# Patient Record
Sex: FEMALE | Race: WHITE | NOT HISPANIC OR LATINO | Employment: PART TIME | URBAN - METROPOLITAN AREA
[De-identification: names, ages, dates, MRNs, and addresses within clinical notes are randomized per-mention and may not be internally consistent; named-entity substitution may affect disease eponyms.]

---

## 2017-01-24 DIAGNOSIS — B37.31 VAGINAL CANDIDIASIS: ICD-10-CM

## 2017-01-24 RX ORDER — FLUCONAZOLE 150 MG/1
TABLET ORAL
Qty: 4 TABLET | Refills: 0 | Status: SHIPPED | OUTPATIENT
Start: 2017-01-24 | End: 2017-03-25 | Stop reason: SDUPTHER

## 2017-02-23 ENCOUNTER — OFFICE VISIT (OUTPATIENT)
Dept: FAMILY MEDICINE CLINIC | Facility: CLINIC | Age: 57
End: 2017-02-23

## 2017-02-23 VITALS
HEIGHT: 64 IN | HEART RATE: 94 BPM | DIASTOLIC BLOOD PRESSURE: 76 MMHG | BODY MASS INDEX: 47.87 KG/M2 | WEIGHT: 280.4 LBS | TEMPERATURE: 98.6 F | OXYGEN SATURATION: 95 % | SYSTOLIC BLOOD PRESSURE: 138 MMHG

## 2017-02-23 DIAGNOSIS — Z79.899 ENCOUNTER FOR LONG-TERM (CURRENT) USE OF MEDICATIONS: ICD-10-CM

## 2017-02-23 DIAGNOSIS — E55.9 VITAMIN D DEFICIENCY: ICD-10-CM

## 2017-02-23 DIAGNOSIS — E03.8 OTHER SPECIFIED HYPOTHYROIDISM: ICD-10-CM

## 2017-02-23 DIAGNOSIS — M17.11 PRIMARY OSTEOARTHRITIS OF RIGHT KNEE: ICD-10-CM

## 2017-02-23 DIAGNOSIS — R79.89 ELEVATED LIVER FUNCTION TESTS: ICD-10-CM

## 2017-02-23 DIAGNOSIS — R42 DIZZINESS: ICD-10-CM

## 2017-02-23 DIAGNOSIS — F41.9 ANXIETY: ICD-10-CM

## 2017-02-23 DIAGNOSIS — E11.9 TYPE 2 DIABETES MELLITUS WITHOUT COMPLICATION, WITHOUT LONG-TERM CURRENT USE OF INSULIN (HCC): ICD-10-CM

## 2017-02-23 DIAGNOSIS — E78.5 HYPERLIPIDEMIA, UNSPECIFIED HYPERLIPIDEMIA TYPE: ICD-10-CM

## 2017-02-23 DIAGNOSIS — I10 HYPERTENSION, ESSENTIAL: Primary | ICD-10-CM

## 2017-02-23 DIAGNOSIS — J30.1 NON-SEASONAL ALLERGIC RHINITIS DUE TO POLLEN: ICD-10-CM

## 2017-02-23 DIAGNOSIS — E66.01 MORBID OBESITY WITH BMI OF 45.0-49.9, ADULT (HCC): ICD-10-CM

## 2017-02-23 PROCEDURE — 99214 OFFICE O/P EST MOD 30 MIN: CPT | Performed by: INTERNAL MEDICINE

## 2017-02-23 RX ORDER — TIZANIDINE HYDROCHLORIDE 4 MG/1
4 CAPSULE, GELATIN COATED ORAL 3 TIMES DAILY
Qty: 90 CAPSULE | Refills: 1 | Status: SHIPPED | OUTPATIENT
Start: 2017-02-23 | End: 2017-05-21 | Stop reason: SDUPTHER

## 2017-02-23 NOTE — PROGRESS NOTES
Tiana Hudson is a 56 y.o. female   Chief Complaint   Patient presents with   • Hypertension     follow up   • Diabetes   • Hypothyroidism         Subjective   History of Present Illness     Tiana Hudson is a 56 y.o.female who presents with for routine check up and evaluation  Medical illnesses addressed today include HTN, Hypothyroidism, Type 2 DM (intolerant of Invokamet, willing to try Victoza), anxiety, vit D def, non-seasonal allergic rhinitis, dizziness, elevated LFT, morbid obesity.  Follow up as planned      The following portions of the patient's history were reviewed and updated as appropriate: past medical history, surgeries, family history, allergies, current medications, past social history and problem list.    A comprehensive review of 14 systems was peformed  Review of Systems   Constitutional: Negative.  Negative for chills, fatigue, fever and unexpected weight change.   HENT: Negative.  Negative for ear pain, hearing loss, sinus pressure, sore throat and tinnitus.    Eyes: Negative.  Negative for pain, discharge and redness.   Respiratory: Negative.  Negative for cough, shortness of breath and wheezing.    Cardiovascular: Negative.  Negative for chest pain, palpitations and leg swelling.   Gastrointestinal: Negative.  Negative for abdominal pain, constipation, diarrhea and nausea.   Endocrine: Negative.  Negative for cold intolerance and heat intolerance.   Genitourinary: Negative.  Negative for difficulty urinating, flank pain and urgency.   Musculoskeletal: Positive for back pain. Negative for joint swelling and myalgias.   Skin: Negative.  Negative for rash and wound.   Allergic/Immunologic: Negative.  Negative for environmental allergies and food allergies.   Neurological: Negative.  Negative for dizziness, seizures, numbness and headaches.   Hematological: Negative.  Negative for adenopathy. Does not bruise/bleed easily.   Psychiatric/Behavioral: Negative.  Negative for decreased  "concentration, dysphoric mood and sleep disturbance. The patient is not nervous/anxious.    All other systems reviewed and are negative.      I have reviewed the patient's medical history in detail and updated the computerized patient record.      Objective   Vitals:    02/23/17 1359   BP: 138/76   BP Location: Right arm   Patient Position: Sitting   Cuff Size: Large Adult   Pulse: 94   Temp: 98.6 °F (37 °C)   TempSrc: Oral   SpO2: 95%   Weight: 280 lb 6.4 oz (127 kg)   Height: 64\" (162.6 cm)   PainSc:   2   PainLoc: Knee           Physical Exam   Constitutional: She appears well-developed and well-nourished.   HENT:   Head: Normocephalic and atraumatic.   Right Ear: External ear normal.   Left Ear: External ear normal.   Nose: Nose normal.   Mouth/Throat: Oropharynx is clear and moist.   Eyes: Conjunctivae and EOM are normal. Pupils are equal, round, and reactive to light.   Neck: Normal range of motion. Neck supple.   Cardiovascular: Normal rate, regular rhythm, normal heart sounds and intact distal pulses.    Pulmonary/Chest: Effort normal and breath sounds normal.   Abdominal: Soft. Bowel sounds are normal.   Musculoskeletal: Normal range of motion.   Bilatetral knee pain  Blood sugars running high   Neurological: She is alert. She has normal reflexes.   Skin: Skin is warm and dry.   Psychiatric: She has a normal mood and affect. Her behavior is normal. Judgment and thought content normal.       Procedures    Reviewed old notes from [employer]                            Assessment/Plan     Diagnoses and all orders for this visit:    Hypertension, essential  Comments:  Salt level to  monitor  Try to lose weight  Follow up as planned  Orders:  -     CBC & Differential  -     Comprehensive Metabolic Panel  -     Lipid Panel With LDL / HDL Ratio  -     Thyroid Panel With TSH  -     Hemoglobin A1c  -     Microalbumin / Creatinine Urine Ratio  -     Vitamin D 25 hydroxy    Primary osteoarthritis of right " knee  Comments:  Follow up as planned  Dr. Cruz as planned  Considering knee replacement  Orders:  -     CBC & Differential  -     Comprehensive Metabolic Panel  -     Lipid Panel With LDL / HDL Ratio  -     Thyroid Panel With TSH  -     Hemoglobin A1c  -     Microalbumin / Creatinine Urine Ratio  -     Vitamin D 25 hydroxy    Other specified hypothyroidism  Comments:  Continue same meds  Check labs today  Follow up as planned                                                                                                        Orders:  -     CBC & Differential  -     Comprehensive Metabolic Panel  -     Lipid Panel With LDL / HDL Ratio  -     Thyroid Panel With TSH  -     Hemoglobin A1c  -     Microalbumin / Creatinine Urine Ratio  -     Vitamin D 25 hydroxy    Hyperlipidemia, unspecified hyperlipidemia type  Comments:  Labs  Watch diet  Orders:  -     CBC & Differential  -     Comprehensive Metabolic Panel  -     Lipid Panel With LDL / HDL Ratio  -     Thyroid Panel With TSH  -     Hemoglobin A1c  -     Microalbumin / Creatinine Urine Ratio  -     Vitamin D 25 hydroxy    Anxiety  Comments:  Lexapro as planned  Follow up as planned  Orders:  -     CBC & Differential  -     Comprehensive Metabolic Panel  -     Lipid Panel With LDL / HDL Ratio  -     Thyroid Panel With TSH  -     Hemoglobin A1c  -     Microalbumin / Creatinine Urine Ratio  -     Vitamin D 25 hydroxy    Vitamin D deficiency  Comments:  Check level  Follow up as planned  Orders:  -     CBC & Differential  -     Comprehensive Metabolic Panel  -     Lipid Panel With LDL / HDL Ratio  -     Thyroid Panel With TSH  -     Hemoglobin A1c  -     Microalbumin / Creatinine Urine Ratio  -     Vitamin D 25 hydroxy    Non-seasonal allergic rhinitis due to pollen  Comments:  Follow up as planned  Return if symptoms change or worsen    Dizziness  Comments:  treat as needed  Follow up as planned    Elevated liver function tests  Comments:  Continue close  monitoring    Morbid obesity with BMI of 45.0-49.9, adult  Comments:  Follow up as planned  No other acute changes at present time    Encounter for long-term (current) use of medications  -     CBC & Differential  -     Comprehensive Metabolic Panel  -     Lipid Panel With LDL / HDL Ratio  -     Thyroid Panel With TSH  -     Hemoglobin A1c  -     Microalbumin / Creatinine Urine Ratio  -     Vitamin D 25 hydroxy    Type 2 diabetes mellitus without complication, without long-term current use of insulin  Comments:  trial of Bydureon  Continue to monitor closely    Other orders  -     TiZANidine (ZANAFLEX) 4 MG capsule; Take 1 capsule by mouth 3 (Three) Times a Day.  -     Exenatide ER 2 MG pen-injector; Inject 1 ampule under the skin 1 (One) Time Per Week.           Dean Fairchild MD  2/23/2017  2:04 PM

## 2017-02-23 NOTE — PATIENT INSTRUCTIONS
Diagnoses and all orders for this visit:    Hypertension, essential  Comments:  Salt level to  monitor  Try to lose weight  Follow up as planned  Orders:  -     CBC & Differential  -     Comprehensive Metabolic Panel  -     Lipid Panel With LDL / HDL Ratio  -     Thyroid Panel With TSH  -     Hemoglobin A1c  -     Microalbumin / Creatinine Urine Ratio  -     Vitamin D 25 hydroxy    Primary osteoarthritis of right knee  Comments:  Follow up as planned  Dr. Cruz as planned  Considering knee replacement  Orders:  -     CBC & Differential  -     Comprehensive Metabolic Panel  -     Lipid Panel With LDL / HDL Ratio  -     Thyroid Panel With TSH  -     Hemoglobin A1c  -     Microalbumin / Creatinine Urine Ratio  -     Vitamin D 25 hydroxy    Other specified hypothyroidism  Comments:  Continue same meds  Check labs today  Follow up as planned                                                                                                        Orders:  -     CBC & Differential  -     Comprehensive Metabolic Panel  -     Lipid Panel With LDL / HDL Ratio  -     Thyroid Panel With TSH  -     Hemoglobin A1c  -     Microalbumin / Creatinine Urine Ratio  -     Vitamin D 25 hydroxy    Hyperlipidemia, unspecified hyperlipidemia type  Comments:  Labs  Watch diet  Orders:  -     CBC & Differential  -     Comprehensive Metabolic Panel  -     Lipid Panel With LDL / HDL Ratio  -     Thyroid Panel With TSH  -     Hemoglobin A1c  -     Microalbumin / Creatinine Urine Ratio  -     Vitamin D 25 hydroxy    Anxiety  Comments:  Lexapro as planned  Follow up as planned  Orders:  -     CBC & Differential  -     Comprehensive Metabolic Panel  -     Lipid Panel With LDL / HDL Ratio  -     Thyroid Panel With TSH  -     Hemoglobin A1c  -     Microalbumin / Creatinine Urine Ratio  -     Vitamin D 25 hydroxy    Vitamin D deficiency  Comments:  Check level  Follow up as planned  Orders:  -     CBC & Differential  -     Comprehensive Metabolic Panel  -      Lipid Panel With LDL / HDL Ratio  -     Thyroid Panel With TSH  -     Hemoglobin A1c  -     Microalbumin / Creatinine Urine Ratio  -     Vitamin D 25 hydroxy    Non-seasonal allergic rhinitis due to pollen  Comments:  Follow up as planned  Return if symptoms change or worsen    Dizziness  Comments:  treat as needed  Follow up as planned    Elevated liver function tests  Comments:  Continue close monitoring    Morbid obesity with BMI of 45.0-49.9, adult  Comments:  Follow up as planned  No other acute changes at present time    Encounter for long-term (current) use of medications  -     CBC & Differential  -     Comprehensive Metabolic Panel  -     Lipid Panel With LDL / HDL Ratio  -     Thyroid Panel With TSH  -     Hemoglobin A1c  -     Microalbumin / Creatinine Urine Ratio  -     Vitamin D 25 hydroxy    Type 2 diabetes mellitus without complication, without long-term current use of insulin  Comments:  trial of Bydureon  Continue to monitor closely    Other orders  -     TiZANidine (ZANAFLEX) 4 MG capsule; Take 1 capsule by mouth 3 (Three) Times a Day.  -     Exenatide ER 2 MG pen-injector; Inject 1 ampule under the skin 1 (One) Time Per Week.

## 2017-02-24 LAB
25(OH)D3+25(OH)D2 SERPL-MCNC: 42.1 NG/ML (ref 30–100)
ALBUMIN SERPL-MCNC: 4.3 G/DL (ref 3.5–5.2)
ALBUMIN/CREAT UR: 137.6 MG/G CREAT (ref 0–30)
ALBUMIN/GLOB SERPL: 1.7 G/DL
ALP SERPL-CCNC: 63 U/L (ref 39–117)
ALT SERPL-CCNC: 23 U/L (ref 1–33)
AST SERPL-CCNC: 25 U/L (ref 1–32)
BASOPHILS # BLD AUTO: 0.05 10*3/MM3 (ref 0–0.2)
BASOPHILS NFR BLD AUTO: 0.8 % (ref 0–1.5)
BILIRUB SERPL-MCNC: 0.7 MG/DL (ref 0.1–1.2)
BUN SERPL-MCNC: 14 MG/DL (ref 6–20)
BUN/CREAT SERPL: 16.3 (ref 7–25)
CALCIUM SERPL-MCNC: 9.9 MG/DL (ref 8.6–10.5)
CHLORIDE SERPL-SCNC: 98 MMOL/L (ref 98–107)
CHOLEST SERPL-MCNC: 220 MG/DL (ref 0–200)
CO2 SERPL-SCNC: 26.5 MMOL/L (ref 22–29)
CREAT SERPL-MCNC: 0.86 MG/DL (ref 0.57–1)
CREAT UR-MCNC: 88.1 MG/DL
DIFFERENTIAL COMMENT: NORMAL
EOSINOPHIL # BLD AUTO: 0.43 10*3/MM3 (ref 0–0.7)
EOSINOPHIL NFR BLD AUTO: 6.9 % (ref 0.3–6.2)
ERYTHROCYTE [DISTWIDTH] IN BLOOD BY AUTOMATED COUNT: 21.4 % (ref 11.7–13)
FT4I SERPL CALC-MCNC: 2.6 (ref 1.2–4.9)
GLOBULIN SER CALC-MCNC: 2.6 GM/DL
GLUCOSE SERPL-MCNC: 130 MG/DL (ref 65–99)
HBA1C MFR BLD: 6.55 % (ref 4.8–5.6)
HCT VFR BLD AUTO: 32.6 % (ref 35.6–45.5)
HDLC SERPL-MCNC: 58 MG/DL (ref 40–60)
HGB BLD-MCNC: 9.5 G/DL (ref 11.9–15.5)
IMM GRANULOCYTES # BLD: 0.02 10*3/MM3 (ref 0–0.03)
IMM GRANULOCYTES NFR BLD: 0.3 % (ref 0–0.5)
LDLC SERPL CALC-MCNC: 140 MG/DL (ref 0–100)
LDLC/HDLC SERPL: 2.41 {RATIO}
LYMPHOCYTES # BLD AUTO: 1.82 10*3/MM3 (ref 0.9–4.8)
LYMPHOCYTES NFR BLD AUTO: 29.2 % (ref 19.6–45.3)
MCH RBC QN AUTO: 20.1 PG (ref 26.9–32)
MCHC RBC AUTO-ENTMCNC: 29.1 G/DL (ref 32.4–36.3)
MCV RBC AUTO: 68.9 FL (ref 80.5–98.2)
MICROALBUMIN UR-MCNC: 121.2 UG/ML
MONOCYTES # BLD AUTO: 0.59 10*3/MM3 (ref 0.2–1.2)
MONOCYTES NFR BLD AUTO: 9.5 % (ref 5–12)
NEUTROPHILS # BLD AUTO: 3.32 10*3/MM3 (ref 1.9–8.1)
NEUTROPHILS NFR BLD AUTO: 53.3 % (ref 42.7–76)
PLATELET # BLD AUTO: 381 10*3/MM3 (ref 140–500)
PLATELET BLD QL SMEAR: NORMAL
POTASSIUM SERPL-SCNC: 4.7 MMOL/L (ref 3.5–5.2)
PROT SERPL-MCNC: 6.9 G/DL (ref 6–8.5)
RBC # BLD AUTO: 4.73 10*6/MM3 (ref 3.9–5.2)
RBC MORPH BLD: NORMAL
SODIUM SERPL-SCNC: 138 MMOL/L (ref 136–145)
T3RU NFR SERPL: 27 % (ref 24–39)
T4 SERPL-MCNC: 9.8 UG/DL (ref 4.5–12)
TRIGL SERPL-MCNC: 112 MG/DL (ref 0–150)
TSH SERPL DL<=0.005 MIU/L-ACNC: 2.06 UIU/ML (ref 0.45–4.5)
VLDLC SERPL CALC-MCNC: 22.4 MG/DL (ref 5–40)
WBC # BLD AUTO: 6.23 10*3/MM3 (ref 4.5–10.7)

## 2017-03-13 ENCOUNTER — CLINICAL SUPPORT (OUTPATIENT)
Dept: ORTHOPEDIC SURGERY | Facility: CLINIC | Age: 57
End: 2017-03-13

## 2017-03-13 VITALS — TEMPERATURE: 98.6 F | BODY MASS INDEX: 46.61 KG/M2 | HEIGHT: 64 IN | WEIGHT: 273 LBS

## 2017-03-13 DIAGNOSIS — M17.0 ARTHRITIS OF BOTH KNEES: Primary | ICD-10-CM

## 2017-03-13 DIAGNOSIS — M17.11 ARTHRITIS OF RIGHT KNEE: ICD-10-CM

## 2017-03-13 PROCEDURE — 99214 OFFICE O/P EST MOD 30 MIN: CPT | Performed by: NURSE PRACTITIONER

## 2017-03-13 PROCEDURE — 20610 DRAIN/INJ JOINT/BURSA W/O US: CPT | Performed by: NURSE PRACTITIONER

## 2017-03-13 RX ORDER — CYCLOBENZAPRINE HCL 10 MG
10 TABLET ORAL 3 TIMES DAILY PRN
Status: ON HOLD | COMMUNITY
End: 2017-05-22

## 2017-03-13 RX ORDER — METHYLPREDNISOLONE ACETATE 80 MG/ML
80 INJECTION, SUSPENSION INTRA-ARTICULAR; INTRALESIONAL; INTRAMUSCULAR; SOFT TISSUE
Status: COMPLETED | OUTPATIENT
Start: 2017-03-13 | End: 2017-03-13

## 2017-03-13 RX ORDER — BUPIVACAINE HYDROCHLORIDE 5 MG/ML
2 INJECTION, SOLUTION PERINEURAL
Status: COMPLETED | OUTPATIENT
Start: 2017-03-13 | End: 2017-03-13

## 2017-03-13 RX ORDER — LIDOCAINE HYDROCHLORIDE 10 MG/ML
2 INJECTION, SOLUTION INFILTRATION; PERINEURAL
Status: COMPLETED | OUTPATIENT
Start: 2017-03-13 | End: 2017-03-13

## 2017-03-13 RX ORDER — CEFAZOLIN SODIUM 2 G/100ML
2 INJECTION, SOLUTION INTRAVENOUS ONCE
Status: CANCELLED | OUTPATIENT
Start: 2017-03-13 | End: 2017-03-13

## 2017-03-13 RX ADMIN — BUPIVACAINE HYDROCHLORIDE 2 ML: 5 INJECTION, SOLUTION PERINEURAL at 09:28

## 2017-03-13 RX ADMIN — METHYLPREDNISOLONE ACETATE 80 MG: 80 INJECTION, SUSPENSION INTRA-ARTICULAR; INTRALESIONAL; INTRAMUSCULAR; SOFT TISSUE at 09:28

## 2017-03-13 RX ADMIN — LIDOCAINE HYDROCHLORIDE 2 ML: 10 INJECTION, SOLUTION INFILTRATION; PERINEURAL at 09:28

## 2017-03-13 NOTE — PATIENT INSTRUCTIONS
[facility]    KNEE HOME EXERCISES      It is important that you maintain and possibly improve your strength and range of motion of your knee.      •  Walk frequently for short intervals  •  Using a cane or walker to allow you to walk safely if needed  •  Avoid sitting for long periods of time to avoid cramping and swelling of your leg   •  Do exercises either on a bed or in a chair.  •  If any of the exercises cause you pain, either eliminate that exercise or decrease          the motion or repetitions      Start exercises with 10 repetitions and increase to 30 repetitions.  Do two sets of each exercise each day    ANKLE PUMPS    1. Move your feet up and down as if you are pumping on a gas pedal       STRAIGHT LEG RAISES    1. Lie flat with your injured leg straight.  Keep the other leg bent.  2. Tighten the injured leg's thigh muscle.  3. Lift leg, with knee locked in the straight position no higher than the other knee for  seconds  4. Lower leg slowly. Rest for 5 seconds      SHORT ARC QUAD    1. Lie with both knees bent over a pillow  2. Straighten both knees  3. Hold 5 seconds  4. Bend knees back to starting position and repeat sequence       QUADRICEPS     1. Lie flat with your injured let straight.  Keep the other leg bent.  2. Tighten the injured leg's thigh muscle by trying to move your kneecap toward the  thigh.  3. Make your leg as stiff as you can.  4. Hold for 5 seconds and relax for 5 seconds        HAMSTRING STRETCH    1. Lie flat with your injured leg straight. Keep the other leg bent  2. Press your heel of your injured leg into the floor for 5 seconds       OR  1. Sit with injured leg out straight.   2. Loop a sheet around the ball of foot and toes.  3. Gently pull on the sheet, keeping the leg straight  4. Hold for 10-30 seconds      KNEE FLEXION-EXTENSION SITTING     1. Sit with injured let bent as shown  2. Straighten leg at the knee  3. Hold 5 seconds  4. Bend knee back  to starting position   5. Rest for 2-5 seconds and repeat sequence       HEEL SLIDES     1. Lie on back with legs straight  2. Slide heels toward buttocks  3. Return to starting position       HEEL SLIDS WITH SHEET    1. Lie on your back with a sheet wrapped around your foot  2. Pull on the sheet to assist you in bending your knee and sliding your heel toward  your buttocks  3. Hold for 5 seconds        KNEE FLEXION STRETCH    1. Sit in a chair  2. Bend bad knee back as far as you can   3. Hold stretch for 5-10 seconds      CHAIR PUSH UPS    1. Sit in a chair with arm rests  2. Place hands on armrests  3. Straighten arms, raising buttocks up off of chair         WALL SLIDES    1. Stand with your back and head against a wall.    2. Keep your feet shoulder width apart and 6-12 inches from the wall  3. Slowly slide down the wall until your knees are slightly bent.    4. Hold for 5 seconds and then slowly slide back up  5. As you get stronger, then you can slowly increase the bend in your knees, but do  not drop your buttocks below the level of your knees       STEP UPS    1. Stand with your foot on your injured leg on a 3-5 inch support (such as a block of  wood)  2. Place other foot on the floor  3. Shift your weight onto the foot on the block and try to straighten the knee and  raising the other foot off of the floor  4. Slowly lower your foot until only the heel touches the floor

## 2017-03-13 NOTE — PROGRESS NOTES
Knee Joint Injection      Patient: Tiana Hudson  YOB: 1960    Chief Complaints: Knee pain    Subjective:    History of Present Illness: Pt gets intermittent  injections with good relief. Is here for repeat injection. Understands options. The pain is a generalized joint line tenderness.  It has been progressive in nature but remains intermittent.  Worsened by prolonged standing or walking and squatting activities. Has had improvement in the past with ice/heat, rest, and injections. KNEE: TIMING:  The pain is described as ACUTE on CHRONIC.  LOCATION: medial joint line tenderness. AGGRAVATING FACTORS:  Is worsened by prolonged standing, sitting, walking and squatting activities.  ASSOCIATED SYMPTOMS:  swelling, tenderness, and aching. OTHER SYMPTOMS denies popping, locking or catching. RELIEVING FACTORS:  Previous treatment has included cortisone injections  OTC Tylenol  OTC meds/NSAIDS  assistive devices.    This problem is not new to this examiner.     Allergies:   Allergies   Allergen Reactions   • Sulfa Antibiotics Hives       Medications:   Home Medications:  Current Outpatient Prescriptions on File Prior to Visit   Medication Sig   • acetaminophen (TYLENOL) 500 MG tablet Take 1,000 mg by mouth Every 6 (Six) Hours As Needed for mild pain (1-3).   • amLODIPine-benazepril (LOTREL 5-20) 5-20 MG per capsule Take 1 capsule by mouth daily.   • Canagliflozin-Metformin HCl (INVOKAMET) 150-1000 MG tablet Take 150-1,000 tablets by mouth daily.   • celecoxib (CeleBREX) 200 MG capsule Take 1 capsule by mouth 2 (Two) Times a Day.   • cholecalciferol (VITAMIN D3) 400 UNITS tablet Take 2,000 Units by mouth daily.   • escitalopram (LEXAPRO) 10 MG tablet Take 1 tablet by mouth daily.   • Exenatide ER 2 MG pen-injector Inject 1 ampule under the skin 1 (One) Time Per Week.   • fluconazole (DIFLUCAN) 150 MG tablet TAKE ONE TABLET BY MOUTH ONCE WEEKLY   • levothyroxine (SYNTHROID, LEVOTHROID) 125 MCG tablet Take 1  tablet by mouth daily.   • meclizine (ANTIVERT) 25 MG tablet Take 1 tablet by mouth 3 (Three) Times a Day As Needed for dizziness.   • metFORMIN (GLUCOPHAGE) 500 MG tablet TAKE ONE TABLET BY MOUTH TWO TIMES A DAY WITH MEALS   • oxybutynin XL (DITROPAN-XL) 10 MG 24 hr tablet Take 10 mg by mouth Daily.   • pravastatin (PRAVACHOL) 40 MG tablet Take 1 tablet by mouth daily.   • rOPINIRole (REQUIP) 0.5 MG tablet Take 1 tablet by mouth Every Night. Take 1 hour before bedtime.   • TiZANidine (ZANAFLEX) 4 MG capsule Take 1 capsule by mouth 3 (Three) Times a Day.     No current facility-administered medications on file prior to visit.      Current Medications:  Scheduled Meds:  Continuous Infusions:  No current facility-administered medications for this visit.   PRN Meds:.    I have reviewed the patient's medical history in detail and updated the computerized patient record.  Review and summarization of old records include:    Past Medical History   Diagnosis Date   • Arthritis    • Bladder mass    • Depression    • Diabetes mellitus    • Disease of thyroid gland      HYPOTHYROIDISM   • Gallstones    • Hyperlipidemia    • Hypertension    • Kidney stone    • Lumbar stenosis    • PONV (postoperative nausea and vomiting)    • Positive TB test      chest x-ray neg        Past Surgical History   Procedure Laterality Date   • Appendectomy     • Ovarian cyst removal Left    • Dilatation and curettage     • Knee arthroscopy w/ meniscal repair Left    • Cystoscopy bladder biopsy N/A 10/3/2016     Procedure: CYSTOSCOPY BLADDER BIOPSY;  Surgeon: Rei Calvert MD;  Location: [facility] OR;  Service:         Social History     Occupational History   • Not on file.     Social History Main Topics   • Smoking status: Never Smoker   • Smokeless tobacco: Never Used   • Alcohol use 0.6 oz/week     1 Shots of liquor per week   • Drug use: No   • Sexual activity: Defer    Social History     Social History Narrative        Family  History   Problem Relation Age of Onset   • Hepatitis Mother    • Heart failure Father    • Stroke Father    • Hypertension Father        ROS: 14 point review of systems was performed and was negative except for documented findings in HPI and today's encounter.     Allergies:   Allergies   Allergen Reactions   • Sulfa Antibiotics Hives     Constitutional:  Denies fever, shaking or chills   Eyes:  Denies change in visual acuity   HENT:  Denies nasal congestion or sore throat   Respiratory:  Denies cough or shortness of breath   Cardiovascular:  Denies chest pain or severe LE edema   GI:  Denies abdominal pain, nausea, vomiting, bloody stools or diarrhea   Musculoskeletal:  Numbness, tingling, or loss of motor function only as noted above in history of present illness.  : Denies painful urination or hematuria  Integument:  Denies rash, lesion or ulceration   Neurologic:  Denies headache or focal weakness  Endocrine:  Denies lymphadenopathy  Psych:  Denies confusion or change in mental status   Hem:  Denies active bleeding    Physical Exam:  Body mass index is 46.86 kg/(m^2).  Vitals:    03/13/17 0859   Temp: 98.6 °F (37 °C)     Vital Signs:  reviewed  Constitutional: Awake alert and oriented x3, well developed, no acute distress, non-toxic appearance.  EYES: symmetric, sclera clear  ENT:  Normocephalic, Atraumatic.   Respiratory:  No respiratory distress, No wheezing  CV: pulse regular, no palpitations or pallor.  GI:  Abdomen soft, non-tender.   Vascular:  Intact distal pulses, No cyanosis, no signs or symptoms of DVT.  Neurologic: Sensation grossly intact to the involved extremity, No focal deficits noted.   Neck: No tenderness, Supple.  Integument: warm, dry, no ulcerations.   Psychiatric:  Oriented, no pathological affect.  Musculoskeletal:    Affected knee(s):  Painful gait with a subtle limp, positive for synovitis, swelling, joint effusion with crepitation.  Lachman negative  Posterior drawer  negative  Luz Marina's negative  Patellofemoral grind +  Sensation grossly intact to light touch throughout the lower extremity  Skin is intact  Distal pulses are palpable  No signs or symptoms of DVT        Diagnostic Data:     Imaging was done previously in the office and discussed at length with the patient:    Indication: pain related symptoms,  Views: 3V AP, LAT & 40 degree PA bilateral knee(s)   Findings: severe end-stage arthritis (bone on bone, subchondral sclerosis/cysts, osteophytes)  Comparison views: viewed last xray done in the office.     Procedure:  Large Joint Arthrocentesis  Date/Time: 3/13/2017 9:28 AM  Consent given by: patient  Site marked: site marked  Timeout: Immediately prior to procedure a time out was called to verify the correct patient, procedure, equipment, support staff and site/side marked as required   Supporting Documentation  Indications: pain and joint swelling   Procedure Details  Location: knee - L knee  Preparation: Patient was prepped and draped in the usual sterile fashion  Needle size: 22 G  Approach: anterolateral  Medications administered: 2 mL bupivacaine; 2 mL lidocaine 1 %; 80 mg methylPREDNISolone acetate 80 MG/ML  Patient tolerance: patient tolerated the procedure well with no immediate complications          Assessment. Severe arthritis bilateral knee(s). Right knee is worse than the Left and would like to schedule Right TKA in the next few months.       Plan: Is to proceed with injection  Follow up as indicated.  Ice, elevate, and rest as needed.  Additional interventions include: Biomechanics of pertinent body area discussed.  Risks, benefits, alternatives, comparisons, and complications of accepted medicines, injections, recommendations, surgical procedures, and therapies explained and education provided in laymen's terms. The patient was given the opportunity to ask questions and they were answerved to their satisfaction.   Natural history and expected course  discussed. Questions answered.  Advice on benefits of, and types of regular/moderate exercise.  Lifestyle measures for weight loss with biomechanical explanations for weight loss and how this affects orthopedic condition.  Cortisone Injection. See procedure note.  OTC analgesics as needed with dosage warning and instructions given.  Medications per orders; safety, precautions, and warnings given.  Cryotherapy/brachy therapy as indicated with instructions.   TKA: Continuation of conservative management vs. TKA: I reviewed anatomy of a total knee arthroplasty in laymen's terms, as well as typical postoperative recovery and possibly 6-12 months for maximal recovery, and possible need for rehabilitation stay after hospitalization. We also discussed risks, benefits, alternatives, and limitations of procedure with risks including but not limited to neurovascular damage, bleeding, infection, malalignment, chronic pian, failure of implants, osteolysis, loosening of implants, loss of motion, weakness, stiffness, instability, DVT, pulmonary embolus, death, stroke, complex regional pain syndrome, myocardial infarction, and need for additional procedures. Concept of substitution vs. replacement discussed.  No guarantees were given regarding results of surgery.  Patient verbalized understanding, and was given the opportunity to ask and have all questions answered today.    Discussed risks involved with BMI at 46.8, weight distribution is apple shapped, encouraged thigh strengthening exercises and exercises bike and weight loss leading into surgery. Right knee is worse than the Left and would like to schedule Right TKA in the next few months.   Dr. Cruz in to discuss surgery. Is a nurse in L&D.   3/13/2017  NOEMI

## 2017-03-24 DIAGNOSIS — B37.31 VAGINAL CANDIDIASIS: ICD-10-CM

## 2017-03-25 DIAGNOSIS — B37.31 VAGINAL CANDIDIASIS: ICD-10-CM

## 2017-03-25 RX ORDER — FLUCONAZOLE 150 MG/1
150 TABLET ORAL ONCE
Qty: 4 TABLET | Refills: 0 | Status: SHIPPED | OUTPATIENT
Start: 2017-03-25 | End: 2017-03-25

## 2017-03-27 RX ORDER — FLUCONAZOLE 150 MG/1
TABLET ORAL
Qty: 4 TABLET | Refills: 0 | Status: SHIPPED | OUTPATIENT
Start: 2017-03-27 | End: 2017-05-11 | Stop reason: SDUPTHER

## 2017-03-30 RX ORDER — ESCITALOPRAM OXALATE 10 MG/1
TABLET ORAL
Qty: 90 TABLET | Refills: 2 | Status: SHIPPED | OUTPATIENT
Start: 2017-03-30 | End: 2017-05-11 | Stop reason: SDUPTHER

## 2017-04-19 ENCOUNTER — HOSPITAL ENCOUNTER (OUTPATIENT)
Dept: GENERAL RADIOLOGY | Facility: HOSPITAL | Age: 57
Discharge: HOME | End: 2017-04-19
Attending: INTERNAL MEDICINE | Admitting: INTERNAL MEDICINE

## 2017-04-19 ENCOUNTER — OFFICE VISIT (OUTPATIENT)
Dept: FAMILY MEDICINE CLINIC | Facility: CLINIC | Age: 57
End: 2017-04-19

## 2017-04-19 VITALS
BODY MASS INDEX: 48.32 KG/M2 | HEIGHT: 64 IN | DIASTOLIC BLOOD PRESSURE: 68 MMHG | WEIGHT: 283 LBS | TEMPERATURE: 99.4 F | SYSTOLIC BLOOD PRESSURE: 128 MMHG | HEART RATE: 95 BPM | OXYGEN SATURATION: 94 %

## 2017-04-19 DIAGNOSIS — E78.5 HYPERLIPIDEMIA, UNSPECIFIED HYPERLIPIDEMIA TYPE: ICD-10-CM

## 2017-04-19 DIAGNOSIS — M17.0 ARTHRITIS OF BOTH KNEES: ICD-10-CM

## 2017-04-19 DIAGNOSIS — M25.561 PAIN IN BOTH KNEES, UNSPECIFIED CHRONICITY: ICD-10-CM

## 2017-04-19 DIAGNOSIS — I10 HYPERTENSION, ESSENTIAL: Primary | ICD-10-CM

## 2017-04-19 DIAGNOSIS — E03.8 OTHER SPECIFIED HYPOTHYROIDISM: ICD-10-CM

## 2017-04-19 DIAGNOSIS — Z01.818 PREOPERATIVE CLEARANCE: ICD-10-CM

## 2017-04-19 DIAGNOSIS — E11.9 TYPE 2 DIABETES MELLITUS WITHOUT COMPLICATION, WITHOUT LONG-TERM CURRENT USE OF INSULIN (HCC): ICD-10-CM

## 2017-04-19 DIAGNOSIS — M25.562 PAIN IN BOTH KNEES, UNSPECIFIED CHRONICITY: ICD-10-CM

## 2017-04-19 DIAGNOSIS — M17.11 ARTHRITIS OF RIGHT KNEE: ICD-10-CM

## 2017-04-19 PROCEDURE — 99214 OFFICE O/P EST MOD 30 MIN: CPT | Performed by: INTERNAL MEDICINE

## 2017-04-19 PROCEDURE — 93000 ELECTROCARDIOGRAM COMPLETE: CPT | Performed by: INTERNAL MEDICINE

## 2017-04-19 PROCEDURE — 71020 HC CHEST PA AND LATERAL: CPT

## 2017-04-19 NOTE — PATIENT INSTRUCTIONS
Diagnoses and all orders for this visit:    Hypertension, essential  Comments:  Continue same meds  Follow  up as planned    Hyperlipidemia, unspecified hyperlipidemia type  Comments:  Watch diet and exercise regularly  Now stable at present time    Other specified hypothyroidism  Comments:  Continue hypothyroidism monitoring  Follow up as planned    Arthritis of both knees  Comments:  Continue treatment with Dr. Cruz  Follow up as planned    Arthritis of right knee  Comments:  planned knee replacement  Follow up as planned    Pain in both knees, unspecified chronicity  -     XR Chest 2 View    Type 2 diabetes mellitus without complication, without long-term current use of insulin  Comments:  Follow up as planned at present time  Continue same treatments    Preoperative clearance  Comments:  Patient is medically cleared for surgery from my point of view  Will review labs collected today  EKG is normal.  Orders:  -     XR Chest 2 View    Other orders  -     ECG 12 Lead

## 2017-04-19 NOTE — PROGRESS NOTES
Tiana Hudson is a 56 y.o. female   Chief Complaint   Patient presents with   • Hypertension     clearance for surgery          Subjective   History of Present Illness     Tiana Hudson is a 56 y.o.female who presents with:surgery planned by MAO May 22.  She is having total knee arthroplasty.  Has labs scheduled ast hospital.  No other acute changes at present time. Reviewed xray myself and looks good.  EKG is totally normal.  No pain meds at present time.  New job going okay but misses L and D      The following portions of the patient's history were reviewed and updated as appropriate: past medical history, surgeries, family history, allergies, current medications, past social history and problem list.    A comprehensive review of 14 systems was peformed  Review of Systems   Constitutional: Negative for chills, fatigue, fever and unexpected weight change.   HENT: Negative for ear pain, hearing loss, sinus pressure, sore throat and tinnitus.    Eyes: Negative for pain, discharge and redness.   Respiratory: Negative for cough, shortness of breath and wheezing.    Cardiovascular: Negative for chest pain, palpitations and leg swelling.   Gastrointestinal: Negative for abdominal pain, constipation, diarrhea and nausea.   Endocrine: Negative for cold intolerance and heat intolerance.   Genitourinary: Negative for difficulty urinating, flank pain and urgency.   Musculoskeletal: Negative for back pain, joint swelling and myalgias.   Skin: Negative for rash and wound.   Allergic/Immunologic: Negative for environmental allergies and food allergies.   Neurological: Negative for dizziness, seizures, numbness and headaches.   Hematological: Negative for adenopathy. Does not bruise/bleed easily.   Psychiatric/Behavioral: Negative for decreased concentration, dysphoric mood and sleep disturbance. The patient is not nervous/anxious.    All other systems reviewed and are negative.      I have reviewed the patient's  "medical history in detail and updated the computerized patient record.    Objective   Vitals:    04/19/17 1044   BP: 128/68   BP Location: Left arm   Patient Position: Sitting   Cuff Size: Adult   Pulse: 95   Temp: 99.4 °F (37.4 °C)   TempSrc: Oral   SpO2: 94%   Weight: 283 lb (128 kg)   Height: 64\" (162.6 cm)   PainSc:   4   PainLoc: Knee         Physical Exam   Constitutional: She appears well-developed and well-nourished.   HENT:   Head: Normocephalic and atraumatic.   Right Ear: External ear normal.   Left Ear: External ear normal.   Nose: Nose normal.   Mouth/Throat: Oropharynx is clear and moist.   Eyes: Conjunctivae and EOM are normal. Pupils are equal, round, and reactive to light.   Neck: Normal range of motion. Neck supple.   Cardiovascular: Normal rate, regular rhythm, normal heart sounds and intact distal pulses.    Pulmonary/Chest: Effort normal and breath sounds normal.   Abdominal: Soft. Bowel sounds are normal.   Musculoskeletal: Normal range of motion.   R>L DJD with planned replacement   Neurological: She is alert. She has normal reflexes.   Skin: Skin is warm and dry.   Psychiatric: She has a normal mood and affect. Her behavior is normal. Judgment and thought content normal.         ECG 12 Lead  Date/Time: 4/19/2017 1:05 PM  Performed by: KYLE UPTON  Authorized by: KYLE UPTON   Comparison: not compared with previous ECG   Previous ECG: no previous ECG available  Rhythm: sinus rhythm  BPM: 65  Clinical impression: normal ECG            Reviewed consult notes from Dr. Cruz    Reviewed actual xray films Chest xray as planned.  No other acute changes                        Assessment/Plan     Diagnoses and all orders for this visit:    Hypertension, essential  Comments:  Continue same meds  Follow  up as planned    Hyperlipidemia, unspecified hyperlipidemia type  Comments:  Watch diet and exercise regularly  Now stable at present time    Other specified hypothyroidism  Comments:  Continue " hypothyroidism monitoring  Follow up as planned    Arthritis of both knees  Comments:  Continue treatment with Dr. Cruz  Follow up as planned    Arthritis of right knee  Comments:  planned knee replacement  Follow up as planned    Pain in both knees, unspecified chronicity  -     XR Chest 2 View    Type 2 diabetes mellitus without complication, without long-term current use of insulin  Comments:  Follow up as planned at present time  Continue same treatments    Preoperative clearance  Comments:  Patient is medically cleared for surgery from my point of view  Will review labs collected today  EKG is normal.  Orders:  -     XR Chest 2 View    Other orders  -     ECG 12 Lead           Dean Fairchild MD  4/19/2017  10:47 AM

## 2017-05-11 ENCOUNTER — APPOINTMENT (OUTPATIENT)
Dept: PREADMISSION TESTING | Facility: HOSPITAL | Age: 57
End: 2017-05-11

## 2017-05-11 VITALS
TEMPERATURE: 97.9 F | HEIGHT: 64 IN | HEART RATE: 75 BPM | WEIGHT: 283.6 LBS | DIASTOLIC BLOOD PRESSURE: 82 MMHG | SYSTOLIC BLOOD PRESSURE: 133 MMHG | RESPIRATION RATE: 16 BRPM | OXYGEN SATURATION: 97 % | BODY MASS INDEX: 48.42 KG/M2

## 2017-05-11 DIAGNOSIS — M17.11 ARTHRITIS OF RIGHT KNEE: ICD-10-CM

## 2017-05-11 LAB
ANION GAP SERPL CALCULATED.3IONS-SCNC: 14.4 MMOL/L
BACTERIA UR QL AUTO: ABNORMAL /HPF
BILIRUB UR QL STRIP: NEGATIVE
BLADDER EPITHELIAL: ABNORMAL /LPF
BUN BLD-MCNC: 16 MG/DL (ref 6–20)
BUN/CREAT SERPL: 21.3 (ref 7–25)
CALCIUM SPEC-SCNC: 9.7 MG/DL (ref 8.6–10.5)
CHLORIDE SERPL-SCNC: 100 MMOL/L (ref 98–107)
CLARITY UR: ABNORMAL
CO2 SERPL-SCNC: 24.6 MMOL/L (ref 22–29)
COLOR UR: ABNORMAL
CREAT BLD-MCNC: 0.75 MG/DL (ref 0.57–1)
DEPRECATED RDW RBC AUTO: 50.6 FL (ref 37–54)
ERYTHROCYTE [DISTWIDTH] IN BLOOD BY AUTOMATED COUNT: 20.7 % (ref 11.7–13)
GFR SERPL CREATININE-BSD FRML MDRD: 81 ML/MIN/1.73
GLUCOSE BLD-MCNC: 115 MG/DL (ref 65–99)
GLUCOSE UR STRIP-MCNC: NEGATIVE MG/DL
HCT VFR BLD AUTO: 32.2 % (ref 35.6–45.5)
HGB BLD-MCNC: 9.4 G/DL (ref 11.9–15.5)
HGB UR QL STRIP.AUTO: ABNORMAL
HYALINE CASTS UR QL AUTO: ABNORMAL /LPF
KETONES UR QL STRIP: ABNORMAL
LEUKOCYTE ESTERASE UR QL STRIP.AUTO: ABNORMAL
MCH RBC QN AUTO: 19.5 PG (ref 26.9–32)
MCHC RBC AUTO-ENTMCNC: 29.2 G/DL (ref 32.4–36.3)
MCV RBC AUTO: 66.7 FL (ref 80.5–98.2)
NITRITE UR QL STRIP: POSITIVE
PH UR STRIP.AUTO: <=5 [PH] (ref 5–8)
PLATELET # BLD AUTO: 343 10*3/MM3 (ref 140–500)
PMV BLD AUTO: 10 FL (ref 6–12)
POTASSIUM BLD-SCNC: 4.1 MMOL/L (ref 3.5–5.2)
PROT UR QL STRIP: ABNORMAL
RBC # BLD AUTO: 4.83 10*6/MM3 (ref 3.9–5.2)
RBC # UR: ABNORMAL /HPF
REF LAB TEST METHOD: ABNORMAL
RENAL EPI CELLS #/AREA URNS HPF: ABNORMAL /HPF
SODIUM BLD-SCNC: 139 MMOL/L (ref 136–145)
SP GR UR STRIP: 1.02 (ref 1–1.03)
SQUAMOUS #/AREA URNS HPF: ABNORMAL /HPF
UROBILINOGEN UR QL STRIP: ABNORMAL
WBC NRBC COR # BLD: 6.98 10*3/MM3 (ref 4.5–10.7)
WBC UR QL AUTO: ABNORMAL /HPF
YEAST URNS QL MICRO: ABNORMAL /HPF

## 2017-05-11 PROCEDURE — 85027 COMPLETE CBC AUTOMATED: CPT | Performed by: ORTHOPAEDIC SURGERY

## 2017-05-11 PROCEDURE — 80048 BASIC METABOLIC PNL TOTAL CA: CPT | Performed by: ORTHOPAEDIC SURGERY

## 2017-05-11 PROCEDURE — 36415 COLL VENOUS BLD VENIPUNCTURE: CPT

## 2017-05-11 PROCEDURE — 87086 URINE CULTURE/COLONY COUNT: CPT | Performed by: ORTHOPAEDIC SURGERY

## 2017-05-11 PROCEDURE — 81001 URINALYSIS AUTO W/SCOPE: CPT | Performed by: ORTHOPAEDIC SURGERY

## 2017-05-11 RX ORDER — ESCITALOPRAM OXALATE 10 MG/1
10 TABLET ORAL DAILY
COMMUNITY

## 2017-05-11 RX ORDER — FLUCONAZOLE 150 MG/1
150 TABLET ORAL AS NEEDED
COMMUNITY
End: 2017-07-13

## 2017-05-12 ENCOUNTER — TELEPHONE (OUTPATIENT)
Dept: ORTHOPEDIC SURGERY | Facility: CLINIC | Age: 57
End: 2017-05-12

## 2017-05-12 DIAGNOSIS — N39.0 URINARY TRACT INFECTION, SITE UNSPECIFIED: Primary | ICD-10-CM

## 2017-05-12 LAB — BACTERIA SPEC AEROBE CULT: NORMAL

## 2017-05-13 ENCOUNTER — TELEPHONE (OUTPATIENT)
Dept: FAMILY MEDICINE CLINIC | Facility: CLINIC | Age: 57
End: 2017-05-13

## 2017-05-15 RX ORDER — PHENAZOPYRIDINE HYDROCHLORIDE 200 MG/1
200 TABLET, FILM COATED ORAL 3 TIMES DAILY PRN
Qty: 30 TABLET | Refills: 0 | Status: ON HOLD | OUTPATIENT
Start: 2017-05-15 | End: 2017-05-22

## 2017-05-15 RX ORDER — AMOXICILLIN 875 MG/1
875 TABLET, COATED ORAL 2 TIMES DAILY
Qty: 10 TABLET | Refills: 0 | Status: ON HOLD | OUTPATIENT
Start: 2017-05-15 | End: 2017-05-22

## 2017-05-16 ENCOUNTER — TELEPHONE (OUTPATIENT)
Dept: FAMILY MEDICINE CLINIC | Facility: CLINIC | Age: 57
End: 2017-05-16

## 2017-05-16 RX ORDER — FLUCONAZOLE 150 MG/1
150 TABLET ORAL ONCE
Qty: 2 TABLET | Refills: 0 | Status: SHIPPED | OUTPATIENT
Start: 2017-05-16 | End: 2017-05-16

## 2017-05-17 RX ORDER — FLUCONAZOLE 150 MG/1
TABLET ORAL
Qty: 4 TABLET | Refills: 0 | OUTPATIENT
Start: 2017-05-17

## 2017-05-19 ENCOUNTER — OFFICE VISIT (OUTPATIENT)
Dept: ORTHOPEDIC SURGERY | Facility: CLINIC | Age: 57
End: 2017-05-19

## 2017-05-19 VITALS — TEMPERATURE: 97.6 F | WEIGHT: 281 LBS | BODY MASS INDEX: 47.97 KG/M2 | HEIGHT: 64 IN

## 2017-05-19 DIAGNOSIS — M17.11 ARTHRITIS OF RIGHT KNEE: Primary | ICD-10-CM

## 2017-05-19 PROCEDURE — S0260 H&P FOR SURGERY: HCPCS | Performed by: NURSE PRACTITIONER

## 2017-05-22 ENCOUNTER — ANESTHESIA (OUTPATIENT)
Dept: PERIOP | Facility: HOSPITAL | Age: 57
End: 2017-05-22

## 2017-05-22 ENCOUNTER — APPOINTMENT (OUTPATIENT)
Dept: GENERAL RADIOLOGY | Facility: HOSPITAL | Age: 57
End: 2017-05-22

## 2017-05-22 ENCOUNTER — ANESTHESIA EVENT (OUTPATIENT)
Dept: PERIOP | Facility: HOSPITAL | Age: 57
End: 2017-05-22

## 2017-05-22 ENCOUNTER — HOSPITAL ENCOUNTER (INPATIENT)
Facility: HOSPITAL | Age: 57
LOS: 3 days | Discharge: HOME HEALTH | End: 2017-05-25
Attending: ORTHOPAEDIC SURGERY | Admitting: ORTHOPAEDIC SURGERY

## 2017-05-22 DIAGNOSIS — M17.11 ARTHRITIS OF RIGHT KNEE: ICD-10-CM

## 2017-05-22 LAB
B-HCG UR QL: NEGATIVE
B-HCG UR QL: NEGATIVE
GLUCOSE BLDC GLUCOMTR-MCNC: 109 MG/DL (ref 70–130)
GLUCOSE BLDC GLUCOMTR-MCNC: 141 MG/DL (ref 70–130)
INTERNAL NEGATIVE CONTROL: NEGATIVE
INTERNAL NEGATIVE CONTROL: NEGATIVE
INTERNAL POSITIVE CONTROL: POSITIVE
INTERNAL POSITIVE CONTROL: POSITIVE
Lab: NORMAL
Lab: NORMAL

## 2017-05-22 PROCEDURE — 25010000002 FENTANYL CITRATE (PF) 100 MCG/2ML SOLUTION: Performed by: ANESTHESIOLOGY

## 2017-05-22 PROCEDURE — C1713 ANCHOR/SCREW BN/BN,TIS/BN: HCPCS | Performed by: ORTHOPAEDIC SURGERY

## 2017-05-22 PROCEDURE — C1776 JOINT DEVICE (IMPLANTABLE): HCPCS | Performed by: ORTHOPAEDIC SURGERY

## 2017-05-22 PROCEDURE — 25010000002 EPINEPHRINE PER 0.1 MG: Performed by: ORTHOPAEDIC SURGERY

## 2017-05-22 PROCEDURE — 25010000002 NEOSTIGMINE 10 MG/10ML SOLUTION: Performed by: ANESTHESIOLOGY

## 2017-05-22 PROCEDURE — 73560 X-RAY EXAM OF KNEE 1 OR 2: CPT

## 2017-05-22 PROCEDURE — 8E0YXBZ COMPUTER ASSISTED PROCEDURE OF LOWER EXTREMITY: ICD-10-PCS | Performed by: ORTHOPAEDIC SURGERY

## 2017-05-22 PROCEDURE — 25010000002 MEPERIDINE 25 MG/0.5ML SOLUTION

## 2017-05-22 PROCEDURE — 25010000002 KETOROLAC TROMETHAMINE PER 15 MG: Performed by: ORTHOPAEDIC SURGERY

## 2017-05-22 PROCEDURE — 0SRC0J9 REPLACEMENT OF RIGHT KNEE JOINT WITH SYNTHETIC SUBSTITUTE, CEMENTED, OPEN APPROACH: ICD-10-PCS | Performed by: ORTHOPAEDIC SURGERY

## 2017-05-22 PROCEDURE — 27447 TOTAL KNEE ARTHROPLASTY: CPT | Performed by: ORTHOPAEDIC SURGERY

## 2017-05-22 PROCEDURE — 20985 CPTR-ASST DIR MS PX: CPT | Performed by: ORTHOPAEDIC SURGERY

## 2017-05-22 PROCEDURE — 25010000002 PROPOFOL 10 MG/ML EMULSION: Performed by: ANESTHESIOLOGY

## 2017-05-22 PROCEDURE — 82962 GLUCOSE BLOOD TEST: CPT

## 2017-05-22 PROCEDURE — 27447 TOTAL KNEE ARTHROPLASTY: CPT | Performed by: NURSE PRACTITIONER

## 2017-05-22 PROCEDURE — 25010000002 MIDAZOLAM PER 1 MG: Performed by: ANESTHESIOLOGY

## 2017-05-22 PROCEDURE — 25010000002 ROPIVACAINE PER 1 MG: Performed by: ORTHOPAEDIC SURGERY

## 2017-05-22 PROCEDURE — 25010000002 MORPHINE (PF) 10 MG/ML SOLUTION 1 ML VIAL: Performed by: ORTHOPAEDIC SURGERY

## 2017-05-22 PROCEDURE — 25010000002 ONDANSETRON PER 1 MG: Performed by: ANESTHESIOLOGY

## 2017-05-22 DEVICE — SIGMA FEMORAL CRUCIATE RETAINING CEMENTED 4N RIGHT
Type: IMPLANTABLE DEVICE | Status: FUNCTIONAL
Brand: SIGMA

## 2017-05-22 DEVICE — P.F.C. SIGMA OVAL DOME PATELLA 3-PEG 35MM CEMENTED
Type: IMPLANTABLE DEVICE | Status: FUNCTIONAL
Brand: P.F.C. SIGMA

## 2017-05-22 DEVICE — SIGMA TIBIAL INSERT FIXED BEARING CURVED PLUS 3 10MM XLK
Type: IMPLANTABLE DEVICE | Status: FUNCTIONAL
Brand: SIGMA

## 2017-05-22 DEVICE — SMARTSET GMV HIGH PERFORMANCE GENTAMICIN MEDIUM VISCOSITY BONE CEMENT 40G
Type: IMPLANTABLE DEVICE | Status: FUNCTIONAL
Brand: SMARTSET

## 2017-05-22 DEVICE — P.F.C. SIGMA TIBIAL TRAY FIXED BEARING MODULAR COCR 3 CEMENTED
Type: IMPLANTABLE DEVICE | Status: FUNCTIONAL
Brand: P.F.C. SIGMA

## 2017-05-22 DEVICE — IMPLANTABLE DEVICE: Type: IMPLANTABLE DEVICE | Status: FUNCTIONAL

## 2017-05-22 RX ORDER — OXYCODONE AND ACETAMINOPHEN 7.5; 325 MG/1; MG/1
1 TABLET ORAL ONCE AS NEEDED
Status: DISCONTINUED | OUTPATIENT
Start: 2017-05-22 | End: 2017-05-22 | Stop reason: HOSPADM

## 2017-05-22 RX ORDER — TIZANIDINE HYDROCHLORIDE 4 MG/1
CAPSULE, GELATIN COATED ORAL
Qty: 90 CAPSULE | Refills: 0 | Status: SHIPPED | OUTPATIENT
Start: 2017-05-22 | End: 2018-10-24

## 2017-05-22 RX ORDER — BISACODYL 10 MG
10 SUPPOSITORY, RECTAL RECTAL DAILY PRN
Status: DISCONTINUED | OUTPATIENT
Start: 2017-05-22 | End: 2017-05-25 | Stop reason: HOSPADM

## 2017-05-22 RX ORDER — PROMETHAZINE HYDROCHLORIDE 25 MG/1
12.5 TABLET ORAL ONCE AS NEEDED
Status: DISCONTINUED | OUTPATIENT
Start: 2017-05-22 | End: 2017-05-22 | Stop reason: HOSPADM

## 2017-05-22 RX ORDER — FENTANYL CITRATE 50 UG/ML
50 INJECTION, SOLUTION INTRAMUSCULAR; INTRAVENOUS
Status: DISCONTINUED | OUTPATIENT
Start: 2017-05-22 | End: 2017-05-22 | Stop reason: HOSPADM

## 2017-05-22 RX ORDER — PROMETHAZINE HYDROCHLORIDE 25 MG/ML
12.5 INJECTION, SOLUTION INTRAMUSCULAR; INTRAVENOUS ONCE AS NEEDED
Status: DISCONTINUED | OUTPATIENT
Start: 2017-05-22 | End: 2017-05-22 | Stop reason: HOSPADM

## 2017-05-22 RX ORDER — CEFAZOLIN SODIUM IN 0.9 % NACL 3 G/100 ML
3 INTRAVENOUS SOLUTION, PIGGYBACK (ML) INTRAVENOUS EVERY 8 HOURS
Status: COMPLETED | OUTPATIENT
Start: 2017-05-22 | End: 2017-05-23

## 2017-05-22 RX ORDER — ACETAMINOPHEN 325 MG/1
325 TABLET ORAL EVERY 4 HOURS PRN
Status: DISCONTINUED | OUTPATIENT
Start: 2017-05-22 | End: 2017-05-25 | Stop reason: HOSPADM

## 2017-05-22 RX ORDER — PROMETHAZINE HYDROCHLORIDE 25 MG/1
25 SUPPOSITORY RECTAL ONCE AS NEEDED
Status: DISCONTINUED | OUTPATIENT
Start: 2017-05-22 | End: 2017-05-22 | Stop reason: HOSPADM

## 2017-05-22 RX ORDER — POLYETHYLENE GLYCOL 3350 17 G/17G
17 POWDER, FOR SOLUTION ORAL 2 TIMES DAILY
Status: DISCONTINUED | OUTPATIENT
Start: 2017-05-22 | End: 2017-05-25 | Stop reason: HOSPADM

## 2017-05-22 RX ORDER — DIPHENHYDRAMINE HCL 25 MG
50 CAPSULE ORAL EVERY 6 HOURS PRN
Status: DISCONTINUED | OUTPATIENT
Start: 2017-05-22 | End: 2017-05-25 | Stop reason: HOSPADM

## 2017-05-22 RX ORDER — DIPHENHYDRAMINE HYDROCHLORIDE 50 MG/ML
25 INJECTION INTRAMUSCULAR; INTRAVENOUS EVERY 6 HOURS PRN
Status: DISCONTINUED | OUTPATIENT
Start: 2017-05-22 | End: 2017-05-25 | Stop reason: HOSPADM

## 2017-05-22 RX ORDER — SODIUM CHLORIDE, SODIUM LACTATE, POTASSIUM CHLORIDE, CALCIUM CHLORIDE 600; 310; 30; 20 MG/100ML; MG/100ML; MG/100ML; MG/100ML
100 INJECTION, SOLUTION INTRAVENOUS CONTINUOUS
Status: ACTIVE | OUTPATIENT
Start: 2017-05-22 | End: 2017-05-23

## 2017-05-22 RX ORDER — PROMETHAZINE HYDROCHLORIDE 12.5 MG/1
12.5 TABLET ORAL EVERY 6 HOURS PRN
Status: DISCONTINUED | OUTPATIENT
Start: 2017-05-22 | End: 2017-05-25 | Stop reason: HOSPADM

## 2017-05-22 RX ORDER — BISACODYL 5 MG/1
10 TABLET, DELAYED RELEASE ORAL DAILY PRN
Status: DISCONTINUED | OUTPATIENT
Start: 2017-05-22 | End: 2017-05-25 | Stop reason: HOSPADM

## 2017-05-22 RX ORDER — NALOXONE HCL 0.4 MG/ML
0.2 VIAL (ML) INJECTION AS NEEDED
Status: DISCONTINUED | OUTPATIENT
Start: 2017-05-22 | End: 2017-05-22 | Stop reason: HOSPADM

## 2017-05-22 RX ORDER — OXYBUTYNIN CHLORIDE 10 MG/1
10 TABLET, EXTENDED RELEASE ORAL DAILY
Status: DISCONTINUED | OUTPATIENT
Start: 2017-05-22 | End: 2017-05-25 | Stop reason: HOSPADM

## 2017-05-22 RX ORDER — HYDRALAZINE HYDROCHLORIDE 20 MG/ML
5 INJECTION INTRAMUSCULAR; INTRAVENOUS
Status: DISCONTINUED | OUTPATIENT
Start: 2017-05-22 | End: 2017-05-22 | Stop reason: HOSPADM

## 2017-05-22 RX ORDER — SCOLOPAMINE TRANSDERMAL SYSTEM 1 MG/1
1 PATCH, EXTENDED RELEASE TRANSDERMAL ONCE
Status: DISCONTINUED | OUTPATIENT
Start: 2017-05-22 | End: 2017-05-23

## 2017-05-22 RX ORDER — GLYCOPYRROLATE 0.2 MG/ML
INJECTION INTRAMUSCULAR; INTRAVENOUS AS NEEDED
Status: DISCONTINUED | OUTPATIENT
Start: 2017-05-22 | End: 2017-05-22 | Stop reason: SURG

## 2017-05-22 RX ORDER — TRANEXAMIC ACID 100 MG/ML
INJECTION, SOLUTION INTRAVENOUS AS NEEDED
Status: DISCONTINUED | OUTPATIENT
Start: 2017-05-22 | End: 2017-05-22 | Stop reason: HOSPADM

## 2017-05-22 RX ORDER — ONDANSETRON 2 MG/ML
INJECTION INTRAMUSCULAR; INTRAVENOUS AS NEEDED
Status: DISCONTINUED | OUTPATIENT
Start: 2017-05-22 | End: 2017-05-22 | Stop reason: SURG

## 2017-05-22 RX ORDER — OXYCODONE AND ACETAMINOPHEN 7.5; 325 MG/1; MG/1
2 TABLET ORAL
Status: DISCONTINUED | OUTPATIENT
Start: 2017-05-22 | End: 2017-05-25 | Stop reason: HOSPADM

## 2017-05-22 RX ORDER — LABETALOL HYDROCHLORIDE 5 MG/ML
5 INJECTION, SOLUTION INTRAVENOUS
Status: DISCONTINUED | OUTPATIENT
Start: 2017-05-22 | End: 2017-05-22 | Stop reason: HOSPADM

## 2017-05-22 RX ORDER — HYDROMORPHONE HCL IN 0.9% NACL 10 MG/50ML
PATIENT CONTROLLED ANALGESIA SYRINGE INTRAVENOUS CONTINUOUS
Status: DISCONTINUED | OUTPATIENT
Start: 2017-05-22 | End: 2017-05-25 | Stop reason: HOSPADM

## 2017-05-22 RX ORDER — PROMETHAZINE HYDROCHLORIDE 25 MG/1
25 TABLET ORAL ONCE AS NEEDED
Status: DISCONTINUED | OUTPATIENT
Start: 2017-05-22 | End: 2017-05-22 | Stop reason: HOSPADM

## 2017-05-22 RX ORDER — ESCITALOPRAM OXALATE 10 MG/1
10 TABLET ORAL DAILY
Status: DISCONTINUED | OUTPATIENT
Start: 2017-05-22 | End: 2017-05-25 | Stop reason: HOSPADM

## 2017-05-22 RX ORDER — PROMETHAZINE HYDROCHLORIDE 25 MG/ML
12.5 INJECTION, SOLUTION INTRAMUSCULAR; INTRAVENOUS EVERY 6 HOURS PRN
Status: DISCONTINUED | OUTPATIENT
Start: 2017-05-22 | End: 2017-05-25 | Stop reason: HOSPADM

## 2017-05-22 RX ORDER — TIZANIDINE 4 MG/1
4 TABLET ORAL EVERY 8 HOURS SCHEDULED
Status: DISCONTINUED | OUTPATIENT
Start: 2017-05-22 | End: 2017-05-25 | Stop reason: HOSPADM

## 2017-05-22 RX ORDER — LEVOTHYROXINE SODIUM 0.12 MG/1
125 TABLET ORAL EVERY MORNING
Status: DISCONTINUED | OUTPATIENT
Start: 2017-05-23 | End: 2017-05-25 | Stop reason: HOSPADM

## 2017-05-22 RX ORDER — CEFAZOLIN SODIUM IN 0.9 % NACL 3 G/100 ML
3 INTRAVENOUS SOLUTION, PIGGYBACK (ML) INTRAVENOUS ONCE
Status: COMPLETED | OUTPATIENT
Start: 2017-05-22 | End: 2017-05-22

## 2017-05-22 RX ORDER — PROPOFOL 10 MG/ML
VIAL (ML) INTRAVENOUS AS NEEDED
Status: DISCONTINUED | OUTPATIENT
Start: 2017-05-22 | End: 2017-05-22 | Stop reason: SURG

## 2017-05-22 RX ORDER — ROSUVASTATIN CALCIUM 5 MG/1
5 TABLET, COATED ORAL DAILY
Status: DISCONTINUED | OUTPATIENT
Start: 2017-05-22 | End: 2017-05-25 | Stop reason: HOSPADM

## 2017-05-22 RX ORDER — ROCURONIUM BROMIDE 10 MG/ML
INJECTION, SOLUTION INTRAVENOUS AS NEEDED
Status: DISCONTINUED | OUTPATIENT
Start: 2017-05-22 | End: 2017-05-22 | Stop reason: SURG

## 2017-05-22 RX ORDER — LIDOCAINE HYDROCHLORIDE 20 MG/ML
INJECTION, SOLUTION INFILTRATION; PERINEURAL AS NEEDED
Status: DISCONTINUED | OUTPATIENT
Start: 2017-05-22 | End: 2017-05-22 | Stop reason: SURG

## 2017-05-22 RX ORDER — MIDAZOLAM HYDROCHLORIDE 1 MG/ML
2 INJECTION INTRAMUSCULAR; INTRAVENOUS
Status: DISCONTINUED | OUTPATIENT
Start: 2017-05-22 | End: 2017-05-22 | Stop reason: HOSPADM

## 2017-05-22 RX ORDER — SODIUM CHLORIDE 0.9 % (FLUSH) 0.9 %
1-10 SYRINGE (ML) INJECTION AS NEEDED
Status: DISCONTINUED | OUTPATIENT
Start: 2017-05-22 | End: 2017-05-22 | Stop reason: HOSPADM

## 2017-05-22 RX ORDER — HYDROMORPHONE HYDROCHLORIDE 1 MG/ML
0.5 INJECTION, SOLUTION INTRAMUSCULAR; INTRAVENOUS; SUBCUTANEOUS
Status: DISCONTINUED | OUTPATIENT
Start: 2017-05-22 | End: 2017-05-22 | Stop reason: HOSPADM

## 2017-05-22 RX ORDER — OXYCODONE AND ACETAMINOPHEN 7.5; 325 MG/1; MG/1
1 TABLET ORAL
Status: DISCONTINUED | OUTPATIENT
Start: 2017-05-22 | End: 2017-05-25 | Stop reason: HOSPADM

## 2017-05-22 RX ORDER — ONDANSETRON 4 MG/1
4 TABLET, ORALLY DISINTEGRATING ORAL EVERY 6 HOURS PRN
Status: DISCONTINUED | OUTPATIENT
Start: 2017-05-22 | End: 2017-05-25 | Stop reason: HOSPADM

## 2017-05-22 RX ORDER — HYDROCODONE BITARTRATE AND ACETAMINOPHEN 7.5; 325 MG/1; MG/1
1 TABLET ORAL ONCE AS NEEDED
Status: DISCONTINUED | OUTPATIENT
Start: 2017-05-22 | End: 2017-05-22 | Stop reason: HOSPADM

## 2017-05-22 RX ORDER — ONDANSETRON 2 MG/ML
4 INJECTION INTRAMUSCULAR; INTRAVENOUS ONCE AS NEEDED
Status: DISCONTINUED | OUTPATIENT
Start: 2017-05-22 | End: 2017-05-22 | Stop reason: HOSPADM

## 2017-05-22 RX ORDER — PRAVASTATIN SODIUM 40 MG
40 TABLET ORAL DAILY
Status: DISCONTINUED | OUTPATIENT
Start: 2017-05-22 | End: 2017-05-22 | Stop reason: CLARIF

## 2017-05-22 RX ORDER — FAMOTIDINE 10 MG/ML
20 INJECTION, SOLUTION INTRAVENOUS ONCE
Status: COMPLETED | OUTPATIENT
Start: 2017-05-22 | End: 2017-05-22

## 2017-05-22 RX ORDER — FLUMAZENIL 0.1 MG/ML
0.2 INJECTION INTRAVENOUS AS NEEDED
Status: DISCONTINUED | OUTPATIENT
Start: 2017-05-22 | End: 2017-05-22 | Stop reason: HOSPADM

## 2017-05-22 RX ORDER — DOCUSATE SODIUM 100 MG/1
100 CAPSULE, LIQUID FILLED ORAL 2 TIMES DAILY
Status: DISCONTINUED | OUTPATIENT
Start: 2017-05-22 | End: 2017-05-25 | Stop reason: HOSPADM

## 2017-05-22 RX ORDER — AMLODIPINE BESYLATE AND BENAZEPRIL HYDROCHLORIDE 5; 20 MG/1; MG/1
1 CAPSULE ORAL DAILY
Status: DISCONTINUED | OUTPATIENT
Start: 2017-05-22 | End: 2017-05-25 | Stop reason: HOSPADM

## 2017-05-22 RX ORDER — NALOXONE HCL 0.4 MG/ML
0.1 VIAL (ML) INJECTION
Status: DISCONTINUED | OUTPATIENT
Start: 2017-05-22 | End: 2017-05-25 | Stop reason: HOSPADM

## 2017-05-22 RX ORDER — CEFAZOLIN SODIUM 2 G/100ML
2 INJECTION, SOLUTION INTRAVENOUS ONCE
Status: DISCONTINUED | OUTPATIENT
Start: 2017-05-22 | End: 2017-05-22

## 2017-05-22 RX ORDER — MIDAZOLAM HYDROCHLORIDE 1 MG/ML
1 INJECTION INTRAMUSCULAR; INTRAVENOUS
Status: DISCONTINUED | OUTPATIENT
Start: 2017-05-22 | End: 2017-05-22 | Stop reason: HOSPADM

## 2017-05-22 RX ORDER — ONDANSETRON 2 MG/ML
4 INJECTION INTRAMUSCULAR; INTRAVENOUS EVERY 6 HOURS PRN
Status: DISCONTINUED | OUTPATIENT
Start: 2017-05-22 | End: 2017-05-25 | Stop reason: HOSPADM

## 2017-05-22 RX ORDER — ONDANSETRON 4 MG/1
4 TABLET, FILM COATED ORAL EVERY 6 HOURS PRN
Status: DISCONTINUED | OUTPATIENT
Start: 2017-05-22 | End: 2017-05-25 | Stop reason: HOSPADM

## 2017-05-22 RX ORDER — NEOSTIGMINE METHYLSULFATE 1 MG/ML
INJECTION, SOLUTION INTRAVENOUS AS NEEDED
Status: DISCONTINUED | OUTPATIENT
Start: 2017-05-22 | End: 2017-05-22 | Stop reason: SURG

## 2017-05-22 RX ORDER — SODIUM CHLORIDE, SODIUM LACTATE, POTASSIUM CHLORIDE, CALCIUM CHLORIDE 600; 310; 30; 20 MG/100ML; MG/100ML; MG/100ML; MG/100ML
9 INJECTION, SOLUTION INTRAVENOUS CONTINUOUS
Status: DISCONTINUED | OUTPATIENT
Start: 2017-05-22 | End: 2017-05-25 | Stop reason: HOSPADM

## 2017-05-22 RX ORDER — MECLIZINE HYDROCHLORIDE 25 MG/1
25 TABLET ORAL 3 TIMES DAILY PRN
Status: DISCONTINUED | OUTPATIENT
Start: 2017-05-22 | End: 2017-05-25 | Stop reason: HOSPADM

## 2017-05-22 RX ORDER — DIPHENHYDRAMINE HYDROCHLORIDE 50 MG/ML
12.5 INJECTION INTRAMUSCULAR; INTRAVENOUS
Status: DISCONTINUED | OUTPATIENT
Start: 2017-05-22 | End: 2017-05-22 | Stop reason: HOSPADM

## 2017-05-22 RX ORDER — MAGNESIUM HYDROXIDE 1200 MG/15ML
LIQUID ORAL AS NEEDED
Status: DISCONTINUED | OUTPATIENT
Start: 2017-05-22 | End: 2017-05-22 | Stop reason: HOSPADM

## 2017-05-22 RX ORDER — ROPINIROLE 0.5 MG/1
0.5 TABLET, FILM COATED ORAL NIGHTLY
Status: DISCONTINUED | OUTPATIENT
Start: 2017-05-22 | End: 2017-05-25 | Stop reason: HOSPADM

## 2017-05-22 RX ADMIN — ROSUVASTATIN CALCIUM 5 MG: 5 TABLET, FILM COATED ORAL at 22:40

## 2017-05-22 RX ADMIN — MEPERIDINE HYDROCHLORIDE 12.5 MG: 25 INJECTION, SOLUTION INTRAMUSCULAR; INTRAVENOUS; SUBCUTANEOUS at 16:09

## 2017-05-22 RX ADMIN — ONDANSETRON 4 MG: 2 INJECTION INTRAMUSCULAR; INTRAVENOUS at 13:45

## 2017-05-22 RX ADMIN — Medication: at 15:57

## 2017-05-22 RX ADMIN — FAMOTIDINE 20 MG: 10 INJECTION, SOLUTION INTRAVENOUS at 12:16

## 2017-05-22 RX ADMIN — LIDOCAINE HYDROCHLORIDE 100 MG: 20 INJECTION, SOLUTION INFILTRATION; PERINEURAL at 13:20

## 2017-05-22 RX ADMIN — RIVAROXABAN 10 MG: 10 TABLET, FILM COATED ORAL at 22:35

## 2017-05-22 RX ADMIN — SCOPALAMINE 1 PATCH: 1 PATCH, EXTENDED RELEASE TRANSDERMAL at 12:15

## 2017-05-22 RX ADMIN — OXYBUTYNIN CHLORIDE 10 MG: 10 TABLET, EXTENDED RELEASE ORAL at 22:37

## 2017-05-22 RX ADMIN — PROPOFOL 200 MG: 10 INJECTION, EMULSION INTRAVENOUS at 13:20

## 2017-05-22 RX ADMIN — ROCURONIUM BROMIDE 50 MG: 10 INJECTION INTRAVENOUS at 13:20

## 2017-05-22 RX ADMIN — SODIUM CHLORIDE, POTASSIUM CHLORIDE, SODIUM LACTATE AND CALCIUM CHLORIDE 9 ML/HR: 600; 310; 30; 20 INJECTION, SOLUTION INTRAVENOUS at 12:16

## 2017-05-22 RX ADMIN — FENTANYL CITRATE 50 MCG: 50 INJECTION, SOLUTION INTRAMUSCULAR; INTRAVENOUS at 16:24

## 2017-05-22 RX ADMIN — CEFAZOLIN 3 G: 1 INJECTION, POWDER, FOR SOLUTION INTRAMUSCULAR; INTRAVENOUS; PARENTERAL at 22:35

## 2017-05-22 RX ADMIN — DOCUSATE SODIUM 100 MG: 100 CAPSULE, LIQUID FILLED ORAL at 18:43

## 2017-05-22 RX ADMIN — GLYCOPYRROLATE 0.5 MG: 0.2 INJECTION INTRAMUSCULAR; INTRAVENOUS at 14:45

## 2017-05-22 RX ADMIN — OXYCODONE HYDROCHLORIDE AND ACETAMINOPHEN 1 TABLET: 7.5; 325 TABLET ORAL at 22:36

## 2017-05-22 RX ADMIN — METFORMIN HYDROCHLORIDE 500 MG: 500 TABLET, FILM COATED ORAL at 22:50

## 2017-05-22 RX ADMIN — SODIUM CHLORIDE, POTASSIUM CHLORIDE, SODIUM LACTATE AND CALCIUM CHLORIDE: 600; 310; 30; 20 INJECTION, SOLUTION INTRAVENOUS at 13:15

## 2017-05-22 RX ADMIN — MUPIROCIN: 20 OINTMENT TOPICAL at 18:43

## 2017-05-22 RX ADMIN — TIZANIDINE 4 MG: 4 TABLET ORAL at 22:35

## 2017-05-22 RX ADMIN — CEFAZOLIN 2 G: 1 INJECTION, POWDER, FOR SOLUTION INTRAMUSCULAR; INTRAVENOUS; PARENTERAL at 13:16

## 2017-05-22 RX ADMIN — NEOSTIGMINE METHYLSULFATE 2.5 MG: 1 INJECTION INTRAVENOUS at 14:51

## 2017-05-22 RX ADMIN — ROPINIROLE HYDROCHLORIDE 0.5 MG: 0.5 TABLET, FILM COATED ORAL at 22:35

## 2017-05-22 RX ADMIN — FENTANYL CITRATE 50 MCG: 50 INJECTION INTRAMUSCULAR; INTRAVENOUS at 14:10

## 2017-05-22 RX ADMIN — MIDAZOLAM 2 MG: 1 INJECTION INTRAMUSCULAR; INTRAVENOUS at 12:16

## 2017-05-22 RX ADMIN — ESCITALOPRAM 10 MG: 10 TABLET, FILM COATED ORAL at 22:38

## 2017-05-23 LAB
ANION GAP SERPL CALCULATED.3IONS-SCNC: 11.7 MMOL/L
BUN BLD-MCNC: 7 MG/DL (ref 6–20)
BUN/CREAT SERPL: 10.4 (ref 7–25)
CALCIUM SPEC-SCNC: 8.5 MG/DL (ref 8.6–10.5)
CHLORIDE SERPL-SCNC: 95 MMOL/L (ref 98–107)
CO2 SERPL-SCNC: 26.3 MMOL/L (ref 22–29)
CREAT BLD-MCNC: 0.67 MG/DL (ref 0.57–1)
GFR SERPL CREATININE-BSD FRML MDRD: 92 ML/MIN/1.73
GLUCOSE BLD-MCNC: 119 MG/DL (ref 65–99)
GLUCOSE BLDC GLUCOMTR-MCNC: 102 MG/DL (ref 70–130)
GLUCOSE BLDC GLUCOMTR-MCNC: 126 MG/DL (ref 70–130)
GLUCOSE BLDC GLUCOMTR-MCNC: 128 MG/DL (ref 70–130)
GLUCOSE BLDC GLUCOMTR-MCNC: 142 MG/DL (ref 70–130)
HBA1C MFR BLD: 6.02 % (ref 4.8–5.6)
HCT VFR BLD AUTO: 31.2 % (ref 35.6–45.5)
HGB BLD-MCNC: 9.1 G/DL (ref 11.9–15.5)
POTASSIUM BLD-SCNC: 3.9 MMOL/L (ref 3.5–5.2)
SODIUM BLD-SCNC: 133 MMOL/L (ref 136–145)

## 2017-05-23 PROCEDURE — 80048 BASIC METABOLIC PNL TOTAL CA: CPT | Performed by: ORTHOPAEDIC SURGERY

## 2017-05-23 PROCEDURE — 83036 HEMOGLOBIN GLYCOSYLATED A1C: CPT | Performed by: HOSPITALIST

## 2017-05-23 PROCEDURE — 82962 GLUCOSE BLOOD TEST: CPT

## 2017-05-23 PROCEDURE — 97150 GROUP THERAPEUTIC PROCEDURES: CPT

## 2017-05-23 PROCEDURE — 85014 HEMATOCRIT: CPT | Performed by: ORTHOPAEDIC SURGERY

## 2017-05-23 PROCEDURE — 97161 PT EVAL LOW COMPLEX 20 MIN: CPT

## 2017-05-23 PROCEDURE — 97110 THERAPEUTIC EXERCISES: CPT

## 2017-05-23 PROCEDURE — 99024 POSTOP FOLLOW-UP VISIT: CPT | Performed by: NURSE PRACTITIONER

## 2017-05-23 PROCEDURE — 85018 HEMOGLOBIN: CPT | Performed by: ORTHOPAEDIC SURGERY

## 2017-05-23 RX ORDER — ONDANSETRON 4 MG/1
4 TABLET, FILM COATED ORAL EVERY 6 HOURS PRN
Qty: 20 TABLET | Refills: 1 | Status: SHIPPED | OUTPATIENT
Start: 2017-05-23 | End: 2017-06-05

## 2017-05-23 RX ORDER — POLYETHYLENE GLYCOL 3350 17 G/17G
17 POWDER, FOR SOLUTION ORAL 2 TIMES DAILY
Qty: 30 EACH | Refills: 4 | Status: SHIPPED | OUTPATIENT
Start: 2017-05-23 | End: 2017-10-05

## 2017-05-23 RX ORDER — DEXTROSE MONOHYDRATE 25 G/50ML
25 INJECTION, SOLUTION INTRAVENOUS
Status: DISCONTINUED | OUTPATIENT
Start: 2017-05-23 | End: 2017-05-25 | Stop reason: HOSPADM

## 2017-05-23 RX ORDER — OXYCODONE AND ACETAMINOPHEN 7.5; 325 MG/1; MG/1
TABLET ORAL
Qty: 100 TABLET | Refills: 0 | Status: SHIPPED | OUTPATIENT
Start: 2017-05-23 | End: 2017-06-05 | Stop reason: SDUPTHER

## 2017-05-23 RX ORDER — BISACODYL 5 MG/1
10 TABLET, DELAYED RELEASE ORAL DAILY PRN
Qty: 20 TABLET | Refills: 0 | Status: SHIPPED | OUTPATIENT
Start: 2017-05-23 | End: 2017-07-06 | Stop reason: SDUPTHER

## 2017-05-23 RX ORDER — NICOTINE POLACRILEX 4 MG
15 LOZENGE BUCCAL
Status: DISCONTINUED | OUTPATIENT
Start: 2017-05-23 | End: 2017-05-25 | Stop reason: HOSPADM

## 2017-05-23 RX ORDER — PSEUDOEPHEDRINE HCL 30 MG
100 TABLET ORAL 2 TIMES DAILY
Qty: 60 CAPSULE | Refills: 2 | Status: SHIPPED | OUTPATIENT
Start: 2017-05-23 | End: 2017-10-05

## 2017-05-23 RX ADMIN — RIVAROXABAN 10 MG: 10 TABLET, FILM COATED ORAL at 08:59

## 2017-05-23 RX ADMIN — OXYCODONE HYDROCHLORIDE AND ACETAMINOPHEN 1 TABLET: 7.5; 325 TABLET ORAL at 09:00

## 2017-05-23 RX ADMIN — CEFAZOLIN 3 G: 1 INJECTION, POWDER, FOR SOLUTION INTRAMUSCULAR; INTRAVENOUS; PARENTERAL at 06:29

## 2017-05-23 RX ADMIN — METFORMIN HYDROCHLORIDE 500 MG: 500 TABLET ORAL at 17:48

## 2017-05-23 RX ADMIN — TIZANIDINE 4 MG: 4 TABLET ORAL at 16:56

## 2017-05-23 RX ADMIN — ESCITALOPRAM 10 MG: 10 TABLET, FILM COATED ORAL at 08:59

## 2017-05-23 RX ADMIN — OXYBUTYNIN CHLORIDE 10 MG: 10 TABLET, EXTENDED RELEASE ORAL at 09:00

## 2017-05-23 RX ADMIN — ROSUVASTATIN CALCIUM 5 MG: 5 TABLET, FILM COATED ORAL at 21:39

## 2017-05-23 RX ADMIN — OXYCODONE HYDROCHLORIDE AND ACETAMINOPHEN 2 TABLET: 7.5; 325 TABLET ORAL at 21:43

## 2017-05-23 RX ADMIN — MUPIROCIN: 20 OINTMENT TOPICAL at 09:00

## 2017-05-23 RX ADMIN — TIZANIDINE 4 MG: 4 TABLET ORAL at 06:29

## 2017-05-23 RX ADMIN — OXYCODONE HYDROCHLORIDE AND ACETAMINOPHEN 2 TABLET: 7.5; 325 TABLET ORAL at 17:48

## 2017-05-23 RX ADMIN — MUPIROCIN: 20 OINTMENT TOPICAL at 17:48

## 2017-05-23 RX ADMIN — DOCUSATE SODIUM 100 MG: 100 CAPSULE, LIQUID FILLED ORAL at 17:48

## 2017-05-23 RX ADMIN — TIZANIDINE 4 MG: 4 TABLET ORAL at 23:31

## 2017-05-23 RX ADMIN — ROPINIROLE HYDROCHLORIDE 0.5 MG: 0.5 TABLET, FILM COATED ORAL at 21:39

## 2017-05-23 RX ADMIN — DOCUSATE SODIUM 100 MG: 100 CAPSULE, LIQUID FILLED ORAL at 09:00

## 2017-05-23 RX ADMIN — OXYCODONE HYDROCHLORIDE AND ACETAMINOPHEN 2 TABLET: 7.5; 325 TABLET ORAL at 13:38

## 2017-05-23 RX ADMIN — CEFAZOLIN 3 G: 1 INJECTION, POWDER, FOR SOLUTION INTRAMUSCULAR; INTRAVENOUS; PARENTERAL at 15:54

## 2017-05-23 RX ADMIN — LEVOTHYROXINE SODIUM 125 MCG: 125 TABLET ORAL at 07:01

## 2017-05-24 LAB
ABO GROUP BLD: NORMAL
ANION GAP SERPL CALCULATED.3IONS-SCNC: 12.4 MMOL/L
BLD GP AB SCN SERPL QL: NEGATIVE
BUN BLD-MCNC: 7 MG/DL (ref 6–20)
BUN/CREAT SERPL: 10 (ref 7–25)
CALCIUM SPEC-SCNC: 8.4 MG/DL (ref 8.6–10.5)
CHLORIDE SERPL-SCNC: 94 MMOL/L (ref 98–107)
CO2 SERPL-SCNC: 26.6 MMOL/L (ref 22–29)
CREAT BLD-MCNC: 0.7 MG/DL (ref 0.57–1)
FERRITIN SERPL-MCNC: 15.68 NG/ML (ref 13–150)
FOLATE SERPL-MCNC: 15.24 NG/ML (ref 4.78–24.2)
GFR SERPL CREATININE-BSD FRML MDRD: 87 ML/MIN/1.73
GLUCOSE BLD-MCNC: 142 MG/DL (ref 65–99)
GLUCOSE BLDC GLUCOMTR-MCNC: 113 MG/DL (ref 70–130)
GLUCOSE BLDC GLUCOMTR-MCNC: 125 MG/DL (ref 70–130)
GLUCOSE BLDC GLUCOMTR-MCNC: 129 MG/DL (ref 70–130)
GLUCOSE BLDC GLUCOMTR-MCNC: 136 MG/DL (ref 70–130)
HCT VFR BLD AUTO: 27.1 % (ref 35.6–45.5)
HGB BLD-MCNC: 7.9 G/DL (ref 11.9–15.5)
IRON 24H UR-MRATE: 14 MCG/DL (ref 37–145)
IRON SATN MFR SERPL: 4 % (ref 20–50)
POTASSIUM BLD-SCNC: 3.7 MMOL/L (ref 3.5–5.2)
RH BLD: POSITIVE
SODIUM BLD-SCNC: 133 MMOL/L (ref 136–145)
TIBC SERPL-MCNC: 381 MCG/DL
TRANSFERRIN SERPL-MCNC: 256 MG/DL (ref 200–360)
TSH SERPL DL<=0.05 MIU/L-ACNC: 6.08 MIU/ML (ref 0.27–4.2)
VIT B12 BLD-MCNC: 696 PG/ML (ref 211–946)

## 2017-05-24 PROCEDURE — 86900 BLOOD TYPING SEROLOGIC ABO: CPT | Performed by: HOSPITALIST

## 2017-05-24 PROCEDURE — 85018 HEMOGLOBIN: CPT | Performed by: ORTHOPAEDIC SURGERY

## 2017-05-24 PROCEDURE — 97150 GROUP THERAPEUTIC PROCEDURES: CPT

## 2017-05-24 PROCEDURE — 84443 ASSAY THYROID STIM HORMONE: CPT | Performed by: HOSPITALIST

## 2017-05-24 PROCEDURE — 36430 TRANSFUSION BLD/BLD COMPNT: CPT

## 2017-05-24 PROCEDURE — 80048 BASIC METABOLIC PNL TOTAL CA: CPT | Performed by: HOSPITALIST

## 2017-05-24 PROCEDURE — 82728 ASSAY OF FERRITIN: CPT | Performed by: HOSPITALIST

## 2017-05-24 PROCEDURE — 97110 THERAPEUTIC EXERCISES: CPT

## 2017-05-24 PROCEDURE — 82607 VITAMIN B-12: CPT | Performed by: HOSPITALIST

## 2017-05-24 PROCEDURE — 86850 RBC ANTIBODY SCREEN: CPT | Performed by: HOSPITALIST

## 2017-05-24 PROCEDURE — 84466 ASSAY OF TRANSFERRIN: CPT | Performed by: HOSPITALIST

## 2017-05-24 PROCEDURE — 82962 GLUCOSE BLOOD TEST: CPT

## 2017-05-24 PROCEDURE — 83540 ASSAY OF IRON: CPT | Performed by: HOSPITALIST

## 2017-05-24 PROCEDURE — 85014 HEMATOCRIT: CPT | Performed by: ORTHOPAEDIC SURGERY

## 2017-05-24 PROCEDURE — P9016 RBC LEUKOCYTES REDUCED: HCPCS

## 2017-05-24 PROCEDURE — 86900 BLOOD TYPING SEROLOGIC ABO: CPT

## 2017-05-24 PROCEDURE — 86923 COMPATIBILITY TEST ELECTRIC: CPT

## 2017-05-24 PROCEDURE — 86901 BLOOD TYPING SEROLOGIC RH(D): CPT | Performed by: HOSPITALIST

## 2017-05-24 PROCEDURE — 25010000002 IRON SUCROSE PER 1 MG: Performed by: HOSPITALIST

## 2017-05-24 PROCEDURE — 82746 ASSAY OF FOLIC ACID SERUM: CPT | Performed by: HOSPITALIST

## 2017-05-24 PROCEDURE — 99024 POSTOP FOLLOW-UP VISIT: CPT | Performed by: NURSE PRACTITIONER

## 2017-05-24 PROCEDURE — 86901 BLOOD TYPING SEROLOGIC RH(D): CPT

## 2017-05-24 RX ORDER — ACETAMINOPHEN 325 MG/1
650 TABLET ORAL EVERY 6 HOURS PRN
Status: DISCONTINUED | OUTPATIENT
Start: 2017-05-24 | End: 2017-05-25 | Stop reason: HOSPADM

## 2017-05-24 RX ADMIN — OXYCODONE HYDROCHLORIDE AND ACETAMINOPHEN 2 TABLET: 7.5; 325 TABLET ORAL at 22:03

## 2017-05-24 RX ADMIN — ROSUVASTATIN CALCIUM 5 MG: 5 TABLET, FILM COATED ORAL at 09:28

## 2017-05-24 RX ADMIN — METFORMIN HYDROCHLORIDE 500 MG: 500 TABLET ORAL at 09:29

## 2017-05-24 RX ADMIN — OXYBUTYNIN CHLORIDE 10 MG: 10 TABLET, EXTENDED RELEASE ORAL at 09:28

## 2017-05-24 RX ADMIN — LEVOTHYROXINE SODIUM 125 MCG: 125 TABLET ORAL at 06:43

## 2017-05-24 RX ADMIN — ESCITALOPRAM 10 MG: 10 TABLET, FILM COATED ORAL at 09:28

## 2017-05-24 RX ADMIN — RIVAROXABAN 10 MG: 10 TABLET, FILM COATED ORAL at 09:28

## 2017-05-24 RX ADMIN — POLYETHYLENE GLYCOL 3350 17 G: 17 POWDER, FOR SOLUTION ORAL at 09:29

## 2017-05-24 RX ADMIN — OXYCODONE HYDROCHLORIDE AND ACETAMINOPHEN 1 TABLET: 7.5; 325 TABLET ORAL at 02:57

## 2017-05-24 RX ADMIN — DOCUSATE SODIUM 100 MG: 100 CAPSULE, LIQUID FILLED ORAL at 17:20

## 2017-05-24 RX ADMIN — OXYCODONE HYDROCHLORIDE AND ACETAMINOPHEN 2 TABLET: 7.5; 325 TABLET ORAL at 13:21

## 2017-05-24 RX ADMIN — DOCUSATE SODIUM 100 MG: 100 CAPSULE, LIQUID FILLED ORAL at 10:52

## 2017-05-24 RX ADMIN — IRON SUCROSE 200 MG: 20 INJECTION, SOLUTION INTRAVENOUS at 15:30

## 2017-05-24 RX ADMIN — METFORMIN HYDROCHLORIDE 500 MG: 500 TABLET ORAL at 17:19

## 2017-05-24 RX ADMIN — ACETAMINOPHEN 650 MG: 325 TABLET ORAL at 19:00

## 2017-05-24 RX ADMIN — TIZANIDINE 4 MG: 4 TABLET ORAL at 06:43

## 2017-05-24 RX ADMIN — ROPINIROLE HYDROCHLORIDE 0.5 MG: 0.5 TABLET, FILM COATED ORAL at 21:16

## 2017-05-24 RX ADMIN — OXYCODONE HYDROCHLORIDE AND ACETAMINOPHEN 2 TABLET: 7.5; 325 TABLET ORAL at 09:25

## 2017-05-24 RX ADMIN — MUPIROCIN: 20 OINTMENT TOPICAL at 09:29

## 2017-05-24 RX ADMIN — POLYETHYLENE GLYCOL 3350 17 G: 17 POWDER, FOR SOLUTION ORAL at 17:19

## 2017-05-24 RX ADMIN — TIZANIDINE 4 MG: 4 TABLET ORAL at 21:16

## 2017-05-24 RX ADMIN — TIZANIDINE 4 MG: 4 TABLET ORAL at 17:19

## 2017-05-25 VITALS
WEIGHT: 281 LBS | TEMPERATURE: 98.2 F | HEART RATE: 78 BPM | BODY MASS INDEX: 47.97 KG/M2 | DIASTOLIC BLOOD PRESSURE: 66 MMHG | SYSTOLIC BLOOD PRESSURE: 110 MMHG | RESPIRATION RATE: 16 BRPM | OXYGEN SATURATION: 97 % | HEIGHT: 64 IN

## 2017-05-25 LAB
ABO + RH BLD: NORMAL
ANION GAP SERPL CALCULATED.3IONS-SCNC: 9.8 MMOL/L
ANISOCYTOSIS BLD QL: NORMAL
BASOPHILS # BLD AUTO: 0.03 10*3/MM3 (ref 0–0.2)
BASOPHILS NFR BLD AUTO: 0.4 % (ref 0–1.5)
BH BB BLOOD EXPIRATION DATE: NORMAL
BH BB BLOOD TYPE BARCODE: 6200
BH BB DISPENSE STATUS: NORMAL
BH BB PRODUCT CODE: NORMAL
BH BB UNIT NUMBER: NORMAL
BUN BLD-MCNC: 6 MG/DL (ref 6–20)
BUN/CREAT SERPL: 9.4 (ref 7–25)
CALCIUM SPEC-SCNC: 9.1 MG/DL (ref 8.6–10.5)
CHLORIDE SERPL-SCNC: 94 MMOL/L (ref 98–107)
CO2 SERPL-SCNC: 28.2 MMOL/L (ref 22–29)
CREAT BLD-MCNC: 0.64 MG/DL (ref 0.57–1)
DEPRECATED RDW RBC AUTO: 50.9 FL (ref 37–54)
EOSINOPHIL # BLD AUTO: 0.31 10*3/MM3 (ref 0–0.7)
EOSINOPHIL NFR BLD AUTO: 4.4 % (ref 0.3–6.2)
ERYTHROCYTE [DISTWIDTH] IN BLOOD BY AUTOMATED COUNT: 20.5 % (ref 11.7–13)
GFR SERPL CREATININE-BSD FRML MDRD: 97 ML/MIN/1.73
GLUCOSE BLD-MCNC: 131 MG/DL (ref 65–99)
GLUCOSE BLDC GLUCOMTR-MCNC: 115 MG/DL (ref 70–130)
GLUCOSE BLDC GLUCOMTR-MCNC: 131 MG/DL (ref 70–130)
HCT VFR BLD AUTO: 26.8 % (ref 35.6–45.5)
HGB BLD-MCNC: 8.1 G/DL (ref 11.9–15.5)
IMM GRANULOCYTES # BLD: 0.02 10*3/MM3 (ref 0–0.03)
IMM GRANULOCYTES NFR BLD: 0.3 % (ref 0–0.5)
LYMPHOCYTES # BLD AUTO: 1.14 10*3/MM3 (ref 0.9–4.8)
LYMPHOCYTES NFR BLD AUTO: 16.1 % (ref 19.6–45.3)
MACROCYTES BLD QL SMEAR: NORMAL
MCH RBC QN AUTO: 21 PG (ref 26.9–32)
MCHC RBC AUTO-ENTMCNC: 30.2 G/DL (ref 32.4–36.3)
MCV RBC AUTO: 69.4 FL (ref 80.5–98.2)
MONOCYTES # BLD AUTO: 0.76 10*3/MM3 (ref 0.2–1.2)
MONOCYTES NFR BLD AUTO: 10.7 % (ref 5–12)
NEUTROPHILS # BLD AUTO: 4.82 10*3/MM3 (ref 1.9–8.1)
NEUTROPHILS NFR BLD AUTO: 68.1 % (ref 42.7–76)
PLAT MORPH BLD: NORMAL
PLATELET # BLD AUTO: 238 10*3/MM3 (ref 140–500)
PMV BLD AUTO: 9.6 FL (ref 6–12)
POLYCHROMASIA BLD QL SMEAR: NORMAL
POTASSIUM BLD-SCNC: 3.6 MMOL/L (ref 3.5–5.2)
RBC # BLD AUTO: 3.86 10*6/MM3 (ref 3.9–5.2)
SODIUM BLD-SCNC: 132 MMOL/L (ref 136–145)
STOMATOCYTES BLD QL SMEAR: NORMAL
UNIT  ABO: NORMAL
UNIT  RH: NORMAL
WBC MORPH BLD: NORMAL
WBC NRBC COR # BLD: 7.08 10*3/MM3 (ref 4.5–10.7)

## 2017-05-25 PROCEDURE — 85007 BL SMEAR W/DIFF WBC COUNT: CPT | Performed by: HOSPITALIST

## 2017-05-25 PROCEDURE — 25010000002 IRON SUCROSE PER 1 MG: Performed by: HOSPITALIST

## 2017-05-25 PROCEDURE — 97150 GROUP THERAPEUTIC PROCEDURES: CPT

## 2017-05-25 PROCEDURE — 97110 THERAPEUTIC EXERCISES: CPT

## 2017-05-25 PROCEDURE — 82962 GLUCOSE BLOOD TEST: CPT

## 2017-05-25 PROCEDURE — 99024 POSTOP FOLLOW-UP VISIT: CPT | Performed by: NURSE PRACTITIONER

## 2017-05-25 PROCEDURE — 85025 COMPLETE CBC W/AUTO DIFF WBC: CPT | Performed by: HOSPITALIST

## 2017-05-25 PROCEDURE — 80048 BASIC METABOLIC PNL TOTAL CA: CPT | Performed by: HOSPITALIST

## 2017-05-25 RX ADMIN — OXYCODONE HYDROCHLORIDE AND ACETAMINOPHEN 2 TABLET: 7.5; 325 TABLET ORAL at 09:07

## 2017-05-25 RX ADMIN — ROSUVASTATIN CALCIUM 5 MG: 5 TABLET, FILM COATED ORAL at 08:03

## 2017-05-25 RX ADMIN — IRON SUCROSE 200 MG: 20 INJECTION, SOLUTION INTRAVENOUS at 10:21

## 2017-05-25 RX ADMIN — LEVOTHYROXINE SODIUM 125 MCG: 125 TABLET ORAL at 08:03

## 2017-05-25 RX ADMIN — RIVAROXABAN 10 MG: 10 TABLET, FILM COATED ORAL at 08:05

## 2017-05-25 RX ADMIN — METFORMIN HYDROCHLORIDE 500 MG: 500 TABLET ORAL at 08:03

## 2017-05-25 RX ADMIN — OXYCODONE HYDROCHLORIDE AND ACETAMINOPHEN 2 TABLET: 7.5; 325 TABLET ORAL at 02:59

## 2017-05-25 RX ADMIN — OXYBUTYNIN CHLORIDE 10 MG: 10 TABLET, EXTENDED RELEASE ORAL at 08:02

## 2017-05-25 RX ADMIN — OXYCODONE HYDROCHLORIDE AND ACETAMINOPHEN 2 TABLET: 7.5; 325 TABLET ORAL at 12:44

## 2017-05-25 RX ADMIN — DOCUSATE SODIUM 100 MG: 100 CAPSULE, LIQUID FILLED ORAL at 08:02

## 2017-05-25 RX ADMIN — ESCITALOPRAM 10 MG: 10 TABLET, FILM COATED ORAL at 08:02

## 2017-06-01 ENCOUNTER — TELEPHONE (OUTPATIENT)
Dept: ORTHOPEDIC SURGERY | Facility: CLINIC | Age: 57
End: 2017-06-01

## 2017-06-01 ENCOUNTER — TELEPHONE (OUTPATIENT)
Dept: FAMILY MEDICINE CLINIC | Facility: CLINIC | Age: 57
End: 2017-06-01

## 2017-06-01 DIAGNOSIS — R89.9 ABNORMAL LABORATORY TEST: Primary | ICD-10-CM

## 2017-06-01 DIAGNOSIS — D58.2 ABNORMAL HEMOGLOBIN (HCC): Primary | ICD-10-CM

## 2017-06-01 DIAGNOSIS — M17.11 ARTHRITIS OF RIGHT KNEE: Primary | ICD-10-CM

## 2017-06-01 NOTE — TELEPHONE ENCOUNTER
Tried to call and inform patient that lab orders are in chart but there was no answer and no voicemail

## 2017-06-01 NOTE — TELEPHONE ENCOUNTER
----- Message from Tiana Hudson sent at 5/31/2017  8:38 PM EDT -----  Regarding: Test Results Question  Contact: [phone]  I had my right knee replaced on Monday,  May 22. On Wednesday,  may 24, my hemoglobin was 7.9. It had been 9.2 prior to surgery.  So I received a blood transfusion of 1 unit and an iron infusion. On Thursday my hemoglobin was up to 8.1 and I received another iron infusion and was allowed to come home. I am on xarellto for 2 weeks, which will be over Monday when I go for my follow up with Dr. Cruz. Then I'll be on aspirin. I go see Dr Kraus on Friday. I was wondering if you would be able to draw a cbc for me so I can see how my hemoglobin is. It would be nice to just drop by the office for labs. May I do that? Please let me know.    Thank you.    Sincerely,   Tiana Hudson

## 2017-06-01 NOTE — TELEPHONE ENCOUNTER
----- Message from Tiana Hudson sent at 5/31/2017  8:46 PM EDT -----  Regarding: Visit Follow-Up Question  Contact: [phone]    MY CHART MESSAGE:  Dr Cruz,     I have not been contacted by home health nor physical therapy. My air conditioner is out at home. I would like a referral to go to physical therapy at [facility]. I have been doing the same therapy that I was doing in the hospital,  twice a day.  I have added 5 repetitions to each session daily, so I do 65 of each exercise twice daily. I took my bandage off and there is no oozing and it seems to be healing nicely. My daughter has been caring for me. She graduated from [employer] with a B.S. in Nursing and Did an externship in [facility] [employer] last summer so I feel adequately cared for and exercised but I do look forward to physical therapy at [facility].  Thank you.    Sincerely,   Tiana Mccabe,  I will put in the PT order for [facility] but will you look into this.  Thanks NOEMI

## 2017-06-01 NOTE — TELEPHONE ENCOUNTER
Put in orders for CBC and total iron level.    Call patient and have her drop by office to have labs done

## 2017-06-02 ENCOUNTER — RESULTS ENCOUNTER (OUTPATIENT)
Dept: FAMILY MEDICINE CLINIC | Facility: CLINIC | Age: 57
End: 2017-06-02

## 2017-06-02 DIAGNOSIS — D58.2 ABNORMAL HEMOGLOBIN (HCC): ICD-10-CM

## 2017-06-02 LAB
BASOPHILS # BLD AUTO: 0.04 10*3/MM3 (ref 0–0.2)
BASOPHILS NFR BLD AUTO: 0.6 % (ref 0–1.5)
DIFFERENTIAL COMMENT: NORMAL
EOSINOPHIL # BLD AUTO: 0.26 10*3/MM3 (ref 0–0.7)
EOSINOPHIL NFR BLD AUTO: 3.6 % (ref 0.3–6.2)
ERYTHROCYTE [DISTWIDTH] IN BLOOD BY AUTOMATED COUNT: 25.7 % (ref 11.7–13)
HCT VFR BLD AUTO: 36.5 % (ref 35.6–45.5)
HGB BLD-MCNC: 10.5 G/DL (ref 11.9–15.5)
IMM GRANULOCYTES # BLD: 0.02 10*3/MM3 (ref 0–0.03)
IMM GRANULOCYTES NFR BLD: 0.3 % (ref 0–0.5)
IRON SATN MFR SERPL: 7 % (ref 20–50)
IRON SERPL-MCNC: 32 MCG/DL (ref 37–145)
LYMPHOCYTES # BLD AUTO: 1.67 10*3/MM3 (ref 0.9–4.8)
LYMPHOCYTES NFR BLD AUTO: 23.1 % (ref 19.6–45.3)
MCH RBC QN AUTO: 21.1 PG (ref 26.9–32)
MCHC RBC AUTO-ENTMCNC: 28.8 G/DL (ref 32.4–36.3)
MCV RBC AUTO: 73.3 FL (ref 80.5–98.2)
MONOCYTES # BLD AUTO: 0.77 10*3/MM3 (ref 0.2–1.2)
MONOCYTES NFR BLD AUTO: 10.6 % (ref 5–12)
NEUTROPHILS # BLD AUTO: 4.48 10*3/MM3 (ref 1.9–8.1)
NEUTROPHILS NFR BLD AUTO: 61.8 % (ref 42.7–76)
PLATELET # BLD AUTO: 389 10*3/MM3 (ref 140–500)
PLATELET BLD QL SMEAR: NORMAL
RBC # BLD AUTO: 4.98 10*6/MM3 (ref 3.9–5.2)
RBC MORPH BLD: NORMAL
TIBC SERPL-MCNC: 452 MCG/DL
UIBC SERPL-MCNC: 420 MCG/DL
WBC # BLD AUTO: 7.24 10*3/MM3 (ref 4.5–10.7)

## 2017-06-02 NOTE — TELEPHONE ENCOUNTER
Have contacted the patient regarding her postoperative physical therapy.  I explained to her that home health was initially ordered while she was in the hospital however she discussed with the discharge planner that she would prefer to do outpatient physical therapy at [facility] and that if she would make her first appointment.  I did discuss in great detail with the patient whether she would like to have home health or outpatient PT.  Patient would prefer to do outpatient therapy at [facility].  Order has been placed in Epic and have given the patient the number to contact Jamaal Castillo to set up her first PT visit

## 2017-06-02 NOTE — TELEPHONE ENCOUNTER
I checked into this.  Patient was never scheduled for home health.  She was scheduled for OP at [place] and patient told the discharger planners that she would make the appt.

## 2017-06-05 ENCOUNTER — TREATMENT (OUTPATIENT)
Dept: PHYSICAL THERAPY | Facility: CLINIC | Age: 57
End: 2017-06-05

## 2017-06-05 ENCOUNTER — OFFICE VISIT (OUTPATIENT)
Dept: ORTHOPEDIC SURGERY | Facility: CLINIC | Age: 57
End: 2017-06-05

## 2017-06-05 VITALS — WEIGHT: 281 LBS | BODY MASS INDEX: 47.97 KG/M2 | TEMPERATURE: 97.6 F | HEIGHT: 64 IN

## 2017-06-05 DIAGNOSIS — Z96.651 STATUS POST TOTAL RIGHT KNEE REPLACEMENT: Primary | ICD-10-CM

## 2017-06-05 DIAGNOSIS — M17.11 ARTHRITIS OF RIGHT KNEE: ICD-10-CM

## 2017-06-05 DIAGNOSIS — Z47.89 SURGICAL AFTERCARE, MUSCULOSKELETAL SYSTEM: ICD-10-CM

## 2017-06-05 PROCEDURE — 99024 POSTOP FOLLOW-UP VISIT: CPT | Performed by: NURSE PRACTITIONER

## 2017-06-05 PROCEDURE — 73560 X-RAY EXAM OF KNEE 1 OR 2: CPT | Performed by: NURSE PRACTITIONER

## 2017-06-05 PROCEDURE — 97110 THERAPEUTIC EXERCISES: CPT | Performed by: PHYSICAL THERAPIST

## 2017-06-05 PROCEDURE — 97161 PT EVAL LOW COMPLEX 20 MIN: CPT | Performed by: PHYSICAL THERAPIST

## 2017-06-05 RX ORDER — OXYCODONE AND ACETAMINOPHEN 7.5; 325 MG/1; MG/1
TABLET ORAL
Qty: 100 TABLET | Refills: 0 | Status: SHIPPED | OUTPATIENT
Start: 2017-06-05 | End: 2017-07-06 | Stop reason: SDUPTHER

## 2017-06-05 NOTE — PATIENT INSTRUCTIONS
Education: scar massage, progression of OP PT for TKA rehab, cryotherapy, pain meds.  See Exercise Pro printout for HEP issued today.

## 2017-06-05 NOTE — PROGRESS NOTES
Tiana Hudson : 1960 MRN: [id number] DATE: 2017  Body mass index is 48.23 kg/(m^2).  Vitals:    17 0900   Temp: 97.6 °F (36.4 °C)       DIAGNOSIS: 2 week follow up right total knee arthroplasty    SUBJECTIVE:Patient returns today for 2 week follow up of right total knee replacement. Patient reports doing well with no unusual complaints. Appears to be progressing appropriately.    OBJECTIVE:   Exam:. The incision is healing appropriately. No sign of infection. Range of motion is progressing as expected -2/105. The calf is soft and nontender with a negative Homans sign.    DIAGNOSTIC STUDIES  Xrays: 2 views of the right knee (AP full length and lateral) were ordered and reviewed for evaluation of recent knee replacement. They demonstrate a well positioned, well aligned knee replacement without complicating factors noted. In comparison with previous films there has been interval implant placement.    ASSESSMENT: 2 week status post right total knee replacement expected healing.    PLAN: 1) Staples removed and steri strips applied   2) Order given for PT and pain medicine (as needed)   3) Continue MARIO hose for four weeks   4) Continue ice PRN   5) Continue EC Aspirin 325mg by mouth twice a day until 6 weeks post op.   6) Follow up in 4 weeks with repeat Xrays of right knee (2 views)   7) Continue with antibiotic prophylaxis with dental procedures for 2 yrs post total joint replacement.    Mami Montejo, APRN  2017     Pain Medications utilized after surgery are narcotics and the law requires that the following information be given to all patients that are prescribed narcotics:    CLASSIFICATION: Pain medications are called Opioids and are narcotics  LEGALITIES: It is illegal to share narcotics with others and to drive within 4 hours of taking narcotics  POTENTIAL SIDE EFFECTS: Potential side effects of opioids include: nausea, vomiting, itching, dizziness, drowsiness, dry mouth, constipation,  and difficulty urinating.  POTENTIAL ADVERSE EFFECTS:   Opioid tolerance can develop with use of pain medications and this simply means that it requires more and more of the medication to control pain; however, this is seen more in patients that use opioids for longer periods of time.  Opioid dependence can develop with use of Opioids and this simply means that to stop the medication can cause withdrawal symptoms so wean gradually (do not stop all at once); however, this is seen more with patients that use opioids for longer periods of time.  Opioid addiction can develop with use of Opioids and the incidence of this is very unlikely in patients who take the medications as ordered and stop the medications as instructed.  Opioid overdose can be dangerous, but is unlikely when the medication is taken as ordered and stopped when ordered. It is important not to mix opioids with alcohol or with and type of sedative such as Benadryl as this can lead to over sedation and respiratory difficulty.    DOSAGE:   Pain medications will need to be taken consistently for the first week to decrease pain and promote adequate pain relief and participation in physical therapy.    After the initial surgical pain begins to resolve, you may begin to decrease the pain medication. By the end of 8 weeks, you should be off of pain medications.    Refills will not be given by the office during evening hours, on weekends.  To seek refills on pain medications during the initial 6 week post-operative period, you must call the office 48 hours in advance to request the refill. The office will then notify you when to  the prescription. DO NOT wait until you are out of the medication to request a refill.

## 2017-06-05 NOTE — PATIENT INSTRUCTIONS
ASSESSMENT: 2 week status post right total knee replacement expected healing.    PLAN: 1) Staples removed and steri strips applied   2) Order given for PT and pain medicine (as needed)   3) Continue MARIO hose for four weeks   4) Continue ice PRN   5) Continue EC Aspirin 325mg by mouth twice a day until 6 weeks post op.   6) Follow up in 4 weeks with repeat Xrays of right knee (2 views)   7) Continue with antibiotic prophylaxis with dental procedures for 2 yrs post total joint replacement.    Mami Montejo, APRN  6/5/2017     Pain Medications utilized after surgery are narcotics and the law requires that the following information be given to all patients that are prescribed narcotics:    CLASSIFICATION: Pain medications are called Opioids and are narcotics  LEGALITIES: It is illegal to share narcotics with others and to drive within 4 hours of taking narcotics  POTENTIAL SIDE EFFECTS: Potential side effects of opioids include: nausea, vomiting, itching, dizziness, drowsiness, dry mouth, constipation, and difficulty urinating.  POTENTIAL ADVERSE EFFECTS:   Opioid tolerance can develop with use of pain medications and this simply means that it requires more and more of the medication to control pain; however, this is seen more in patients that use opioids for longer periods of time.  Opioid dependence can develop with use of Opioids and this simply means that to stop the medication can cause withdrawal symptoms so wean gradually (do not stop all at once); however, this is seen more with patients that use opioids for longer periods of time.  Opioid addiction can develop with use of Opioids and the incidence of this is very unlikely in patients who take the medications as ordered and stop the medications as instructed.  Opioid overdose can be dangerous, but is unlikely when the medication is taken as ordered and stopped when ordered. It is important not to mix opioids with alcohol or with and type of sedative such as  Benadryl as this can lead to over sedation and respiratory difficulty.    DOSAGE:   Pain medications will need to be taken consistently for the first week to decrease pain and promote adequate pain relief and participation in physical therapy.    After the initial surgical pain begins to resolve, you may begin to decrease the pain medication. By the end of 8 weeks, you should be off of pain medications.    Refills will not be given by the office during evening hours, on weekends.  To seek refills on pain medications during the initial 6 week post-operative period, you must call the office 48 hours in advance to request the refill. The office will then notify you when to  the prescription. DO NOT wait until you are out of the medication to request a refill.  [facility]    KNEE HOME EXERCISES      It is important that you maintain and possibly improve your strength and range of motion of your knee.      •  Walk frequently for short intervals  •  Using a cane or walker to allow you to walk safely if needed  •  Avoid sitting for long periods of time to avoid cramping and swelling of your leg   •  Do exercises either on a bed or in a chair.  •  If any of the exercises cause you pain, either eliminate that exercise or decrease          the motion or repetitions      Start exercises with 10 repetitions and increase to 30 repetitions.  Do two sets of each exercise each day    ANKLE PUMPS    1. Move your feet up and down as if you are pumping on a gas pedal       STRAIGHT LEG RAISES    1. Lie flat with your injured leg straight.  Keep the other leg bent.  2. Tighten the injured leg's thigh muscle.  3. Lift leg, with knee locked in the straight position no higher than the other knee for  seconds  4. Lower leg slowly. Rest for 5 seconds      SHORT ARC QUAD    1. Lie with both knees bent over a pillow  2. Straighten both knees  3. Hold 5 seconds  4. Bend knees back to starting position and repeat  sequence       QUADRICEPS     1. Lie flat with your injured let straight.  Keep the other leg bent.  2. Tighten the injured leg's thigh muscle by trying to move your kneecap toward the  thigh.  3. Make your leg as stiff as you can.  4. Hold for 5 seconds and relax for 5 seconds        HAMSTRING STRETCH    1. Lie flat with your injured leg straight. Keep the other leg bent  2. Press your heel of your injured leg into the floor for 5 seconds       OR  1. Sit with injured leg out straight.   2. Loop a sheet around the ball of foot and toes.  3. Gently pull on the sheet, keeping the leg straight  4. Hold for 10-30 seconds      KNEE FLEXION-EXTENSION SITTING     1. Sit with injured let bent as shown  2. Straighten leg at the knee  3. Hold 5 seconds  4. Bend knee back to starting position   5. Rest for 2-5 seconds and repeat sequence       HEEL SLIDES     1. Lie on back with legs straight  2. Slide heels toward buttocks  3. Return to starting position       HEEL SLIDS WITH SHEET    1. Lie on your back with a sheet wrapped around your foot  2. Pull on the sheet to assist you in bending your knee and sliding your heel toward  your buttocks  3. Hold for 5 seconds        KNEE FLEXION STRETCH    1. Sit in a chair  2. Bend bad knee back as far as you can   3. Hold stretch for 5-10 seconds      CHAIR PUSH UPS    1. Sit in a chair with arm rests  2. Place hands on armrests  3. Straighten arms, raising buttocks up off of chair         WALL SLIDES    1. Stand with your back and head against a wall.    2. Keep your feet shoulder width apart and 6-12 inches from the wall  3. Slowly slide down the wall until your knees are slightly bent.    4. Hold for 5 seconds and then slowly slide back up  5. As you get stronger, then you can slowly increase the bend in your knees, but do  not drop your buttocks below the level of your knees       STEP UPS    1. Stand with your foot on your injured leg on a 3-5 inch support (such as a block of   wood)  2. Place other foot on the floor  3. Shift your weight onto the foot on the block and try to straighten the knee and  raising the other foot off of the floor  4. Slowly lower your foot until only the heel touches the floor

## 2017-06-05 NOTE — PROGRESS NOTES
Physical Therapy Initial Evaluation and Plan of Care    Patient Name: Tiana Hudson       Patient MRN: [id number]  : 1960  Physician:Ross Cruz MD  Date: 2017    Encounter Diagnoses   Name Primary?   • Status post total right knee replacement Yes   • Arthritis of right knee    • Surgical aftercare, musculoskeletal system          Subjective Evaluation    History of Present Illness  Date of surgery: 2017  Mechanism of injury: Chronic arthritis in both knees. R knee was worse than L. Planning on replacing L as well. Pain post surgery is better than pain prior to surgery.     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    Quality of life: good    Pain  Current pain rating: 3  At best pain ratin (post exercise, pain meds and ice.)  At worst pain ratin  Location: lateral sides of knee  Quality: dull ache  Relieving factors: ice, medications, rest and change in position  Aggravating factors: movement  Progression: improved    Social Support  Lives in: multiple-level home (13 steps)  Lives with: spouse    Treatments  Previous treatment: injection treatment  Patient Goals  Patient goals for therapy: decreased edema, decreased pain, improved balance, increased motion, increased strength, independence with ADLs/IADLs and return to work             Objective     Observations     Right Knee   Positive for edema and incision (sutures removed today. Stei strips in place. Healing well, no signs of redness or infection. ).     Neurological Testing   Sensation     Knee   Left Knee   Intact: light touch    Right Knee   Intact: light touch     Active Range of Motion     Right Knee   Flexion: 111 degrees   Extension: 12 degrees     Passive Range of Motion     Right Knee   Flexion: 115 degrees   Extension: 3 degrees     Patellar Mobility     Right Knee Patellar tendons within functional limits include the medial and lateral. Hypermobile in the superior and inferior patellar tendon(s).      Strength/Myotome Testing     Right Hip   Planes of Motion   Flexion: 4    Right Knee   Flexion: 4+  Extension: 4  Quadriceps contraction: fair    Right Ankle/Foot   Dorsiflexion: 5    Swelling     Right Knee Girth Measurement (cm)   Joint line: 41 cm    Ambulation   Weight-Bearing Status   Weight-Bearing Status (Left): full weight bearing   Weight-Bearing Status (Right): full weight-bearing    Assistive device used: wheeled walker    Observational Gait   Gait: antalgic   Decreased walking speed, stride length, right stance time and right step length.          Assessment & Plan     Assessment  Impairments: abnormal gait, abnormal muscle firing, abnormal or restricted ROM, activity intolerance, impaired balance, impaired physical strength, lacks appropriate home exercise program, pain with function and weight-bearing intolerance  Assessment details: Patient would benefit from PT to address the listed impairments.  Prognosis: good  Prognosis details: STGs To be met in 2-4 weeks:  1. Pt to tolerate initial HEP to maintain gains made in PT.  2. Pt to exhibit increased right knee AROM to 3-118 degrees to allow for improved ability to navigate curbs and stairs.  3. Pt to report decreased pain with walking by 50% to demonstrate improved quality of life.  4. Pt to exhibit increased right quad strength to 4+/5 to allow for prolonged ADL's and walking.  5. Patient is able to ambulate independently.   LTGs To be met in 4-8 weeks:   1. Pt to demonstrate improved right knee quad MMT of >/= 5-/5 to allow for prolonged ADL's, stair climbing, and walking.  2. Pt will report pain of </= 3/10 with all ADLs to demonstrate improved quality of life.  3. Pt will score LEFS >/= 75% to demonstrate improved improved functional ability.   4. 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   5. R knee AROM is =/> 0-120 degrees to allow for improved gait and ADL performance.       Plan  Therapy options: will be seen for skilled  physical therapy services  Planned modality interventions: cryotherapy, electrical stimulation/Russian stimulation, thermotherapy (hydrocollator packs) and ultrasound  Planned therapy interventions: abdominal trunk stabilization, balance/weight-bearing training, flexibility, functional ROM exercises, gait training, home exercise program, joint mobilization, manual therapy, neuromuscular re-education, soft tissue mobilization, strengthening, stretching and therapeutic activities  Frequency: 3x week  Duration in weeks: 4  Treatment plan discussed with: patient        See Exercise, Manual, and Modality Logs for complete treatment.     PROCEDURES AND MODALITIES:  Paraffin:   pre-rx  Moist Heat:    Ice: Rx Minutes: 10 mins post-rx  E-Stim:    Ultrasound:    Ionto:   Traction:    Dry Needling:         Therapy Exercise 67579 20 minutes     Timed Code Treatment: 20 Minutes  Total Treatment Time: 65 Minutes    PT SIGNATURE: Lesly Rajan PT, DPT  [place] License # [id number]  DATE TREATMENT INITIATED: 6/5/2017    Initial Certification  Certification Period: 9/3/2017  I certify that the therapy services are furnished while this patient is under my care.  The services outlined above are required by this patient, and will be reviewed every 90 days.     PHYSICIAN: Ross Cruz MD      DATE:     Please sign and return via fax to [phone].. Thank you, [facility].

## 2017-06-06 NOTE — TELEPHONE ENCOUNTER
Spoke with patient and she states that she already had blood drawn. States that she had voicemail from someone at our office on Friday.

## 2017-06-07 ENCOUNTER — TREATMENT (OUTPATIENT)
Dept: PHYSICAL THERAPY | Facility: CLINIC | Age: 57
End: 2017-06-07

## 2017-06-07 DIAGNOSIS — Z47.89 SURGICAL AFTERCARE, MUSCULOSKELETAL SYSTEM: ICD-10-CM

## 2017-06-07 DIAGNOSIS — Z96.651 STATUS POST TOTAL RIGHT KNEE REPLACEMENT: Primary | ICD-10-CM

## 2017-06-07 DIAGNOSIS — M17.11 ARTHRITIS OF RIGHT KNEE: ICD-10-CM

## 2017-06-07 PROCEDURE — 97110 THERAPEUTIC EXERCISES: CPT | Performed by: PHYSICAL THERAPIST

## 2017-06-07 NOTE — PROGRESS NOTES
Physical Therapy Daily Progress Note    Visit #: 2  Tiana Hudson reports: Pain isn't too bad. New exercises are going well.  Pain Scale (0-10): 2    Subjective       Objective     Active Range of Motion     Right Knee   Extension: 10 degrees   AAROM R knee flex: 118 deg    See Exercise, Manual, and Modality Logs for complete treatment.     PROCEDURES AND MODALITIES:  Paraffin:   pre-rx  Moist Heat:    Ice: Rx Minutes: 10 mins post-rx  E-Stim:    Ultrasound:    Ionto:   Traction:    Dry Needling:         Manual PT [id number] 6 minutes and Therapy Exercise [id number] 42 minutes     Timed Code Treatment: 48 Minutes  Total Treatment Time: 62 Minutes    Assessment/Plan  AROM knee ext/flex is improving. No evident increase in edema observed. Tolerating all CONY well. Continues ambulating with RW.     Progress strengthening /stabilization /functional activity      Lesly Rajan PT, DPT  Physical Therapist  [place] License # [id number]

## 2017-06-09 ENCOUNTER — TREATMENT (OUTPATIENT)
Dept: PHYSICAL THERAPY | Facility: CLINIC | Age: 57
End: 2017-06-09

## 2017-06-09 DIAGNOSIS — Z47.89 SURGICAL AFTERCARE, MUSCULOSKELETAL SYSTEM: ICD-10-CM

## 2017-06-09 DIAGNOSIS — M17.11 ARTHRITIS OF RIGHT KNEE: ICD-10-CM

## 2017-06-09 DIAGNOSIS — Z96.651 STATUS POST TOTAL RIGHT KNEE REPLACEMENT: Primary | ICD-10-CM

## 2017-06-09 PROCEDURE — 97140 MANUAL THERAPY 1/> REGIONS: CPT | Performed by: PHYSICAL THERAPIST

## 2017-06-09 PROCEDURE — 97110 THERAPEUTIC EXERCISES: CPT | Performed by: PHYSICAL THERAPIST

## 2017-06-09 NOTE — PROGRESS NOTES
Physical Therapy Daily Progress Note    Visit #: 3  Tiana Hudson reports: Pain was elevated after last appt. Pain pill and ice helped.   Pain Scale (0-10): 1    Subjective       Objective   See Exercise, Manual, and Modality Logs for complete treatment.     PROCEDURES AND MODALITIES:  Paraffin:   pre-rx  Moist Heat:    Ice: Rx Minutes: 10 mins post-rx  E-Stim:    Ultrasound:    Ionto:   Traction:    Dry Needling:         Manual PT [id number] 10 minutes and Therapy Exercise [id number] 38 minutes     Timed Code Treatment: 48 Minutes  Total Treatment Time: 60 Minutes    Assessment/Plan  Increased pain is expected post PT session. Patient is making good progress. Patellar mobility is improving. Patient seems compliant with HEP.     Progress strengthening /stabilization /functional activity as tolerated.      Lesly Rajan PT, DPT  Physical Therapist  [place] License # [id number]

## 2017-06-12 ENCOUNTER — TREATMENT (OUTPATIENT)
Dept: PHYSICAL THERAPY | Facility: CLINIC | Age: 57
End: 2017-06-12

## 2017-06-12 ENCOUNTER — TELEPHONE (OUTPATIENT)
Dept: ORTHOPEDIC SURGERY | Facility: CLINIC | Age: 57
End: 2017-06-12

## 2017-06-12 DIAGNOSIS — M17.11 ARTHRITIS OF RIGHT KNEE: ICD-10-CM

## 2017-06-12 DIAGNOSIS — Z47.89 SURGICAL AFTERCARE, MUSCULOSKELETAL SYSTEM: ICD-10-CM

## 2017-06-12 DIAGNOSIS — Z96.651 STATUS POST TOTAL RIGHT KNEE REPLACEMENT: Primary | ICD-10-CM

## 2017-06-12 PROCEDURE — 97140 MANUAL THERAPY 1/> REGIONS: CPT | Performed by: PHYSICAL THERAPIST

## 2017-06-12 PROCEDURE — 97110 THERAPEUTIC EXERCISES: CPT | Performed by: PHYSICAL THERAPIST

## 2017-06-12 NOTE — PROGRESS NOTES
Physical Therapy Daily Progress Note    Visit #: 4  Tiana Hudson reports: Walking without walker now. My incision is oozing some. Going to try to get into MD to check for infection.   Pain Scale (0-10): 1    Subjective       Objective   See Exercise, Manual, and Modality Logs for complete treatment.   Slight dehiscence of medial incision, no bleeding. Yellow serous exudate.   PROCEDURES AND MODALITIES:  Paraffin:   pre-rx  Moist Heat:    Ice: Rx Minutes: 10 mins post-rx  E-Stim:    Ultrasound:    Ionto:   Traction:    Dry Needling:         Manual PT [id number] 8 minutes and Therapy Exercise [id number] 42 minutes     Timed Code Treatment: 50 Minutes  Total Treatment Time: 61 Minutes    Assessment/Plan  Patient tolerated all new exercises well. Encouraged patient to see MD for incision just to be cautious. Steri strip added to dehisced portion of incision.   Progress strengthening /stabilization /functional activity      Lesly Rajan PT, DPT  Physical Therapist  [place] License # [id number]

## 2017-06-12 NOTE — TELEPHONE ENCOUNTER
Called.  Minimal.  No pain associated.  Doing great.  Cleaning etc instrucitons given and call if worsens. spm

## 2017-06-14 ENCOUNTER — TREATMENT (OUTPATIENT)
Dept: PHYSICAL THERAPY | Facility: CLINIC | Age: 57
End: 2017-06-14

## 2017-06-14 DIAGNOSIS — Z96.651 STATUS POST TOTAL RIGHT KNEE REPLACEMENT: Primary | ICD-10-CM

## 2017-06-14 DIAGNOSIS — Z47.89 SURGICAL AFTERCARE, MUSCULOSKELETAL SYSTEM: ICD-10-CM

## 2017-06-14 DIAGNOSIS — M17.11 ARTHRITIS OF RIGHT KNEE: ICD-10-CM

## 2017-06-14 PROCEDURE — 97110 THERAPEUTIC EXERCISES: CPT | Performed by: PHYSICAL THERAPIST

## 2017-06-14 NOTE — PROGRESS NOTES
Physical Therapy Daily Progress Note    Visit #: 5  Tiana Hudson reports: A little more sore after last visit but not bad. Dealing with constipation due to pain meds.   Pain Scale (0-10):     Subjective       Objective     Active Range of Motion     Right Knee   Extension: 3 degrees     Passive Range of Motion     Right Knee   Flexion: 120 degrees   Extension: 0 degrees      See Exercise, Manual, and Modality Logs for complete treatment.     PROCEDURES AND MODALITIES:  Paraffin:   pre-rx  Moist Heat:    Ice: Rx Minutes: 10 mins post-rx  E-Stim:    Ultrasound:    Ionto:   Traction:    Dry Needling:         Manual PT [id number] 5 minutes and Therapy Exercise [id number] 45 minutes     Timed Code Treatment: 50 Minutes  Total Treatment Time: 60 Minutes    Assessment/Plan  Knee mobility is improving. Pain is decreasing. Has decreased single leg balance. Gait mechanics and stability are improving.     Progress strengthening /stabilization /functional activity      Lesly Rajan PT, DPT  Physical Therapist  [place] License # [id number]

## 2017-06-16 ENCOUNTER — TREATMENT (OUTPATIENT)
Dept: PHYSICAL THERAPY | Facility: CLINIC | Age: 57
End: 2017-06-16

## 2017-06-16 DIAGNOSIS — Z47.89 SURGICAL AFTERCARE, MUSCULOSKELETAL SYSTEM: ICD-10-CM

## 2017-06-16 DIAGNOSIS — M17.11 ARTHRITIS OF RIGHT KNEE: ICD-10-CM

## 2017-06-16 DIAGNOSIS — Z96.651 STATUS POST TOTAL RIGHT KNEE REPLACEMENT: Primary | ICD-10-CM

## 2017-06-16 PROCEDURE — 97110 THERAPEUTIC EXERCISES: CPT | Performed by: PHYSICAL THERAPIST

## 2017-06-16 PROCEDURE — 97140 MANUAL THERAPY 1/> REGIONS: CPT | Performed by: PHYSICAL THERAPIST

## 2017-06-16 NOTE — PROGRESS NOTES
Physical Therapy Daily Progress Note    Visit #: 6  Tiana Hudson reports: Knee is sore.   Pain Scale (0-10): 3    Subjective       Objective   See Exercise, Manual, and Modality Logs for complete treatment.     PROCEDURES AND MODALITIES:  Paraffin:   pre-rx  Moist Heat:    Ice: Rx Minutes: 10 mins post-rx  E-Stim:    Ultrasound:    Ionto:   Traction:    Dry Needling:         Manual PT [id number] 5 minutes and Therapy Exercise [id number] 50 minutes     Timed Code Treatment: 55 Minutes  Total Treatment Time: 65 Minutes    Assessment/Plan   Knee AROM ext is almost to neurtral. Pain is controlled and does increase some with increased CONY or activity. Continues to build strength. Steri strips removed from knee. Incision looks clean but does still have some areas that are not completely healed.     Progress strengthening /stabilization /functional activity      Lesly Rajan PT, DPT  Physical Therapist  [place] License # [id number]

## 2017-06-19 ENCOUNTER — TREATMENT (OUTPATIENT)
Dept: PHYSICAL THERAPY | Facility: CLINIC | Age: 57
End: 2017-06-19

## 2017-06-19 DIAGNOSIS — M17.11 ARTHRITIS OF RIGHT KNEE: ICD-10-CM

## 2017-06-19 DIAGNOSIS — Z47.89 SURGICAL AFTERCARE, MUSCULOSKELETAL SYSTEM: ICD-10-CM

## 2017-06-19 DIAGNOSIS — Z96.651 STATUS POST TOTAL RIGHT KNEE REPLACEMENT: Primary | ICD-10-CM

## 2017-06-19 PROCEDURE — 97110 THERAPEUTIC EXERCISES: CPT | Performed by: PHYSICAL THERAPIST

## 2017-06-19 NOTE — PROGRESS NOTES
Physical Therapy Daily Progress Note    Visit #: 7  Tiana Hudson reports: I was up walking around a lot this weekend and did not have significant increased pain.  Pain Scale (0-10):     Subjective       Objective     Passive Range of Motion     Right Knee   Flexion: 125 degrees   Extension: 0 degrees      See Exercise, Manual, and Modality Logs for complete treatment.     PROCEDURES AND MODALITIES:  Paraffin:   pre-rx  Moist Heat:    Ice: Rx Minutes: 10 mins post-rx  E-Stim:    Ultrasound:    Ionto:   Traction:    Dry Needling:         Therapy Exercise [id number] 40 minutes     Timed Code Treatment: 40 Minutes  Total Treatment Time: 50 Minutes    Assessment/Plan  Gait is essentially normal with very minimal gait deviations. Knee AROM is improved and nearing normal. Continues to tolerate strengthening well.     Progress strengthening /stabilization /functional activity      Lesly Rajan PT, DPT  Physical Therapist  [place] License # [id number]

## 2017-06-21 ENCOUNTER — TREATMENT (OUTPATIENT)
Dept: PHYSICAL THERAPY | Facility: CLINIC | Age: 57
End: 2017-06-21

## 2017-06-21 DIAGNOSIS — Z96.651 STATUS POST TOTAL RIGHT KNEE REPLACEMENT: Primary | ICD-10-CM

## 2017-06-21 DIAGNOSIS — Z47.89 SURGICAL AFTERCARE, MUSCULOSKELETAL SYSTEM: ICD-10-CM

## 2017-06-21 DIAGNOSIS — M17.11 ARTHRITIS OF RIGHT KNEE: ICD-10-CM

## 2017-06-21 PROCEDURE — 97110 THERAPEUTIC EXERCISES: CPT | Performed by: PHYSICAL THERAPIST

## 2017-06-21 NOTE — PROGRESS NOTES
Physical Therapy Daily Progress Note    Visit #: 8  Tiana Hudson reports: I could tell my exercises were progressed last time.   Pain Scale (0-10):     Subjective       Objective   See Exercise, Manual, and Modality Logs for complete treatment.   SLS: firm surface R LE: 7 sec    PROCEDURES AND MODALITIES:  Paraffin:   pre-rx  Moist Heat:    Ice: Rx Minutes: 10 mins post-rx  E-Stim:    Ultrasound:    Ionto:   Traction:    Dry Needling:         Therapy Exercise [id number] 45 minutes     Timed Code Treatment: 45 Minutes  Total Treatment Time: 60 Minutes    Assessment/Plan  Incision is still not closed in a few places. Patient is diligent about keeping it clean. Knee AROM continues to improve. SL balance on R is abnormal. Added to HEP.     Progress per Plan of Care      Lesly Rajan PT, DPT  Physical Therapist  [place] License # [id number]

## 2017-06-26 ENCOUNTER — TREATMENT (OUTPATIENT)
Dept: PHYSICAL THERAPY | Facility: CLINIC | Age: 57
End: 2017-06-26

## 2017-06-26 DIAGNOSIS — Z96.651 STATUS POST TOTAL RIGHT KNEE REPLACEMENT: Primary | ICD-10-CM

## 2017-06-26 DIAGNOSIS — M17.11 ARTHRITIS OF RIGHT KNEE: ICD-10-CM

## 2017-06-26 DIAGNOSIS — Z47.89 SURGICAL AFTERCARE, MUSCULOSKELETAL SYSTEM: ICD-10-CM

## 2017-06-26 PROCEDURE — 97110 THERAPEUTIC EXERCISES: CPT | Performed by: PHYSICAL THERAPIST

## 2017-06-26 NOTE — PROGRESS NOTES
Physical Therapy Daily Progress Note    Visit #: 9  Tiana Hudson reports: Incision is healing but there is an stitch sticking out. Balance is still not good.   Pain Scale (0-10): 1/10    Subjective       Objective   See Exercise, Manual, and Modality Logs for complete treatment.     PROCEDURES AND MODALITIES:  Paraffin:   pre-rx  Moist Heat:    Ice: Rx Minutes: 10 mins post-rx  E-Stim:    Ultrasound:    Ionto:   Traction:    Dry Needling:         Therapy Exercise [id number] 40 minutes and NMR [id number] 6 minutes     Timed Code Treatment: 46 Minutes  Total Treatment Time: 60 Minutes    Assessment/Plan  Patient is maintaining extension AROM. SLS balance is abnromal on R LE. Gait mechanics are improved and is not using an AD anymore.     Progress per Plan of Care      Lesly Rajan PT, DPT  Physical Therapist  [place] License # [id number]

## 2017-07-06 ENCOUNTER — OFFICE VISIT (OUTPATIENT)
Dept: ORTHOPEDIC SURGERY | Facility: CLINIC | Age: 57
End: 2017-07-06

## 2017-07-06 VITALS — HEIGHT: 64 IN | WEIGHT: 280 LBS | BODY MASS INDEX: 47.8 KG/M2 | TEMPERATURE: 97.6 F

## 2017-07-06 DIAGNOSIS — M17.12 ARTHRITIS OF LEFT KNEE: ICD-10-CM

## 2017-07-06 DIAGNOSIS — Z96.651 STATUS POST TOTAL RIGHT KNEE REPLACEMENT: ICD-10-CM

## 2017-07-06 DIAGNOSIS — Z96.651 HISTORY OF TOTAL KNEE ARTHROPLASTY, RIGHT: Primary | ICD-10-CM

## 2017-07-06 PROBLEM — Z96.659 HISTORY OF TOTAL KNEE ARTHROPLASTY: Status: ACTIVE | Noted: 2017-07-06

## 2017-07-06 PROCEDURE — 20610 DRAIN/INJ JOINT/BURSA W/O US: CPT | Performed by: NURSE PRACTITIONER

## 2017-07-06 PROCEDURE — 73560 X-RAY EXAM OF KNEE 1 OR 2: CPT | Performed by: NURSE PRACTITIONER

## 2017-07-06 PROCEDURE — 99024 POSTOP FOLLOW-UP VISIT: CPT | Performed by: NURSE PRACTITIONER

## 2017-07-06 RX ORDER — BUPIVACAINE HYDROCHLORIDE 5 MG/ML
2 INJECTION, SOLUTION PERINEURAL
Status: COMPLETED | OUTPATIENT
Start: 2017-07-06 | End: 2017-07-06

## 2017-07-06 RX ORDER — METHYLPREDNISOLONE ACETATE 80 MG/ML
80 INJECTION, SUSPENSION INTRA-ARTICULAR; INTRALESIONAL; INTRAMUSCULAR; SOFT TISSUE
Status: COMPLETED | OUTPATIENT
Start: 2017-07-06 | End: 2017-07-06

## 2017-07-06 RX ORDER — LIDOCAINE HYDROCHLORIDE 10 MG/ML
2 INJECTION, SOLUTION INFILTRATION; PERINEURAL
Status: COMPLETED | OUTPATIENT
Start: 2017-07-06 | End: 2017-07-06

## 2017-07-06 RX ORDER — ASPIRIN 325 MG
325 TABLET ORAL 2 TIMES DAILY
COMMUNITY
End: 2017-10-05

## 2017-07-06 RX ORDER — OXYCODONE AND ACETAMINOPHEN 7.5; 325 MG/1; MG/1
TABLET ORAL
Qty: 100 TABLET | Refills: 0
Start: 2017-07-06 | End: 2017-10-05

## 2017-07-06 RX ADMIN — BUPIVACAINE HYDROCHLORIDE 2 ML: 5 INJECTION, SOLUTION PERINEURAL at 08:29

## 2017-07-06 RX ADMIN — METHYLPREDNISOLONE ACETATE 80 MG: 80 INJECTION, SUSPENSION INTRA-ARTICULAR; INTRALESIONAL; INTRAMUSCULAR; SOFT TISSUE at 08:29

## 2017-07-06 RX ADMIN — LIDOCAINE HYDROCHLORIDE 2 ML: 10 INJECTION, SOLUTION INFILTRATION; PERINEURAL at 08:29

## 2017-07-06 NOTE — PATIENT INSTRUCTIONS
Chief Complaint and HPI: 6 weeks follow up right total knee, LEFT KNEE PAIN    SUBJECTIVE:Patient returns today for follow up of total joint replacement. Patient reports doing well with no unusual complaints. Appears to be progressing appropriately. Left knee with swelling and pain, has had intermittent coritsone inj which help.    OBJECTIVE:   Exam:. Right knee: The incision is healing appropriately. No sign of infection. Range of motion is progressing as expected 0/125. The calf is soft and nontender with a negative Homans sign. Left knee: synovitis, swelling, crepitation, distal pulse intact.     DIAGNOSTIC STUDIES  Xrays: 2 views of the rightknee (AP full length and lateral) were ordered and reviewed for evaluation of recent joint replacement. They demonstrate a well positioned, well aligned total joint replacement without complicating factors noted. In comparison with previous films there has been no alignment change.    ASSESSMENT: status post right total knee replacement expected healing. Left knee arthritis.     PLAN: 1) Continue with PT exercises as prescribed   2) Follow up 3 MONTHS  WITH XRAYS (2 views of same joint).   3) Continue with antibiotic prophylaxis with dental procedures for 2 yrs post total joint replacement.   4) May discontinue Aspirin therapy from surgery at this time and resume medications, vitamins, and supplements you were taking before surgery.    5) Cortisone injection for left knee.    Mami Montejo, APRN  7/6/2017       Large Joint Arthrocentesis  Date/Time: 7/6/2017 8:29 AM  Consent given by: patient  Site marked: site marked  Timeout: Immediately prior to procedure a time out was called to verify the correct patient, procedure, equipment, support staff and site/side marked as required   Supporting Documentation  Indications: pain and joint swelling   Procedure Details  Location: knee - L knee  Preparation: Patient was prepped and draped in the usual sterile fashion  Needle size:  22 G  Approach: anterolateral  Medications administered: 2 mL lidocaine 1 %; 80 mg methylPREDNISolone acetate 80 MG/ML; 2 mL bupivacaine  Patient tolerance: patient tolerated the procedure well with no immediate complications           Pain Medications utilized after surgery are narcotics and the law requires that the following information be given to all patients that are prescribed narcotics:    CLASSIFICATION: Pain medications are called Opioids and are narcotics  LEGALITIES: It is illegal to share narcotics with others and to drive within 4 hours of taking narcotics  POTENTIAL SIDE EFFECTS: Potential side effects of opioids include: nausea, vomiting, itching, dizziness, drowsiness, dry mouth, constipation, and difficulty urinating.  POTENTIAL ADVERSE EFFECTS:   Opioid tolerance can develop with use of pain medications and this simply means that it requires more and more of the medication to control pain; however, this is seen more in patients that use opioids for longer periods of time.  Opioid dependence can develop with use of Opioids and this simply means that to stop the medication can cause withdrawal symptoms so wean gradually (do not stop all at once); however, this is seen more with patients that use opioids for longer periods of time.  Opioid addiction can develop with use of Opioids and the incidence of this is very unlikely in patients who take the medications as ordered and stop the medications as instructed.  Opioid overdose can be dangerous, but is unlikely when the medication is taken as ordered and stopped when ordered. It is important not to mix opioids with alcohol or with and type of sedative such as Benadryl as this can lead to over sedation and respiratory difficulty.    DOSAGE:   Pain medications will need to be taken consistently for the first week to decrease pain and promote adequate pain relief and participation in physical therapy.    After the initial surgical pain begins to resolve,  you may begin to decrease the pain medication. By the end of 8 weeks, you should be off of pain medications.    Refills will not be given by the office during evening hours, on weekends.  To seek refills on pain medications during the initial 6 week post-operative period, you must call the office 48 hours in advance to request the refill. The office will then notify you when to  the prescription. DO NOT wait until you are out of the medication to request a refill.

## 2017-07-06 NOTE — PROGRESS NOTES
Tiana Hudson : 1960 MRN: [id number] DATE: 2017  Body mass index is 48.06 kg/(m^2).  Vitals:    17 0811   Temp: 97.6 °F (36.4 °C)       Chief Complaint and HPI: 6 weeks follow up right total knee, LEFT KNEE PAIN    SUBJECTIVE:Patient returns today for follow up of total joint replacement. Patient reports doing well with no unusual complaints. Appears to be progressing appropriately. Left knee with swelling and pain, has had intermittent coritsone inj which help.    OBJECTIVE:   Exam:. Right knee: The incision is healing appropriately. No sign of infection. Range of motion is progressing as expected 0/125. The calf is soft and nontender with a negative Homans sign. Left knee: synovitis, swelling, crepitation, distal pulse intact.     DIAGNOSTIC STUDIES  Xrays: 2 views of the rightknee (AP full length and lateral) were ordered and reviewed for evaluation of recent joint replacement. They demonstrate a well positioned, well aligned total joint replacement without complicating factors noted. In comparison with previous films there has been no alignment change.    ASSESSMENT: status post right total knee replacement expected healing. Left knee arthritis.     PLAN: 1) Continue with PT exercises as prescribed, percocet refill for pain.   2) Follow up 3 MONTHS  WITH XRAYS (2 views of same joint).   3) Continue with antibiotic prophylaxis with dental procedures for 2 yrs post total joint replacement.   4) May discontinue Aspirin therapy from surgery at this time and resume medications, vitamins, and supplements you were taking before surgery.    5) Cortisone injection for left knee.    Mami Montejo, ELIAS  2017       Large Joint Arthrocentesis  Date/Time: 2017 8:29 AM  Consent given by: patient  Site marked: site marked  Timeout: Immediately prior to procedure a time out was called to verify the correct patient, procedure, equipment, support staff and site/side marked as required   Supporting  Documentation  Indications: pain and joint swelling   Procedure Details  Location: knee - L knee  Preparation: Patient was prepped and draped in the usual sterile fashion  Needle size: 22 G  Approach: anterolateral  Medications administered: 2 mL lidocaine 1 %; 80 mg methylPREDNISolone acetate 80 MG/ML; 2 mL bupivacaine  Patient tolerance: patient tolerated the procedure well with no immediate complications           Pain Medications utilized after surgery are narcotics and the law requires that the following information be given to all patients that are prescribed narcotics:    CLASSIFICATION: Pain medications are called Opioids and are narcotics  LEGALITIES: It is illegal to share narcotics with others and to drive within 4 hours of taking narcotics  POTENTIAL SIDE EFFECTS: Potential side effects of opioids include: nausea, vomiting, itching, dizziness, drowsiness, dry mouth, constipation, and difficulty urinating.  POTENTIAL ADVERSE EFFECTS:   Opioid tolerance can develop with use of pain medications and this simply means that it requires more and more of the medication to control pain; however, this is seen more in patients that use opioids for longer periods of time.  Opioid dependence can develop with use of Opioids and this simply means that to stop the medication can cause withdrawal symptoms so wean gradually (do not stop all at once); however, this is seen more with patients that use opioids for longer periods of time.  Opioid addiction can develop with use of Opioids and the incidence of this is very unlikely in patients who take the medications as ordered and stop the medications as instructed.  Opioid overdose can be dangerous, but is unlikely when the medication is taken as ordered and stopped when ordered. It is important not to mix opioids with alcohol or with and type of sedative such as Benadryl as this can lead to over sedation and respiratory difficulty.    DOSAGE:   Pain medications will need to  be taken consistently for the first week to decrease pain and promote adequate pain relief and participation in physical therapy.    After the initial surgical pain begins to resolve, you may begin to decrease the pain medication. By the end of 8 weeks, you should be off of pain medications.    Refills will not be given by the office during evening hours, on weekends.  To seek refills on pain medications during the initial 6 week post-operative period, you must call the office 48 hours in advance to request the refill. The office will then notify you when to  the prescription. DO NOT wait until you are out of the medication to request a refill.

## 2017-07-13 ENCOUNTER — APPOINTMENT (OUTPATIENT)
Dept: LAB | Facility: HOSPITAL | Age: 57
End: 2017-07-13

## 2017-07-13 ENCOUNTER — OFFICE VISIT (OUTPATIENT)
Dept: INFECTIOUS DISEASES | Facility: CLINIC | Age: 57
End: 2017-07-13

## 2017-07-13 VITALS
HEART RATE: 99 BPM | DIASTOLIC BLOOD PRESSURE: 83 MMHG | BODY MASS INDEX: 45.14 KG/M2 | HEIGHT: 64 IN | SYSTOLIC BLOOD PRESSURE: 136 MMHG | TEMPERATURE: 98.2 F | WEIGHT: 264.4 LBS

## 2017-07-13 DIAGNOSIS — N39.0 RECURRENT UTI: Primary | ICD-10-CM

## 2017-07-13 DIAGNOSIS — B37.49 CANDIDURIA: ICD-10-CM

## 2017-07-13 DIAGNOSIS — Z22.7 TB LUNG, LATENT: ICD-10-CM

## 2017-07-13 DIAGNOSIS — R31.9 HEMATURIA: ICD-10-CM

## 2017-07-13 LAB
BACTERIA UR QL AUTO: ABNORMAL /HPF
BILIRUB UR QL STRIP: NEGATIVE
CLARITY UR: ABNORMAL
COLOR UR: ABNORMAL
GLUCOSE UR STRIP-MCNC: ABNORMAL MG/DL
HGB UR QL STRIP.AUTO: ABNORMAL
HYALINE CASTS UR QL AUTO: ABNORMAL /LPF
KETONES UR QL STRIP: NEGATIVE
LEUKOCYTE ESTERASE UR QL STRIP.AUTO: ABNORMAL
NITRITE UR QL STRIP: POSITIVE
PH UR STRIP.AUTO: 6 [PH] (ref 5–8)
PROT UR QL STRIP: ABNORMAL
RBC # UR: ABNORMAL /HPF
REF LAB TEST METHOD: ABNORMAL
SP GR UR STRIP: 1.02 (ref 1–1.03)
SQUAMOUS #/AREA URNS HPF: ABNORMAL /HPF
UROBILINOGEN UR QL STRIP: ABNORMAL
WBC UR QL AUTO: ABNORMAL /HPF

## 2017-07-13 PROCEDURE — 87086 URINE CULTURE/COLONY COUNT: CPT | Performed by: INTERNAL MEDICINE

## 2017-07-13 PROCEDURE — 99245 OFF/OP CONSLTJ NEW/EST HI 55: CPT | Performed by: INTERNAL MEDICINE

## 2017-07-13 PROCEDURE — 87491 CHLMYD TRACH DNA AMP PROBE: CPT | Performed by: INTERNAL MEDICINE

## 2017-07-13 PROCEDURE — 81001 URINALYSIS AUTO W/SCOPE: CPT | Performed by: INTERNAL MEDICINE

## 2017-07-13 PROCEDURE — 87798 DETECT AGENT NOS DNA AMP: CPT | Performed by: INTERNAL MEDICINE

## 2017-07-13 PROCEDURE — 36415 COLL VENOUS BLD VENIPUNCTURE: CPT | Performed by: INTERNAL MEDICINE

## 2017-07-13 PROCEDURE — 87591 N.GONORRHOEAE DNA AMP PROB: CPT | Performed by: INTERNAL MEDICINE

## 2017-07-13 RX ORDER — FAMOTIDINE 20 MG/1
20 TABLET, FILM COATED ORAL 2 TIMES DAILY PRN
COMMUNITY
End: 2020-05-19

## 2017-07-13 NOTE — PROGRESS NOTES
"cc: Tiana is here for evaluation of recurrent candiduria.    Patient reports that she was in her usual state of health until approximately August 2016 when she started developing urinary frequency, urgency nocturia and hematuria.  This persisted despite numerous courses of antibiotics including amoxicillin, nitrofurantoin, Diflucan and cephalexin.  Multiple urine cultures have grown fungus.  She denies any pruritus or vaginal discharge.  Her symptoms have been incompletely controlled with Diflucan.  She denies any history of infections outside of the urinary symptoms.  She denies any history of pyelonephritis.  She denies any douching or sexual intercourse.  Her symptoms tend to be intermittent but occurring at least once per month.    She is also had alterations in her diabetes mellitus medicines without much relief.  Finally, she is undergone cystoscopy with apparent benign changes.    She is also been on medicines for overactive bladder.    I have reviewed her past microbiology data and summarized it under diagnostics.  She has grown mixed ami on low colony counts of yeast.    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  She is .  She is a nonsmoker and does not use IV drugs.    Review of Systems: Fever.  Constipation.  Joint and back pain. All other reviewed and negative except as per HPI    Blood pressure 136/83, pulse 99, temperature 98.2 °F (36.8 °C), height 64\" (162.6 cm), weight 264 lb 6.4 oz (120 kg).  GENERAL: Awake and alert, in " no acute distress.   HEENT: Oropharynx is clear. Hearing is grossly normal.   EYES: PERRL. No conjunctival injection. No lid lag.   LYMPHATICS: No lymphadenopathy of the neck or inguinal regions.   HEART: Regular rate and rhythm. No peripheral edema.   LUNGS: Clear to auscultation anteriorly with normal respiratory effort.   ABDOMEN: Soft, nontender, nondistended. No appreciable organomegaly.   SKIN: Warm and dry without cutaneous eruptions   PSYCHIATRIC: Appropriate mood, affect, insight, and judgment.     DIAGNOSTICS:  6/2/17  WBC 7.2  (P62, L 23, M 11, e4)    H/H 10/37    Cr 0.64      6/27/17 urine culture: 25-50,000 CFU yeast  6/15/17 Urine culture: 10-25,000 CFU of urogenital mixed ami  5/11/17 Urine culture: >100,000 CFU mixed culture  2/21/17 urine culture: 10-25 CFU mixed urogenital ami  11/18/16 urine culture: ,000 CFU yeast  9/29/16 urine culture: >100,000 colony forming units Candida tropicalis  8/17/16 urine culture: >100,000 CFU yeast    10/25/16 bladder biopsy pathology with severe acute chronic inflammation    CT of the abdomen and pelvis: IUD in the uterus.  Thickening along the right posterior bladder wall 3 x 2.5 x 0.4 cm.  Left ovarian cyst.    Assessment and Plan  1.  Recurrent candiduria  2.  Hematuria  3.  Latent tuberculosis in 1992    56-year-old with recurrent urinary symptoms marked by frequency, urgency nocturia and hematuria.  Candida very rarely causes cystitis and in patients with normal immune systems.  She has very low colony counts of yeast and I do not think that those are significant enough to be causing her symptoms.  Best I can tell, she is not ever cultured a typical uropathogen such as Escherichia coli.  I think we should investigate for alternative causes of her symptoms.  I will screen her for chlamydia, gonococcus and Mycoplasma.  I think we should see how she evolves off of antibiotics.  With her past history of incompletely treated latent tuberculosis, we  will also check AFB PCR of the urine.  I will see her back in 3 months or sooner if needed

## 2017-07-14 LAB — BACTERIA SPEC AEROBE CULT: NORMAL

## 2017-07-16 LAB
C TRACH RRNA SPEC DONR QL NAA+PROBE: NEGATIVE
CONV COMMENT: NORMAL
MYCOPLASMA GENITALIUM NA: NEGATIVE
N GONORRHOEA DNA SPEC QL NAA+PROBE: NEGATIVE

## 2017-07-20 LAB — REF LAB TEST RESULTS: NORMAL

## 2017-07-25 DIAGNOSIS — R25.2 CRAMP OF BOTH LOWER EXTREMITIES: ICD-10-CM

## 2017-07-25 RX ORDER — ROPINIROLE 0.5 MG/1
0.5 TABLET, FILM COATED ORAL
Qty: 30 TABLET | Refills: 0 | Status: SHIPPED | OUTPATIENT
Start: 2017-07-25

## 2017-08-15 ENCOUNTER — DOCUMENTATION (OUTPATIENT)
Dept: PHYSICAL THERAPY | Facility: CLINIC | Age: 57
End: 2017-08-15

## 2017-08-15 DIAGNOSIS — M17.11 ARTHRITIS OF RIGHT KNEE: ICD-10-CM

## 2017-08-15 DIAGNOSIS — Z96.651 STATUS POST TOTAL RIGHT KNEE REPLACEMENT: Primary | ICD-10-CM

## 2017-08-15 DIAGNOSIS — Z47.89 SURGICAL AFTERCARE, MUSCULOSKELETAL SYSTEM: ICD-10-CM

## 2017-08-15 NOTE — PROGRESS NOTES
Discharge Report    Patient Name: Tiana Hudson       Patient MRN: [id number]  : 1960  Physician: Ross Cruz MD  Date: 8/15/2017    Encounter Diagnoses   Name Primary?   • Status post total right knee replacement Yes   • Arthritis of right knee    • Surgical aftercare, musculoskeletal system        Treatment has included therapeutic exercise, manual therapy, cryotherapy and HEP    Assessment: Patient cancelled last 4 scheduled PT appts for unknown reasons. Current status is unknown. See MD note dated 17 for status of R knee.   Progress towards goals: Partially Met    Discharge Reason: Patient did not attend final appointment      Plan of Care: Continue with current home exercise program as instructed    Prognosis: good    Understanding at Discharge: unknown

## 2017-09-18 ENCOUNTER — LAB (OUTPATIENT)
Dept: LAB | Facility: HOSPITAL | Age: 57
End: 2017-09-18
Attending: INTERNAL MEDICINE

## 2017-09-18 ENCOUNTER — TRANSCRIBE ORDERS (OUTPATIENT)
Dept: ADMINISTRATIVE | Facility: HOSPITAL | Age: 57
End: 2017-09-18

## 2017-09-18 DIAGNOSIS — N39.0 URINARY TRACT INFECTION, SITE UNSPECIFIED: ICD-10-CM

## 2017-09-18 DIAGNOSIS — N39.0 URINARY TRACT INFECTION, SITE UNSPECIFIED: Primary | ICD-10-CM

## 2017-09-18 LAB
BILIRUB UR QL STRIP: NEGATIVE
CLARITY UR: ABNORMAL
COLOR UR: YELLOW
GLUCOSE UR STRIP-MCNC: NEGATIVE MG/DL
HGB UR QL STRIP.AUTO: ABNORMAL
KETONES UR QL STRIP: NEGATIVE
LEUKOCYTE ESTERASE UR QL STRIP.AUTO: ABNORMAL
NITRITE UR QL STRIP: NEGATIVE
PH UR STRIP.AUTO: 7 [PH] (ref 5–8)
PROT UR QL STRIP: ABNORMAL
SP GR UR STRIP: 1.02 (ref 1–1.03)
UROBILINOGEN UR QL STRIP: ABNORMAL

## 2017-09-18 PROCEDURE — 87086 URINE CULTURE/COLONY COUNT: CPT

## 2017-09-18 PROCEDURE — 81003 URINALYSIS AUTO W/O SCOPE: CPT

## 2017-09-19 LAB — BACTERIA SPEC AEROBE CULT: ABNORMAL

## 2017-10-05 ENCOUNTER — OFFICE VISIT (OUTPATIENT)
Dept: ORTHOPEDIC SURGERY | Facility: CLINIC | Age: 57
End: 2017-10-05

## 2017-10-05 VITALS — WEIGHT: 263 LBS | TEMPERATURE: 98 F | BODY MASS INDEX: 44.9 KG/M2 | HEIGHT: 64 IN

## 2017-10-05 DIAGNOSIS — Z96.651 STATUS POST TOTAL RIGHT KNEE REPLACEMENT: Primary | ICD-10-CM

## 2017-10-05 DIAGNOSIS — M17.12 ARTHRITIS OF LEFT KNEE: ICD-10-CM

## 2017-10-05 PROCEDURE — 99213 OFFICE O/P EST LOW 20 MIN: CPT | Performed by: NURSE PRACTITIONER

## 2017-10-05 PROCEDURE — 20610 DRAIN/INJ JOINT/BURSA W/O US: CPT | Performed by: NURSE PRACTITIONER

## 2017-10-05 PROCEDURE — 73560 X-RAY EXAM OF KNEE 1 OR 2: CPT | Performed by: NURSE PRACTITIONER

## 2017-10-05 RX ORDER — LIDOCAINE HYDROCHLORIDE 10 MG/ML
2 INJECTION, SOLUTION EPIDURAL; INFILTRATION; INTRACAUDAL; PERINEURAL
Status: COMPLETED | OUTPATIENT
Start: 2017-10-05 | End: 2017-10-05

## 2017-10-05 RX ORDER — BUPIVACAINE HYDROCHLORIDE 5 MG/ML
2 INJECTION, SOLUTION EPIDURAL; INTRACAUDAL
Status: COMPLETED | OUTPATIENT
Start: 2017-10-05 | End: 2017-10-05

## 2017-10-05 RX ORDER — METHYLPREDNISOLONE ACETATE 80 MG/ML
80 INJECTION, SUSPENSION INTRA-ARTICULAR; INTRALESIONAL; INTRAMUSCULAR; SOFT TISSUE
Status: COMPLETED | OUTPATIENT
Start: 2017-10-05 | End: 2017-10-05

## 2017-10-05 RX ADMIN — METHYLPREDNISOLONE ACETATE 80 MG: 80 INJECTION, SUSPENSION INTRA-ARTICULAR; INTRALESIONAL; INTRAMUSCULAR; SOFT TISSUE at 09:17

## 2017-10-05 RX ADMIN — BUPIVACAINE HYDROCHLORIDE 2 ML: 5 INJECTION, SOLUTION EPIDURAL; INTRACAUDAL at 09:17

## 2017-10-05 RX ADMIN — LIDOCAINE HYDROCHLORIDE 2 ML: 10 INJECTION, SOLUTION EPIDURAL; INFILTRATION; INTRACAUDAL; PERINEURAL at 09:17

## 2017-10-05 NOTE — PROGRESS NOTES
NAME: Tiana Hudson  : 1960   MRN: [id number]   DATE: 10/5/2017    CC: 5 months Follow up status post total joint replacement right knee, left knee pain    SUBJECTIVE:    HPI: Patient returns today for a follow up of total joint replacement. Patient reports doing well with no unusual complaints. Appears to be progressing appropriately. Left knee with acute on chronic swelling and aching daily generalized over joint helped by cortisone inj, ice and rest.   HPI    This problem is not new to this examiner.     Allergies:   Allergies   Allergen Reactions   • Sulfa Antibiotics Hives       Medications:   Home Medications:  Current Outpatient Prescriptions on File Prior to Visit   Medication Sig   • amLODIPine-benazepril (LOTREL 5-20) 5-20 MG per capsule Take 1 capsule by mouth daily.   • escitalopram (LEXAPRO) 10 MG tablet Take 10 mg by mouth Daily.   • Exenatide ER 2 MG pen-injector Inject 1 ampule under the skin 1 (One) Time Per Week. (Patient taking differently: Inject 1 ampule under the skin 1 (One) Time Per Week. )   • famotidine (PEPCID) 20 MG tablet Take 20 mg by mouth 2 (Two) Times a Day.   • levothyroxine (SYNTHROID, LEVOTHROID) 125 MCG tablet Take 1 tablet by mouth daily.   • metFORMIN (GLUCOPHAGE) 500 MG tablet Take 1 tablet by mouth 2 (Two) Times a Day With Meals.   • oxybutynin XL (DITROPAN-XL) 10 MG 24 hr tablet Take 10 mg by mouth Daily.   • pravastatin (PRAVACHOL) 40 MG tablet Take 1 tablet by mouth daily.   • rOPINIRole (REQUIP) 0.5 MG tablet Take 1 tablet by mouth every night at bedtime.   • TiZANidine (ZANAFLEX) 4 MG capsule TAKE ONE CAPSULE BY MOUTH THREE TIMES A DAY   • [DISCONTINUED] aspirin 325 MG tablet Take 325 mg by mouth 2 (Two) Times a Day.   • [DISCONTINUED] docusate sodium 100 MG capsule Take 100 mg by mouth 2 (Two) Times a Day.   • [DISCONTINUED] esomeprazole (nexIUM) 20 MG capsule Take 20 mg by mouth Every Morning Before Breakfast.   • [DISCONTINUED] meclizine  (ANTIVERT) 25 MG tablet Take 1 tablet by mouth 3 (Three) Times a Day As Needed for dizziness.   • [DISCONTINUED] oxyCODONE-acetaminophen (PERCOCET) 7.5-325 MG per tablet 1-2 tabs po q 3-4 hr prn severe pain, wean as tolerated   • [DISCONTINUED] polyethylene glycol (MIRALAX) packet Take 17 g by mouth 2 (Two) Times a Day.     No current facility-administered medications on file prior to visit.        Current Medications:  Scheduled Meds:  Continuous Infusions:  No current facility-administered medications for this visit.   PRN Meds:.    I have reviewed the patient's medical history in detail and updated the computerized patient record.  Review and summarization of old records include:    Past Medical History:   Diagnosis Date   • Anxiety and depression    • Arthritis    • Depression    • Diabetes mellitus    • Disease of thyroid gland     HYPOTHYROIDISM   • Gallstones    • High cholesterol    • History of frequent urinary tract infections     HX OF YEAST IN BLADDER HAS PRN DIFLUCAN   • Hyperlipidemia    • Hypertension    • Knee pain, bilateral    • Low back pain    • Lumbar stenosis    • PONV (postoperative nausea and vomiting)    • Positive TB test     chest x-ray neg        Past Surgical History:   Procedure Laterality Date   • APPENDECTOMY     • CYSTOSCOPY BLADDER BIOPSY N/A 10/3/2016    Procedure: CYSTOSCOPY BLADDER BIOPSY;  Surgeon: Rei Calvert MD;  Location: [facility] OR;  Service:    • DILATATION AND CURETTAGE     • KNEE ARTHROSCOPY W/ MENISCAL REPAIR Left    • OVARIAN CYST REMOVAL Left    • TOTAL KNEE ARTHROPLASTY Right 5/22/2017    Procedure: RIGHT TOTAL KNEE ARTHROPLASTY WITH ALETA NAVIGATION;  Surgeon: Ross Cruz MD;  Location: [facility] OR;  Service:         Social History     Occupational History   • Not on file.     Social History Main Topics   • Smoking status: Never Smoker   • Smokeless tobacco: Never Used   • Alcohol use Yes      Comment: SOCIALLY   • Drug use: No   • Sexual  activity: Defer    Social History     Social History Narrative        Family History   Problem Relation Age of Onset   • Hepatitis Mother    • Heart failure Father    • Stroke Father    • Hypertension Father        ROS: 14 point review of systems was performed and was negative except for documented findings in HPI and today's encounter.     Allergies:   Allergies   Allergen Reactions   • Sulfa Antibiotics Hives     Constitutional:  Denies fever, shaking or chills   Eyes:  Denies change in visual acuity   HENT:  Denies nasal congestion or sore throat   Respiratory:  Denies cough or shortness of breath   Cardiovascular:  Denies chest pain or severe LE edema   GI:  Denies abdominal pain, nausea, vomiting, bloody stools or diarrhea   Musculoskeletal:  Denies numbness tingling or loss of motor function except as outlined above in history of present illness.  : Denies painful urination or hematuria  Integument:  Denies rash, lesion or ulceration   Neurologic:  Denies headache or focal weakness  Endocrine:  Denies lymphadenopathy  Psych:  Denies confusion or change in mental status   Hem:  Denies active bleeding        OBJECTIVE:     Physical Exam:  Vital Signs:  Body mass index is 45.14 kg/(m^2).  Vitals:    10/05/17 0858   Temp: 98 °F (36.7 °C)       Constitutional: Awake alert and oriented x3, well developed, well nourished, no acute distress, non-toxic appearance.  HEENT:  Normocephalic, Atraumatic, Bilateral external ears normal, Oropharynx moist, No oral exudates, Nose normal.   Respiratory:  No respiratory distress, No wheezing  CV: No palpitations  Vascular:  Brisk cap refill, Intact distal pulses, No cyanosis, all compartments soft with no signs or symptoms of compartment syndrome or DVT.  Neurologic: Sensation grossly intact to light touch throughout the involved extremity and bilaterally symmetric, deep tendon reflexes are 2+ and bilaterally symmetric, No focal deficits noted.   Neck:  Normal range of motion,  No tenderness, Supple, Negative Spurlings.  Integument: Well hydrated, no rash, warm, dry, no lesions or ulceration.   Musculoskeletal:  Affected extremity post op incision is healed appropriately. No sign of infection. Range of motion is progressing as expected. The calf is soft and nontender with a negative Homans sign. Distal pulses intact. Normal amount of swelling present for recovery time. Left knee with swelling, synovitis and crepitation.     DIAGNOSTIC STUDIES  Xrays: 2 views of the right knee (AP full length and lateral) were ordered and reviewed for evaluation of past joint replacement. They demonstrate a well positioned, well aligned total joint replacement without complicating factors noted. In comparison with the film from the last office visit, there has been no alignment change.    ASSESSMENT: Status post right total knee replacement with expected healing      Large Joint Arthrocentesis  Date/Time: 10/5/2017 9:17 AM  Consent given by: patient  Site marked: site marked  Timeout: Immediately prior to procedure a time out was called to verify the correct patient, procedure, equipment, support staff and site/side marked as required   Supporting Documentation  Indications: pain and joint swelling   Procedure Details  Location: knee - L knee  Preparation: Patient was prepped and draped in the usual sterile fashion  Needle size: 22 G  Approach: anterolateral  Medications administered: 80 mg methylPREDNISolone acetate 80 MG/ML; 2 mL bupivacaine (PF) 0.5 %; 2 mL lidocaine PF 1% 1 %  Patient tolerance: patient tolerated the procedure well with no immediate complications            PLAN: 1) Continue home PT exercises as needed.   2) Continue with antibiotic prophylaxis with dental procedures for 2 yrs post total joint replacement.   3) Follow up PRN for right knee, 3 mo f/u left knee.     10/5/2017  Patient was seen by ELIAS Wolf in the office today.

## 2018-01-04 ENCOUNTER — CLINICAL SUPPORT (OUTPATIENT)
Dept: ORTHOPEDIC SURGERY | Facility: CLINIC | Age: 58
End: 2018-01-04

## 2018-01-04 VITALS — TEMPERATURE: 98.1 F | HEIGHT: 64 IN | WEIGHT: 275 LBS | BODY MASS INDEX: 46.95 KG/M2

## 2018-01-04 DIAGNOSIS — M25.562 CHRONIC PAIN OF LEFT KNEE: Primary | ICD-10-CM

## 2018-01-04 DIAGNOSIS — Z96.651 STATUS POST TOTAL RIGHT KNEE REPLACEMENT: ICD-10-CM

## 2018-01-04 DIAGNOSIS — G89.29 CHRONIC PAIN OF LEFT KNEE: Primary | ICD-10-CM

## 2018-01-04 DIAGNOSIS — M17.12 ARTHRITIS OF LEFT KNEE: ICD-10-CM

## 2018-01-04 PROCEDURE — 73562 X-RAY EXAM OF KNEE 3: CPT | Performed by: NURSE PRACTITIONER

## 2018-01-04 PROCEDURE — 99213 OFFICE O/P EST LOW 20 MIN: CPT | Performed by: NURSE PRACTITIONER

## 2018-01-04 PROCEDURE — 20610 DRAIN/INJ JOINT/BURSA W/O US: CPT | Performed by: NURSE PRACTITIONER

## 2018-01-04 RX ORDER — BUPIVACAINE HYDROCHLORIDE 5 MG/ML
2 INJECTION, SOLUTION EPIDURAL; INTRACAUDAL
Status: COMPLETED | OUTPATIENT
Start: 2018-01-04 | End: 2018-01-04

## 2018-01-04 RX ORDER — LIDOCAINE HYDROCHLORIDE 10 MG/ML
2 INJECTION, SOLUTION EPIDURAL; INFILTRATION; INTRACAUDAL; PERINEURAL
Status: COMPLETED | OUTPATIENT
Start: 2018-01-04 | End: 2018-01-04

## 2018-01-04 RX ORDER — METHYLPREDNISOLONE ACETATE 80 MG/ML
80 INJECTION, SUSPENSION INTRA-ARTICULAR; INTRALESIONAL; INTRAMUSCULAR; SOFT TISSUE
Status: COMPLETED | OUTPATIENT
Start: 2018-01-04 | End: 2018-01-04

## 2018-01-04 RX ADMIN — METHYLPREDNISOLONE ACETATE 80 MG: 80 INJECTION, SUSPENSION INTRA-ARTICULAR; INTRALESIONAL; INTRAMUSCULAR; SOFT TISSUE at 08:28

## 2018-01-04 RX ADMIN — LIDOCAINE HYDROCHLORIDE 2 ML: 10 INJECTION, SOLUTION EPIDURAL; INFILTRATION; INTRACAUDAL; PERINEURAL at 08:28

## 2018-01-04 RX ADMIN — BUPIVACAINE HYDROCHLORIDE 2 ML: 5 INJECTION, SOLUTION EPIDURAL; INTRACAUDAL at 08:28

## 2018-01-04 NOTE — PROGRESS NOTES
Patient: Tiana Hudson  YOB: 1960    Chief Complaints:  left knee pain, right knee s/p TKA 8 mo    Subjective:    History of Present Illness: Here today for knee pain. The pain is a generalized joint tenderness.  It has been progressive in nature but remains intermittent.  Worsened by prolonged standing or walking and squatting activities. Has had improvement in the past with ice/heat, rest, and injections.     Right knee is doing well sp TKA without complaint and pt states is getting stronger and giving some relief to her left knee.     This problem is not new to this examiner.     Allergies:   Allergies   Allergen Reactions   • Sulfa Antibiotics Hives       Medications:   Home Medications:  Current Outpatient Prescriptions on File Prior to Visit   Medication Sig   • amLODIPine-benazepril (LOTREL 5-20) 5-20 MG per capsule Take 1 capsule by mouth daily.   • Cyanocobalamin (VITAMIN B-12 PO) Take  by mouth.   • escitalopram (LEXAPRO) 10 MG tablet Take 10 mg by mouth Daily.   • Exenatide ER 2 MG pen-injector Inject 1 ampule under the skin 1 (One) Time Per Week. (Patient taking differently: Inject 1 ampule under the skin 1 (One) Time Per Week. THURSDAYS)   • famotidine (PEPCID) 20 MG tablet Take 20 mg by mouth 2 (Two) Times a Day.   • levothyroxine (SYNTHROID, LEVOTHROID) 125 MCG tablet Take 1 tablet by mouth daily.   • metFORMIN (GLUCOPHAGE) 500 MG tablet Take 1 tablet by mouth 2 (Two) Times a Day With Meals.   • Multiple Vitamins-Minerals (CENTRUM WOMEN) tablet Take  by mouth.   • oxybutynin XL (DITROPAN-XL) 10 MG 24 hr tablet Take 10 mg by mouth Daily.   • pravastatin (PRAVACHOL) 40 MG tablet Take 1 tablet by mouth daily.   • rOPINIRole (REQUIP) 0.5 MG tablet Take 1 tablet by mouth every night at bedtime.   • TiZANidine (ZANAFLEX) 4 MG capsule TAKE ONE CAPSULE BY MOUTH THREE TIMES A DAY     No current facility-administered medications on file prior to visit.      Current Medications:  Scheduled  Meds:  Continuous Infusions:  No current facility-administered medications for this visit.   PRN Meds:.    I have reviewed the patient's medical history in detail and updated the computerized patient record.  Review and summarization of old records include:    Past Medical History:   Diagnosis Date   • Anxiety and depression    • Arthritis    • Depression    • Diabetes mellitus    • Disease of thyroid gland     HYPOTHYROIDISM   • Gallstones    • High cholesterol    • History of frequent urinary tract infections     HX OF YEAST IN BLADDER HAS PRN DIFLUCAN   • Hyperlipidemia    • Hypertension    • Knee pain, bilateral    • Low back pain    • Lumbar stenosis    • PONV (postoperative nausea and vomiting)    • Positive TB test     chest x-ray neg        Past Surgical History:   Procedure Laterality Date   • APPENDECTOMY     • CYSTOSCOPY BLADDER BIOPSY N/A 10/3/2016    Procedure: CYSTOSCOPY BLADDER BIOPSY;  Surgeon: Rei Calvert MD;  Location: [facility];  Service:    • DILATATION AND CURETTAGE     • KNEE ARTHROSCOPY W/ MENISCAL REPAIR Left    • OVARIAN CYST REMOVAL Left    • TOTAL KNEE ARTHROPLASTY Right 5/22/2017    Procedure: RIGHT TOTAL KNEE ARTHROPLASTY WITH ALETA NAVIGATION;  Surgeon: Ross Cruz MD;  Location: [facility] OR;  Service:         Social History     Occupational History   • Not on file.     Social History Main Topics   • Smoking status: Never Smoker   • Smokeless tobacco: Never Used   • Alcohol use Yes      Comment: SOCIALLY   • Drug use: No   • Sexual activity: Defer    Social History     Social History Narrative        Family History   Problem Relation Age of Onset   • Hepatitis Mother    • Heart failure Father    • Stroke Father    • Hypertension Father        ROS: 14 point review of systems was performed and was negative except for documented findings in HPI and today's encounter.     Allergies:   Allergies   Allergen Reactions   • Sulfa Antibiotics Hives     Constitutional:   Denies fever, shaking or chills   Eyes:  Denies change in visual acuity   HENT:  Denies nasal congestion or sore throat   Respiratory:  Denies cough or shortness of breath   Cardiovascular:  Denies chest pain or severe LE edema   GI:  Denies abdominal pain, nausea, vomiting, bloody stools or diarrhea   Musculoskeletal:  Numbness, tingling, or loss of motor function only as noted above in history of present illness.  : Denies painful urination or hematuria  Integument:  Denies rash, lesion or ulceration   Neurologic:  Denies headache or focal weakness  Endocrine:  Denies lymphadenopathy  Psych:  Denies confusion or change in mental status   Hem:  Denies active bleeding    Physical Exam:  Body mass index is 47.2 kg/(m^2).  Vitals:    01/04/18 0826   Temp: 98.1 °F (36.7 °C)     Vital Signs:  reviewed  Constitutional: Awake alert and oriented x3, well developed, no acute distress, non-toxic appearance.  EYES: symmetric, sclera clear  ENT:  Normocephalic, Atraumatic.   Respiratory:  No respiratory distress, No wheezing  CV: pulse regular, no palpitations or pallor.  GI:  Abdomen soft, non-tender.   Vascular:  Intact distal pulses, No cyanosis, no signs or symptoms of DVT.  Neurologic: Sensation grossly intact to the involved extremity, No focal deficits noted.   Neck: No tenderness, Supple.  Integument: warm, dry, no ulcerations.   Psychiatric:  Oriented, no pathological affect.  Musculoskeletal:    Affected left knee(s):  Painful gait with a subtle limp, positive for synovitis, swelling, joint effusion with crepitation.  Lachman negative  Posterior drawer negative  Luz Marina's negative  Patellofemoral grind +  Sensation grossly intact to light touch throughout the lower extremity  Skin is intact  Distal pulses are palpable  No signs or symptoms of DVT    Right knee is doing well without swelling or pain, straight and stronger, calf soft      Diagnostic Data:     Imaging was done today, images were personally viewed and  discussed at length with the patient:    Indication: pain related symptoms,  Views: 3V AP, LAT & 40 degree PA bilateral knee(s)   Findings: severe end-stage arthritis (bone on bone, subchondral sclerosis/cysts, osteophytes), S/P right  Total Knee Replacement in good position and alignment  Comparison views: viewed last xray done in the office.     Procedure:  Large Joint Arthrocentesis  Date/Time: 1/4/2018 8:28 AM  Consent given by: patient  Site marked: site marked  Timeout: Immediately prior to procedure a time out was called to verify the correct patient, procedure, equipment, support staff and site/side marked as required   Supporting Documentation  Indications: pain and joint swelling   Procedure Details  Location: knee - L knee  Preparation: Patient was prepped and draped in the usual sterile fashion  Needle size: 22 G  Approach: anterolateral  Medications administered: 2 mL lidocaine PF 1% 1 %; 2 mL bupivacaine (PF) 0.5 %; 80 mg methylPREDNISolone acetate 80 MG/ML  Patient tolerance: patient tolerated the procedure well with no immediate complications          Assessment:     ICD-10-CM ICD-9-CM   1. Chronic pain of left knee M25.562 719.46    G89.29 338.29   2. Status post total right knee replacement Z96.651 V43.65           Plan: Is to proceed with injection  Follow up as indicated.  Ice, elevate, and rest as needed.  Additional interventions include:  15 min spent face to face with patient 9 min spent counseling about natural history and expected course of assessed complaint and reviewed treatment options that have been tried and not tried and those currently available. Questions answered.    Natural history and expected course of this patient's diagnosis discussed along with evaluation of therapies. Questions answered.  Advice on benefits of, and types of regular/moderate exercise including biomechanical forces involved as it pertains to this complaint, with a goal of 5 min at least 7 times a week.     Address and update of wt loss, physical exercises, use of assistive devices, and monitoring of Medications per orders to address ortho complaints; Evaluation and discussion of safety, precautions, side effects, and warnings given especially of long term NSAID therapy.  Lifestyle measures for weight loss, suggest starting at 10lbs, with biomechanical explanations for weight loss and how this affects orthopedic condition.  Cortisone Injection. See procedure note.  OTC analgesics as needed with dosage warning and instructions given.  Cryotherapy/brachy therapy as indicated with instructions.   Advised on strengthening exercises, stressed importance of these with progression of arthritis, demonstrated exercises to patient with repeat demonstration and verb of understanding.   xr right knee next visit for 1 yr f/u s/p knee replacement.   1/4/2018     NOEMI

## 2018-04-06 ENCOUNTER — CLINICAL SUPPORT (OUTPATIENT)
Dept: ORTHOPEDIC SURGERY | Facility: CLINIC | Age: 58
End: 2018-04-06

## 2018-04-06 VITALS — HEIGHT: 64 IN | BODY MASS INDEX: 49.24 KG/M2 | TEMPERATURE: 97.7 F | WEIGHT: 288.4 LBS

## 2018-04-06 DIAGNOSIS — Z96.651 STATUS POST TOTAL RIGHT KNEE REPLACEMENT: ICD-10-CM

## 2018-04-06 DIAGNOSIS — M17.12 ARTHRITIS OF LEFT KNEE: Primary | ICD-10-CM

## 2018-04-06 PROCEDURE — 20610 DRAIN/INJ JOINT/BURSA W/O US: CPT | Performed by: NURSE PRACTITIONER

## 2018-04-06 PROCEDURE — 99212 OFFICE O/P EST SF 10 MIN: CPT | Performed by: NURSE PRACTITIONER

## 2018-04-06 RX ORDER — DULAGLUTIDE 1.5 MG/.5ML
INJECTION, SOLUTION SUBCUTANEOUS
Refills: 1 | COMMUNITY
Start: 2018-02-28 | End: 2018-10-24

## 2018-04-06 RX ORDER — POTASSIUM CHLORIDE 20 MEQ/1
20 TABLET, EXTENDED RELEASE ORAL AS NEEDED
COMMUNITY
Start: 2018-02-26

## 2018-04-06 RX ORDER — METHYLPREDNISOLONE ACETATE 80 MG/ML
80 INJECTION, SUSPENSION INTRA-ARTICULAR; INTRALESIONAL; INTRAMUSCULAR; SOFT TISSUE
Status: COMPLETED | OUTPATIENT
Start: 2018-04-06 | End: 2018-04-06

## 2018-04-06 RX ORDER — METHENAMINE, SODIUM PHOSPHATE, MONOBASIC, MONOHYDRATE, PHENYL SALICYLATE, METHYLENE BLUE, AND HYOSCYAMINE SULFATE 120; 40.8; 36; 10; .12 MG/1; MG/1; MG/1; MG/1; MG/1
1 CAPSULE ORAL 3 TIMES DAILY
Refills: 2 | COMMUNITY
Start: 2018-03-26

## 2018-04-06 RX ORDER — CONJUGATED ESTROGENS 0.62 MG/G
CREAM VAGINAL
Refills: 6 | COMMUNITY
Start: 2018-02-05 | End: 2018-10-24

## 2018-04-06 RX ORDER — CELECOXIB 200 MG/1
CAPSULE ORAL
COMMUNITY
End: 2018-10-22 | Stop reason: ALTCHOICE

## 2018-04-06 RX ORDER — FERROUS SULFATE 325(65) MG
1 TABLET ORAL 2 TIMES DAILY
Refills: 1 | COMMUNITY
Start: 2018-03-05 | End: 2018-10-24

## 2018-04-06 RX ORDER — TIZANIDINE 4 MG/1
4 TABLET ORAL 3 TIMES DAILY PRN
Refills: 1 | COMMUNITY
Start: 2018-02-26 | End: 2018-04-06 | Stop reason: SDUPTHER

## 2018-04-06 RX ORDER — LIDOCAINE HYDROCHLORIDE 20 MG/ML
4 INJECTION, SOLUTION EPIDURAL; INFILTRATION; INTRACAUDAL; PERINEURAL
Status: COMPLETED | OUTPATIENT
Start: 2018-04-06 | End: 2018-04-06

## 2018-04-06 RX ORDER — FUROSEMIDE 20 MG/1
10 TABLET ORAL DAILY PRN
Refills: 1 | COMMUNITY
Start: 2018-02-26

## 2018-04-06 RX ADMIN — LIDOCAINE HYDROCHLORIDE 4 ML: 20 INJECTION, SOLUTION EPIDURAL; INFILTRATION; INTRACAUDAL; PERINEURAL at 08:11

## 2018-04-06 RX ADMIN — METHYLPREDNISOLONE ACETATE 80 MG: 80 INJECTION, SUSPENSION INTRA-ARTICULAR; INTRALESIONAL; INTRAMUSCULAR; SOFT TISSUE at 08:11

## 2018-04-06 NOTE — PROGRESS NOTES
Patient: Tiana Hudson  YOB: 1960    Chief Complaints:  left knee pain    Subjective:    History of Present Illness: Here today for knee pain. The pain is a generalized joint tenderness.  It has been progressive in nature but remains intermittent.  Worsened by prolonged standing or walking and squatting activities. Has had improvement in the past with ice/heat, rest, and injections.     This problem is not new to this examiner.     Allergies:   Allergies   Allergen Reactions   • Sulfa Antibiotics Hives       Medications:   Home Medications:  Current Outpatient Prescriptions on File Prior to Visit   Medication Sig   • amLODIPine-benazepril (LOTREL 5-20) 5-20 MG per capsule Take 1 capsule by mouth daily.   • Cyanocobalamin (VITAMIN B-12 PO) Take  by mouth.   • escitalopram (LEXAPRO) 10 MG tablet Take 10 mg by mouth Daily.   • Exenatide ER 2 MG pen-injector Inject 1 ampule under the skin 1 (One) Time Per Week. (Patient taking differently: Inject 1 ampule under the skin 1 (One) Time Per Week. THURSDAYS)   • famotidine (PEPCID) 20 MG tablet Take 20 mg by mouth 2 (Two) Times a Day.   • levothyroxine (SYNTHROID, LEVOTHROID) 125 MCG tablet Take 1 tablet by mouth daily.   • metFORMIN (GLUCOPHAGE) 500 MG tablet Take 1 tablet by mouth 2 (Two) Times a Day With Meals.   • Multiple Vitamins-Minerals (CENTRUM WOMEN) tablet Take  by mouth.   • oxybutynin XL (DITROPAN-XL) 10 MG 24 hr tablet Take 10 mg by mouth Daily.   • pravastatin (PRAVACHOL) 40 MG tablet Take 1 tablet by mouth daily.   • rOPINIRole (REQUIP) 0.5 MG tablet Take 1 tablet by mouth every night at bedtime.   • TiZANidine (ZANAFLEX) 4 MG capsule TAKE ONE CAPSULE BY MOUTH THREE TIMES A DAY     No current facility-administered medications on file prior to visit.      Current Medications:  Scheduled Meds:  Continuous Infusions:  No current facility-administered medications for this visit.   PRN Meds:.    I have reviewed the patient's medical history  in detail and updated the computerized patient record.  Review and summarization of old records include:    Past Medical History:   Diagnosis Date   • Anxiety and depression    • Arthritis    • Depression    • Diabetes mellitus    • Disease of thyroid gland     HYPOTHYROIDISM   • Gallstones    • High cholesterol    • History of frequent urinary tract infections     HX OF YEAST IN BLADDER HAS PRN DIFLUCAN   • Hyperlipidemia    • Hypertension    • Knee pain, bilateral    • Low back pain    • Lumbar stenosis    • PONV (postoperative nausea and vomiting)    • Positive TB test     chest x-ray neg        Past Surgical History:   Procedure Laterality Date   • APPENDECTOMY     • CYSTOSCOPY BLADDER BIOPSY N/A 10/3/2016    Procedure: CYSTOSCOPY BLADDER BIOPSY;  Surgeon: Rei Calvert MD;  Location: [facility];  Service:    • DILATATION AND CURETTAGE     • KNEE ARTHROSCOPY W/ MENISCAL REPAIR Left    • OVARIAN CYST REMOVAL Left    • TOTAL KNEE ARTHROPLASTY Right 5/22/2017    Procedure: RIGHT TOTAL KNEE ARTHROPLASTY WITH ALETA NAVIGATION;  Surgeon: Ross rCuz MD;  Location: [facility] OR;  Service:         Social History     Occupational History   • Not on file.     Social History Main Topics   • Smoking status: Never Smoker   • Smokeless tobacco: Never Used   • Alcohol use Yes      Comment: SOCIALLY   • Drug use: No   • Sexual activity: Defer      Social History     Social History Narrative   • No narrative on file        Family History   Problem Relation Age of Onset   • Hepatitis Mother    • Heart failure Father    • Stroke Father    • Hypertension Father        ROS: 14 point review of systems was performed and was negative except for documented findings in HPI and today's encounter.     Allergies:   Allergies   Allergen Reactions   • Sulfa Antibiotics Hives     Constitutional:  Denies fever, shaking or chills   Eyes:  Denies change in visual acuity   HENT:  Denies nasal congestion or sore throat  "  Respiratory:  Denies cough or shortness of breath   Cardiovascular:  Denies chest pain or severe LE edema   GI:  Denies abdominal pain, nausea, vomiting, bloody stools or diarrhea   Musculoskeletal:  Numbness, tingling, or loss of motor function only as noted above in history of present illness.  : Denies painful urination or hematuria  Integument:  Denies rash, lesion or ulceration   Neurologic:  Denies headache or focal weakness  Endocrine:  Denies lymphadenopathy  Psych:  Denies confusion or change in mental status   Hem:  Denies active bleeding    Physical Exam:  Wt Readings from Last 3 Encounters:   04/06/18 131 kg (288 lb 6.4 oz)   01/04/18 125 kg (275 lb)   10/05/17 119 kg (263 lb)     Ht Readings from Last 3 Encounters:   04/06/18 162.6 cm (64\")   01/04/18 162.6 cm (64\")   10/05/17 162.6 cm (64\")     Body mass index is 49.5 kg/m².  Facility age limit for growth percentiles is 20 years.  Vitals:    04/06/18 0808   Temp: 97.7 °F (36.5 °C)     Vital Signs:  reviewed  Constitutional: Awake alert and oriented x3, well developed, no acute distress, non-toxic appearance.  EYES: symmetric, sclera clear  ENT:  Normocephalic, Atraumatic.   Respiratory:  No respiratory distress, No wheezing  CV: pulse regular, no palpitations or pallor.  GI:  Abdomen soft, non-tender.   Vascular:  Intact distal pulses, No cyanosis, no signs or symptoms of DVT.  Neurologic: Sensation grossly intact to the involved extremity, No focal deficits noted.   Neck: No tenderness, Supple.  Integument: warm, dry, no ulcerations.   Psychiatric:  Oriented, no pathological affect.  Musculoskeletal:    Affected knee(s):  Painful gait with a subtle limp, positive for synovitis, swelling, joint effusion with crepitation.  Lachman negative  Posterior drawer negative  Luz Marina's negative  Patellofemoral grind +  Sensation grossly intact to light touch throughout the lower extremity  Skin is intact  Distal pulses are palpable  No signs or symptoms of " DVT        Diagnostic Data:     Imaging was done previously in the office, images were personally viewed and discussed with the patient:    Indication: pain related symptoms,  Views: 3V AP, LAT & 40 degree PA left knee(s)   Findings: severe end-stage arthritis (bone on bone, subchondral sclerosis/cysts, osteophytes), S/P right  Total Knee Replacement in good position and alignment  Comparison views: viewed last xray done in the office.     Procedure:  Large Joint Arthrocentesis  Date/Time: 4/6/2018 8:11 AM  Consent given by: patient  Site marked: site marked  Timeout: Immediately prior to procedure a time out was called to verify the correct patient, procedure, equipment, support staff and site/side marked as required   Supporting Documentation  Indications: pain   Procedure Details  Location: knee - L knee  Needle size: 25 G  Approach: anteromedial  Medications administered: 4 mL lidocaine PF 2% 2 %; 80 mg methylPREDNISolone acetate 80 MG/ML            Assessment:     ICD-10-CM ICD-9-CM   1. Arthritis of left knee M17.12 716.96   2. Status post total right knee replacement Z96.651 V43.65           Plan: Is to proceed with injection  Follow up as indicated.  Ice, elevate, and rest as needed.  Additional interventions include:  15 min spent face to face with patient 8 min spent counseling about natural history and expected course of assessed complaint and reviewed treatment options that have been tried and not tried and those currently available. Questions answered.    Natural history and expected course of this patient's diagnosis discussed along with evaluation of therapies. Questions answered.  Advice on benefits of, and types of regular/moderate exercise including biomechanical forces involved as it pertains to this complaint, with a goal of 5 min at least 7 times a week.    Address and update of wt loss, physical exercises, use of assistive devices, and monitoring of Medications per orders to address ortho  complaints; Evaluation and discussion of safety, precautions, side effects, and warnings given especially of long term NSAID therapy.  Lifestyle measures for weight loss, suggest starting at 10lbs, with biomechanical explanations for weight loss and how this affects orthopedic condition.  Cortisone Injection. See procedure note.  OTC analgesics as needed with dosage warning and instructions given.  Cryotherapy/brachy therapy as indicated with instructions.   Advised on strengthening exercises, stressed importance of these with progression of arthritis, demonstrated exercises to patient with repeat demonstration and verb of understanding.   States her right knee is strengthening and doing well post TKA it will be a year in May.   4/6/2018  MJR

## 2018-07-13 ENCOUNTER — TELEPHONE (OUTPATIENT)
Dept: ORTHOPEDIC SURGERY | Facility: CLINIC | Age: 58
End: 2018-07-13

## 2018-07-26 ENCOUNTER — HOSPITAL ENCOUNTER (OUTPATIENT)
Dept: GENERAL RADIOLOGY | Facility: HOSPITAL | Age: 58
Discharge: HOME | End: 2018-07-26
Attending: INTERNAL MEDICINE | Admitting: INTERNAL MEDICINE

## 2018-07-26 DIAGNOSIS — R52 PAIN: ICD-10-CM

## 2018-07-26 PROCEDURE — 72110 X-RAY EXAM L-2 SPINE 4/>VWS: CPT

## 2018-07-27 ENCOUNTER — TRANSCRIBE ORDERS (OUTPATIENT)
Dept: ADMINISTRATIVE | Facility: HOSPITAL | Age: 58
End: 2018-07-27

## 2018-07-27 DIAGNOSIS — M53.86 SCIATICA ASSOCIATED WITH DISORDER OF LUMBAR SPINE: Primary | ICD-10-CM

## 2018-07-31 ENCOUNTER — TRANSCRIBE ORDERS (OUTPATIENT)
Dept: PHYSICAL THERAPY | Facility: CLINIC | Age: 58
End: 2018-07-31

## 2018-07-31 DIAGNOSIS — M54.50 CHRONIC BILATERAL LOW BACK PAIN WITHOUT SCIATICA: Primary | ICD-10-CM

## 2018-07-31 DIAGNOSIS — G89.29 CHRONIC BILATERAL LOW BACK PAIN WITHOUT SCIATICA: Primary | ICD-10-CM

## 2018-08-03 ENCOUNTER — APPOINTMENT (OUTPATIENT)
Dept: MRI IMAGING | Facility: HOSPITAL | Age: 58
End: 2018-08-03
Attending: INTERNAL MEDICINE

## 2018-08-13 ENCOUNTER — HOSPITAL ENCOUNTER (OUTPATIENT)
Dept: MRI IMAGING | Facility: HOSPITAL | Age: 58
Discharge: HOME | End: 2018-08-13
Attending: INTERNAL MEDICINE | Admitting: INTERNAL MEDICINE

## 2018-08-13 ENCOUNTER — APPOINTMENT (OUTPATIENT)
Dept: MRI IMAGING | Facility: HOSPITAL | Age: 58
End: 2018-08-13
Attending: INTERNAL MEDICINE

## 2018-08-13 DIAGNOSIS — M53.86 SCIATICA ASSOCIATED WITH DISORDER OF LUMBAR SPINE: ICD-10-CM

## 2018-08-13 PROCEDURE — 72148 MRI LUMBAR SPINE W/O DYE: CPT

## 2018-09-10 ENCOUNTER — CLINICAL SUPPORT (OUTPATIENT)
Dept: ORTHOPEDIC SURGERY | Facility: CLINIC | Age: 58
End: 2018-09-10

## 2018-09-10 VITALS — BODY MASS INDEX: 46.65 KG/M2 | HEIGHT: 65 IN | TEMPERATURE: 98.7 F | WEIGHT: 280 LBS

## 2018-09-10 DIAGNOSIS — M17.12 ARTHRITIS OF LEFT KNEE: ICD-10-CM

## 2018-09-10 DIAGNOSIS — G89.29 CHRONIC PAIN OF LEFT KNEE: Primary | ICD-10-CM

## 2018-09-10 DIAGNOSIS — M25.562 CHRONIC PAIN OF LEFT KNEE: Primary | ICD-10-CM

## 2018-09-10 PROCEDURE — 20610 DRAIN/INJ JOINT/BURSA W/O US: CPT | Performed by: NURSE PRACTITIONER

## 2018-09-10 PROCEDURE — 99212 OFFICE O/P EST SF 10 MIN: CPT | Performed by: NURSE PRACTITIONER

## 2018-09-10 RX ORDER — LIDOCAINE HYDROCHLORIDE 20 MG/ML
4 INJECTION, SOLUTION EPIDURAL; INFILTRATION; INTRACAUDAL; PERINEURAL
Status: COMPLETED | OUTPATIENT
Start: 2018-09-10 | End: 2018-09-10

## 2018-09-10 RX ORDER — METHYLPREDNISOLONE ACETATE 80 MG/ML
80 INJECTION, SUSPENSION INTRA-ARTICULAR; INTRALESIONAL; INTRAMUSCULAR; SOFT TISSUE
Status: COMPLETED | OUTPATIENT
Start: 2018-09-10 | End: 2018-09-10

## 2018-09-10 RX ADMIN — METHYLPREDNISOLONE ACETATE 80 MG: 80 INJECTION, SUSPENSION INTRA-ARTICULAR; INTRALESIONAL; INTRAMUSCULAR; SOFT TISSUE at 08:28

## 2018-09-10 RX ADMIN — LIDOCAINE HYDROCHLORIDE 4 ML: 20 INJECTION, SOLUTION EPIDURAL; INFILTRATION; INTRACAUDAL; PERINEURAL at 08:28

## 2018-09-10 NOTE — PROGRESS NOTES
Patient: Tiana Hudson  YOB: 1960    Chief Complaints:  left knee pain    Subjective:    History of Present Illness: Here today for knee pain. The pain is a generalized joint tenderness.  It has been progressive in nature but remains intermittent.  Worsened by prolonged standing or walking and squatting activities. Has had improvement in the past with ice/heat, rest, and injections.     This problem is not new to this examiner.     Allergies:   Allergies   Allergen Reactions   • Sulfa Antibiotics Hives       Medications:   Home Medications:  Current Outpatient Prescriptions on File Prior to Visit   Medication Sig   • amLODIPine-benazepril (LOTREL 5-20) 5-20 MG per capsule Take 1 capsule by mouth daily.   • celecoxib (CELEBREX) 200 MG capsule    • Cyanocobalamin (VITAMIN B-12 PO) Take  by mouth.   • escitalopram (LEXAPRO) 10 MG tablet Take 10 mg by mouth Daily.   • Exenatide ER 2 MG pen-injector Inject 1 ampule under the skin 1 (One) Time Per Week. (Patient taking differently: Inject 1 ampule under the skin 1 (One) Time Per Week. THURSDAYS)   • famotidine (PEPCID) 20 MG tablet Take 20 mg by mouth 2 (Two) Times a Day.   • ferrous sulfate 325 (65 FE) MG tablet Take 1 tablet by mouth 2 (Two) Times a Day.   • furosemide (LASIX) 20 MG tablet Take 20 mg by mouth Daily As Needed.   • levothyroxine (SYNTHROID, LEVOTHROID) 125 MCG tablet Take 1 tablet by mouth daily.   • metFORMIN (GLUCOPHAGE) 500 MG tablet Take 1 tablet by mouth 2 (Two) Times a Day With Meals.   • Multiple Vitamins-Minerals (CENTRUM WOMEN) tablet Take  by mouth.   • oxybutynin XL (DITROPAN-XL) 10 MG 24 hr tablet Take 10 mg by mouth Daily.   • potassium chloride (K-DUR,KLOR-CON) 20 MEQ CR tablet    • pravastatin (PRAVACHOL) 40 MG tablet Take 1 tablet by mouth daily.   • PREMARIN 0.625 MG/GM vaginal cream APPLY PEA SIZE AMOUNT TO VAGINA 3-4 TIMES A WEEK   • rOPINIRole (REQUIP) 0.5 MG tablet Take 1 tablet by mouth every night at bedtime.    • TiZANidine (ZANAFLEX) 4 MG capsule TAKE ONE CAPSULE BY MOUTH THREE TIMES A DAY   • TRULICITY 1.5 MG/0.5ML solution pen-injector INJECT 1 PEN SUBCUTANEOUSLY WEEKLY   • uribel (URO-MP) 118 MG capsule capsule Take 1 capsule by mouth 3 (Three) Times a Day.     No current facility-administered medications on file prior to visit.      Current Medications:  Scheduled Meds:  Continuous Infusions:  No current facility-administered medications for this visit.   PRN Meds:.    I have reviewed the patient's medical history in detail and updated the computerized patient record.  Review and summarization of old records include:    Past Medical History:   Diagnosis Date   • Anxiety and depression    • Arthritis    • Depression    • Diabetes mellitus (CMS/HCC)    • Disease of thyroid gland     HYPOTHYROIDISM   • Gallstones    • High cholesterol    • History of frequent urinary tract infections     HX OF YEAST IN BLADDER HAS PRN DIFLUCAN   • Hyperlipidemia    • Hypertension    • Knee pain, bilateral    • Low back pain    • Lumbar stenosis    • PONV (postoperative nausea and vomiting)    • Positive TB test     chest x-ray neg        Past Surgical History:   Procedure Laterality Date   • APPENDECTOMY     • CYSTOSCOPY BLADDER BIOPSY N/A 10/3/2016    Procedure: CYSTOSCOPY BLADDER BIOPSY;  Surgeon: Rei Calvert MD;  Location: [facility];  Service:    • DILATATION AND CURETTAGE     • KNEE ARTHROSCOPY W/ MENISCAL REPAIR Left    • OVARIAN CYST REMOVAL Left    • TOTAL KNEE ARTHROPLASTY Right 5/22/2017    Procedure: RIGHT TOTAL KNEE ARTHROPLASTY WITH ALETA NAVIGATION;  Surgeon: Ross Cruz MD;  Location: [facility] OR;  Service:         Social History     Occupational History   • Not on file.     Social History Main Topics   • Smoking status: Never Smoker   • Smokeless tobacco: Never Used   • Alcohol use Yes      Comment: SOCIALLY   • Drug use: No   • Sexual activity: Defer      Social History     Social History  "Narrative   • No narrative on file        Family History   Problem Relation Age of Onset   • Hepatitis Mother    • Heart failure Father    • Stroke Father    • Hypertension Father        ROS: 14 point review of systems was performed and was negative except for documented findings in HPI and today's encounter.     Allergies:   Allergies   Allergen Reactions   • Sulfa Antibiotics Hives     Constitutional:  Denies fever, shaking or chills   Eyes:  Denies change in visual acuity   HENT:  Denies nasal congestion or sore throat   Respiratory:  Denies cough or shortness of breath   Cardiovascular:  Denies chest pain or severe LE edema   GI:  Denies abdominal pain, nausea, vomiting, bloody stools or diarrhea   Musculoskeletal:  Numbness, tingling, or loss of motor function only as noted above in history of present illness.  : Denies painful urination or hematuria  Integument:  Denies rash, lesion or ulceration   Neurologic:  Denies headache or focal weakness  Endocrine:  Denies lymphadenopathy  Psych:  Denies confusion or change in mental status   Hem:  Denies active bleeding    Physical Exam:  Wt Readings from Last 3 Encounters:   09/10/18 127 kg (280 lb)   08/13/18 132 kg (290 lb)   04/06/18 131 kg (288 lb 6.4 oz)     Ht Readings from Last 3 Encounters:   09/10/18 165.1 cm (65\")   08/13/18 165.1 cm (65\")   04/06/18 162.6 cm (64\")     Body mass index is 46.59 kg/m².  Facility age limit for growth percentiles is 20 years.  Vitals:    09/10/18 0827   Temp: 98.7 °F (37.1 °C)     Vital Signs:  reviewed  Constitutional: Awake alert and oriented x3, well developed, no acute distress, non-toxic appearance.  EYES: symmetric, sclera clear  ENT:  Normocephalic, Atraumatic.   Respiratory:  No respiratory distress, No wheezing  CV: pulse regular, no palpitations or pallor.  GI:  Abdomen soft, non-tender.   Vascular:  Intact distal pulses, No cyanosis, no signs or symptoms of DVT.  Neurologic: Sensation grossly intact to the involved " extremity, No focal deficits noted.   Neck: No tenderness, Supple.  Integument: warm, dry, no ulcerations.   Psychiatric:  Oriented, no pathological affect.  Musculoskeletal:    Affected knee(s):  Painful gait with a subtle limp, positive for synovitis, swelling, joint effusion with crepitation.  Lachman negative  Posterior drawer negative  Luz Marina's negative  Patellofemoral grind +  Sensation grossly intact to light touch throughout the lower extremity  Skin is intact  Distal pulses are palpable  No signs or symptoms of DVT        Diagnostic Data:     Imaging was done previously in the office, images were personally viewed and discussed with the patient:    Indication: pain related symptoms,  Views: 3V AP, LAT & 40 degree PA left knee(s)   Findings: severe end-stage arthritis (bone on bone, subchondral sclerosis/cysts, osteophytes)  Comparison views: viewed last xray done in the office.     Procedure:  Large Joint Arthrocentesis  Date/Time: 9/10/2018 8:28 AM  Consent given by: patient  Site marked: site marked  Timeout: Immediately prior to procedure a time out was called to verify the correct patient, procedure, equipment, support staff and site/side marked as required   Supporting Documentation  Indications: pain   Procedure Details  Location: knee - L knee  Needle size: 25 G  Approach: anteromedial  Medications administered: 4 mL lidocaine PF 2% 2 %; 80 mg methylPREDNISolone acetate 80 MG/ML  Patient tolerance: patient tolerated the procedure well with no immediate complications          Assessment:     ICD-10-CM ICD-9-CM   1. Chronic pain of left knee M25.562 719.46    G89.29 338.29   2. Arthritis of left knee M17.12 716.96           Plan: Is to proceed with injection  Follow up as indicated.  Ice, elevate, and rest as needed.  Additional interventions include:  15 min spent face to face with patient 11 min spent counseling about natural history and expected course of assessed complaint and reviewed treatment  options that have been tried and not tried and those currently available. Questions answered.    Natural history and expected course of this patient's diagnosis discussed along with evaluation of therapies. Questions answered.  Advice on benefits of, and types of regular/moderate exercise including biomechanical forces involved as it pertains to this complaint, with a goal of 5 min at least 7 times a week.    Address and update of wt loss, physical exercises, use of assistive devices, and monitoring of Medications per orders to address ortho complaints; Evaluation and discussion of safety, precautions, side effects, and warnings given especially of long term NSAID therapy.  Lifestyle measures for weight loss, suggest starting at 10lbs, with biomechanical explanations for weight loss and how this affects orthopedic condition.  Cortisone Injection. See procedure note.  Cryotherapy/brachy therapy as indicated with instructions.   Advised on strengthening exercises, stressed importance of these with progression of arthritis, demonstrated exercises to patient with repeat demonstration and verb of understanding.   Do Daily THIGH exercises sitting up straight in a supportive chair, lean forward as you do when you go to stand up and in that forward leaning position, lift the thigh slowly up and slowly down.  Give assist with your hand under the knee to lift as needed. 15x on each thigh once a day, every day.     9/10/2018  MJR

## 2018-09-12 ENCOUNTER — OFFICE VISIT (OUTPATIENT)
Dept: NEUROSURGERY | Facility: CLINIC | Age: 58
End: 2018-09-12

## 2018-09-12 VITALS
SYSTOLIC BLOOD PRESSURE: 128 MMHG | HEART RATE: 72 BPM | HEIGHT: 65 IN | BODY MASS INDEX: 47.48 KG/M2 | WEIGHT: 285 LBS | DIASTOLIC BLOOD PRESSURE: 80 MMHG

## 2018-09-12 DIAGNOSIS — M43.10 ACQUIRED SPONDYLOLISTHESIS: Primary | ICD-10-CM

## 2018-09-12 PROCEDURE — 99243 OFF/OP CNSLTJ NEW/EST LOW 30: CPT | Performed by: NEUROLOGICAL SURGERY

## 2018-09-12 RX ORDER — LORAZEPAM 0.5 MG/1
0.5 TABLET ORAL EVERY 8 HOURS PRN
COMMUNITY
End: 2018-10-24

## 2018-09-12 RX ORDER — GABAPENTIN 300 MG/1
CAPSULE ORAL
Qty: 120 CAPSULE | Refills: 3 | Status: SHIPPED | OUTPATIENT
Start: 2018-09-12 | End: 2020-11-11

## 2018-09-12 NOTE — PROGRESS NOTES
"Subjective   Patient ID: Tiana Hudson is a 57 y.o. female who is being seen for consultation today at the request of Dean Fairchild MD for low back pain. She had a lumbar MRI at [facility] on 8/13/18. She presents accompanied by her mother.     History of Present Illness   58 yo lady with history of sciatica in the RLE since June.  She reports having xrays and and MRI and some \"bulging discs\".  SHe has back and RLE pain.  She has not performed PT or had JACKIE's.  Her pain is rated 5-6/10.  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  Her pain is improved by zanaflex and recumbency.  Tylenol.  She reports her back and leg pain are equal.  She denies left leg complaints.  She denies b/b issues.  SHe is a non smoker.  She has not trialed gabapentin or potent NSAIDS.  She denies prior symptoms to June of this year.  No history of malignancy or trauma.  She has restless legs for a few years.  She reports a knee injection last week without any depot effect to the back.        The following portions of the patient's history were reviewed and updated as appropriate: allergies, current medications, past family history, past medical history, past social history, past surgical history and problem list.    Review of Systems   Constitutional: Negative for chills, diaphoresis, fatigue and fever.   Respiratory: Negative for cough and shortness of breath.    Cardiovascular: Negative for chest pain, palpitations and leg swelling.   Gastrointestinal: Negative for abdominal pain, constipation, nausea and vomiting.   Genitourinary: Negative for difficulty urinating and enuresis.   Musculoskeletal: Positive for arthralgias (RLE), back pain and gait problem. Negative for neck pain.   Skin: Negative for rash.   Neurological: Positive for weakness ( RLE) and numbness ( RLE). Negative for dizziness, syncope, light-headedness and headaches.   Hematological: Does not bruise/bleed easily.   Psychiatric/Behavioral: Negative for confusion and " sleep disturbance.       Objective   Physical Exam   Constitutional: She is oriented to person, place, and time.   Neurological: She is oriented to person, place, and time. Gait normal.   Reflex Scores:       Tricep reflexes are 1+ on the right side and 1+ on the left side.       Bicep reflexes are 1+ on the right side and 1+ on the left side.       Brachioradialis reflexes are 1+ on the right side and 1+ on the left side.       Patellar reflexes are 1+ on the right side and 1+ on the left side.       Achilles reflexes are 1+ on the right side and 1+ on the left side.    Neurologic Exam     Mental Status   Oriented to person, place, and time.   Level of consciousness: alert    Motor Exam     Strength   Right deltoid: 5/5  Left deltoid: 5/5  Right biceps: 5/5  Left biceps: 5/5  Right triceps: 5/5  Left triceps: 5/5  Right wrist extension: 5/5  Left wrist extension: 5/5  Right interossei: 5/5  Left interossei: 5/5  Right iliopsoas: 5/5  Left iliopsoas: 5/5  Right quadriceps: 5/5  Left quadriceps: 5/5  Right hamstrin/5  Left hamstrin/5  Right anterior tibial: 5/5  Left anterior tibial: 5/5  Right gastroc: 5/5  Left gastroc: 5/5  ehl 5/5     Sensory Exam   Right arm light touch: normal  Left arm light touch: normal  Right leg light touch: normal  Left leg light touch: normal  Right leg vibration: decreased from ankle  Right arm pinprick: normal  Left arm pinprick: normal  Right leg pinprick: decreased from ankle  Left leg pinprick: normal  Sensory deficit distribution on right: L5    Gait, Coordination, and Reflexes     Gait  Gait: normal    Reflexes   Right brachioradialis: 1+  Left brachioradialis: 1+  Right biceps: 1+  Left biceps: 1+  Right triceps: 1+  Left triceps: 1+  Right patellar: 1+  Left patellar: 1+  Right achilles: 1+  Left achilles: 1+  Right Alfaro: absent  Left Alfaro: absent  Right ankle clonus: absent  Left ankle clonus: absent    Morbid obesity    Assessment/Plan   Independent Review of  Radiographic Studies:    Lumbar spondy at L45, grade 1 with pseudodisc.  She also has some L5S1 ddd as well. Upper lumbar hemangioma.   Medical Decision Making:  Patient with limited non operative management to date.  In terms of options: PT/JACKIE's/Trial of gabapentin and diclofenac.  WE discussed her renal function.  None of her degenerative changes is acute.  Ultimately she may require a fusion but will hold for now.  I suggested diclofenac and gabapentin and we will employ these.  We discussed red flags.  I will check on her in 6 weeks.    Tiana was seen today for back pain.    Diagnoses and all orders for this visit:    Acquired spondylolisthesis    Other orders  -     diclofenac (VOLTAREN) 50 MG EC tablet; Take 1 tablet by mouth 2 (Two) Times a Day As Needed (pain).  -     gabapentin (NEURONTIN) 300 MG capsule; Take 1 qhs for 3-5 days, then bid for 3-5 days, then tid and stay on this dose      Return in about 6 weeks (around 10/24/2018).

## 2018-10-21 NOTE — PROGRESS NOTES
SURGERY  Tiana KAVIN Hudson   1960  10/22/18    Chief Complaint:  gallstones    HPI    Patient is a nice 57 y.o. female who presents at the referral of Dr Fairchild, with gallstones.  She actually was known to have gallstones as far back as 2016, when she had an US done at [facility] showing stones without ductal dilatation, prompted due to elevated liver enzymes.  She was also noted to have bilateral moderate hydronephrosis and suggestion of fatty liver infiltration.  Her hydronephrosis was investigated by a Urologist at  [facility] (after Dr Sanjay Calvert) and requires no further work up.      Now, she notes that she can't lay on her left side to sleep without having pain in the right upper quadrant.  She also has fever, sometimes associated with nausea.  She has right upper quadrant pain post prandial.  She does have iron deficiency, which is being treated with oral iron, with hgb now normal.  She's never had a colonoscopy     Complicating medical decision making in the decision of the advisability of surgery are her diabetes with insulin use (healing issues), HTN, history of frequent UTIs (infectious risk), post op nausea and vomiting, and anxiety/depression with multiple medications.  The biggest risk is her weight with a BMI of 49.  Her hgbA1c is around 6.  Her diabetes however, also suggests that we should proceed with a gallbladder resection even more than the nondiabetic due to her increased risk of asyptomatic disease.    I reviewed the US and the stones are obvious and numerous.      Past Medical History:   Diagnosis Date   • Anemia     intermittently   • Anxiety and depression    • Arthritis    • Depression    • Diabetes mellitus (CMS/HCC)    • Disease of thyroid gland     HYPOTHYROIDISM   • Gallstones    • High cholesterol    • History of frequent urinary tract infections     HX OF YEAST IN BLADDER HAS PRN DIFLUCAN   • History of transfusion 05/24/2017    Had 2 iron infusions.  This was when i had knee  replacement   • Hyperlipidemia    • Hypertension    • Knee pain, bilateral    • Low back pain    • Lumbar stenosis    • PONV (postoperative nausea and vomiting)    • Positive TB test     chest x-ray neg     Past Surgical History:   Procedure Laterality Date   • APPENDECTOMY N/A     Dr. Wilson   • CYSTOSCOPY BLADDER BIOPSY N/A 10/3/2016    Procedure: CYSTOSCOPY BLADDER BIOPSY;  Surgeon: Rei Calvert MD;  Location: [facility];  Service:    • DILATATION AND CURETTAGE     • KNEE ARTHROSCOPY W/ MENISCAL REPAIR Left    • OVARIAN CYST REMOVAL Left     Dr. Wilson   • TOTAL KNEE ARTHROPLASTY Right 2017    Procedure: RIGHT TOTAL KNEE ARTHROPLASTY WITH ALETA NAVIGATION;  Surgeon: Ross Cruz MD;  Location: [facility];  Service:      Family History   Problem Relation Age of Onset   • Hepatitis Mother    • Breast cancer Mother    • Heart disease Mother    • Cancer Mother    • Hyperlipidemia Mother             • Heart failure Father    • Stroke Father    • Hypertension Father         Since he was 72, now 86s   • Diabetes Father    • Heart disease Father    • Hyperlipidemia Father         Unsurs   • Heart disease Maternal Grandmother    • Cancer Maternal Grandfather    • COPD Maternal Grandfather    • Arthritis Paternal Grandmother    • Diabetes Maternal Aunt    • Diabetes Paternal Uncle      Social History     Social History   • Marital status:      Spouse name: N/A   • Number of children: N/A   • Years of education: N/A     Occupational History   • RN      Social History Main Topics   • Smoking status: Never Smoker   • Smokeless tobacco: Never Used      Comment: Never smoked   • Alcohol use Yes     2 drink(s) per week      Comment: Sometimes none   • Drug use: No   • Sexual activity: Not Currently     Partners: Male     Birth control/ protection: IUD      Comment: Currently have inflamed bladder and some bloody urine     Other Topics Concern   • Not on file     Social  History Narrative   • No narrative on file     Occupation/Additional Social Hx: accompanied by her daughter.   to a gentleman who I did a parathyroid resection on and was somewhat unhappy during the evaluation.    Current Outpatient Prescriptions:   •  amLODIPine-benazepril (LOTREL 5-20) 5-20 MG per capsule, Take 1 capsule by mouth daily., Disp: 90 capsule, Rfl: 3  •  diclofenac (VOLTAREN) 50 MG EC tablet, Take 1 tablet by mouth 2 (Two) Times a Day As Needed (pain)., Disp: 60 tablet, Rfl: 2  •  escitalopram (LEXAPRO) 10 MG tablet, Take 10 mg by mouth Daily., Disp: , Rfl:   •  famotidine (PEPCID) 20 MG tablet, Take 20 mg by mouth 2 (Two) Times a Day As Needed., Disp: , Rfl:   •  furosemide (LASIX) 20 MG tablet, Take 20 mg by mouth Daily As Needed., Disp: , Rfl: 1  •  gabapentin (NEURONTIN) 300 MG capsule, Take 1 qhs for 3-5 days, then bid for 3-5 days, then tid and stay on this dose, Disp: 120 capsule, Rfl: 3  •  levothyroxine (SYNTHROID, LEVOTHROID) 125 MCG tablet, Take 1 tablet by mouth daily., Disp: 90 tablet, Rfl: 3  •  LORazepam (ATIVAN) 0.5 MG tablet, Take 0.5 mg by mouth Every 8 (Eight) Hours As Needed for Anxiety., Disp: , Rfl:   •  metFORMIN (GLUCOPHAGE) 500 MG tablet, Take 1 tablet by mouth 2 (Two) Times a Day With Meals., Disp: 360 tablet, Rfl: 0  •  Multiple Vitamins-Minerals (CENTRUM WOMEN) tablet, Take 1 tablet by mouth Daily., Disp: , Rfl:   •  oxybutynin XL (DITROPAN-XL) 10 MG 24 hr tablet, Take 10 mg by mouth Daily., Disp: , Rfl:   •  pravastatin (PRAVACHOL) 40 MG tablet, Take 1 tablet by mouth daily., Disp: 90 tablet, Rfl: 3  •  rOPINIRole (REQUIP) 0.5 MG tablet, Take 1 tablet by mouth every night at bedtime., Disp: 30 tablet, Rfl: 0  •  TiZANidine (ZANAFLEX) 4 MG capsule, TAKE ONE CAPSULE BY MOUTH THREE TIMES A DAY, Disp: 90 capsule, Rfl: 0  •  TRULICITY 1.5 MG/0.5ML solution pen-injector, INJECT 1 PEN SUBCUTANEOUSLY WEEKLY, Disp: , Rfl: 1  •  uribel (URO-MP) 118 MG capsule capsule, Take 1  "capsule by mouth 3 (Three) Times a Day., Disp: , Rfl: 2  •  Cyanocobalamin (VITAMIN B-12 PO), Take 1 tablet by mouth Daily., Disp: , Rfl:   •  ferrous sulfate 325 (65 FE) MG tablet, Take 1 tablet by mouth 2 (Two) Times a Day., Disp: , Rfl: 1  •  potassium chloride (K-DUR,KLOR-CON) 20 MEQ CR tablet, Take 20 mEq by mouth As Needed (only when takin Lasix)., Disp: , Rfl:   •  PREMARIN 0.625 MG/GM vaginal cream, APPLY PEA SIZE AMOUNT TO VAGINA 3-4 TIMES A WEEK, Disp: , Rfl: 6    Allergies   Allergen Reactions   • Sulfa Antibiotics Hives and Rash     Preventative Medicine  Colonoscopy: none  Review of Systems   Constitutional: Positive for appetite change.   Cardiovascular: Positive for leg swelling.   Gastrointestinal: Positive for diarrhea and nausea.   Endocrine: Positive for polyuria.   Genitourinary: Positive for frequency and hematuria.   Musculoskeletal: Positive for arthralgias and back pain.   Skin: Positive for color change.   Allergic/Immunologic: Positive for environmental allergies.   Neurological: Positive for numbness.   Psychiatric/Behavioral: Positive for depressed mood. The patient is nervous/anxious.    All other systems reviewed and are negative.      Vitals:    10/22/18 1358   Pulse: 91   SpO2: 98%   Weight: 134 kg (295 lb 9.6 oz)   Height: 165.1 cm (65\")       PHYSICAL EXAM:    Pulse 91   Ht 165.1 cm (65\")   Wt 134 kg (295 lb 9.6 oz)   SpO2 98%   BMI 49.19 kg/m²   Body mass index is 49.19 kg/m².    Constitutional: well developed, well nourished, excess weight  Eyes: sclera nonicteric, conjunctiva not injected   ENMT: Hearing intact, trachea midline, thyroid without masses  CVS: RRR, no murmur, peripheral edema not present  Respiratory: CTA, normal respiratory effort   Gastrointestinal: no hepatosplenomegaly, abdomen rounded, obese, abdominal hernia not detected, incisional scars none  Genitourinary: inguinal hernia none  Musculoskeletal: gait normal, muscle mass normal  Skin: warm and dry, no " "rashes visible, scabs about her midline, consistent with \"picking\"  Neurological: awake and alert, seems to have reasonable capacity for understanding for medical decision making  Psychiatric: appears to have reasonable judgement, pleasant  Lymphatics: no cervical adenopathy      Radiographic Findings: Gallstones as noted    Lab Findings: Hemoglobin 10.5, up from 8.1, with an iron of 32.  I do not see any recent LFTs    Pamphlet reviewed: Gallbladder    IMPRESSION:  · Gallstones, numerous with mild symptoms  · BMI of 49  · Hypertension on amlodipine-benazepril and Lasix  · Hypercholesterolemia on Pravachol  · Depression on Lexapro.  This is somewhat situational with her father's death in the last year  · Anxiety on Ativan  · Arthritis and bulging disc disease on Zanaflex and Neurontin and Requip  · Urinary frequency on the drip and  · Hypothyroidism on Synthroid  · Diabetes on true listed the and Glucophage  · History of frequent UTIs  · Tendency towards \"picking\".  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  I've been fairly prabha with the patient that her weight puts her at increased risk, as well as her frequent UTIs, her picking, and even her depression with multiple medications.  She understands.  On the other hand her diabetes would dictate that she should go ahead and have this done, and I would rather do it electively than during an emergency admission.  We'll need to control her diabetes around the time of surgery.  Case request entered  · Colonoscopy.  Case request entered  · Avoid picking around the time of surgery as this increases her risk of infection  · Take none of oral diabetic medications morning of procedure and only half of insulin injection    Mary Kay Mac MD  10/22/18  6:07 PM  "

## 2018-10-22 ENCOUNTER — PREP FOR SURGERY (OUTPATIENT)
Dept: OTHER | Facility: HOSPITAL | Age: 58
End: 2018-10-22

## 2018-10-22 ENCOUNTER — OFFICE VISIT (OUTPATIENT)
Dept: SURGERY | Facility: CLINIC | Age: 58
End: 2018-10-22

## 2018-10-22 VITALS — HEART RATE: 91 BPM | OXYGEN SATURATION: 98 % | HEIGHT: 65 IN | BODY MASS INDEX: 48.82 KG/M2 | WEIGHT: 293 LBS

## 2018-10-22 DIAGNOSIS — Z12.11 SCREENING FOR COLORECTAL CANCER: Primary | ICD-10-CM

## 2018-10-22 DIAGNOSIS — K80.20 GALLSTONES: Primary | ICD-10-CM

## 2018-10-22 DIAGNOSIS — Z12.12 SCREENING FOR COLORECTAL CANCER: Primary | ICD-10-CM

## 2018-10-22 PROCEDURE — 99244 OFF/OP CNSLTJ NEW/EST MOD 40: CPT | Performed by: SURGERY

## 2018-10-22 RX ORDER — CEFAZOLIN SODIUM 2 G/100ML
2 INJECTION, SOLUTION INTRAVENOUS ONCE
Status: CANCELLED | OUTPATIENT
Start: 2018-10-31 | End: 2018-10-31

## 2018-10-22 RX ORDER — ACETAMINOPHEN 325 MG/1
650 TABLET ORAL ONCE
Status: CANCELLED | OUTPATIENT
Start: 2018-10-31 | End: 2018-10-31

## 2018-10-22 RX ORDER — OXYCODONE HCL 10 MG/1
10 TABLET, FILM COATED, EXTENDED RELEASE ORAL ONCE
Status: CANCELLED | OUTPATIENT
Start: 2018-10-31 | End: 2018-10-31

## 2018-10-22 RX ORDER — SODIUM CHLORIDE 0.9 % (FLUSH) 0.9 %
1-10 SYRINGE (ML) INJECTION AS NEEDED
Status: CANCELLED | OUTPATIENT
Start: 2018-10-31

## 2018-10-22 RX ORDER — SODIUM CHLORIDE 0.9 % (FLUSH) 0.9 %
3 SYRINGE (ML) INJECTION EVERY 12 HOURS SCHEDULED
Status: CANCELLED | OUTPATIENT
Start: 2018-10-31

## 2018-10-24 ENCOUNTER — OFFICE VISIT (OUTPATIENT)
Dept: NEUROSURGERY | Facility: CLINIC | Age: 58
End: 2018-10-24

## 2018-10-24 ENCOUNTER — APPOINTMENT (OUTPATIENT)
Dept: PREADMISSION TESTING | Facility: HOSPITAL | Age: 58
End: 2018-10-24

## 2018-10-24 VITALS
WEIGHT: 293 LBS | BODY MASS INDEX: 48.82 KG/M2 | HEIGHT: 65 IN | DIASTOLIC BLOOD PRESSURE: 92 MMHG | SYSTOLIC BLOOD PRESSURE: 144 MMHG

## 2018-10-24 VITALS
RESPIRATION RATE: 16 BRPM | HEART RATE: 85 BPM | DIASTOLIC BLOOD PRESSURE: 83 MMHG | WEIGHT: 293 LBS | SYSTOLIC BLOOD PRESSURE: 147 MMHG | BODY MASS INDEX: 48.82 KG/M2 | HEIGHT: 65 IN | OXYGEN SATURATION: 99 % | TEMPERATURE: 97.8 F

## 2018-10-24 DIAGNOSIS — M43.10 ACQUIRED SPONDYLOLISTHESIS: Primary | ICD-10-CM

## 2018-10-24 DIAGNOSIS — K80.20 GALLSTONES: ICD-10-CM

## 2018-10-24 LAB
ALBUMIN SERPL-MCNC: 4.2 G/DL (ref 3.5–5.2)
ALBUMIN/GLOB SERPL: 1.3 G/DL
ALP SERPL-CCNC: 75 U/L (ref 39–117)
ALT SERPL W P-5'-P-CCNC: 25 U/L (ref 1–33)
ANION GAP SERPL CALCULATED.3IONS-SCNC: 10.3 MMOL/L
AST SERPL-CCNC: 22 U/L (ref 1–32)
BILIRUB SERPL-MCNC: 0.6 MG/DL (ref 0.1–1.2)
BUN BLD-MCNC: 16 MG/DL (ref 6–20)
BUN/CREAT SERPL: 15.2 (ref 7–25)
CALCIUM SPEC-SCNC: 10.3 MG/DL (ref 8.6–10.5)
CHLORIDE SERPL-SCNC: 101 MMOL/L (ref 98–107)
CO2 SERPL-SCNC: 28.7 MMOL/L (ref 22–29)
CREAT BLD-MCNC: 1.05 MG/DL (ref 0.57–1)
DEPRECATED RDW RBC AUTO: 47.9 FL (ref 37–54)
ERYTHROCYTE [DISTWIDTH] IN BLOOD BY AUTOMATED COUNT: 15.7 % (ref 11.7–13)
GFR SERPL CREATININE-BSD FRML MDRD: 54 ML/MIN/1.73
GLOBULIN UR ELPH-MCNC: 3.3 GM/DL
GLUCOSE BLD-MCNC: 158 MG/DL (ref 65–99)
HCT VFR BLD AUTO: 39.2 % (ref 35.6–45.5)
HGB BLD-MCNC: 11.5 G/DL (ref 11.9–15.5)
MCH RBC QN AUTO: 24.6 PG (ref 26.9–32)
MCHC RBC AUTO-ENTMCNC: 29.3 G/DL (ref 32.4–36.3)
MCV RBC AUTO: 83.8 FL (ref 80.5–98.2)
PLATELET # BLD AUTO: 300 10*3/MM3 (ref 140–500)
PMV BLD AUTO: 11 FL (ref 6–12)
POTASSIUM BLD-SCNC: 4.6 MMOL/L (ref 3.5–5.2)
PROT SERPL-MCNC: 7.5 G/DL (ref 6–8.5)
RBC # BLD AUTO: 4.68 10*6/MM3 (ref 3.9–5.2)
SODIUM BLD-SCNC: 140 MMOL/L (ref 136–145)
WBC NRBC COR # BLD: 7.22 10*3/MM3 (ref 4.5–10.7)

## 2018-10-24 PROCEDURE — 80053 COMPREHEN METABOLIC PANEL: CPT | Performed by: SURGERY

## 2018-10-24 PROCEDURE — 93005 ELECTROCARDIOGRAM TRACING: CPT

## 2018-10-24 PROCEDURE — 85027 COMPLETE CBC AUTOMATED: CPT | Performed by: SURGERY

## 2018-10-24 PROCEDURE — 99214 OFFICE O/P EST MOD 30 MIN: CPT | Performed by: NEUROLOGICAL SURGERY

## 2018-10-24 PROCEDURE — 36415 COLL VENOUS BLD VENIPUNCTURE: CPT

## 2018-10-24 PROCEDURE — 93010 ELECTROCARDIOGRAM REPORT: CPT | Performed by: INTERNAL MEDICINE

## 2018-10-24 RX ORDER — LORATADINE 10 MG/1
10 TABLET ORAL DAILY
COMMUNITY

## 2018-10-24 RX ORDER — TIZANIDINE 4 MG/1
4 TABLET ORAL NIGHTLY PRN
COMMUNITY

## 2018-10-24 NOTE — PROGRESS NOTES
Subjective   Patient ID: Tiana Hudson is a 57 y.o. female who is here today for follow-up for low back pain. She presents accompanied by her daughter.    History of Present Illness   56 yo female presents for follow up.  She has started voltaren and gabapentin and responded to this to some degree.  She feels at least 50% better with these simple measures.  She denies new issues.  She is taking the gabapentin 300 bid to tid.      The following portions of the patient's history were reviewed and updated as appropriate: allergies, current medications, past family history, past medical history, past social history, past surgical history and problem list.    Review of Systems   Constitutional: Negative for fever.   Cardiovascular: Negative for chest pain.   Gastrointestinal: Negative for abdominal pain.   Genitourinary: Positive for dysuria and pelvic pain.   Musculoskeletal: Positive for back pain.   Neurological: Positive for weakness ( RLE ) and numbness ( RLE). Negative for headaches.       Objective   Physical Exam  Neurologic Exam    Strength   Right deltoid: 5/5  Left deltoid: 5/5  Right biceps: 5/5  Left biceps: 5/5  Right triceps: 5/5  Left triceps: 5/5  Right wrist extension: 5/5  Left wrist extension: 5/5  Right interossei: 5/5  Left interossei: 5/5  Right iliopsoas: 5/5  Left iliopsoas: 5/5  Right quadriceps: 5/5  Left quadriceps: 5/5  Right hamstrin/5  Left hamstrin/5  Right anterior tibial: 5/5  Left anterior tibial: 5/5  Right gastroc: 5/5  Left gastroc: 5/5  ehl 5/5      Sensory Exam   Right arm light touch: normal  Left arm light touch: normal  Right leg light touch: normal  Left leg light touch: normal  Right leg vibration: decreased from ankle  Right arm pinprick: normal  Left arm pinprick: normal  Right leg pinprick: decreased from ankle  Left leg pinprick: normal  Sensory deficit distribution on right: L5     Gait, Coordination, and Reflexes      Gait  Gait: normal     Reflexes   Right  brachioradialis: 1+  Left brachioradialis: 1+  Right biceps: 1+  Left biceps: 1+  Right triceps: 1+  Left triceps: 1+  Right patellar: 1+  Left patellar: 1+  Right achilles: 1+  Left achilles: 1+  Right Alfaro: absent  Left Alfaro: absent  Right ankle clonus: absent  Left ankle clonus: absent     Morbid obesity    Assessment/Plan   Independent Review of Radiographic Studies:  Lumbar spondy at L45, grade 1 with pseudodisc.  She also has some L5S1 ddd as well. Upper lumbar hemangioma.     Medical Decision Making:  She has responded well to the minimal non operative measures employed to date.  She will need to come off her diclofenac so I expect a bit of regression of her circumstance.  We discussed increasing the gabapentin while convalescing from her cholecystectomy.  We will continue to follow her and advance her care and follow her.      Tiana was seen today for back pain.    Diagnoses and all orders for this visit:    Acquired spondylolisthesis      No Follow-up on file.

## 2018-10-24 NOTE — DISCHARGE INSTRUCTIONS
Take the following medications the morning of surgery with a small sip of water:    GABAPENTIN  AMLODIPINE    General Instructions:  • Do not eat solid food after midnight the night before surgery.  • You may drink clear liquids day of surgery but must stop at least one hour before your hospital arrival time @ 0900 AM STOP DRINKING!!!  • It is beneficial for you to have a clear drink that contains carbohydrates the day of surgery.  We suggest a 12 to 20 ounce bottle of Gatorade or Powerade for non-diabetic patients or a 12 to 20 ounce bottle of G2 or Powerade Zero for diabetic patients.    Clear liquids are liquids you can see through.  Nothing red in color.     Plain water                               Sports drinks  Sodas                                   Gelatin (Jell-O)  Fruit juices without pulp such as white grape juice and apple juice  Popsicles that contain no fruit or yogurt  Tea or coffee (no cream or milk added)  Gatorade / Powerade  G2 / Powerade Zero    • Patients who avoid smoking, chewing tobacco and alcohol for 4 weeks prior to surgery have a reduced risk of post-operative complications.  Quit smoking as many days before surgery as you can.  • Do not smoke, use chewing tobacco or drink alcohol the day of surgery.   • If applicable bring your C-PAP/ BI-PAP machine.  • Bring any papers given to you in the doctor’s office.  • Wear clean comfortable clothes and socks.  • Do not wear contact lenses or make-up.  Bring a case for your glasses.   • Bring crutches or walker if applicable.  • Remove all piercings.  Leave jewelry and any other valuables at home.  • Hair extensions with metal clips must be removed prior to surgery.  • The Pre-Admission Testing nurse will instruct you to bring medications if unable to obtain an accurate list in Pre-Admission Testing.        Preventing a Surgical Site Infection:  • For 2 to 3 days before surgery, avoid shaving with a razor because the razor can irritate skin and  make it easier to develop an infection.    • Any areas of open skin can increase the risk of a post-operative wound infection by allowing bacteria to enter and travel throughout the body.  Notify your surgeon if you have any skin wounds / rashes even if it is not near the expected surgical site.  The area will need assessed to determine if surgery should be delayed until it is healed.  • The night prior to surgery sleep in a clean bed with clean clothing.  Do not allow pets to sleep with you.  • Shower on the morning of surgery using a fresh bar of anti-bacterial soap (such as Dial) and clean washcloth.  Dry with a clean towel and dress in clean clothing.  • Ask your surgeon if you will be receiving antibiotics prior to surgery.  • Make sure you, your family, and all healthcare providers clean their hands with soap and water or an alcohol based hand  before caring for you or your wound.    Day of surgery:10- ARRIVE @ 1000 AM REPORT TO THE MAIN OR   Upon arrival, a Pre-op nurse and Anesthesiologist will review your health history, obtain vital signs, and answer questions you may have.  The only belongings needed at this time will be your home medications and if applicable your C-PAP/BI-PAP machine.  If you are staying overnight your family can leave the rest of your belongings in the car and bring them to your room later.  A Pre-op nurse will start an IV and you may receive medication in preparation for surgery, including something to help you relax.  Your family will be able to see you in the Pre-op area.  While you are in surgery your family should notify the waiting room  if they leave the waiting room area and provide a contact phone number.    Please be aware that surgery does come with discomfort.  We want to make every effort to control your discomfort so please discuss any uncontrolled symptoms with your nurse.   Your doctor will most likely have prescribed pain medications.       If you are going home after surgery you will receive individualized written care instructions before being discharged.  A responsible adult must drive you to and from the hospital on the day of your surgery and stay with you for 24 hours.    If you are staying overnight following surgery, you will be transported to your hospital room following the recovery period.  [facility] has all private rooms.    You have received a list of surgical assistants for your reference.  If you have any questions please call Pre-Admission Testing at [phone].  Deductibles and co-payments are collected on the day of service. Please be prepared to pay the required co-pay, deductible or deposit on the day of service as defined by your plan.

## 2018-10-31 ENCOUNTER — ANESTHESIA EVENT (OUTPATIENT)
Dept: PERIOP | Facility: HOSPITAL | Age: 58
End: 2018-10-31

## 2018-10-31 ENCOUNTER — ANESTHESIA (OUTPATIENT)
Dept: PERIOP | Facility: HOSPITAL | Age: 58
End: 2018-10-31

## 2018-10-31 ENCOUNTER — HOSPITAL ENCOUNTER (OUTPATIENT)
Facility: HOSPITAL | Age: 58
Setting detail: HOSPITAL OUTPATIENT SURGERY
Discharge: HOME | End: 2018-10-31
Attending: SURGERY | Admitting: SURGERY

## 2018-10-31 VITALS
RESPIRATION RATE: 16 BRPM | DIASTOLIC BLOOD PRESSURE: 77 MMHG | TEMPERATURE: 98.5 F | OXYGEN SATURATION: 94 % | WEIGHT: 291 LBS | SYSTOLIC BLOOD PRESSURE: 142 MMHG | HEIGHT: 65 IN | HEART RATE: 84 BPM | BODY MASS INDEX: 48.48 KG/M2

## 2018-10-31 DIAGNOSIS — K80.20 GALLSTONES: ICD-10-CM

## 2018-10-31 LAB — GLUCOSE BLDC GLUCOMTR-MCNC: 126 MG/DL (ref 70–130)

## 2018-10-31 PROCEDURE — 25010000002 MIDAZOLAM PER 1 MG: Performed by: ANESTHESIOLOGY

## 2018-10-31 PROCEDURE — 25010000002 DEXAMETHASONE PER 1 MG: Performed by: NURSE ANESTHETIST, CERTIFIED REGISTERED

## 2018-10-31 PROCEDURE — 47562 LAPAROSCOPIC CHOLECYSTECTOMY: CPT | Performed by: PHYSICIAN ASSISTANT

## 2018-10-31 PROCEDURE — 25010000002 SUCCINYLCHOLINE PER 20 MG: Performed by: NURSE ANESTHETIST, CERTIFIED REGISTERED

## 2018-10-31 PROCEDURE — 25010000002 ONDANSETRON PER 1 MG: Performed by: NURSE ANESTHETIST, CERTIFIED REGISTERED

## 2018-10-31 PROCEDURE — 25010000002 FENTANYL CITRATE (PF) 100 MCG/2ML SOLUTION: Performed by: NURSE ANESTHETIST, CERTIFIED REGISTERED

## 2018-10-31 PROCEDURE — 82962 GLUCOSE BLOOD TEST: CPT

## 2018-10-31 PROCEDURE — 47562 LAPAROSCOPIC CHOLECYSTECTOMY: CPT | Performed by: SURGERY

## 2018-10-31 PROCEDURE — 0 IOTHALAMATE 60 % SOLUTION: Performed by: SURGERY

## 2018-10-31 PROCEDURE — 88304 TISSUE EXAM BY PATHOLOGIST: CPT | Performed by: SURGERY

## 2018-10-31 PROCEDURE — 25010000002 PROPOFOL 10 MG/ML EMULSION: Performed by: NURSE ANESTHETIST, CERTIFIED REGISTERED

## 2018-10-31 PROCEDURE — 25010000003 CEFAZOLIN IN DEXTROSE 2-4 GM/100ML-% SOLUTION: Performed by: SURGERY

## 2018-10-31 RX ORDER — SODIUM CHLORIDE 0.9 % (FLUSH) 0.9 %
3 SYRINGE (ML) INJECTION EVERY 12 HOURS SCHEDULED
Status: DISCONTINUED | OUTPATIENT
Start: 2018-10-31 | End: 2018-10-31 | Stop reason: HOSPADM

## 2018-10-31 RX ORDER — FLUMAZENIL 0.1 MG/ML
0.2 INJECTION INTRAVENOUS AS NEEDED
Status: DISCONTINUED | OUTPATIENT
Start: 2018-10-31 | End: 2018-10-31 | Stop reason: HOSPADM

## 2018-10-31 RX ORDER — ONDANSETRON 2 MG/ML
INJECTION INTRAMUSCULAR; INTRAVENOUS AS NEEDED
Status: DISCONTINUED | OUTPATIENT
Start: 2018-10-31 | End: 2018-10-31 | Stop reason: SURG

## 2018-10-31 RX ORDER — OXYCODONE HYDROCHLORIDE AND ACETAMINOPHEN 5; 325 MG/1; MG/1
TABLET ORAL
Qty: 20 TABLET | Refills: 0 | Status: SHIPPED | OUTPATIENT
Start: 2018-10-31 | End: 2018-11-07

## 2018-10-31 RX ORDER — HYDROMORPHONE HYDROCHLORIDE 1 MG/ML
0.5 INJECTION, SOLUTION INTRAMUSCULAR; INTRAVENOUS; SUBCUTANEOUS
Status: DISCONTINUED | OUTPATIENT
Start: 2018-10-31 | End: 2018-10-31 | Stop reason: HOSPADM

## 2018-10-31 RX ORDER — HYDROCODONE BITARTRATE AND ACETAMINOPHEN 7.5; 325 MG/1; MG/1
1 TABLET ORAL ONCE AS NEEDED
Status: DISCONTINUED | OUTPATIENT
Start: 2018-10-31 | End: 2018-10-31 | Stop reason: HOSPADM

## 2018-10-31 RX ORDER — EPHEDRINE SULFATE 50 MG/ML
5 INJECTION, SOLUTION INTRAVENOUS ONCE AS NEEDED
Status: DISCONTINUED | OUTPATIENT
Start: 2018-10-31 | End: 2018-10-31 | Stop reason: HOSPADM

## 2018-10-31 RX ORDER — ONDANSETRON 4 MG/1
4 TABLET, FILM COATED ORAL EVERY 8 HOURS PRN
Qty: 20 TABLET | Refills: 0 | Status: SHIPPED | OUTPATIENT
Start: 2018-10-31 | End: 2018-11-07

## 2018-10-31 RX ORDER — SODIUM CHLORIDE 0.9 % (FLUSH) 0.9 %
1-10 SYRINGE (ML) INJECTION AS NEEDED
Status: DISCONTINUED | OUTPATIENT
Start: 2018-10-31 | End: 2018-10-31 | Stop reason: HOSPADM

## 2018-10-31 RX ORDER — PROMETHAZINE HYDROCHLORIDE 25 MG/ML
12.5 INJECTION, SOLUTION INTRAMUSCULAR; INTRAVENOUS ONCE AS NEEDED
Status: DISCONTINUED | OUTPATIENT
Start: 2018-10-31 | End: 2018-10-31 | Stop reason: HOSPADM

## 2018-10-31 RX ORDER — OXYCODONE AND ACETAMINOPHEN 7.5; 325 MG/1; MG/1
1 TABLET ORAL ONCE AS NEEDED
Status: COMPLETED | OUTPATIENT
Start: 2018-10-31 | End: 2018-10-31

## 2018-10-31 RX ORDER — PROMETHAZINE HYDROCHLORIDE 25 MG/1
25 TABLET ORAL ONCE AS NEEDED
Status: DISCONTINUED | OUTPATIENT
Start: 2018-10-31 | End: 2018-10-31 | Stop reason: HOSPADM

## 2018-10-31 RX ORDER — FAMOTIDINE 10 MG/ML
20 INJECTION, SOLUTION INTRAVENOUS ONCE
Status: COMPLETED | OUTPATIENT
Start: 2018-10-31 | End: 2018-10-31

## 2018-10-31 RX ORDER — MIDAZOLAM HYDROCHLORIDE 1 MG/ML
2 INJECTION INTRAMUSCULAR; INTRAVENOUS
Status: DISCONTINUED | OUTPATIENT
Start: 2018-10-31 | End: 2018-10-31 | Stop reason: HOSPADM

## 2018-10-31 RX ORDER — CEFAZOLIN SODIUM 2 G/100ML
2 INJECTION, SOLUTION INTRAVENOUS ONCE
Status: COMPLETED | OUTPATIENT
Start: 2018-10-31 | End: 2018-10-31

## 2018-10-31 RX ORDER — PROMETHAZINE HYDROCHLORIDE 25 MG/1
25 SUPPOSITORY RECTAL ONCE AS NEEDED
Status: DISCONTINUED | OUTPATIENT
Start: 2018-10-31 | End: 2018-10-31 | Stop reason: HOSPADM

## 2018-10-31 RX ORDER — MAGNESIUM HYDROXIDE 1200 MG/15ML
LIQUID ORAL AS NEEDED
Status: DISCONTINUED | OUTPATIENT
Start: 2018-10-31 | End: 2018-10-31 | Stop reason: HOSPADM

## 2018-10-31 RX ORDER — PROMETHAZINE HYDROCHLORIDE 25 MG/1
12.5 TABLET ORAL ONCE AS NEEDED
Status: DISCONTINUED | OUTPATIENT
Start: 2018-10-31 | End: 2018-10-31 | Stop reason: HOSPADM

## 2018-10-31 RX ORDER — FENTANYL CITRATE 50 UG/ML
INJECTION, SOLUTION INTRAMUSCULAR; INTRAVENOUS AS NEEDED
Status: DISCONTINUED | OUTPATIENT
Start: 2018-10-31 | End: 2018-10-31 | Stop reason: SURG

## 2018-10-31 RX ORDER — DIPHENHYDRAMINE HCL 25 MG
25 CAPSULE ORAL EVERY 6 HOURS PRN
Status: DISCONTINUED | OUTPATIENT
Start: 2018-10-31 | End: 2018-10-31 | Stop reason: HOSPADM

## 2018-10-31 RX ORDER — SODIUM CHLORIDE, SODIUM LACTATE, POTASSIUM CHLORIDE, CALCIUM CHLORIDE 600; 310; 30; 20 MG/100ML; MG/100ML; MG/100ML; MG/100ML
9 INJECTION, SOLUTION INTRAVENOUS CONTINUOUS
Status: DISCONTINUED | OUTPATIENT
Start: 2018-10-31 | End: 2018-10-31 | Stop reason: HOSPADM

## 2018-10-31 RX ORDER — BUPIVACAINE HYDROCHLORIDE AND EPINEPHRINE 5; 5 MG/ML; UG/ML
INJECTION, SOLUTION PERINEURAL AS NEEDED
Status: DISCONTINUED | OUTPATIENT
Start: 2018-10-31 | End: 2018-10-31 | Stop reason: HOSPADM

## 2018-10-31 RX ORDER — SCOLOPAMINE TRANSDERMAL SYSTEM 1 MG/1
1 PATCH, EXTENDED RELEASE TRANSDERMAL ONCE
Status: DISCONTINUED | OUTPATIENT
Start: 2018-10-31 | End: 2018-10-31 | Stop reason: HOSPADM

## 2018-10-31 RX ORDER — FENTANYL CITRATE 50 UG/ML
100 INJECTION, SOLUTION INTRAMUSCULAR; INTRAVENOUS
Status: DISCONTINUED | OUTPATIENT
Start: 2018-10-31 | End: 2018-10-31 | Stop reason: HOSPADM

## 2018-10-31 RX ORDER — MIDAZOLAM HYDROCHLORIDE 1 MG/ML
1 INJECTION INTRAMUSCULAR; INTRAVENOUS
Status: DISCONTINUED | OUTPATIENT
Start: 2018-10-31 | End: 2018-10-31 | Stop reason: HOSPADM

## 2018-10-31 RX ORDER — LABETALOL HYDROCHLORIDE 5 MG/ML
5 INJECTION, SOLUTION INTRAVENOUS
Status: DISCONTINUED | OUTPATIENT
Start: 2018-10-31 | End: 2018-10-31 | Stop reason: HOSPADM

## 2018-10-31 RX ORDER — ROCURONIUM BROMIDE 10 MG/ML
INJECTION, SOLUTION INTRAVENOUS AS NEEDED
Status: DISCONTINUED | OUTPATIENT
Start: 2018-10-31 | End: 2018-10-31 | Stop reason: SURG

## 2018-10-31 RX ORDER — DEXAMETHASONE SODIUM PHOSPHATE 10 MG/ML
INJECTION INTRAMUSCULAR; INTRAVENOUS AS NEEDED
Status: DISCONTINUED | OUTPATIENT
Start: 2018-10-31 | End: 2018-10-31 | Stop reason: SURG

## 2018-10-31 RX ORDER — SUCCINYLCHOLINE CHLORIDE 20 MG/ML
INJECTION INTRAMUSCULAR; INTRAVENOUS AS NEEDED
Status: DISCONTINUED | OUTPATIENT
Start: 2018-10-31 | End: 2018-10-31 | Stop reason: SURG

## 2018-10-31 RX ORDER — ACETAMINOPHEN 325 MG/1
650 TABLET ORAL ONCE
Status: COMPLETED | OUTPATIENT
Start: 2018-10-31 | End: 2018-10-31

## 2018-10-31 RX ORDER — OXYCODONE HCL 10 MG/1
10 TABLET, FILM COATED, EXTENDED RELEASE ORAL ONCE
Status: COMPLETED | OUTPATIENT
Start: 2018-10-31 | End: 2018-10-31

## 2018-10-31 RX ORDER — ONDANSETRON 2 MG/ML
4 INJECTION INTRAMUSCULAR; INTRAVENOUS ONCE AS NEEDED
Status: DISCONTINUED | OUTPATIENT
Start: 2018-10-31 | End: 2018-10-31 | Stop reason: HOSPADM

## 2018-10-31 RX ORDER — PROPOFOL 10 MG/ML
VIAL (ML) INTRAVENOUS AS NEEDED
Status: DISCONTINUED | OUTPATIENT
Start: 2018-10-31 | End: 2018-10-31 | Stop reason: SURG

## 2018-10-31 RX ORDER — FENTANYL CITRATE 50 UG/ML
50 INJECTION, SOLUTION INTRAMUSCULAR; INTRAVENOUS
Status: DISCONTINUED | OUTPATIENT
Start: 2018-10-31 | End: 2018-10-31 | Stop reason: HOSPADM

## 2018-10-31 RX ORDER — NALOXONE HCL 0.4 MG/ML
0.2 VIAL (ML) INJECTION AS NEEDED
Status: DISCONTINUED | OUTPATIENT
Start: 2018-10-31 | End: 2018-10-31 | Stop reason: HOSPADM

## 2018-10-31 RX ORDER — LIDOCAINE HYDROCHLORIDE 10 MG/ML
0.5 INJECTION, SOLUTION EPIDURAL; INFILTRATION; INTRACAUDAL; PERINEURAL ONCE AS NEEDED
Status: DISCONTINUED | OUTPATIENT
Start: 2018-10-31 | End: 2018-10-31 | Stop reason: HOSPADM

## 2018-10-31 RX ADMIN — CEFAZOLIN SODIUM 1 G: 2 INJECTION, SOLUTION INTRAVENOUS at 12:45

## 2018-10-31 RX ADMIN — SODIUM CHLORIDE, POTASSIUM CHLORIDE, SODIUM LACTATE AND CALCIUM CHLORIDE: 600; 310; 30; 20 INJECTION, SOLUTION INTRAVENOUS at 13:23

## 2018-10-31 RX ADMIN — FENTANYL CITRATE 50 MCG: 50 INJECTION, SOLUTION INTRAMUSCULAR; INTRAVENOUS at 13:07

## 2018-10-31 RX ADMIN — ROCURONIUM BROMIDE 10 MG: 10 INJECTION INTRAVENOUS at 12:30

## 2018-10-31 RX ADMIN — MIDAZOLAM 2 MG: 1 INJECTION INTRAMUSCULAR; INTRAVENOUS at 10:41

## 2018-10-31 RX ADMIN — FENTANYL CITRATE 100 MCG: 50 INJECTION, SOLUTION INTRAMUSCULAR; INTRAVENOUS at 12:32

## 2018-10-31 RX ADMIN — ONDANSETRON 4 MG: 2 INJECTION INTRAMUSCULAR; INTRAVENOUS at 13:07

## 2018-10-31 RX ADMIN — CEFAZOLIN SODIUM 2 G: 2 INJECTION, SOLUTION INTRAVENOUS at 12:33

## 2018-10-31 RX ADMIN — DEXAMETHASONE SODIUM PHOSPHATE 8 MG: 10 INJECTION INTRAMUSCULAR; INTRAVENOUS at 13:07

## 2018-10-31 RX ADMIN — FAMOTIDINE 20 MG: 10 INJECTION, SOLUTION INTRAVENOUS at 10:41

## 2018-10-31 RX ADMIN — OXYCODONE HYDROCHLORIDE AND ACETAMINOPHEN 1 TABLET: 7.5; 325 TABLET ORAL at 14:40

## 2018-10-31 RX ADMIN — PROPOFOL 200 MG: 10 INJECTION, EMULSION INTRAVENOUS at 12:30

## 2018-10-31 RX ADMIN — FENTANYL CITRATE 50 MCG: 50 INJECTION, SOLUTION INTRAMUSCULAR; INTRAVENOUS at 13:26

## 2018-10-31 RX ADMIN — SUGAMMADEX 400 MG: 100 INJECTION, SOLUTION INTRAVENOUS at 13:26

## 2018-10-31 RX ADMIN — FENTANYL CITRATE 50 MCG: 50 INJECTION, SOLUTION INTRAMUSCULAR; INTRAVENOUS at 13:43

## 2018-10-31 RX ADMIN — SUCCINYLCHOLINE CHLORIDE 100 MG: 20 INJECTION, SOLUTION INTRAMUSCULAR; INTRAVENOUS; PARENTERAL at 12:30

## 2018-10-31 RX ADMIN — OXYCODONE HYDROCHLORIDE 10 MG: 10 TABLET, FILM COATED, EXTENDED RELEASE ORAL at 10:33

## 2018-10-31 RX ADMIN — SCOPOLAMINE 1 PATCH: 1 PATCH, EXTENDED RELEASE TRANSDERMAL at 10:44

## 2018-10-31 RX ADMIN — ROCURONIUM BROMIDE 30 MG: 10 INJECTION INTRAVENOUS at 12:50

## 2018-10-31 RX ADMIN — SODIUM CHLORIDE, POTASSIUM CHLORIDE, SODIUM LACTATE AND CALCIUM CHLORIDE 9 ML/HR: 600; 310; 30; 20 INJECTION, SOLUTION INTRAVENOUS at 10:42

## 2018-10-31 RX ADMIN — ACETAMINOPHEN 650 MG: 325 TABLET, FILM COATED ORAL at 10:33

## 2018-10-31 NOTE — ANESTHESIA POSTPROCEDURE EVALUATION
Patient: Tiana Hudson    Procedure Summary     Date:  10/31/18 Room / Location:  [facility] OR  / [facility] MAIN OR    Anesthesia Start:  1225 Anesthesia Stop:  1354    Procedure:  laparoscopic cholecystectomy (N/A Abdomen) Diagnosis:       Gallstones      (Gallstones [K80.20])    Surgeon:  Mary Kay Mac MD Provider:  Marvin Michael MD    Anesthesia Type:  general ASA Status:  3          Anesthesia Type: general  Last vitals  BP   172/93 (10/31/18 1011)   Temp   36.7 °C (98.1 °F) (10/31/18 1011)   Pulse   88 (10/31/18 1046)   Resp   20 (10/31/18 1011)     SpO2   96 % (10/31/18 1046)     Post Anesthesia Care and Evaluation    Patient location during evaluation: PACU  Patient participation: complete - patient participated  Level of consciousness: awake and alert  Pain management: adequate  Airway patency: patent  Anesthetic complications: No anesthetic complications    Cardiovascular status: acceptable  Respiratory status: acceptable  Hydration status: acceptable    Comments: --------------------            10/31/18               1046     --------------------   BP:                  Pulse:      88       Resp:                Temp:                SpO2:      96%      --------------------

## 2018-10-31 NOTE — ANESTHESIA PREPROCEDURE EVALUATION
Anesthesia Evaluation     Patient summary reviewed and Nursing notes reviewed   history of anesthetic complications: PONV               Airway   Mallampati: II  TM distance: >3 FB  Neck ROM: full  no difficulty expected  Dental - normal exam     Pulmonary - negative pulmonary ROS and normal exam   Cardiovascular - normal exam    (+) hypertension,       Neuro/Psych- negative ROS  GI/Hepatic/Renal/Endo    (+) morbid obesity,  diabetes mellitus, hypothyroidism,     Musculoskeletal (-) negative ROS    Abdominal  - normal exam   Substance History - negative use     OB/GYN negative ob/gyn ROS         Other                        Anesthesia Plan    ASA 3     general     intravenous induction   Anesthetic plan, all risks, benefits, and alternatives have been provided, discussed and informed consent has been obtained with: patient.    Plan discussed with CRNA.

## 2018-10-31 NOTE — ANESTHESIA PROCEDURE NOTES
Airway  Urgency: elective    Date/Time: 10/31/2018 12:39 PM  Airway not difficult    General Information and Staff    Patient location during procedure: OR  CRNA: REANNA GREGORIO    Indications and Patient Condition  Indications for airway management: airway protection    Preoxygenated: yes  Mask difficulty assessment: 2 - vent by mask + OA or adjuvant +/- NMBA    Final Airway Details  Final airway type: endotracheal airway      Successful airway: ETT  Cuffed: yes   Successful intubation technique: direct laryngoscopy  Endotracheal tube insertion site: oral  Blade: Hernandez  Blade size: 2  ETT size: 7.0 mm  Cormack-Lehane Classification: grade I - full view of glottis  Placement verified by: chest auscultation and capnometry   Cuff volume (mL): 8  Measured from: lips  ETT to lips (cm): 21  Number of attempts at approach: 1    Additional Comments  PreO2 with 100% O2;  FeO2 >85%;  sniff position; easy mask ventilation;  Intubated with no difficultly after eyes taped; Appears atraumatic;  Lips and teeth intact as preop condition;  Airway secured. Connected to ventilator.

## 2018-11-01 ENCOUNTER — TELEPHONE (OUTPATIENT)
Dept: SURGERY | Facility: CLINIC | Age: 58
End: 2018-11-01

## 2018-11-01 LAB
LAB AP CASE REPORT: NORMAL
PATH REPORT.FINAL DX SPEC: NORMAL
PATH REPORT.GROSS SPEC: NORMAL

## 2018-11-07 ENCOUNTER — PREP FOR SURGERY (OUTPATIENT)
Dept: OTHER | Facility: HOSPITAL | Age: 58
End: 2018-11-07

## 2018-11-07 ENCOUNTER — OFFICE VISIT (OUTPATIENT)
Dept: SURGERY | Facility: CLINIC | Age: 58
End: 2018-11-07

## 2018-11-07 DIAGNOSIS — K80.20 GALLSTONES: Primary | ICD-10-CM

## 2018-11-07 PROCEDURE — 99024 POSTOP FOLLOW-UP VISIT: CPT | Performed by: SURGERY

## 2018-11-07 NOTE — PROGRESS NOTES
"SURGERY: [facility]  Post op Visit  Tiana Hudson  11/07/18    Ms. Hudson presents today after having undergone Surgery 10/31/18, of a laparoscopic cholecystectomy, but I did not complete the cholangiogram.  This is because the patient had a BMI of 49, and I was concerned about the cystic duct hearing in half.  When I clipped across the duct, there was noted to be soft black slurry type material between the clips, prompting my concern that she might have retained cystic duct stones and thus would be at risk still for common duct stones.  I discussed this with her  the day of surgery and with her again today but she's had no problems postop.  Her pathology showed chronic cholecystitis and cholelithiasis.    She looks great today, with incisions that are healing well.  I put in orders for a screening colonoscopy.    Her intraoperative findings were:  · Challenging case with BMI precluded cholangiogram, with need to switch to a 30 degree scope for visualization, use of a step stool, addition of a 5th trochar and steep reverse Trendelenburg with footboard.  Subxyphoid incision not closed due to using a \"splitting trochar\" traditionally not requiring closure, as well as the fascia being measured at 7 cm from the skin, and the incision being over top of the liver.  · Soft black slurry type material noted upon dividing cystic duct between clips     Mary Kay Mac MD  5:15 PM  11/7/18  "

## 2018-12-03 ENCOUNTER — TELEPHONE (OUTPATIENT)
Dept: NEUROSURGERY | Facility: CLINIC | Age: 58
End: 2018-12-03

## 2018-12-03 NOTE — TELEPHONE ENCOUNTER
Last seen by [facility] 10/24 for back pain. Patient's pharmacy faxed a request for authorization for 90 day supply of gabapentin.  Rx was written for 4 months at September OV with [facility].  Per KK, ask patient if she is running out of medication or if 90 day supply can be addressed at next OV with [facility] 12/17 in case medication is adjusted at that OV.  Attempted to contact patient to discuss, did not answer. LVM for her to call office back.

## 2018-12-04 NOTE — TELEPHONE ENCOUNTER
Pt called back and stated that the medication can be addressed at next visit. She is not sure why the pharmacy faxed that over so soon.

## 2018-12-05 ENCOUNTER — HOSPITAL ENCOUNTER (OUTPATIENT)
Facility: HOSPITAL | Age: 58
Setting detail: HOSPITAL OUTPATIENT SURGERY
End: 2018-12-05
Attending: SURGERY | Admitting: SURGERY

## 2018-12-05 PROBLEM — Z12.11 SCREENING FOR COLORECTAL CANCER: Status: ACTIVE | Noted: 2018-12-05

## 2018-12-05 PROBLEM — Z12.12 SCREENING FOR COLORECTAL CANCER: Status: ACTIVE | Noted: 2018-12-05

## 2018-12-13 ENCOUNTER — CLINICAL SUPPORT (OUTPATIENT)
Dept: ORTHOPEDIC SURGERY | Facility: CLINIC | Age: 58
End: 2018-12-13

## 2018-12-13 VITALS — HEIGHT: 65 IN | BODY MASS INDEX: 48.55 KG/M2 | TEMPERATURE: 98.7 F | WEIGHT: 291.4 LBS

## 2018-12-13 DIAGNOSIS — M17.12 ARTHRITIS OF LEFT KNEE: Primary | ICD-10-CM

## 2018-12-13 PROCEDURE — 73562 X-RAY EXAM OF KNEE 3: CPT | Performed by: NURSE PRACTITIONER

## 2018-12-13 PROCEDURE — 99212 OFFICE O/P EST SF 10 MIN: CPT | Performed by: NURSE PRACTITIONER

## 2018-12-13 PROCEDURE — 20610 DRAIN/INJ JOINT/BURSA W/O US: CPT | Performed by: ORTHOPAEDIC SURGERY

## 2018-12-13 RX ORDER — METHYLPREDNISOLONE ACETATE 80 MG/ML
80 INJECTION, SUSPENSION INTRA-ARTICULAR; INTRALESIONAL; INTRAMUSCULAR; SOFT TISSUE
Status: COMPLETED | OUTPATIENT
Start: 2018-12-13 | End: 2018-12-13

## 2018-12-13 RX ORDER — LIDOCAINE HYDROCHLORIDE 10 MG/ML
4 INJECTION, SOLUTION EPIDURAL; INFILTRATION; INTRACAUDAL; PERINEURAL
Status: COMPLETED | OUTPATIENT
Start: 2018-12-13 | End: 2018-12-13

## 2018-12-13 RX ADMIN — LIDOCAINE HYDROCHLORIDE 4 ML: 10 INJECTION, SOLUTION EPIDURAL; INFILTRATION; INTRACAUDAL; PERINEURAL at 08:11

## 2018-12-13 RX ADMIN — METHYLPREDNISOLONE ACETATE 80 MG: 80 INJECTION, SUSPENSION INTRA-ARTICULAR; INTRALESIONAL; INTRAMUSCULAR; SOFT TISSUE at 08:11

## 2018-12-13 NOTE — PROGRESS NOTES
Patient: Tiana Hudson  YOB: 1960    Chief Complaints:  left knee pain    Subjective:    History of Present Illness: Here today for knee pain. The pain is a generalized joint tenderness.  It has been progressive in nature but remains intermittent.  Worsened by prolonged standing or walking and squatting activities. Has had improvement in the past with ice/heat, rest, and injections.     This problem is not new to this examiner.     Allergies:   Allergies   Allergen Reactions   • Sulfa Antibiotics Hives and Rash       Medications:   Home Medications:  Current Outpatient Medications on File Prior to Visit   Medication Sig   • amLODIPine-benazepril (LOTREL 5-20) 5-20 MG per capsule Take 1 capsule by mouth daily. (Patient taking differently: Take 1 capsule by mouth Every Morning.)   • diclofenac (VOLTAREN) 50 MG EC tablet Take 1 tablet by mouth 2 (Two) Times a Day As Needed (pain).   • Dulaglutide 1.5 MG/0.5ML solution pen-injector Inject  under the skin into the appropriate area as directed 1 (One) Time Per Week. SUNDAY   • escitalopram (LEXAPRO) 10 MG tablet Take 10 mg by mouth Daily.   • famotidine (PEPCID) 20 MG tablet Take 20 mg by mouth 2 (Two) Times a Day As Needed.   • furosemide (LASIX) 20 MG tablet Take 20 mg by mouth Daily As Needed.   • gabapentin (NEURONTIN) 300 MG capsule Take 1 qhs for 3-5 days, then bid for 3-5 days, then tid and stay on this dose (Patient taking differently: Take 300 mg by mouth 3 (Three) Times a Day. Take 1 qhs for 3-5 days, then bid for 3-5 days, then tid and stay on this dose)   • levothyroxine (SYNTHROID, LEVOTHROID) 125 MCG tablet Take 1 tablet by mouth daily.   • loratadine (CLARITIN) 10 MG tablet Take 10 mg by mouth Daily.   • metFORMIN (GLUCOPHAGE) 500 MG tablet Take 1 tablet by mouth 2 (Two) Times a Day With Meals.   • Multiple Vitamins-Minerals (CENTRUM WOMEN) tablet Take 1 tablet by mouth Daily. HOLD PRIOR TO SURGERY   • oxybutynin XL (DITROPAN-XL) 10 MG  24 hr tablet Take 10 mg by mouth Daily.   • potassium chloride (K-DUR,KLOR-CON) 20 MEQ CR tablet Take 20 mEq by mouth As Needed (only when takin Lasix).   • pravastatin (PRAVACHOL) 40 MG tablet Take 1 tablet by mouth daily.   • rOPINIRole (REQUIP) 0.5 MG tablet Take 1 tablet by mouth every night at bedtime.   • tiZANidine (ZANAFLEX) 4 MG tablet Take 4 mg by mouth At Night As Needed for Muscle Spasms.   • uribel (URO-MP) 118 MG capsule capsule Take 1 capsule by mouth 3 (Three) Times a Day.     No current facility-administered medications on file prior to visit.      Current Medications:  Scheduled Meds:  Continuous Infusions:  No current facility-administered medications for this visit.   PRN Meds:.    I have reviewed the patient's medical history in detail and updated the computerized patient record.  Review and summarization of old records include:    Past Medical History:   Diagnosis Date   • Anemia     intermittently   • Anxiety and depression    • Arthritis    • Depression    • Diabetes mellitus (CMS/HCC)    • Gallstones    • High cholesterol    • History of frequent urinary tract infections     HX OF YEAST IN BLADDER HAS PRN DIFLUCAN   • History of transfusion 05/24/2017    Had 2 iron infusions.  This was when i had knee replacement   • Hyperlipidemia    • Hypertension    • Hypothyroidism    • Knee pain, bilateral    • Low back pain    • Lumbar stenosis    • PONV (postoperative nausea and vomiting)    • Positive TB test     chest x-ray neg   • Seasonal allergies         Past Surgical History:   Procedure Laterality Date   • APPENDECTOMY N/A 1982    Dr. Wilson   • DILATATION AND CURETTAGE N/A    • KNEE ARTHROSCOPY W/ MENISCAL REPAIR Left    • OVARIAN CYST REMOVAL Left 1982    Dr. Wilson        Social History     Occupational History   • Occupation: RN   Tobacco Use   • Smoking status: Never Smoker   • Smokeless tobacco: Never Used   • Tobacco comment: Never smoked   Substance and Sexual Activity   • Alcohol  use: Yes     Alcohol/week: 1.8 oz     Types: 2 Standard drinks or equivalent, 1 Glasses of wine per week   • Drug use: No   • Sexual activity: Not Currently     Partners: Male     Birth control/protection: IUD     Comment: Currently have inflamed bladder and some bloody urine    Social History     Social History Narrative   • Not on file        Family History   Problem Relation Age of Onset   • Hepatitis Mother    • Breast cancer Mother    • Heart disease Mother    • Cancer Mother    • Hyperlipidemia Mother             • Heart failure Father    • Stroke Father    • Hypertension Father         Since he was 72, now 86s   • Diabetes Father    • Heart disease Father    • Hyperlipidemia Father         Unsurs   • Heart disease Maternal Grandmother    • Cancer Maternal Grandfather    • COPD Maternal Grandfather    • Arthritis Paternal Grandmother    • Diabetes Maternal Aunt    • Diabetes Paternal Uncle    • Malig Hyperthermia Neg Hx        ROS: 14 point review of systems was performed and was negative except for documented findings in HPI and today's encounter.     Allergies:   Allergies   Allergen Reactions   • Sulfa Antibiotics Hives and Rash     Constitutional:  Denies fever, shaking or chills   Eyes:  Denies change in visual acuity   HENT:  Denies nasal congestion or sore throat   Respiratory:  Denies cough or shortness of breath   Cardiovascular:  Denies chest pain or severe LE edema   GI:  Denies abdominal pain, nausea, vomiting, bloody stools or diarrhea   Musculoskeletal:  Numbness, tingling, or loss of motor function only as noted above in history of present illness.  : Denies painful urination or hematuria  Integument:  Denies rash, lesion or ulceration   Neurologic:  Denies headache or focal weakness  Endocrine:  Denies lymphadenopathy  Psych:  Denies confusion or change in mental status   Hem:  Denies active bleeding    Physical Exam:  Wt Readings from Last 3 Encounters:   18 132 kg (291 lb  "6.4 oz)   10/31/18 132 kg (291 lb)   10/24/18 133 kg (293 lb)     Ht Readings from Last 3 Encounters:   12/13/18 165.1 cm (65\")   10/31/18 165.1 cm (65\")   10/24/18 165.1 cm (65\")     Body mass index is 48.49 kg/m².  Facility age limit for growth percentiles is 20 years.  Vitals:    12/13/18 0809   Temp: 98.7 °F (37.1 °C)     Vital Signs:  reviewed  Constitutional: Awake alert and oriented x3, well developed, no acute distress, non-toxic appearance.  EYES: symmetric, sclera clear  ENT:  Normocephalic, Atraumatic.   Respiratory:  No respiratory distress, No wheezing  CV: pulse regular, no palpitations or pallor.  GI:  Abdomen soft, non-tender.   Vascular:  Intact distal pulses, No cyanosis, no signs or symptoms of DVT.  Neurologic: Sensation grossly intact to the involved extremity, No focal deficits noted.   Neck: No tenderness, Supple.  Integument: warm, dry, no ulcerations.   Psychiatric:  Oriented, no pathological affect.  Musculoskeletal:    Affected knee(s):  Painful gait with a subtle limp, positive for synovitis, swelling, joint effusion with crepitation.  Lachman negative  Posterior drawer negative  Luz Marina's negative  Patellofemoral grind +  Sensation grossly intact to light touch throughout the lower extremity  Skin is intact  Distal pulses are palpable  No signs or symptoms of DVT        Diagnostic Data:     Imaging was done today, images were personally viewed and discussed with the patient:    Indication: pain related symptoms,  Views: 3V AP, LAT & 40 degree PA left knee(s)   Findings: severe end-stage arthritis (bone on bone, subchondral sclerosis/cysts, osteophytes)  Comparison views: viewed last xray done in the office.     Procedure:  Large Joint Arthrocentesis: L knee  Date/Time: 12/13/2018 8:11 AM  Consent given by: patient  Site marked: site marked  Timeout: Immediately prior to procedure a time out was called to verify the correct patient, procedure, equipment, support staff and site/side " marked as required   Supporting Documentation  Indications: pain and joint swelling   Procedure Details  Location: knee - L knee  Preparation: Patient was prepped and draped in the usual sterile fashion  Needle size: 22 G  Approach: anterolateral  Medications administered: 4 mL lidocaine PF 1% 1 %; 80 mg methylPREDNISolone acetate 80 MG/ML  Patient tolerance: patient tolerated the procedure well with no immediate complications          Assessment:     ICD-10-CM ICD-9-CM   1. Arthritis of left knee M17.12 716.96           Plan: Is to proceed with injection  Follow up as indicated.  Ice, elevate, and rest as needed.  Additional interventions include:  15 min spent face to face with patient 9 min spent counseling about natural history and expected course of assessed complaint and reviewed treatment options that have been tried and not tried and those currently available. Questions answered.    Natural history and expected course of this patient's diagnosis discussed along with evaluation of therapies. Questions answered.  Advice on benefits of, and types of regular/moderate exercise including biomechanical forces involved as it pertains to this complaint, with a goal of 5 min at least 7 times a week.    Address and update of wt loss, physical exercises, use of assistive devices, and monitoring of Medications per orders to address ortho complaints; Evaluation and discussion of safety, precautions, side effects, and warnings given especially of long term NSAID therapy.  Lifestyle measures for weight loss, suggest starting at 10lbs, with biomechanical explanations for weight loss and how this affects orthopedic condition.  Cortisone Injection. See procedure note.  Cryotherapy/brachy therapy as indicated with instructions.   Advised on strengthening exercises, stressed importance of these with progression of arthritis, demonstrated exercises to patient with repeat demonstration and verb of understanding.   Do Daily THIGH  exercises sitting up straight in a supportive chair, lean forward at the hips keeping the back as straight as possible, and in that forward leaning position, lift the thigh very slowly up and down.  Give assist with your hand under the knee to lift as needed. 15x on each thigh once a day, every day.   Just had her gallbladder out and is having back issues and has not been able to do knee exercises. Enc to do daily along with therapy for her back.  She is working on wt loss as well so that she can get her BMI below 40 and have her left knee done.   12/13/2018  NOEMI

## 2019-01-02 RX ORDER — ONDANSETRON 4 MG/1
TABLET, FILM COATED ORAL
Qty: 20 TABLET | Refills: 0 | Status: SHIPPED | OUTPATIENT
Start: 2019-01-02 | End: 2020-05-19

## 2019-01-04 ENCOUNTER — TELEPHONE (OUTPATIENT)
Dept: NEUROSURGERY | Facility: CLINIC | Age: 59
End: 2019-01-04

## 2019-01-04 NOTE — TELEPHONE ENCOUNTER
Pt has an appt with [facility] on 1/10/19 for LBP with no new img.    Left detailed phone message for pt to call us back.    Pt needs to reschedule appt.

## 2019-01-07 ENCOUNTER — ANESTHESIA (OUTPATIENT)
Dept: GASTROENTEROLOGY | Facility: HOSPITAL | Age: 59
End: 2019-01-07

## 2019-01-07 ENCOUNTER — ANESTHESIA EVENT (OUTPATIENT)
Dept: GASTROENTEROLOGY | Facility: HOSPITAL | Age: 59
End: 2019-01-07

## 2019-02-04 RX ORDER — GABAPENTIN 300 MG/1
CAPSULE ORAL
Qty: 120 CAPSULE | Refills: 2 | OUTPATIENT
Start: 2019-02-04

## 2019-02-04 NOTE — TELEPHONE ENCOUNTER
Please ask FÁTIMA how he feels about continuing to manage these meds. She has not been in since October. She NS then rescheduled for March.

## 2019-02-04 NOTE — TELEPHONE ENCOUNTER
Per [facility], PCP should manage these rx.  Has not seen since October. Spoke with patient, informed PCP needs to manage. Voiced understanding.

## 2019-03-06 ENCOUNTER — OFFICE VISIT (OUTPATIENT)
Dept: NEUROSURGERY | Facility: CLINIC | Age: 59
End: 2019-03-06

## 2019-03-06 VITALS
HEART RATE: 84 BPM | WEIGHT: 291 LBS | SYSTOLIC BLOOD PRESSURE: 124 MMHG | DIASTOLIC BLOOD PRESSURE: 72 MMHG | HEIGHT: 65 IN | BODY MASS INDEX: 48.48 KG/M2 | RESPIRATION RATE: 16 BRPM

## 2019-03-06 DIAGNOSIS — M54.41 CHRONIC BILATERAL LOW BACK PAIN WITH RIGHT-SIDED SCIATICA: Primary | ICD-10-CM

## 2019-03-06 DIAGNOSIS — G89.29 CHRONIC BILATERAL LOW BACK PAIN WITH RIGHT-SIDED SCIATICA: Primary | ICD-10-CM

## 2019-03-06 PROCEDURE — 99213 OFFICE O/P EST LOW 20 MIN: CPT | Performed by: NEUROLOGICAL SURGERY

## 2019-03-06 NOTE — PROGRESS NOTES
Subjective   Patient ID: Tiana Hudson is a 58 y.o. female is here today for follow-up for lumbar pain. Pt is accompanied by her daughter.    History of Present Illness 59 yo followed for her back issues.  She has a grade 1 spondy at L45 and some significant L5S1 ddd.  She is taking diclofenac and gabapentin.  She has some back pain and some leg numbness.  She takes zanaflex daily for her back.  Since her cholecystectomy she has had some diarrhea which explained was common after this intervention.  She reports her pain is 2-3 most of the time in terms of pain.  At work her pain does increase as bad as an 8/10.  She works 12 hour shifts.  She feels worse after working [employer].      The following portions of the patient's history were reviewed and updated as appropriate: allergies, current medications, past family history, past medical history, past social history, past surgical history and problem list.    Review of Systems   Musculoskeletal: Positive for back pain (Right leg pain).   Neurological: Positive for numbness (R leg on occasion). Negative for weakness.   Psychiatric/Behavioral: Negative for sleep disturbance.       Objective   Physical Exam   Neurological: Gait normal.     Neurologic Exam     Sensory Exam   Right leg light touch: normal  Left leg light touch: normal  Right leg pinprick: normal  Left leg pinprick: normal    Gait, Coordination, and Reflexes     Gait  Gait: normal      Right iliopsoas: 5/5  Left iliopsoas: 5/5  Right quadriceps: 5/5  Left quadriceps: 5/5  Right hamstrin/5  Left hamstrin/5  Right anterior tibial: 5/5  Left anterior tibial: 5/5  Right gastroc: 5/5  Left gastroc: 5/5  ehl 5/5       Assessment/Plan   Independent Review of Radiographic Studies:  I reviewed her MRI and is as above.      Medical Decision Making:  She has not performed PT at this point.  We will refer her.  No JACKIE's and these may be indicated at some point.  We discussed her weight.  We discussed  considering bariatric intervention.  Her legs are better and her numbness improved.  She is not ready to consider surgery.  I refilled her diclofenac today.      Tiana was seen today for back pain.    Diagnoses and all orders for this visit:    Chronic bilateral low back pain with right-sided sciatica  -     Ambulatory Referral to Physical Therapy Evaluate and treat; Heat; Stretching, ROM, Strengthening; Full weight bearing    Other orders  -     diclofenac (VOLTAREN) 50 MG EC tablet; Take 1 tablet by mouth 2 (Two) Times a Day As Needed (pain).      No Follow-up on file.

## 2019-03-25 ENCOUNTER — CLINICAL SUPPORT (OUTPATIENT)
Dept: ORTHOPEDIC SURGERY | Facility: CLINIC | Age: 59
End: 2019-03-25

## 2019-03-25 VITALS — BODY MASS INDEX: 50.02 KG/M2 | WEIGHT: 293 LBS | HEIGHT: 64 IN

## 2019-03-25 DIAGNOSIS — M17.12 ARTHRITIS OF LEFT KNEE: Primary | ICD-10-CM

## 2019-03-25 PROCEDURE — 99212 OFFICE O/P EST SF 10 MIN: CPT | Performed by: NURSE PRACTITIONER

## 2019-03-25 PROCEDURE — 20610 DRAIN/INJ JOINT/BURSA W/O US: CPT | Performed by: NURSE PRACTITIONER

## 2019-03-25 RX ORDER — METHYLPREDNISOLONE ACETATE 80 MG/ML
160 INJECTION, SUSPENSION INTRA-ARTICULAR; INTRALESIONAL; INTRAMUSCULAR; SOFT TISSUE
Status: COMPLETED | OUTPATIENT
Start: 2019-03-25 | End: 2019-03-25

## 2019-03-25 RX ADMIN — METHYLPREDNISOLONE ACETATE 160 MG: 80 INJECTION, SUSPENSION INTRA-ARTICULAR; INTRALESIONAL; INTRAMUSCULAR; SOFT TISSUE at 09:42

## 2019-03-25 NOTE — PROGRESS NOTES
Patient: Tiana Hudson  YOB: 1960    Chief Complaints:  left knee pain    Subjective:    History of Present Illness: Here today for knee pain. Sp right tka doing well, left knee is getting worse, back is also an issue and sees Dr. Gil for this is going to start PT for her back and is doing knee exercises as well. The pain is a generalized joint tenderness.  It has been progressive in nature but remains intermittent.  Worsened by prolonged standing or walking and squatting activities. Has had improvement in the past with ice/heat, rest, and injections.     Allergies:   Allergies   Allergen Reactions   • Sulfa Antibiotics Hives and Rash       Medications:   Home Medications:  Current Outpatient Medications on File Prior to Visit   Medication Sig   • Dulaglutide 1.5 MG/0.5ML solution pen-injector Inject  under the skin into the appropriate area as directed 1 (One) Time Per Week. SUNDAY   • escitalopram (LEXAPRO) 10 MG tablet Take 10 mg by mouth Daily.   • famotidine (PEPCID) 20 MG tablet Take 20 mg by mouth 2 (Two) Times a Day As Needed.   • furosemide (LASIX) 20 MG tablet Take 20 mg by mouth Daily As Needed.   • gabapentin (NEURONTIN) 300 MG capsule Take 1 qhs for 3-5 days, then bid for 3-5 days, then tid and stay on this dose (Patient taking differently: Take 300 mg by mouth 3 (Three) Times a Day. Take 1 qhs for 3-5 days, then bid for 3-5 days, then tid and stay on this dose)   • levothyroxine (SYNTHROID, LEVOTHROID) 125 MCG tablet Take 1 tablet by mouth daily.   • loratadine (CLARITIN) 10 MG tablet Take 10 mg by mouth Daily.   • metFORMIN (GLUCOPHAGE) 500 MG tablet Take 1 tablet by mouth 2 (Two) Times a Day With Meals.   • Multiple Vitamins-Minerals (CENTRUM WOMEN) tablet Take 1 tablet by mouth Daily. HOLD PRIOR TO SURGERY   • ondansetron (ZOFRAN) 4 MG tablet TAKE 1 TABLET BY MOUTH EVERY 8 HOURS AS NEEDED   • oxybutynin XL (DITROPAN-XL) 10 MG 24 hr tablet Take 10 mg by mouth Daily.   •  potassium chloride (K-DUR,KLOR-CON) 20 MEQ CR tablet Take 20 mEq by mouth As Needed (only when takin Lasix).   • pravastatin (PRAVACHOL) 40 MG tablet Take 1 tablet by mouth daily.   • rOPINIRole (REQUIP) 0.5 MG tablet Take 1 tablet by mouth every night at bedtime.   • tiZANidine (ZANAFLEX) 4 MG tablet Take 4 mg by mouth At Night As Needed for Muscle Spasms.   • amLODIPine-benazepril (LOTREL 5-20) 5-20 MG per capsule Take 1 capsule by mouth daily. (Patient taking differently: Take 1 capsule by mouth Every Morning.)   • diclofenac (VOLTAREN) 50 MG EC tablet Take 1 tablet by mouth 2 (Two) Times a Day As Needed (pain).   • uribel (URO-MP) 118 MG capsule capsule Take 1 capsule by mouth 3 (Three) Times a Day.     No current facility-administered medications on file prior to visit.      Current Medications:  Scheduled Meds:  Continuous Infusions:  No current facility-administered medications for this visit.   PRN Meds:.    I have reviewed the patient's medical history in detail and updated the computerized patient record.  Review and summarization of old records include:    Past Medical History:   Diagnosis Date   • Anemia     intermittently   • Anxiety and depression    • Arthritis    • Depression    • Diabetes mellitus (CMS/HCC)    • Gallstones    • High cholesterol    • History of frequent urinary tract infections     HX OF YEAST IN BLADDER HAS PRN DIFLUCAN   • History of transfusion 05/24/2017    Had 2 iron infusions.  This was when i had knee replacement   • Hyperlipidemia    • Hypertension    • Hypothyroidism    • Knee pain, bilateral    • Low back pain    • Lumbar stenosis    • PONV (postoperative nausea and vomiting)    • Positive TB test     chest x-ray neg   • Seasonal allergies         Past Surgical History:   Procedure Laterality Date   • APPENDECTOMY N/A 1982    Dr. Wilson   • CHOLECYSTECTOMY WITH INTRAOPERATIVE CHOLANGIOGRAM N/A 10/31/2018    Procedure: laparoscopic cholecystectomy;  Surgeon: Mary Kay Mac  MD;  Location: [facility];  Service: General   • CYSTOSCOPY BLADDER BIOPSY N/A 10/3/2016    Procedure: CYSTOSCOPY BLADDER BIOPSY;  Surgeon: Rei Calvert MD;  Location: [facility];  Service:    • DILATATION AND CURETTAGE N/A    • KNEE ARTHROSCOPY W/ MENISCAL REPAIR Left    • OVARIAN CYST REMOVAL Left     Dr. Wilson   • TOTAL KNEE ARTHROPLASTY Right 2017    Procedure: RIGHT TOTAL KNEE ARTHROPLASTY WITH ALETA NAVIGATION;  Surgeon: Ross Cruz MD;  Location: [facility];  Service:         Social History     Occupational History   • Occupation: RN     Comment: part time   Tobacco Use   • Smoking status: Never Smoker   • Smokeless tobacco: Never Used   • Tobacco comment: Never smoked   Substance and Sexual Activity   • Alcohol use: Yes     Alcohol/week: 1.8 oz     Types: 2 Standard drinks or equivalent, 1 Glasses of wine per week   • Drug use: No   • Sexual activity: Not Currently     Partners: Male     Birth control/protection: IUD     Comment: Currently have inflamed bladder and some bloody urine      Social History     Social History Narrative   • Not on file        Family History   Problem Relation Age of Onset   • Hepatitis Mother    • Breast cancer Mother    • Heart disease Mother    • Cancer Mother    • Hyperlipidemia Mother             • Heart failure Father    • Stroke Father    • Hypertension Father         Since he was 72, now 86s   • Diabetes Father    • Heart disease Father    • Hyperlipidemia Father         Unsurs   • Heart disease Maternal Grandmother    • Cancer Maternal Grandfather    • COPD Maternal Grandfather    • Arthritis Paternal Grandmother    • Diabetes Maternal Aunt    • Diabetes Paternal Uncle    • Malig Hyperthermia Neg Hx        ROS: 14 point review of systems was performed and was negative except for documented findings in HPI and today's encounter.     Allergies:   Allergies   Allergen Reactions   • Sulfa Antibiotics Hives and Rash  "    Constitutional:  Denies fever, shaking or chills   Eyes:  Denies change in visual acuity   HENT:  Denies nasal congestion or sore throat   Respiratory:  Denies cough or shortness of breath   Cardiovascular:  Denies chest pain or severe LE edema   GI:  Denies abdominal pain, nausea, vomiting, bloody stools or diarrhea   Musculoskeletal:  Numbness, tingling, or loss of motor function only as noted above in history of present illness.  : Denies painful urination or hematuria  Integument:  Denies rash, lesion or ulceration   Neurologic:  Denies headache or focal weakness  Endocrine:  Denies lymphadenopathy  Psych:  Denies confusion or change in mental status   Hem:  Denies active bleeding    Physical Exam:  Wt Readings from Last 3 Encounters:   03/25/19 133 kg (294 lb)   03/06/19 132 kg (291 lb)   12/13/18 132 kg (291 lb 6.4 oz)     Ht Readings from Last 3 Encounters:   03/25/19 163 cm (64.17\")   03/06/19 165.1 cm (65\")   12/13/18 165.1 cm (65\")     Body mass index is 50.19 kg/m².  Facility age limit for growth percentiles is 20 years.  There were no vitals filed for this visit.  Vital Signs:  reviewed  Constitutional: Awake alert and oriented x3, well developed, no acute distress, non-toxic appearance.  EYES: symmetric, sclera clear  ENT:  Normocephalic, Atraumatic.   Respiratory:  No respiratory distress, No wheezing  CV: pulse regular, no palpitations or pallor.  GI:  Abdomen soft, non-tender.   Vascular:  Intact distal pulses, No cyanosis, no signs or symptoms of DVT.  Neurologic: Sensation grossly intact to the involved extremity, No focal deficits noted.   Neck: No tenderness, Supple.  Integument: warm, dry, no ulcerations.   Psychiatric:  Oriented, no pathological affect.  Musculoskeletal:    Affected knee(s):  Painful gait with a subtle limp, positive for synovitis, swelling, joint effusion with crepitation.  Lachman negative  Posterior drawer negative  Luz Marina's negative  Patellofemoral grind " +  Sensation grossly intact to light touch throughout the lower extremity  Skin is intact  Distal pulses are palpable  No signs or symptoms of DVT        Diagnostic Data:     Imaging was done previously in the office, images were personally viewed and discussed with the patient:    Indication: pain related symptoms,  Views: 3V AP, LAT & 40 degree PA left knee(s)   Findings: severe end-stage arthritis (bone on bone, subchondral sclerosis/cysts, osteophytes), S/P right  Total Knee Replacement in good position and alignment  Comparison views: viewed last xray done in the office.     Procedure:  Large Joint Arthrocentesis: L knee  Date/Time: 3/25/2019 9:42 AM  Consent given by: patient  Site marked: site marked  Timeout: Immediately prior to procedure a time out was called to verify the correct patient, procedure, equipment, support staff and site/side marked as required   Supporting Documentation  Indications: pain and joint swelling   Procedure Details  Location: knee - L knee  Preparation: Patient was prepped and draped in the usual sterile fashion  Needle size: 22 G  Approach: anterolateral  Medications administered: 4 mL lidocaine (cardiac) 20 MG/ML; 160 mg methylPREDNISolone acetate 80 MG/ML  Patient tolerance: patient tolerated the procedure well with no immediate complications          Assessment:     ICD-10-CM ICD-9-CM   1. Arthritis of left knee M17.12 716.96           Plan: Is to proceed with injection  Follow up as indicated.  Ice, elevate, and rest as needed.  Additional interventions include:  14 min spent face to face with patient 9 min spent counseling about:  Biomechanics of pertinent body area discussed.  Risks, benefits, alternatives, comparisons, and complications of accepted medicines, injections, recommendations, surgical procedures, and therapies explained and education provided in laymen's terms. Natural history and expected course of this patient's diagnosis discussed along with evaluation of  therapies. Questions answered. When appropriate I also discussed proper use of cane, walker, trekking poles.   BMI:  The concept of BMI body mass index and its importance and implications discussed.  BMI suggested to be < 40 or as low as possible. Lifestyle measures for weight loss and how this affects orthopedic condition.  EXERCISES:  Advice on benefits of, and types of regular/moderate exercise including biomechanical forces involved as it pertains to this complaint.  RICE: Rest, ice, compression, and elevation therapy, Cryotherapy/brachy therapy, and or OTC linaments as indicated with instructions.   Cortisone Injection. See procedure note.  Sp right tka doing well, left knee is getting worse, back is also an issue and sees Dr. Gil for this is going to start PT for her back and is doing knee exercises as well.   3/25/2019  Mami Montejo, APRN

## 2019-03-26 ENCOUNTER — TREATMENT (OUTPATIENT)
Dept: PHYSICAL THERAPY | Facility: CLINIC | Age: 59
End: 2019-03-26

## 2019-03-26 DIAGNOSIS — G89.29 CHRONIC RIGHT-SIDED LOW BACK PAIN WITH RIGHT-SIDED SCIATICA: Primary | ICD-10-CM

## 2019-03-26 DIAGNOSIS — M54.41 CHRONIC RIGHT-SIDED LOW BACK PAIN WITH RIGHT-SIDED SCIATICA: Primary | ICD-10-CM

## 2019-03-26 PROCEDURE — 97161 PT EVAL LOW COMPLEX 20 MIN: CPT | Performed by: PHYSICAL THERAPIST

## 2019-03-26 PROCEDURE — 97140 MANUAL THERAPY 1/> REGIONS: CPT | Performed by: PHYSICAL THERAPIST

## 2019-03-26 NOTE — PROGRESS NOTES
Physical Therapy Initial Evaluation and Plan of Care      Patient: Tiana Hudson   : 1960  Diagnosis/ICD-10 Code:  Chronic right-sided low back pain with right-sided sciatica [M54.41, G89.29]  Referring practitioner: Joe Gil IV, MD    Subjective Evaluation    History of Present Illness  Mechanism of injury: R TKR 2017 by Anthony CASTILLO knee  L4-5 DDD; anterolisthesis. Worse last year   R LE pain lateral thigh. Intermittent to toes  Gall bladder removed 10/31/2018      Patient Occupation: [employer] part time 29 years Quality of life: good    Pain  Location: L-S R   Quality: sharp and dull ache  Relieving factors: change in position and rest  Aggravating factors: standing, sleeping and prolonged positioning    Social Support  Lives in: multiple-level home  Lives with: spouse    Diagnostic Tests  X-ray: abnormal  MRI studies: abnormal    Treatments  No previous or current treatments  Patient Goals  Patient goals for therapy: decreased pain, increased strength and independence with ADLs/IADLs             Objective       Static Posture     Lumbar Spine   Increased lordosis.     Postural Observations  Seated posture: fair  Standing posture: fair        Palpation     Right   Hypertonic in the piriformis. Tenderness of the piriformis.     Active Range of Motion     Lumbar   Flexion: WFL  Extension: WFL and with pain  Left lateral flexion: WFL  Right lateral flexion: WFL and with pain  Left rotation: WFL  Right rotation: WFL    Strength/Myotome Testing     Left Hip   Planes of Motion   Flexion: 4-  Extension: 4  Abduction: 5  External rotation: 4  Internal rotation: 4+    Right Hip   Planes of Motion   Flexion: 4+  Extension: 4-  Abduction: 5  External rotation: 4  Internal rotation: 4+ (pain)    Left Knee   Flexion: 5  Extension: 5    Right Knee   Flexion: 5  Extension: 5    Left Ankle/Foot   Dorsiflexion: 5    Right Ankle/Foot   Dorsiflexion: 5    Additional Strength Details  Lower  abs 3+/5    Tests     Lumbar     Left   Negative femoral stretch, passive SLR and quadrant.     Right   Positive quadrant.   Negative femoral stretch and passive SLR.          Assessment & Plan     Assessment  Impairments: abnormal or restricted ROM, impaired physical strength, lacks appropriate home exercise program and pain with function  Assessment details: Pt is a good candidate for skilled PT intervention, shawanda manual therapy for spinal joint mobility, alignment, postural restoration and core strengthening to restore functional AROM and strength to return to previous level of ADL's.  Prognosis: good  Prognosis details: Short Term Goals: ( 3 weeks)  1.Pt to be independent with HEP  2. Pt to exhibit improved lumbar extension to 100% without pain to allow for increased ease with ADL's  3. Pt to demonstrate proper lifting technique  4. Pt to improve lower ab  strength to 4-/5 to allow for improved standing/stairclimbing tolerance    Long Term Goals (6 weeks )  1. Pt to demonstrate ability to lift safely without LBP  2. Pt to exhibit > 4/5 lower abdominal strength to allow for more strenuous daily activities  3. Pt to exhibit no LE symptoms with ADL's  4. Pt to score < 15% on Back Index  Functional Limitations: lifting, sleeping, walking, uncomfortable because of pain and standing  Plan  Therapy options: will be seen for skilled physical therapy services  Planned modality interventions: cryotherapy, electrical stimulation/Russian stimulation and thermotherapy (hydrocollator packs)  Planned therapy interventions: abdominal trunk stabilization, balance/weight-bearing training, body mechanics training, flexibility, home exercise program, joint mobilization, manual therapy, neuromuscular re-education, postural training, soft tissue mobilization, spinal/joint mobilization, strengthening, stretching and therapeutic activities  Frequency: 2x week  Duration in weeks: 6  Treatment plan discussed with: patient        Manual  Therapy:    10     mins  11440;  Therapeutic Exercise:    6     mins  87353;     Neuromuscular Fallon:        mins  80589;    Therapeutic Activity:     5     mins  12387;     Gait Training:           mins  43955;     Ultrasound:          mins  25600;    Electrical Stimulation:         mins  34668 ( );  Dry Needling          mins self-pay    Timed Treatment:   21   mins   Total Treatment:     55   mins    PT SIGNATURE: Layne Arevalo, PT   [place] License # [id number]  DATE TREATMENT INITIATED: 3/27/2019    Initial Certification  Certification Period: 6/25/2019  I certify that the therapy services are furnished while this patient is under my care.  The services outlined above are required by this patient, and will be reviewed every 90 days.     PHYSICIAN: Joe Gil IV, MD      DATE:     Please sign and return via fax to [phone].. Thank you, [facility].

## 2019-04-01 ENCOUNTER — TREATMENT (OUTPATIENT)
Dept: PHYSICAL THERAPY | Facility: CLINIC | Age: 59
End: 2019-04-01

## 2019-04-01 DIAGNOSIS — M54.41 CHRONIC RIGHT-SIDED LOW BACK PAIN WITH RIGHT-SIDED SCIATICA: Primary | ICD-10-CM

## 2019-04-01 DIAGNOSIS — G89.29 CHRONIC RIGHT-SIDED LOW BACK PAIN WITH RIGHT-SIDED SCIATICA: Primary | ICD-10-CM

## 2019-04-01 PROCEDURE — 97140 MANUAL THERAPY 1/> REGIONS: CPT | Performed by: PHYSICAL THERAPIST

## 2019-04-01 PROCEDURE — 97110 THERAPEUTIC EXERCISES: CPT | Performed by: PHYSICAL THERAPIST

## 2019-04-01 NOTE — PATIENT INSTRUCTIONS
Access Code: [email or web address]   URL: [email or web address]   Date: 04/01/2019   Prepared by: Taryn Watkins     Exercises   Bent Knee Fallouts - 10 reps - 1 sets - 1x daily   Supine Hip Adduction Isometric with Ball - 10 reps - 1 sets - 5 hold - 1x daily   Hooklying Small March - 10 reps - 1 sets - 2x daily

## 2019-04-03 NOTE — PROGRESS NOTES
Physical Therapy Daily Progress Note    Visit # : 3  Tiana Hudson reports: my hip is feeling a bit sore today; I could tell it was going to rain    Subjective     Objective   See Exercise, Manual, and Modality Logs for complete treatment.       Assessment/Plan   Good tolerance to alternative stretch and functional core stability  Progression.  Added hip flex/ext to active release self mob.   Progress strengthening /stabilization /functional activity         Manual Therapy:            10     mins  44454;  Therapeutic Exercise:    15     mins  31772;     Neuromuscular Fallon:        mins  48025;    Therapeutic Activity:     5      mins  09692;     Gait Training:                      mins  57316;     Ultrasound:                          mins  46099;    Electrical Stimulation:         mins  72933 ( );  Dry Needling                       mins self-pay     Timed Treatment:   30   mins   Total Treatment:     45   mins    Ruth Watkins PT  Physical Therapist  [place] License # [id number]

## 2019-04-05 ENCOUNTER — TREATMENT (OUTPATIENT)
Dept: PHYSICAL THERAPY | Facility: CLINIC | Age: 59
End: 2019-04-05

## 2019-04-05 DIAGNOSIS — M54.41 CHRONIC RIGHT-SIDED LOW BACK PAIN WITH RIGHT-SIDED SCIATICA: Primary | ICD-10-CM

## 2019-04-05 DIAGNOSIS — G89.29 CHRONIC RIGHT-SIDED LOW BACK PAIN WITH RIGHT-SIDED SCIATICA: Primary | ICD-10-CM

## 2019-04-05 PROCEDURE — 97110 THERAPEUTIC EXERCISES: CPT | Performed by: PHYSICAL THERAPIST

## 2019-04-05 PROCEDURE — 97140 MANUAL THERAPY 1/> REGIONS: CPT | Performed by: PHYSICAL THERAPIST

## 2019-04-05 NOTE — PATIENT INSTRUCTIONS
Access Code: [email or web address]   URL: [email or web address]   Date: 04/05/2019   Prepared by: Taryn Adam Figure 4 Piriformis Stretch - 3 reps - 1 sets - 20 hold - 1x daily   Shoulder Extension with Resistance - 10 reps - 2 sets - 5 hold - 1x daily

## 2019-04-09 ENCOUNTER — OFFICE VISIT (OUTPATIENT)
Dept: PHYSICAL THERAPY | Facility: CLINIC | Age: 59
End: 2019-04-09

## 2019-04-09 DIAGNOSIS — G89.29 CHRONIC RIGHT-SIDED LOW BACK PAIN WITH RIGHT-SIDED SCIATICA: Primary | ICD-10-CM

## 2019-04-09 DIAGNOSIS — M54.41 CHRONIC RIGHT-SIDED LOW BACK PAIN WITH RIGHT-SIDED SCIATICA: Primary | ICD-10-CM

## 2019-04-09 PROCEDURE — 97530 THERAPEUTIC ACTIVITIES: CPT | Performed by: PHYSICAL THERAPIST

## 2019-04-09 PROCEDURE — 97110 THERAPEUTIC EXERCISES: CPT | Performed by: PHYSICAL THERAPIST

## 2019-04-09 PROCEDURE — G0283 ELEC STIM OTHER THAN WOUND: HCPCS | Performed by: PHYSICAL THERAPIST

## 2019-04-09 NOTE — PROGRESS NOTES
Physical Therapy Daily Progress Note        Tiana Hudson reports: soreness in right hip and low back.  Can tell that the rain affects hip/LB.    Subjective 3/10 pre PT and 5/10 post PT(exercises) and 3/10 post modality application    Objective   See Exercise, Manual, and Modality Logs for complete treatment.   Added: sktc, LTR, hamstring stretch with foot pump B and bridges with ppt  -painfree exercise performance/progression  -pillow use with sleeping positions; log rolling  -alternate exercise positions for stretching while at work.      Assessment/Plan  Able to progress/perform exercises without increased symptoms/discomfort right LB/hip.  Benefits from verbal/tactile cues to ensure proper technique and performance. Reported pain relief with modality application.    Progress strengthening /stabilization /functional activity           Manual Therapy:   5     mins  53488;  Therapeutic Exercise:    23     mins  08755;     Neuromuscular Fallon:        mins  48820;    Therapeutic Activity:     12     mins  80322;     Gait Training:           mins  27950;     Ultrasound:          mins  07218;    Electrical Stimulation:  15       mins  82053 ( );      Timed Treatment: 40     mins   Total Treatment:    55    mins    Deshawn Parisi PTA    Physical Therapist Assistant [place] [id number]

## 2019-04-10 ENCOUNTER — OFFICE VISIT (OUTPATIENT)
Dept: PHYSICAL THERAPY | Facility: CLINIC | Age: 59
End: 2019-04-10

## 2019-04-10 DIAGNOSIS — G89.29 CHRONIC RIGHT-SIDED LOW BACK PAIN WITH RIGHT-SIDED SCIATICA: Primary | ICD-10-CM

## 2019-04-10 DIAGNOSIS — M54.41 CHRONIC RIGHT-SIDED LOW BACK PAIN WITH RIGHT-SIDED SCIATICA: Primary | ICD-10-CM

## 2019-04-10 PROCEDURE — 97140 MANUAL THERAPY 1/> REGIONS: CPT | Performed by: PHYSICAL THERAPIST

## 2019-04-10 PROCEDURE — 97110 THERAPEUTIC EXERCISES: CPT | Performed by: PHYSICAL THERAPIST

## 2019-04-11 NOTE — PROGRESS NOTES
"Physical Therapy Daily Progress Note        Tiana Layagher reports: right hip/LB felt better after last treatment but \" but it began to bother me last night while grocery shopping\".      Subjective     Objective   -tenderness/tightness right piriformis  -increased abnormal mm tone/tightness right lumbar pvm.    See Exercise, Manual, and Modality Logs for complete treatment.   -hold bridges secondary to increased lumbar complaints and core weakness.  -Added hip abd/er with green t band with TA contraction.    Assessment/Plan  Positive response to manual therapy techniques/stretches.  Remains symptomatic in/along R piriformis and post thigh but able to decrease with exercise performance.  Core strength decreased, held bridges due to lumbar discomfort reports.    Progress per Plan of Care toward all goals as appropriate.           Manual Therapy:   12      mins  69960;  Therapeutic Exercise:   25      mins  60408;     Neuromuscular Fallon:        mins  95549;    Therapeutic Activity:    5      mins  06252;     Gait Training:           mins  26917;     Ultrasound:          mins  95942;    Electrical Stimulation:         mins  07141 ( );      Timed Treatment: 42     mins   Total Treatment:   57     mins    Deshawn Parisi PTA    Physical Therapist Assistant [place] [address]    "

## 2019-04-15 ENCOUNTER — TREATMENT (OUTPATIENT)
Dept: PHYSICAL THERAPY | Facility: CLINIC | Age: 59
End: 2019-04-15

## 2019-04-15 DIAGNOSIS — G89.29 CHRONIC RIGHT-SIDED LOW BACK PAIN WITH RIGHT-SIDED SCIATICA: Primary | ICD-10-CM

## 2019-04-15 DIAGNOSIS — M54.41 CHRONIC RIGHT-SIDED LOW BACK PAIN WITH RIGHT-SIDED SCIATICA: Primary | ICD-10-CM

## 2019-04-15 PROCEDURE — 97140 MANUAL THERAPY 1/> REGIONS: CPT | Performed by: PHYSICAL THERAPIST

## 2019-04-15 PROCEDURE — 97110 THERAPEUTIC EXERCISES: CPT | Performed by: PHYSICAL THERAPIST

## 2019-04-15 NOTE — PROGRESS NOTES
Physical Therapy Daily Progress Note        Subjective     Tiana Hudson reports: Still having R buttock and back pain. SLightly better. Bridges are painful    Objective   SLR R (+) buttock and back pain at 60 degrees  See Exercise, Manual, and Modality Logs for complete treatment.       Assessment/Plan  Improved SLR to 90 without pain following SLR with distraction x 5. SOre after manual STM to piriformis. STM and distraction to lumbar felt good.Need to continue to work on core strength.    Progress per Plan of Care           Manual Therapy:  15       mins  95238;  Therapeutic Exercise: 30      mins  59445;     Neuromuscular Fallon:       mins  27313;    Therapeutic Activity:         mins  37651;     Gait Training:         mins  45007;     Ultrasound:         mins  03832;    Electrical Stimulation:        mins  35075 ( );  Dry Needling         mins self-pay    Timed Treatment:   45   mins   Total Treatment:     55   mins    Layne Arevalo, PT  Physical Therapist  [place] License # [id number]

## 2019-04-18 ENCOUNTER — TREATMENT (OUTPATIENT)
Dept: PHYSICAL THERAPY | Facility: CLINIC | Age: 59
End: 2019-04-18

## 2019-04-18 DIAGNOSIS — G89.29 CHRONIC RIGHT-SIDED LOW BACK PAIN WITH RIGHT-SIDED SCIATICA: Primary | ICD-10-CM

## 2019-04-18 DIAGNOSIS — M54.41 CHRONIC RIGHT-SIDED LOW BACK PAIN WITH RIGHT-SIDED SCIATICA: Primary | ICD-10-CM

## 2019-04-18 PROCEDURE — 97110 THERAPEUTIC EXERCISES: CPT | Performed by: PHYSICAL THERAPIST

## 2019-04-18 PROCEDURE — 97140 MANUAL THERAPY 1/> REGIONS: CPT | Performed by: PHYSICAL THERAPIST

## 2019-04-18 NOTE — PROGRESS NOTES
Physical Therapy Daily Progress Note        Subjective     Tiana Hudson reports: Had to help daughter clean up house. Lots of stooping    Objective   See Exercise, Manual, and Modality Logs for complete treatment.       Assessment/Plan  (-) SLR even without distraction today. Improving core strength and hip flexibility    Progress per Plan of Care           Manual Therapy:  15       mins  58501;  Therapeutic Exercise: 25      mins  99021;     Neuromuscular Fallon:       mins  15625;    Therapeutic Activity:         mins  83324;     Gait Training:         mins  48307;     Ultrasound:         mins  74727;    Electrical Stimulation:        mins  87746 ( );  Dry Needling         mins self-pay    Timed Treatment:   40   mins   Total Treatment:     55   mins    Layne Arevalo, PT  Physical Therapist  [place] License # [id number]

## 2019-04-22 ENCOUNTER — TREATMENT (OUTPATIENT)
Dept: PHYSICAL THERAPY | Facility: CLINIC | Age: 59
End: 2019-04-22

## 2019-04-22 DIAGNOSIS — G89.29 CHRONIC RIGHT-SIDED LOW BACK PAIN WITH RIGHT-SIDED SCIATICA: Primary | ICD-10-CM

## 2019-04-22 DIAGNOSIS — Z96.651 STATUS POST TOTAL RIGHT KNEE REPLACEMENT: ICD-10-CM

## 2019-04-22 DIAGNOSIS — M17.11 ARTHRITIS OF RIGHT KNEE: ICD-10-CM

## 2019-04-22 DIAGNOSIS — M54.41 CHRONIC RIGHT-SIDED LOW BACK PAIN WITH RIGHT-SIDED SCIATICA: Primary | ICD-10-CM

## 2019-04-22 PROCEDURE — 97140 MANUAL THERAPY 1/> REGIONS: CPT | Performed by: PHYSICAL THERAPIST

## 2019-04-22 PROCEDURE — 97110 THERAPEUTIC EXERCISES: CPT | Performed by: PHYSICAL THERAPIST

## 2019-04-22 NOTE — PROGRESS NOTES
Physical Therapy Daily Progress Note        Subjective     Tiana Hudson reports: I felt really good on Saturday at work. BEst I have felt in a long time. No sharp pains in back. A little sore today.    Objective   See Exercise, Manual, and Modality Logs for complete treatment.       Assessment/Plan  Improving core strength. (-) SLR    Progress strengthening /stabilization /functional activity           Manual Therapy:  15       mins  20593;  Therapeutic Exercise: 25      mins  75088;     Neuromuscular Fallon:       mins  39984;    Therapeutic Activity:         mins  24217;     Gait Training:         mins  46091;     Ultrasound:         mins  65524;    Electrical Stimulation:        mins  93546 ( );  Dry Needling         mins self-pay    Timed Treatment:   40   mins   Total Treatment:     50   mins    Layne Arevalo PT  Physical Therapist  [place] License # [id number]

## 2019-04-29 ENCOUNTER — TREATMENT (OUTPATIENT)
Dept: PHYSICAL THERAPY | Facility: CLINIC | Age: 59
End: 2019-04-29

## 2019-04-29 DIAGNOSIS — M54.41 CHRONIC RIGHT-SIDED LOW BACK PAIN WITH RIGHT-SIDED SCIATICA: Primary | ICD-10-CM

## 2019-04-29 DIAGNOSIS — G89.29 CHRONIC RIGHT-SIDED LOW BACK PAIN WITH RIGHT-SIDED SCIATICA: Primary | ICD-10-CM

## 2019-04-29 PROCEDURE — 97110 THERAPEUTIC EXERCISES: CPT | Performed by: PHYSICAL THERAPIST

## 2019-04-29 PROCEDURE — 97140 MANUAL THERAPY 1/> REGIONS: CPT | Performed by: PHYSICAL THERAPIST

## 2019-04-29 NOTE — PROGRESS NOTES
Physical Therapy Daily Progress Note        Subjective     Tiana Hudson reports: Knee and  L back sore. Just did 4 hour recert today. Mild N/T into L foot today. MAybe because I was on my feet a lot?    Objective   SLR (-) L and R  See Exercise, Manual, and Modality Logs for complete treatment.       Assessment/Plan  STill slightly sore in L lumbar after treatment. No radicular symptoms during treatment. Need to continue to work on core. Pt also states she knows losing weight would help    Progress per Plan of Care           Manual Therapy:  15       mins  39984;  Therapeutic Exercise: 25      mins  64645;     Neuromuscular Fallon:6       mins  82879;    Therapeutic Activity:         mins  64762;     Gait Training:         mins  35090;     Ultrasound:         mins  97558;    Electrical Stimulation:        mins  28441 ( );  Dry Needling         mins self-pay    Timed Treatment:   46   mins   Total Treatment:     56   mins    Layne Arevalo, PT  Physical Therapist  [place] License # [id number]

## 2019-05-02 ENCOUNTER — TREATMENT (OUTPATIENT)
Dept: PHYSICAL THERAPY | Facility: CLINIC | Age: 59
End: 2019-05-02

## 2019-05-02 DIAGNOSIS — G89.29 CHRONIC RIGHT-SIDED LOW BACK PAIN WITH RIGHT-SIDED SCIATICA: Primary | ICD-10-CM

## 2019-05-02 DIAGNOSIS — M54.41 CHRONIC RIGHT-SIDED LOW BACK PAIN WITH RIGHT-SIDED SCIATICA: Primary | ICD-10-CM

## 2019-05-02 PROCEDURE — 97110 THERAPEUTIC EXERCISES: CPT | Performed by: PHYSICAL THERAPIST

## 2019-05-02 PROCEDURE — 97140 MANUAL THERAPY 1/> REGIONS: CPT | Performed by: PHYSICAL THERAPIST

## 2019-05-02 NOTE — PROGRESS NOTES
Physical Therapy Daily Progress Note        Subjective     Tiana Hudson reports: Sore both L-S and buttocks today. I still get some numbness into B feet but intermittent. I am overall better but still have good days and bad days    Objective   See Exercise, Manual, and Modality Logs for complete treatment.       Assessment/Plan  ABle to do bridge without pain. If no residual pain tomorrow continue to try to increase reps by 2-3 a day    Progress per Plan of Care           Manual Therapy:  20       mins  27880;  Therapeutic Exercise: 30      mins  74068;     Neuromuscular Fallon:       mins  94413;    Therapeutic Activity:         mins  46336;     Gait Training:         mins  72915;     Ultrasound:         mins  21331;    Electrical Stimulation:        mins  92155 ( );  Dry Needling         mins self-pay    Timed Treatment:   50   mins   Total Treatment:     60   mins    Layne Arevalo PT  Physical Therapist  [place] License # [id number]

## 2019-07-01 ENCOUNTER — CLINICAL SUPPORT (OUTPATIENT)
Dept: ORTHOPEDIC SURGERY | Facility: CLINIC | Age: 59
End: 2019-07-01

## 2019-07-01 VITALS — WEIGHT: 280 LBS | HEIGHT: 65 IN | BODY MASS INDEX: 46.65 KG/M2

## 2019-07-01 DIAGNOSIS — M17.12 ARTHRITIS OF LEFT KNEE: Primary | ICD-10-CM

## 2019-07-01 PROCEDURE — 99213 OFFICE O/P EST LOW 20 MIN: CPT | Performed by: NURSE PRACTITIONER

## 2019-07-01 PROCEDURE — 73562 X-RAY EXAM OF KNEE 3: CPT | Performed by: NURSE PRACTITIONER

## 2019-07-01 PROCEDURE — 20610 DRAIN/INJ JOINT/BURSA W/O US: CPT | Performed by: NURSE PRACTITIONER

## 2019-07-01 RX ORDER — NITROFURANTOIN 25; 75 MG/1; MG/1
CAPSULE ORAL
Refills: 0 | COMMUNITY
Start: 2019-06-21 | End: 2020-05-19

## 2019-07-01 RX ORDER — METHYLPREDNISOLONE ACETATE 80 MG/ML
80 INJECTION, SUSPENSION INTRA-ARTICULAR; INTRALESIONAL; INTRAMUSCULAR; SOFT TISSUE
Status: COMPLETED | OUTPATIENT
Start: 2019-07-01 | End: 2019-07-01

## 2019-07-01 RX ORDER — BENZONATATE 200 MG/1
CAPSULE ORAL
Refills: 0 | COMMUNITY
Start: 2019-06-11 | End: 2020-05-19

## 2019-07-01 RX ORDER — CEFDINIR 300 MG/1
CAPSULE ORAL
Refills: 0 | COMMUNITY
Start: 2019-06-11 | End: 2020-05-28

## 2019-07-01 RX ORDER — CELECOXIB 200 MG/1
CAPSULE ORAL
Refills: 1 | COMMUNITY
Start: 2019-06-04 | End: 2020-05-19

## 2019-07-01 RX ORDER — CIPROFLOXACIN 500 MG/1
TABLET, FILM COATED ORAL
Refills: 0 | COMMUNITY
Start: 2019-06-03 | End: 2020-05-19

## 2019-07-01 RX ORDER — FLUCONAZOLE 150 MG/1
TABLET ORAL
Refills: 0 | COMMUNITY
Start: 2019-06-24 | End: 2020-05-19

## 2019-07-01 RX ADMIN — METHYLPREDNISOLONE ACETATE 80 MG: 80 INJECTION, SUSPENSION INTRA-ARTICULAR; INTRALESIONAL; INTRAMUSCULAR; SOFT TISSUE at 08:30

## 2019-07-01 NOTE — PROGRESS NOTES
Patient: Tiana Hudson  YOB: 1960    Chief Complaints:  left knee pain her right knee TKA is doing.     Subjective:    History of Present Illness: Here today for left knee pain her right knee TKA is doing. The pain is a generalized joint tenderness.  It has been progressive in nature but remains intermittent.  Worsened by prolonged standing or walking and squatting activities. Has had improvement in the past with ice/heat, rest, and injections.     This problem is not new to this examiner.     Allergies:   Allergies   Allergen Reactions   • Sulfa Antibiotics Hives and Rash       Medications:   Home Medications:  Current Outpatient Medications on File Prior to Visit   Medication Sig   • amLODIPine-benazepril (LOTREL 5-20) 5-20 MG per capsule Take 1 capsule by mouth daily. (Patient taking differently: Take 1 capsule by mouth Every Morning.)   • benzonatate (TESSALON) 200 MG capsule 1 ORALLY 3 TIMES A DAY AS NEEDED FOR COUGH. TAKE DURING THE DAY   • cefdinir (OMNICEF) 300 MG capsule 1 TABLET ORALLY TWICE DAILY FOR 10 DAYS   • celecoxib (CeleBREX) 200 MG capsule TAKE ONE CAPSULE BY MOUTH TWO TIMES DAILY WITH FOOD.   • ciprofloxacin (CIPRO) 500 MG tablet 1 TABLET ORALLY TWICE A DAY FOR 10 DAYS   • diclofenac (VOLTAREN) 50 MG EC tablet Take 1 tablet by mouth 2 (Two) Times a Day As Needed (pain).   • Dulaglutide 1.5 MG/0.5ML solution pen-injector Inject  under the skin into the appropriate area as directed 1 (One) Time Per Week. SUNDAY   • escitalopram (LEXAPRO) 10 MG tablet Take 10 mg by mouth Daily.   • famotidine (PEPCID) 20 MG tablet Take 20 mg by mouth 2 (Two) Times a Day As Needed.   • fluconazole (DIFLUCAN) 150 MG tablet TAKE 1 TABLET NOW AND 1 TABLET 7 DAYS AFTER FIRST TABLET   • furosemide (LASIX) 20 MG tablet Take 20 mg by mouth Daily As Needed.   • gabapentin (NEURONTIN) 300 MG capsule Take 1 qhs for 3-5 days, then bid for 3-5 days, then tid and stay on this dose (Patient taking  differently: Take 300 mg by mouth 3 (Three) Times a Day. Take 1 qhs for 3-5 days, then bid for 3-5 days, then tid and stay on this dose)   • HYDROcod Polst-CPM Polst ER (TUSSIONEX PENNKINETIC) 10-8 MG/5ML ER suspension TAKE 5 MLS BY MOUTH EVERY 12 HOURS AS NEEDED FOR COUGH   • levothyroxine (SYNTHROID, LEVOTHROID) 125 MCG tablet Take 1 tablet by mouth daily.   • loratadine (CLARITIN) 10 MG tablet Take 10 mg by mouth Daily.   • metFORMIN (GLUCOPHAGE) 500 MG tablet Take 1 tablet by mouth 2 (Two) Times a Day With Meals.   • Multiple Vitamins-Minerals (CENTRUM WOMEN) tablet Take 1 tablet by mouth Daily. HOLD PRIOR TO SURGERY   • nitrofurantoin, macrocrystal-monohydrate, (MACROBID) 100 MG capsule 1 CAPSULE ORALLY EVERY 12 HOURS WITH FOOD FOR 10 DAYS   • ondansetron (ZOFRAN) 4 MG tablet TAKE 1 TABLET BY MOUTH EVERY 8 HOURS AS NEEDED   • oxybutynin XL (DITROPAN-XL) 10 MG 24 hr tablet Take 10 mg by mouth Daily.   • potassium chloride (K-DUR,KLOR-CON) 20 MEQ CR tablet Take 20 mEq by mouth As Needed (only when takin Lasix).   • pravastatin (PRAVACHOL) 40 MG tablet Take 1 tablet by mouth daily.   • rOPINIRole (REQUIP) 0.5 MG tablet Take 1 tablet by mouth every night at bedtime.   • tiZANidine (ZANAFLEX) 4 MG tablet Take 4 mg by mouth At Night As Needed for Muscle Spasms.   • uribel (URO-MP) 118 MG capsule capsule Take 1 capsule by mouth 3 (Three) Times a Day.     No current facility-administered medications on file prior to visit.      Current Medications:  Scheduled Meds:  Continuous Infusions:  No current facility-administered medications for this visit.   PRN Meds:.    I have reviewed the patient's medical history in detail and updated the computerized patient record.  Review and summarization of old records include:    Past Medical History:   Diagnosis Date   • Anemia     intermittently   • Anxiety and depression    • Arthritis    • Depression    • Diabetes mellitus (CMS/HCC)    • Gallstones    • High cholesterol    •  History of frequent urinary tract infections     HX OF YEAST IN BLADDER HAS PRN DIFLUCAN   • History of transfusion 05/24/2017    Had 2 iron infusions.  This was when i had knee replacement   • Hyperlipidemia    • Hypertension    • Hypothyroidism    • Knee pain, bilateral    • Low back pain    • Lumbar stenosis    • PONV (postoperative nausea and vomiting)    • Positive TB test     chest x-ray neg   • Seasonal allergies         Past Surgical History:   Procedure Laterality Date   • APPENDECTOMY N/A 1982    Dr. Wilson   • CHOLECYSTECTOMY WITH INTRAOPERATIVE CHOLANGIOGRAM N/A 10/31/2018    Procedure: laparoscopic cholecystectomy;  Surgeon: Mary Kay Mac MD;  Location: [facility];  Service: General   • CYSTOSCOPY BLADDER BIOPSY N/A 10/3/2016    Procedure: CYSTOSCOPY BLADDER BIOPSY;  Surgeon: Rei Calvert MD;  Location: [facility];  Service:    • DILATATION AND CURETTAGE N/A    • KNEE ARTHROSCOPY W/ MENISCAL REPAIR Left    • OVARIAN CYST REMOVAL Left 1982    Dr. Wilson   • TOTAL KNEE ARTHROPLASTY Right 5/22/2017    Procedure: RIGHT TOTAL KNEE ARTHROPLASTY WITH ALETA NAVIGATION;  Surgeon: Ross Cruz MD;  Location: [facility];  Service:         Social History     Occupational History   • Occupation: RN     Comment: part time   Tobacco Use   • Smoking status: Never Smoker   • Smokeless tobacco: Never Used   • Tobacco comment: Never smoked   Substance and Sexual Activity   • Alcohol use: Yes     Alcohol/week: 1.8 oz     Types: 2 Standard drinks or equivalent, 1 Glasses of wine per week   • Drug use: No   • Sexual activity: Not Currently     Partners: Male     Birth control/protection: IUD     Comment: Currently have inflamed bladder and some bloody urine      Social History     Social History Narrative   • Not on file        Family History   Problem Relation Age of Onset   • Hepatitis Mother    • Breast cancer Mother    • Heart disease Mother    • Cancer Mother    • Hyperlipidemia Mother      "        • Heart failure Father    • Stroke Father    • Hypertension Father         Since he was 72, now 86s   • Diabetes Father    • Heart disease Father    • Hyperlipidemia Father         Unsurs   • Heart disease Maternal Grandmother    • Cancer Maternal Grandfather    • COPD Maternal Grandfather    • Arthritis Paternal Grandmother    • Diabetes Maternal Aunt    • Diabetes Paternal Uncle    • Malig Hyperthermia Neg Hx        ROS: 14 point review of systems was performed and was negative except for documented findings in HPI and today's encounter.     Allergies:   Allergies   Allergen Reactions   • Sulfa Antibiotics Hives and Rash     Constitutional:  Denies fever, shaking or chills   Eyes:  Denies change in visual acuity   HENT:  Denies nasal congestion or sore throat   Respiratory:  Denies cough or shortness of breath   Cardiovascular:  Denies chest pain or severe LE edema   GI:  Denies abdominal pain, nausea, vomiting, bloody stools or diarrhea   Musculoskeletal:  Numbness, tingling, or loss of motor function only as noted above in history of present illness.  : Denies painful urination or hematuria  Integument:  Denies rash, lesion or ulceration   Neurologic:  Denies headache or focal weakness  Endocrine:  Denies lymphadenopathy  Psych:  Denies confusion or change in mental status   Hem:  Denies active bleeding    Physical Exam:  Wt Readings from Last 3 Encounters:   19 127 kg (280 lb)   19 133 kg (294 lb)   19 132 kg (291 lb)     Ht Readings from Last 3 Encounters:   19 165.1 cm (65\")   19 163 cm (64.17\")   19 165.1 cm (65\")     Body mass index is 46.59 kg/m².  Facility age limit for growth percentiles is 20 years.  There were no vitals filed for this visit.  Vital Signs:  reviewed  Constitutional: Awake alert and oriented x3, well developed, no acute distress, non-toxic appearance.  EYES: symmetric, sclera clear  ENT:  Normocephalic, Atraumatic.   Respiratory:  " No respiratory distress, No wheezing  CV: pulse regular, no palpitations or pallor.  GI:  Abdomen soft, non-tender.   Vascular:  Intact distal pulses, No cyanosis, no signs or symptoms of DVT.  Neurologic: Sensation grossly intact to the involved extremity, No focal deficits noted.   Neck: No tenderness, Supple.  Integument: warm, dry, no ulcerations.   Psychiatric:  Oriented, no pathological affect.  Musculoskeletal:    Affected knee(s):  Painful gait with a subtle limp, positive for synovitis, swelling, joint effusion with crepitation.  Lachman negative  Posterior drawer negative  Luz Marina's negative  Patellofemoral grind +  Sensation grossly intact to light touch throughout the lower extremity  Skin is intact  Distal pulses are palpable  No signs or symptoms of DVT        Diagnostic Data:     Imaging was done today, images were personally viewed and discussed with the patient:    Indication: pain related symptoms,  Views: 3V AP, LAT & 40 degree PA left knee(s)   Findings: severe end-stage arthritis (bone on bone, subchondral sclerosis/cysts, osteophytes)  Comparison views: viewed last xray done in the office.     Procedure:  Large Joint Arthrocentesis: L knee  Date/Time: 7/1/2019 8:30 AM  Consent given by: patient  Site marked: site marked  Timeout: Immediately prior to procedure a time out was called to verify the correct patient, procedure, equipment, support staff and site/side marked as required   Supporting Documentation  Indications: pain and joint swelling   Procedure Details  Location: knee - L knee  Preparation: Patient was prepped and draped in the usual sterile fashion  Needle size: 22 G (21)  Approach: anterolateral  Medications administered: 4 mL lidocaine (cardiac); 80 mg methylPREDNISolone acetate 80 MG/ML  Patient tolerance: patient tolerated the procedure well with no immediate complications          Assessment:     ICD-10-CM ICD-9-CM   1. Arthritis of left knee M17.12 716.96           Plan: Is  to proceed with injection  Follow up as indicated.  Ice, elevate, and rest as needed.  Additional interventions include:  15 min spent face to face with patient 11 min spent counseling about:  Biomechanics of pertinent body area discussed.  Risks, benefits, alternatives, comparisons, and complications of accepted medicines, injections, recommendations, surgical procedures, and therapies explained and education provided in laymen's terms. Natural history and expected course of this patient's diagnosis discussed along with evaluation of therapies. Questions answered. When appropriate I also discussed proper use of cane, walker, trekking poles.   BMI:  The concept of BMI body mass index and its importance and implications discussed.  BMI suggested to be < 40 or as low as possible. Lifestyle measures for weight loss and how this affects orthopedic condition.  EXERCISES:  Advice on benefits of, and types of regular/moderate exercise including biomechanical forces involved as it pertains to this complaint.  RICE: Rest, ice, compression, and elevation therapy, Cryotherapy/brachy therapy, and or OTC linaments as indicated with instructions.   Cortisone Injection. See procedure note.  She is thinking at this point that she is going to need to get L TKA possibly by the end of the year. Enc wt loss so that we can safely do this for her. Her son is getting  next week so she plans on a diet then.   7/1/2019  Mami Montejo, APRN

## 2019-10-01 ENCOUNTER — CLINICAL SUPPORT (OUTPATIENT)
Dept: ORTHOPEDIC SURGERY | Facility: CLINIC | Age: 59
End: 2019-10-01

## 2019-10-01 VITALS — TEMPERATURE: 97.8 F | BODY MASS INDEX: 46.98 KG/M2 | WEIGHT: 282 LBS | HEIGHT: 65 IN

## 2019-10-01 DIAGNOSIS — E66.01 MORBID OBESITY WITH BMI OF 45.0-49.9, ADULT (HCC): Primary | ICD-10-CM

## 2019-10-01 DIAGNOSIS — M17.12 ARTHRITIS OF LEFT KNEE: ICD-10-CM

## 2019-10-01 PROCEDURE — 99214 OFFICE O/P EST MOD 30 MIN: CPT | Performed by: NURSE PRACTITIONER

## 2019-10-01 NOTE — PROGRESS NOTES
Patient: Tiana Hudson  YOB: 1960    Chief Complaints:  left knee pain,     Subjective:    History of Present Illness: Here today for knee pain. The pain is a generalized joint tenderness.  It has been progressive in nature but remains intermittent.  Worsened by prolonged standing or walking and squatting activities. Has had improvement in the past with ice/heat, rest, and injections.     This problem is not new to this examiner.     Allergies:   Allergies   Allergen Reactions   • Sulfa Antibiotics Hives and Rash       Medications:   Home Medications:  Current Outpatient Medications on File Prior to Visit   Medication Sig   • amLODIPine-benazepril (LOTREL 5-20) 5-20 MG per capsule Take 1 capsule by mouth daily. (Patient taking differently: Take 1 capsule by mouth Every Morning.)   • benzonatate (TESSALON) 200 MG capsule 1 ORALLY 3 TIMES A DAY AS NEEDED FOR COUGH. TAKE DURING THE DAY   • cefdinir (OMNICEF) 300 MG capsule 1 TABLET ORALLY TWICE DAILY FOR 10 DAYS   • celecoxib (CeleBREX) 200 MG capsule TAKE ONE CAPSULE BY MOUTH TWO TIMES DAILY WITH FOOD.   • ciprofloxacin (CIPRO) 500 MG tablet 1 TABLET ORALLY TWICE A DAY FOR 10 DAYS   • diclofenac (VOLTAREN) 50 MG EC tablet Take 1 tablet by mouth 2 (Two) Times a Day As Needed (pain).   • Dulaglutide 1.5 MG/0.5ML solution pen-injector Inject  under the skin into the appropriate area as directed 1 (One) Time Per Week. SUNDAY   • escitalopram (LEXAPRO) 10 MG tablet Take 10 mg by mouth Daily.   • famotidine (PEPCID) 20 MG tablet Take 20 mg by mouth 2 (Two) Times a Day As Needed.   • fluconazole (DIFLUCAN) 150 MG tablet TAKE 1 TABLET NOW AND 1 TABLET 7 DAYS AFTER FIRST TABLET   • furosemide (LASIX) 20 MG tablet Take 20 mg by mouth Daily As Needed.   • gabapentin (NEURONTIN) 300 MG capsule Take 1 qhs for 3-5 days, then bid for 3-5 days, then tid and stay on this dose (Patient taking differently: Take 300 mg by mouth 3 (Three) Times a Day. Take 1 qhs for  3-5 days, then bid for 3-5 days, then tid and stay on this dose)   • HYDROcod Polst-CPM Polst ER (TUSSIONEX PENNKINETIC) 10-8 MG/5ML ER suspension TAKE 5 MLS BY MOUTH EVERY 12 HOURS AS NEEDED FOR COUGH   • levothyroxine (SYNTHROID, LEVOTHROID) 125 MCG tablet Take 1 tablet by mouth daily.   • loratadine (CLARITIN) 10 MG tablet Take 10 mg by mouth Daily.   • metFORMIN (GLUCOPHAGE) 500 MG tablet Take 1 tablet by mouth 2 (Two) Times a Day With Meals.   • Multiple Vitamins-Minerals (CENTRUM WOMEN) tablet Take 1 tablet by mouth Daily. HOLD PRIOR TO SURGERY   • nitrofurantoin, macrocrystal-monohydrate, (MACROBID) 100 MG capsule 1 CAPSULE ORALLY EVERY 12 HOURS WITH FOOD FOR 10 DAYS   • ondansetron (ZOFRAN) 4 MG tablet TAKE 1 TABLET BY MOUTH EVERY 8 HOURS AS NEEDED   • oxybutynin XL (DITROPAN-XL) 10 MG 24 hr tablet Take 10 mg by mouth Daily.   • potassium chloride (K-DUR,KLOR-CON) 20 MEQ CR tablet Take 20 mEq by mouth As Needed (only when takin Lasix).   • pravastatin (PRAVACHOL) 40 MG tablet Take 1 tablet by mouth daily.   • rOPINIRole (REQUIP) 0.5 MG tablet Take 1 tablet by mouth every night at bedtime.   • tiZANidine (ZANAFLEX) 4 MG tablet Take 4 mg by mouth At Night As Needed for Muscle Spasms.   • uribel (URO-MP) 118 MG capsule capsule Take 1 capsule by mouth 3 (Three) Times a Day.     No current facility-administered medications on file prior to visit.      Current Medications:  Scheduled Meds:  Continuous Infusions:  No current facility-administered medications for this visit.   PRN Meds:.    I have reviewed the patient's medical history in detail and updated the computerized patient record.  Review and summarization of old records include:    Past Medical History:   Diagnosis Date   • Anemia     intermittently   • Anxiety and depression    • Arthritis    • Depression    • Diabetes mellitus (CMS/HCC)    • Gallstones    • High cholesterol    • History of frequent urinary tract infections     HX OF YEAST IN BLADDER HAS  PRN DIFLUCAN   • History of transfusion 2017    Had 2 iron infusions.  This was when i had knee replacement   • Hyperlipidemia    • Hypertension    • Hypothyroidism    • Knee pain, bilateral    • Low back pain    • Lumbar stenosis    • PONV (postoperative nausea and vomiting)    • Positive TB test     chest x-ray neg   • Seasonal allergies         Past Surgical History:   Procedure Laterality Date   • APPENDECTOMY N/A     Dr. Wilson   • CHOLECYSTECTOMY WITH INTRAOPERATIVE CHOLANGIOGRAM N/A 10/31/2018    Procedure: laparoscopic cholecystectomy;  Surgeon: Mary Kay Mac MD;  Location: [facility];  Service: General   • CYSTOSCOPY BLADDER BIOPSY N/A 10/3/2016    Procedure: CYSTOSCOPY BLADDER BIOPSY;  Surgeon: Rei Calvert MD;  Location: [facility];  Service:    • DILATATION AND CURETTAGE N/A    • KNEE ARTHROSCOPY W/ MENISCAL REPAIR Left    • OVARIAN CYST REMOVAL Left     Dr. Wilson   • TOTAL KNEE ARTHROPLASTY Right 2017    Procedure: RIGHT TOTAL KNEE ARTHROPLASTY WITH ALETA NAVIGATION;  Surgeon: Ross Curz MD;  Location: [facility];  Service:         Social History     Occupational History   • Occupation: RN     Comment: part time   Tobacco Use   • Smoking status: Never Smoker   • Smokeless tobacco: Never Used   • Tobacco comment: Never smoked   Substance and Sexual Activity   • Alcohol use: Yes     Alcohol/week: 1.8 oz     Types: 2 Standard drinks or equivalent, 1 Glasses of wine per week   • Drug use: No   • Sexual activity: Not Currently     Partners: Male     Birth control/protection: IUD     Comment: Currently have inflamed bladder and some bloody urine      Social History     Social History Narrative   • Not on file        Family History   Problem Relation Age of Onset   • Hepatitis Mother    • Breast cancer Mother    • Heart disease Mother    • Cancer Mother    • Hyperlipidemia Mother             • Heart failure Father    • Stroke Father    •  "Hypertension Father         Since he was 72, now 86s   • Diabetes Father    • Heart disease Father    • Hyperlipidemia Father         Unsurs   • Heart disease Maternal Grandmother    • Cancer Maternal Grandfather    • COPD Maternal Grandfather    • Arthritis Paternal Grandmother    • Diabetes Maternal Aunt    • Diabetes Paternal Uncle    • Malig Hyperthermia Neg Hx        ROS: 14 point review of systems was performed and was negative except for documented findings in HPI and today's encounter.     Allergies:   Allergies   Allergen Reactions   • Sulfa Antibiotics Hives and Rash     Constitutional:  Denies fever, shaking or chills   Eyes:  Denies change in visual acuity   HENT:  Denies nasal congestion or sore throat   Respiratory:  Denies cough or shortness of breath   Cardiovascular:  Denies chest pain or severe LE edema   GI:  Denies abdominal pain, nausea, vomiting, bloody stools or diarrhea   Musculoskeletal:  Numbness, tingling, or loss of motor function only as noted above in history of present illness.  : Denies painful urination or hematuria  Integument:  Denies rash, lesion or ulceration   Neurologic:  Denies headache or focal weakness  Endocrine:  Denies lymphadenopathy  Psych:  Denies confusion or change in mental status   Hem:  Denies active bleeding    Physical Exam: 58 y.o. female  Wt Readings from Last 3 Encounters:   10/01/19 128 kg (282 lb)   07/01/19 127 kg (280 lb)   03/25/19 133 kg (294 lb)     Ht Readings from Last 1 Encounters:   10/01/19 165.1 cm (65\")     Body mass index is 46.93 kg/m².  Vitals:    10/01/19 0919   Temp: 97.8 °F (36.6 °C)     Vital signs reviewed.   Constitutional: Awake alert and oriented x3, well developed, no acute distress, non-toxic appearance.  EYES: symmetric, sclera clear  ENT:  Normocephalic, Atraumatic.   Respiratory:  No respiratory distress, No wheezing  CV: pulse regular, no palpitations or pallor.  GI:  Abdomen soft, non-tender.   Vascular:  Intact distal " pulses, No cyanosis, no signs or symptoms of DVT.  Neurologic: Sensation grossly intact to the involved extremity, No focal deficits noted.   Neck: No tenderness, Supple.  Integument: warm, dry, no ulcerations.   Psychiatric:  Oriented, no pathological affect.  Musculoskeletal:    Affected knee(s):  Painful gait with a subtle limp, positive for synovitis, swelling, joint effusion with crepitation.  Lachman negative  Posterior drawer negative  Luz Marina's negative  Patellofemoral grind +  Sensation grossly intact to light touch throughout the lower extremity  Skin is intact  Distal pulses are palpable  No signs or symptoms of DVT        Diagnostic Data:     Imaging was done previously in the office, images were personally viewed and discussed with the patient:    Indication: pain related symptoms,  Views: 3V AP, LAT & 40 degree PA left knee(s)   Findings: severe end-stage arthritis (bone on bone, subchondral sclerosis/cysts, osteophytes), S/P right  Total Knee Replacement in good position and alignment  Comparison views: viewed last xray done in the office.     Procedure:  Procedures    Assessment:     ICD-10-CM ICD-9-CM   1. Morbid obesity with BMI of 45.0-49.9, adult (CMS/[facility]) E66.01 278.01    Z68.42 V85.42   2. Arthritis of left knee M17.12 716.96           Plan: Is to proceed with injection  Follow up as indicated.  Ice, elevate, and rest as needed.  Additional interventions include:  25 min spent face to face with patient 15 min spent counseling about:  Biomechanics of pertinent body area discussed.  Risks, benefits, alternatives, comparisons, and complications of accepted medicines, injections, recommendations, surgical procedures, and therapies explained and education provided in laymen's terms. Natural history and expected course of this patient's diagnosis discussed along with evaluation of therapies. Questions answered. When appropriate I also discussed proper use of cane, walker, trekking poles.   BMI:  The  concept of BMI body mass index and its importance and implications discussed.  BMI suggested to be < 40 or as low as possible. Lifestyle measures for weight loss and how this affects orthopedic condition.  EXERCISES:  Advice on benefits of, and types of regular/moderate exercise including biomechanical forces involved as it pertains to this complaint.  RICE: Rest, ice, compression, and elevation therapy, Cryotherapy/brachy therapy, and or OTC linaments as indicated with instructions.   Cortisone Injection. See procedure note.  TKA: Patient reports no pertinent medical issues needing clearance. Continuation of conservative management vs. TKA: I reviewed anatomy of a total knee arthroplasty in laymen's terms, as well as typical postoperative recovery and possibly 6-12 months for maximal recovery, and possible need for rehabilitation stay after hospitalization. We also discussed risks, benefits, alternatives, and limitations of procedure with risks including but not limited to neurovascular damage, bleeding, infection, malalignment, chronic pian, failure of implants, osteolysis, loosening of implants, loss of motion, weakness, stiffness, instability, DVT, pulmonary embolus, death, stroke, complex regional pain syndrome, myocardial infarction, and need for additional procedures. Concept of substitution vs. replacement discussed.  No guarantees were given regarding results of surgery.  Patient verbalized understanding, and was given the opportunity to ask and have all questions answered today.   She is working on wt loss and wants to schedule surgery in Dec.so will be vigilant about this.   Has smaller legs (apple shape) and did well with right TKA 2017. Last A1c 7.2   10/1/2019  Mami Montejo, APRN

## 2019-10-04 ENCOUNTER — DOCUMENTATION (OUTPATIENT)
Dept: ORTHOPEDIC SURGERY | Facility: CLINIC | Age: 59
End: 2019-10-04

## 2019-10-04 NOTE — PROGRESS NOTES
"I tried to call and get ahold of Tiana and did not reach her.  Left message but want to let her know that due to [employer]'s rules we cannot schedule her until her BMI is <40.  Joleen Stubbs, the hospital's ortho NP is doing an \"optimization program\" to help patients with this and I would like to have Joleen call her to help with this if that is ok with Tiana.  Thanks, MJR   "

## 2019-11-13 ENCOUNTER — TRANSCRIBE ORDERS (OUTPATIENT)
Dept: PHYSICAL THERAPY | Facility: CLINIC | Age: 59
End: 2019-11-13

## 2019-11-13 DIAGNOSIS — M54.50 LOW BACK PAIN, UNSPECIFIED BACK PAIN LATERALITY, UNSPECIFIED CHRONICITY, UNSPECIFIED WHETHER SCIATICA PRESENT: ICD-10-CM

## 2019-11-13 DIAGNOSIS — M25.562 LEFT KNEE PAIN, UNSPECIFIED CHRONICITY: Primary | ICD-10-CM

## 2019-11-21 ENCOUNTER — APPOINTMENT (OUTPATIENT)
Dept: PREADMISSION TESTING | Facility: HOSPITAL | Age: 59
End: 2019-11-21

## 2020-05-11 ENCOUNTER — OFFICE VISIT (OUTPATIENT)
Dept: CARDIOLOGY | Facility: CLINIC | Age: 60
End: 2020-05-11

## 2020-05-11 VITALS
DIASTOLIC BLOOD PRESSURE: 84 MMHG | SYSTOLIC BLOOD PRESSURE: 120 MMHG | WEIGHT: 291 LBS | HEART RATE: 81 BPM | BODY MASS INDEX: 49.68 KG/M2 | HEIGHT: 64 IN

## 2020-05-11 DIAGNOSIS — R07.2 PRECORDIAL PAIN: Primary | ICD-10-CM

## 2020-05-11 DIAGNOSIS — I10 ESSENTIAL HYPERTENSION: ICD-10-CM

## 2020-05-11 DIAGNOSIS — Z82.49 FH ISCHEMIC HEART DISEASE: ICD-10-CM

## 2020-05-11 DIAGNOSIS — E66.01 MORBID OBESITY WITH BMI OF 40.0-44.9, ADULT (HCC): ICD-10-CM

## 2020-05-11 DIAGNOSIS — E78.5 HYPERLIPIDEMIA, UNSPECIFIED HYPERLIPIDEMIA TYPE: ICD-10-CM

## 2020-05-11 PROCEDURE — 93000 ELECTROCARDIOGRAM COMPLETE: CPT | Performed by: INTERNAL MEDICINE

## 2020-05-11 PROCEDURE — 99203 OFFICE O/P NEW LOW 30 MIN: CPT | Performed by: INTERNAL MEDICINE

## 2020-05-11 RX ORDER — CIPROFLOXACIN 250 MG/1
TABLET, FILM COATED ORAL
COMMUNITY
Start: 2020-05-10 | End: 2020-05-28

## 2020-05-11 NOTE — PROGRESS NOTES
Subjective:        [facility]  Cardiology Consult Note    Patient Identification:  Name: Tiana Hudson  Age: 59 y.o.  Sex: female  :  1960  MRN: [id number]             CC  Cp Thursday mornibng    Tight , at rest, reyes 2 hrs,       History of Present Illness:   59-year-old female with a known history of hypertension diabetes obesity strong family history all well controlled has been complaining of tightness pressure sensation last Thursday lasted for 2 hours retrosternal moderate in intensity with shortness of breath and dizziness    Comorbid cardiac risk factors:     Past Medical History:  Past Medical History:   Diagnosis Date   • Anemia     intermittently   • Anxiety and depression    • Arthritis    • Depression    • Diabetes mellitus (CMS/HCC)    • Gallstones    • High cholesterol    • History of frequent urinary tract infections     HX OF YEAST IN BLADDER HAS PRN DIFLUCAN   • History of transfusion 2017    Had 2 iron infusions.  This was when i had knee replacement   • Hyperlipidemia    • Hypertension    • Hypothyroidism    • Knee pain, bilateral    • Low back pain    • Lumbar stenosis    • PONV (postoperative nausea and vomiting)    • Positive TB test     chest x-ray neg   • Seasonal allergies      Past Surgical History:  Past Surgical History:   Procedure Laterality Date   • APPENDECTOMY N/A     Dr. Wilson   • CHOLECYSTECTOMY WITH INTRAOPERATIVE CHOLANGIOGRAM N/A 10/31/2018    Procedure: laparoscopic cholecystectomy;  Surgeon: Mary Kay Mac MD;  Location: [facility] OR;  Service: General   • CYSTOSCOPY BLADDER BIOPSY N/A 10/3/2016    Procedure: CYSTOSCOPY BLADDER BIOPSY;  Surgeon: Rei Calvert MD;  Location: [facility] OR;  Service:    • DILATATION AND CURETTAGE N/A    • KNEE ARTHROSCOPY W/ MENISCAL REPAIR Left    • OVARIAN CYST REMOVAL Left     Dr. Wilson   • TOTAL KNEE ARTHROPLASTY Right 2017    Procedure: RIGHT TOTAL KNEE ARTHROPLASTY WITH  ALETA NAVIGATION;  Surgeon: Ross Cruz MD;  Location: [facility] OR;  Service:       Allergies:  Allergies   Allergen Reactions   • Sulfa Antibiotics Hives and Rash     Home Meds:    (Not in a hospital admission)  Current Meds:   [unfilled]  Social History:   Social History     Tobacco Use   • Smoking status: Never Smoker   • Smokeless tobacco: Never Used   • Tobacco comment: Never smoked   Substance Use Topics   • Alcohol use: Yes     Alcohol/week: 3.0 standard drinks     Types: 2 Standard drinks or equivalent, 1 Glasses of wine per week      Family History:  Family History   Problem Relation Age of Onset   • Hepatitis Mother    • Breast cancer Mother    • Heart disease Mother    • Cancer Mother    • Hyperlipidemia Mother             • Heart failure Father    • Stroke Father    • Hypertension Father         Since he was 72, now 86s   • Diabetes Father    • Heart disease Father    • Hyperlipidemia Father         Unsurs   • Heart disease Maternal Grandmother    • Cancer Maternal Grandfather    • COPD Maternal Grandfather    • Arthritis Paternal Grandmother    • Diabetes Maternal Aunt    • Diabetes Paternal Uncle    • Malig Hyperthermia Neg Hx         Review of Systems    Constitutional: No weakness,fatigue, fever, rigors, chills   Eyes: No vision changes, eye pain   ENT/oropharynx: No difficulty swallowing, sore throat, epistaxis, changes in hearing   Cardiovascular:  Chest pain   Respiratory: No shortness of breath, dyspnea on exertion, cough, wheezing hemoptysis   Gastrointestinal: No abdominal pain, nausea, vomiting, diarrhea, bloody stools   Genitourinary: No hematuria, dysuria   Neurological: No headache, tremors, numbness,  one-sided weakness    Musculoskeletal: No cramps, myalgias,  joint pain, joint swelling   Integument: No rash, edema           Constitutional:  Heart Rate:  [81] 81  BP: (120)/(84) 120/84    Physical Exam   General:  Appears in no acute distress  Eyes: PERTL,  HEENT:  No  JVD. Thyroid not visibly enlarged. No mucosal pallor or cyanosis  Respiratory: Respirations regular and unlabored at rest. BBS with good air entry in all fields. No crackles, rubs or wheezes auscultated  Cardiovascular: S1S2 Regular rate and rhythm. No murmur, rub or gallop auscultated. No carotid bruits. DP/PT pulses    . No pretibial pitting edema  Gastrointestinal: Abdomen soft, flat, non tender. Bowel sounds present. No hepatosplenomegaly. No ascites  Musculoskeletal: BARLOW x4. No abnormal movements  Extremities: No digital clubbing or cyanosis  Skin: Color pink. Skin warm and dry to touch. No rashes  No xanthoma  Neuro: AAO x3 CN II-XII grossly intact            ECG 12 Lead  Date/Time: 5/11/2020 10:28 AM  Performed by: Ramón Caldwell MD  Authorized by: Ramón Caldwell MD   Comparison: compared with previous ECG   Similar to previous ECG  Rhythm: sinus rhythm  ST Flattening: all    Clinical impression: non-specific ECG                Cardiographics  ECG:     Telemetry:    Echocardiogram:     Imaging  Chest X-ray:     Lab Review               @[email or web address]@              Assessment:/ Recommendations / Plan:   Patient Active Problem List   Diagnosis   • Primary osteoarthritis of right knee   • UTI (urinary tract infection)   • Varicose veins   • Contact dermatitis   • Elevated liver function tests   • Leg cramp   • Medial collateral ligament sprain of knee   • Vitamin D deficiency   • Hypertension, essential   • Hyperlipidemia   • Abrasion   • Hypothyroidism   • Gestational diabetes   • Morbid obesity with BMI of 45.0-49.9, adult (CMS/[facility])   • Allergic rhinitis   • Back pain   • Anxiety   • H/O Postconcussion syndrome   • H/O dizziness   • Allergy   • Vaginal candidiasis   • Chronic pain of left knee   • Arthritis of both knees   • Inflammation of bladder severe, acute and chronic, with mucosal erosions/ulcers   • Dense breast tissue on mammogram   • Dizziness   • Restless leg syndrome   • Type 2  diabetes mellitus without complication (CMS/[facility])   • Arthritis of right knee   • Status post total right knee replacement   • Arthritis of left knee   • History of total knee arthroplasty   • Acquired spondylolisthesis   • Screening for colorectal cancer                    ICD-10-CM ICD-9-CM   1. Precordial pain R07.2 786.51   2. FH ischemic heart disease Z82.49 V17.3   3. Morbid obesity with BMI of 40.0-44.9, adult (CMS/[facility]) E66.01 278.01    Z68.41 V85.41   4. Hyperlipidemia, unspecified hyperlipidemia type E78.5 272.4   5. Essential hypertension I10 401.9     1. Precordial pain  Atypical but has significant cardiac risk factors will need further evaluation    2. FH ischemic heart disease  Will need further evaluation    3. Morbid obesity with BMI of 40.0-44.9, adult (CMS/[facility])  Significant risk of obesity to CAD, HTN has been explained  Advantages of wt reduction has been explained      4. Hyperlipidemia, unspecified hyperlipidemia type  Continue current medication    5. Essential hypertension  The blood pressure controlled    Can  Not walk due to arthritis    Walking ediscvan, echo  Labs/tests ordered for am      Ramón Caldwell MD  5/11/2020, 10:24      EMR Dragon/Transcription:   Dictated utilizing Dragon dictation  Answers for HPI/ROS submitted by the patient on 5/10/2020   What is the primary reason for your visit?: Other  Please describe your symptoms.: Chest pain on Thursday morning, 5/7/20 with BP up as high as 194/100. 12 lead EKG on Friday, 5/8/20 showed change in ST segment as compared to EKG in 2018.  Have you had these symptoms before?: Yes  How long have you been having these symptoms?: Other  Please list any medications you are currently taking for this condition.: Amlodipine/benazapril 5/20 (lotrel) for high BP. Lasix 10 mg PO prn and potassium supplement when taking lasix. Taking lasix rarely.  Please describe any probable cause for these symptoms. : Stress and anxiety attacks have caused  chest pain in the past.  Never noticed anything lasting as long as Thursday, 5/7/20, but it went away. In the past was more obviously stress.

## 2020-05-19 ENCOUNTER — HOSPITAL ENCOUNTER (OUTPATIENT)
Dept: CARDIOLOGY | Facility: HOSPITAL | Age: 60
Discharge: HOME | End: 2020-05-19

## 2020-05-19 ENCOUNTER — HOSPITAL ENCOUNTER (OUTPATIENT)
Dept: CARDIOLOGY | Facility: HOSPITAL | Age: 60
Discharge: HOME | End: 2020-05-19
Admitting: INTERNAL MEDICINE

## 2020-05-19 VITALS
HEIGHT: 65 IN | OXYGEN SATURATION: 99 % | BODY MASS INDEX: 48.48 KG/M2 | DIASTOLIC BLOOD PRESSURE: 73 MMHG | HEART RATE: 79 BPM | SYSTOLIC BLOOD PRESSURE: 136 MMHG | WEIGHT: 291 LBS | RESPIRATION RATE: 16 BRPM

## 2020-05-19 LAB
BH CV ECHO MEAS - ACS: 1.8 CM
BH CV ECHO MEAS - AO MAX PG (FULL): 4.9 MMHG
BH CV ECHO MEAS - AO MAX PG: 12.8 MMHG
BH CV ECHO MEAS - AO MEAN PG (FULL): 3.1 MMHG
BH CV ECHO MEAS - AO MEAN PG: 7.9 MMHG
BH CV ECHO MEAS - AO ROOT AREA (BSA CORRECTED): 1.4
BH CV ECHO MEAS - AO ROOT AREA: 8.1 CM^2
BH CV ECHO MEAS - AO ROOT DIAM: 3.2 CM
BH CV ECHO MEAS - AO V2 MAX: 179.2 CM/SEC
BH CV ECHO MEAS - AO V2 MEAN: 134.4 CM/SEC
BH CV ECHO MEAS - AO V2 VTI: 33.7 CM
BH CV ECHO MEAS - AVA(I,A): 2.5 CM^2
BH CV ECHO MEAS - AVA(I,D): 2.5 CM^2
BH CV ECHO MEAS - AVA(V,A): 2.5 CM^2
BH CV ECHO MEAS - AVA(V,D): 2.5 CM^2
BH CV ECHO MEAS - BSA(HAYCOCK): 2.5 M^2
BH CV ECHO MEAS - BSA: 2.3 M^2
BH CV ECHO MEAS - BZI_BMI: 50 KILOGRAMS/M^2
BH CV ECHO MEAS - BZI_METRIC_HEIGHT: 162.6 CM
BH CV ECHO MEAS - BZI_METRIC_WEIGHT: 132 KG
BH CV ECHO MEAS - EDV(CUBED): 47.6 ML
BH CV ECHO MEAS - EDV(MOD-SP2): 97 ML
BH CV ECHO MEAS - EDV(MOD-SP4): 96 ML
BH CV ECHO MEAS - EDV(TEICH): 55.3 ML
BH CV ECHO MEAS - EF(CUBED): 61 %
BH CV ECHO MEAS - EF(MOD-BP): 69 %
BH CV ECHO MEAS - EF(MOD-SP2): 71.1 %
BH CV ECHO MEAS - EF(MOD-SP4): 65.6 %
BH CV ECHO MEAS - EF(TEICH): 53.5 %
BH CV ECHO MEAS - ESV(CUBED): 18.6 ML
BH CV ECHO MEAS - ESV(MOD-SP2): 28 ML
BH CV ECHO MEAS - ESV(MOD-SP4): 33 ML
BH CV ECHO MEAS - ESV(TEICH): 25.7 ML
BH CV ECHO MEAS - FS: 27 %
BH CV ECHO MEAS - IVS/LVPW: 1
BH CV ECHO MEAS - IVSD: 1.1 CM
BH CV ECHO MEAS - LA DIMENSION: 4.3 CM
BH CV ECHO MEAS - LA/AO: 1.3
BH CV ECHO MEAS - LAT PEAK E' VEL: 9.9 CM/SEC
BH CV ECHO MEAS - LV DIASTOLIC VOL/BSA (35-75): 41.8 ML/M^2
BH CV ECHO MEAS - LV MASS(C)D: 129.1 GRAMS
BH CV ECHO MEAS - LV MASS(C)DI: 56.3 GRAMS/M^2
BH CV ECHO MEAS - LV MAX PG: 7.9 MMHG
BH CV ECHO MEAS - LV MEAN PG: 4.8 MMHG
BH CV ECHO MEAS - LV SYSTOLIC VOL/BSA (12-30): 14.4 ML/M^2
BH CV ECHO MEAS - LV V1 MAX: 140.7 CM/SEC
BH CV ECHO MEAS - LV V1 MEAN: 103.3 CM/SEC
BH CV ECHO MEAS - LV V1 VTI: 27 CM
BH CV ECHO MEAS - LVIDD: 3.6 CM
BH CV ECHO MEAS - LVIDS: 2.6 CM
BH CV ECHO MEAS - LVLD AP2: 8 CM
BH CV ECHO MEAS - LVLD AP4: 8.2 CM
BH CV ECHO MEAS - LVLS AP2: 6.2 CM
BH CV ECHO MEAS - LVLS AP4: 6.2 CM
BH CV ECHO MEAS - LVOT AREA (M): 3.1 CM^2
BH CV ECHO MEAS - LVOT AREA: 3.1 CM^2
BH CV ECHO MEAS - LVOT DIAM: 2 CM
BH CV ECHO MEAS - LVPWD: 1.1 CM
BH CV ECHO MEAS - MED PEAK E' VEL: 9.4 CM/SEC
BH CV ECHO MEAS - MV A DUR: 0.19 SEC
BH CV ECHO MEAS - MV A MAX VEL: 96.5 CM/SEC
BH CV ECHO MEAS - MV DEC SLOPE: 200.9 CM/SEC^2
BH CV ECHO MEAS - MV DEC TIME: 0.22 SEC
BH CV ECHO MEAS - MV E MAX VEL: 71.3 CM/SEC
BH CV ECHO MEAS - MV E/A: 0.74
BH CV ECHO MEAS - MV MAX PG: 3.5 MMHG
BH CV ECHO MEAS - MV MEAN PG: 1.5 MMHG
BH CV ECHO MEAS - MV P1/2T MAX VEL: 68.6 CM/SEC
BH CV ECHO MEAS - MV P1/2T: 100.1 MSEC
BH CV ECHO MEAS - MV V2 MAX: 93 CM/SEC
BH CV ECHO MEAS - MV V2 MEAN: 57.8 CM/SEC
BH CV ECHO MEAS - MV V2 VTI: 20.9 CM
BH CV ECHO MEAS - MVA P1/2T LCG: 3.2 CM^2
BH CV ECHO MEAS - MVA(P1/2T): 2.2 CM^2
BH CV ECHO MEAS - MVA(VTI): 4 CM^2
BH CV ECHO MEAS - PA ACC TIME: 0.08 SEC
BH CV ECHO MEAS - PA MAX PG (FULL): 3.8 MMHG
BH CV ECHO MEAS - PA MAX PG: 6.7 MMHG
BH CV ECHO MEAS - PA PR(ACCEL): 43.3 MMHG
BH CV ECHO MEAS - PA V2 MAX: 129 CM/SEC
BH CV ECHO MEAS - PULM A REVS DUR: 0.12 SEC
BH CV ECHO MEAS - PULM A REVS VEL: 49.3 CM/SEC
BH CV ECHO MEAS - PULM DIAS VEL: 28.2 CM/SEC
BH CV ECHO MEAS - PULM S/D: 1.9
BH CV ECHO MEAS - PULM SYS VEL: 53.9 CM/SEC
BH CV ECHO MEAS - PVA(V,A): 1.6 CM^2
BH CV ECHO MEAS - PVA(V,D): 1.6 CM^2
BH CV ECHO MEAS - QP/QS: 0.48
BH CV ECHO MEAS - RV BASE (<4.1) - OBSOLETE: 3.2 CM
BH CV ECHO MEAS - RV LENGTH (<8.5) - OBSOLETE: 6.6 CM
BH CV ECHO MEAS - RV MAX PG: 2.8 MMHG
BH CV ECHO MEAS - RV MEAN PG: 1.8 MMHG
BH CV ECHO MEAS - RV V1 MAX: 83.9 CM/SEC
BH CV ECHO MEAS - RV V1 MEAN: 64.1 CM/SEC
BH CV ECHO MEAS - RV V1 VTI: 16.8 CM
BH CV ECHO MEAS - RVOT AREA: 2.4 CM^2
BH CV ECHO MEAS - RVOT DIAM: 1.8 CM
BH CV ECHO MEAS - SI(AO): 119.2 ML/M^2
BH CV ECHO MEAS - SI(CUBED): 12.7 ML/M^2
BH CV ECHO MEAS - SI(LVOT): 36.9 ML/M^2
BH CV ECHO MEAS - SI(MOD-SP2): 30.1 ML/M^2
BH CV ECHO MEAS - SI(MOD-SP4): 27.5 ML/M^2
BH CV ECHO MEAS - SI(TEICH): 12.9 ML/M^2
BH CV ECHO MEAS - SV(AO): 273.5 ML
BH CV ECHO MEAS - SV(CUBED): 29.1 ML
BH CV ECHO MEAS - SV(LVOT): 84.6 ML
BH CV ECHO MEAS - SV(MOD-SP2): 69 ML
BH CV ECHO MEAS - SV(MOD-SP4): 63 ML
BH CV ECHO MEAS - SV(RVOT): 40.4 ML
BH CV ECHO MEAS - SV(TEICH): 29.6 ML
BH CV ECHO MEAS - TAPSE (>1.6): 2.4 CM
BH CV ECHO MEASUREMENTS AVERAGE E/E' RATIO: 7.39
BH CV XLRA - RV BASE: 3.2 CM
BH CV XLRA - RV LENGTH: 6.6 CM
BH CV XLRA - RV MID: 1.9 CM
BH CV XLRA - TDI S': 14.3 CM/SEC
LEFT ATRIUM VOLUME INDEX: 21 ML/M2
MAXIMAL PREDICTED HEART RATE: 163 BPM
STRESS TARGET HR: 139 BPM

## 2020-05-19 PROCEDURE — 93306 TTE W/DOPPLER COMPLETE: CPT | Performed by: INTERNAL MEDICINE

## 2020-05-19 PROCEDURE — 93016 CV STRESS TEST SUPVJ ONLY: CPT | Performed by: NURSE PRACTITIONER

## 2020-05-19 PROCEDURE — 0 TECHNETIUM SESTAMIBI: Performed by: INTERNAL MEDICINE

## 2020-05-19 PROCEDURE — 93306 TTE W/DOPPLER COMPLETE: CPT

## 2020-05-19 PROCEDURE — A9500 TC99M SESTAMIBI: HCPCS | Performed by: INTERNAL MEDICINE

## 2020-05-19 PROCEDURE — 93017 CV STRESS TEST TRACING ONLY: CPT

## 2020-05-19 PROCEDURE — 25010000002 PERFLUTREN (DEFINITY) 8.476 MG IN SODIUM CHLORIDE (PF) 0.9 % 10 ML INJECTION: Performed by: INTERNAL MEDICINE

## 2020-05-19 PROCEDURE — 78452 HT MUSCLE IMAGE SPECT MULT: CPT

## 2020-05-19 PROCEDURE — 78452 HT MUSCLE IMAGE SPECT MULT: CPT | Performed by: INTERNAL MEDICINE

## 2020-05-19 PROCEDURE — 93018 CV STRESS TEST I&R ONLY: CPT | Performed by: INTERNAL MEDICINE

## 2020-05-19 RX ADMIN — TECHNETIUM TC 99M SESTAMIBI 1 DOSE: 1 INJECTION INTRAVENOUS at 10:50

## 2020-05-19 RX ADMIN — TECHNETIUM TC 99M SESTAMIBI 1 DOSE: 1 INJECTION INTRAVENOUS at 08:15

## 2020-05-19 RX ADMIN — SODIUM CHLORIDE 2 ML: 9 INJECTION INTRAMUSCULAR; INTRAVENOUS; SUBCUTANEOUS at 11:43

## 2020-05-21 LAB
BH CV STRESS BP STAGE 1: NORMAL
BH CV STRESS DURATION MIN STAGE 1: 2
BH CV STRESS DURATION SEC STAGE 1: 46
BH CV STRESS GRADE STAGE 1: 0
BH CV STRESS HR STAGE 1: 148
BH CV STRESS METS STAGE 1: 1.6
BH CV STRESS PROTOCOL 1: NORMAL
BH CV STRESS RECOVERY BP: NORMAL MMHG
BH CV STRESS RECOVERY HR: 83 BPM
BH CV STRESS RECOVERY O2: 99 %
BH CV STRESS SPEED STAGE 1: 0.8
BH CV STRESS STAGE 1: 1
LV EF NUC BP: 66 %
MAXIMAL PREDICTED HEART RATE: 163 BPM
PERCENT MAX PREDICTED HR: 90.8 %
STRESS BASELINE BP: NORMAL MMHG
STRESS BASELINE HR: 111 BPM
STRESS O2 SAT REST: 99 %
STRESS PERCENT HR: 107 %
STRESS POST ESTIMATED WORKLOAD: 1.6 METS
STRESS POST EXERCISE DUR MIN: 2 MIN
STRESS POST EXERCISE DUR SEC: 46 SEC
STRESS POST PEAK BP: NORMAL MMHG
STRESS POST PEAK HR: 148 BPM
STRESS TARGET HR: 139 BPM

## 2020-05-28 ENCOUNTER — OFFICE VISIT (OUTPATIENT)
Dept: CARDIOLOGY | Facility: CLINIC | Age: 60
End: 2020-05-28

## 2020-05-28 VITALS — BODY MASS INDEX: 48.32 KG/M2 | HEIGHT: 65 IN | WEIGHT: 290 LBS

## 2020-05-28 DIAGNOSIS — R07.2 PRECORDIAL PAIN: Primary | ICD-10-CM

## 2020-05-28 DIAGNOSIS — I10 ESSENTIAL HYPERTENSION: ICD-10-CM

## 2020-05-28 DIAGNOSIS — E78.5 HYPERLIPIDEMIA, UNSPECIFIED HYPERLIPIDEMIA TYPE: ICD-10-CM

## 2020-05-28 PROCEDURE — 99442 PR PHYS/QHP TELEPHONE EVALUATION 11-20 MIN: CPT | Performed by: INTERNAL MEDICINE

## 2020-05-28 NOTE — PROGRESS NOTES
You have chosen to receive care through a telephone visit. Do you consent to use a telephone visit for your medical care today? Yes    TEST RESULTS   Subjective:        Tiana Hudson is a 59 y.o. female who here for follow up    CC    Telephone checkup    cp    HPI  59-year-old female underwent a stress test and echocardiogram which are normal patient continues to have the chest pain but patient seems to think it is due to the stress and so far as she manages the stress patient is not having any issues     Problem List Items Addressed This Visit        Cardiovascular and Mediastinum    Essential hypertension    Hyperlipidemia       Nervous and Auditory    Precordial pain - Primary        .    The following portions of the patient's history were reviewed and updated as appropriate: allergies, current medications, past family history, past medical history, past social history, past surgical history and problem list.    Past Medical History:   Diagnosis Date   • Anemia     intermittently   • Anxiety and depression    • Arthritis    • Depression    • Diabetes mellitus (CMS/HCC)    • Gallstones    • High cholesterol    • History of frequent urinary tract infections     HX OF YEAST IN BLADDER HAS PRN DIFLUCAN   • History of transfusion 2017    Had 2 iron infusions.  This was when i had knee replacement   • Hyperlipidemia    • Hypertension    • Hypothyroidism    • Knee pain, bilateral    • Low back pain    • Lumbar stenosis    • PONV (postoperative nausea and vomiting)    • Positive TB test     chest x-ray neg   • Seasonal allergies      reports that she has never smoked. She has never used smokeless tobacco. She reports that she drinks about 3.0 standard drinks of alcohol per week. She reports that she does not use drugs.   Family History   Problem Relation Age of Onset   • Hepatitis Mother    • Breast cancer Mother    • Heart disease Mother    • Cancer Mother    • Hyperlipidemia Mother             •  Heart failure Father    • Stroke Father    • Hypertension Father         Since he was 72, now 86s   • Diabetes Father    • Heart disease Father    • Hyperlipidemia Father         Unsurs   • Heart disease Maternal Grandmother    • Cancer Maternal Grandfather    • COPD Maternal Grandfather    • Arthritis Paternal Grandmother    • Diabetes Maternal Aunt    • Diabetes Paternal Uncle    • Malig Hyperthermia Neg Hx        Review of Systems  Constitutional: No wt loss, fever, fatigue  Gastrointestinal: No nausea, abdominal pain  Behavioral/Psych: No insomnia or anxiety   Cardiovascular chest pain  Objective:       Physical Exam    Telephone conference only      Procedures      Echocardiogram:        Current Outpatient Medications:   •  amLODIPine-benazepril (LOTREL 5-20) 5-20 MG per capsule, Take 1 capsule by mouth daily. (Patient taking differently: Take 1 capsule by mouth Every Morning.), Disp: 90 capsule, Rfl: 3  •  diclofenac (VOLTAREN) 50 MG EC tablet, Take 1 tablet by mouth 2 (Two) Times a Day As Needed (pain)., Disp: 60 tablet, Rfl: 2  •  Dulaglutide 1.5 MG/0.5ML solution pen-injector, Inject  under the skin into the appropriate area as directed 1 (One) Time Per Week. SUNDAY, Disp: , Rfl:   •  escitalopram (LEXAPRO) 10 MG tablet, Take 10 mg by mouth Daily., Disp: , Rfl:   •  furosemide (LASIX) 20 MG tablet, Take 10 mg by mouth Daily As Needed., Disp: , Rfl: 1  •  gabapentin (NEURONTIN) 300 MG capsule, Take 1 qhs for 3-5 days, then bid for 3-5 days, then tid and stay on this dose (Patient taking differently: Take 300 mg by mouth 3 (Three) Times a Day. Take 1 qhs for 3-5 days, then bid for 3-5 days, then tid and stay on this dose), Disp: 120 capsule, Rfl: 3  •  levothyroxine (SYNTHROID, LEVOTHROID) 125 MCG tablet, Take 1 tablet by mouth daily., Disp: 90 tablet, Rfl: 3  •  loratadine (CLARITIN) 10 MG tablet, Take 10 mg by mouth Daily., Disp: , Rfl:   •  metFORMIN (GLUCOPHAGE) 500 MG tablet, Take 1 tablet by mouth 2  (Two) Times a Day With Meals., Disp: 360 tablet, Rfl: 0  •  oxybutynin XL (DITROPAN-XL) 10 MG 24 hr tablet, Take 10 mg by mouth Daily., Disp: , Rfl:   •  potassium chloride (K-DUR,KLOR-CON) 20 MEQ CR tablet, Take 20 mEq by mouth As Needed (only when takin Lasix)., Disp: , Rfl:   •  pravastatin (PRAVACHOL) 40 MG tablet, Take 1 tablet by mouth daily., Disp: 90 tablet, Rfl: 3  •  rOPINIRole (REQUIP) 0.5 MG tablet, Take 1 tablet by mouth every night at bedtime., Disp: 30 tablet, Rfl: 0  •  tiZANidine (ZANAFLEX) 4 MG tablet, Take 4 mg by mouth At Night As Needed for Muscle Spasms., Disp: , Rfl:   •  uribel (URO-MP) 118 MG capsule capsule, Take 1 capsule by mouth 3 (Three) Times a Day., Disp: , Rfl: 2   Assessment:        Patient Active Problem List   Diagnosis   • Primary osteoarthritis of right knee   • UTI (urinary tract infection)   • Varicose veins   • Contact dermatitis   • Elevated liver function tests   • Leg cramp   • Medial collateral ligament sprain of knee   • Vitamin D deficiency   • Essential hypertension   • Hyperlipidemia   • Abrasion   • Hypothyroidism   • Gestational diabetes   • Morbid obesity with BMI of 40.0-44.9, adult (CMS/[facility])   • Allergic rhinitis   • Back pain   • Anxiety   • H/O Postconcussion syndrome   • H/O dizziness   • Allergy   • Vaginal candidiasis   • Chronic pain of left knee   • Arthritis of both knees   • Inflammation of bladder severe, acute and chronic, with mucosal erosions/ulcers   • Dense breast tissue on mammogram   • Dizziness   • Restless leg syndrome   • Type 2 diabetes mellitus without complication (CMS/[facility])   • Arthritis of right knee   • Status post total right knee replacement   • Arthritis of left knee   • History of total knee arthroplasty   • Acquired spondylolisthesis   • Screening for colorectal cancer   • Precordial pain   • FH ischemic heart disease     Interpretation Summary        · BMI is 49.2 kg/m2..  · Findings consistent with an equivocal ECG stress  test.  · Left ventricular ejection fraction is normal (Calculated EF = 66%).  · Myocardial perfusion imaging indicates a normal myocardial perfusion study with no evidence of ischemia.  · Impressions are consistent with a low risk study.     Interpretation Summary     · Saline study neg for PFO  · Calculated EF = 69%  · There is no evidence of pericardial effusion.               Plan:            ICD-10-CM ICD-9-CM   1. Precordial pain R07.2 786.51   2. Essential hypertension I10 401.9   3. Hyperlipidemia, unspecified hyperlipidemia type E78.5 272.4     1. Precordial pain  Atypical with negative work-up may need a heart catheter persist    2. Essential hypertension  Under control    3. Hyperlipidemia, unspecified hyperlipidemia type  Continue current treatment       Specificity and sensitivity of the stress test/ cardiac workup has been explained. Pt has been explained if  Symptoms continue please go to ER, and further w/p will be required.    Also explained this does not rule out coronary artery disease or the future events, continue to emphasize on risk reductions for coronary artery disease    Pt also advised to contact PCP for other causes of symptoms      This patient has consented to a telehealth visit via telephone conference. The visit was scheduled as a telephone conference visit to comply with patient safety concerns in accordance with CDC recommendations.  All vitals recorded within this visit are reported by the patient.  I spent  12 minutes in total including but not limited to the 12 minutes spent in direct conversation with this patient.     COUNSELING:    Tiana Martinez was given to patient for the following topics: diagnostic results, risk factor reductions, impressions, risks and benefits of treatment options and importance of treatment compliance .       SMOKING COUNSELING:    [unfilled]    Dictated using Dragon dictation

## 2020-10-27 ENCOUNTER — OFFICE (OUTPATIENT)
Dept: URBAN - METROPOLITAN AREA CLINIC 66 | Facility: CLINIC | Age: 60
End: 2020-10-27

## 2020-10-27 VITALS
HEART RATE: 96 BPM | HEIGHT: 65 IN | DIASTOLIC BLOOD PRESSURE: 80 MMHG | WEIGHT: 287 LBS | TEMPERATURE: 97.8 F | SYSTOLIC BLOOD PRESSURE: 128 MMHG

## 2020-10-27 DIAGNOSIS — R19.7 DIARRHEA, UNSPECIFIED: ICD-10-CM

## 2020-10-27 DIAGNOSIS — D50.9 IRON DEFICIENCY ANEMIA, UNSPECIFIED: ICD-10-CM

## 2020-10-27 DIAGNOSIS — K21.9 GASTRO-ESOPHAGEAL REFLUX DISEASE WITHOUT ESOPHAGITIS: ICD-10-CM

## 2020-10-27 DIAGNOSIS — E66.9 OBESITY, UNSPECIFIED: ICD-10-CM

## 2020-10-27 DIAGNOSIS — K91.5 POSTCHOLECYSTECTOMY SYNDROME: ICD-10-CM

## 2020-10-27 DIAGNOSIS — R14.2 ERUCTATION: ICD-10-CM

## 2020-10-27 PROCEDURE — 99202 OFFICE O/P NEW SF 15 MIN: CPT | Performed by: INTERNAL MEDICINE

## 2020-11-11 ENCOUNTER — OFFICE VISIT (OUTPATIENT)
Dept: CARDIOLOGY | Facility: CLINIC | Age: 60
End: 2020-11-11

## 2020-11-11 VITALS
WEIGHT: 289 LBS | HEIGHT: 65 IN | DIASTOLIC BLOOD PRESSURE: 87 MMHG | BODY MASS INDEX: 48.15 KG/M2 | SYSTOLIC BLOOD PRESSURE: 149 MMHG | HEART RATE: 79 BPM

## 2020-11-11 DIAGNOSIS — I10 ESSENTIAL HYPERTENSION: ICD-10-CM

## 2020-11-11 DIAGNOSIS — E78.5 HYPERLIPIDEMIA, UNSPECIFIED HYPERLIPIDEMIA TYPE: ICD-10-CM

## 2020-11-11 DIAGNOSIS — R07.2 PRECORDIAL PAIN: Primary | ICD-10-CM

## 2020-11-11 PROCEDURE — 99213 OFFICE O/P EST LOW 20 MIN: CPT | Performed by: NURSE PRACTITIONER

## 2020-11-11 RX ORDER — TRAMADOL HYDROCHLORIDE 50 MG/1
25 TABLET ORAL 2 TIMES DAILY PRN
COMMUNITY
Start: 2020-10-26

## 2020-11-11 RX ORDER — FERROUS SULFATE TAB EC 324 MG (65 MG FE EQUIVALENT) 324 (65 FE) MG
324 TABLET DELAYED RESPONSE ORAL
COMMUNITY

## 2020-11-11 RX ORDER — OMEGA-3S/DHA/EPA/FISH OIL/D3 300MG-1000
400 CAPSULE ORAL DAILY
COMMUNITY
End: 2021-05-13

## 2020-11-11 RX ORDER — DIPHENOXYLATE HYDROCHLORIDE AND ATROPINE SULFATE 2.5; .025 MG/1; MG/1
TABLET ORAL DAILY
COMMUNITY

## 2020-11-11 NOTE — PROGRESS NOTES
Subjective:        Tiana Hudson is a 60 y.o. female who here for follow up    Chief Complaint   Patient presents with   • Follow-up     6 MO FOLLOW UP     Hypertension    HPI    Tiana Hudson is a 60-year-old female, who is new to me.  She has a history which includes depression, diabetes mellitus, history of gallstones, hyperlipidemia, hypertension, anemia, anxiety, hypothyroidism.      The following portions of the patient's history were reviewed and updated as appropriate: allergies, current medications, past family history, past medical history, past social history, past surgical history and problem list.    Past Medical History:   Diagnosis Date   • Anemia     intermittently   • Anxiety and depression    • Arthritis    • Depression    • Diabetes mellitus (CMS/HCC)    • Gallstones    • High cholesterol    • History of frequent urinary tract infections     HX OF YEAST IN BLADDER HAS PRN DIFLUCAN   • History of transfusion 2017    Had 2 iron infusions.  This was when i had knee replacement   • Hyperlipidemia    • Hypertension    • Hypothyroidism    • Knee pain, bilateral    • Low back pain    • Lumbar stenosis    • PONV (postoperative nausea and vomiting)    • Positive TB test     chest x-ray neg   • Seasonal allergies          reports that she has never smoked. She has never used smokeless tobacco. She reports current alcohol use of about 3.0 standard drinks of alcohol per week. She reports that she does not use drugs.     Family History   Problem Relation Age of Onset   • Hepatitis Mother    • Breast cancer Mother    • Heart disease Mother    • Cancer Mother    • Hyperlipidemia Mother             • Heart failure Father    • Stroke Father    • Hypertension Father         Since he was 72, now 86s   • Diabetes Father    • Heart disease Father    • Hyperlipidemia Father         Unsurs   • Heart disease Maternal Grandmother    • Cancer Maternal Grandfather    • COPD Maternal Grandfather    •  Arthritis Paternal Grandmother    • Diabetes Maternal Aunt    • Diabetes Paternal Uncle    • Malig Hyperthermia Neg Hx        ROS     Review of Systems  Constitutional: No wt loss, fever, fatigue  Gastrointestinal: No nausea, abdominal pain  Behavioral/Psych: No insomnia or anxiety  Cardiovascular : Denies chest pain, shortness of breath      Objective:           Vitals signs and nursing note reviewed.   Constitutional:       Appearance: Well-developed.   HENT:      Head: Normocephalic.      Right Ear: External ear normal.      Left Ear: External ear normal.   Neck:      Musculoskeletal: Normal range of motion.      Vascular: No JVD.   Pulmonary:      Effort: Pulmonary effort is normal. No respiratory distress.      Breath sounds: Normal breath sounds. No stridor. No rales.   Cardiovascular:      Normal rate. Regular rhythm.      No gallop.   Pulses:     Intact distal pulses.   Abdominal:      General: Bowel sounds are normal. There is no distension.      Palpations: Abdomen is soft.      Tenderness: There is no abdominal tenderness. There is no guarding.   Musculoskeletal: Normal range of motion.         General: No tenderness.   Skin:     General: Skin is warm.   Neurological:      Mental Status: Alert and oriented to person, place, and time.      Deep Tendon Reflexes: Reflexes are normal and symmetric.   Psychiatric:         Judgment: Judgment normal.         Procedures    Interpretation Summary       · BMI is 49.2 kg/m2..  · Findings consistent with an equivocal ECG stress test.  · Left ventricular ejection fraction is normal (Calculated EF = 66%).  · Myocardial perfusion imaging indicates a normal myocardial perfusion study with no evidence of ischemia.  · Impressions are consistent with a low risk study.     Asymptomatic for chest pain. Specificity of study reduced secondary to baseline Non-specific ST-T wave abnormalities,. ECG is  equivocal for  suggestion of ischemia  Ectopy: none.  Blood pressure response:  Baseline Hypertensive  136/76, Peak 150/66  Recovery:160//82  1 min  160/60  2 min  154/68  3 min  140/82  4 min  136/82  5 min  140/77      Initial procedure was to be a Walking Lexiscan due to c/o of knee pain.  However at 0.8 MPH and no grade, reached 85% MPH in 2 minutes.   Exercise tolerance is very poor, Reached 19/20 Nicholas Scale in 2 1/2 minutes.     Supervised by:  Misty GRIFFIN.          Interpretation Summary    · Saline study neg for PFO  · Calculated EF = 69%  · There is no evidence of pericardial effusion.           Current Outpatient Medications:   •  amLODIPine-benazepril (LOTREL 5-20) 5-20 MG per capsule, Take 1 capsule by mouth daily. (Patient taking differently: Take 1 capsule by mouth Every Morning.), Disp: 90 capsule, Rfl: 3  •  diclofenac (VOLTAREN) 50 MG EC tablet, Take 1 tablet by mouth 2 (Two) Times a Day As Needed (pain)., Disp: 60 tablet, Rfl: 2  •  Dulaglutide 1.5 MG/0.5ML solution pen-injector, Inject  under the skin into the appropriate area as directed 1 (One) Time Per Week. SUNDAY, Disp: , Rfl:   •  escitalopram (LEXAPRO) 10 MG tablet, Take 10 mg by mouth Daily., Disp: , Rfl:   •  furosemide (LASIX) 20 MG tablet, Take 10 mg by mouth Daily As Needed., Disp: , Rfl: 1  •  gabapentin (NEURONTIN) 300 MG capsule, Take 1 qhs for 3-5 days, then bid for 3-5 days, then tid and stay on this dose (Patient taking differently: Take 300 mg by mouth 3 (Three) Times a Day. Take 1 qhs for 3-5 days, then bid for 3-5 days, then tid and stay on this dose), Disp: 120 capsule, Rfl: 3  •  levothyroxine (SYNTHROID, LEVOTHROID) 125 MCG tablet, Take 1 tablet by mouth daily., Disp: 90 tablet, Rfl: 3  •  loratadine (CLARITIN) 10 MG tablet, Take 10 mg by mouth Daily., Disp: , Rfl:   •  metFORMIN (GLUCOPHAGE) 500 MG tablet, Take 1 tablet by mouth 2 (Two) Times a Day With Meals., Disp: 360 tablet, Rfl: 0  •  oxybutynin XL (DITROPAN-XL) 10 MG 24 hr tablet, Take 10 mg by mouth Daily., Disp: , Rfl:   •   potassium chloride (K-DUR,KLOR-CON) 20 MEQ CR tablet, Take 20 mEq by mouth As Needed (only when takin Lasix)., Disp: , Rfl:   •  pravastatin (PRAVACHOL) 40 MG tablet, Take 1 tablet by mouth daily., Disp: 90 tablet, Rfl: 3  •  rOPINIRole (REQUIP) 0.5 MG tablet, Take 1 tablet by mouth every night at bedtime., Disp: 30 tablet, Rfl: 0  •  tiZANidine (ZANAFLEX) 4 MG tablet, Take 4 mg by mouth At Night As Needed for Muscle Spasms., Disp: , Rfl:   •  uribel (URO-MP) 118 MG capsule capsule, Take 1 capsule by mouth 3 (Three) Times a Day., Disp: , Rfl: 2     Assessment:        Patient Active Problem List   Diagnosis   • Primary osteoarthritis of right knee   • UTI (urinary tract infection)   • Varicose veins   • Contact dermatitis   • Elevated liver function tests   • Leg cramp   • Medial collateral ligament sprain of knee   • Vitamin D deficiency   • Essential hypertension   • Hyperlipidemia   • Abrasion   • Hypothyroidism   • Gestational diabetes   • Morbid obesity with BMI of 40.0-44.9, adult (CMS/[facility])   • Allergic rhinitis   • Back pain   • Anxiety   • H/O Postconcussion syndrome   • H/O dizziness   • Allergy   • Vaginal candidiasis   • Chronic pain of left knee   • Arthritis of both knees   • Inflammation of bladder severe, acute and chronic, with mucosal erosions/ulcers   • Dense breast tissue on mammogram   • Dizziness   • Restless leg syndrome   • Type 2 diabetes mellitus without complication (CMS/[facility])   • Arthritis of right knee   • Status post total right knee replacement   • Arthritis of left knee   • History of total knee arthroplasty   • Acquired spondylolisthesis   • Screening for colorectal cancer   • Precordial pain   • FH ischemic heart disease               Plan:   1.  Essential hypertension: Today her blood pressure is slightly elevated. She states it usually is around 120's sys. She states she had a death in the family and she has been upset. She will monitor her blood pressure.      Educated patient on  exercising for at least 30 minutes a day for 2 to 3 days a week. Importance of controlling hypertension and blood pressure checkup on the regular basis has been explained. Hypertension as a silent killer has been discussed. Risk reduction of the weight and regular exercises to control the hypertension has been explained.    2.  Hyperlipidemia.  She sees Dr. Fairchild and he manages her cholesterol.     Risk of the hyperlipidemia, importance of the treatment has been explained. Pros and cons of the statins has been explained. Regular blood workup as well as side effects including the liver failure, myelopathy death has been explained.     3.  History of pericardial pain.  Note atypical with negative work-up if persists she will need a cardiac cath in the future.             No diagnosis found.    There are no diagnoses linked to this encounter.    COUNSELING: jannet Martinez was given to patient for the following topics: diagnostic results, risk factor reductions, impressions, risks and benefits of treatment options and importance of treatment compliance .       SMOKING COUNSELING: denies     She will follow up in 6 months, unless she needs to be seen sooner.    Sincerely,   ELIAS Ellis  [facility]  11/11/20  0935    EMR Dragon/Transcription disclaimer:   Much of this encounter note is an electronic transcription/translation of spoken language to printed text. The electronic translation of spoken language may permit erroneous, or at times, nonsensical words or phrases to be inadvertently transcribed; Although I have reviewed the note for such errors, some may still exist.

## 2020-12-09 ENCOUNTER — LAB REQUISITION (OUTPATIENT)
Dept: LAB | Facility: HOSPITAL | Age: 60
End: 2020-12-09

## 2020-12-09 DIAGNOSIS — Z00.00 ENCOUNTER FOR GENERAL ADULT MEDICAL EXAMINATION WITHOUT ABNORMAL FINDINGS: ICD-10-CM

## 2020-12-09 PROCEDURE — U0004 COV-19 TEST NON-CDC HGH THRU: HCPCS | Performed by: INTERNAL MEDICINE

## 2020-12-10 LAB — SARS-COV-2 RNA RESP QL NAA+PROBE: NOT DETECTED

## 2020-12-14 ENCOUNTER — AMBULATORY SURGICAL CENTER (OUTPATIENT)
Dept: URBAN - METROPOLITAN AREA SURGERY 20 | Facility: SURGERY | Age: 60
End: 2020-12-14

## 2020-12-14 ENCOUNTER — OFFICE (OUTPATIENT)
Dept: URBAN - METROPOLITAN AREA PATHOLOGY 4 | Facility: PATHOLOGY | Age: 60
End: 2020-12-14

## 2020-12-14 VITALS — HEIGHT: 65 IN

## 2020-12-14 DIAGNOSIS — K31.819 ANGIODYSPLASIA OF STOMACH AND DUODENUM WITHOUT BLEEDING: ICD-10-CM

## 2020-12-14 DIAGNOSIS — D50.9 IRON DEFICIENCY ANEMIA, UNSPECIFIED: ICD-10-CM

## 2020-12-14 DIAGNOSIS — K21.9 GASTRO-ESOPHAGEAL REFLUX DISEASE WITHOUT ESOPHAGITIS: ICD-10-CM

## 2020-12-14 DIAGNOSIS — K31.89 OTHER DISEASES OF STOMACH AND DUODENUM: ICD-10-CM

## 2020-12-14 DIAGNOSIS — K29.50 UNSPECIFIED CHRONIC GASTRITIS WITHOUT BLEEDING: ICD-10-CM

## 2020-12-14 DIAGNOSIS — K64.1 SECOND DEGREE HEMORRHOIDS: ICD-10-CM

## 2020-12-14 DIAGNOSIS — K57.30 DIVERTICULOSIS OF LARGE INTESTINE WITHOUT PERFORATION OR ABS: ICD-10-CM

## 2020-12-14 DIAGNOSIS — R19.7 DIARRHEA, UNSPECIFIED: ICD-10-CM

## 2020-12-14 LAB
GI HISTOLOGY: A. SELECT: (no result)
GI HISTOLOGY: B. SELECT: (no result)
GI HISTOLOGY: C. SELECT: (no result)
GI HISTOLOGY: PDF REPORT: (no result)

## 2020-12-14 PROCEDURE — 88342 IMHCHEM/IMCYTCHM 1ST ANTB: CPT | Performed by: INTERNAL MEDICINE

## 2020-12-14 PROCEDURE — 45378 DIAGNOSTIC COLONOSCOPY: CPT | Performed by: INTERNAL MEDICINE

## 2020-12-14 PROCEDURE — 43239 EGD BIOPSY SINGLE/MULTIPLE: CPT | Performed by: INTERNAL MEDICINE

## 2020-12-14 PROCEDURE — 88305 TISSUE EXAM BY PATHOLOGIST: CPT | Performed by: INTERNAL MEDICINE

## 2021-03-26 ENCOUNTER — BULK ORDERING (OUTPATIENT)
Dept: CASE MANAGEMENT | Facility: OTHER | Age: 61
End: 2021-03-26

## 2021-03-26 DIAGNOSIS — Z23 IMMUNIZATION DUE: ICD-10-CM

## 2021-05-13 ENCOUNTER — OFFICE VISIT (OUTPATIENT)
Dept: CARDIOLOGY | Facility: CLINIC | Age: 61
End: 2021-05-13

## 2021-05-13 VITALS
DIASTOLIC BLOOD PRESSURE: 81 MMHG | HEIGHT: 65 IN | WEIGHT: 283 LBS | SYSTOLIC BLOOD PRESSURE: 127 MMHG | HEART RATE: 85 BPM | BODY MASS INDEX: 47.15 KG/M2

## 2021-05-13 DIAGNOSIS — R07.2 PRECORDIAL PAIN: Primary | ICD-10-CM

## 2021-05-13 DIAGNOSIS — E78.5 HYPERLIPIDEMIA, UNSPECIFIED HYPERLIPIDEMIA TYPE: ICD-10-CM

## 2021-05-13 DIAGNOSIS — I10 ESSENTIAL HYPERTENSION: ICD-10-CM

## 2021-05-13 PROCEDURE — 99213 OFFICE O/P EST LOW 20 MIN: CPT | Performed by: INTERNAL MEDICINE

## 2021-05-13 PROCEDURE — 93000 ELECTROCARDIOGRAM COMPLETE: CPT | Performed by: INTERNAL MEDICINE

## 2021-08-10 ENCOUNTER — OFFICE (OUTPATIENT)
Dept: URBAN - METROPOLITAN AREA CLINIC 66 | Facility: CLINIC | Age: 61
End: 2021-08-10

## 2021-08-10 VITALS
SYSTOLIC BLOOD PRESSURE: 136 MMHG | HEIGHT: 65 IN | WEIGHT: 281 LBS | DIASTOLIC BLOOD PRESSURE: 78 MMHG | HEART RATE: 93 BPM

## 2021-08-10 DIAGNOSIS — K91.5 POSTCHOLECYSTECTOMY SYNDROME: ICD-10-CM

## 2021-08-10 DIAGNOSIS — R14.2 ERUCTATION: ICD-10-CM

## 2021-08-10 DIAGNOSIS — K21.9 GASTRO-ESOPHAGEAL REFLUX DISEASE WITHOUT ESOPHAGITIS: ICD-10-CM

## 2021-08-10 DIAGNOSIS — R11.0 NAUSEA: ICD-10-CM

## 2021-08-10 DIAGNOSIS — R14.0 ABDOMINAL DISTENSION (GASEOUS): ICD-10-CM

## 2021-08-10 DIAGNOSIS — D50.9 IRON DEFICIENCY ANEMIA, UNSPECIFIED: ICD-10-CM

## 2021-08-10 PROCEDURE — 99213 OFFICE O/P EST LOW 20 MIN: CPT | Performed by: NURSE PRACTITIONER

## 2021-08-10 RX ORDER — FAMOTIDINE 40 MG/1
40 TABLET, FILM COATED ORAL
Qty: 30 | Refills: 11 | Status: ACTIVE
Start: 2021-08-10

## 2021-10-22 ENCOUNTER — IMMUNIZATION (OUTPATIENT)
Dept: VACCINE CLINIC | Facility: HOSPITAL | Age: 61
End: 2021-10-22

## 2021-10-22 PROCEDURE — 0004A ADM SARSCOV2 30MCG/0.3ML BOOSTER: CPT | Performed by: INTERNAL MEDICINE

## 2021-10-22 PROCEDURE — 91300 HC SARSCOV02 VAC 30MCG/0.3ML IM: CPT | Performed by: INTERNAL MEDICINE

## 2022-05-11 ENCOUNTER — OFFICE VISIT (OUTPATIENT)
Dept: CARDIOLOGY | Facility: CLINIC | Age: 62
End: 2022-05-11

## 2022-05-11 VITALS
DIASTOLIC BLOOD PRESSURE: 87 MMHG | SYSTOLIC BLOOD PRESSURE: 143 MMHG | HEART RATE: 80 BPM | BODY MASS INDEX: 46.48 KG/M2 | HEIGHT: 65 IN | WEIGHT: 279 LBS

## 2022-05-11 DIAGNOSIS — E78.5 HYPERLIPIDEMIA, UNSPECIFIED HYPERLIPIDEMIA TYPE: Primary | ICD-10-CM

## 2022-05-11 DIAGNOSIS — I10 ESSENTIAL HYPERTENSION: ICD-10-CM

## 2022-05-11 PROCEDURE — 99213 OFFICE O/P EST LOW 20 MIN: CPT | Performed by: NURSE PRACTITIONER

## 2022-05-11 PROCEDURE — 93000 ELECTROCARDIOGRAM COMPLETE: CPT | Performed by: NURSE PRACTITIONER

## 2022-05-11 RX ORDER — LEVOTHYROXINE SODIUM 137 UG/1
TABLET ORAL
COMMUNITY
Start: 2022-03-25

## 2022-05-11 NOTE — PROGRESS NOTES
Subjective:        Tiana Hudson is a 61 y.o. female who here for follow up    No chief complaint on file.    Hypertension    HPI    Tiana Hudson is a 61-year-old female, who is established with this provider.  She has a history to include depression, diabetes mellitus, history of gallstones, hypertension, per lipidemia as well as hypothyroidism.      Echo in  showed EF 69%, and no evidence of pericardial effusion.  Previous stress test was a negative study.    The following portions of the patient's history were reviewed and updated as appropriate: allergies, current medications, past family history, past medical history, past social history, past surgical history and problem list.    Past Medical History:   Diagnosis Date   • Anemia     intermittently   • Anxiety and depression    • Arthritis    • Depression    • Diabetes mellitus (CMS/HCC)    • Gallstones    • High cholesterol    • History of frequent urinary tract infections     HX OF YEAST IN BLADDER HAS PRN DIFLUCAN   • History of transfusion 2017    Had 2 iron infusions.  This was when i had knee replacement   • Hyperlipidemia    • Hypertension    • Hypothyroidism    • Knee pain, bilateral    • Low back pain    • Lumbar stenosis    • PONV (postoperative nausea and vomiting)    • Positive TB test     chest x-ray neg   • Seasonal allergies          reports that she has never smoked. She has never used smokeless tobacco. She reports current alcohol use of about 3.0 standard drinks of alcohol per week. She reports that she does not use drugs.     Family History   Problem Relation Age of Onset   • Hepatitis Mother    • Breast cancer Mother    • Heart disease Mother    • Cancer Mother    • Hyperlipidemia Mother             • Heart failure Father    • Stroke Father    • Hypertension Father         Since he was 72, now 86s   • Diabetes Father    • Heart disease Father    • Hyperlipidemia Father         Unsurs   • Heart disease Maternal  Grandmother    • Cancer Maternal Grandfather    • COPD Maternal Grandfather    • Arthritis Paternal Grandmother    • Diabetes Maternal Aunt    • Diabetes Paternal Uncle    • Malig Hyperthermia Neg Hx        ROS     Review of Systems  Constitutional: No wt loss, fever, fatigue  Gastrointestinal: No nausea, abdominal pain  Behavioral/Psych: No insomnia or anxiety  Cardiovascular : Denies chest pain, shortness of breath      Objective:           Vitals and nursing note reviewed.   Constitutional:       Appearance: Well-developed.   HENT:      Head: Normocephalic.      Right Ear: External ear normal.      Left Ear: External ear normal.   Neck:      Vascular: No JVD.   Pulmonary:      Effort: Pulmonary effort is normal. No respiratory distress.      Breath sounds: Normal breath sounds. No stridor. No rales.   Cardiovascular:      Normal rate. Regular rhythm.      No gallop.   Pulses:     Intact distal pulses.   Abdominal:      General: Bowel sounds are normal. There is no distension.      Palpations: Abdomen is soft.      Tenderness: There is no abdominal tenderness. There is no guarding.   Musculoskeletal: Normal range of motion.         General: No tenderness.      Cervical back: Normal range of motion. Skin:     General: Skin is warm.   Neurological:      Mental Status: Alert and oriented to person, place, and time.      Deep Tendon Reflexes: Reflexes are normal and symmetric.   Psychiatric:         Judgment: Judgment normal.           ECG 12 Lead    Date/Time: 5/11/2022 9:50 AM  Performed by: Misty Wilburn APRN  Authorized by: Misty Wilburn APRN   Comparison: compared with previous ECG from 5/13/2021  Rhythm: sinus rhythm  Rate: normal  BPM: 79    Clinical impression: non-specific ECG        Interpretation Summary    · Saline study neg for PFO  · Calculated EF = 69%  · There is no evidence of pericardial effusion.       Interpretation Summary       · BMI is 49.2 kg/m2..  · Findings consistent with an  equivocal ECG stress test.  · Left ventricular ejection fraction is normal (Calculated EF = 66%).  · Myocardial perfusion imaging indicates a normal myocardial perfusion study with no evidence of ischemia.  · Impressions are consistent with a low risk study.     Asymptomatic for chest pain. Specificity of study reduced secondary to baseline Non-specific ST-T wave abnormalities,. ECG is  equivocal for  suggestion of ischemia  Ectopy: none.  Blood pressure response: Baseline Hypertensive  136/76, Peak 150/66  Recovery:160//82  1 min  160/60  2 min  154/68  3 min  140/82  4 min  136/82  5 min  140/77      Initial procedure was to be a Walking Lexiscan due to c/o of knee pain.  However at 0.8 MPH and no grade, reached 85% MPH in 2 minutes.   Exercise tolerance is very poor, Reached 19/20 Nicholas Scale in 2 1/2 minutes.     Supervised by:  Misty GRIFFIN.         Interpretation Summary       · BMI is 49.2 kg/m2..  · Findings consistent with an equivocal ECG stress test.  · Left ventricular ejection fraction is normal (Calculated EF = 66%).  · Myocardial perfusion imaging indicates a normal myocardial perfusion study with no evidence of ischemia.  · Impressions are consistent with a low risk study.     Asymptomatic for chest pain. Specificity of study reduced secondary to baseline Non-specific ST-T wave abnormalities,. ECG is  equivocal for  suggestion of ischemia  Ectopy: none.  Blood pressure response: Baseline Hypertensive  136/76, Peak 150/66  Recovery:160//82  1 min  160/60  2 min  154/68  3 min  140/82  4 min  136/82  5 min  140/77      Initial procedure was to be a Walking Lexiscan due to c/o of knee pain.  However at 0.8 MPH and no grade, reached 85% MPH in 2 minutes.   Exercise tolerance is very poor, Reached 19/20 Nicholas Scale in 2 1/2 minutes.     Supervised by:  Misty GRIFFIN.          Interpretation Summary    · Saline study neg for PFO  · Calculated EF = 69%  · There is no  evidence of pericardial effusion.           Current Outpatient Medications:   •  amLODIPine-benazepril (LOTREL 5-20) 5-20 MG per capsule, Take 1 capsule by mouth daily. (Patient taking differently: Take 1 capsule by mouth Every Morning.), Disp: 90 capsule, Rfl: 3  •  diclofenac (VOLTAREN) 50 MG EC tablet, Take 1 tablet by mouth 2 (Two) Times a Day As Needed (pain)., Disp: 60 tablet, Rfl: 2  •  Dulaglutide 1.5 MG/0.5ML solution pen-injector, Inject  under the skin into the appropriate area as directed 1 (One) Time Per Week. SUNDAY, Disp: , Rfl:   •  escitalopram (LEXAPRO) 10 MG tablet, Take 10 mg by mouth Daily., Disp: , Rfl:   •  ferrous sulfate 324 (65 Fe) MG tablet delayed-release EC tablet, Take 324 mg by mouth Daily With Breakfast., Disp: , Rfl:   •  furosemide (LASIX) 20 MG tablet, Take 10 mg by mouth Daily As Needed., Disp: , Rfl: 1  •  levothyroxine (SYNTHROID, LEVOTHROID) 125 MCG tablet, Take 1 tablet by mouth daily., Disp: 90 tablet, Rfl: 3  •  loratadine (CLARITIN) 10 MG tablet, Take 10 mg by mouth Daily., Disp: , Rfl:   •  metFORMIN (GLUCOPHAGE) 500 MG tablet, Take 1 tablet by mouth 2 (Two) Times a Day With Meals., Disp: 360 tablet, Rfl: 0  •  multivitamin (MULTIPLE VITAMIN PO), Take  by mouth Daily., Disp: , Rfl:   •  oxybutynin XL (DITROPAN-XL) 10 MG 24 hr tablet, Take 10 mg by mouth Daily., Disp: , Rfl:   •  potassium chloride (K-DUR,KLOR-CON) 20 MEQ CR tablet, Take 20 mEq by mouth As Needed (only when takin Lasix)., Disp: , Rfl:   •  pravastatin (PRAVACHOL) 40 MG tablet, Take 1 tablet by mouth daily., Disp: 90 tablet, Rfl: 3  •  rOPINIRole (REQUIP) 0.5 MG tablet, Take 1 tablet by mouth every night at bedtime., Disp: 30 tablet, Rfl: 0  •  tiZANidine (ZANAFLEX) 4 MG tablet, Take 4 mg by mouth At Night As Needed for Muscle Spasms., Disp: , Rfl:   •  traMADol (ULTRAM) 50 MG tablet, Take 25 mg by mouth 2 (Two) Times a Day As Needed. for pain, Disp: , Rfl:   •  uribel (URO-MP) 118 MG capsule capsule, Take 1  capsule by mouth 3 (Three) Times a Day., Disp: , Rfl: 2     Assessment:        Patient Active Problem List   Diagnosis   • Primary osteoarthritis of right knee   • UTI (urinary tract infection)   • Varicose veins   • Contact dermatitis   • Elevated liver function tests   • Leg cramp   • Medial collateral ligament sprain of knee   • Vitamin D deficiency   • Essential hypertension   • Hyperlipidemia   • Abrasion   • Hypothyroidism   • Gestational diabetes   • Morbid obesity with BMI of 40.0-44.9, adult (HCC)   • Allergic rhinitis   • Back pain   • Anxiety   • H/O Postconcussion syndrome   • H/O dizziness   • Allergy   • Vaginal candidiasis   • Chronic pain of left knee   • Arthritis of both knees   • Inflammation of bladder severe, acute and chronic, with mucosal erosions/ulcers   • Dense breast tissue on mammogram   • Dizziness   • Restless leg syndrome   • Type 2 diabetes mellitus without complication ([facility])   • Arthritis of right knee   • Status post total right knee replacement   • Arthritis of left knee   • History of total knee arthroplasty   • Acquired spondylolisthesis   • Screening for colorectal cancer   • Precordial pain   • FH ischemic heart disease               Plan:   1.  Hypertension: She states her blood pressure is usually 120s over 80s.  It is slightly elevated today will not make any changes today.  We will continue to monitor her blood pressure at home.    Educated patient on exercising for at least 30 minutes a day for 2 to 3 days a week. Importance of controlling hypertension and blood pressure checkup on the regular basis has been explained. Hypertension as a silent killer has been discussed. Risk reduction of the weight and regular exercises to control the hypertension has been explained.    2.  Hyperlipidemia: Her primary care physician manages her cholesterol and her lab work.      Risk of the hyperlipidemia, importance of the treatment has been explained. Pros and cons of the statins has  been explained. Regular blood workup as well as side effects including the liver failure, myelopathy death has been explained.    3.  History of precordial pain it was noted to be atypical in nature and her testing had been negative.             No diagnosis found.    There are no diagnoses linked to this encounter.    COUNSELING: jannet Martinez was given to patient for the following topics: diagnostic results, risk factor reductions, impressions, risks and benefits of treatment options and importance of treatment compliance .       SMOKING COUNSELING: denies    She will follow up in 1 year, unless she needs to be seen sooner.    Sincerely,   ELIAS Ellis  [facility]  05/11/22  0935    EMR Dragon/Transcription disclaimer:   Much of this encounter note is an electronic transcription/translation of spoken language to printed text. The electronic translation of spoken language may permit erroneous, or at times, nonsensical words or phrases to be inadvertently transcribed; Although I have reviewed the note for such errors, some may still exist.

## 2023-03-08 ENCOUNTER — TRANSCRIBE ORDERS (OUTPATIENT)
Dept: ADMINISTRATIVE | Facility: HOSPITAL | Age: 63
End: 2023-03-08
Payer: COMMERCIAL

## 2023-03-08 DIAGNOSIS — Z12.39 BREAST SCREENING: Primary | ICD-10-CM

## 2023-03-08 DIAGNOSIS — Z13.820 OSTEOPOROSIS SCREENING: ICD-10-CM

## 2024-01-02 ENCOUNTER — HOSPITAL ENCOUNTER (INPATIENT)
Facility: HOSPITAL | Age: 64
LOS: 17 days | Discharge: HOME HEALTH | DRG: 853 | End: 2024-01-19
Attending: STUDENT IN AN ORGANIZED HEALTH CARE EDUCATION/TRAINING PROGRAM
Payer: COMMERCIAL

## 2024-01-02 ENCOUNTER — APPOINTMENT (OUTPATIENT)
Dept: CT IMAGING | Facility: HOSPITAL | Age: 64
DRG: 853 | End: 2024-01-02
Payer: COMMERCIAL

## 2024-01-02 ENCOUNTER — APPOINTMENT (OUTPATIENT)
Dept: GENERAL RADIOLOGY | Facility: HOSPITAL | Age: 64
DRG: 853 | End: 2024-01-02
Payer: COMMERCIAL

## 2024-01-02 DIAGNOSIS — R25.2 LEG CRAMP: ICD-10-CM

## 2024-01-02 DIAGNOSIS — R42 DIZZINESS: ICD-10-CM

## 2024-01-02 DIAGNOSIS — M25.562 CHRONIC PAIN OF LEFT KNEE: ICD-10-CM

## 2024-01-02 DIAGNOSIS — M54.41 RIGHT-SIDED LOW BACK PAIN WITH RIGHT-SIDED SCIATICA, UNSPECIFIED CHRONICITY: Primary | ICD-10-CM

## 2024-01-02 DIAGNOSIS — E55.9 VITAMIN D DEFICIENCY: ICD-10-CM

## 2024-01-02 DIAGNOSIS — R79.89 ELEVATED LIVER FUNCTION TESTS: ICD-10-CM

## 2024-01-02 DIAGNOSIS — Z96.651 STATUS POST TOTAL RIGHT KNEE REPLACEMENT: ICD-10-CM

## 2024-01-02 DIAGNOSIS — F41.9 ANXIETY: ICD-10-CM

## 2024-01-02 DIAGNOSIS — N13.30 BILATERAL HYDRONEPHROSIS: ICD-10-CM

## 2024-01-02 DIAGNOSIS — F07.81 POSTCONCUSSION SYNDROME: ICD-10-CM

## 2024-01-02 DIAGNOSIS — K76.9 CHRONIC LIVER DISEASE: ICD-10-CM

## 2024-01-02 DIAGNOSIS — E11.9 TYPE 2 DIABETES MELLITUS WITHOUT COMPLICATION, WITHOUT LONG-TERM CURRENT USE OF INSULIN: ICD-10-CM

## 2024-01-02 DIAGNOSIS — B37.31 VAGINAL CANDIDIASIS: ICD-10-CM

## 2024-01-02 DIAGNOSIS — E11.10 DIABETIC KETOACIDOSIS WITHOUT COMA ASSOCIATED WITH TYPE 2 DIABETES MELLITUS: ICD-10-CM

## 2024-01-02 DIAGNOSIS — M17.11 ARTHRITIS OF RIGHT KNEE: ICD-10-CM

## 2024-01-02 DIAGNOSIS — E87.20 LACTIC ACIDOSIS: ICD-10-CM

## 2024-01-02 DIAGNOSIS — A41.9 SEPSIS, DUE TO UNSPECIFIED ORGANISM, UNSPECIFIED WHETHER ACUTE ORGAN DYSFUNCTION PRESENT: ICD-10-CM

## 2024-01-02 DIAGNOSIS — N17.9 AKI (ACUTE KIDNEY INJURY): ICD-10-CM

## 2024-01-02 DIAGNOSIS — Z96.653 HISTORY OF TOTAL BILATERAL KNEE REPLACEMENT: ICD-10-CM

## 2024-01-02 DIAGNOSIS — M17.0 ARTHRITIS OF BOTH KNEES: ICD-10-CM

## 2024-01-02 DIAGNOSIS — M17.12 ARTHRITIS OF LEFT KNEE: ICD-10-CM

## 2024-01-02 DIAGNOSIS — Z12.11 SCREENING FOR COLORECTAL CANCER: ICD-10-CM

## 2024-01-02 DIAGNOSIS — N30.00 ACUTE CYSTITIS WITHOUT HEMATURIA: ICD-10-CM

## 2024-01-02 DIAGNOSIS — G89.29 CHRONIC PAIN OF LEFT KNEE: ICD-10-CM

## 2024-01-02 DIAGNOSIS — Z82.49 FH ISCHEMIC HEART DISEASE: ICD-10-CM

## 2024-01-02 DIAGNOSIS — A41.9 SEPSIS WITHOUT ACUTE ORGAN DYSFUNCTION, DUE TO UNSPECIFIED ORGANISM: ICD-10-CM

## 2024-01-02 DIAGNOSIS — Z87.898 H/O DIZZINESS: ICD-10-CM

## 2024-01-02 DIAGNOSIS — M17.11 PRIMARY OSTEOARTHRITIS OF RIGHT KNEE: ICD-10-CM

## 2024-01-02 DIAGNOSIS — N43.3 BILATERAL HYDROCELE: ICD-10-CM

## 2024-01-02 DIAGNOSIS — M43.10 ACQUIRED SPONDYLOLISTHESIS: ICD-10-CM

## 2024-01-02 DIAGNOSIS — N30.90 INFLAMMATION OF BLADDER: ICD-10-CM

## 2024-01-02 DIAGNOSIS — N13.5 UPJ (URETEROPELVIC JUNCTION) OBSTRUCTION: ICD-10-CM

## 2024-01-02 DIAGNOSIS — R07.2 PRECORDIAL PAIN: ICD-10-CM

## 2024-01-02 DIAGNOSIS — I10 ESSENTIAL HYPERTENSION: ICD-10-CM

## 2024-01-02 DIAGNOSIS — E66.01 MORBID OBESITY WITH BMI OF 40.0-44.9, ADULT: ICD-10-CM

## 2024-01-02 DIAGNOSIS — T14.8XXA ABRASION: ICD-10-CM

## 2024-01-02 DIAGNOSIS — G25.81 RESTLESS LEG SYNDROME: ICD-10-CM

## 2024-01-02 DIAGNOSIS — N20.0 RENAL CALCULUS, RIGHT: ICD-10-CM

## 2024-01-02 DIAGNOSIS — Z12.12 SCREENING FOR COLORECTAL CANCER: ICD-10-CM

## 2024-01-02 LAB
ALBUMIN SERPL-MCNC: 1.8 G/DL (ref 3.5–5.2)
ALBUMIN SERPL-MCNC: 2.4 G/DL (ref 3.5–5.2)
ALBUMIN/GLOB SERPL: 0.4 G/DL
ALBUMIN/GLOB SERPL: 0.5 G/DL
ALP SERPL-CCNC: 188 U/L (ref 39–117)
ALP SERPL-CCNC: 220 U/L (ref 39–117)
ALT SERPL W P-5'-P-CCNC: 16 U/L (ref 1–33)
ALT SERPL W P-5'-P-CCNC: 16 U/L (ref 1–33)
ANION GAP SERPL CALCULATED.3IONS-SCNC: 15.9 MMOL/L (ref 5–15)
ANION GAP SERPL CALCULATED.3IONS-SCNC: 18.3 MMOL/L (ref 5–15)
ANION GAP SERPL CALCULATED.3IONS-SCNC: 23.3 MMOL/L (ref 5–15)
ANISOCYTOSIS BLD QL: ABNORMAL
AST SERPL-CCNC: 21 U/L (ref 1–32)
AST SERPL-CCNC: 33 U/L (ref 1–32)
ATMOSPHERIC PRESS: 754.5 MMHG
BACTERIA UR QL AUTO: ABNORMAL /HPF
BASE EXCESS BLDV CALC-SCNC: -5.4 MMOL/L (ref -2–2)
BILIRUB SERPL-MCNC: 0.3 MG/DL (ref 0–1.2)
BILIRUB SERPL-MCNC: 0.5 MG/DL (ref 0–1.2)
BILIRUB UR QL STRIP: NEGATIVE
BUN SERPL-MCNC: 17 MG/DL (ref 8–23)
BUN SERPL-MCNC: 19 MG/DL (ref 8–23)
BUN SERPL-MCNC: 23 MG/DL (ref 8–23)
BUN/CREAT SERPL: 12.6 (ref 7–25)
BUN/CREAT SERPL: 14.3 (ref 7–25)
BUN/CREAT SERPL: 14.8 (ref 7–25)
CALCIUM SPEC-SCNC: 8.7 MG/DL (ref 8.6–10.5)
CALCIUM SPEC-SCNC: 8.7 MG/DL (ref 8.6–10.5)
CALCIUM SPEC-SCNC: 9.4 MG/DL (ref 8.6–10.5)
CHLORIDE SERPL-SCNC: 76 MMOL/L (ref 98–107)
CHLORIDE SERPL-SCNC: 91 MMOL/L (ref 98–107)
CHLORIDE SERPL-SCNC: 96 MMOL/L (ref 98–107)
CLARITY UR: ABNORMAL
CO2 BLDA-SCNC: 18.9 MMOL/L (ref 23–27)
CO2 SERPL-SCNC: 15.7 MMOL/L (ref 22–29)
CO2 SERPL-SCNC: 15.7 MMOL/L (ref 22–29)
CO2 SERPL-SCNC: 19.1 MMOL/L (ref 22–29)
COLOR UR: YELLOW
CREAT SERPL-MCNC: 1.15 MG/DL (ref 0.57–1)
CREAT SERPL-MCNC: 1.33 MG/DL (ref 0.57–1)
CREAT SERPL-MCNC: 1.82 MG/DL (ref 0.57–1)
D-LACTATE SERPL-SCNC: 2.6 MMOL/L (ref 0.5–2)
D-LACTATE SERPL-SCNC: 3.2 MMOL/L (ref 0.5–2)
D-LACTATE SERPL-SCNC: 6 MMOL/L (ref 0.5–2)
DEPRECATED RDW RBC AUTO: 40.2 FL (ref 37–54)
DEPRECATED RDW RBC AUTO: 42.8 FL (ref 37–54)
DEVICE COMMENT: ABNORMAL
EGFRCR SERPLBLD CKD-EPI 2021: 31.3 ML/MIN/1.73
EGFRCR SERPLBLD CKD-EPI 2021: 45.6 ML/MIN/1.73
EGFRCR SERPLBLD CKD-EPI 2021: 54.3 ML/MIN/1.73
ERYTHROCYTE [DISTWIDTH] IN BLOOD BY AUTOMATED COUNT: 13 % (ref 12.3–15.4)
ERYTHROCYTE [DISTWIDTH] IN BLOOD BY AUTOMATED COUNT: 13.1 % (ref 12.3–15.4)
GEN 5 2HR TROPONIN T REFLEX: <6 NG/L
GLOBULIN UR ELPH-MCNC: 5 GM/DL
GLOBULIN UR ELPH-MCNC: 5.1 GM/DL
GLUCOSE BLDC GLUCOMTR-MCNC: 288 MG/DL (ref 70–130)
GLUCOSE BLDC GLUCOMTR-MCNC: 325 MG/DL (ref 70–130)
GLUCOSE BLDC GLUCOMTR-MCNC: 363 MG/DL (ref 70–130)
GLUCOSE BLDC GLUCOMTR-MCNC: 379 MG/DL (ref 70–130)
GLUCOSE BLDC GLUCOMTR-MCNC: 410 MG/DL (ref 70–130)
GLUCOSE BLDC GLUCOMTR-MCNC: 493 MG/DL (ref 70–130)
GLUCOSE BLDC GLUCOMTR-MCNC: 591 MG/DL (ref 70–130)
GLUCOSE SERPL-MCNC: 411 MG/DL (ref 65–99)
GLUCOSE SERPL-MCNC: 477 MG/DL (ref 65–99)
GLUCOSE SERPL-MCNC: 695 MG/DL (ref 65–99)
GLUCOSE SERPL-MCNC: 978 MG/DL (ref 65–99)
GLUCOSE UR STRIP-MCNC: ABNORMAL MG/DL
HBA1C MFR BLD: 16.9 % (ref 4.8–5.6)
HBA1C MFR BLD: 17.7 % (ref 4.8–5.6)
HCO3 BLDV-SCNC: 18 MMOL/L (ref 22–28)
HCT VFR BLD AUTO: 31.7 % (ref 34–46.6)
HCT VFR BLD AUTO: 38.6 % (ref 34–46.6)
HGB BLD-MCNC: 10.5 G/DL (ref 12–15.9)
HGB BLD-MCNC: 11.2 G/DL (ref 12–15.9)
HGB UR QL STRIP.AUTO: ABNORMAL
HOLD SPECIMEN: NORMAL
HOLD SPECIMEN: NORMAL
HYALINE CASTS UR QL AUTO: ABNORMAL /LPF
KETONES UR QL STRIP: ABNORMAL
LEUKOCYTE ESTERASE UR QL STRIP.AUTO: ABNORMAL
LIPASE SERPL-CCNC: 23 U/L (ref 13–60)
LYMPHOCYTES # BLD MANUAL: 0.4 10*3/MM3 (ref 0.7–3.1)
LYMPHOCYTES # BLD MANUAL: 0.69 10*3/MM3 (ref 0.7–3.1)
LYMPHOCYTES NFR BLD MANUAL: 16.2 % (ref 5–12)
LYMPHOCYTES NFR BLD MANUAL: 4 % (ref 5–12)
MACROCYTES BLD QL SMEAR: ABNORMAL
MAGNESIUM SERPL-MCNC: 1.9 MG/DL (ref 1.6–2.4)
MAGNESIUM SERPL-MCNC: 1.9 MG/DL (ref 1.6–2.4)
MAGNESIUM SERPL-MCNC: 2 MG/DL (ref 1.6–2.4)
MAGNESIUM SERPL-MCNC: 2 MG/DL (ref 1.6–2.4)
MCH RBC QN AUTO: 26.5 PG (ref 26.6–33)
MCH RBC QN AUTO: 28.1 PG (ref 26.6–33)
MCHC RBC AUTO-ENTMCNC: 29 G/DL (ref 31.5–35.7)
MCHC RBC AUTO-ENTMCNC: 33.1 G/DL (ref 31.5–35.7)
MCV RBC AUTO: 84.8 FL (ref 79–97)
MCV RBC AUTO: 91.5 FL (ref 79–97)
MODALITY: ABNORMAL
MONOCYTES # BLD: 0.69 10*3/MM3 (ref 0.1–0.9)
MONOCYTES # BLD: 3.26 10*3/MM3 (ref 0.1–0.9)
NEUTROPHILS # BLD AUTO: 15.9 10*3/MM3 (ref 1.7–7)
NEUTROPHILS # BLD AUTO: 16.46 10*3/MM3 (ref 1.7–7)
NEUTROPHILS NFR BLD MANUAL: 81.8 % (ref 42.7–76)
NEUTROPHILS NFR BLD MANUAL: 91.9 % (ref 42.7–76)
NITRITE UR QL STRIP: NEGATIVE
NRBC BLD AUTO-RTO: 0 /100 WBC (ref 0–0.2)
NRBC BLD AUTO-RTO: 0 /100 WBC (ref 0–0.2)
OSMOLALITY SERPL: 295 MOSM/KG (ref 280–301)
OSMOLALITY SERPL: 317 MOSM/KG (ref 280–301)
PCO2 BLDV: 28.9 MM HG (ref 41–51)
PH BLDV: 7.4 PH UNITS (ref 7.31–7.41)
PH UR STRIP.AUTO: <=5 [PH] (ref 5–8)
PHOSPHATE SERPL-MCNC: 2.7 MG/DL (ref 2.5–4.5)
PHOSPHATE SERPL-MCNC: 3.4 MG/DL (ref 2.5–4.5)
PHOSPHATE SERPL-MCNC: 4.2 MG/DL (ref 2.5–4.5)
PHOSPHATE SERPL-MCNC: 4.6 MG/DL (ref 2.5–4.5)
PLAT MORPH BLD: NORMAL
PLAT MORPH BLD: NORMAL
PLATELET # BLD AUTO: 480 10*3/MM3 (ref 140–450)
PLATELET # BLD AUTO: 568 10*3/MM3 (ref 140–450)
PMV BLD AUTO: 10.7 FL (ref 6–12)
PMV BLD AUTO: 11.4 FL (ref 6–12)
PO2 BLDV: 31.7 MM HG (ref 35–45)
POIKILOCYTOSIS BLD QL SMEAR: ABNORMAL
POIKILOCYTOSIS BLD QL SMEAR: ABNORMAL
POLYCHROMASIA BLD QL SMEAR: ABNORMAL
POTASSIUM SERPL-SCNC: 3.4 MMOL/L (ref 3.5–5.2)
POTASSIUM SERPL-SCNC: 3.9 MMOL/L (ref 3.5–5.2)
POTASSIUM SERPL-SCNC: 4.2 MMOL/L (ref 3.5–5.2)
PROT SERPL-MCNC: 6.8 G/DL (ref 6–8.5)
PROT SERPL-MCNC: 7.5 G/DL (ref 6–8.5)
PROT UR QL STRIP: ABNORMAL
QT INTERVAL: 359 MS
QTC INTERVAL: 466 MS
RBC # BLD AUTO: 3.74 10*6/MM3 (ref 3.77–5.28)
RBC # BLD AUTO: 4.22 10*6/MM3 (ref 3.77–5.28)
RBC # UR STRIP: ABNORMAL /HPF
REF LAB TEST METHOD: ABNORMAL
SAO2 % BLDCOV: 62.6 % (ref 45–75)
SODIUM SERPL-SCNC: 115 MMOL/L (ref 136–145)
SODIUM SERPL-SCNC: 125 MMOL/L (ref 136–145)
SODIUM SERPL-SCNC: 131 MMOL/L (ref 136–145)
SP GR UR STRIP: 1.03 (ref 1–1.03)
SQUAMOUS #/AREA URNS HPF: ABNORMAL /HPF
TOTAL RATE: 18 BREATHS/MINUTE
TROPONIN T DELTA: NORMAL
TROPONIN T SERPL HS-MCNC: 7 NG/L
UROBILINOGEN UR QL STRIP: ABNORMAL
VARIANT LYMPHS NFR BLD MANUAL: 2 % (ref 19.6–45.3)
VARIANT LYMPHS NFR BLD MANUAL: 4 % (ref 19.6–45.3)
WBC # UR STRIP: ABNORMAL /HPF
WBC MORPH BLD: NORMAL
WBC MORPH BLD: NORMAL
WBC NRBC COR # BLD AUTO: 17.3 10*3/MM3 (ref 3.4–10.8)
WBC NRBC COR # BLD AUTO: 20.12 10*3/MM3 (ref 3.4–10.8)
WHOLE BLOOD HOLD COAG: NORMAL
WHOLE BLOOD HOLD SPECIMEN: NORMAL
YEAST URNS QL MICRO: ABNORMAL /HPF

## 2024-01-02 PROCEDURE — 85007 BL SMEAR W/DIFF WBC COUNT: CPT

## 2024-01-02 PROCEDURE — 83735 ASSAY OF MAGNESIUM: CPT | Performed by: STUDENT IN AN ORGANIZED HEALTH CARE EDUCATION/TRAINING PROGRAM

## 2024-01-02 PROCEDURE — 25010000002 ONDANSETRON PER 1 MG: Performed by: STUDENT IN AN ORGANIZED HEALTH CARE EDUCATION/TRAINING PROGRAM

## 2024-01-02 PROCEDURE — 83036 HEMOGLOBIN GLYCOSYLATED A1C: CPT | Performed by: STUDENT IN AN ORGANIZED HEALTH CARE EDUCATION/TRAINING PROGRAM

## 2024-01-02 PROCEDURE — 84484 ASSAY OF TROPONIN QUANT: CPT | Performed by: STUDENT IN AN ORGANIZED HEALTH CARE EDUCATION/TRAINING PROGRAM

## 2024-01-02 PROCEDURE — 84100 ASSAY OF PHOSPHORUS: CPT | Performed by: INTERNAL MEDICINE

## 2024-01-02 PROCEDURE — 85025 COMPLETE CBC W/AUTO DIFF WBC: CPT

## 2024-01-02 PROCEDURE — 25010000002 ENOXAPARIN PER 10 MG: Performed by: STUDENT IN AN ORGANIZED HEALTH CARE EDUCATION/TRAINING PROGRAM

## 2024-01-02 PROCEDURE — 87086 URINE CULTURE/COLONY COUNT: CPT | Performed by: STUDENT IN AN ORGANIZED HEALTH CARE EDUCATION/TRAINING PROGRAM

## 2024-01-02 PROCEDURE — 87077 CULTURE AEROBIC IDENTIFY: CPT

## 2024-01-02 PROCEDURE — 82948 REAGENT STRIP/BLOOD GLUCOSE: CPT

## 2024-01-02 PROCEDURE — 25010000002 CEFTRIAXONE PER 250 MG: Performed by: STUDENT IN AN ORGANIZED HEALTH CARE EDUCATION/TRAINING PROGRAM

## 2024-01-02 PROCEDURE — 25810000003 SODIUM CHLORIDE 0.9 % SOLUTION: Performed by: INTERNAL MEDICINE

## 2024-01-02 PROCEDURE — 80053 COMPREHEN METABOLIC PANEL: CPT

## 2024-01-02 PROCEDURE — 87186 SC STD MICRODIL/AGAR DIL: CPT

## 2024-01-02 PROCEDURE — 83690 ASSAY OF LIPASE: CPT

## 2024-01-02 PROCEDURE — 85007 BL SMEAR W/DIFF WBC COUNT: CPT | Performed by: INTERNAL MEDICINE

## 2024-01-02 PROCEDURE — 25810000003 SEPSIS FLUID NS 0.9 % SOLUTION: Performed by: STUDENT IN AN ORGANIZED HEALTH CARE EDUCATION/TRAINING PROGRAM

## 2024-01-02 PROCEDURE — 25010000002 PIPERACILLIN SOD-TAZOBACTAM PER 1 G: Performed by: INTERNAL MEDICINE

## 2024-01-02 PROCEDURE — 87040 BLOOD CULTURE FOR BACTERIA: CPT | Performed by: STUDENT IN AN ORGANIZED HEALTH CARE EDUCATION/TRAINING PROGRAM

## 2024-01-02 PROCEDURE — 25010000002 POTASSIUM CHLORIDE PER 2 MEQ: Performed by: INTERNAL MEDICINE

## 2024-01-02 PROCEDURE — 25810000003 SODIUM CHLORIDE 0.9 % SOLUTION: Performed by: STUDENT IN AN ORGANIZED HEALTH CARE EDUCATION/TRAINING PROGRAM

## 2024-01-02 PROCEDURE — 36415 COLL VENOUS BLD VENIPUNCTURE: CPT

## 2024-01-02 PROCEDURE — 81001 URINALYSIS AUTO W/SCOPE: CPT

## 2024-01-02 PROCEDURE — 99291 CRITICAL CARE FIRST HOUR: CPT

## 2024-01-02 PROCEDURE — 83930 ASSAY OF BLOOD OSMOLALITY: CPT | Performed by: INTERNAL MEDICINE

## 2024-01-02 PROCEDURE — 93010 ELECTROCARDIOGRAM REPORT: CPT | Performed by: INTERNAL MEDICINE

## 2024-01-02 PROCEDURE — 25510000001 IOPAMIDOL 61 % SOLUTION: Performed by: STUDENT IN AN ORGANIZED HEALTH CARE EDUCATION/TRAINING PROGRAM

## 2024-01-02 PROCEDURE — 83735 ASSAY OF MAGNESIUM: CPT | Performed by: INTERNAL MEDICINE

## 2024-01-02 PROCEDURE — 85025 COMPLETE CBC W/AUTO DIFF WBC: CPT | Performed by: INTERNAL MEDICINE

## 2024-01-02 PROCEDURE — 82803 BLOOD GASES ANY COMBINATION: CPT

## 2024-01-02 PROCEDURE — 83036 HEMOGLOBIN GLYCOSYLATED A1C: CPT | Performed by: INTERNAL MEDICINE

## 2024-01-02 PROCEDURE — 82947 ASSAY GLUCOSE BLOOD QUANT: CPT | Performed by: STUDENT IN AN ORGANIZED HEALTH CARE EDUCATION/TRAINING PROGRAM

## 2024-01-02 PROCEDURE — 25010000002 MORPHINE PER 10 MG: Performed by: STUDENT IN AN ORGANIZED HEALTH CARE EDUCATION/TRAINING PROGRAM

## 2024-01-02 PROCEDURE — 25010000002 SODIUM CHLORIDE 0.9 % WITH KCL 20 MEQ 20-0.9 MEQ/L-% SOLUTION: Performed by: INTERNAL MEDICINE

## 2024-01-02 PROCEDURE — 83605 ASSAY OF LACTIC ACID: CPT

## 2024-01-02 PROCEDURE — 84100 ASSAY OF PHOSPHORUS: CPT | Performed by: STUDENT IN AN ORGANIZED HEALTH CARE EDUCATION/TRAINING PROGRAM

## 2024-01-02 PROCEDURE — 74177 CT ABD & PELVIS W/CONTRAST: CPT

## 2024-01-02 PROCEDURE — 71045 X-RAY EXAM CHEST 1 VIEW: CPT

## 2024-01-02 PROCEDURE — 93005 ELECTROCARDIOGRAM TRACING: CPT | Performed by: STUDENT IN AN ORGANIZED HEALTH CARE EDUCATION/TRAINING PROGRAM

## 2024-01-02 PROCEDURE — 83930 ASSAY OF BLOOD OSMOLALITY: CPT | Performed by: STUDENT IN AN ORGANIZED HEALTH CARE EDUCATION/TRAINING PROGRAM

## 2024-01-02 RX ORDER — SODIUM CHLORIDE AND POTASSIUM CHLORIDE 300; 900 MG/100ML; MG/100ML
250 INJECTION, SOLUTION INTRAVENOUS CONTINUOUS PRN
Status: DISCONTINUED | OUTPATIENT
Start: 2024-01-02 | End: 2024-01-04

## 2024-01-02 RX ORDER — ENOXAPARIN SODIUM 100 MG/ML
40 INJECTION SUBCUTANEOUS DAILY
Status: DISCONTINUED | OUTPATIENT
Start: 2024-01-03 | End: 2024-01-02 | Stop reason: DRUGHIGH

## 2024-01-02 RX ORDER — MORPHINE SULFATE 2 MG/ML
4 INJECTION, SOLUTION INTRAMUSCULAR; INTRAVENOUS ONCE
Status: COMPLETED | OUTPATIENT
Start: 2024-01-02 | End: 2024-01-02

## 2024-01-02 RX ORDER — SODIUM CHLORIDE 450 MG/100ML
250 INJECTION, SOLUTION INTRAVENOUS CONTINUOUS PRN
Status: DISCONTINUED | OUTPATIENT
Start: 2024-01-02 | End: 2024-01-04

## 2024-01-02 RX ORDER — DEXTROSE MONOHYDRATE, SODIUM CHLORIDE, AND POTASSIUM CHLORIDE 50; 2.98; 4.5 G/1000ML; G/1000ML; G/1000ML
150 INJECTION, SOLUTION INTRAVENOUS CONTINUOUS PRN
Status: DISCONTINUED | OUTPATIENT
Start: 2024-01-02 | End: 2024-01-02 | Stop reason: SDUPTHER

## 2024-01-02 RX ORDER — DEXTROSE MONOHYDRATE, SODIUM CHLORIDE, AND POTASSIUM CHLORIDE 50; 1.49; 9 G/1000ML; G/1000ML; G/1000ML
150 INJECTION, SOLUTION INTRAVENOUS CONTINUOUS PRN
Status: DISCONTINUED | OUTPATIENT
Start: 2024-01-02 | End: 2024-01-02 | Stop reason: SDUPTHER

## 2024-01-02 RX ORDER — IPRATROPIUM BROMIDE AND ALBUTEROL SULFATE 2.5; .5 MG/3ML; MG/3ML
3 SOLUTION RESPIRATORY (INHALATION) EVERY 6 HOURS PRN
Status: DISCONTINUED | OUTPATIENT
Start: 2024-01-02 | End: 2024-01-19 | Stop reason: HOSPADM

## 2024-01-02 RX ORDER — DEXTROSE MONOHYDRATE, SODIUM CHLORIDE, AND POTASSIUM CHLORIDE 50; 2.98; 9 G/1000ML; G/1000ML; G/1000ML
150 INJECTION, SOLUTION INTRAVENOUS CONTINUOUS PRN
Status: DISCONTINUED | OUTPATIENT
Start: 2024-01-02 | End: 2024-01-04

## 2024-01-02 RX ORDER — ONDANSETRON 4 MG/1
4 TABLET, ORALLY DISINTEGRATING ORAL EVERY 6 HOURS PRN
Status: DISCONTINUED | OUTPATIENT
Start: 2024-01-02 | End: 2024-01-19 | Stop reason: HOSPADM

## 2024-01-02 RX ORDER — DEXTROSE MONOHYDRATE 25 G/50ML
10-50 INJECTION, SOLUTION INTRAVENOUS
Status: DISCONTINUED | OUTPATIENT
Start: 2024-01-02 | End: 2024-01-02 | Stop reason: SDUPTHER

## 2024-01-02 RX ORDER — ONDANSETRON 2 MG/ML
4 INJECTION INTRAMUSCULAR; INTRAVENOUS EVERY 6 HOURS PRN
Status: DISCONTINUED | OUTPATIENT
Start: 2024-01-02 | End: 2024-01-19 | Stop reason: HOSPADM

## 2024-01-02 RX ORDER — SODIUM CHLORIDE 9 MG/ML
250 INJECTION, SOLUTION INTRAVENOUS CONTINUOUS PRN
Status: DISCONTINUED | OUTPATIENT
Start: 2024-01-02 | End: 2024-01-04

## 2024-01-02 RX ORDER — NICOTINE POLACRILEX 4 MG
15 LOZENGE BUCCAL
Status: DISCONTINUED | OUTPATIENT
Start: 2024-01-02 | End: 2024-01-04

## 2024-01-02 RX ORDER — ESCITALOPRAM OXALATE 10 MG/1
10 TABLET ORAL DAILY
Status: DISCONTINUED | OUTPATIENT
Start: 2024-01-02 | End: 2024-01-19 | Stop reason: HOSPADM

## 2024-01-02 RX ORDER — BISACODYL 10 MG
10 SUPPOSITORY, RECTAL RECTAL DAILY PRN
Status: DISCONTINUED | OUTPATIENT
Start: 2024-01-02 | End: 2024-01-19 | Stop reason: HOSPADM

## 2024-01-02 RX ORDER — SODIUM CHLORIDE 0.9 % (FLUSH) 0.9 %
10 SYRINGE (ML) INJECTION AS NEEDED
Status: DISCONTINUED | OUTPATIENT
Start: 2024-01-02 | End: 2024-01-06

## 2024-01-02 RX ORDER — SODIUM CHLORIDE 9 MG/ML
40 INJECTION, SOLUTION INTRAVENOUS AS NEEDED
Status: DISCONTINUED | OUTPATIENT
Start: 2024-01-02 | End: 2024-01-06

## 2024-01-02 RX ORDER — BISACODYL 5 MG/1
5 TABLET, DELAYED RELEASE ORAL DAILY PRN
Status: DISCONTINUED | OUTPATIENT
Start: 2024-01-02 | End: 2024-01-19 | Stop reason: HOSPADM

## 2024-01-02 RX ORDER — DEXTROSE AND SODIUM CHLORIDE 5; .9 G/100ML; G/100ML
150 INJECTION, SOLUTION INTRAVENOUS CONTINUOUS PRN
Status: DISCONTINUED | OUTPATIENT
Start: 2024-01-02 | End: 2024-01-02 | Stop reason: SDUPTHER

## 2024-01-02 RX ORDER — ENOXAPARIN SODIUM 100 MG/ML
40 INJECTION SUBCUTANEOUS EVERY 12 HOURS
Status: DISCONTINUED | OUTPATIENT
Start: 2024-01-02 | End: 2024-01-18

## 2024-01-02 RX ORDER — NICOTINE POLACRILEX 4 MG
15 LOZENGE BUCCAL
Status: DISCONTINUED | OUTPATIENT
Start: 2024-01-02 | End: 2024-01-02 | Stop reason: SDUPTHER

## 2024-01-02 RX ORDER — DEXTROSE MONOHYDRATE, SODIUM CHLORIDE, AND POTASSIUM CHLORIDE 50; 1.49; 9 G/1000ML; G/1000ML; G/1000ML
150 INJECTION, SOLUTION INTRAVENOUS CONTINUOUS PRN
Status: DISCONTINUED | OUTPATIENT
Start: 2024-01-02 | End: 2024-01-04

## 2024-01-02 RX ORDER — DEXTROSE MONOHYDRATE, SODIUM CHLORIDE, AND POTASSIUM CHLORIDE 50; 1.49; 4.5 G/1000ML; G/1000ML; G/1000ML
150 INJECTION, SOLUTION INTRAVENOUS CONTINUOUS PRN
Status: DISCONTINUED | OUTPATIENT
Start: 2024-01-02 | End: 2024-01-02 | Stop reason: SDUPTHER

## 2024-01-02 RX ORDER — DEXTROSE MONOHYDRATE, SODIUM CHLORIDE, AND POTASSIUM CHLORIDE 50; 2.98; 4.5 G/1000ML; G/1000ML; G/1000ML
150 INJECTION, SOLUTION INTRAVENOUS CONTINUOUS PRN
Status: DISCONTINUED | OUTPATIENT
Start: 2024-01-02 | End: 2024-01-04

## 2024-01-02 RX ORDER — DEXTROSE MONOHYDRATE 25 G/50ML
10-50 INJECTION, SOLUTION INTRAVENOUS
Status: DISCONTINUED | OUTPATIENT
Start: 2024-01-02 | End: 2024-01-04

## 2024-01-02 RX ORDER — POLYETHYLENE GLYCOL 3350 17 G/17G
17 POWDER, FOR SOLUTION ORAL DAILY PRN
Status: DISCONTINUED | OUTPATIENT
Start: 2024-01-02 | End: 2024-01-19 | Stop reason: HOSPADM

## 2024-01-02 RX ORDER — SODIUM CHLORIDE AND POTASSIUM CHLORIDE 150; 900 MG/100ML; MG/100ML
250 INJECTION, SOLUTION INTRAVENOUS CONTINUOUS PRN
Status: DISCONTINUED | OUTPATIENT
Start: 2024-01-02 | End: 2024-01-04

## 2024-01-02 RX ORDER — DEXTROSE MONOHYDRATE, SODIUM CHLORIDE, AND POTASSIUM CHLORIDE 50; 2.98; 9 G/1000ML; G/1000ML; G/1000ML
150 INJECTION, SOLUTION INTRAVENOUS CONTINUOUS PRN
Status: DISCONTINUED | OUTPATIENT
Start: 2024-01-02 | End: 2024-01-02 | Stop reason: SDUPTHER

## 2024-01-02 RX ORDER — NITROGLYCERIN 0.4 MG/1
0.4 TABLET SUBLINGUAL
Status: DISCONTINUED | OUTPATIENT
Start: 2024-01-02 | End: 2024-01-19 | Stop reason: HOSPADM

## 2024-01-02 RX ORDER — HYDROCODONE BITARTRATE AND ACETAMINOPHEN 5; 325 MG/1; MG/1
1 TABLET ORAL EVERY 6 HOURS PRN
Status: DISCONTINUED | OUTPATIENT
Start: 2024-01-02 | End: 2024-01-19 | Stop reason: HOSPADM

## 2024-01-02 RX ORDER — IBUPROFEN 600 MG/1
1 TABLET ORAL
Status: DISCONTINUED | OUTPATIENT
Start: 2024-01-02 | End: 2024-01-04

## 2024-01-02 RX ORDER — IBUPROFEN 600 MG/1
1 TABLET ORAL
Status: DISCONTINUED | OUTPATIENT
Start: 2024-01-02 | End: 2024-01-02 | Stop reason: SDUPTHER

## 2024-01-02 RX ORDER — SODIUM CHLORIDE 0.9 % (FLUSH) 0.9 %
10 SYRINGE (ML) INJECTION EVERY 12 HOURS SCHEDULED
Status: DISCONTINUED | OUTPATIENT
Start: 2024-01-02 | End: 2024-01-06

## 2024-01-02 RX ORDER — SODIUM CHLORIDE AND POTASSIUM CHLORIDE 150; 450 MG/100ML; MG/100ML
250 INJECTION, SOLUTION INTRAVENOUS CONTINUOUS PRN
Status: DISCONTINUED | OUTPATIENT
Start: 2024-01-02 | End: 2024-01-04

## 2024-01-02 RX ORDER — DEXTROSE AND SODIUM CHLORIDE 5; .45 G/100ML; G/100ML
150 INJECTION, SOLUTION INTRAVENOUS CONTINUOUS PRN
Status: DISCONTINUED | OUTPATIENT
Start: 2024-01-02 | End: 2024-01-04

## 2024-01-02 RX ORDER — ACETAMINOPHEN 325 MG/1
650 TABLET ORAL EVERY 4 HOURS PRN
Status: DISCONTINUED | OUTPATIENT
Start: 2024-01-02 | End: 2024-01-19 | Stop reason: HOSPADM

## 2024-01-02 RX ORDER — ONDANSETRON 2 MG/ML
4 INJECTION INTRAMUSCULAR; INTRAVENOUS ONCE
Status: COMPLETED | OUTPATIENT
Start: 2024-01-02 | End: 2024-01-02

## 2024-01-02 RX ORDER — DEXTROSE MONOHYDRATE, SODIUM CHLORIDE, AND POTASSIUM CHLORIDE 50; 1.49; 4.5 G/1000ML; G/1000ML; G/1000ML
150 INJECTION, SOLUTION INTRAVENOUS CONTINUOUS PRN
Status: DISCONTINUED | OUTPATIENT
Start: 2024-01-02 | End: 2024-01-04

## 2024-01-02 RX ORDER — DEXTROSE AND SODIUM CHLORIDE 5; .45 G/100ML; G/100ML
150 INJECTION, SOLUTION INTRAVENOUS CONTINUOUS PRN
Status: DISCONTINUED | OUTPATIENT
Start: 2024-01-02 | End: 2024-01-02 | Stop reason: SDUPTHER

## 2024-01-02 RX ORDER — ROPINIROLE 0.5 MG/1
0.5 TABLET, FILM COATED ORAL NIGHTLY
Status: DISCONTINUED | OUTPATIENT
Start: 2024-01-02 | End: 2024-01-19 | Stop reason: HOSPADM

## 2024-01-02 RX ORDER — DEXTROSE AND SODIUM CHLORIDE 5; .9 G/100ML; G/100ML
150 INJECTION, SOLUTION INTRAVENOUS CONTINUOUS PRN
Status: DISCONTINUED | OUTPATIENT
Start: 2024-01-02 | End: 2024-01-04

## 2024-01-02 RX ORDER — POTASSIUM CHLORIDE 750 MG/1
40 TABLET, FILM COATED, EXTENDED RELEASE ORAL EVERY 4 HOURS
Status: COMPLETED | OUTPATIENT
Start: 2024-01-02 | End: 2024-01-03

## 2024-01-02 RX ORDER — LEVOTHYROXINE SODIUM 137 UG/1
137 TABLET ORAL
Status: DISCONTINUED | OUTPATIENT
Start: 2024-01-03 | End: 2024-01-17

## 2024-01-02 RX ORDER — AMOXICILLIN 250 MG
2 CAPSULE ORAL 2 TIMES DAILY
Status: DISCONTINUED | OUTPATIENT
Start: 2024-01-02 | End: 2024-01-19 | Stop reason: HOSPADM

## 2024-01-02 RX ORDER — ACETAMINOPHEN 650 MG/1
650 SUPPOSITORY RECTAL EVERY 4 HOURS PRN
Status: DISCONTINUED | OUTPATIENT
Start: 2024-01-02 | End: 2024-01-19 | Stop reason: HOSPADM

## 2024-01-02 RX ADMIN — POTASSIUM CHLORIDE AND SODIUM CHLORIDE 250 ML/HR: 900; 150 INJECTION, SOLUTION INTRAVENOUS at 20:48

## 2024-01-02 RX ADMIN — POTASSIUM CHLORIDE AND SODIUM CHLORIDE 250 ML/HR: 450; 150 INJECTION, SOLUTION INTRAVENOUS at 22:42

## 2024-01-02 RX ADMIN — Medication 10 ML: at 20:49

## 2024-01-02 RX ADMIN — INSULIN HUMAN 5.4 UNITS/HR: 1 INJECTION, SOLUTION INTRAVENOUS at 16:37

## 2024-01-02 RX ADMIN — SODIUM CHLORIDE 1000 ML/HR: 9 INJECTION, SOLUTION INTRAVENOUS at 16:34

## 2024-01-02 RX ADMIN — Medication 10 ML: at 16:27

## 2024-01-02 RX ADMIN — PIPERACILLIN SODIUM AND TAZOBACTAM SODIUM 4.5 G: 4; .5 INJECTION, SOLUTION INTRAVENOUS at 17:05

## 2024-01-02 RX ADMIN — PIPERACILLIN SODIUM AND TAZOBACTAM SODIUM 4.5 G: 4; .5 INJECTION, SOLUTION INTRAVENOUS at 23:22

## 2024-01-02 RX ADMIN — ONDANSETRON 4 MG: 2 INJECTION INTRAMUSCULAR; INTRAVENOUS at 12:50

## 2024-01-02 RX ADMIN — ENOXAPARIN SODIUM 40 MG: 100 INJECTION SUBCUTANEOUS at 20:51

## 2024-01-02 RX ADMIN — INSULIN HUMAN 5.4 UNITS/HR: 1 INJECTION, SOLUTION INTRAVENOUS at 15:45

## 2024-01-02 RX ADMIN — MORPHINE SULFATE 4 MG: 2 INJECTION, SOLUTION INTRAMUSCULAR; INTRAVENOUS at 16:32

## 2024-01-02 RX ADMIN — MORPHINE SULFATE 4 MG: 2 INJECTION, SOLUTION INTRAMUSCULAR; INTRAVENOUS at 12:52

## 2024-01-02 RX ADMIN — ROPINIROLE HYDROCHLORIDE 0.5 MG: 0.5 TABLET, FILM COATED ORAL at 22:01

## 2024-01-02 RX ADMIN — IOPAMIDOL 90 ML: 612 INJECTION, SOLUTION INTRAVENOUS at 14:37

## 2024-01-02 RX ADMIN — POTASSIUM CHLORIDE 40 MEQ: 750 TABLET, EXTENDED RELEASE ORAL at 22:01

## 2024-01-02 RX ADMIN — ESCITALOPRAM OXALATE 10 MG: 10 TABLET, FILM COATED ORAL at 20:10

## 2024-01-02 RX ADMIN — SODIUM CHLORIDE 1000 ML: 9 INJECTION, SOLUTION INTRAVENOUS at 12:48

## 2024-01-02 RX ADMIN — CEFTRIAXONE 2000 MG: 2 INJECTION, POWDER, FOR SOLUTION INTRAMUSCULAR; INTRAVENOUS at 13:34

## 2024-01-02 RX ADMIN — SODIUM CHLORIDE 1000 ML/HR: 9 INJECTION, SOLUTION INTRAVENOUS at 14:12

## 2024-01-02 NOTE — ED NOTES
Nursing report ED to floor  Tiana Hudson  63 y.o.  female    HPI :   Chief Complaint   Patient presents with    Flank Pain     R side       Admitting doctor:   Arthur Najera MD    Admitting diagnosis:   The primary encounter diagnosis was Sepsis, due to unspecified organism, unspecified whether acute organ dysfunction present. A diagnosis of Acute cystitis without hematuria was also pertinent to this visit.    Code status:   Current Code Status       Date Active Code Status Order ID Comments User Context       1/2/2024 1634 CPR (Attempt to Resuscitate) [id number]  Arthur Najera MD ED        Question Answer    Code Status (Patient has no pulse and is not breathing) CPR (Attempt to Resuscitate)    Medical Interventions (Patient has pulse or is breathing) Full Support                    Allergies:   Sulfa antibiotics    Isolation:   No active isolations    Intake and Output    Intake/Output Summary (Last 24 hours) at 1/2/2024 1637  Last data filed at 1/2/2024 1512  Gross per 24 hour   Intake 1100 ml   Output --   Net 1100 ml       Weight:       01/02/24  1131   Weight: 120 kg (265 lb)       Most recent vitals:   Vitals:    01/02/24 1520 01/02/24 1521 01/02/24 1531 01/02/24 1601   BP:   123/62 113/59   Pulse: 102   100   Resp:       Temp:       TempSrc:       SpO2: 94% 92%  91%   Weight:       Height:           Active LDAs/IV Access:   Lines, Drains & Airways       Active LDAs       Name Placement date Placement time Site Days    Peripheral IV 01/02/24 1119 Right Antecubital 01/02/24  1119  Antecubital  less than 1    Peripheral IV 01/02/24 1429 Left;Posterior Hand 01/02/24  1429  Hand  less than 1                    Labs (abnormal labs have a star):   Labs Reviewed   COMPREHENSIVE METABOLIC PANEL - Abnormal; Notable for the following components:       Result Value    Glucose 978 (*)     Creatinine 1.82 (*)     Sodium 115 (*)     Chloride 76 (*)     CO2 15.7 (*)     Albumin 2.4 (*)     AST (SGOT) 33 (*)      Alkaline Phosphatase 220 (*)     Anion Gap 23.3 (*)     eGFR 31.3 (*)     All other components within normal limits    Narrative:     GFR Normal >60  Chronic Kidney Disease <60  Kidney Failure <15     URINALYSIS W/ MICROSCOPIC IF INDICATED (NO CULTURE) - Abnormal; Notable for the following components:    Appearance, UA Turbid (*)     Glucose, UA >=1000 mg/dL (3+) (*)     Ketones, UA Trace (*)     Blood, UA Moderate (2+) (*)     Protein, UA Trace (*)     Leuk Esterase, UA Moderate (2+) (*)     All other components within normal limits   LACTIC ACID, PLASMA - Abnormal; Notable for the following components:    Lactate 6.0 (*)     All other components within normal limits   CBC WITH AUTO DIFFERENTIAL - Abnormal; Notable for the following components:    WBC 20.12 (*)     Hemoglobin 11.2 (*)     MCH 26.5 (*)     MCHC 29.0 (*)     Platelets 568 (*)     All other components within normal limits   MANUAL DIFFERENTIAL - Abnormal; Notable for the following components:    Neutrophil % 81.8 (*)     Lymphocyte % 2.0 (*)     Monocyte % 16.2 (*)     Neutrophils Absolute 16.46 (*)     Lymphocytes Absolute 0.40 (*)     Monocytes Absolute 3.26 (*)     All other components within normal limits   URINALYSIS, MICROSCOPIC ONLY - Abnormal; Notable for the following components:    RBC, UA 6-10 (*)     WBC, UA 6-10 (*)     Bacteria, UA 2+ (*)     All other components within normal limits   OSMOLALITY, SERUM - Abnormal; Notable for the following components:    Osmolality 317 (*)     All other components within normal limits   HEMOGLOBIN A1C - Abnormal; Notable for the following components:    Hemoglobin A1C 16.90 (*)     All other components within normal limits    Narrative:     Hemoglobin A1C Ranges:    Increased Risk for Diabetes  5.7% to 6.4%  Diabetes                     >= 6.5%  Diabetic Goal                < 7.0%   LIPASE - Normal   PHOSPHORUS - Normal   MAGNESIUM - Normal   TROPONIN - Normal    Narrative:     High Sensitive Troponin T  Reference Range:  <14.0 ng/L- Negative Female for AMI  <22.0 ng/L- Negative Male for AMI  >=14 - Abnormal Female indicating possible myocardial injury.  >=22 - Abnormal Male indicating possible myocardial injury.   Clinicians would have to utilize clinical acumen, EKG, Troponin, and serial changes to determine if it is an Acute Myocardial Infarction or myocardial injury due to an underlying chronic condition.        BLOOD CULTURE   BLOOD CULTURE   URINE CULTURE   RAINBOW DRAW    Narrative:     The following orders were created for panel order [place] Draw.  Procedure                               Abnormality         Status                     ---------                               -----------         ------                     Green Top (Gel)[[id number]]                                  Final result               Lavender Top[[id number]]                                     Final result               Gold Top - SST[[id number]]                                   Final result               Light Blue Top[[id number]]                                   Final result                 Please view results for these tests on the individual orders.   LACTIC ACID, REFLEX   BLOOD GAS, VENOUS   BASIC METABOLIC PANEL   BASIC METABOLIC PANEL   MAGNESIUM   MAGNESIUM   PHOSPHORUS   PHOSPHORUS   HIGH SENSITIVITIY TROPONIN T 2HR   BASIC METABOLIC PANEL   MAGNESIUM   PHOSPHORUS   COMPREHENSIVE METABOLIC PANEL   PHOSPHORUS   MAGNESIUM   OSMOLALITY, SERUM   HEMOGLOBIN A1C   CBC WITH AUTO DIFFERENTIAL   POCT GLUCOSE FINGERSTICK   CBC AND DIFFERENTIAL    Narrative:     The following orders were created for panel order CBC & Differential.  Procedure                               Abnormality         Status                     ---------                               -----------         ------                     CBC Auto Differential[[id number]]        Abnormal            Final result                 Please view results for these tests on the  individual orders.   GREEN TOP   LAVENDER TOP   GOLD TOP - SST   LIGHT BLUE TOP   CBC AND DIFFERENTIAL    Narrative:     The following orders were created for panel order CBC & Differential.  Procedure                               Abnormality         Status                     ---------                               -----------         ------                     CBC Auto Differential[[id number]]                                                         Please view results for these tests on the individual orders.       EKG:   ECG 12 Lead Electrolyte Imbalance   Final Result   HEART RATE= 101  bpm   RR Interval= 594  ms   NC Interval= 148  ms   P Horizontal Axis= -31  deg   P Front Axis= 27  deg   QRSD Interval= 122  ms   QT Interval= 359  ms   QTcB= 466  ms   QRS Axis= 92  deg   T Wave Axis= 8  deg   - ABNORMAL ECG -   Sinus tachycardia   Nonspecific intraventricular conduction delay   Borderline ST depression, lateral leads   No change from previous tracing   Electronically Signed By: Chloé Tinajero ([facility]) 02-Jan-2024 16:07:33   Date and Time of Study: 2024-01-02 13:41:01          Meds given in ED:   Medications   sodium chloride 0.9 % flush 10 mL (has no administration in time range)   sodium chloride 0.9 % flush 10 mL (10 mL Intravenous Given 1/2/24 1627)   sodium chloride 0.9 % flush 10 mL (has no administration in time range)   sodium chloride 0.9 % infusion 40 mL (has no administration in time range)   dextrose (D50W) (25 g/50 mL) IV injection 10-50 mL (has no administration in time range)   sodium chloride 0.9 % bolus (0 mL/hr Intravenous Stopped 1/2/24 1512)   sodium chloride 0.9 % infusion (has no administration in time range)   sodium chloride 0.9 % with KCl 20 mEq/L infusion (has no administration in time range)   sodium chloride 0.9 % with KCl 40 mEq/L infusion (has no administration in time range)   dextrose 5 % and sodium chloride 0.9 % infusion (has no administration in time range)   dextrose 5 % and  sodium chloride 0.9 % with KCl 20 mEq/L infusion (has no administration in time range)   dextrose 5 % and sodium chloride 0.9 % with KCl 40 mEq/L infusion (has no administration in time range)   sodium chloride 0.45 % infusion (has no administration in time range)   sodium chloride 0.45 % with KCl 20 mEq/L infusion (has no administration in time range)   sodium chloride 0.45 % 1,000 mL with potassium chloride 40 mEq infusion (has no administration in time range)   dextrose 5 % and sodium chloride 0.45 % infusion (has no administration in time range)   dextrose 5 % and sodium chloride 0.45 % with KCl 20 mEq/L infusion (has no administration in time range)   dextrose 5 % and sodium chloride 0.45 % with KCl 40 mEq/L infusion (has no administration in time range)   Potassium Replacement - Follow Nurse / BPA Driven Protocol (has no administration in time range)   Magnesium Standard Dose Replacement - Follow Nurse / BPA Driven Protocol (has no administration in time range)   Phosphorus Replacement - Follow Nurse / BPA Driven Protocol (has no administration in time range)   Calcium Replacement - Follow Nurse / BPA Driven Protocol (has no administration in time range)   Pharmacy to Dose Zosyn (has no administration in time range)   sodium chloride 0.9 % flush 10 mL (has no administration in time range)   sodium chloride 0.9 % flush 10 mL (has no administration in time range)   sodium chloride 0.9 % infusion 40 mL (has no administration in time range)   dextrose (GLUTOSE) oral gel 15 g (has no administration in time range)   dextrose (D50W) (25 g/50 mL) IV injection 10-50 mL (has no administration in time range)   glucagon (GLUCAGEN) injection 1 mg (has no administration in time range)   sodium chloride 0.9 % bolus (1,000 mL/hr Intravenous New Bag 1/2/24 9038)   sodium chloride 0.9 % infusion (has no administration in time range)   sodium chloride 0.9 % with KCl 20 mEq/L infusion (has no administration in time range)    sodium chloride 0.9 % with KCl 40 mEq/L infusion (has no administration in time range)   dextrose 5 % and sodium chloride 0.9 % infusion (has no administration in time range)   dextrose 5 % and sodium chloride 0.9 % with KCl 20 mEq/L infusion (has no administration in time range)   dextrose 5 % and sodium chloride 0.9 % with KCl 40 mEq/L infusion (has no administration in time range)   sodium chloride 0.45 % infusion (has no administration in time range)   sodium chloride 0.45 % with KCl 20 mEq/L infusion (has no administration in time range)   sodium chloride 0.45 % 1,000 mL with potassium chloride 40 mEq infusion (has no administration in time range)   dextrose 5 % and sodium chloride 0.45 % infusion (has no administration in time range)   dextrose 5 % and sodium chloride 0.45 % with KCl 20 mEq/L infusion (has no administration in time range)   dextrose 5 % and sodium chloride 0.45 % with KCl 40 mEq/L infusion (has no administration in time range)   insulin regular 1 unit/mL in 0.9% sodium chloride (Glucommander) (has no administration in time range)   HYDROmorphone (DILAUDID) injection 1 mg (has no administration in time range)   nitroglycerin (NITROSTAT) SL tablet 0.4 mg (has no administration in time range)   sodium chloride 0.9 % flush 10 mL (has no administration in time range)   sodium chloride 0.9 % flush 10 mL (has no administration in time range)   sodium chloride 0.9 % infusion 40 mL (has no administration in time range)   sennosides-docusate (PERICOLACE) 8.6-50 MG per tablet 2 tablet (has no administration in time range)     And   polyethylene glycol (MIRALAX) packet 17 g (has no administration in time range)     And   bisacodyl (DULCOLAX) EC tablet 5 mg (has no administration in time range)     And   bisacodyl (DULCOLAX) suppository 10 mg (has no administration in time range)   Enoxaparin Sodium (LOVENOX) syringe 40 mg (has no administration in time range)   acetaminophen (TYLENOL) tablet 650 mg (has  no administration in time range)     Or   acetaminophen (TYLENOL) suppository 650 mg (has no administration in time range)   ipratropium-albuterol (DUO-NEB) nebulizer solution 3 mL (has no administration in time range)   ondansetron ODT (ZOFRAN-ODT) disintegrating tablet 4 mg (has no administration in time range)     Or   ondansetron (ZOFRAN) injection 4 mg (has no administration in time range)   piperacillin-tazobactam (ZOSYN) 4.5 g in iso-osmotic dextrose 100 mL IVPB (premix) (has no administration in time range)   piperacillin-tazobactam (ZOSYN) 4.5 g in iso-osmotic dextrose 100 mL IVPB (premix) (has no administration in time range)   cefTRIAXone (ROCEPHIN) 2,000 mg in sodium chloride 0.9 % 100 mL IVPB-VTB (0 mg Intravenous Stopped 1/2/24 1414)   ondansetron (ZOFRAN) injection 4 mg (4 mg Intravenous Given 1/2/24 1250)   sepsis fluid NS 0.9 % bolus 2,466 mL (1,000 mL Intravenous New Bag 1/2/24 1248)   morphine injection 4 mg (4 mg Intravenous Given 1/2/24 1252)   iopamidol (ISOVUE-300) 61 % injection 100 mL (90 mL Intravenous Given 1/2/24 1437)   morphine injection 4 mg (4 mg Intravenous Given 1/2/24 1632)       Imaging results:  CT Abdomen Pelvis With Contrast    Result Date: 1/2/2024  1. New moderate volume of low-attenuation mildly loculated fluid throughout the right perinephric space, favored secondary to right calyceal rupture. 2. New mild-moderate wall thickening involving the right renal pelvis and renal calyces. Recommend correlation with urinalysis for findings to suggest infection. 3. Findings suggesting bilateral UPJ obstruction. Moderate bilateral hydronephrosis has increased on the left and mildly decreased on the right compared to prior. 4. New mild dilation of the right ureter of indeterminate etiology. 5. Few nonobstructing right renal calculi measuring up to 4 mm. 6. Hepatic morphology suggesting chronic liver disease. 7. Unchanged mildly enlarged retrocaval lymph node, favored  reactive/inflammatory  This report was finalized on 1/2/2024 3:23 PM by Dr. Angelito Aguilera M.D on Workstation: [id number]      XR Chest 1 View    Result Date: 1/2/2024  1. No acute process except minimal atelectasis of the right lung base.  This report was finalized on 1/2/2024 2:17 PM by Dr. Reji Mallory M.D on Workstation: [id number]       Ambulatory status:   - assist    Social issues:   Social History     Socioeconomic History    Marital status:    Tobacco Use    Smoking status: Never    Smokeless tobacco: Never    Tobacco comments:     Never smoked   Vaping Use    Vaping Use: Never used   Substance and Sexual Activity    Alcohol use: Yes     Alcohol/week: 3.0 standard drinks of alcohol     Types: 1 Glasses of wine, 2 Standard drinks or equivalent per week    Drug use: No    Sexual activity: Not Currently     Partners: Male     Birth control/protection: I.U.D.       NIH Stroke Scale:       Alida Ashton RN  01/02/24 16:37 EST

## 2024-01-02 NOTE — ED PROVIDER NOTES
EMERGENCY DEPARTMENT ENCOUNTER    Room Number:  28/28  PCP: Dean Fairchild MD  Historian: Patient, daughter at bedside      HPI:  Chief Complaint: Abdominal/flank pain  Context: Tiana Hudson is a 63 y.o. female who presents to the ED c/o abdominal/flank pain.  Patient has a history of kidney stones.  Patient states she developed flank pain with associated abdominal pain several days ago that is gradually worsened.  Patient has had emesis here.  Patient denies dysuria.  Patient denies fever, chills.            PAST MEDICAL HISTORY  Active Ambulatory Problems     Diagnosis Date Noted    Primary osteoarthritis of right knee 06/07/2016    UTI (urinary tract infection) 07/18/2016    Varicose veins 07/18/2016    Contact dermatitis 07/18/2016    Elevated liver function tests 07/18/2016    Leg cramp 07/18/2016    Medial collateral ligament sprain of knee 07/18/2016    Vitamin D deficiency 07/18/2016    Essential hypertension 07/18/2016    Hyperlipidemia 07/18/2016    Abrasion 07/18/2016    Hypothyroidism 07/18/2016    Gestational diabetes 07/18/2016    Morbid obesity with BMI of 40.0-44.9, adult 07/18/2016    Allergic rhinitis 07/18/2016    Back pain 07/18/2016    Anxiety 07/18/2016    H/O Postconcussion syndrome 07/18/2016    H/O dizziness 07/18/2016    Allergy 07/18/2016    Vaginal candidiasis 07/18/2016    Chronic pain of left knee 09/08/2016    Arthritis of both knees 09/08/2016    Inflammation of bladder severe, acute and chronic, with mucosal erosions/ulcers 10/24/2016    Dense breast tissue on mammogram 10/24/2016    Dizziness 10/24/2016    Restless leg syndrome 10/24/2016    Type 2 diabetes mellitus without complication 02/23/2017    Arthritis of right knee 03/13/2017    Status post total right knee replacement 06/05/2017    Arthritis of left knee 07/06/2017    History of total knee arthroplasty 07/06/2017    Acquired spondylolisthesis 09/12/2018    Screening for colorectal cancer 12/05/2018    Precordial  pain 2020    FH ischemic heart disease 2020     Resolved Ambulatory Problems     Diagnosis Date Noted    Empty nest syndrome 2016    Gallstones 10/22/2018     Past Medical History:   Diagnosis Date    Anemia     Anxiety and depression     Arthritis     Depression     Diabetes mellitus     High cholesterol     History of frequent urinary tract infections     History of transfusion 2017    Hypertension     Knee pain, bilateral     Low back pain     Lumbar stenosis     PONV (postoperative nausea and vomiting)     Positive TB test     Seasonal allergies          PAST SURGICAL HISTORY  Past Surgical History:   Procedure Laterality Date    APPENDECTOMY N/A     Dr. Wilson    CHOLECYSTECTOMY WITH INTRAOPERATIVE CHOLANGIOGRAM N/A 10/31/2018    Procedure: laparoscopic cholecystectomy;  Surgeon: Mary Kay Mac MD;  Location: [facility];  Service: General    COLONOSCOPY      CYSTOSCOPY BLADDER BIOPSY N/A 10/3/2016    Procedure: CYSTOSCOPY BLADDER BIOPSY;  Surgeon: Rei Calvert MD;  Location: [facility];  Service:     DILATATION AND CURETTAGE N/A     ENDOSCOPY      INTRAUTERINE DEVICE INSERTION      KNEE ARTHROSCOPY W/ MENISCAL REPAIR Left     OVARIAN CYST REMOVAL Left     Dr. Wilson    TOTAL KNEE ARTHROPLASTY Right 2017    Procedure: RIGHT TOTAL KNEE ARTHROPLASTY WITH ALETA NAVIGATION;  Surgeon: Ross Cruz MD;  Location: [facility];  Service:          FAMILY HISTORY  Family History   Problem Relation Age of Onset    Hepatitis Mother     Breast cancer Mother     Heart disease Mother     Cancer Mother     Hyperlipidemia Mother              Heart failure Father     Stroke Father     Hypertension Father         Since he was 72, now 86s    Diabetes Father     Heart disease Father     Hyperlipidemia Father         Unsurs    Heart disease Maternal Grandmother     Cancer Maternal Grandfather     COPD Maternal Grandfather     Arthritis Paternal Grandmother      Diabetes Maternal Aunt     Diabetes Paternal Uncle     Malig Hyperthermia Neg Hx          SOCIAL HISTORY  Social History     Socioeconomic History    Marital status:    Tobacco Use    Smoking status: Never    Smokeless tobacco: Never    Tobacco comments:     Never smoked   Vaping Use    Vaping Use: Never used   Substance and Sexual Activity    Alcohol use: Yes     Alcohol/week: 3.0 standard drinks of alcohol     Types: 1 Glasses of wine, 2 Standard drinks or equivalent per week    Drug use: No    Sexual activity: Not Currently     Partners: Male     Birth control/protection: I.U.D.         ALLERGIES  Sulfa antibiotics        REVIEW OF SYSTEMS  Review of Systems   Gastrointestinal:  Positive for abdominal pain and vomiting.   Genitourinary:  Positive for flank pain.   All other systems reviewed and are negative.       PHYSICAL EXAM  ED Triage Vitals   Temp Heart Rate Resp BP SpO2   01/02/24 1128 01/02/24 1127 01/02/24 1128 01/02/24 1128 01/02/24 1127   96 °F (35.6 °C) 114 16 125/72 98 %      Temp src Heart Rate Source Patient Position BP Location FiO2 (%)   01/02/24 1128 01/02/24 1127 -- -- --   Tympanic Monitor          Physical Exam      GENERAL: Ill-appearing, no acute distress  HENT: nares patent  EYES: no scleral icterus  CV: regular rhythm, normal rate  RESPIRATORY: normal effort  ABDOMEN: soft.  Tender to palpation in the right side of the abdomen and right flank with voluntary guarding  MUSCULOSKELETAL: no deformity  NEURO: alert, moves all extremities, follows commands  PSYCH:  calm, cooperative  SKIN: warm, dry    Vital signs and nursing notes reviewed.          LAB RESULTS  Recent Results (from the past 24 hour(s))   Lipase    Collection Time: 01/02/24 11:53 AM    Specimen: Blood   Result Value Ref Range    Lipase 23 13 - 60 U/L   Lactic Acid, Plasma    Collection Time: 01/02/24 11:53 AM    Specimen: Blood   Result Value Ref Range    Lactate 6.0 (C) 0.5 - 2.0 mmol/L   Green Top (Gel)     Collection Time: 01/02/24 11:53 AM   Result Value Ref Range    Extra Tube Hold for add-ons.    Lavender Top    Collection Time: 01/02/24 11:53 AM   Result Value Ref Range    Extra Tube hold for add-on    Gold Top - SST    Collection Time: 01/02/24 11:53 AM   Result Value Ref Range    Extra Tube Hold for add-ons.    Light Blue Top    Collection Time: 01/02/24 11:53 AM   Result Value Ref Range    Extra Tube Hold for add-ons.    CBC Auto Differential    Collection Time: 01/02/24 11:53 AM    Specimen: Blood   Result Value Ref Range    WBC 20.12 (H) 3.40 - 10.80 10*3/mm3    RBC 4.22 3.77 - 5.28 10*6/mm3    Hemoglobin 11.2 (L) 12.0 - 15.9 g/dL    Hematocrit 38.6 34.0 - 46.6 %    MCV 91.5 79.0 - 97.0 fL    MCH 26.5 (L) 26.6 - 33.0 pg    MCHC 29.0 (L) 31.5 - 35.7 g/dL    RDW 13.0 12.3 - 15.4 %    RDW-SD 42.8 37.0 - 54.0 fl    MPV 11.4 6.0 - 12.0 fL    Platelets 568 (H) 140 - 450 10*3/mm3    nRBC 0.0 0.0 - 0.2 /100 WBC   Manual Differential    Collection Time: 01/02/24 11:53 AM    Specimen: Blood   Result Value Ref Range    Neutrophil % 81.8 (H) 42.7 - 76.0 %    Lymphocyte % 2.0 (L) 19.6 - 45.3 %    Monocyte % 16.2 (H) 5.0 - 12.0 %    Neutrophils Absolute 16.46 (H) 1.70 - 7.00 10*3/mm3    Lymphocytes Absolute 0.40 (L) 0.70 - 3.10 10*3/mm3    Monocytes Absolute 3.26 (H) 0.10 - 0.90 10*3/mm3    Anisocytosis Slight/1+ None Seen    Macrocytes Slight/1+ None Seen    Poikilocytes Slight/1+ None Seen    Polychromasia Slight/1+ None Seen    WBC Morphology Normal Normal    Platelet Morphology Normal Normal   Phosphorus    Collection Time: 01/02/24 11:53 AM    Specimen: Blood   Result Value Ref Range    Phosphorus 4.2 2.5 - 4.5 mg/dL   Magnesium    Collection Time: 01/02/24 11:53 AM    Specimen: Blood   Result Value Ref Range    Magnesium 2.0 1.6 - 2.4 mg/dL   Osmolality, Serum    Collection Time: 01/02/24 11:53 AM    Specimen: Blood   Result Value Ref Range    Osmolality 317 (H) 280 - 301 mOsm/kg   Hemoglobin A1c    Collection Time:  01/02/24 11:53 AM    Specimen: Blood   Result Value Ref Range    Hemoglobin A1C 16.90 (H) 4.80 - 5.60 %   Urinalysis With Microscopic If Indicated (No Culture) - Urine, Clean Catch    Collection Time: 01/02/24 12:35 PM    Specimen: Urine, Clean Catch   Result Value Ref Range    Color, UA Yellow Yellow, Straw    Appearance, UA Turbid (A) Clear    pH, UA <=5.0 5.0 - 8.0    Specific Gravity, UA 1.027 1.005 - 1.030    Glucose, UA >=1000 mg/dL (3+) (A) Negative    Ketones, UA Trace (A) Negative    Bilirubin, UA Negative Negative    Blood, UA Moderate (2+) (A) Negative    Protein, UA Trace (A) Negative    Leuk Esterase, UA Moderate (2+) (A) Negative    Nitrite, UA Negative Negative    Urobilinogen, UA 0.2 E.U./dL 0.2 - 1.0 E.U./dL   Urinalysis, Microscopic Only - Urine, Clean Catch    Collection Time: 01/02/24 12:35 PM    Specimen: Urine, Clean Catch   Result Value Ref Range    RBC, UA 6-10 (A) None Seen, 0-2 /HPF    WBC, UA 6-10 (A) None Seen, 0-2 /HPF    Bacteria, UA 2+ (A) None Seen /HPF    Squamous Epithelial Cells, UA 0-2 None Seen, 0-2 /HPF    Yeast, UA Moderate/2+ Yeast None Seen /HPF    Hyaline Casts, UA None Seen None Seen /LPF    Methodology Manual Light Microscopy    Comprehensive Metabolic Panel    Collection Time: 01/02/24 12:40 PM    Specimen: Blood   Result Value Ref Range    Glucose 978 (C) 65 - 99 mg/dL    BUN 23 8 - 23 mg/dL    Creatinine 1.82 (H) 0.57 - 1.00 mg/dL    Sodium 115 (C) 136 - 145 mmol/L    Potassium 4.2 3.5 - 5.2 mmol/L    Chloride 76 (L) 98 - 107 mmol/L    CO2 15.7 (L) 22.0 - 29.0 mmol/L    Calcium 9.4 8.6 - 10.5 mg/dL    Total Protein 7.5 6.0 - 8.5 g/dL    Albumin 2.4 (L) 3.5 - 5.2 g/dL    ALT (SGPT) 16 1 - 33 U/L    AST (SGOT) 33 (H) 1 - 32 U/L    Alkaline Phosphatase 220 (H) 39 - 117 U/L    Total Bilirubin 0.5 0.0 - 1.2 mg/dL    Globulin 5.1 gm/dL    A/G Ratio 0.5 g/dL    BUN/Creatinine Ratio 12.6 7.0 - 25.0    Anion Gap 23.3 (H) 5.0 - 15.0 mmol/L    eGFR 31.3 (L) >60.0 mL/min/1.73    High Sensitivity Troponin T    Collection Time: 01/02/24 12:40 PM    Specimen: Blood   Result Value Ref Range    HS Troponin T 7 <14 ng/L   ECG 12 Lead Electrolyte Imbalance    Collection Time: 01/02/24  1:41 PM   Result Value Ref Range    QT Interval 359 ms    QTC Interval 466 ms       Ordered the above labs and reviewed the results.        RADIOLOGY  CT Abdomen Pelvis With Contrast    Result Date: 1/2/2024  CT ABDOMEN AND PELVIS WITH IV CONTRAST  HISTORY: Right flank pain.  TECHNIQUE: Radiation dose reduction techniques were utilized, including automated exposure control and exposure modulation based on body size. 3 mm images were obtained through the abdomen and pelvis after the administration of IV contrast.  COMPARISON: 6/21/2023   FINDINGS: Moderate bilateral hydronephrosis with caliber changes at the UPJ, mildly decreased on the right and increased on the left. New circumferential mild-moderate wall thickening involving the right renal calyces and renal pelvis. New moderate volume of low-attenuation mildly loculated fluid throughout the right perinephric space (series 3/images 88 through 132). No enhancing wall or definite organization. No internal locules of air. Fluid is most concentrated adjacent to the interpolar right kidney (series 3/image 94). New diffuse mild dilation of the right ureter. No renal calculi within the right ureter or bladder. Few nonobstructing right renal calculi measuring up to 4 mm. Nondilated left ureter. Nondilated bladder without wall thickening. Left renal cyst.  Similar hepatic morphology with concavity of the posterior right hepatic surface cortical contour and widening of the hepatic fissures. Question mild nodularity of the hepatic surface cortical contour. Spleen is normal in size. No definite upper abdominal portosystemic collaterals. Cholecystectomy. Nondilated biliary tree. Unchanged 1.2 cm retrocaval lymph node (series 2/image 65).  Sigmoid colon diverticulosis without  findings suggest diverticulitis. Appendectomy. Nondilated normal small bowel.  Well-positioned IUD. Remaining solid organs and bowel are normal.  Right lower lobe linear atelectasis.       1. New moderate volume of low-attenuation mildly loculated fluid throughout the right perinephric space, favored secondary to right calyceal rupture. 2. New mild-moderate wall thickening involving the right renal pelvis and renal calyces. Recommend correlation with urinalysis for findings to suggest infection. 3. Findings suggesting bilateral UPJ obstruction. Moderate bilateral hydronephrosis has increased on the left and mildly decreased on the right compared to prior. 4. New mild dilation of the right ureter of indeterminate etiology. 5. Few nonobstructing right renal calculi measuring up to 4 mm. 6. Hepatic morphology suggesting chronic liver disease. 7. Unchanged mildly enlarged retrocaval lymph node, favored reactive/inflammatory  This report was finalized on 1/2/2024 3:23 PM by Dr. Angelito Aguilera M.D on Workstation: [id number]      XR Chest 1 View    Result Date: 1/2/2024  XR CHEST 1 VW-1/2/2024  HISTORY: Evaluate for fluid overload.  Heart size is within normal limits. Lungs are underinflated. There is some minimal linear atelectasis of the right lung base. Lungs are otherwise clear. Bones and soft tissues are unremarkable.      1. No acute process except minimal atelectasis of the right lung base.  This report was finalized on 1/2/2024 2:17 PM by Dr. Reji Mallory M.D on Workstation: [id number]       Ordered the above noted radiological studies. Reviewed by me in PACS.            PROCEDURES  Critical Care    Performed by: Angelito Winkler MD  Authorized by: Angelito Winkler MD    Critical care provider statement:     Critical care time (minutes):  45    Critical care time was exclusive of:  Separately billable procedures and treating other patients    Critical care was necessary to treat or prevent imminent or  life-threatening deterioration of the following conditions:  Endocrine crisis and sepsis    Critical care was time spent personally by me on the following activities:  Development of treatment plan with patient or surrogate, discussions with consultants, evaluation of patient's response to treatment, examination of patient, obtaining history from patient or surrogate, ordering and performing treatments and interventions, ordering and review of laboratory studies, ordering and review of radiographic studies, re-evaluation of patient's condition and review of old charts    Care discussed with: admitting provider          MEDICATIONS GIVEN IN ER  Medications   sodium chloride 0.9 % flush 10 mL (has no administration in time range)   sodium chloride 0.9 % flush 10 mL (10 mL Intravenous Given 1/2/24 1627)   sodium chloride 0.9 % flush 10 mL (has no administration in time range)   sodium chloride 0.9 % infusion 40 mL (has no administration in time range)   dextrose (D50W) (25 g/50 mL) IV injection 10-50 mL (has no administration in time range)   sodium chloride 0.9 % bolus (0 mL/hr Intravenous Stopped 1/2/24 1512)   sodium chloride 0.9 % infusion (has no administration in time range)   sodium chloride 0.9 % with KCl 20 mEq/L infusion (has no administration in time range)   sodium chloride 0.9 % with KCl 40 mEq/L infusion (has no administration in time range)   dextrose 5 % and sodium chloride 0.9 % infusion (has no administration in time range)   dextrose 5 % and sodium chloride 0.9 % with KCl 20 mEq/L infusion (has no administration in time range)   dextrose 5 % and sodium chloride 0.9 % with KCl 40 mEq/L infusion (has no administration in time range)   sodium chloride 0.45 % infusion (has no administration in time range)   sodium chloride 0.45 % with KCl 20 mEq/L infusion (has no administration in time range)   sodium chloride 0.45 % 1,000 mL with potassium chloride 40 mEq infusion (has no administration in time range)    dextrose 5 % and sodium chloride 0.45 % infusion (has no administration in time range)   dextrose 5 % and sodium chloride 0.45 % with KCl 20 mEq/L infusion (has no administration in time range)   dextrose 5 % and sodium chloride 0.45 % with KCl 40 mEq/L infusion (has no administration in time range)   Potassium Replacement - Follow Nurse / BPA Driven Protocol (has no administration in time range)   Magnesium Standard Dose Replacement - Follow Nurse / BPA Driven Protocol (has no administration in time range)   Phosphorus Replacement - Follow Nurse / BPA Driven Protocol (has no administration in time range)   Calcium Replacement - Follow Nurse / BPA Driven Protocol (has no administration in time range)   Pharmacy to Dose Zosyn (has no administration in time range)   sodium chloride 0.9 % flush 10 mL (has no administration in time range)   sodium chloride 0.9 % flush 10 mL (has no administration in time range)   sodium chloride 0.9 % infusion 40 mL (has no administration in time range)   dextrose (GLUTOSE) oral gel 15 g (has no administration in time range)   dextrose (D50W) (25 g/50 mL) IV injection 10-50 mL (has no administration in time range)   glucagon (GLUCAGEN) injection 1 mg (has no administration in time range)   sodium chloride 0.9 % bolus (has no administration in time range)   sodium chloride 0.9 % infusion (has no administration in time range)   sodium chloride 0.9 % with KCl 20 mEq/L infusion (has no administration in time range)   sodium chloride 0.9 % with KCl 40 mEq/L infusion (has no administration in time range)   dextrose 5 % and sodium chloride 0.9 % infusion (has no administration in time range)   dextrose 5 % and sodium chloride 0.9 % with KCl 20 mEq/L infusion (has no administration in time range)   dextrose 5 % and sodium chloride 0.9 % with KCl 40 mEq/L infusion (has no administration in time range)   sodium chloride 0.45 % infusion (has no administration in time range)   sodium chloride  0.45 % with KCl 20 mEq/L infusion (has no administration in time range)   sodium chloride 0.45 % 1,000 mL with potassium chloride 40 mEq infusion (has no administration in time range)   dextrose 5 % and sodium chloride 0.45 % infusion (has no administration in time range)   dextrose 5 % and sodium chloride 0.45 % with KCl 20 mEq/L infusion (has no administration in time range)   dextrose 5 % and sodium chloride 0.45 % with KCl 40 mEq/L infusion (has no administration in time range)   insulin regular 1 unit/mL in 0.9% sodium chloride (Glucommander) (has no administration in time range)   HYDROmorphone (DILAUDID) injection 1 mg (has no administration in time range)   nitroglycerin (NITROSTAT) SL tablet 0.4 mg (has no administration in time range)   sodium chloride 0.9 % flush 10 mL (has no administration in time range)   sodium chloride 0.9 % flush 10 mL (has no administration in time range)   sodium chloride 0.9 % infusion 40 mL (has no administration in time range)   sennosides-docusate (PERICOLACE) 8.6-50 MG per tablet 2 tablet (has no administration in time range)     And   polyethylene glycol (MIRALAX) packet 17 g (has no administration in time range)     And   bisacodyl (DULCOLAX) EC tablet 5 mg (has no administration in time range)     And   bisacodyl (DULCOLAX) suppository 10 mg (has no administration in time range)   Enoxaparin Sodium (LOVENOX) syringe 40 mg (has no administration in time range)   acetaminophen (TYLENOL) tablet 650 mg (has no administration in time range)     Or   acetaminophen (TYLENOL) suppository 650 mg (has no administration in time range)   ipratropium-albuterol (DUO-NEB) nebulizer solution 3 mL (has no administration in time range)   ondansetron ODT (ZOFRAN-ODT) disintegrating tablet 4 mg (has no administration in time range)     Or   ondansetron (ZOFRAN) injection 4 mg (has no administration in time range)   cefTRIAXone (ROCEPHIN) 2,000 mg in sodium chloride 0.9 % 100 mL IVPB-VTB (0 mg  Intravenous Stopped 1/2/24 1414)   ondansetron (ZOFRAN) injection 4 mg (4 mg Intravenous Given 1/2/24 1250)   sepsis fluid NS 0.9 % bolus 2,466 mL (1,000 mL Intravenous New Bag 1/2/24 1248)   morphine injection 4 mg (4 mg Intravenous Given 1/2/24 1252)   iopamidol (ISOVUE-300) 61 % injection 100 mL (90 mL Intravenous Given 1/2/24 1437)   morphine injection 4 mg (4 mg Intravenous Given 1/2/24 1632)                   MEDICAL DECISION MAKING, PROGRESS, and CONSULTS    All labs have been independently reviewed by me.  All radiology studies have been reviewed by me and I have also reviewed the radiology report.   EKG's independently viewed and interpreted by me.  Discussion below represents my analysis of pertinent findings related to patient's condition, differential diagnosis, treatment plan and final disposition.      Additional sources:  - Discussed/ obtained information from independent historians:   Family member at bedside    - External (non-ED) record review: Office visit with cardiology from 5/11/2022 reviewed and notable for history of hypertension, hyperlipidemia.  Plan at that time was to increase exercise and controlling symptoms conservatively with follow-up in 1 year    - Chronic or social conditions impacting care: Diabetes    - Shared decision making: Shared decision making techniques elected to proceed with inpatient management at this time      Orders placed during this visit:  Orders Placed This Encounter   Procedures    Critical Care    Blood Culture - Blood,    Blood Culture - Blood,    Urine Culture - Urine,    CT Abdomen Pelvis With Contrast    XR Chest 1 View    [place] Draw    Comprehensive Metabolic Panel    Lipase    Urinalysis With Microscopic If Indicated (No Culture) - Urine, Clean Catch    Lactic Acid, Plasma    CBC Auto Differential    Manual Differential    STAT Lactic Acid, Reflex    Urinalysis, Microscopic Only - Urine, Clean Catch    Blood Gas, Venous -    Phosphorus    Magnesium     Osmolality, Serum    Hemoglobin A1c    High Sensitivity Troponin T    Basic Metabolic Panel    Magnesium    Phosphorus    High Sensitivity Troponin T 2Hr    Comprehensive Metabolic Panel    Phosphorus    Magnesium    Osmolality, Serum    Hemoglobin A1c    Basic Metabolic Panel    Magnesium    Phosphorus    CBC Auto Differential    Basic Metabolic Panel    NPO Diet NPO Type: Strict NPO    Undress & Gown    Vital Signs    Strict Intake & Output    Daily Weights    Corrected Serum Sodium = Measured Sodium + [1.6 x ((Glucose - 100)/100)]    Do NOT Discontinue Insulin Infusion Until 2 Hours After First Dose of Basal SQ Insulin    Prior to Initiating Glucommander™, Ensure All Prior Insulin Orders Are Discontinued    Do Not Start Insulin Infusion if Potassium < 3.3    Use a Dedicated Line for Insulin Infusion (If Possible).  May Use a Carrier Fluid of NS at KVO Rate if Insulin Rate is Insufficient to Maintain IV Patency.  Prime IV Line With Insulin Infusion    Glucommander Must Be Discontinued if Insulin Infusion is Discontinued.  If Insulin Infusion is Restarted, Previous Glucommander Settings Must Be Discontinued and Re-Entered From New Order    Once DKA Meets Resolution Criteria - Call Provider for Transition Orders From IV to SQ Insulin    Utilize the Start Meal Feature / Meal Bolus Feature in Glucommander if Patient Starts a Diet or Bolus Tube Feedings    Notify Provider - Insulin Infusion    RN to Release PRN POC Glucose Orders Per Glucommander    RN to Order STAT Glucose For Any POC Glucose <10 or >600    If Insulin Infusion is Paused - Follow Glucommander Instructions    DKA / HHS Patients - Phosphorus of 1 mg/dL or Higher Does NOT Require Replacement (While Insulin Infusing)    Vital Signs    Strict Intake & Output    Daily Weights    Corrected Serum Sodium = Measured Sodium + [1.6 x ((Glucose - 100)/100)]    Do NOT Discontinue Insulin Infusion Until 2 Hours After First Dose of Basal SQ Insulin    Prior to  Initiating Glucommander™, Ensure All Prior Insulin Orders Are Discontinued    Do Not Start Insulin Infusion if Potassium < 3.3    Use a Dedicated Line for Insulin Infusion (If Possible).  May Use a Carrier Fluid of NS at KVO Rate if Insulin Rate is Insufficient to Maintain IV Patency.  Prime IV Line With Insulin Infusion    Glucommander Must Be Discontinued if Insulin Infusion is Discontinued.  If Insulin Infusion is Restarted, Previous Glucommander Settings Must Be Discontinued and Re-Entered From New Order    Once DKA Meets Resolution Criteria - Call Provider for Transition Orders From IV to SQ Insulin    Utilize the Start Meal Feature / Meal Bolus Feature in Glucommander if Patient Starts a Diet or Bolus Tube Feedings    Notify Provider - Insulin Infusion    RN to Release PRN POC Glucose Orders Per Glucommander    RN to Order STAT Glucose For Any POC Glucose <10 or >600    If Insulin Infusion is Paused - Follow Glucommander Instructions    DKA / HHS Patients - Phosphorus of 1 mg/dL or Higher Does NOT Require Replacement (While Insulin Infusing)    Vital Signs Per hospital policy    Telemetry - Place Orders & Notify Provider of Results When Patient Experiences Acute Chest Pain, Dysrhythmia or Respiratory Distress    Continuous Pulse Oximetry    Height and weight    Daily Weights    Intake and Output    Oral Care - Patient Not on NPPV & Not Intubated    Target Arousal Level RASS 0 to -1    If Patient has BG of < 80 and is symptomatic but not on an IV insulin protocol then use the Adult Hypoglycemia Treatment Orders.    Tobacco cessation education    ICU / CCU - Place Order Consult Intensivist For Critical Care Management (If Patient Not Admitted to Cardiology for Primary Cardiology Condition)    ICU / CCU - Notify All Physicians When Patient is Transferred    Use Mobility Guidelines for Advancement of Activity    Saline Lock & Maintain IV Access    Code Status and Medical Interventions:    Inpatient Diabetes  Educator Consult    Urology (on-call MD unless specified)    IP General Consult (Use specialty-specific consult if known)    Inpatient Diabetes Educator Consult    Oxygen Therapy- Nasal Cannula; Titrate 1-6 LPM Per SpO2; 90 - 95%    POC Glucose PRN    POC Glucose PRN    POC Glucose Once    ECG 12 Lead Electrolyte Imbalance    Insert Peripheral IV    Insert Peripheral IV x2    Insert Peripheral IV x2    Insert Peripheral IV    Inpatient Admission    CBC & Differential    Green Top (Gel)    Lavender Top    Gold Top - SST    Light Blue Top    CBC & Differential    CBC & Differential       Differential diagnosis includes but is not limited to:    Pyelonephritis, ureteral calculus, sepsis, DKA      Independent interpretation of labs, radiology studies, and discussions with consultants:  ED Course as of 01/02/24 1635   Tue Jan 02, 2024   1238 Lactate(!!): 6.0 [MW]   1238 WBC(!): 20.12 [MW]   1327 Leukocytes, UA(!): Moderate (2+) [MW]   1328 Bacteria, UA(!): 2+ [MW]   1602 EKG interpreted by me demonstrates sinus rhythm, rate of 101, no ST elevation, no RI/QT prolongation [MW]   1634 Workup is concerning for DKA as well as UTI with possible pyelonephritis.  Patient is noted to have severe sepsis.  30/kg fluid bolus administered.  Cultures sent and antibiotics initiated.  CT abdomen obtained and demonstrates findings concerning for potential calyceal rupture.  Patient has known UPJ obstruction.  Urology consulted.  Patient discussed with her primary physician Dr. Fairchild who requests admission to the ICU. [MW]   1635 Discussed with Dr. Tena of pulmonary/critical care who agrees to accept to ICU [MW]      ED Course User Index  [MW] Angelito Winkler MD           DIAGNOSIS  Final diagnoses:   Sepsis, due to unspecified organism, unspecified whether acute organ dysfunction present   Acute cystitis without hematuria         DISPOSITION  ED Disposition       ED Disposition   Decision to Admit    Condition   --    Comment   Level  of Care: Critical Care [6]   Diagnosis: DKA (diabetic ketoacidosis) [094651]   Admitting Physician: CLAUDE MODI [[id number]]   Attending Physician: CLAUDE MODI [[id number]]   Certification: I Certify That Inpatient Hospital Services Are Medically Necessary For Greater Than 2 Midnights                           Latest Documented Vital Signs:  As of 16:35 EST  BP- 113/59 HR- 100 Temp- 97 °F (36.1 °C) O2 sat- 91%              --    Please note that portions of this were completed with a voice recognition program.       Note Disclaimer: At [facility], we believe that sharing information builds trust and better relationships. You are receiving this note because you are receiving care at [facility] or recently visited. It is possible you will see health information before a provider has talked with you about it. This kind of information can be easy to misunderstand. To help you fully understand what it means for your health, we urge you to discuss this note with your provider.             Angelito Winkler MD  01/02/24 3377

## 2024-01-02 NOTE — PROGRESS NOTES
[facility]: Piperacillin-Tazobactam Consult    Pt Name: Tiana Hudson   : 1960    Indication: Intra-Abdominal Infection    Relevant clinical data and objective history reviewed:    Past Medical History:   Diagnosis Date    Anemia     intermittently    Anxiety and depression     Arthritis     Depression     Diabetes mellitus     Gallstones     High cholesterol     History of frequent urinary tract infections     HX OF YEAST IN BLADDER HAS PRN DIFLUCAN    History of transfusion 2017    Had 2 iron infusions.  This was when i had knee replacement    Hyperlipidemia     Hypertension     Hypothyroidism     Knee pain, bilateral     Low back pain     Lumbar stenosis     PONV (postoperative nausea and vomiting)     Positive TB test     chest x-ray neg    Seasonal allergies      Creatinine   Date Value Ref Range Status   2024 1.82 (H) 0.57 - 1.00 mg/dL Final   2023 1.25 (H) 0.57 - 1.00 mg/dL Final   10/24/2018 1.05 (H) 0.57 - 1.00 mg/dL Final     BUN   Date Value Ref Range Status   2024 23 8 - 23 mg/dL Final     Estimated Creatinine Clearance: 42.1 mL/min (A) (by C-G formula based on SCr of 1.82 mg/dL (H)).    Lab Results   Component Value Date    WBC 20.12 (H) 2024     Temp Readings from Last 3 Encounters:   24 97 °F (36.1 °C)   10/01/19 97.8 °F (36.6 °C)   18 98.7 °F (37.1 °C) (Temporal)      Assessment/Plan  Estimated CrCl >20 mL/min at this time; BMI 44.10 kg/m2  Will start piperacillin-tazobactam 4.5 g IV every 8 hours for 5 days    Pharmacy will continue to follow daily while on piperacillin-tazobactam and adjust as needed. Thank you for this consult.    Raven Moeller [facility]  Clinical Pharmacist

## 2024-01-02 NOTE — H&P
[facility]    CC: flank pain    HPI:  Mrs. Hudson is a 64yo female with DMII and history of urologic problems. She has had recent lithotripsy.  She started feeling unwell about a week ago and has had flank pain and some associated nasuea and generally not feeling well. Progressively has worsened and she presented to the ER here with hyperglycemia, lactic acidosis and likey right calycel rupture. Admission to the ICU has been requested.    Past Medical History:   Diagnosis Date    Anemia     intermittently    Anxiety and depression     Arthritis     Depression     Diabetes mellitus     Gallstones     High cholesterol     History of frequent urinary tract infections     HX OF YEAST IN BLADDER HAS PRN DIFLUCAN    History of transfusion 2017    Had 2 iron infusions.  This was when i had knee replacement    Hyperlipidemia     Hypertension     Hypothyroidism     Knee pain, bilateral     Low back pain     Lumbar stenosis     PONV (postoperative nausea and vomiting)     Positive TB test     chest x-ray neg    Seasonal allergies      Social History     Socioeconomic History    Marital status:    Tobacco Use    Smoking status: Never    Smokeless tobacco: Never    Tobacco comments:     Never smoked   Vaping Use    Vaping Use: Never used   Substance and Sexual Activity    Alcohol use: Yes     Alcohol/week: 3.0 standard drinks of alcohol     Types: 1 Glasses of wine, 2 Standard drinks or equivalent per week    Drug use: No    Sexual activity: Not Currently     Partners: Male     Birth control/protection: I.U.D.     Family History   Problem Relation Age of Onset    Hepatitis Mother     Breast cancer Mother     Heart disease Mother     Cancer Mother     Hyperlipidemia Mother              Heart failure Father     Stroke Father     Hypertension Father         Since he was 72, now 86s    Diabetes Father     Heart disease Father     Hyperlipidemia Father         Unsurs    Heart disease  Maternal Grandmother     Cancer Maternal Grandfather     COPD Maternal Grandfather     Arthritis Paternal Grandmother     Diabetes Maternal Aunt     Diabetes Paternal Uncle     Malig Hyperthermia Neg Hx      MEDS: reviewed as per chart  ALL: sulfa  ROS: 12 point negative except as in HPI    Vital Sign Min/Max for last 24 hours  Temp  Min: 96 °F (35.6 °C)  Max: 97 °F (36.1 °C)   BP  Min: 84/63  Max: 139/83   Pulse  Min: 94  Max: 115   Resp  Min: 16  Max: 16   SpO2  Min: 91 %  Max: 98 %   No data recorded   Weight  Min: 120 kg (265 lb)  Max: 120 kg (265 lb)     GEN:   appears ill, obese, A&O x3  HEENT: PERRL, EOMI, no icterus, mmm, no JVD, trachea midline, neck supple  CHEST: CTA bilat, no wheezes, no crackles, no use of accessory muscles  CV: tachy, regular, no m/g/r  ABD: soft, mildly, nd +bs, no hepatosplenomegaly  EXT: no c/c/e  SKIN: no rashes, no xanthomas, nl turgor, warm, dry  LYMPH: no palpable cervical or supraclavicular lymphadenopathy  NEURO: CN 2-12 intact and symmetric bilaterally  PSYCH: nl affect, nl orientation, nl judgment, nl mood  MSK: no kyphoscoliosis, 5/5 strength ue and le bilaterally    Labs: 1/2/24: Reviewed:  7.40/28/31  Lactate 2.6  Glucose 695  Bun 19  Cr 1.33  Na 125  Bicarb 15.7  Albumin 1.8  Wbc 20  Hgb 11.2  Plts 568    Ct abd/pelvis:  1. New moderate volume of low-attenuation mildly loculated fluid   throughout the right perinephric space, favored secondary to right   calyceal rupture.   2. New mild-moderate wall thickening involving the right renal pelvis   and renal calyces. Recommend correlation with urinalysis for findings to   suggest infection.   3. Findings suggesting bilateral UPJ obstruction. Moderate bilateral   hydronephrosis has increased on the left and mildly decreased on the   right compared to prior.   4. New mild dilation of the right ureter of indeterminate etiology.   5. Few nonobstructing right renal calculi measuring up to 4 mm.   6. Hepatic morphology suggesting  chronic liver disease.   7. Unchanged mildly enlarged retrocaval lymph node, favored   reactive/inflammatory     A/P:  Sepsis -- iv fluids, antibiotics. Supportive care, admit to icu, monitor in ICU  Anion gap metabolic acidosis -- suspect a lot of the gap is lactic acidosis, which is already improving, there maybe some DKA, I certainly can't rule that out, will treat as such with DKA protocol.    3.   Lactic acidosis -- could be do to her home metformin and/or sepsis -- improving already, continue fluids; monitor  4.   Calyceal ruture with bilateral UPJ obststruction -- urology to see, antibiotics  5.  DMII -- insulin gtt for now  6.  Psuedohyponatremia  7. Hypothyroidism -- continue synthroid  8.  HLD- hold statin for now  9. Obesity    CC 36 mins excluding any future billable procedures.

## 2024-01-03 ENCOUNTER — APPOINTMENT (OUTPATIENT)
Dept: GENERAL RADIOLOGY | Facility: HOSPITAL | Age: 64
DRG: 853 | End: 2024-01-03
Payer: COMMERCIAL

## 2024-01-03 ENCOUNTER — ANESTHESIA (OUTPATIENT)
Dept: PERIOP | Facility: HOSPITAL | Age: 64
End: 2024-01-03
Payer: COMMERCIAL

## 2024-01-03 ENCOUNTER — ANESTHESIA EVENT (OUTPATIENT)
Dept: PERIOP | Facility: HOSPITAL | Age: 64
End: 2024-01-03
Payer: COMMERCIAL

## 2024-01-03 PROBLEM — A41.9 SEPSIS, UNSPECIFIED ORGANISM: Status: ACTIVE | Noted: 2024-01-03

## 2024-01-03 PROBLEM — K76.9 CHRONIC LIVER DISEASE: Status: ACTIVE | Noted: 2024-01-03

## 2024-01-03 PROBLEM — N17.9 AKI (ACUTE KIDNEY INJURY): Status: ACTIVE | Noted: 2024-01-03

## 2024-01-03 PROBLEM — N13.5 UPJ (URETEROPELVIC JUNCTION) OBSTRUCTION: Status: ACTIVE | Noted: 2024-01-03

## 2024-01-03 PROBLEM — N13.30 BILATERAL HYDRONEPHROSIS: Status: ACTIVE | Noted: 2024-01-03

## 2024-01-03 PROBLEM — N20.0 RENAL CALCULUS, RIGHT: Status: ACTIVE | Noted: 2024-01-03

## 2024-01-03 PROBLEM — E87.20 LACTIC ACIDOSIS: Status: ACTIVE | Noted: 2024-01-03

## 2024-01-03 PROBLEM — N43.3 BILATERAL HYDROCELE: Status: ACTIVE | Noted: 2024-01-03

## 2024-01-03 LAB
ANION GAP SERPL CALCULATED.3IONS-SCNC: 10.7 MMOL/L (ref 5–15)
ANION GAP SERPL CALCULATED.3IONS-SCNC: 11.7 MMOL/L (ref 5–15)
ANION GAP SERPL CALCULATED.3IONS-SCNC: 11.8 MMOL/L (ref 5–15)
ANION GAP SERPL CALCULATED.3IONS-SCNC: 12.1 MMOL/L (ref 5–15)
ANION GAP SERPL CALCULATED.3IONS-SCNC: 13 MMOL/L (ref 5–15)
ANION GAP SERPL CALCULATED.3IONS-SCNC: 14 MMOL/L (ref 5–15)
BACTERIA SPEC AEROBE CULT: NO GROWTH
BASOPHILS # BLD AUTO: 0.15 10*3/MM3 (ref 0–0.2)
BASOPHILS NFR BLD AUTO: 0.8 % (ref 0–1.5)
BUN SERPL-MCNC: 15 MG/DL (ref 8–23)
BUN SERPL-MCNC: 15 MG/DL (ref 8–23)
BUN SERPL-MCNC: 16 MG/DL (ref 8–23)
BUN SERPL-MCNC: 17 MG/DL (ref 8–23)
BUN/CREAT SERPL: 15.2 (ref 7–25)
BUN/CREAT SERPL: 16 (ref 7–25)
BUN/CREAT SERPL: 16.3 (ref 7–25)
BUN/CREAT SERPL: 16.8 (ref 7–25)
CALCIUM SPEC-SCNC: 8.5 MG/DL (ref 8.6–10.5)
CALCIUM SPEC-SCNC: 8.7 MG/DL (ref 8.6–10.5)
CALCIUM SPEC-SCNC: 8.8 MG/DL (ref 8.6–10.5)
CALCIUM SPEC-SCNC: 8.9 MG/DL (ref 8.6–10.5)
CHLORIDE SERPL-SCNC: 100 MMOL/L (ref 98–107)
CHLORIDE SERPL-SCNC: 104 MMOL/L (ref 98–107)
CHLORIDE SERPL-SCNC: 105 MMOL/L (ref 98–107)
CHLORIDE SERPL-SCNC: 98 MMOL/L (ref 98–107)
CO2 SERPL-SCNC: 17 MMOL/L (ref 22–29)
CO2 SERPL-SCNC: 18.3 MMOL/L (ref 22–29)
CO2 SERPL-SCNC: 19.2 MMOL/L (ref 22–29)
CO2 SERPL-SCNC: 19.3 MMOL/L (ref 22–29)
CO2 SERPL-SCNC: 20.9 MMOL/L (ref 22–29)
CO2 SERPL-SCNC: 21 MMOL/L (ref 22–29)
CREAT SERPL-MCNC: 0.92 MG/DL (ref 0.57–1)
CREAT SERPL-MCNC: 0.94 MG/DL (ref 0.57–1)
CREAT SERPL-MCNC: 1.01 MG/DL (ref 0.57–1)
CREAT SERPL-MCNC: 1.04 MG/DL (ref 0.57–1)
CREAT SERPL-MCNC: 1.04 MG/DL (ref 0.57–1)
CREAT SERPL-MCNC: 1.05 MG/DL (ref 0.57–1)
D-LACTATE SERPL-SCNC: 1.9 MMOL/L (ref 0.5–2)
D-LACTATE SERPL-SCNC: 2.8 MMOL/L (ref 0.5–2)
DEPRECATED RDW RBC AUTO: 41.5 FL (ref 37–54)
EGFRCR SERPLBLD CKD-EPI 2021: 60.6 ML/MIN/1.73
EGFRCR SERPLBLD CKD-EPI 2021: 61.3 ML/MIN/1.73
EGFRCR SERPLBLD CKD-EPI 2021: 61.3 ML/MIN/1.73
EGFRCR SERPLBLD CKD-EPI 2021: 63.5 ML/MIN/1.73
EGFRCR SERPLBLD CKD-EPI 2021: 69.2 ML/MIN/1.73
EGFRCR SERPLBLD CKD-EPI 2021: 71 ML/MIN/1.73
EOSINOPHIL # BLD AUTO: 0.12 10*3/MM3 (ref 0–0.4)
EOSINOPHIL NFR BLD AUTO: 0.6 % (ref 0.3–6.2)
ERYTHROCYTE [DISTWIDTH] IN BLOOD BY AUTOMATED COUNT: 13.5 % (ref 12.3–15.4)
GLUCOSE BLDC GLUCOMTR-MCNC: 100 MG/DL (ref 70–130)
GLUCOSE BLDC GLUCOMTR-MCNC: 118 MG/DL (ref 70–130)
GLUCOSE BLDC GLUCOMTR-MCNC: 119 MG/DL (ref 70–130)
GLUCOSE BLDC GLUCOMTR-MCNC: 124 MG/DL (ref 70–130)
GLUCOSE BLDC GLUCOMTR-MCNC: 131 MG/DL (ref 70–130)
GLUCOSE BLDC GLUCOMTR-MCNC: 137 MG/DL (ref 70–130)
GLUCOSE BLDC GLUCOMTR-MCNC: 141 MG/DL (ref 70–130)
GLUCOSE BLDC GLUCOMTR-MCNC: 155 MG/DL (ref 70–130)
GLUCOSE BLDC GLUCOMTR-MCNC: 170 MG/DL (ref 70–130)
GLUCOSE BLDC GLUCOMTR-MCNC: 170 MG/DL (ref 70–130)
GLUCOSE BLDC GLUCOMTR-MCNC: 171 MG/DL (ref 70–130)
GLUCOSE BLDC GLUCOMTR-MCNC: 171 MG/DL (ref 70–130)
GLUCOSE BLDC GLUCOMTR-MCNC: 180 MG/DL (ref 70–130)
GLUCOSE BLDC GLUCOMTR-MCNC: 185 MG/DL (ref 70–130)
GLUCOSE BLDC GLUCOMTR-MCNC: 188 MG/DL (ref 70–130)
GLUCOSE BLDC GLUCOMTR-MCNC: 197 MG/DL (ref 70–130)
GLUCOSE BLDC GLUCOMTR-MCNC: 199 MG/DL (ref 70–130)
GLUCOSE BLDC GLUCOMTR-MCNC: 237 MG/DL (ref 70–130)
GLUCOSE SERPL-MCNC: 123 MG/DL (ref 65–99)
GLUCOSE SERPL-MCNC: 139 MG/DL (ref 65–99)
GLUCOSE SERPL-MCNC: 164 MG/DL (ref 65–99)
GLUCOSE SERPL-MCNC: 165 MG/DL (ref 65–99)
GLUCOSE SERPL-MCNC: 186 MG/DL (ref 65–99)
GLUCOSE SERPL-MCNC: 287 MG/DL (ref 65–99)
HCT VFR BLD AUTO: 29.2 % (ref 34–46.6)
HGB BLD-MCNC: 9.6 G/DL (ref 12–15.9)
LYMPHOCYTES # BLD AUTO: 1.25 10*3/MM3 (ref 0.7–3.1)
LYMPHOCYTES NFR BLD AUTO: 6.6 % (ref 19.6–45.3)
MAGNESIUM SERPL-MCNC: 1.8 MG/DL (ref 1.6–2.4)
MAGNESIUM SERPL-MCNC: 2 MG/DL (ref 1.6–2.4)
MCH RBC QN AUTO: 27.4 PG (ref 26.6–33)
MCHC RBC AUTO-ENTMCNC: 32.9 G/DL (ref 31.5–35.7)
MCV RBC AUTO: 83.4 FL (ref 79–97)
MONOCYTES # BLD AUTO: 0.88 10*3/MM3 (ref 0.1–0.9)
MONOCYTES NFR BLD AUTO: 4.6 % (ref 5–12)
NEUTROPHILS NFR BLD AUTO: 16.11 10*3/MM3 (ref 1.7–7)
NEUTROPHILS NFR BLD AUTO: 85.2 % (ref 42.7–76)
PHOSPHATE SERPL-MCNC: 1.5 MG/DL (ref 2.5–4.5)
PHOSPHATE SERPL-MCNC: 1.9 MG/DL (ref 2.5–4.5)
PHOSPHATE SERPL-MCNC: 2 MG/DL (ref 2.5–4.5)
PHOSPHATE SERPL-MCNC: 2 MG/DL (ref 2.5–4.5)
PHOSPHATE SERPL-MCNC: 2.3 MG/DL (ref 2.5–4.5)
PHOSPHATE SERPL-MCNC: 2.5 MG/DL (ref 2.5–4.5)
PLATELET # BLD AUTO: 441 10*3/MM3 (ref 140–450)
PMV BLD AUTO: 10.4 FL (ref 6–12)
POTASSIUM SERPL-SCNC: 3.5 MMOL/L (ref 3.5–5.2)
POTASSIUM SERPL-SCNC: 4 MMOL/L (ref 3.5–5.2)
POTASSIUM SERPL-SCNC: 4.1 MMOL/L (ref 3.5–5.2)
POTASSIUM SERPL-SCNC: 4.1 MMOL/L (ref 3.5–5.2)
POTASSIUM SERPL-SCNC: 4.3 MMOL/L (ref 3.5–5.2)
POTASSIUM SERPL-SCNC: 4.8 MMOL/L (ref 3.5–5.2)
RBC # BLD AUTO: 3.5 10*6/MM3 (ref 3.77–5.28)
SODIUM SERPL-SCNC: 133 MMOL/L (ref 136–145)
SODIUM SERPL-SCNC: 133 MMOL/L (ref 136–145)
SODIUM SERPL-SCNC: 134 MMOL/L (ref 136–145)
SODIUM SERPL-SCNC: 135 MMOL/L (ref 136–145)
SODIUM SERPL-SCNC: 135 MMOL/L (ref 136–145)
SODIUM SERPL-SCNC: 136 MMOL/L (ref 136–145)
WBC NRBC COR # BLD AUTO: 18.93 10*3/MM3 (ref 3.4–10.8)

## 2024-01-03 PROCEDURE — 25010000002 PROPOFOL 10 MG/ML EMULSION: Performed by: NURSE ANESTHETIST, CERTIFIED REGISTERED

## 2024-01-03 PROCEDURE — 83605 ASSAY OF LACTIC ACID: CPT

## 2024-01-03 PROCEDURE — 25010000002 FENTANYL CITRATE (PF) 50 MCG/ML SOLUTION: Performed by: ANESTHESIOLOGY

## 2024-01-03 PROCEDURE — 25010000002 FENTANYL CITRATE (PF) 50 MCG/ML SOLUTION: Performed by: NURSE ANESTHETIST, CERTIFIED REGISTERED

## 2024-01-03 PROCEDURE — 25010000002 PIPERACILLIN SOD-TAZOBACTAM PER 1 G: Performed by: UROLOGY

## 2024-01-03 PROCEDURE — 87070 CULTURE OTHR SPECIMN AEROBIC: CPT | Performed by: UROLOGY

## 2024-01-03 PROCEDURE — 83735 ASSAY OF MAGNESIUM: CPT | Performed by: STUDENT IN AN ORGANIZED HEALTH CARE EDUCATION/TRAINING PROGRAM

## 2024-01-03 PROCEDURE — 82948 REAGENT STRIP/BLOOD GLUCOSE: CPT

## 2024-01-03 PROCEDURE — 74420 UROGRAPHY RTRGR +-KUB: CPT

## 2024-01-03 PROCEDURE — 0 IOTHALAMATE 60 % SOLUTION: Performed by: UROLOGY

## 2024-01-03 PROCEDURE — 80048 BASIC METABOLIC PNL TOTAL CA: CPT | Performed by: UROLOGY

## 2024-01-03 PROCEDURE — 87158 CULTURE TYPING ADDED METHOD: CPT

## 2024-01-03 PROCEDURE — 87205 SMEAR GRAM STAIN: CPT | Performed by: UROLOGY

## 2024-01-03 PROCEDURE — 80048 BASIC METABOLIC PNL TOTAL CA: CPT | Performed by: STUDENT IN AN ORGANIZED HEALTH CARE EDUCATION/TRAINING PROGRAM

## 2024-01-03 PROCEDURE — 84100 ASSAY OF PHOSPHORUS: CPT | Performed by: STUDENT IN AN ORGANIZED HEALTH CARE EDUCATION/TRAINING PROGRAM

## 2024-01-03 PROCEDURE — BT141ZZ FLUOROSCOPY OF KIDNEYS, URETERS AND BLADDER USING LOW OSMOLAR CONTRAST: ICD-10-PCS | Performed by: UROLOGY

## 2024-01-03 PROCEDURE — 87102 FUNGUS ISOLATION CULTURE: CPT | Performed by: UROLOGY

## 2024-01-03 PROCEDURE — 25010000002 ENOXAPARIN PER 10 MG: Performed by: STUDENT IN AN ORGANIZED HEALTH CARE EDUCATION/TRAINING PROGRAM

## 2024-01-03 PROCEDURE — 25010000002 ONDANSETRON PER 1 MG: Performed by: NURSE ANESTHETIST, CERTIFIED REGISTERED

## 2024-01-03 PROCEDURE — 87186 SC STD MICRODIL/AGAR DIL: CPT | Performed by: UROLOGY

## 2024-01-03 PROCEDURE — C1769 GUIDE WIRE: HCPCS | Performed by: UROLOGY

## 2024-01-03 PROCEDURE — C1758 CATHETER, URETERAL: HCPCS | Performed by: UROLOGY

## 2024-01-03 PROCEDURE — 0T788DZ DILATION OF BILATERAL URETERS WITH INTRALUMINAL DEVICE, VIA NATURAL OR ARTIFICIAL OPENING ENDOSCOPIC: ICD-10-PCS | Performed by: UROLOGY

## 2024-01-03 PROCEDURE — 85025 COMPLETE CBC W/AUTO DIFF WBC: CPT | Performed by: INTERNAL MEDICINE

## 2024-01-03 PROCEDURE — 25810000003 LACTATED RINGERS PER 1000 ML: Performed by: ANESTHESIOLOGY

## 2024-01-03 PROCEDURE — 87015 SPECIMEN INFECT AGNT CONCNTJ: CPT | Performed by: UROLOGY

## 2024-01-03 PROCEDURE — 36415 COLL VENOUS BLD VENIPUNCTURE: CPT | Performed by: STUDENT IN AN ORGANIZED HEALTH CARE EDUCATION/TRAINING PROGRAM

## 2024-01-03 PROCEDURE — 87077 CULTURE AEROBIC IDENTIFY: CPT | Performed by: UROLOGY

## 2024-01-03 PROCEDURE — 25010000002 SUCCINYLCHOLINE PER 20 MG: Performed by: NURSE ANESTHETIST, CERTIFIED REGISTERED

## 2024-01-03 PROCEDURE — 84100 ASSAY OF PHOSPHORUS: CPT | Performed by: UROLOGY

## 2024-01-03 PROCEDURE — 25010000002 PIPERACILLIN SOD-TAZOBACTAM PER 1 G: Performed by: INTERNAL MEDICINE

## 2024-01-03 PROCEDURE — C2617 STENT, NON-COR, TEM W/O DEL: HCPCS | Performed by: UROLOGY

## 2024-01-03 PROCEDURE — 83735 ASSAY OF MAGNESIUM: CPT | Performed by: UROLOGY

## 2024-01-03 PROCEDURE — 87186 SC STD MICRODIL/AGAR DIL: CPT

## 2024-01-03 PROCEDURE — 25010000002 ENOXAPARIN PER 10 MG: Performed by: UROLOGY

## 2024-01-03 DEVICE — URETERAL STENT
Type: IMPLANTABLE DEVICE | Site: URETER | Status: FUNCTIONAL
Brand: POLARIS™ ULTRA

## 2024-01-03 RX ORDER — DEXTROSE MONOHYDRATE 25 G/50ML
10-50 INJECTION, SOLUTION INTRAVENOUS
Status: DISCONTINUED | OUTPATIENT
Start: 2024-01-03 | End: 2024-01-04

## 2024-01-03 RX ORDER — IBUPROFEN 600 MG/1
1 TABLET ORAL
Status: DISCONTINUED | OUTPATIENT
Start: 2024-01-03 | End: 2024-01-19 | Stop reason: HOSPADM

## 2024-01-03 RX ORDER — PROMETHAZINE HYDROCHLORIDE 25 MG/1
25 TABLET ORAL ONCE AS NEEDED
Status: DISCONTINUED | OUTPATIENT
Start: 2024-01-03 | End: 2024-01-03 | Stop reason: HOSPADM

## 2024-01-03 RX ORDER — MIDAZOLAM HYDROCHLORIDE 1 MG/ML
1 INJECTION INTRAMUSCULAR; INTRAVENOUS
Status: DISCONTINUED | OUTPATIENT
Start: 2024-01-03 | End: 2024-01-03 | Stop reason: HOSPADM

## 2024-01-03 RX ORDER — HYDROMORPHONE HYDROCHLORIDE 1 MG/ML
0.5 INJECTION, SOLUTION INTRAMUSCULAR; INTRAVENOUS; SUBCUTANEOUS
Status: DISCONTINUED | OUTPATIENT
Start: 2024-01-03 | End: 2024-01-03 | Stop reason: HOSPADM

## 2024-01-03 RX ORDER — INSULIN LISPRO 100 [IU]/ML
1-200 INJECTION, SOLUTION INTRAVENOUS; SUBCUTANEOUS AS NEEDED
Status: DISCONTINUED | OUTPATIENT
Start: 2024-01-03 | End: 2024-01-04

## 2024-01-03 RX ORDER — EPHEDRINE SULFATE 50 MG/ML
5 INJECTION, SOLUTION INTRAVENOUS ONCE AS NEEDED
Status: DISCONTINUED | OUTPATIENT
Start: 2024-01-03 | End: 2024-01-03

## 2024-01-03 RX ORDER — INSULIN LISPRO 100 [IU]/ML
1-200 INJECTION, SOLUTION INTRAVENOUS; SUBCUTANEOUS
Status: DISCONTINUED | OUTPATIENT
Start: 2024-01-03 | End: 2024-01-04

## 2024-01-03 RX ORDER — NICOTINE POLACRILEX 4 MG
15 LOZENGE BUCCAL
Status: DISCONTINUED | OUTPATIENT
Start: 2024-01-03 | End: 2024-01-04

## 2024-01-03 RX ORDER — EPHEDRINE SULFATE 50 MG/ML
INJECTION INTRAVENOUS AS NEEDED
Status: DISCONTINUED | OUTPATIENT
Start: 2024-01-03 | End: 2024-01-03 | Stop reason: SURG

## 2024-01-03 RX ORDER — ONDANSETRON 2 MG/ML
4 INJECTION INTRAMUSCULAR; INTRAVENOUS ONCE AS NEEDED
Status: DISCONTINUED | OUTPATIENT
Start: 2024-01-03 | End: 2024-01-03 | Stop reason: HOSPADM

## 2024-01-03 RX ORDER — FAMOTIDINE 10 MG/ML
20 INJECTION, SOLUTION INTRAVENOUS ONCE
Status: COMPLETED | OUTPATIENT
Start: 2024-01-03 | End: 2024-01-03

## 2024-01-03 RX ORDER — SODIUM CHLORIDE 0.9 % (FLUSH) 0.9 %
3-10 SYRINGE (ML) INJECTION AS NEEDED
Status: DISCONTINUED | OUTPATIENT
Start: 2024-01-03 | End: 2024-01-03 | Stop reason: HOSPADM

## 2024-01-03 RX ORDER — LIDOCAINE HYDROCHLORIDE 20 MG/ML
INJECTION, SOLUTION INFILTRATION; PERINEURAL AS NEEDED
Status: DISCONTINUED | OUTPATIENT
Start: 2024-01-03 | End: 2024-01-03 | Stop reason: SURG

## 2024-01-03 RX ORDER — IPRATROPIUM BROMIDE AND ALBUTEROL SULFATE 2.5; .5 MG/3ML; MG/3ML
3 SOLUTION RESPIRATORY (INHALATION) ONCE AS NEEDED
Status: DISCONTINUED | OUTPATIENT
Start: 2024-01-03 | End: 2024-01-03 | Stop reason: HOSPADM

## 2024-01-03 RX ORDER — NALOXONE HCL 0.4 MG/ML
0.2 VIAL (ML) INJECTION AS NEEDED
Status: DISCONTINUED | OUTPATIENT
Start: 2024-01-03 | End: 2024-01-03 | Stop reason: HOSPADM

## 2024-01-03 RX ORDER — LIDOCAINE HYDROCHLORIDE 10 MG/ML
0.5 INJECTION, SOLUTION INFILTRATION; PERINEURAL ONCE AS NEEDED
Status: DISCONTINUED | OUTPATIENT
Start: 2024-01-03 | End: 2024-01-03 | Stop reason: HOSPADM

## 2024-01-03 RX ORDER — PROPOFOL 10 MG/ML
VIAL (ML) INTRAVENOUS AS NEEDED
Status: DISCONTINUED | OUTPATIENT
Start: 2024-01-03 | End: 2024-01-03 | Stop reason: SURG

## 2024-01-03 RX ORDER — SODIUM CHLORIDE, SODIUM LACTATE, POTASSIUM CHLORIDE, CALCIUM CHLORIDE 600; 310; 30; 20 MG/100ML; MG/100ML; MG/100ML; MG/100ML
9 INJECTION, SOLUTION INTRAVENOUS CONTINUOUS
Status: DISCONTINUED | OUTPATIENT
Start: 2024-01-03 | End: 2024-01-15

## 2024-01-03 RX ORDER — OXYCODONE AND ACETAMINOPHEN 7.5; 325 MG/1; MG/1
1 TABLET ORAL EVERY 4 HOURS PRN
Status: DISCONTINUED | OUTPATIENT
Start: 2024-01-03 | End: 2024-01-03 | Stop reason: HOSPADM

## 2024-01-03 RX ORDER — DIPHENHYDRAMINE HYDROCHLORIDE 50 MG/ML
12.5 INJECTION INTRAMUSCULAR; INTRAVENOUS
Status: DISCONTINUED | OUTPATIENT
Start: 2024-01-03 | End: 2024-01-03 | Stop reason: HOSPADM

## 2024-01-03 RX ORDER — HYDROCODONE BITARTRATE AND ACETAMINOPHEN 5; 325 MG/1; MG/1
1 TABLET ORAL ONCE AS NEEDED
Status: DISCONTINUED | OUTPATIENT
Start: 2024-01-03 | End: 2024-01-03 | Stop reason: HOSPADM

## 2024-01-03 RX ORDER — DROPERIDOL 2.5 MG/ML
0.62 INJECTION, SOLUTION INTRAMUSCULAR; INTRAVENOUS
Status: DISCONTINUED | OUTPATIENT
Start: 2024-01-03 | End: 2024-01-03 | Stop reason: HOSPADM

## 2024-01-03 RX ORDER — FENTANYL CITRATE 50 UG/ML
50 INJECTION, SOLUTION INTRAMUSCULAR; INTRAVENOUS ONCE AS NEEDED
Status: COMPLETED | OUTPATIENT
Start: 2024-01-03 | End: 2024-01-03

## 2024-01-03 RX ORDER — SUCCINYLCHOLINE CHLORIDE 20 MG/ML
INJECTION INTRAMUSCULAR; INTRAVENOUS AS NEEDED
Status: DISCONTINUED | OUTPATIENT
Start: 2024-01-03 | End: 2024-01-03 | Stop reason: SURG

## 2024-01-03 RX ORDER — ONDANSETRON 2 MG/ML
INJECTION INTRAMUSCULAR; INTRAVENOUS AS NEEDED
Status: DISCONTINUED | OUTPATIENT
Start: 2024-01-03 | End: 2024-01-03 | Stop reason: SURG

## 2024-01-03 RX ORDER — PROMETHAZINE HYDROCHLORIDE 25 MG/1
25 SUPPOSITORY RECTAL ONCE AS NEEDED
Status: DISCONTINUED | OUTPATIENT
Start: 2024-01-03 | End: 2024-01-03 | Stop reason: HOSPADM

## 2024-01-03 RX ORDER — SODIUM CHLORIDE 0.9 % (FLUSH) 0.9 %
3 SYRINGE (ML) INJECTION EVERY 12 HOURS SCHEDULED
Status: DISCONTINUED | OUTPATIENT
Start: 2024-01-03 | End: 2024-01-03 | Stop reason: HOSPADM

## 2024-01-03 RX ORDER — LABETALOL HYDROCHLORIDE 5 MG/ML
5 INJECTION, SOLUTION INTRAVENOUS
Status: DISCONTINUED | OUTPATIENT
Start: 2024-01-03 | End: 2024-01-03 | Stop reason: HOSPADM

## 2024-01-03 RX ORDER — CALCIUM CHLORIDE 100 MG/ML
INJECTION INTRAVENOUS; INTRAVENTRICULAR AS NEEDED
Status: DISCONTINUED | OUTPATIENT
Start: 2024-01-03 | End: 2024-01-03 | Stop reason: SURG

## 2024-01-03 RX ORDER — HYDRALAZINE HYDROCHLORIDE 20 MG/ML
5 INJECTION INTRAMUSCULAR; INTRAVENOUS
Status: DISCONTINUED | OUTPATIENT
Start: 2024-01-03 | End: 2024-01-03 | Stop reason: HOSPADM

## 2024-01-03 RX ORDER — FLUMAZENIL 0.1 MG/ML
0.2 INJECTION INTRAVENOUS AS NEEDED
Status: DISCONTINUED | OUTPATIENT
Start: 2024-01-03 | End: 2024-01-03 | Stop reason: HOSPADM

## 2024-01-03 RX ORDER — PHENYLEPHRINE HCL IN 0.9% NACL 1 MG/10 ML
SYRINGE (ML) INTRAVENOUS AS NEEDED
Status: DISCONTINUED | OUTPATIENT
Start: 2024-01-03 | End: 2024-01-03 | Stop reason: SURG

## 2024-01-03 RX ORDER — FENTANYL CITRATE 50 UG/ML
50 INJECTION, SOLUTION INTRAMUSCULAR; INTRAVENOUS
Status: DISCONTINUED | OUTPATIENT
Start: 2024-01-03 | End: 2024-01-03

## 2024-01-03 RX ADMIN — Medication 10 ML: at 09:35

## 2024-01-03 RX ADMIN — LEVOTHYROXINE SODIUM 137 MCG: 137 TABLET ORAL at 05:54

## 2024-01-03 RX ADMIN — Medication 200 MCG: at 16:31

## 2024-01-03 RX ADMIN — PIPERACILLIN SODIUM AND TAZOBACTAM SODIUM 4.5 G: 4; .5 INJECTION, SOLUTION INTRAVENOUS at 07:43

## 2024-01-03 RX ADMIN — PIPERACILLIN SODIUM AND TAZOBACTAM SODIUM 4.5 G: 4; .5 INJECTION, SOLUTION INTRAVENOUS at 18:23

## 2024-01-03 RX ADMIN — SUCCINYLCHOLINE CHLORIDE 100 MG: 20 INJECTION, SOLUTION INTRAMUSCULAR; INTRAVENOUS; PARENTERAL at 16:11

## 2024-01-03 RX ADMIN — ONDANSETRON 4 MG: 2 INJECTION INTRAMUSCULAR; INTRAVENOUS at 16:44

## 2024-01-03 RX ADMIN — SODIUM CHLORIDE, POTASSIUM CHLORIDE, SODIUM LACTATE AND CALCIUM CHLORIDE 9 ML/HR: 600; 310; 30; 20 INJECTION, SOLUTION INTRAVENOUS at 15:27

## 2024-01-03 RX ADMIN — INSULIN HUMAN 5.8 UNITS/HR: 1 INJECTION, SOLUTION INTRAVENOUS at 05:31

## 2024-01-03 RX ADMIN — ROPINIROLE HYDROCHLORIDE 0.5 MG: 0.5 TABLET, FILM COATED ORAL at 20:29

## 2024-01-03 RX ADMIN — PIPERACILLIN SODIUM AND TAZOBACTAM SODIUM 4.5 G: 4; .5 INJECTION, SOLUTION INTRAVENOUS at 23:22

## 2024-01-03 RX ADMIN — CALCIUM CHLORIDE 200 MG: 100 INJECTION INTRAVENOUS; INTRAVENTRICULAR at 16:37

## 2024-01-03 RX ADMIN — FENTANYL CITRATE 50 MCG: 50 INJECTION, SOLUTION INTRAMUSCULAR; INTRAVENOUS at 17:09

## 2024-01-03 RX ADMIN — ESCITALOPRAM OXALATE 10 MG: 10 TABLET, FILM COATED ORAL at 11:06

## 2024-01-03 RX ADMIN — Medication 100 MCG: at 16:49

## 2024-01-03 RX ADMIN — FENTANYL CITRATE 50 MCG: 50 INJECTION, SOLUTION INTRAMUSCULAR; INTRAVENOUS at 15:28

## 2024-01-03 RX ADMIN — ENOXAPARIN SODIUM 40 MG: 100 INJECTION SUBCUTANEOUS at 11:07

## 2024-01-03 RX ADMIN — LIDOCAINE HYDROCHLORIDE 60 MG: 20 INJECTION, SOLUTION INFILTRATION; PERINEURAL at 16:10

## 2024-01-03 RX ADMIN — FAMOTIDINE 20 MG: 10 INJECTION INTRAVENOUS at 15:28

## 2024-01-03 RX ADMIN — CALCIUM CHLORIDE 300 MG: 100 INJECTION INTRAVENOUS; INTRAVENTRICULAR at 16:32

## 2024-01-03 RX ADMIN — POTASSIUM CHLORIDE 40 MEQ: 750 TABLET, EXTENDED RELEASE ORAL at 02:21

## 2024-01-03 RX ADMIN — Medication 10 ML: at 20:31

## 2024-01-03 RX ADMIN — PROPOFOL 100 MG: 10 INJECTION, EMULSION INTRAVENOUS at 16:10

## 2024-01-03 RX ADMIN — POTASSIUM CHLORIDE, DEXTROSE MONOHYDRATE AND SODIUM CHLORIDE 150 ML/HR: 150; 5; 900 INJECTION, SOLUTION INTRAVENOUS at 08:44

## 2024-01-03 RX ADMIN — POTASSIUM CHLORIDE, DEXTROSE MONOHYDRATE AND SODIUM CHLORIDE 150 ML/HR: 150; 5; 450 INJECTION, SOLUTION INTRAVENOUS at 02:21

## 2024-01-03 RX ADMIN — Medication 100 MCG: at 16:23

## 2024-01-03 RX ADMIN — ENOXAPARIN SODIUM 40 MG: 100 INJECTION SUBCUTANEOUS at 20:29

## 2024-01-03 RX ADMIN — Medication 200 MCG: at 16:37

## 2024-01-03 RX ADMIN — SENNOSIDES AND DOCUSATE SODIUM 2 TABLET: 50; 8.6 TABLET ORAL at 20:29

## 2024-01-03 RX ADMIN — SENNOSIDES AND DOCUSATE SODIUM 2 TABLET: 50; 8.6 TABLET ORAL at 11:06

## 2024-01-03 RX ADMIN — POTASSIUM CHLORIDE, DEXTROSE MONOHYDRATE AND SODIUM CHLORIDE 150 ML/HR: 150; 5; 450 INJECTION, SOLUTION INTRAVENOUS at 23:21

## 2024-01-03 RX ADMIN — PROPOFOL 50 MG: 10 INJECTION, EMULSION INTRAVENOUS at 16:25

## 2024-01-03 RX ADMIN — EPHEDRINE SULFATE 10 MG: 50 INJECTION INTRAVENOUS at 16:49

## 2024-01-03 RX ADMIN — Medication 200 MCG: at 16:27

## 2024-01-03 RX ADMIN — HYDROCODONE BITARTRATE AND ACETAMINOPHEN 1 TABLET: 5; 325 TABLET ORAL at 05:54

## 2024-01-03 NOTE — BRIEF OP NOTE
CYSTOSCOPY URETERAL CATHETER/STENT INSERTION  Progress Note    Tiana Hudson  1/3/2024    Pre-op Diagnosis:   Urosepsis bilateral UPJ obstruction       Post-Op Diagnosis Codes:  Same    Procedure/CPT® Codes:        Procedure(s):  CYSTOSCOPY BILATERAL RETROGRADE PYELOGRAM  BILATERAL URETERAL STENT INSERTION AND CATHETHER PLACEMENT bilateral renal cultures              Surgeon(s):  Eduard Armando MD    Anesthesia: General    Staff:   Circulator: Heydi Goldberg RN; Uma Gutiérrez RN  Scrub Person: Analia Garay         Estimated Blood Loss: 5 mL    Urine Voided: * No values recorded between 1/3/2024  4:01 PM and 1/3/2024  4:45 PM *    Specimens:                Specimens       ID Source Type Tests Collected By Collected At Frozen?    1 Kidney, Left Body Fluid FUNGAL CULTURE  BODY FLUID CULTURE   Eduard Armando MD 1/3/24 1623     Description: left kidney    2 Kidney, Right Body Fluid FUNGAL CULTURE  BODY FLUID CULTURE   Eduard Armando MD 1/3/24 1627     Description: RIGHT KIDNEY                  Drains:   Urethral Catheter Silicone 16 Fr. (Active)       [REMOVED] External Urinary Catheter (Removed)       Findings: As above bilateral hydronephrotic drip's with decompression of the collecting system stent placed cultures taken        Complications: None called daughter Enoch at [phone] postoperatively          Eduard Armando MD     Date: 1/3/2024  Time: 16:47 EST

## 2024-01-03 NOTE — OP NOTE
Preoperative diagnosis bilateral UPJ obstruction urosepsis right renal stone    Postoperative diagnosis same    Operative procedure cystoscopy bilateral renal pelvic cultures with decompression General retrograde pyelogram and placement of 5 [place] citizen by 26 cm Percuflex double-J stents without tether under fluoroscopic guidance and Perales catheter insertion    Surgeon Tr    Anesthesia General    Procedure note 63-year-old female diabetic poorly controlled presented with above-mentioned symptoms and findings discussion by myself and Dr. Marvin Martinez regarding the findings and placement of stents for relief of obstruction and treatment of infection    Permit was signed antibiotics in the form of Zosyn has already been delivered all cultures are pending after under general anesthesia timeout was taken COVID precautions allergies to sulfa general anesthesia modified dorsolithotomy position with cystoscopy table prepped and draped sterile fashion of genitalia scope was advanced into the bladder there is a fluffy appearance suggestive of possible yeast remainder the bladder appeared to be normal orifice ease normal position figuration the left was intubated with a guidewire followed by a Pollick catheter with decompression and gentle retrograde delineating the UPJ obstruction upon after decompression a 5 [place] citizen by 26 cm stent was placed without tether with good curling in the bladder and renal pelvis    Like fashion the right side was then intubated decompressed with culture taken gentle retrograde and placement of a stent 5 [place] citizen by 26 cm after completion of this Perales catheter was inserted pelvic examination anesthesia revealed no pelvic masses rectal without masses as well patient procedure well findings were called to daughter Enoch [phone] she was sent to the recovery room in satisfactory condition    Orders have been renewed with the added order to review with the managing intensivist

## 2024-01-03 NOTE — CONSULTS
NATHALIA CAMPUZANO [facility]  INTERNAL MEDICINE  DEAN FAIRCHILD MD  [address] [place] [address]  Phone [phone] Fax [phone]  E-mail:  [email or web address]      INTERNAL MEDICINE DAILY PROGRESS NOTE  Dean Fairchild M.D.  2024            Patient Identification:  Name: Tiana Hudson  Age: 63 y.o.  Sex: female  :  1960  MRN: [id number]         Primary Care Physician: Dean Fairchild MD  LENGTH OF STAY 0 DAYS    Consults       Date and Time Order Name Status Description    2024  3:45 PM IP General Consult (Use specialty-specific consult if known)      2024  3:45 PM Urology (on-call MD unless specified)              Chief Complaint: Abdominal/flank pain with DKA, acute cystitis and sepsis, and possible calyceal rupture in the kidney    History of Present Illness:  Subjective     Interval History: Patient is a 63 y.o.female who presented at my request to the emergency room at [facility] with complaint of acute abdominal/flank pain and history of recurrent kidney stones.  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  Patient has been followed for the last few months by Dr. Rei Calvert in urology for renal calculi and actually has had a stent placed in the right kidney due to obstruction from her renal stones.  It is also noted that the patient had a recent lithotripsy.  Patient began to feel poorly after the recent  holiday and became more severely ill over the last 3 to 4 days prior to this admission.  She has felt extremely fatigued, dizzy at times, tired, nauseated, and has spent most of her time in bed sleeping.  Family has had difficulty getting the patient to take any oral  intake and became quite concerned as her condition continued to worsen.  Patient was attempting to go to work today but really was too dizzy to get in the car to drive and 2-week to actually work.  After urging from her daughter, patient called me with respect to the symptoms and at my request went to the ER for evaluation.  Patient has multiple medical comorbidities that complicate her medical care.These include most significantly recurrent urinary tract infections due to her nephrolithiasis, morbid obesity with BMI greater than 40, diabetes mellitus type 2 poorly controlled with recent addition of Ozempic to her metformin therapy, vitamin D deficiency, essential hypertension, hyperlipidemia, hypothyroidism, allergic rhinitis, back pain, contact dermatitis, anxiety, and history of dense breast tissue on mammograms.    Patient was evaluated in the emergency room by Angelito Winkler MD who was the ER attending on call time of her arrival.  Patient was seen on 1/2/2024 at 1544 by Dr. Winkler.  His HPI was consistent with the story which I given above.  Patient denied dysuria, fever, chills on his evaluation.  She did admit to having emesis and actually had some emesis while in the emergency room.  Review of systems in the emergency room was positive for diffuse abdominal pain, and vomiting.  Patient also was noted to have some significant flank pain.  All other systems reviewed were negative in the emergency room vital signs on arrival in the emergency room showed temperature of 96 °F, heart rate of 114, respiratory rate of 16, blood pressure 125/72, and O2 sat of 98%.  Patient was described as ill-appearing but in no acute distress.  She did have severe tenderness to palpation more on the right side of the abdomen and out into the right flank area with voluntary guarding.  Labs collected in the ER showed that her lactate level was elevated at 6.0.  She had a lipase of 23, her white blood cell count was 20.12, her  hemoglobin was 11.2, and her platelet count was 568,000.  Patient did have a phosphorus of 4.2, magnesium 2.0, osmolality of 317, hemoglobin A1c is 16.90, and UA showed specific gravity 1.027, 3+ glucose, moderate blood 2+, leukocyte esterase moderate 2+, nitrates negative, white blood cells 6-10, red blood cells 6-10, bacteria 2+.  Her CMP showed a glucose of 978, creatinine of 1.82, sodium of 115, potassium of 4.2, chloride of 76, CO2 of 15.7, albumin 2.4, AST 33, alk phos 220, anion gap of 23, EGFR of 31.3.  Patient did have a CT scan of her abdomen and pelvis completed which showed loculated fluid throughout the right perinephric space felt to possibly be secondary to right calyceal rupture, new mild to moderate wall thickening of the right renal pelvis and renal calyces, bilateral UPJ obstruction with moderate bilateral hydronephrosis on the left, mild new dilation of right ureter, nonobstructing right renal calculi, hepatic morphology suggestive of chronic liver disease, and reactive lymph nodes in the retrocaval area.  A chest x-ray was done which showed no acute disease other than minimal atelectasis at the base of the right lung.  Patient underwent aggressive fluid resuscitation in the emergency room and received over 3 L of IV fluid.  She was placed on an insulin drip and was also started on Zosyn therapy.  She was followed on sepsis protocol as well as DKA protocol.  Patient did receive morphine 4 mg for pain x 2.  Case was discussed with myself as her primary care attending.  Urology was also contacted about situation.  I did suggest that patient be admitted to the ICU for treatment of the DKA and severe sepsis picture.  Dr. Arthur Tena did agree to the ICU admission and will be following her in the CCU for pulmonary/critical care.  Final diagnoses in the ER were sepsis due to unspecified organism and acute cystitis without significant hematuria.    1/2/2024.  I personally saw the patient for the first  time during this hospitalization on this date in the coronary care unit room #335.  Patient was resting quietly in bed and did a peer acutely ill.  She did wake to her name and spoke a few words before falling back asleep.  Patient's 2 daughters were at the foot of her bed and did discuss the case at length with me.  Daughter Enoch, is a former nurse from here at [facility], and now works at [employer] with the hospitalist group there.  Patient has responded well to the Glucommander DKA protocols and blood sugars have gradually come down to near normal levels for the patient in the 1  range.  I will full PPE for the exam including an N95 face mask, goggles, white lab coat, and gloves when touching patient.  I performed thorough hand hygiene before and after the patient visit.  Patient did have a venous blood gas which showed pH 7.40, pCO2 28, pO2 of 31, and O2 sat of 62%.  She also had recent electrolytes which showed a sodium of 131, potassium 3.4, chloride 96, CO2 19.1, BUN 17, creatinine 1.15, estimated GFR now up to 54.3.  Lactate level has been coming steadily down and currently at 2.8.  Additional labs ordered for tomorrow a.m.  Patient has been able to urinate several times and daughters have helped her up to the bathroom for this.  I actually note that she did have some urinary frequency and incontinence at home which is unusual for her.  I did speak briefly to the patient's daytime nurse who was departing soon after I arrived on the floor.  I have reviewed Dr. Arthur Tena's notes and his admission history and physical.  I do agree completely with his plan of care at present time.  Supportive care is continued to be offered for the sepsis with careful monitoring in the ICU.  Patient's anion gap metabolic acidosis probably is related to lactic acidosis and she was continuing to take metformin even when she was not able to eat.  IV fluids are continued aggressively.  Urology is to consult on the  possible bilateral UPJ obstruction and calyceal rupture and broad-spectrum antibiotics are continued.  Probably will plan sliding scale insulin for stabilization of diabetes while here in the hospital and will train patient to continue that in the home environment.  Pseudohyponatremia will be corrected with the IV fluids.  Hypothyroidism will be addressed with continued Synthroid.  Statins are held for now.  Obesity is an ongoing problem for the patient.  At the time my exam, patient is medically stable and slowly improving.  I did relay this to the daughter to agree with my assessment.  We will continue to follow the patient with you and we will be happy to assume care of the patient when she is stable and ready to be transferred out of the ICU.  I can be reached directly for any concerns or questions at [phone].      Review of Systems:    Review of systems could not be obtained due to  patient confusion. patient nonverbal.    Past Medical History:   Diagnosis Date    Anemia     intermittently    Anxiety and depression     Arthritis     Depression     Diabetes mellitus     Gallstones     High cholesterol     History of frequent urinary tract infections     HX OF YEAST IN BLADDER HAS PRN DIFLUCAN    History of transfusion 05/24/2017    Had 2 iron infusions.  This was when i had knee replacement    Hyperlipidemia     Hypertension     Hypothyroidism     Knee pain, bilateral     Low back pain     Lumbar stenosis     PONV (postoperative nausea and vomiting)     Positive TB test     chest x-ray neg    Seasonal allergies      Past Surgical History:   Procedure Laterality Date    APPENDECTOMY N/A 1982    Dr. Wilson    CHOLECYSTECTOMY WITH INTRAOPERATIVE CHOLANGIOGRAM N/A 10/31/2018    Procedure: laparoscopic cholecystectomy;  Surgeon: Mary Kay Mac MD;  Location: [facility];  Service: General    COLONOSCOPY      CYSTOSCOPY BLADDER BIOPSY N/A 10/3/2016    Procedure: CYSTOSCOPY BLADDER BIOPSY;  Surgeon: Rei  Spencer Calvert MD;  Location: [facility] MAIN OR;  Service:     DILATATION AND CURETTAGE N/A     ENDOSCOPY      INTRAUTERINE DEVICE INSERTION      KNEE ARTHROSCOPY W/ MENISCAL REPAIR Left     OVARIAN CYST REMOVAL Left     Dr. Wilson    TOTAL KNEE ARTHROPLASTY Right 2017    Procedure: RIGHT TOTAL KNEE ARTHROPLASTY WITH ALETA NAVIGATION;  Surgeon: Ross Cruz MD;  Location: [facility] MAIN OR;  Service:      Allergies   Allergen Reactions    Sulfa Antibiotics Hives and Rash       Family History   Problem Relation Age of Onset    Hepatitis Mother     Breast cancer Mother     Heart disease Mother     Cancer Mother     Hyperlipidemia Mother              Heart failure Father     Stroke Father     Hypertension Father         Since he was 72, now 86s    Diabetes Father     Heart disease Father     Hyperlipidemia Father         Unsurs    Heart disease Maternal Grandmother     Cancer Maternal Grandfather     COPD Maternal Grandfather     Arthritis Paternal Grandmother     Diabetes Maternal Aunt     Diabetes Paternal Uncle     Malig Hyperthermia Neg Hx        Social History     Socioeconomic History    Marital status:    Tobacco Use    Smoking status: Never    Smokeless tobacco: Never    Tobacco comments:     Never smoked   Vaping Use    Vaping Use: Never used   Substance and Sexual Activity    Alcohol use: Never     Alcohol/week: 3.0 standard drinks of alcohol     Types: 1 Glasses of wine, 2 Standard drinks or equivalent per week    Drug use: Never    Sexual activity: Not Currently     Partners: Male     Birth control/protection: I.U.D.       PMH, FH, SH and ROS completed with Admission History and Physical and updated in EPIC system.        Objective     Scheduled Meds:enoxaparin, 40 mg, Subcutaneous, Q12H  escitalopram, 10 mg, Oral, Daily  [START ON 1/3/2024] levothyroxine, 137 mcg, Oral, Q AM  piperacillin-tazobactam, 4.5 g, Intravenous, Q8H  rOPINIRole, 0.5 mg, Oral, Nightly  senna-docusate  "sodium, 2 tablet, Oral, BID  sodium chloride, 10 mL, Intravenous, Q12H  sodium chloride, 10 mL, Intravenous, Q12H  sodium chloride, 10 mL, Intravenous, Q12H      Continuous Infusions:dextrose 5 % and sodium chloride 0.45 %, 150 mL/hr  dextrose 5 % and sodium chloride 0.45 % with KCl 20 mEq/L, 150 mL/hr  dextrose 5 % and sodium chloride 0.45 % with KCl 40 mEq/L, 150 mL/hr  dextrose 5 % and sodium chloride 0.9 %, 150 mL/hr  dextrose 5 % and sodium chloride 0.9 % with KCl 20 mEq, 150 mL/hr  dextrose 5% and sodium chloride 0.9% with KCl 40 mEq/L, 150 mL/hr  insulin, 0-100 Units/hr, Last Rate: 5 Units/hr (01/02/24 2027)  Pharmacy to Dose Zosyn,   sodium chloride 0.45 % 1,000 mL with potassium chloride 40 mEq infusion, 250 mL/hr  sodium chloride 0.45 % 1,000 mL with potassium chloride 40 mEq infusion, 250 mL/hr  sodium chloride, 250 mL/hr  sodium chloride, 250 mL/hr  sodium chloride 0.45 % with KCl 20 mEq, 250 mL/hr  sodium chloride 0.45 % with KCl 20 mEq, 250 mL/hr  sodium chloride, 250 mL/hr  sodium chloride, 250 mL/hr  sodium chloride 0.9 % with KCl 20 mEq, 250 mL/hr  sodium chloride 0.9 % with KCl 20 mEq, 250 mL/hr, Last Rate: 250 mL/hr (01/02/24 2048)  sodium chloride 0.9 % with KCl 40 mEq/L, 250 mL/hr  sodium chloride 0.9 % with KCl 40 mEq/L, 250 mL/hr        Vital signs in last 24 hours:  Temp:  [96 °F (35.6 °C)-97.6 °F (36.4 °C)] 97.6 °F (36.4 °C)  Heart Rate:  [] 116  Resp:  [16-23] 23  BP: ()/(48-83) 103/65    Intake/Output:    Intake/Output Summary (Last 24 hours) at 1/2/2024 2100  Last data filed at 1/2/2024 1830  Gross per 24 hour   Intake 1104.68 ml   Output 250 ml   Net 854.68 ml       Exam:  /65 (BP Location: Left arm, Patient Position: Lying)   Pulse 116   Temp 97.6 °F (36.4 °C) (Oral)   Resp 23   Ht 165.1 cm (65\")   Wt 120 kg (265 lb)   LMP 09/03/2016 Comment: IUD  SpO2 94%   BMI 44.10 kg/m²     Constitutional:  Alert, cooperative, no distress, AAOx1, resting comfortably, " extremely weak, continuing on insulin drip at the time of my visit, daughters present at bedside.   at home and overwhelmed bit by situation.   Head:      Normocephalic, without obvious abnormality, atraumatic   Eyes:     PERRLA, conjunctiva/corneas clear, no icterus, no conjunctival                                     pallor, EOM's intact, both eyes      ENT and Mouth: Lips, tongue, gums normal; oral mucosa pink and moist   Neck:     Supple, symmetrical, trachea midline, no JVD  Respiratory:     Clear to auscultation bilaterally, respirations unlabored  Cardiovascular:  Regular rate and rhythm, S1 and S2 normal, no murmur,      no  Rub or gallop.  Pulses normal.    Gastrointestinal:   BS present x 4 Soft, non-tender, bowel sounds active,      no masses, no hepatosplenomegaly.  Patient still has guarding right flank and right lower quadrant.  No real rebound at present time.                                                   :       No hernia.  Normal exam for sex.         Musculoskeletal: Extremities normal, atraumatic, no cyanosis or edema     No arthropathy.  No deformity.  Gait normal                                                 Skin:   Skin is warm and dry,  no rashes, swelling or palpable lesions   Neurologic:  CN -XII intact, motor strength grossly intact, sensation grossly intact to light touch, no focal reflex deficits noted    Psychiatric:     Alert,oriented X3, no delusions, psychoses, depression or anxiety    Heme/Lymph/Imun:   No bruises, petechiae.  Lymph nodes normal in size/configuration       Data Review:  Lab Results   Component Value Date    CALCIUM 8.7 01/02/2024    PHOS 3.4 01/02/2024     Results from last 7 days   Lab Units 01/02/24  1816 01/02/24  1629 01/02/24  1240 01/02/24  1240 01/02/24  1153   AST (SGOT) U/L  --  21  --  33*  --    ALT (SGPT) U/L  --  16  --  16  --    MAGNESIUM mg/dL  --  1.9  --  2.0 2.0   SODIUM mmol/L  --  125*  --  115*  --    POTASSIUM mmol/L  --  3.9  --   4.2  --    CHLORIDE mmol/L  --  91*  --  76*  --    CO2 mmol/L  --  15.7*  --  15.7*  --    BUN mg/dL  --  19  --  23  --    CREATININE mg/dL  --  1.33*  --  1.82*  --    GLUCOSE mg/dL 477* 695*   < > 978*  --    CALCIUM mg/dL  --  8.7  --  9.4  --    WBC 10*3/mm3 17.30*  --   --   --  20.12*   HEMOGLOBIN g/dL 10.5*  --   --   --  11.2*   PLATELETS 10*3/mm3 480*  --   --   --  568*    < > = values in this interval not displayed.     Lab Results   Component Value Date    TROPONINT <6 01/02/2024     Estimated Creatinine Clearance: 57.6 mL/min (A) (by C-G formula based on SCr of 1.33 mg/dL (H)).  WEIGHTS:     Wt Readings from Last 1 Encounters:   01/02/24 1131 120 kg (265 lb)         Assessment:    DKA (diabetic ketoacidosis)    UTI (urinary tract infection)    Morbid obesity with BMI of 40.0-44.9, adult    Vitamin D deficiency    Essential hypertension    Hyperlipidemia    Hypothyroidism    Allergic rhinitis    Back pain    Primary osteoarthritis of right knee    Contact dermatitis    Anxiety    Dense breast tissue on mammogram      Attending Physician Assessment and Plan:    1.  Cystitis/UTI with sepsis associated with bilateral UPJ obstruction and moderate bilateral hydronephrosis and possible right calyceal rupture.  Patient resuscitated aggressively with IV fluids.  Broad-spectrum antibiotics, Zosyn, started.  Urology consult is placed.  Patient being followed carefully in ICU for additional complications or signs of worsening sepsis.    2.  Anion gap metabolic acidosis versus possible diabetic ketoacidosis.  Improving rapidly at present time on DKA protocol with Glucomander.  Aggressive fluid resuscitation initiated in the ER is continued in the ICU.  Patient now with good urinary output and hopefully will be able to come off of IV insulin and switch to subcu dosing overnight.    3.  Lactic acidosis possibly due to home use of metformin while taking no food or possibly due to acute sepsis.  Continuing rapid  fluid resuscitation with improvement already noted in lactic acid.  Monitoring carefully for signs of any new changes or problems.    4.  Type 2 diabetes mellitus poorly controlled at present time with elevated A1c greater than 16.  Will probably need to arrange for follow-up with patient in endocrinology clinic.  Discussed situation with the daughters and she may benefit from continuous 24-hour glucose monitoring issues willing to learn the process.  Will probably need to discharge charge on sliding scale insulin and may also consider mealtime insulin or long acting insulin.  Diabetic educator to see patient and work with us on diet, glucose testing techniques, and generalized diabetes management.    5.  Pseudohyponatremia.  Seems to already be improving with resuscitation and resolution of the uncontrolled glucoses.  Will continue to follow electrolytes regularly.    6.  Hypothyroidism.  Patient's Synthroid is continued by the ICU attending.  Will continue to follow this in the hospital and on an outpatient basis.    7.  Hyperlipidemia.  Agree with holding statin for now.  Will resume prior to discharge and continue on outpatient basis.    8.  Obesity.  Hope to continue using agents such as Ozempic for management of patient's blood sugar levels.  This will also help with diet control and ongoing need for gradual weight loss.    9.  Acute kidney injury secondary to volume depletion with uncontrolled glucoses.  Should improve as patient's volume status normalizes.  Will continue to monitor closely.    10.  Generalized muscle weakness and fatigue.  Suspect related to the poorly controlled diabetes and electrolyte disturbances.  Will do PT and OT evaluations after patient stabilizes.  Suspect patient will discharge to home with PT and OT and home environment        Plan for disposition:Where: home and home health and When:  4-5 days when medically stable    Copied text in this note has been reviewed by me and is  accurate as of 01/02/2024.  Much of this dictation was completed using Dragon voice activated transcription software which can result in misspelled words and nonsensical phrases at times.      Thanks again for the consult on this pleasant 63-year-old white female.  I will look forward to following her with you in the ICU and be happy to pick her up on the floor when she is discharged out of the ICU.  I appreciate the input and help in her care and management from the intensivist in the ICU.  If you have any concerns or questions about her general medical care please feel free to contact me at [phone].      Dean Fairchild MD  1/2/2024  21:00 EST

## 2024-01-03 NOTE — PROGRESS NOTES
"Nutrition Services    Patient Name:  Tiana Hudson  YOB: 1960  MRN: [id number]  Admit Date:  1/2/2024  Assessment Date:  01/03/24    NUTRITION SCREENING      Reason for Encounter MST score 2+   Diagnosis/Problem Sepsis, DKA, morbid obesity       PO Diet NPO Diet NPO Type: Strict NPO   Supplements    PO Intake % NPO       Medications MAR reviewed by RD   Labs  Listed below, reviewed   Physical Findings alert   GI Function Abd discomfort   Skin Status intact       Height  Weight  BMI  Weight Trend     Height: 165.1 cm (65\")  Weight: 111 kg (245 lb 6 oz) (01/03/24 0600)  Body mass index is 40.83 kg/m².  Loss       Nutrition Problem (PES) Nutrition appropriate for condition at this time as evidenced by blood sugar control with tx.       Intervention/Plan RD to follow up per protocol.     Results from last 7 days   Lab Units 01/03/24  1207 01/03/24  0839 01/03/24  0409 01/02/24  1816 01/02/24  1629 01/02/24  1240   SODIUM mmol/L 135* 135* 133*   < > 125* 115*   POTASSIUM mmol/L 4.0 4.8 4.3   < > 3.9 4.2   CHLORIDE mmol/L 104 105 100   < > 91* 76*   CO2 mmol/L 19.2* 17.0* 20.9*   < > 15.7* 15.7*   BUN mg/dL 17 17 16   < > 19 23   CREATININE mg/dL 1.04* 1.04* 1.05*   < > 1.33* 1.82*   CALCIUM mg/dL 8.9 8.8 8.5*   < > 8.7 9.4   BILIRUBIN mg/dL  --   --   --   --  0.3 0.5   ALK PHOS U/L  --   --   --   --  188* 220*   ALT (SGPT) U/L  --   --   --   --  16 16   AST (SGOT) U/L  --   --   --   --  21 33*   GLUCOSE mg/dL 123* 139* 186*   < > 695* 978*    < > = values in this interval not displayed.     Results from last 7 days   Lab Units 01/03/24  1207 01/03/24  0839 01/03/24  0409   MAGNESIUM mg/dL 2.0 1.8 1.8   PHOSPHORUS mg/dL 1.5* 2.0* 2.3*   HEMOGLOBIN g/dL  --   --  9.6*   HEMATOCRIT %  --   --  29.2*     Lab Results   Component Value Date    HGBA1C 17.70 (H) 01/02/2024         Electronically signed by:  Lesly Landrum RD  01/03/24 13:32 EST  "

## 2024-01-03 NOTE — ANESTHESIA PROCEDURE NOTES
Airway  Urgency: elective    Date/Time: 1/3/2024 4:13 PM  Airway not difficult    General Information and Staff    Patient location during procedure: OR  CRNA/CAA: Rossi Lawrence CRNA    Indications and Patient Condition    Preoxygenated: yes  Mask difficulty assessment: 1 - vent by mask    Final Airway Details  Final airway type: endotracheal airway      Successful airway: ETT  Cuffed: yes   Successful intubation technique: direct laryngoscopy  Endotracheal tube insertion site: oral  Blade: Monty  Blade size: 3  ETT size (mm): 7.0  Cormack-Lehane Classification: grade I - full view of glottis  Placement verified by: chest auscultation and capnometry   Measured from: teeth  ETT/EBT  to teeth (cm): 21  Number of attempts at approach: 1  Assessment: lips, teeth, and gum same as pre-op and atraumatic intubation    Additional Comments  Teeth, tongue, lips, and gums in preop condition. VSS throughout. Easy mask/smooth easy airway. Tube visualized through cords.

## 2024-01-03 NOTE — CONSULTS
[facility] CONS[facility]      Patient Identification:  NAME:  Tiana Hudson  Age:  63 y.o.   Sex:  female   :  1960   MRN:  [id number]     Chief complaint: Right sided flank pain    History of present illness:      Pt is a 63 y.o. female with a history of kidney stones, gross hematuria, BERENICE and chronic cystitis that presented to the ER on 24 with complaints of severe right sided flank pain. Pain began several days ago and has progressively gotten worse. Urology consulted for evaluation and management of abnormal urologic findings on CT scan. Pt denies gross hematuria, fever, dysuria. Has an episode of vomiting in the ER. Patient sees Dr. Calvert with [facility]. Was scheduled to come in on 24 for follow-up. 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 Pt had prior cysto with right ESWL and stent placed in 2023. Was noted to have bilateral UPJO with right worse than left. Dr. Calvert has been actively monitoring. Patient states that pain has improved with pain medication. Family at bedside.     In hospital:  -AVSS, good UOP  -WBC - 18.93  -Creat - 1.05  -UA - Moderate leukocytes, negative nitrites, moderate blood; 2+ bacteria, moderate yeast  -UCx - In process    -CT - New moderate volume of low-attenuation mildly loculated fluid throughout the right perinephric space, favored secondary to right calyceal rupture. New mild-moderate wall thickening involving the right renal pelvis and renal calyces. Findings suggesting bilateral UPJ obstruction. Moderate bilateral hydronephrosis has increased on the left and mildly decreased on the right compared to prior. New mild dilation of the right ureter of indeterminate etiology. Few nonobstructing right renal calculi measuring up to 4 mm.    Asked to see    Past medical history:  Past Medical History:   Diagnosis Date    Anemia     intermittently    Anxiety and depression     Arthritis     Depression     Diabetes mellitus      Gallstones     High cholesterol     History of frequent urinary tract infections     HX OF YEAST IN BLADDER HAS PRN DIFLUCAN    History of transfusion 05/24/2017    Had 2 iron infusions.  This was when i had knee replacement    Hyperlipidemia     Hypertension     Hypothyroidism     Knee pain, bilateral     Low back pain     Lumbar stenosis     PONV (postoperative nausea and vomiting)     Positive TB test     chest x-ray neg    Seasonal allergies        Past surgical history:  Past Surgical History:   Procedure Laterality Date    APPENDECTOMY N/A 1982    Dr. Wilson    CHOLECYSTECTOMY WITH INTRAOPERATIVE CHOLANGIOGRAM N/A 10/31/2018    Procedure: laparoscopic cholecystectomy;  Surgeon: Mary Kay Mac MD;  Location: [facility] OR;  Service: General    COLONOSCOPY      CYSTOSCOPY BLADDER BIOPSY N/A 10/3/2016    Procedure: CYSTOSCOPY BLADDER BIOPSY;  Surgeon: Rei Calvert MD;  Location: [facility] OR;  Service:     DILATATION AND CURETTAGE N/A     ENDOSCOPY      INTRAUTERINE DEVICE INSERTION      KNEE ARTHROSCOPY W/ MENISCAL REPAIR Left     OVARIAN CYST REMOVAL Left 1982    Dr. Wilson    TOTAL KNEE ARTHROPLASTY Right 5/22/2017    Procedure: RIGHT TOTAL KNEE ARTHROPLASTY WITH ALETA NAVIGATION;  Surgeon: Ross Cruz MD;  Location: [facility] OR;  Service:        Allergies:  Sulfa antibiotics    Home medications:  Medications Prior to Admission   Medication Sig Dispense Refill Last Dose    amLODIPine-benazepril (LOTREL 5-20) 5-20 MG per capsule Take 1 capsule by mouth daily. (Patient taking differently: Take 1 capsule by mouth Every Morning.) 90 capsule 3     diclofenac (VOLTAREN) 50 MG EC tablet Take 1 tablet by mouth 2 (Two) Times a Day As Needed (pain). 60 tablet 2     Dulaglutide 1.5 MG/0.5ML solution pen-injector Inject  under the skin into the appropriate area as directed 1 (One) Time Per Week. SUNDAY       escitalopram (LEXAPRO) 10 MG tablet Take 10 mg by mouth Daily.       furosemide (LASIX) 20  MG tablet Take 10 mg by mouth Daily As Needed.  1     levothyroxine (SYNTHROID, LEVOTHROID) 137 MCG tablet TAKE 1 TABLET BY MOUTH EVERY DAY IN THE MORNING ON AN EMPTY STOMACH       loratadine (CLARITIN) 10 MG tablet Take 10 mg by mouth Daily.       metFORMIN (GLUCOPHAGE) 500 MG tablet Take 1 tablet by mouth 2 (Two) Times a Day With Meals. 360 tablet 0     multivitamin (THERAGRAN) tablet tablet Take  by mouth Daily.       oxybutynin XL (DITROPAN-XL) 10 MG 24 hr tablet Take 10 mg by mouth Daily.       potassium chloride (K-DUR,KLOR-CON) 20 MEQ CR tablet Take 20 mEq by mouth As Needed (only when takin Lasix).       pravastatin (PRAVACHOL) 40 MG tablet Take 1 tablet by mouth daily. 90 tablet 3     rOPINIRole (REQUIP) 0.5 MG tablet Take 1 tablet by mouth every night at bedtime. 30 tablet 0     tiZANidine (ZANAFLEX) 4 MG tablet Take 4 mg by mouth At Night As Needed for Muscle Spasms.       traMADol (ULTRAM) 50 MG tablet Take 25 mg by mouth 2 (Two) Times a Day As Needed. for pain       uribel (URO-MP) 118 MG capsule capsule Take 1 capsule by mouth 3 (Three) Times a Day.  2     VITAMIN D PO Take  by mouth.           Hospital medications:  enoxaparin, 40 mg, Subcutaneous, Q12H  escitalopram, 10 mg, Oral, Daily  levothyroxine, 137 mcg, Oral, Q AM  piperacillin-tazobactam, 4.5 g, Intravenous, Q8H  rOPINIRole, 0.5 mg, Oral, Nightly  senna-docusate sodium, 2 tablet, Oral, BID  sodium chloride, 10 mL, Intravenous, Q12H  sodium chloride, 10 mL, Intravenous, Q12H  sodium chloride, 10 mL, Intravenous, Q12H      dextrose 5 % and sodium chloride 0.45 %, 150 mL/hr  dextrose 5 % and sodium chloride 0.45 % with KCl 20 mEq/L, 150 mL/hr, Last Rate: 150 mL/hr (01/03/24 0221)  dextrose 5 % and sodium chloride 0.45 % with KCl 40 mEq/L, 150 mL/hr  dextrose 5 % and sodium chloride 0.9 %, 150 mL/hr  dextrose 5 % and sodium chloride 0.9 % with KCl 20 mEq, 150 mL/hr  dextrose 5% and sodium chloride 0.9% with KCl 40 mEq/L, 150 mL/hr  insulin, 0-100  Units/hr, Last Rate: 4.6 Units/hr (01/03/24 0739)  sodium chloride 0.45 % 1,000 mL with potassium chloride 40 mEq infusion, 250 mL/hr  sodium chloride 0.45 % 1,000 mL with potassium chloride 40 mEq infusion, 250 mL/hr  sodium chloride, 250 mL/hr  sodium chloride, 250 mL/hr  sodium chloride 0.45 % with KCl 20 mEq, 250 mL/hr  sodium chloride 0.45 % with KCl 20 mEq, 250 mL/hr, Last Rate: 250 mL/hr (01/02/24 2242)  sodium chloride, 250 mL/hr  sodium chloride, 250 mL/hr  sodium chloride 0.9 % with KCl 20 mEq, 250 mL/hr  sodium chloride 0.9 % with KCl 20 mEq, 250 mL/hr, Last Rate: 250 mL/hr (01/02/24 2048)  sodium chloride 0.9 % with KCl 40 mEq/L, 250 mL/hr  sodium chloride 0.9 % with KCl 40 mEq/L, 250 mL/hr        acetaminophen **OR** acetaminophen    senna-docusate sodium **AND** polyethylene glycol **AND** bisacodyl **AND** bisacodyl    Calcium Replacement - Follow Nurse / BPA Driven Protocol    dextrose    dextrose    dextrose 5 % and sodium chloride 0.45 %    dextrose 5 % and sodium chloride 0.45 % with KCl 20 mEq/L    dextrose 5 % and sodium chloride 0.45 % with KCl 40 mEq/L    dextrose 5 % and sodium chloride 0.9 %    dextrose 5 % and sodium chloride 0.9 % with KCl 20 mEq    dextrose 5% and sodium chloride 0.9% with KCl 40 mEq/L    glucagon (human recombinant)    HYDROcodone-acetaminophen    HYDROmorphone    ipratropium-albuterol    Magnesium Standard Dose Replacement - Follow Nurse / BPA Driven Protocol    nitroglycerin    ondansetron ODT **OR** ondansetron    Phosphorus Replacement - Follow Nurse / BPA Driven Protocol    Potassium Replacement - Follow Nurse / BPA Driven Protocol    sodium chloride 0.45 % 1,000 mL with potassium chloride 40 mEq infusion    sodium chloride 0.45 % 1,000 mL with potassium chloride 40 mEq infusion    sodium chloride    sodium chloride    sodium chloride 0.45 % with KCl 20 mEq    sodium chloride 0.45 % with KCl 20 mEq    sodium chloride    sodium chloride    sodium chloride    sodium  chloride    sodium chloride    sodium chloride    sodium chloride    sodium chloride    sodium chloride    sodium chloride 0.9 % with KCl 20 mEq    sodium chloride 0.9 % with KCl 20 mEq    sodium chloride 0.9 % with KCl 40 mEq/L    sodium chloride 0.9 % with KCl 40 mEq/L    Family history:  Family History   Problem Relation Age of Onset    Hepatitis Mother     Breast cancer Mother     Heart disease Mother     Cancer Mother     Hyperlipidemia Mother              Heart failure Father     Stroke Father     Hypertension Father         Since he was 72, now 86s    Diabetes Father     Heart disease Father     Hyperlipidemia Father         Unsurs    Heart disease Maternal Grandmother     Cancer Maternal Grandfather     COPD Maternal Grandfather     Arthritis Paternal Grandmother     Diabetes Maternal Aunt     Diabetes Paternal Uncle     Malig Hyperthermia Neg Hx        Social history:  Social History     Tobacco Use    Smoking status: Never    Smokeless tobacco: Never    Tobacco comments:     Never smoked   Vaping Use    Vaping Use: Never used   Substance Use Topics    Alcohol use: Never     Alcohol/week: 3.0 standard drinks of alcohol     Types: 1 Glasses of wine, 2 Standard drinks or equivalent per week    Drug use: Never       Review of systems:      12 point negative except as in HPI    Objective:  TMax 24 hours:   Temp (24hrs), Av.4 °F (36.3 °C), Min:96 °F (35.6 °C), Max:98.7 °F (37.1 °C)      Vitals Ranges:   Temp:  [96 °F (35.6 °C)-98.7 °F (37.1 °C)] 98.7 °F (37.1 °C)  Heart Rate:  [] 100  Resp:  [16-28] 28  BP: ()/(47-83) 109/59    Intake/Output Last 3 shifts:  I/O last 3 completed shifts:  In: 2464.7 [P.O.:360; I.V.:1004.7; IV Piggyback:1100]  Out: 800 [Urine:800]     Physical Exam:    General Appearance:    Alert, cooperative, NAD   Lungs:     Respirations unlabored, no wheezing   Abdomen:     Soft, NDNT, no masses, no guarding   :    Pelvic not performed, bladder nondistended and  nontender   Neuro/Psych:   Orientation intact, mood/affect pleasant       Results review:   I reviewed the patient's new clinical results.    Data review:  Lab Results (last 24 hours)       Procedure Component Value Units Date/Time    POC Glucose Once [[id number]]  (Abnormal) Collected: 01/03/24 0738    Specimen: Blood Updated: 01/03/24 0741     Glucose 137 mg/dL     POC Glucose Once [[id number]]  (Normal) Collected: 01/03/24 0630    Specimen: Blood Updated: 01/03/24 0631     Glucose 124 mg/dL     POC Glucose Once [[id number]]  (Abnormal) Collected: 01/03/24 0527    Specimen: Blood Updated: 01/03/24 0528     Glucose 155 mg/dL     Basic Metabolic Panel [[id number]]  (Abnormal) Collected: 01/03/24 0409    Specimen: Blood Updated: 01/03/24 0438     Glucose 186 mg/dL      BUN 16 mg/dL      Creatinine 1.05 mg/dL      Sodium 133 mmol/L      Potassium 4.3 mmol/L      Chloride 100 mmol/L      CO2 20.9 mmol/L      Calcium 8.5 mg/dL      BUN/Creatinine Ratio 15.2     Anion Gap 12.1 mmol/L      eGFR 60.6 mL/min/1.73     Narrative:      GFR Normal >60  Chronic Kidney Disease <60  Kidney Failure <15      Magnesium [[id number]]  (Normal) Collected: 01/03/24 0409    Specimen: Blood Updated: 01/03/24 0438     Magnesium 1.8 mg/dL     Phosphorus [[id number]]  (Abnormal) Collected: 01/03/24 0409    Specimen: Blood Updated: 01/03/24 0438     Phosphorus 2.3 mg/dL     STAT Lactic Acid, Reflex [[id number]]  (Normal) Collected: 01/03/24 0409    Specimen: Blood Updated: 01/03/24 0431     Lactate 1.9 mmol/L     POC Glucose Once [[id number]]  (Abnormal) Collected: 01/03/24 0426    Specimen: Blood Updated: 01/03/24 0428     Glucose 170 mg/dL     CBC & Differential [[id number]]  (Abnormal) Collected: 01/03/24 0409    Specimen: Blood Updated: 01/03/24 0427    Narrative:      The following orders were created for panel order CBC & Differential.  Procedure                               Abnormality         Status                     ---------                                -----------         ------                     CBC Auto Differential[[id number]]        Abnormal            Final result                 Please view results for these tests on the individual orders.    CBC Auto Differential [[id number]]  (Abnormal) Collected: 01/03/24 0409    Specimen: Blood Updated: 01/03/24 0427     WBC 18.93 10*3/mm3      RBC 3.50 10*6/mm3      Hemoglobin 9.6 g/dL      Hematocrit 29.2 %      MCV 83.4 fL      MCH 27.4 pg      MCHC 32.9 g/dL      RDW 13.5 %      RDW-SD 41.5 fl      MPV 10.4 fL      Platelets 441 10*3/mm3      Neutrophil % 85.2 %      Lymphocyte % 6.6 %      Monocyte % 4.6 %      Eosinophil % 0.6 %      Basophil % 0.8 %      Neutrophils, Absolute 16.11 10*3/mm3      Lymphocytes, Absolute 1.25 10*3/mm3      Monocytes, Absolute 0.88 10*3/mm3      Eosinophils, Absolute 0.12 10*3/mm3      Basophils, Absolute 0.15 10*3/mm3     POC Glucose Once [[id number]]  (Abnormal) Collected: 01/03/24 0326    Specimen: Blood Updated: 01/03/24 0330     Glucose 180 mg/dL     POC Glucose Once [[id number]]  (Abnormal) Collected: 01/03/24 0223    Specimen: Blood Updated: 01/03/24 0224     Glucose 185 mg/dL     POC Glucose Once [[id number]]  (Abnormal) Collected: 01/03/24 0125    Specimen: Blood Updated: 01/03/24 0130     Glucose 197 mg/dL     Basic Metabolic Panel [[id number]]  (Abnormal) Collected: 01/03/24 0007    Specimen: Blood Updated: 01/03/24 0051     Glucose 287 mg/dL      BUN 17 mg/dL      Creatinine 1.01 mg/dL      Sodium 133 mmol/L      Potassium 3.5 mmol/L      Chloride 98 mmol/L      CO2 21.0 mmol/L      Calcium 8.7 mg/dL      BUN/Creatinine Ratio 16.8     Anion Gap 14.0 mmol/L      eGFR 63.5 mL/min/1.73     Narrative:      GFR Normal >60  Chronic Kidney Disease <60  Kidney Failure <15      Magnesium [[id number]]  (Normal) Collected: 01/03/24 0007    Specimen: Blood Updated: 01/03/24 0051     Magnesium 1.8 mg/dL     Phosphorus [[id number]]  (Normal) Collected: 01/03/24 0007    Specimen:  Blood Updated: 01/03/24 0051     Phosphorus 2.5 mg/dL     STAT Lactic Acid, Reflex [[id number]]  (Abnormal) Collected: 01/03/24 0007    Specimen: Blood Updated: 01/03/24 0049     Lactate 2.8 mmol/L     POC Glucose Once [[id number]]  (Abnormal) Collected: 01/03/24 0022    Specimen: Blood Updated: 01/03/24 0023     Glucose 237 mg/dL     POC Glucose Once [[id number]]  (Abnormal) Collected: 01/02/24 2322    Specimen: Blood Updated: 01/02/24 2323     Glucose 288 mg/dL     POC Glucose Once [[id number]]  (Abnormal) Collected: 01/02/24 2229    Specimen: Blood Updated: 01/02/24 2230     Glucose 325 mg/dL     POC Glucose Once [[id number]]  (Abnormal) Collected: 01/02/24 2123    Specimen: Blood Updated: 01/02/24 2124     Glucose 363 mg/dL     Basic Metabolic Panel [[id number]]  (Abnormal) Collected: 01/02/24 2036    Specimen: Blood Updated: 01/02/24 2115     Glucose 411 mg/dL      BUN 17 mg/dL      Creatinine 1.15 mg/dL      Sodium 131 mmol/L      Potassium 3.4 mmol/L      Comment: Slight hemolysis detected by analyzer. Result may be falsely elevated.        Chloride 96 mmol/L      CO2 19.1 mmol/L      Calcium 8.7 mg/dL      BUN/Creatinine Ratio 14.8     Anion Gap 15.9 mmol/L      eGFR 54.3 mL/min/1.73     Narrative:      GFR Normal >60  Chronic Kidney Disease <60  Kidney Failure <15      Magnesium [[id number]]  (Normal) Collected: 01/02/24 2036    Specimen: Blood Updated: 01/02/24 2110     Magnesium 1.9 mg/dL     STAT Lactic Acid, Reflex [[id number]]  (Abnormal) Collected: 01/02/24 2036    Specimen: Blood Updated: 01/02/24 2109     Lactate 3.2 mmol/L     Phosphorus [[id number]]  (Normal) Collected: 01/02/24 2036    Specimen: Blood Updated: 01/02/24 2105     Phosphorus 2.7 mg/dL     POC Glucose Once [[id number]]  (Abnormal) Collected: 01/02/24 2025    Specimen: Blood Updated: 01/02/24 2027     Glucose 379 mg/dL     Osmolality, Serum [[id number]]  (Normal) Collected: 01/02/24 1816    Specimen: Blood Updated: 01/02/24 1924     Osmolality  295 mOsm/kg     POC Glucose Once [[id number]]  (Abnormal) Collected: 01/02/24 1920    Specimen: Blood Updated: 01/02/24 1922     Glucose 410 mg/dL     Manual Differential [[id number]]  (Abnormal) Collected: 01/02/24 1816    Specimen: Blood Updated: 01/02/24 1913     Neutrophil % 91.9 %      Lymphocyte % 4.0 %      Monocyte % 4.0 %      Neutrophils Absolute 15.90 10*3/mm3      Lymphocytes Absolute 0.69 10*3/mm3      Monocytes Absolute 0.69 10*3/mm3      Poikilocytes Mod/2+     WBC Morphology Normal     Platelet Morphology Normal    Glucose, Random [[id number]]  (Abnormal) Collected: 01/02/24 1816    Specimen: Blood Updated: 01/02/24 1906     Glucose 477 mg/dL     Hemoglobin A1c [[id number]]  (Abnormal) Collected: 01/02/24 1816    Specimen: Blood Updated: 01/02/24 1841     Hemoglobin A1C 17.70 %     Narrative:      Hemoglobin A1C Ranges:    Increased Risk for Diabetes  5.7% to 6.4%  Diabetes                     >= 6.5%  Diabetic Goal                < 7.0%    CBC & Differential [[id number]]  (Abnormal) Collected: 01/02/24 1816    Specimen: Blood Updated: 01/02/24 1841    Narrative:      The following orders were created for panel order CBC & Differential.  Procedure                               Abnormality         Status                     ---------                               -----------         ------                     CBC Auto Differential[[id number]]        Abnormal            Final result                 Please view results for these tests on the individual orders.    CBC Auto Differential [[id number]]  (Abnormal) Collected: 01/02/24 1816    Specimen: Blood Updated: 01/02/24 1841     WBC 17.30 10*3/mm3      RBC 3.74 10*6/mm3      Hemoglobin 10.5 g/dL      Hematocrit 31.7 %      MCV 84.8 fL      MCH 28.1 pg      MCHC 33.1 g/dL      RDW 13.1 %      RDW-SD 40.2 fl      MPV 10.7 fL      Platelets 480 10*3/mm3      nRBC 0.0 /100 WBC     POC Glucose Once [[id number]]  (Abnormal) Collected: 01/02/24 1815    Specimen: Blood  Updated: 01/02/24 1819     Glucose 493 mg/dL     Phosphorus [[id number]]  (Normal) Collected: 01/02/24 1629    Specimen: Blood Updated: 01/02/24 1713     Phosphorus 3.4 mg/dL     Magnesium [[id number]]  (Normal) Collected: 01/02/24 1629    Specimen: Blood Updated: 01/02/24 1712     Magnesium 1.9 mg/dL     Comprehensive Metabolic Panel [[id number]]  (Abnormal) Collected: 01/02/24 1629    Specimen: Blood Updated: 01/02/24 1712     Glucose 695 mg/dL      BUN 19 mg/dL      Creatinine 1.33 mg/dL      Sodium 125 mmol/L      Potassium 3.9 mmol/L      Chloride 91 mmol/L      CO2 15.7 mmol/L      Calcium 8.7 mg/dL      Total Protein 6.8 g/dL      Albumin 1.8 g/dL      ALT (SGPT) 16 U/L      AST (SGOT) 21 U/L      Alkaline Phosphatase 188 U/L      Total Bilirubin 0.3 mg/dL      Globulin 5.0 gm/dL      A/G Ratio 0.4 g/dL      BUN/Creatinine Ratio 14.3     Anion Gap 18.3 mmol/L      eGFR 45.6 mL/min/1.73     Narrative:      GFR Normal >60  Chronic Kidney Disease <60  Kidney Failure <15      STAT Lactic Acid, Reflex [[id number]]  (Abnormal) Collected: 01/02/24 1629    Specimen: Blood Updated: 01/02/24 1712     Lactate 2.6 mmol/L     Blood Gas, Venous - [[id number]]  (Abnormal) Collected: 01/02/24 1658    Specimen: Venous Blood Updated: 01/02/24 1702     pH, Venous 7.403 pH Units      pCO2, Venous 28.9 mm Hg      pO2, Venous 31.7 mm Hg      HCO3, Venous 18.0 mmol/L      Base Excess, Venous -5.4 mmol/L      Comment: Serial Number: [id number]:  [id number]        O2 Saturation, Venous 62.6 %      CO2 Content 18.9 mmol/L      Barometric Pressure for Blood Gas 754.5000 mmHg      Modality Room Air     Rate 18 Breaths/minute      Device Comment [id number]    High Sensitivity Troponin T 2Hr [[id number]] Collected: 01/02/24 1629    Specimen: Blood Updated: 01/02/24 1700     HS Troponin T <6 ng/L      Troponin T Delta --     Comment: Unable to calculate.       Narrative:      High Sensitive Troponin T Reference Range:  <14.0 ng/L- Negative  Female for AMI  <22.0 ng/L- Negative Male for AMI  >=14 - Abnormal Female indicating possible myocardial injury.  >=22 - Abnormal Male indicating possible myocardial injury.   Clinicians would have to utilize clinical acumen, EKG, Troponin, and serial changes to determine if it is an Acute Myocardial Infarction or myocardial injury due to an underlying chronic condition.         Phosphorus [[id number]]  (Abnormal) Collected: 01/02/24 1240    Specimen: Blood Updated: 01/02/24 1650     Phosphorus 4.6 mg/dL     Magnesium [[id number]]  (Normal) Collected: 01/02/24 1240    Specimen: Blood Updated: 01/02/24 1650     Magnesium 2.0 mg/dL     POC Glucose Once [[id number]]  (Abnormal) Collected: 01/02/24 1645    Specimen: Blood Updated: 01/02/24 1646     Glucose 591 mg/dL     Urine Culture - Urine, Urine, Clean Catch [[id number]] Collected: 01/02/24 1235    Specimen: Urine, Clean Catch Updated: 01/02/24 1448    High Sensitivity Troponin T [[id number]]  (Normal) Collected: 01/02/24 1240    Specimen: Blood Updated: 01/02/24 1432     HS Troponin T 7 ng/L     Narrative:      High Sensitive Troponin T Reference Range:  <14.0 ng/L- Negative Female for AMI  <22.0 ng/L- Negative Male for AMI  >=14 - Abnormal Female indicating possible myocardial injury.  >=22 - Abnormal Male indicating possible myocardial injury.   Clinicians would have to utilize clinical acumen, EKG, Troponin, and serial changes to determine if it is an Acute Myocardial Infarction or myocardial injury due to an underlying chronic condition.         Hemoglobin A1c [[id number]]  (Abnormal) Collected: 01/02/24 1153    Specimen: Blood Updated: 01/02/24 1417     Hemoglobin A1C 16.90 %     Narrative:      Hemoglobin A1C Ranges:    Increased Risk for Diabetes  5.7% to 6.4%  Diabetes                     >= 6.5%  Diabetic Goal                < 7.0%    Phosphorus [[id number]]  (Normal) Collected: 01/02/24 1153    Specimen: Blood Updated: 01/02/24 1403     Phosphorus 4.2 mg/dL      Magnesium [[id number]]  (Normal) Collected: 01/02/24 1153    Specimen: Blood Updated: 01/02/24 1403     Magnesium 2.0 mg/dL     Osmolality, Serum [[id number]]  (Abnormal) Collected: 01/02/24 1153    Specimen: Blood Updated: 01/02/24 1352     Osmolality 317 mOsm/kg     Blood Culture - Blood, Arm, Left [[id number]] Collected: 01/02/24 1320    Specimen: Blood from Arm, Left Updated: 01/02/24 1325    Comprehensive Metabolic Panel [[id number]]  (Abnormal) Collected: 01/02/24 1240    Specimen: Blood Updated: 01/02/24 1324     Glucose 978 mg/dL      BUN 23 mg/dL      Creatinine 1.82 mg/dL      Sodium 115 mmol/L      Potassium 4.2 mmol/L      Comment: Slight hemolysis detected by analyzer. Result may be falsely elevated.        Chloride 76 mmol/L      CO2 15.7 mmol/L      Calcium 9.4 mg/dL      Total Protein 7.5 g/dL      Albumin 2.4 g/dL      ALT (SGPT) 16 U/L      AST (SGOT) 33 U/L      Comment: Slight hemolysis detected by analyzer. Result may be falsely elevated.        Alkaline Phosphatase 220 U/L      Total Bilirubin 0.5 mg/dL      Globulin 5.1 gm/dL      A/G Ratio 0.5 g/dL      BUN/Creatinine Ratio 12.6     Anion Gap 23.3 mmol/L      eGFR 31.3 mL/min/1.73     Narrative:      GFR Normal >60  Chronic Kidney Disease <60  Kidney Failure <15      Urinalysis, Microscopic Only - Urine, Clean Catch [[id number]]  (Abnormal) Collected: 01/02/24 1235    Specimen: Urine, Clean Catch Updated: 01/02/24 1324     RBC, UA 6-10 /HPF      WBC, UA 6-10 /HPF      Bacteria, UA 2+ /HPF      Squamous Epithelial Cells, UA 0-2 /HPF      Yeast, UA Moderate/2+ Yeast /HPF      Hyaline Casts, UA None Seen /LPF      Methodology Manual Light Microscopy    Blood Culture - Blood, Arm, Left [[id number]] Collected: 01/02/24 1311    Specimen: Blood from Arm, Left Updated: 01/02/24 1319    Urinalysis With Microscopic If Indicated (No Culture) - Urine, Clean Catch [[id number]]  (Abnormal) Collected: 01/02/24 1235    Specimen: Urine, Clean Catch Updated: 01/02/24  1306     Color, UA Yellow     Appearance, UA Turbid     pH, UA <=5.0     Specific Gravity, UA 1.027     Glucose, UA >=1000 mg/dL (3+)     Ketones, UA Trace     Bilirubin, UA Negative     Blood, UA Moderate (2+)     Protein, UA Trace     Leuk Esterase, UA Moderate (2+)     Nitrite, UA Negative     Urobilinogen, UA 0.2 E.U./dL    [place] Draw [[id number]] Collected: 01/02/24 1153    Specimen: Blood Updated: 01/02/24 1300    Narrative:      The following orders were created for panel order [place] Draw.  Procedure                               Abnormality         Status                     ---------                               -----------         ------                     Green Top (Gel)[[id number]]                                  Final result               Lavender Top[[id number]]                                     Final result               Gold Top - SST[[id number]]                                   Final result               Light Blue Top[[id number]]                                   Final result                 Please view results for these tests on the individual orders.    Green Top (Gel) [[id number]] Collected: 01/02/24 1153    Specimen: Blood Updated: 01/02/24 1300     Extra Tube Hold for add-ons.     Comment: Auto resulted.       Lavender Top [[id number]] Collected: 01/02/24 1153    Specimen: Blood Updated: 01/02/24 1300     Extra Tube hold for add-on     Comment: Auto resulted       Gold Top - SST [[id number]] Collected: 01/02/24 1153    Specimen: Blood Updated: 01/02/24 1300     Extra Tube Hold for add-ons.     Comment: Auto resulted.       Light Blue Top [[id number]] Collected: 01/02/24 1153    Specimen: Blood Updated: 01/02/24 1300     Extra Tube Hold for add-ons.     Comment: Auto resulted       Manual Differential [[id number]]  (Abnormal) Collected: 01/02/24 1153    Specimen: Blood Updated: 01/02/24 1233     Neutrophil % 81.8 %      Lymphocyte % 2.0 %      Monocyte % 16.2 %      Neutrophils Absolute 16.46  10*3/mm3      Lymphocytes Absolute 0.40 10*3/mm3      Monocytes Absolute 3.26 10*3/mm3      Anisocytosis Slight/1+     Macrocytes Slight/1+     Poikilocytes Slight/1+     Polychromasia Slight/1+     WBC Morphology Normal     Platelet Morphology Normal    Lactic Acid, Plasma [[id number]]  (Abnormal) Collected: 01/02/24 1153    Specimen: Blood Updated: 01/02/24 1229     Lactate 6.0 mmol/L     Lipase [[id number]]  (Normal) Collected: 01/02/24 1153    Specimen: Blood Updated: 01/02/24 1221     Lipase 23 U/L     CBC & Differential [[id number]]  (Abnormal) Collected: 01/02/24 1153    Specimen: Blood Updated: 01/02/24 1216    Narrative:      The following orders were created for panel order CBC & Differential.  Procedure                               Abnormality         Status                     ---------                               -----------         ------                     CBC Auto Differential[[id number]]        Abnormal            Final result                 Please view results for these tests on the individual orders.    CBC Auto Differential [[id number]]  (Abnormal) Collected: 01/02/24 1153    Specimen: Blood Updated: 01/02/24 1216     WBC 20.12 10*3/mm3      RBC 4.22 10*6/mm3      Hemoglobin 11.2 g/dL      Hematocrit 38.6 %      MCV 91.5 fL      MCH 26.5 pg      MCHC 29.0 g/dL      RDW 13.0 %      RDW-SD 42.8 fl      MPV 11.4 fL      Platelets 568 10*3/mm3      nRBC 0.0 /100 WBC              Imaging:  Imaging Results (Last 24 Hours)       Procedure Component Value Units Date/Time    CT Abdomen Pelvis With Contrast [[id number]] Collected: 01/02/24 1449     Updated: 01/02/24 1526    Narrative:      CT ABDOMEN AND PELVIS WITH IV CONTRAST     HISTORY: Right flank pain.     TECHNIQUE: Radiation dose reduction techniques were utilized, including  automated exposure control and exposure modulation based on body size.   3 mm images were obtained through the abdomen and pelvis after the  administration of IV contrast.      COMPARISON: 6/21/2023        FINDINGS: Moderate bilateral hydronephrosis with caliber changes at the  UPJ, mildly decreased on the right and increased on the left. New  circumferential mild-moderate wall thickening involving the right renal  calyces and renal pelvis. New moderate volume of low-attenuation mildly  loculated fluid throughout the right perinephric space (series 3/images  88 through 132). No enhancing wall or definite organization. No internal  locules of air. Fluid is most concentrated adjacent to the interpolar  right kidney (series 3/image 94). New diffuse mild dilation of the right  ureter. No renal calculi within the right ureter or bladder. Few  nonobstructing right renal calculi measuring up to 4 mm. Nondilated left  ureter. Nondilated bladder without wall thickening. Left renal cyst.     Similar hepatic morphology with concavity of the posterior right hepatic  surface cortical contour and widening of the hepatic fissures. Question  mild nodularity of the hepatic surface cortical contour. Spleen is  normal in size. No definite upper abdominal portosystemic collaterals.  Cholecystectomy. Nondilated biliary tree. Unchanged 1.2 cm retrocaval  lymph node (series 2/image 65).     Sigmoid colon diverticulosis without findings suggest diverticulitis.  Appendectomy. Nondilated normal small bowel.     Well-positioned IUD. Remaining solid organs and bowel are normal.     Right lower lobe linear atelectasis.          Impression:      1. New moderate volume of low-attenuation mildly loculated fluid  throughout the right perinephric space, favored secondary to right  calyceal rupture.  2. New mild-moderate wall thickening involving the right renal pelvis  and renal calyces. Recommend correlation with urinalysis for findings to  suggest infection.  3. Findings suggesting bilateral UPJ obstruction. Moderate bilateral  hydronephrosis has increased on the left and mildly decreased on the  right compared to  prior.  4. New mild dilation of the right ureter of indeterminate etiology.  5. Few nonobstructing right renal calculi measuring up to 4 mm.  6. Hepatic morphology suggesting chronic liver disease.  7. Unchanged mildly enlarged retrocaval lymph node, favored  reactive/inflammatory     This report was finalized on 1/2/2024 3:23 PM by Dr. Angelito Aguilera M.D on Workstation: [id number]       XR Chest 1 View [[id number]] Collected: 01/02/24 1416     Updated: 01/02/24 1420    Narrative:      XR CHEST 1 VW-1/2/2024     HISTORY: Evaluate for fluid overload.     Heart size is within normal limits. Lungs are underinflated. There is  some minimal linear atelectasis of the right lung base. Lungs are  otherwise clear. Bones and soft tissues are unremarkable.       Impression:      1. No acute process except minimal atelectasis of the right lung base.     This report was finalized on 1/2/2024 2:17 PM by Dr. Reji Mallory M.D on Workstation: [id number]                Assessment:     Bilateral UPJ obstruction  Right calyceal rupture  Moderate bilateral hydronephrosis  DKA    Plan:     - NPO  - Consent  - Add on for cystoscopy, bilateral retrograde pyelogram, bilateral ureteral stent placement    Mami Hall, ELIAS  01/03/24  08:41 EST

## 2024-01-03 NOTE — ANESTHESIA PREPROCEDURE EVALUATION
Anesthesia Evaluation     Patient summary reviewed and Nursing notes reviewed   history of anesthetic complications:  PONV  NPO Solid Status: > 8 hours  NPO Liquid Status: > 4 hours           Airway   Mallampati: II  TM distance: >3 FB  Neck ROM: full  No difficulty expected  Comment: LMA 4 used in past/grade I view with Hernandez 2   Dental - normal exam     Pulmonary - normal exam    breath sounds clear to auscultation  Cardiovascular - normal exam    ECG reviewed  Rhythm: regular  Rate: normal    (+) hypertension 2 medications or greater, hyperlipidemia    ROS comment: EF 69% by ECHO 5/20/normal stress test 5/20    Neuro/Psych  (+) dizziness/light headedness, psychiatric history Anxiety    ROS Comment: RLS  GI/Hepatic/Renal/Endo    (+) obesity, morbid obesity, liver disease history of elevated LFT, renal disease- stones, diabetes mellitus type 2, thyroid problem hypothyroidism    ROS Comment: Bilateral UPJ obstruction/hx DKA    Musculoskeletal     (+) back pain, chronic pain      ROS comment: Lumbar spinal stenosis  Abdominal   (+) obese   Substance History      OB/GYN          Other   arthritis, blood dyscrasia anemia,       Other Comment: Hgb 9.6                Anesthesia Plan    ASA 3     general     (Probable LMA)  intravenous induction     Anesthetic plan, risks, benefits, and alternatives have been provided, discussed and informed consent has been obtained with: patient.      CODE STATUS:    Code Status (Patient has no pulse and is not breathing): CPR (Attempt to Resuscitate)  Medical Interventions (Patient has pulse or is breathing): Full Support

## 2024-01-03 NOTE — PROGRESS NOTES
[facility] INPATIENT PROGRESS NOTE         [facility] CORONARY CARE    1/3/2024      PATIENT IDENTIFICATION:  Name: Tiana Hudson ADMIT: 2024   : 1960  PCP: Dean Fairchild MD    MRN: [id number] LOS: 1 days   AGE/SEX: 63 y.o. female  ROOM: [facility]                     LOS 1    Reason for visit: sepsis, acidosis       SUBJECTIVE:      Resting comfortably.  Denies productive cough or chest pain.  On 2 L supplemental oxygen with saturations 96%.  Discussed with family at bedside.  Patient is a little slow to respond but answers questions appropriately.  No new issues overnight.  No concerns per family.  Diminished breath sounds at bases but no wheezing or rhonchi.  Nonpitting edema.  On insulin drip.    Objective   OBJECTIVE:    Vital Sign Min/Max for last 24 hours  Temp  Min: 96 °F (35.6 °C)  Max: 98.7 °F (37.1 °C)   BP  Min: 84/63  Max: 139/83   Pulse  Min: 92  Max: 116   Resp  Min: 16  Max: 28   SpO2  Min: 89 %  Max: 98 %   No data recorded   Weight  Min: 111 kg (245 lb 6 oz)  Max: 120 kg (265 lb)    Vitals:    24 0721 24 0800 24 0900 24 1000   BP:  106/56 104/54 90/53   BP Location:       Patient Position:       Pulse:  98 96 92   Resp:       Temp: 98.7 °F (37.1 °C)      TempSrc:       SpO2:  94% 94% 94%   Weight:       Height:                24  1131 24  0600   Weight: 120 kg (265 lb) 111 kg (245 lb 6 oz)       Body mass index is 40.83 kg/m².                          Body mass index is 40.83 kg/m².    Intake/Output Summary (Last 24 hours) at 1/3/2024 1024  Last data filed at 1/3/2024 0631  Gross per 24 hour   Intake 2464.68 ml   Output 800 ml   Net 1664.68 ml         Exam:  GEN:  No distress, appears stated age  EYES:   PERRL, anicteric sclerae  ENT:    External ears/nose normal, OP clear  NECK:  No adenopathy, midline trachea  LUNGS: Normal chest on inspection, palpation and auscultation  CV:  Normal S1S2, without murmur  ABD:  Nontender,  nondistended, no hepatosplenomegaly, +BS  EXT:  + edema.  No cyanosis or clubbing.  No mottling and normal cap refill.    Assessment     Scheduled meds:  enoxaparin, 40 mg, Subcutaneous, Q12H  escitalopram, 10 mg, Oral, Daily  levothyroxine, 137 mcg, Oral, Q AM  piperacillin-tazobactam, 4.5 g, Intravenous, Q8H  rOPINIRole, 0.5 mg, Oral, Nightly  senna-docusate sodium, 2 tablet, Oral, BID  sodium chloride, 10 mL, Intravenous, Q12H  sodium chloride, 10 mL, Intravenous, Q12H  sodium chloride, 10 mL, Intravenous, Q12H      IV meds:                      dextrose 5 % and sodium chloride 0.45 %, 150 mL/hr  dextrose 5 % and sodium chloride 0.45 % with KCl 20 mEq/L, 150 mL/hr, Last Rate: 150 mL/hr (01/03/24 0221)  dextrose 5 % and sodium chloride 0.45 % with KCl 40 mEq/L, 150 mL/hr  dextrose 5 % and sodium chloride 0.9 %, 150 mL/hr  dextrose 5 % and sodium chloride 0.9 % with KCl 20 mEq, 150 mL/hr, Last Rate: 150 mL/hr (01/03/24 0844)  dextrose 5% and sodium chloride 0.9% with KCl 40 mEq/L, 150 mL/hr  insulin, 0-100 Units/hr, Last Rate: 4.2 Units/hr (01/03/24 0850)  sodium chloride 0.45 % 1,000 mL with potassium chloride 40 mEq infusion, 250 mL/hr  sodium chloride 0.45 % 1,000 mL with potassium chloride 40 mEq infusion, 250 mL/hr  sodium chloride, 250 mL/hr  sodium chloride, 250 mL/hr  sodium chloride 0.45 % with KCl 20 mEq, 250 mL/hr  sodium chloride 0.45 % with KCl 20 mEq, 250 mL/hr, Last Rate: 250 mL/hr (01/02/24 2242)  sodium chloride, 250 mL/hr  sodium chloride, 250 mL/hr  sodium chloride 0.9 % with KCl 20 mEq, 250 mL/hr  sodium chloride 0.9 % with KCl 20 mEq, 250 mL/hr, Last Rate: 250 mL/hr (01/02/24 2048)  sodium chloride 0.9 % with KCl 40 mEq/L, 250 mL/hr  sodium chloride 0.9 % with KCl 40 mEq/L, 250 mL/hr      Data Review:  Results from last 7 days   Lab Units 01/03/24  0839 01/03/24  0409 01/03/24  0007 01/02/24 2036 01/02/24  1816 01/02/24  1629   SODIUM mmol/L 135* 133* 133* 131*  --  125*   POTASSIUM mmol/L 4.8  4.3 3.5 3.4*  --  3.9   CHLORIDE mmol/L 105 100 98 96*  --  91*   CO2 mmol/L 17.0* 20.9* 21.0* 19.1*  --  15.7*   BUN mg/dL 17 16 17 17  --  19   CREATININE mg/dL 1.04* 1.05* 1.01* 1.15*  --  1.33*   GLUCOSE mg/dL 139* 186* 287* 411*   < > 695*   CALCIUM mg/dL 8.8 8.5* 8.7 8.7  --  8.7    < > = values in this interval not displayed.         Estimated Creatinine Clearance: 70.5 mL/min (A) (by C-G formula based on SCr of 1.04 mg/dL (H)).  Results from last 7 days   Lab Units 01/03/24  0409 01/02/24 1816 01/02/24  1153   WBC 10*3/mm3 18.93* 17.30* 20.12*   HEMOGLOBIN g/dL 9.6* 10.5* 11.2*   PLATELETS 10*3/mm3 441 480* 568*         Results from last 7 days   Lab Units 01/02/24  1629 01/02/24  1240   ALT (SGPT) U/L 16 16   AST (SGOT) U/L 21 33*         Results from last 7 days   Lab Units 01/03/24  0409 01/03/24  0007 01/02/24 2036 01/02/24  1629 01/02/24  1153   LACTATE mmol/L 1.9 2.8* 3.2* 2.6* 6.0*         Hemoglobin A1C   Date/Time Value Ref Range Status   01/02/2024 1816 17.70 (H) 4.80 - 5.60 % Final   01/02/2024 1153 16.90 (H) 4.80 - 5.60 % Final     Glucose   Date/Time Value Ref Range Status   01/03/2024 0850 131 (H) 70 - 130 mg/dL Final   01/03/2024 0738 137 (H) 70 - 130 mg/dL Final   01/03/2024 0630 124 70 - 130 mg/dL Final   01/03/2024 0527 155 (H) 70 - 130 mg/dL Final   01/03/2024 0426 170 (H) 70 - 130 mg/dL Final   01/03/2024 0326 180 (H) 70 - 130 mg/dL Final   01/03/2024 0223 185 (H) 70 - 130 mg/dL Final         Lab 01/03/24  0409 01/03/24  0007 01/02/24  2036 01/02/24  1629 01/02/24  1153   LACTATE 1.9 2.8* 3.2* 2.6* 6.0*             Microbiology reviewed            Active Hospital Problems    Diagnosis  POA    **DKA (diabetic ketoacidosis) [E11.10]  Yes    JADEN (acute kidney injury) [N17.9]  Yes    Sepsis, unspecified organism [A41.9]  Yes    Lactic acidosis [E87.20]  Yes    UPJ (ureteropelvic junction) obstruction bilateral [N13.5]  Yes    Bilateral hydrocele [N43.3]  Unknown    Bilateral hydronephrosis  moderate on admission [N13.30]  Yes    Renal calculus on right, nonobstructinbg [N20.0]  Yes    Chronic liver disease [K76.9]  Yes    Dense breast tissue on mammogram [R92.30]  Yes    UTI (urinary tract infection) [N39.0]  Yes    Vitamin D deficiency [E55.9]  Yes    Essential hypertension [I10]  Yes    Hyperlipidemia [E78.5]  Yes    Hypothyroidism [E03.9]  Yes    Morbid obesity with BMI of 40.0-44.9, adult [E66.01, Z68.41]  Not Applicable    Allergic rhinitis [J30.9]  Yes    Back pain [M54.9]  Yes    Anxiety [F41.9]  Yes    Contact dermatitis [L25.9]  Yes    Primary osteoarthritis of right knee [M17.11]  Yes      Resolved Hospital Problems   No resolved problems to display.       CT abd reviewed        ASSESSMENT:  Sepsis  DKA  Lactic acodosis: resolved  Calyceal rupture with bilateral UPJ obstruction  DMII: poor control  Hypothyroidism  HLD   Morbid obesity: BMI >40      PLAN:  Continue ABX  Noted urology to see per Dr Galan note but no consult seen.  I will place urology consult.    Continue fluid resuscitation.  Insulin gtt.  Control BP.  DVT prophylaxis  D/W family at bedside.      CCT: 35 min    Alexei Aragon MD  Pulmonary and Critical Care Medicine  [facility], [facility]  1/3/2024    10:24 EST

## 2024-01-03 NOTE — PLAN OF CARE
Goal Outcome Evaluation:  Plan of Care Reviewed With: patient, daughter        Progress: no change          Pt admitted to CCU for DKA/ sepsis. Pt A&O X4. VSS with pt on room air. Pt and daughter updated on plan of care.

## 2024-01-03 NOTE — PLAN OF CARE
Goal Outcome Evaluation:  Plan of Care Reviewed With: patient        Progress: improving       Blood sugars improving, insulin gtt maintained as ordered. Serial labs improving. Flank pain continues, relieved with [place]. Urology consult pending. Resting between care. Will continue to monitor

## 2024-01-03 NOTE — ANESTHESIA POSTPROCEDURE EVALUATION
"Patient: Tiana Hudson    Procedure Summary       Date: 01/03/24 Room / Location: [facility] OR  / [facility] MAIN OR    Anesthesia Start: 1600 Anesthesia Stop: 1702    Procedure: CYSTOSCOPY BILATERAL RETROGRADE PYELOGRAM  BILATERAL URETERAL STENT INSERTION AND CATHETHER PLACEMENT (Bilateral) Diagnosis:     Surgeons: Eduard Armando MD Provider: Brendan Wu DO    Anesthesia Type: general ASA Status: 3            Anesthesia Type: general    Vitals  Vitals Value Taken Time   BP 94/55 01/03/24 1715   Temp 37 °C (98.6 °F) 01/03/24 1658   Pulse 103 01/03/24 1724   Resp 16 01/03/24 1658   SpO2 98 % 01/03/24 1724   Vitals shown include unfiled device data.        Post Anesthesia Care and Evaluation    Patient location during evaluation: bedside  Patient participation: complete - patient participated  Level of consciousness: awake and alert  Pain management: adequate    Airway patency: patent  Anesthetic complications: No anesthetic complications  PONV Status: controlled  Cardiovascular status: acceptable and hemodynamically stable  Respiratory status: acceptable, spontaneous ventilation and nonlabored ventilation  Hydration status: acceptable    Comments: BP 94/55   Pulse 102   Temp 37 °C (98.6 °F) (Oral)   Resp 16   Ht 165.1 cm (65\")   Wt 111 kg (245 lb 6 oz)   LMP 09/03/2016 Comment: IUD  SpO2 95%   BMI 40.83 kg/m²       "

## 2024-01-04 LAB
ANION GAP SERPL CALCULATED.3IONS-SCNC: 10.9 MMOL/L (ref 5–15)
ANION GAP SERPL CALCULATED.3IONS-SCNC: 12 MMOL/L (ref 5–15)
ANION GAP SERPL CALCULATED.3IONS-SCNC: 12.3 MMOL/L (ref 5–15)
ANION GAP SERPL CALCULATED.3IONS-SCNC: 13 MMOL/L (ref 5–15)
ANION GAP SERPL CALCULATED.3IONS-SCNC: 8.4 MMOL/L (ref 5–15)
BASOPHILS # BLD AUTO: 0.1 10*3/MM3 (ref 0–0.2)
BASOPHILS NFR BLD AUTO: 0.6 % (ref 0–1.5)
BUN SERPL-MCNC: 10 MG/DL (ref 8–23)
BUN SERPL-MCNC: 11 MG/DL (ref 8–23)
BUN SERPL-MCNC: 12 MG/DL (ref 8–23)
BUN SERPL-MCNC: 12 MG/DL (ref 8–23)
BUN SERPL-MCNC: 13 MG/DL (ref 8–23)
BUN/CREAT SERPL: 11.9 (ref 7–25)
BUN/CREAT SERPL: 12 (ref 7–25)
BUN/CREAT SERPL: 12.8 (ref 7–25)
BUN/CREAT SERPL: 13.5 (ref 7–25)
BUN/CREAT SERPL: 14.8 (ref 7–25)
CALCIUM SPEC-SCNC: 8.5 MG/DL (ref 8.6–10.5)
CALCIUM SPEC-SCNC: 8.7 MG/DL (ref 8.6–10.5)
CHLORIDE SERPL-SCNC: 101 MMOL/L (ref 98–107)
CHLORIDE SERPL-SCNC: 103 MMOL/L (ref 98–107)
CHLORIDE SERPL-SCNC: 103 MMOL/L (ref 98–107)
CHLORIDE SERPL-SCNC: 97 MMOL/L (ref 98–107)
CHLORIDE SERPL-SCNC: 98 MMOL/L (ref 98–107)
CO2 SERPL-SCNC: 18 MMOL/L (ref 22–29)
CO2 SERPL-SCNC: 18.6 MMOL/L (ref 22–29)
CO2 SERPL-SCNC: 19 MMOL/L (ref 22–29)
CO2 SERPL-SCNC: 19.1 MMOL/L (ref 22–29)
CO2 SERPL-SCNC: 19.7 MMOL/L (ref 22–29)
CREAT SERPL-MCNC: 0.84 MG/DL (ref 0.57–1)
CREAT SERPL-MCNC: 0.88 MG/DL (ref 0.57–1)
CREAT SERPL-MCNC: 0.89 MG/DL (ref 0.57–1)
CREAT SERPL-MCNC: 0.92 MG/DL (ref 0.57–1)
CREAT SERPL-MCNC: 0.94 MG/DL (ref 0.57–1)
DEPRECATED RDW RBC AUTO: 43 FL (ref 37–54)
EGFRCR SERPLBLD CKD-EPI 2021: 69.2 ML/MIN/1.73
EGFRCR SERPLBLD CKD-EPI 2021: 71 ML/MIN/1.73
EGFRCR SERPLBLD CKD-EPI 2021: 73.9 ML/MIN/1.73
EGFRCR SERPLBLD CKD-EPI 2021: 74.9 ML/MIN/1.73
EGFRCR SERPLBLD CKD-EPI 2021: 79.2 ML/MIN/1.73
EOSINOPHIL # BLD AUTO: 0.14 10*3/MM3 (ref 0–0.4)
EOSINOPHIL NFR BLD AUTO: 0.8 % (ref 0.3–6.2)
ERYTHROCYTE [DISTWIDTH] IN BLOOD BY AUTOMATED COUNT: 13.7 % (ref 12.3–15.4)
GLUCOSE BLDC GLUCOMTR-MCNC: 122 MG/DL (ref 70–130)
GLUCOSE BLDC GLUCOMTR-MCNC: 132 MG/DL (ref 70–130)
GLUCOSE BLDC GLUCOMTR-MCNC: 139 MG/DL (ref 70–130)
GLUCOSE BLDC GLUCOMTR-MCNC: 155 MG/DL (ref 70–130)
GLUCOSE BLDC GLUCOMTR-MCNC: 170 MG/DL (ref 70–130)
GLUCOSE BLDC GLUCOMTR-MCNC: 182 MG/DL (ref 70–130)
GLUCOSE BLDC GLUCOMTR-MCNC: 204 MG/DL (ref 70–130)
GLUCOSE BLDC GLUCOMTR-MCNC: 211 MG/DL (ref 70–130)
GLUCOSE BLDC GLUCOMTR-MCNC: 227 MG/DL (ref 70–130)
GLUCOSE SERPL-MCNC: 134 MG/DL (ref 65–99)
GLUCOSE SERPL-MCNC: 167 MG/DL (ref 65–99)
GLUCOSE SERPL-MCNC: 221 MG/DL (ref 65–99)
GLUCOSE SERPL-MCNC: 227 MG/DL (ref 65–99)
GLUCOSE SERPL-MCNC: 250 MG/DL (ref 65–99)
HCT VFR BLD AUTO: 30.7 % (ref 34–46.6)
HGB BLD-MCNC: 10 G/DL (ref 12–15.9)
LYMPHOCYTES # BLD AUTO: 1.78 10*3/MM3 (ref 0.7–3.1)
LYMPHOCYTES NFR BLD AUTO: 10.4 % (ref 19.6–45.3)
MAGNESIUM SERPL-MCNC: 1.7 MG/DL (ref 1.6–2.4)
MAGNESIUM SERPL-MCNC: 1.8 MG/DL (ref 1.6–2.4)
MAGNESIUM SERPL-MCNC: 1.9 MG/DL (ref 1.6–2.4)
MCH RBC QN AUTO: 27.7 PG (ref 26.6–33)
MCHC RBC AUTO-ENTMCNC: 32.6 G/DL (ref 31.5–35.7)
MCV RBC AUTO: 85 FL (ref 79–97)
MONOCYTES # BLD AUTO: 1.21 10*3/MM3 (ref 0.1–0.9)
MONOCYTES NFR BLD AUTO: 7.1 % (ref 5–12)
NEUTROPHILS NFR BLD AUTO: 13.11 10*3/MM3 (ref 1.7–7)
NEUTROPHILS NFR BLD AUTO: 76.8 % (ref 42.7–76)
PHOSPHATE SERPL-MCNC: 2.1 MG/DL (ref 2.5–4.5)
PHOSPHATE SERPL-MCNC: 2.1 MG/DL (ref 2.5–4.5)
PHOSPHATE SERPL-MCNC: 2.3 MG/DL (ref 2.5–4.5)
PHOSPHATE SERPL-MCNC: 2.3 MG/DL (ref 2.5–4.5)
PHOSPHATE SERPL-MCNC: 2.5 MG/DL (ref 2.5–4.5)
PLATELET # BLD AUTO: 446 10*3/MM3 (ref 140–450)
PMV BLD AUTO: 10.8 FL (ref 6–12)
POTASSIUM SERPL-SCNC: 3.9 MMOL/L (ref 3.5–5.2)
POTASSIUM SERPL-SCNC: 4.1 MMOL/L (ref 3.5–5.2)
POTASSIUM SERPL-SCNC: 4.1 MMOL/L (ref 3.5–5.2)
RBC # BLD AUTO: 3.61 10*6/MM3 (ref 3.77–5.28)
SODIUM SERPL-SCNC: 127 MMOL/L (ref 136–145)
SODIUM SERPL-SCNC: 128 MMOL/L (ref 136–145)
SODIUM SERPL-SCNC: 130 MMOL/L (ref 136–145)
SODIUM SERPL-SCNC: 133 MMOL/L (ref 136–145)
SODIUM SERPL-SCNC: 135 MMOL/L (ref 136–145)
WBC NRBC COR # BLD AUTO: 17.08 10*3/MM3 (ref 3.4–10.8)

## 2024-01-04 PROCEDURE — 63710000001 INSULIN GLARGINE PER 5 UNITS: Performed by: INTERNAL MEDICINE

## 2024-01-04 PROCEDURE — 36415 COLL VENOUS BLD VENIPUNCTURE: CPT | Performed by: UROLOGY

## 2024-01-04 PROCEDURE — 85025 COMPLETE CBC W/AUTO DIFF WBC: CPT | Performed by: INTERNAL MEDICINE

## 2024-01-04 PROCEDURE — 99223 1ST HOSP IP/OBS HIGH 75: CPT | Performed by: STUDENT IN AN ORGANIZED HEALTH CARE EDUCATION/TRAINING PROGRAM

## 2024-01-04 PROCEDURE — 63710000001 INSULIN LISPRO (HUMAN) PER 5 UNITS: Performed by: INTERNAL MEDICINE

## 2024-01-04 PROCEDURE — 84100 ASSAY OF PHOSPHORUS: CPT | Performed by: UROLOGY

## 2024-01-04 PROCEDURE — 25010000002 ENOXAPARIN PER 10 MG: Performed by: UROLOGY

## 2024-01-04 PROCEDURE — 83735 ASSAY OF MAGNESIUM: CPT | Performed by: UROLOGY

## 2024-01-04 PROCEDURE — 80048 BASIC METABOLIC PNL TOTAL CA: CPT | Performed by: UROLOGY

## 2024-01-04 PROCEDURE — 82948 REAGENT STRIP/BLOOD GLUCOSE: CPT

## 2024-01-04 PROCEDURE — 25010000002 PIPERACILLIN SOD-TAZOBACTAM PER 1 G: Performed by: UROLOGY

## 2024-01-04 RX ORDER — INSULIN LISPRO 100 [IU]/ML
2-9 INJECTION, SOLUTION INTRAVENOUS; SUBCUTANEOUS
Status: DISCONTINUED | OUTPATIENT
Start: 2024-01-04 | End: 2024-01-19 | Stop reason: HOSPADM

## 2024-01-04 RX ORDER — DEXTROSE MONOHYDRATE 25 G/50ML
25 INJECTION, SOLUTION INTRAVENOUS
Status: DISCONTINUED | OUTPATIENT
Start: 2024-01-04 | End: 2024-01-19 | Stop reason: HOSPADM

## 2024-01-04 RX ORDER — FENTANYL/ROPIVACAINE/NS/PF 2-625MCG/1
15 PLASTIC BAG, INJECTION (ML) EPIDURAL ONCE
Qty: 250 ML | Refills: 0 | Status: DISCONTINUED | OUTPATIENT
Start: 2024-01-04 | End: 2024-01-16

## 2024-01-04 RX ORDER — NICOTINE POLACRILEX 4 MG
15 LOZENGE BUCCAL
Status: DISCONTINUED | OUTPATIENT
Start: 2024-01-04 | End: 2024-01-19 | Stop reason: HOSPADM

## 2024-01-04 RX ORDER — FLUCONAZOLE 100 MG/1
400 TABLET ORAL EVERY 24 HOURS
Qty: 14 TABLET | Refills: 0 | Status: DISCONTINUED | OUTPATIENT
Start: 2024-01-04 | End: 2024-01-07

## 2024-01-04 RX ADMIN — FLUCONAZOLE 400 MG: 100 TABLET ORAL at 16:31

## 2024-01-04 RX ADMIN — INSULIN LISPRO 26 UNITS: 100 INJECTION, SOLUTION INTRAVENOUS; SUBCUTANEOUS at 12:57

## 2024-01-04 RX ADMIN — HYDROCODONE BITARTRATE AND ACETAMINOPHEN 1 TABLET: 5; 325 TABLET ORAL at 20:58

## 2024-01-04 RX ADMIN — Medication 10 ML: at 08:17

## 2024-01-04 RX ADMIN — LEVOTHYROXINE SODIUM 137 MCG: 137 TABLET ORAL at 07:19

## 2024-01-04 RX ADMIN — SENNOSIDES AND DOCUSATE SODIUM 2 TABLET: 50; 8.6 TABLET ORAL at 08:18

## 2024-01-04 RX ADMIN — INSULIN LISPRO 2 UNITS: 100 INJECTION, SOLUTION INTRAVENOUS; SUBCUTANEOUS at 20:59

## 2024-01-04 RX ADMIN — Medication 10 ML: at 20:59

## 2024-01-04 RX ADMIN — ENOXAPARIN SODIUM 40 MG: 100 INJECTION SUBCUTANEOUS at 08:13

## 2024-01-04 RX ADMIN — INSULIN LISPRO 4 UNITS: 100 INJECTION, SOLUTION INTRAVENOUS; SUBCUTANEOUS at 16:31

## 2024-01-04 RX ADMIN — ROPINIROLE HYDROCHLORIDE 0.5 MG: 0.5 TABLET, FILM COATED ORAL at 20:58

## 2024-01-04 RX ADMIN — INSULIN GLARGINE 10 UNITS: 100 INJECTION, SOLUTION SUBCUTANEOUS at 20:59

## 2024-01-04 RX ADMIN — ESCITALOPRAM OXALATE 10 MG: 10 TABLET, FILM COATED ORAL at 08:13

## 2024-01-04 RX ADMIN — PIPERACILLIN SODIUM AND TAZOBACTAM SODIUM 4.5 G: 4; .5 INJECTION, SOLUTION INTRAVENOUS at 07:19

## 2024-01-04 RX ADMIN — INSULIN HUMAN 5.9 UNITS/HR: 1 INJECTION, SOLUTION INTRAVENOUS at 01:27

## 2024-01-04 RX ADMIN — ACETAMINOPHEN 650 MG: 325 TABLET, FILM COATED ORAL at 15:15

## 2024-01-04 RX ADMIN — ENOXAPARIN SODIUM 40 MG: 100 INJECTION SUBCUTANEOUS at 20:59

## 2024-01-04 RX ADMIN — HYDROCODONE BITARTRATE AND ACETAMINOPHEN 1 TABLET: 5; 325 TABLET ORAL at 04:12

## 2024-01-04 RX ADMIN — INSULIN GLARGINE 72 UNITS: 100 INJECTION, SOLUTION SUBCUTANEOUS at 00:34

## 2024-01-04 RX ADMIN — INSULIN LISPRO 24 UNITS: 100 INJECTION, SOLUTION INTRAVENOUS; SUBCUTANEOUS at 08:16

## 2024-01-04 RX ADMIN — SENNOSIDES AND DOCUSATE SODIUM 2 TABLET: 50; 8.6 TABLET ORAL at 20:58

## 2024-01-04 NOTE — PROGRESS NOTES
[facility]     Mar/chart reviewed  Follow up sepsis, DKA, ruptured renal calycel  Some flank discomfort no chest pain    Vital Sign Min/Max for last 24 hours  Temp  Min: 97.5 °F (36.4 °C)  Max: 98.6 °F (37 °C)   BP  Min: 90/53  Max: 127/58   Pulse  Min: 88  Max: 107   Resp  Min: 14  Max: 24   SpO2  Min: 92 %  Max: 99 %   Flow (L/min)  Min: 2  Max: 4   Weight  Min: 111 kg (245 lb 6 oz)  Max: 111 kg (245 lb 6 oz)     Nad, axox3,   perrl, eomi, no icterus,  mmm, no jvd, trachea midline, neck supple,  chest cta bilaterally, no crackles, no wheezes,   rrr,   soft, nt, nd +bs,  no c/c/ 1+ edema  Skin warm, dry no rashes    Labs: 1/4: reviewed:  Glucose 167  Na 135  Bicarb 19  Wbc 17  Hgb 10  Plts 446    Micro: 1/2: blood culture +gram positive bacilli    A/P:  Sepsis -- better  Anion gap metabolic acidosis -- gap closed, acidosismuch better  3.   Lactic acidosis -- could be do to her home metformin and/or sepsis -- resolved  4.   Calyceal ruture with bilateral UPJ obststruction -- s/p or with urology, continue antibiotics  5.  DMII --ssi, lantus  6.  Psuedohyponatremia --resolved  7. Hypothyroidism -- continue synthroid  8.  HLD- hold statin for now  9. Obesity  10. Bacteremia -- continue iv antibiotics, await final culture    She can move out of icu. Will defer medical management to Dr. Fairchild out side of ICU and see prn.

## 2024-01-04 NOTE — CONSULTS
Referring Provider: No ref. provider found  Reason for Consultation:     management of abx     Chief Complaint   Patient presents with    Flank Pain     R side         Subjective   History of present illness: Is a 63-year-old female with past medical history of type 2 diabetes, morbid obesity, and recurrent urinary tract infections who follows with urology in the setting of bilateral UPJ obstruction presenting with flank pain.  ID consulted for antibiotic management.    On admission patient has been afebrile with initial leukocytosis of 20 in the setting of DKA.  Admission glucose elevated at 978.  Lactate initially 6 with improvement and found to have JADEN and hyponatremia.  Imaging concerning for loculated fluid throughout the right perinephric space favoring calyceal rupture.  Urology consulted and underwent bilateral ureteral stenting on 1/3.  Urine culture has been negative and blood culture positive for gram-positive bacilli 1 out of 2 from admission.  Was on Zosyn however currently not on antibiotics.    She reports she is feeling much better.  States that her flank pain is improving on the right side.  Denies any fevers or chills.  Denies any dysuria.  Denies any other abdominal pain.  States her nausea has improved as well.    Past Medical History:   Diagnosis Date    Anemia     intermittently    Anxiety and depression     Arthritis     Depression     Diabetes mellitus     Gallstones     High cholesterol     History of frequent urinary tract infections     HX OF YEAST IN BLADDER HAS PRN DIFLUCAN    History of transfusion 05/24/2017    Had 2 iron infusions.  This was when i had knee replacement    Hyperlipidemia     Hypertension     Hypothyroidism     Knee pain, bilateral     Low back pain     Lumbar stenosis     PONV (postoperative nausea and vomiting)     Positive TB test     chest x-ray neg    Seasonal allergies        Past Surgical History:   Procedure Laterality Date    APPENDECTOMY N/A 1982      [facility]    CHOLECYSTECTOMY WITH INTRAOPERATIVE CHOLANGIOGRAM N/A 10/31/2018    Procedure: laparoscopic cholecystectomy;  Surgeon: Mary Kay Mac MD;  Location: [facility] OR;  Service: General    COLONOSCOPY      CYSTOSCOPY BLADDER BIOPSY N/A 10/3/2016    Procedure: CYSTOSCOPY BLADDER BIOPSY;  Surgeon: Rei Calvert MD;  Location: [facility] OR;  Service:     CYSTOSCOPY W/ URETERAL STENT PLACEMENT Bilateral 1/3/2024    Procedure: CYSTOSCOPY BILATERAL RETROGRADE PYELOGRAM  BILATERAL URETERAL STENT INSERTION AND CATHETHER PLACEMENT;  Surgeon: Eduard Armando MD;  Location: [facility] OR;  Service: Urology;  Laterality: Bilateral;    DILATATION AND CURETTAGE N/A     ENDOSCOPY      INTRAUTERINE DEVICE INSERTION      KNEE ARTHROSCOPY W/ MENISCAL REPAIR Left     OVARIAN CYST REMOVAL Left 1982    Dr. Wilson    TOTAL KNEE ARTHROPLASTY Right 5/22/2017    Procedure: RIGHT TOTAL KNEE ARTHROPLASTY WITH ALETA NAVIGATION;  Surgeon: Ross Cruz MD;  Location: [facility] OR;  Service:        family history includes Arthritis in her paternal grandmother; Breast cancer in her mother; COPD in her maternal grandfather; Cancer in her maternal grandfather and mother; Diabetes in her father, maternal aunt, and paternal uncle; Heart disease in her father, maternal grandmother, and mother; Heart failure in her father; Hepatitis in her mother; Hyperlipidemia in her father and mother; Hypertension in her father; Stroke in her father.     reports that she has never smoked. She has never used smokeless tobacco. She reports that she does not drink alcohol and does not use drugs.     Allergies   Allergen Reactions    Sulfa Antibiotics Hives and Rash       Medication:  Antibiotics:  Anti-Infectives (From admission, onward)      Ordered     Dose/Rate Route Frequency Start Stop    01/02/24 1636  piperacillin-tazobactam (ZOSYN) 4.5 g in iso-osmotic dextrose 100 mL IVPB (premix)        Ordering Provider: Arthur Najera MD     "4.5 g  over 30 Minutes Intravenous Once 01/02/24 1652 01/02/24 1735    01/02/24 1243  cefTRIAXone (ROCEPHIN) 2,000 mg in sodium chloride 0.9 % 100 mL IVPB-VTB        Ordering Provider: Angelito Winkler MD    2,000 mg  200 mL/hr over 30 Minutes Intravenous Once 01/02/24 1259 01/02/24 1414              Objective     Physical Exam:   Vital Signs   Temp:  [97.5 °F (36.4 °C)-98.6 °F (37 °C)] 97.5 °F (36.4 °C)  Heart Rate:  [] 93  Resp:  [14-24] 18  BP: ()/(51-87) 112/65    GENERAL: Awake and alert, in no acute distress.   HEENT: Oropharynx is clear. Hearing is grossly normal.   EYES: PERRL. No conjunctival injection. No lid lag.   LUNGS: Normal work of breathing  GI: Soft, with right-sided flank pain  SKIN: Warm and dry without cutaneous eruptions   PSYCHIATRIC: Appropriate mood, affect, insight, and judgment.     Results Review:   I reviewed the patient's new clinical results.  I reviewed the patient's new imaging results and agree with the interpretation.  I reviewed the patient's other test results and agree with the interpretation    Lab Results   Component Value Date    WBC 17.08 (H) 01/04/2024    HGB 10.0 (L) 01/04/2024    HCT 30.7 (L) 01/04/2024    MCV 85.0 01/04/2024     01/04/2024       No results found for: \"VANCOPEAK\", \"VANCOTROUGH\", \"VANCORANDOM\"    Lab Results   Component Value Date    GLUCOSE 250 (H) 01/04/2024    BUN 12 01/04/2024    CREATININE 0.89 01/04/2024    EGFRIFNONA 54 (L) 10/24/2018    EGFRIFAFRI 83 02/23/2017    BCR 13.5 01/04/2024    CO2 19.0 (L) 01/04/2024    CALCIUM 8.5 (L) 01/04/2024    PROTENTOTREF 6.9 02/23/2017    ALBUMIN 1.8 (L) 01/02/2024    LABIL2 1.7 02/23/2017    AST 21 01/02/2024    ALT 16 01/02/2024         Estimated Creatinine Clearance: 82.4 mL/min (by C-G formula based on SCr of 0.89 mg/dL).      Microbiology:  1/2 urine culture no growth  1/2 blood cultures 1 out of 2 gram-positive bacilli  1/3 left and right renal fluid cultures in " process    Radiology:  1/2 CT abdomen and pelvis report reviewed with moderate low-attenuation loculated fluid throughout the right perinephric space concerning for right calyceal rupture.  Findings of bilateral UPJ obstruction with moderate bilateral hydronephrosis.  New mild dilatation of the right ureter.    Assessment   #Right calyceal rupture  #Bilateral hydronephrosis status post bilateral ureteral stenting  #JADEN  #DKA  #Hyponatremia  #Morbid obesity, BMI 40  #Positive blood culture, suspect contaminant    Unclear significance of positive blood culture given gram-positive bacilli in 1 out of 2 which would normally constitute contamination.  Plan to repeat blood cultures and follow-up speciation.    Urine culture negative. Samples from the renal calyces showing yeast and patient has a history of candidal colonization of the urinary tract.  Given that she has had recent  instrumentation we will plan to start fluconazole 400 mg daily while following up culture results.    Thank you for this consult.  We will continue to follow along and tailor antibiotics as the patient's clinical course evolves.

## 2024-01-04 NOTE — PROGRESS NOTES
[facility]  INTERNAL MEDICINE  DEAN FAIRCHILD MD  [address] [place] [address]  Phone [phone] Fax [phone]  E-mail:  [email or web address]      INTERNAL MEDICINE DAILY PROGRESS NOTE  Dean Fairchild M.D.  1/3/2024            Patient Identification:  Name: Tiana Hudson  Age: 63 y.o.  Sex: female  :  1960  MRN: [id number]         Primary Care Physician: Dean Fairchild MD  LENGTH OF STAY 1 DAYS    Consults       Date and Time Order Name Status Description    1/3/2024 10:27 AM Inpatient Urology Consult      2024 11:55 PM Urology (on-call MD unless specified) Completed     2024  3:45 PM IP General Consult (Use specialty-specific consult if known) Completed             Chief Complaint: Abdominal/flank pain with DKA, acute cystitis and sepsis, and possible calyceal rupture in the kidney     History of Present Illness:     Subjective   Interval History: Patient is a 63 y.o.female who presented at my request to the emergency room at [facility] with complaint of acute abdominal/flank pain and history of recurrent kidney stones.  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  Patient has been followed for the last few months by Dr. Rei Calvert in urology for renal calculi and actually has had a stent placed in the right kidney due to obstruction from her renal stones.  It is also noted that the patient had a recent lithotripsy.  Patient began to feel poorly after the recent  holiday and became more severely ill over the last 3 to 4 days prior to this admission.  She has felt extremely fatigued, dizzy at times, tired, nauseated, and has spent most of her time in bed  sleeping.  Family has had difficulty getting the patient to take any oral intake and became quite concerned as her condition continued to worsen.  Patient was attempting to go to work today but really was too dizzy to get in the car to drive and 2-week to actually work.  After urging from her daughter, patient called me with respect to the symptoms and at my request went to the ER for evaluation.  Patient has multiple medical comorbidities that complicate her medical care.These include most significantly recurrent urinary tract infections due to her nephrolithiasis, morbid obesity with BMI greater than 40, diabetes mellitus type 2 poorly controlled with recent addition of Ozempic to her metformin therapy, vitamin D deficiency, essential hypertension, hyperlipidemia, hypothyroidism, allergic rhinitis, back pain, contact dermatitis, anxiety, and history of dense breast tissue on mammograms.     Patient was evaluated in the emergency room by Angelito Winkler MD who was the ER attending on call time of her arrival.  Patient was seen on 1/2/2024 at 1544 by Dr. Winkler.  His HPI was consistent with the story which I given above.  Patient denied dysuria, fever, chills on his evaluation.  She did admit to having emesis and actually had some emesis while in the emergency room.  Review of systems in the emergency room was positive for diffuse abdominal pain, and vomiting.  Patient also was noted to have some significant flank pain.  All other systems reviewed were negative in the emergency room vital signs on arrival in the emergency room showed temperature of 96 °F, heart rate of 114, respiratory rate of 16, blood pressure 125/72, and O2 sat of 98%.  Patient was described as ill-appearing but in no acute distress.  She did have severe tenderness to palpation more on the right side of the abdomen and out into the right flank area with voluntary guarding.  Labs collected in the ER showed that her lactate level was elevated at  6.0.  She had a lipase of 23, her white blood cell count was 20.12, her hemoglobin was 11.2, and her platelet count was 568,000.  Patient did have a phosphorus of 4.2, magnesium 2.0, osmolality of 317, hemoglobin A1c is 16.90, and UA showed specific gravity 1.027, 3+ glucose, moderate blood 2+, leukocyte esterase moderate 2+, nitrates negative, white blood cells 6-10, red blood cells 6-10, bacteria 2+.  Her CMP showed a glucose of 978, creatinine of 1.82, sodium of 115, potassium of 4.2, chloride of 76, CO2 of 15.7, albumin 2.4, AST 33, alk phos 220, anion gap of 23, EGFR of 31.3.  Patient did have a CT scan of her abdomen and pelvis completed which showed loculated fluid throughout the right perinephric space felt to possibly be secondary to right calyceal rupture, new mild to moderate wall thickening of the right renal pelvis and renal calyces, bilateral UPJ obstruction with moderate bilateral hydronephrosis on the left, mild new dilation of right ureter, nonobstructing right renal calculi, hepatic morphology suggestive of chronic liver disease, and reactive lymph nodes in the retrocaval area.  A chest x-ray was done which showed no acute disease other than minimal atelectasis at the base of the right lung.  Patient underwent aggressive fluid resuscitation in the emergency room and received over 3 L of IV fluid.  She was placed on an insulin drip and was also started on Zosyn therapy.  She was followed on sepsis protocol as well as DKA protocol.  Patient did receive morphine 4 mg for pain x 2.  Case was discussed with myself as her primary care attending.  Urology was also contacted about situation.  I did suggest that patient be admitted to the ICU for treatment of the DKA and severe sepsis picture.  Dr. Arthur Tena did agree to the ICU admission and will be following her in the CCU for pulmonary/critical care.  Final diagnoses in the ER were sepsis due to unspecified organism and acute cystitis without  significant hematuria.     1/2/2024.  I personally saw the patient for the first time during this hospitalization on this date in the coronary care unit room #335.  Patient was resting quietly in bed and did a peer acutely ill.  She did wake to her name and spoke a few words before falling back asleep.  Patient's 2 daughters were at the foot of her bed and did discuss the case at length with me.  Daughter Enoch, is a former nurse from here at [employer], and now works at [employer] with the hospitalist group there.  Patient has responded well to the Glucommander DKA protocols and blood sugars have gradually come down to near normal levels for the patient in the 1  range.  I will full PPE for the exam including an N95 face mask, goggles, white lab coat, and gloves when touching patient.  I performed thorough hand hygiene before and after the patient visit.  Patient did have a venous blood gas which showed pH 7.40, pCO2 28, pO2 of 31, and O2 sat of 62%.  She also had recent electrolytes which showed a sodium of 131, potassium 3.4, chloride 96, CO2 19.1, BUN 17, creatinine 1.15, estimated GFR now up to 54.3.  Lactate level has been coming steadily down and currently at 2.8.  Additional labs ordered for tomorrow a.m.  Patient has been able to urinate several times and daughters have helped her up to the bathroom for this.  I actually note that she did have some urinary frequency and incontinence at home which is unusual for her.  I did speak briefly to the patient's daytime nurse who was departing soon after I arrived on the floor.  I have reviewed Dr. Arthur Tena's notes and his admission history and physical.  I do agree completely with his plan of care at present time.  Supportive care is continued to be offered for the sepsis with careful monitoring in the ICU.  Patient's anion gap metabolic acidosis probably is related to lactic acidosis and she was continuing to take metformin even when she was not able to  eat.  IV fluids are continued aggressively.  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  Probably will plan sliding scale insulin for stabilization of diabetes while here in the hospital and will train patient to continue that in the home environment.  Pseudohyponatremia will be corrected with the IV fluids.  Hypothyroidism will be addressed with continued Synthroid.  Statins are held for now.  Obesity is an ongoing problem for the patient.  At the time my exam, patient is medically stable and slowly improving.  I did relay this to the daughter to agree with my assessment.  We will continue to follow the patient with you and we will be happy to assume care of the patient when she is stable and ready to be transferred out of the ICU.  I can be reached directly for any concerns or questions at [phone].    1/3/2024.  Patient is seen again today in her room in the CCU #335.  Patient was resting quietly in bed at the time of my visit and states that her daughters were downstairs getting a bite to eat.  I did discuss the case with the patient's nurse, Tiana, who tells me that she is being preop and should be going down soon for cystoscopy and stent placement.  I will full PPE for the exam including an N95 face mask, goggles, white lab coat, and gloves when touching patient.  I performed thorough hand hygiene before and after the patient visit.  Patient is more alert today but still somewhat confused about details of her medical case.  She is able to carry on a conversation with me though and joke a bit with me while I am present in the room.  She does understand she has issues because of the obstruction bilaterally of her ureters that has resulted in hydronephrosis and hopefully can be relieved by the cystoscopy that is planned for later this morning.  Patient is still on an insulin drip but is drastically improved compared to  where she was yesterday at this time.  Review of labs shows that her sodium is now up to 133, her CO2 is 19.1, glucose is now at 134, phosphorus is at 2.1, lactate is normalized at 1.9, white blood cell count today is 18.93, hemoglobin is 9.6 today, lakelets are normal today,Blood culture from the first day of hospitalization shows anaerobic bottle gram-positive bacilli growth with identification still ongoing.  Fungal and body fluid cultures are still negative.  Urine culture remains negative.  Dr. Oquendo was following the patient for critical care today.  He has continued her antibiotics and is awaiting urology input.  Fluid resuscitation is continued.  Blood pressure now seems well-controlled.  DVT prophylaxis in place.  He had a lengthy discussion with patient's family at the time of his rounds.  Urology has seen the patient in consultation and is planning to do cystoscopy later today.  Dietitian is following the patient's case and recommending input treatment as needed.Dr. Armando did perform a cystoscopy with ureteral catheterization and stent insertion bilaterally.  His pre-op diagnosis and postop diagnoses were urosepsis with bilateral UPJ obstruction.  He did feel that he had successfully decompressed the blockages and the hydronephrotic changes.  He did speak with patient's daughter postoperatively.  Patient does seem to be much improved today.  Vital signs still show no fever.  Blood pressure is relatively stable.  Will continue to follow with you.        Review of Systems:               Review of systems could not be obtained due to  patient confusion. patient nonverbal.       Past Medical History:   Diagnosis Date    Anemia     intermittently    Anxiety and depression     Arthritis     Depression     Diabetes mellitus     Gallstones     High cholesterol     History of frequent urinary tract infections     HX OF YEAST IN BLADDER HAS PRN DIFLUCAN    History of transfusion 05/24/2017    Had 2 iron  infusions.  This was when i had knee replacement    Hyperlipidemia     Hypertension     Hypothyroidism     Knee pain, bilateral     Low back pain     Lumbar stenosis     PONV (postoperative nausea and vomiting)     Positive TB test     chest x-ray neg    Seasonal allergies      Past Surgical History:   Procedure Laterality Date    APPENDECTOMY N/A     Dr. Wilson    CHOLECYSTECTOMY WITH INTRAOPERATIVE CHOLANGIOGRAM N/A 10/31/2018    Procedure: laparoscopic cholecystectomy;  Surgeon: Mary Kay Mac MD;  Location: [facility] OR;  Service: General    COLONOSCOPY      CYSTOSCOPY BLADDER BIOPSY N/A 10/3/2016    Procedure: CYSTOSCOPY BLADDER BIOPSY;  Surgeon: Rei Calvert MD;  Location: [facility] OR;  Service:     DILATATION AND CURETTAGE N/A     ENDOSCOPY      INTRAUTERINE DEVICE INSERTION      KNEE ARTHROSCOPY W/ MENISCAL REPAIR Left     OVARIAN CYST REMOVAL Left     Dr. Wilson    TOTAL KNEE ARTHROPLASTY Right 2017    Procedure: RIGHT TOTAL KNEE ARTHROPLASTY WITH ALETA NAVIGATION;  Surgeon: Ross Cruz MD;  Location: [facility] OR;  Service:      Allergies   Allergen Reactions    Sulfa Antibiotics Hives and Rash       Family History   Problem Relation Age of Onset    Hepatitis Mother     Breast cancer Mother     Heart disease Mother     Cancer Mother     Hyperlipidemia Mother              Heart failure Father     Stroke Father     Hypertension Father         Since he was 72, now 86s    Diabetes Father     Heart disease Father     Hyperlipidemia Father         Unsurs    Heart disease Maternal Grandmother     Cancer Maternal Grandfather     COPD Maternal Grandfather     Arthritis Paternal Grandmother     Diabetes Maternal Aunt     Diabetes Paternal Uncle     Malig Hyperthermia Neg Hx        Social History     Socioeconomic History    Marital status:    Tobacco Use    Smoking status: Never    Smokeless tobacco: Never    Tobacco comments:     Never smoked   Vaping Use     Vaping Use: Never used   Substance and Sexual Activity    Alcohol use: Never     Alcohol/week: 3.0 standard drinks of alcohol     Types: 1 Glasses of wine, 2 Standard drinks or equivalent per week    Drug use: Never    Sexual activity: Not Currently     Partners: Male     Birth control/protection: I.U.D.       PMH, FH, SH and ROS completed with Admission History and Physical and updated in EPIC system.        Objective     Scheduled Meds:enoxaparin, 40 mg, Subcutaneous, Q12H  escitalopram, 10 mg, Oral, Daily  Edit Initial Basal Insulin Transition Dose, 1 each, Does not apply, Once  insulin glargine, 1-200 Units, Subcutaneous, Nightly - Glucommander  insulin lispro, 1-200 Units, Subcutaneous, 4x Daily With Meals & Nightly  levothyroxine, 137 mcg, Oral, Q AM  piperacillin-tazobactam, 4.5 g, Intravenous, Q8H  rOPINIRole, 0.5 mg, Oral, Nightly  senna-docusate sodium, 2 tablet, Oral, BID  sodium chloride, 10 mL, Intravenous, Q12H  sodium chloride, 10 mL, Intravenous, Q12H  sodium chloride, 10 mL, Intravenous, Q12H      Continuous Infusions:dextrose 5 % and sodium chloride 0.45 %, 150 mL/hr  dextrose 5 % and sodium chloride 0.45 % with KCl 20 mEq/L, 150 mL/hr, Last Rate: 150 mL/hr (01/03/24 0221)  dextrose 5 % and sodium chloride 0.45 % with KCl 40 mEq/L, 150 mL/hr  dextrose 5 % and sodium chloride 0.9 %, 150 mL/hr  dextrose 5 % and sodium chloride 0.9 % with KCl 20 mEq, 150 mL/hr, Last Rate: 150 mL/hr (01/03/24 0844)  dextrose 5% and sodium chloride 0.9% with KCl 40 mEq/L, 150 mL/hr  insulin, 0-100 Units/hr, Last Rate: 6.1 Units/hr (01/03/24 1955)  lactated ringers, 9 mL/hr, Last Rate: 9 mL/hr (01/03/24 1527)  sodium chloride 0.45 % 1,000 mL with potassium chloride 40 mEq infusion, 250 mL/hr  sodium chloride 0.45 % 1,000 mL with potassium chloride 40 mEq infusion, 250 mL/hr  sodium chloride, 250 mL/hr  sodium chloride, 250 mL/hr  sodium chloride 0.45 % with KCl 20 mEq, 250 mL/hr  sodium chloride 0.45 % with KCl 20 mEq,  "250 mL/hr, Last Rate: 250 mL/hr (01/02/24 2242)  sodium chloride, 250 mL/hr  sodium chloride, 250 mL/hr  sodium chloride 0.9 % with KCl 20 mEq, 250 mL/hr  sodium chloride 0.9 % with KCl 20 mEq, 250 mL/hr, Last Rate: 250 mL/hr (01/02/24 2048)  sodium chloride 0.9 % with KCl 40 mEq/L, 250 mL/hr  sodium chloride 0.9 % with KCl 40 mEq/L, 250 mL/hr        Vital signs in last 24 hours:  Temp:  [98 °F (36.7 °C)-98.7 °F (37.1 °C)] 98.2 °F (36.8 °C)  Heart Rate:  [] 103  Resp:  [16-28] 16  BP: ()/(47-76) 116/59    Intake/Output:    Intake/Output Summary (Last 24 hours) at 1/3/2024 2015  Last data filed at 1/3/2024 1731  Gross per 24 hour   Intake 1960 ml   Output 855 ml   Net 1105 ml       Exam:  /59   Pulse 103   Temp 98.2 °F (36.8 °C) (Oral)   Resp 16   Ht 165.1 cm (65\")   Wt 111 kg (245 lb 6 oz)   LMP 09/03/2016 Comment: IUD  SpO2 96%   BMI 40.83 kg/m²     Constitutional:  Alert, cooperative, no distress, AAOx3, resting comfortably, mentation is improved today with lower blood sugar levels and sepsis under better control.   Head:      Normocephalic, without obvious abnormality, atraumatic   Eyes:     PERRLA, conjunctiva/corneas clear, no icterus, no conjunctival                                     pallor, EOM's intact, both eyes      ENT and Mouth: Lips, tongue, gums normal; oral mucosa pink and moist   Neck:     Supple, symmetrical, trachea midline, no JVD  Respiratory:     Clear to auscultation bilaterally, respirations unlabored  Cardiovascular:  Regular rate and rhythm, S1 and S2 normal, no murmur,      no  Rub or gallop.  Pulses normal.    Gastrointestinal:   BS present x 4 Soft, non-tender, bowel sounds active,      no masses, no hepatosplenomegaly                                                     :       No hernia.  Normal exam for sex.         Musculoskeletal: Extremities normal, atraumatic, no cyanosis or edema     No arthropathy.  No deformity.  Gait normal                           "                       Skin:   Skin is warm and dry,  no rashes, swelling or palpable lesions   Neurologic:  CN -XII intact, motor strength grossly intact, sensation grossly intact to light touch, no focal reflex deficits noted    Psychiatric:     Alert,oriented X3, no delusions, psychoses, depression or anxiety    Heme/Lymph/Imun:   No bruises, petechiae.  Lymph nodes normal in size/configuration       Data Review:  Lab Results   Component Value Date    CALCIUM 8.9 01/03/2024    PHOS 1.5 (C) 01/03/2024     Results from last 7 days   Lab Units 01/03/24  1207 01/03/24  0839 01/03/24  0409 01/02/24  2036 01/02/24  1816 01/02/24  1629 01/02/24  1240 01/02/24  1153 01/02/24  1153   AST (SGOT) U/L  --   --   --   --   --  21 33*  --   --    ALT (SGPT) U/L  --   --   --   --   --  16 16  --   --    MAGNESIUM mg/dL 2.0 1.8 1.8   < >  --  1.9 2.0  --  2.0   SODIUM mmol/L 135* 135* 133*   < >  --  125* 115*   < >  --    POTASSIUM mmol/L 4.0 4.8 4.3   < >  --  3.9 4.2   < >  --    CHLORIDE mmol/L 104 105 100   < >  --  91* 76*   < >  --    CO2 mmol/L 19.2* 17.0* 20.9*   < >  --  15.7* 15.7*   < >  --    BUN mg/dL 17 17 16   < >  --  19 23   < >  --    CREATININE mg/dL 1.04* 1.04* 1.05*   < >  --  1.33* 1.82*   < >  --    GLUCOSE mg/dL 123* 139* 186*   < > 477* 695* 978*   < >  --    CALCIUM mg/dL 8.9 8.8 8.5*   < >  --  8.7 9.4   < >  --    WBC 10*3/mm3  --   --  18.93*  --  17.30*  --   --   --  20.12*   HEMOGLOBIN g/dL  --   --  9.6*  --  10.5*  --   --   --  11.2*   PLATELETS 10*3/mm3  --   --  441  --  480*  --   --   --  568*    < > = values in this interval not displayed.     Lab Results   Component Value Date    TROPONINT <6 01/02/2024     Estimated Creatinine Clearance: 70.5 mL/min (A) (by C-G formula based on SCr of 1.04 mg/dL (H)).  WEIGHTS:     Wt Readings from Last 1 Encounters:   01/03/24 0600 111 kg (245 lb 6 oz)   01/02/24 1131 120 kg (265 lb)         Assessment:    DKA (diabetic ketoacidosis)    UTI (urinary  tract infection)    Morbid obesity with BMI of 40.0-44.9, adult    JADEN (acute kidney injury)    Sepsis, unspecified organism    Lactic acidosis    UPJ (ureteropelvic junction) obstruction bilateral    Bilateral hydronephrosis moderate on admission    Renal calculus on right, nonobstructinbg    Vitamin D deficiency    Essential hypertension    Hyperlipidemia    Hypothyroidism    Allergic rhinitis    Back pain    Chronic liver disease    Primary osteoarthritis of right knee    Contact dermatitis    Anxiety    Dense breast tissue on mammogram    Bilateral hydrocele      Attending Physician Assessment and Plan:    1.  Cystitis/UTI with sepsis associated with bilateral UPJ obstruction and moderate bilateral hydronephrosis and possible right calyceal rupture.  Patient resuscitated aggressively with IV fluids.  Broad-spectrum antibiotics, Zosyn, started.  Urology consult is placed.  Patient being followed carefully in ICU for additional complications or signs of worsening sepsis.  Culture of urine remains negative on day #2.  New cultures collected at time of cystoscopy today by urology.     2.  Anion gap metabolic acidosis versus possible diabetic ketoacidosis.  Improving rapidly at present time on DKA protocol with Glucomander.  Aggressive fluid resuscitation initiated in the ER is continued in the ICU.  Patient now with good urinary output and hopefully will be able to come off of IV insulin and switch to subcu dosing overnight..  Anion gap has narrowed and improved.  Patient should come Glucomander DKA protocol sometime later today.     3.  Lactic acidosis possibly due to home use of metformin while taking no food or possibly due to acute sepsis.  Continuing rapid fluid resuscitation with improvement already noted in lactic acid.  Monitoring carefully for signs of any new changes or problems.  Lactic acidosis has also resolved on day #2 and patient's sepsis seems to be under much more stable condition at present  time.     4.  Type 2 diabetes mellitus poorly controlled at present time with elevated A1c greater than 16.  Will probably need to arrange for follow-up with patient in endocrinology clinic.  Discussed situation with the daughters and she may benefit from continuous 24-hour glucose monitoring issues willing to learn the process.  Will probably need to discharge charge on sliding scale insulin and may also consider mealtime insulin or long acting insulin.  Diabetic educator to see patient and work with us on diet, glucose testing techniques, and generalized diabetes management.     5.  Pseudohyponatremia.  Seems to already be improving with resuscitation and resolution of the uncontrolled glucoses.  Will continue to follow electrolytes regularly.  Hyponatremia now corrected and in normal range.     6.  Hypothyroidism.  Patient's Synthroid is continued by the ICU attending.  Will continue to follow this in the hospital and on an outpatient basis.  Hypothyroidism is a stable condition     7.  Hyperlipidemia.  Agree with holding statin for now.  Will resume prior to discharge and continue on outpatient basis.     8.  Obesity.  Hope to continue using agents such as Ozempic for management of patient's blood sugar levels.  This will also help with diet control and ongoing need for gradual weight loss.     9.  Acute kidney injury secondary to volume depletion with uncontrolled glucoses.  Should improve as patient's volume status normalizes.  Will continue to monitor closely.  Acute kidney injury is gradually improving with resolution of hydronephrosis and bilateral stent placement.     10.  Generalized muscle weakness and fatigue.  Suspect related to the poorly controlled diabetes and electrolyte disturbances.  Will do PT and OT evaluations after patient stabilizes.  Suspect patient will discharge to home with PT and OT and home environment.  Hopefully patient will be able to work with PT and OT in the next few days.            Plan for disposition:Where: home and home health and When:  3-4 days when medically stable     Copied text in this note has been reviewed by me and is accurate as of 01/03/2024.  Much of this dictation was completed using Dragon voice activated transcription software which can result in misspelled words and nonsensical phrases at times.        Thanks again for the consult on this pleasant 63-year-old white female.  I will look forward to following her with you in the ICU and be happy to pick her up on the floor when she is discharged out of the ICU.  I appreciate the input and help in her care and management from the intensivist in the ICU.  If you have any concerns or questions about her general medical care please feel free to contact me at [phone].            Dean Fairchild MD  1/3/2024  1114 EST

## 2024-01-04 NOTE — PROGRESS NOTES
Day 1 postop    Bilateral stent placement for bilateral UPJ obstruction urosepsis    Afebrile vital signs are stable urine output has been excellent rate renal function remained stable continue current antibiotic regimen cultures pending

## 2024-01-05 LAB
ALBUMIN SERPL-MCNC: 2.1 G/DL (ref 3.5–5.2)
ALBUMIN/GLOB SERPL: 0.5 G/DL
ALP SERPL-CCNC: 279 U/L (ref 39–117)
ALT SERPL W P-5'-P-CCNC: 19 U/L (ref 1–33)
ANION GAP SERPL CALCULATED.3IONS-SCNC: 8.5 MMOL/L (ref 5–15)
AST SERPL-CCNC: 34 U/L (ref 1–32)
BASOPHILS # BLD AUTO: 0.08 10*3/MM3 (ref 0–0.2)
BASOPHILS NFR BLD AUTO: 0.6 % (ref 0–1.5)
BILIRUB SERPL-MCNC: 0.4 MG/DL (ref 0–1.2)
BUN SERPL-MCNC: 8 MG/DL (ref 8–23)
BUN/CREAT SERPL: 9.4 (ref 7–25)
CALCIUM SPEC-SCNC: 8.9 MG/DL (ref 8.6–10.5)
CHLORIDE SERPL-SCNC: 101 MMOL/L (ref 98–107)
CO2 SERPL-SCNC: 22.5 MMOL/L (ref 22–29)
CREAT SERPL-MCNC: 0.85 MG/DL (ref 0.57–1)
CRP SERPL-MCNC: 23.85 MG/DL (ref 0–0.5)
D-LACTATE SERPL-SCNC: 1.2 MMOL/L (ref 0.5–2)
DEPRECATED RDW RBC AUTO: 44.7 FL (ref 37–54)
EGFRCR SERPLBLD CKD-EPI 2021: 78.1 ML/MIN/1.73
EOSINOPHIL # BLD AUTO: 0.12 10*3/MM3 (ref 0–0.4)
EOSINOPHIL NFR BLD AUTO: 0.9 % (ref 0.3–6.2)
ERYTHROCYTE [DISTWIDTH] IN BLOOD BY AUTOMATED COUNT: 14 % (ref 12.3–15.4)
ERYTHROCYTE [SEDIMENTATION RATE] IN BLOOD: >130 MM/HR (ref 0–30)
GLOBULIN UR ELPH-MCNC: 4.3 GM/DL
GLUCOSE BLDC GLUCOMTR-MCNC: 163 MG/DL (ref 70–130)
GLUCOSE BLDC GLUCOMTR-MCNC: 182 MG/DL (ref 70–130)
GLUCOSE BLDC GLUCOMTR-MCNC: 187 MG/DL (ref 70–130)
GLUCOSE BLDC GLUCOMTR-MCNC: 189 MG/DL (ref 70–130)
GLUCOSE BLDC GLUCOMTR-MCNC: 212 MG/DL (ref 70–130)
GLUCOSE BLDC GLUCOMTR-MCNC: >599 MG/DL (ref 70–130)
GLUCOSE SERPL-MCNC: 159 MG/DL (ref 65–99)
HCT VFR BLD AUTO: 32.5 % (ref 34–46.6)
HGB BLD-MCNC: 10.3 G/DL (ref 12–15.9)
IMM GRANULOCYTES # BLD AUTO: 0.57 10*3/MM3 (ref 0–0.05)
IMM GRANULOCYTES NFR BLD AUTO: 4.4 % (ref 0–0.5)
LYMPHOCYTES # BLD AUTO: 1.7 10*3/MM3 (ref 0.7–3.1)
LYMPHOCYTES NFR BLD AUTO: 13 % (ref 19.6–45.3)
MAGNESIUM SERPL-MCNC: 1.7 MG/DL (ref 1.6–2.4)
MCH RBC QN AUTO: 27.9 PG (ref 26.6–33)
MCHC RBC AUTO-ENTMCNC: 31.7 G/DL (ref 31.5–35.7)
MCV RBC AUTO: 88.1 FL (ref 79–97)
MONOCYTES # BLD AUTO: 0.96 10*3/MM3 (ref 0.1–0.9)
MONOCYTES NFR BLD AUTO: 7.4 % (ref 5–12)
NEUTROPHILS NFR BLD AUTO: 73.7 % (ref 42.7–76)
NEUTROPHILS NFR BLD AUTO: 9.61 10*3/MM3 (ref 1.7–7)
NRBC BLD AUTO-RTO: 0.1 /100 WBC (ref 0–0.2)
PHOSPHATE SERPL-MCNC: 3 MG/DL (ref 2.5–4.5)
PLATELET # BLD AUTO: 412 10*3/MM3 (ref 140–450)
PMV BLD AUTO: 10.8 FL (ref 6–12)
POTASSIUM SERPL-SCNC: 4.1 MMOL/L (ref 3.5–5.2)
PROCALCITONIN SERPL-MCNC: 3.24 NG/ML (ref 0–0.25)
PROT SERPL-MCNC: 6.4 G/DL (ref 6–8.5)
RBC # BLD AUTO: 3.69 10*6/MM3 (ref 3.77–5.28)
SODIUM SERPL-SCNC: 132 MMOL/L (ref 136–145)
WBC NRBC COR # BLD AUTO: 13.04 10*3/MM3 (ref 3.4–10.8)

## 2024-01-05 PROCEDURE — 86140 C-REACTIVE PROTEIN: CPT | Performed by: INTERNAL MEDICINE

## 2024-01-05 PROCEDURE — 83735 ASSAY OF MAGNESIUM: CPT | Performed by: INTERNAL MEDICINE

## 2024-01-05 PROCEDURE — 97530 THERAPEUTIC ACTIVITIES: CPT

## 2024-01-05 PROCEDURE — 85025 COMPLETE CBC W/AUTO DIFF WBC: CPT | Performed by: INTERNAL MEDICINE

## 2024-01-05 PROCEDURE — 97162 PT EVAL MOD COMPLEX 30 MIN: CPT

## 2024-01-05 PROCEDURE — 84145 PROCALCITONIN (PCT): CPT | Performed by: INTERNAL MEDICINE

## 2024-01-05 PROCEDURE — 25010000002 VANCOMYCIN 10 G RECONSTITUTED SOLUTION: Performed by: STUDENT IN AN ORGANIZED HEALTH CARE EDUCATION/TRAINING PROGRAM

## 2024-01-05 PROCEDURE — 25010000002 ENOXAPARIN PER 10 MG: Performed by: UROLOGY

## 2024-01-05 PROCEDURE — 85652 RBC SED RATE AUTOMATED: CPT | Performed by: INTERNAL MEDICINE

## 2024-01-05 PROCEDURE — 25810000003 SODIUM CHLORIDE 0.9 % SOLUTION: Performed by: STUDENT IN AN ORGANIZED HEALTH CARE EDUCATION/TRAINING PROGRAM

## 2024-01-05 PROCEDURE — 82948 REAGENT STRIP/BLOOD GLUCOSE: CPT

## 2024-01-05 PROCEDURE — 63710000001 INSULIN LISPRO (HUMAN) PER 5 UNITS: Performed by: INTERNAL MEDICINE

## 2024-01-05 PROCEDURE — 80053 COMPREHEN METABOLIC PANEL: CPT | Performed by: INTERNAL MEDICINE

## 2024-01-05 PROCEDURE — 84100 ASSAY OF PHOSPHORUS: CPT | Performed by: INTERNAL MEDICINE

## 2024-01-05 PROCEDURE — 25010000002 VANCOMYCIN 750 MG RECONSTITUTED SOLUTION 1 EACH VIAL: Performed by: STUDENT IN AN ORGANIZED HEALTH CARE EDUCATION/TRAINING PROGRAM

## 2024-01-05 PROCEDURE — 97166 OT EVAL MOD COMPLEX 45 MIN: CPT

## 2024-01-05 PROCEDURE — 63710000001 INSULIN GLARGINE PER 5 UNITS: Performed by: INTERNAL MEDICINE

## 2024-01-05 PROCEDURE — 25810000003 SODIUM CHLORIDE 0.9 % SOLUTION 250 ML FLEX CONT: Performed by: STUDENT IN AN ORGANIZED HEALTH CARE EDUCATION/TRAINING PROGRAM

## 2024-01-05 PROCEDURE — 83605 ASSAY OF LACTIC ACID: CPT | Performed by: INTERNAL MEDICINE

## 2024-01-05 PROCEDURE — 99232 SBSQ HOSP IP/OBS MODERATE 35: CPT | Performed by: STUDENT IN AN ORGANIZED HEALTH CARE EDUCATION/TRAINING PROGRAM

## 2024-01-05 PROCEDURE — 87040 BLOOD CULTURE FOR BACTERIA: CPT | Performed by: INTERNAL MEDICINE

## 2024-01-05 RX ADMIN — INSULIN LISPRO 4 UNITS: 100 INJECTION, SOLUTION INTRAVENOUS; SUBCUTANEOUS at 11:55

## 2024-01-05 RX ADMIN — Medication 10 ML: at 09:09

## 2024-01-05 RX ADMIN — INSULIN LISPRO 2 UNITS: 100 INJECTION, SOLUTION INTRAVENOUS; SUBCUTANEOUS at 20:57

## 2024-01-05 RX ADMIN — FLUCONAZOLE 400 MG: 100 TABLET ORAL at 17:15

## 2024-01-05 RX ADMIN — HYDROCODONE BITARTRATE AND ACETAMINOPHEN 1 TABLET: 5; 325 TABLET ORAL at 09:26

## 2024-01-05 RX ADMIN — INSULIN LISPRO 2 UNITS: 100 INJECTION, SOLUTION INTRAVENOUS; SUBCUTANEOUS at 17:15

## 2024-01-05 RX ADMIN — SENNOSIDES AND DOCUSATE SODIUM 2 TABLET: 50; 8.6 TABLET ORAL at 09:04

## 2024-01-05 RX ADMIN — ROPINIROLE HYDROCHLORIDE 0.5 MG: 0.5 TABLET, FILM COATED ORAL at 20:57

## 2024-01-05 RX ADMIN — VANCOMYCIN HYDROCHLORIDE 2250 MG: 10 INJECTION, POWDER, LYOPHILIZED, FOR SOLUTION INTRAVENOUS at 09:03

## 2024-01-05 RX ADMIN — ENOXAPARIN SODIUM 40 MG: 100 INJECTION SUBCUTANEOUS at 09:03

## 2024-01-05 RX ADMIN — ENOXAPARIN SODIUM 40 MG: 100 INJECTION SUBCUTANEOUS at 20:57

## 2024-01-05 RX ADMIN — Medication 10 ML: at 20:36

## 2024-01-05 RX ADMIN — HYDROCODONE BITARTRATE AND ACETAMINOPHEN 1 TABLET: 5; 325 TABLET ORAL at 21:07

## 2024-01-05 RX ADMIN — VANCOMYCIN HYDROCHLORIDE 750 MG: 750 INJECTION, POWDER, LYOPHILIZED, FOR SOLUTION INTRAVENOUS at 20:58

## 2024-01-05 RX ADMIN — LEVOTHYROXINE SODIUM 137 MCG: 137 TABLET ORAL at 05:55

## 2024-01-05 RX ADMIN — SENNOSIDES AND DOCUSATE SODIUM 2 TABLET: 50; 8.6 TABLET ORAL at 20:57

## 2024-01-05 RX ADMIN — INSULIN LISPRO 2 UNITS: 100 INJECTION, SOLUTION INTRAVENOUS; SUBCUTANEOUS at 09:04

## 2024-01-05 RX ADMIN — ESCITALOPRAM OXALATE 10 MG: 10 TABLET, FILM COATED ORAL at 09:04

## 2024-01-05 RX ADMIN — INSULIN GLARGINE 10 UNITS: 100 INJECTION, SOLUTION SUBCUTANEOUS at 20:57

## 2024-01-05 NOTE — CONSULTS
[facility] Consult Note      Patient Name: Tiana Hudson  : 1960  MRN: [id number]  Primary Care Physician:  Dean Fairchild MD  Referring Physician: No ref. provider found  Date of admission: 2024    Subjective     Reason for Consult: Abnormal renal function    HPI:   Tiana Hudson is a 63 y.o. female chronic normocytic anemia, anxiety disorder, depression disorder, osteoarthritis, diabetes mellitus type 2 since , dyslipidemia, hypertension, hypothyroidism, osteoarthritis affecting knees and lower back, seasonal allergies, patient with onset of nephrolithiasis on 10/23 that underwent cystoscopy and stent placement and resolution of her symptoms. (Her father developing nephrolithiasis in the mid 70s, her  and her daughter who is in the 20s also has nephrolithiasis ) .  Patient presents for onset of    Bilateral back pain worsening on the right flank accompanied with nausea and decreased oral intake she presented to the emerged department where she was found to have acute kidney injury with bilateral hydronephrosis.  And diabetic ketoacidosis with metabolic acidosis pseudohyponatremia that required initiation of DKA protocol patient was seen by urology underwent cystoscopy with bilateral stent placement    Patient is currently closely monitored.  Nephrology consultation was requested for the management    Review of Systems:   14 point review of systems is otherwise negative except for mentioned above on HPI    Personal History     Past Medical History:   Diagnosis Date    Anemia     intermittently    Anxiety and depression     Arthritis     Depression     Diabetes mellitus     Gallstones     High cholesterol     History of frequent urinary tract infections     HX OF YEAST IN BLADDER HAS PRN DIFLUCAN    History of transfusion 2017    Had 2 iron infusions.  This was when i had knee replacement    Hyperlipidemia     Hypertension     Hypothyroidism      Knee pain, bilateral     Low back pain     Lumbar stenosis     PONV (postoperative nausea and vomiting)     Positive TB test     chest x-ray neg    Seasonal allergies        Past Surgical History:   Procedure Laterality Date    APPENDECTOMY N/A 1982    Dr. Wilson    CHOLECYSTECTOMY WITH INTRAOPERATIVE CHOLANGIOGRAM N/A 10/31/2018    Procedure: laparoscopic cholecystectomy;  Surgeon: Mary Kay Mac MD;  Location: [facility];  Service: General    COLONOSCOPY      CYSTOSCOPY BLADDER BIOPSY N/A 10/3/2016    Procedure: CYSTOSCOPY BLADDER BIOPSY;  Surgeon: Rei Calvert MD;  Location: [facility];  Service:     CYSTOSCOPY W/ URETERAL STENT PLACEMENT Bilateral 1/3/2024    Procedure: CYSTOSCOPY BILATERAL RETROGRADE PYELOGRAM  BILATERAL URETERAL STENT INSERTION AND CATHETHER PLACEMENT;  Surgeon: Eduard Armando MD;  Location: [facility];  Service: Urology;  Laterality: Bilateral;    DILATATION AND CURETTAGE N/A     ENDOSCOPY      INTRAUTERINE DEVICE INSERTION      KNEE ARTHROSCOPY W/ MENISCAL REPAIR Left     OVARIAN CYST REMOVAL Left 1982    Dr. Wilson    TOTAL KNEE ARTHROPLASTY Right 5/22/2017    Procedure: RIGHT TOTAL KNEE ARTHROPLASTY WITH ALETA NAVIGATION;  Surgeon: Ross Cruz MD;  Location: [facility];  Service:        Family History: family history includes Arthritis in her paternal grandmother; Breast cancer in her mother; COPD in her maternal grandfather; Cancer in her maternal grandfather and mother; Diabetes in her father, maternal aunt, and paternal uncle; Heart disease in her father, maternal grandmother, and mother; Heart failure in her father; Hepatitis in her mother; Hyperlipidemia in her father and mother; Hypertension in her father; Stroke in her father.    Social History:  reports that she has never smoked. She has never used smokeless tobacco. She reports that she does not drink alcohol and does not use drugs.    Home Medications:  Prior to Admission medications     Medication Sig Start Date End Date Taking? Authorizing Provider   amLODIPine-benazepril (LOTREL 5-20) 5-20 MG per capsule Take 1 capsule by mouth daily.  Patient taking differently: Take 1 capsule by mouth Every Morning. 7/18/16   Dean Fairchild MD   diclofenac (VOLTAREN) 50 MG EC tablet Take 1 tablet by mouth 2 (Two) Times a Day As Needed (pain). 3/6/19   Joe Gil IV, MD   Dulaglutide 1.5 MG/0.5ML solution pen-injector Inject  under the skin into the appropriate area as directed 1 (One) Time Per Week. SUNDAY    Lucinda Willis MD   escitalopram (LEXAPRO) 10 MG tablet Take 10 mg by mouth Daily.    Lucinda Willis MD   furosemide (LASIX) 20 MG tablet Take 10 mg by mouth Daily As Needed. 2/26/18   Lucinda Willis MD   levothyroxine (SYNTHROID, LEVOTHROID) 137 MCG tablet TAKE 1 TABLET BY MOUTH EVERY DAY IN THE MORNING ON AN EMPTY STOMACH 3/25/22   Lucinda Willis MD   loratadine (CLARITIN) 10 MG tablet Take 10 mg by mouth Daily.    Lucinda Willis MD   metFORMIN (GLUCOPHAGE) 500 MG tablet Take 1 tablet by mouth 2 (Two) Times a Day With Meals. 4/7/17   Dean Fairchild MD   multivitamin (THERAGRAN) tablet tablet Take  by mouth Daily.    Lucinda Willis MD   oxybutynin XL (DITROPAN-XL) 10 MG 24 hr tablet Take 10 mg by mouth Daily.    Lucinda Willis MD   potassium chloride (K-DUR,KLOR-CON) 20 MEQ CR tablet Take 20 mEq by mouth As Needed (only when takin Lasix). 2/26/18   Lucinda Willis MD   pravastatin (PRAVACHOL) 40 MG tablet Take 1 tablet by mouth daily. 7/18/16   Dean Fairchild MD   rOPINIRole (REQUIP) 0.5 MG tablet Take 1 tablet by mouth every night at bedtime. 7/25/17   Fabiana Kamara APRN   tiZANidine (ZANAFLEX) 4 MG tablet Take 4 mg by mouth At Night As Needed for Muscle Spasms.    Lucinda Willis MD   traMADol (ULTRAM) 50 MG tablet Take 25 mg by mouth 2 (Two) Times a Day As Needed. for pain 10/26/20   Provider, MD Lucinda    uribel (URO-MP) 118 MG capsule capsule Take 1 capsule by mouth 3 (Three) Times a Day. 3/26/18   Provider, MD Lucinda   VITAMIN D PO Take  by mouth.    Provider, Lucinda, MD       Allergies:  Allergies   Allergen Reactions    Sulfa Antibiotics Hives and Rash       Objective     Vitals:   Temp:  [97.3 °F (36.3 °C)-98.3 °F (36.8 °C)] 98.3 °F (36.8 °C)  Heart Rate:  [93-96] 96  Resp:  [18-20] 18  BP: (124-127)/(69-83) 124/69    Intake/Output Summary (Last 24 hours) at 1/5/2024 1205  Last data filed at 1/5/2024 0426  Gross per 24 hour   Intake 480 ml   Output 1875 ml   Net -1395 ml       Physical Exam:   Constitutional: Awake, alert, no acute distress.  HEENT: Sclera anicteric, no conjunctival injection  Neck: Supple, no thyromegaly, no lymphadenopathy, trachea at midline, no JVD  Respiratory: Clear to auscultation bilaterally, nonlabored respiration  Cardiovascular: RRR, no murmurs, no rubs or gallops, no carotid bruit  Gastrointestinal: Positive bowel sounds, abdomen is soft, nontender and nondistended  : No palpable bladder  Musculoskeletal: No edema, no clubbing or cyanosis  Psychiatric: Appropriate affect, cooperative  Neurologic: Oriented x3, moving all extremities, normal speech and mental status  Skin: Warm and dry       Scheduled Meds:     enoxaparin, 40 mg, Subcutaneous, Q12H  escitalopram, 10 mg, Oral, Daily  fluconazole, 400 mg, Oral, Q24H  insulin glargine, 10 Units, Subcutaneous, Nightly  insulin lispro, 2-9 Units, Subcutaneous, 4x Daily AC & at Bedtime  levothyroxine, 137 mcg, Oral, Q AM  potassium phosphate, 15 mmol, Intravenous, Once  rOPINIRole, 0.5 mg, Oral, Nightly  senna-docusate sodium, 2 tablet, Oral, BID  sodium chloride, 10 mL, Intravenous, Q12H  sodium chloride, 10 mL, Intravenous, Q12H  sodium chloride, 10 mL, Intravenous, Q12H      IV Meds:   lactated ringers, 9 mL/hr, Last Rate: 9 mL/hr (01/03/24 1527)  Pharmacy to dose vancomycin,         Results Reviewed:   I have personally  reviewed the results from the time of this admission to 1/5/2024 12:05 EST     Lab Results   Component Value Date    GLUCOSE 159 (H) 01/05/2024    CALCIUM 8.9 01/05/2024     (L) 01/05/2024    K 4.1 01/05/2024    CO2 22.5 01/05/2024     01/05/2024    BUN 8 01/05/2024    CREATININE 0.85 01/05/2024    EGFRIFAFRI 83 02/23/2017    EGFRIFNONA 54 (L) 10/24/2018    BCR 9.4 01/05/2024    ANIONGAP 8.5 01/05/2024      Lab Results   Component Value Date    MG 1.7 01/05/2024    PHOS 3.0 01/05/2024    ALBUMIN 2.1 (L) 01/05/2024           Assessment / Plan       DKA (diabetic ketoacidosis)    Primary osteoarthritis of right knee    UTI (urinary tract infection)    Contact dermatitis    Vitamin D deficiency    Essential hypertension    Hyperlipidemia    Hypothyroidism    Morbid obesity with BMI of 40.0-44.9, adult    Allergic rhinitis    Back pain    Anxiety    Dense breast tissue on mammogram    JADEN (acute kidney injury)    Sepsis, unspecified organism    Lactic acidosis    UPJ (ureteropelvic junction) obstruction bilateral    Bilateral hydrocele    Bilateral hydronephrosis moderate on admission    Renal calculus on right, nonobstructinbg    Chronic liver disease      ASSESSMENT:    Acute kidney injury likely prerenal secondary to DKA with bilateral hydronephrosis.  Renal function is slowly improving avoid nephrotoxins keep MAP over 65,     Chronic bilateral hydronephrosis I was able to review images including liver ultrasound and MRI of the lumbar spine from 2016 and 2018 and she clearly has  chronic hydronephrosis .her most recent images.  Show persistent hydronephrosis  , renal cyst bilateral was seen by urology and underwent cystoscopy with bilateral stent placement .     Diabetes Mellitus type 2  w DKA.Continue insulin regimen / hypoglycemic regimen .Hypoglycemic protocol . POC glucose 4 x day .Diabetic diet    Pseudohyponatremia secondary to hyperglycemia.  Normalized    Metabolic acidosis from DKA normalized  with medical management    Chronic normocytic anemia follow H&H closely    PLAN:  -Renal function and electrolytes has significant improved with medical management  -Follow urine output, patient remains hemodynamically stable  -Continue surveillance labs    Review extensive images    Discussed with the daughter at bedside    Thank you for involving us in the care of Tiana Hudson.  Please feel free to call with any questions.    Bijan Sánchez MD  01/05/24  12:05 [facility]    [facility]  [phone]      Please note that portions of this note were completed with a voice recognition program.

## 2024-01-05 NOTE — PROGRESS NOTES
NATHALIA CAMPUZANO [facility]  INTERNAL MEDICINE  DEAN FAIRCHILD MD  [address] [place] [address]  Phone [phone] Fax [phone]  E-mail:  [email or web address]      INTERNAL MEDICINE DAILY PROGRESS NOTE  Dean Fairchild M.D.  2024            Patient Identification:  Name: Tiana Hudson  Age: 63 y.o.  Sex: female  :  1960  MRN: [id number]         Primary Care Physician: Dean Fairchild MD  LENGTH OF STAY 2 DAYS    Consults       Date and Time Order Name Status Description    2024  2:00 PM Inpatient Infectious Diseases Consult Completed     1/3/2024 10:27 AM Inpatient Urology Consult      2024 11:55 PM Urology (on-call MD unless specified) Completed     2024  3:45 PM IP General Consult (Use specialty-specific consult if known) Completed             Chief Complaint: Abdominal/flank pain with DKA, acute cystitis and sepsis, and possible calyceal rupture in the kidney     History of Present Illness:     Subjective   Interval History: Patient is a 63 y.o.female who presented at my request to the emergency room at [facility] with complaint of acute abdominal/flank pain and history of recurrent kidney stones.  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  Patient has been followed for the last few months by Dr. Rei Calvert in urology for renal calculi and actually has had a stent placed in the right kidney due to obstruction from her renal stones.  It is also noted that the patient had a recent lithotripsy.  Patient began to feel poorly after the recent Shrey[place] holiday and became more severely ill over the last 3 to 4 days prior to this admission.  She has felt extremely fatigued, dizzy at  times, tired, nauseated, and has spent most of her time in bed sleeping.  Family has had difficulty getting the patient to take any oral intake and became quite concerned as her condition continued to worsen.  Patient was attempting to go to work today but really was too dizzy to get in the car to drive and 2-week to actually work.  After urging from her daughter, patient called me with respect to the symptoms and at my request went to the ER for evaluation.  Patient has multiple medical comorbidities that complicate her medical care.These include most significantly recurrent urinary tract infections due to her nephrolithiasis, morbid obesity with BMI greater than 40, diabetes mellitus type 2 poorly controlled with recent addition of Ozempic to her metformin therapy, vitamin D deficiency, essential hypertension, hyperlipidemia, hypothyroidism, allergic rhinitis, back pain, contact dermatitis, anxiety, and history of dense breast tissue on mammograms.     Patient was evaluated in the emergency room by Angelito Winkler MD who was the ER attending on call time of her arrival.  Patient was seen on 1/2/2024 at 1544 by Dr. Winkler.  His HPI was consistent with the story which I given above.  Patient denied dysuria, fever, chills on his evaluation.  She did admit to having emesis and actually had some emesis while in the emergency room.  Review of systems in the emergency room was positive for diffuse abdominal pain, and vomiting.  Patient also was noted to have some significant flank pain.  All other systems reviewed were negative in the emergency room vital signs on arrival in the emergency room showed temperature of 96 °F, heart rate of 114, respiratory rate of 16, blood pressure 125/72, and O2 sat of 98%.  Patient was described as ill-appearing but in no acute distress.  She did have severe tenderness to palpation more on the right side of the abdomen and out into the right flank area with voluntary guarding.  Labs  collected in the ER showed that her lactate level was elevated at 6.0.  She had a lipase of 23, her white blood cell count was 20.12, her hemoglobin was 11.2, and her platelet count was 568,000.  Patient did have a phosphorus of 4.2, magnesium 2.0, osmolality of 317, hemoglobin A1c is 16.90, and UA showed specific gravity 1.027, 3+ glucose, moderate blood 2+, leukocyte esterase moderate 2+, nitrates negative, white blood cells 6-10, red blood cells 6-10, bacteria 2+.  Her CMP showed a glucose of 978, creatinine of 1.82, sodium of 115, potassium of 4.2, chloride of 76, CO2 of 15.7, albumin 2.4, AST 33, alk phos 220, anion gap of 23, EGFR of 31.3.  Patient did have a CT scan of her abdomen and pelvis completed which showed loculated fluid throughout the right perinephric space felt to possibly be secondary to right calyceal rupture, new mild to moderate wall thickening of the right renal pelvis and renal calyces, bilateral UPJ obstruction with moderate bilateral hydronephrosis on the left, mild new dilation of right ureter, nonobstructing right renal calculi, hepatic morphology suggestive of chronic liver disease, and reactive lymph nodes in the retrocaval area.  A chest x-ray was done which showed no acute disease other than minimal atelectasis at the base of the right lung.  Patient underwent aggressive fluid resuscitation in the emergency room and received over 3 L of IV fluid.  She was placed on an insulin drip and was also started on Zosyn therapy.  She was followed on sepsis protocol as well as DKA protocol.  Patient did receive morphine 4 mg for pain x 2.  Case was discussed with myself as her primary care attending.  Urology was also contacted about situation.  I did suggest that patient be admitted to the ICU for treatment of the DKA and severe sepsis picture.  Dr. Arthur Tena did agree to the ICU admission and will be following her in the CCU for pulmonary/critical care.  Final diagnoses in the ER were  sepsis due to unspecified organism and acute cystitis without significant hematuria.     1/2/2024.  I personally saw the patient for the first time during this hospitalization on this date in the coronary care unit room #335.  Patient was resting quietly in bed and did a peer acutely ill.  She did wake to her name and spoke a few words before falling back asleep.  Patient's 2 daughters were at the foot of her bed and did discuss the case at length with me.  Daughter Enoch, is a former nurse from here at [employer], and now works at [employer] with the hospitalist group there.  Patient has responded well to the Glucommander DKA protocols and blood sugars have gradually come down to near normal levels for the patient in the 1  range.  I will full PPE for the exam including an N95 face mask, goggles, white lab coat, and gloves when touching patient.  I performed thorough hand hygiene before and after the patient visit.  Patient did have a venous blood gas which showed pH 7.40, pCO2 28, pO2 of 31, and O2 sat of 62%.  She also had recent electrolytes which showed a sodium of 131, potassium 3.4, chloride 96, CO2 19.1, BUN 17, creatinine 1.15, estimated GFR now up to 54.3.  Lactate level has been coming steadily down and currently at 2.8.  Additional labs ordered for tomorrow a.m.  Patient has been able to urinate several times and daughters have helped her up to the bathroom for this.  I actually note that she did have some urinary frequency and incontinence at home which is unusual for her.  I did speak briefly to the patient's daytime nurse who was departing soon after I arrived on the floor.  I have reviewed Dr. Arthur Tena's notes and his admission history and physical.  I do agree completely with his plan of care at present time.  Supportive care is continued to be offered for the sepsis with careful monitoring in the ICU.  Patient's anion gap metabolic acidosis probably is related to lactic acidosis and she  was continuing to take metformin even when she was not able to eat.  IV fluids are continued aggressively.  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  Probably will plan sliding scale insulin for stabilization of diabetes while here in the hospital and will train patient to continue that in the home environment.  Pseudohyponatremia will be corrected with the IV fluids.  Hypothyroidism will be addressed with continued Synthroid.  Statins are held for now.  Obesity is an ongoing problem for the patient.  At the time my exam, patient is medically stable and slowly improving.  I did relay this to the daughter to agree with my assessment.  We will continue to follow the patient with you and we will be happy to assume care of the patient when she is stable and ready to be transferred out of the ICU.  I can be reached directly for any concerns or questions at [phone].    1/3/2024.  Patient is seen again today in her room in the CCU #335.  Patient was resting quietly in bed at the time of my visit and states that her daughters were downstairs getting a bite to eat.  I did discuss the case with the patient's nurse, Tiana, who tells me that she is being preop and should be going down soon for cystoscopy and stent placement.  I will full PPE for the exam including an N95 face mask, goggles, white lab coat, and gloves when touching patient.  I performed thorough hand hygiene before and after the patient visit.  Patient is more alert today but still somewhat confused about details of her medical case.  She is able to carry on a conversation with me though and joke a bit with me while I am present in the room.  She does understand she has issues because of the obstruction bilaterally of her ureters that has resulted in hydronephrosis and hopefully can be relieved by the cystoscopy that is planned for later this morning.  Patient is still  on an insulin drip but is drastically improved compared to where she was yesterday at this time.  Review of labs shows that her sodium is now up to 133, her CO2 is 19.1, glucose is now at 134, phosphorus is at 2.1, lactate is normalized at 1.9, white blood cell count today is 18.93, hemoglobin is 9.6 today, lakelets are normal today,Blood culture from the first day of hospitalization shows anaerobic bottle gram-positive bacilli growth with identification still ongoing.  Fungal and body fluid cultures are still negative.  Urine culture remains negative.  Dr. Oquendo was following the patient for critical care today.  He has continued her antibiotics and is awaiting urology input.  Fluid resuscitation is continued.  Blood pressure now seems well-controlled.  DVT prophylaxis in place.  He had a lengthy discussion with patient's family at the time of his rounds.  Urology has seen the patient in consultation and is planning to do cystoscopy later today.  Dietitian is following the patient's case and recommending input treatment as needed.Dr. Armando did perform a cystoscopy with ureteral catheterization and stent insertion bilaterally.  His pre-op diagnosis and postop diagnoses were urosepsis with bilateral UPJ obstruction.  He did feel that he had successfully decompressed the blockages and the hydronephrotic changes.  He did speak with patient's daughter postoperatively.  Patient does seem to be much improved today.  Vital signs still show no fever.  Blood pressure is relatively stable.  Will continue to follow with you.    1/4/2024.  Patient is seen again today in her room in the CCU #335.  Patient has transitions of critical care and is in overflow for care on the floor.  Patient's daughter is present at bedside at the time of my visit.  Patient is much more awake and alert today.  She is more interactive and talkative.  I did discuss the case with the patient's nurse.  We are going to obtain an ID consult today for  help with antibiotic management and we also are going to transition the patient to regular sliding scale insulin as we begin the teaching process of getting patient on her own regimen of sliding scale insulin at the time of discharge to home.  We have also started patient on Lantus therapy at 10 units for now and may need to titrate up.  I wore full PPE for the exam today including an N95 face mask, goggles, white lab coat, and gloves when touching patient.  I performed thorough hand hygiene before and after the patient visit.  Specialist have seen patient today and their care is summarized below.Dr. Najera, the critical care attending today, felt that her mentation was slow but answering questions appropriately.  No other major findings were discovered at present time.  Antibiotics were continued for sepsis was felt to be significantly improved with anion gap now closed and lactic acidosis much better.  IV antibiotics were continued awaiting final culture results and I did consult ID to see patient for their input on the situation.  Dr. Devon Armando who completed bilateral stent placement for bilateral OP J obstruction and urosepsis is pleased with progress on day 1 postop.  He notes that urine output has been excellent.  Dr. Us from infectious disease did see the patient in consultation.  He notes that Zosyn antibiotics have been discontinued by Dr. Armando after her successful surgery.  White blood cell count is down to 17.08 today with hematocrit of 30.7.  Glucose max was 250 today estimated GFR is up to 54 and CO2 is 19.0.  He feels the significance of the positive blood culture is unclear at this point with only 1 of 2 bottles positive for gram-positive bacilli which normally would represent a contamination.  He is planning to repeat the blood cultures and will follow-up.  Urine culture was also negative except for yeast growth and due to the recent  instrumentation he has started the patient on  fluconazole 400 mg daily while cultures are pending.  New blood culture has been sent.  We will check again the lactic acid, procalcitonin, sed rate, and CRP with a.m. labs tomorrow.  Labs relatively stable today though I do note that sodium is dropped a bit to 127.  CO2 remains slightly low at 18.0 and chloride slightly low at 97.  Patient's glucoses running in the upper 100s and low 200s at present.  Electrolytes otherwise seem fairly stable.  White count is still elevated somewhat at 17.08 despite stenting yesterday.  Patient currently off antibiotics and being followed carefully by ID.  Plan to check procalcitonin and repeat blood culture with a.m. labs.  Hemoglobin is at 10.0.  Otherwise patient appears very stable at present time.  I do agree that her mentation is a little bit slower than usual but I suspect this will continue to improve as we get further from her acute DKA event.  Treatment plan reviewed with patient and her daughter today.        Review of Systems:               Review of systems could not be obtained due to  patient confusion. patient nonverbal.       Past Medical History:   Diagnosis Date    Anemia     intermittently    Anxiety and depression     Arthritis     Depression     Diabetes mellitus     Gallstones     High cholesterol     History of frequent urinary tract infections     HX OF YEAST IN BLADDER HAS PRN DIFLUCAN    History of transfusion 05/24/2017    Had 2 iron infusions.  This was when i had knee replacement    Hyperlipidemia     Hypertension     Hypothyroidism     Knee pain, bilateral     Low back pain     Lumbar stenosis     PONV (postoperative nausea and vomiting)     Positive TB test     chest x-ray neg    Seasonal allergies      Past Surgical History:   Procedure Laterality Date    APPENDECTOMY N/A 1982    Dr. Wilson    CHOLECYSTECTOMY WITH INTRAOPERATIVE CHOLANGIOGRAM N/A 10/31/2018    Procedure: laparoscopic cholecystectomy;  Surgeon: Mary Kay Mac MD;  Location: [facility]  MAIN OR;  Service: General    COLONOSCOPY      CYSTOSCOPY BLADDER BIOPSY N/A 10/3/2016    Procedure: CYSTOSCOPY BLADDER BIOPSY;  Surgeon: Rei Calvert MD;  [place] [facility] [place];  Service:     CYSTOSCOPY W/ URETERAL STENT PLACEMENT Bilateral 1/3/2024    Procedure: CYSTOSCOPY BILATERAL RETROGRADE PYELOGRAM  BILATERAL URETERAL STENT INSERTION AND CATHETHER PLACEMENT;  Surgeon: Eduard Armando MD;  Location: [facility] OR;  Service: Urology;  Laterality: Bilateral;    DILATATION AND CURETTAGE N/A     ENDOSCOPY      INTRAUTERINE DEVICE INSERTION      KNEE ARTHROSCOPY W/ MENISCAL REPAIR Left     OVARIAN CYST REMOVAL Left     Dr. Wilson    TOTAL KNEE ARTHROPLASTY Right 2017    Procedure: RIGHT TOTAL KNEE ARTHROPLASTY WITH ALETA NAVIGATION;  Surgeon: Ross Cruz MD;  Location[place] [facility];  Service:      Allergies   Allergen Reactions    Sulfa Antibiotics Hives and Rash       Family History   Problem Relation Age of Onset    Hepatitis Mother     Breast cancer Mother     Heart disease Mother     Cancer Mother     Hyperlipidemia Mother              Heart failure Father     Stroke Father     Hypertension Father         Since he was 72, now 86s    Diabetes Father     Heart disease Father     Hyperlipidemia Father         Unsurs    Heart disease Maternal Grandmother     Cancer Maternal Grandfather     COPD Maternal Grandfather     Arthritis Paternal Grandmother     Diabetes Maternal Aunt     Diabetes Paternal Uncle     Malig Hyperthermia Neg Hx        Social History     Socioeconomic History    Marital status:    Tobacco Use    Smoking status: Never    Smokeless tobacco: Never    Tobacco comments:     Never smoked   Vaping Use    Vaping Use: Never used   Substance and Sexual Activity    Alcohol use: Never     Alcohol/week: 3.0 standard drinks of alcohol     Types: 1 Glasses of wine, 2 Standard drinks or equivalent per week    Drug use: Never    Sexual activity: Not  "Currently     Partners: Male     Birth control/protection: I.U.D.       PMH, FH, SH and ROS completed with Admission History and Physical and updated in EPIC system.        Objective     Scheduled Meds:enoxaparin, 40 mg, Subcutaneous, Q12H  escitalopram, 10 mg, Oral, Daily  fluconazole, 400 mg, Oral, Q24H  insulin glargine, 10 Units, Subcutaneous, Nightly  insulin lispro, 2-9 Units, Subcutaneous, 4x Daily AC & at Bedtime  levothyroxine, 137 mcg, Oral, Q AM  potassium phosphate, 15 mmol, Intravenous, Once  rOPINIRole, 0.5 mg, Oral, Nightly  senna-docusate sodium, 2 tablet, Oral, BID  sodium chloride, 10 mL, Intravenous, Q12H  sodium chloride, 10 mL, Intravenous, Q12H  sodium chloride, 10 mL, Intravenous, Q12H      Continuous Infusions:lactated ringers, 9 mL/hr, Last Rate: 9 mL/hr (01/03/24 1527)        Vital signs in last 24 hours:  Temp:  [97.3 °F (36.3 °C)-97.5 °F (36.4 °C)] 97.3 °F (36.3 °C)  Heart Rate:  [] 93  Resp:  [14-20] 18  BP: (105-127)/(52-87) 124/69    Intake/Output:    Intake/Output Summary (Last 24 hours) at 1/4/2024 2358  Last data filed at 1/4/2024 1713  Gross per 24 hour   Intake 720 ml   Output 1175 ml   Net -455 ml       Exam:  /69 (BP Location: Left arm, Patient Position: Lying)   Pulse 93   Temp 97.3 °F (36.3 °C)   Resp 18   Ht 165.1 cm (65\")   Wt 111 kg (245 lb 6 oz)   LMP 09/03/2016 Comment: IUD  SpO2 91%   BMI 40.83 kg/m²     Constitutional:  Alert, cooperative, a bit slow to answer questions, mild distress, AAOx3, resting comfortably, mentation is continuing to today with lower blood sugar levels and sepsis under better control.   Head:      Normocephalic, without obvious abnormality, atraumatic   Eyes:     PERRLA, conjunctiva/corneas clear, no icterus, no conjunctival                                     pallor, EOM's intact, both eyes      ENT and Mouth: Lips, tongue, gums normal; oral mucosa pink and moist   Neck:     Supple, symmetrical, trachea midline, no " JVD  Respiratory:     Clear to auscultation bilaterally, respirations unlabored  Cardiovascular:  Regular rate and rhythm, S1 and S2 normal, no murmur,      no  Rub or gallop.  Pulses normal.    Gastrointestinal:   BS present x 4 Soft, non-tender, bowel sounds active,      no masses, no hepatosplenomegaly                                                     :       No hernia.  Normal exam for sex.         Musculoskeletal: Extremities normal, atraumatic, no cyanosis or edema     No arthropathy.  No deformity.  Gait normal                                                 Skin:   Skin is warm and dry,  no rashes, swelling or palpable lesions   Neurologic:  CN -XII intact, motor strength grossly intact, sensation grossly intact to light touch, no focal reflex deficits noted    Psychiatric:     Alert,oriented X3, no delusions, psychoses, depression or anxiety    Heme/Lymph/Imun:   No bruises, petechiae.  Lymph nodes normal in size/configuration       Data Review:  Lab Results   Component Value Date    CALCIUM 8.7 01/04/2024    PHOS 2.5 01/04/2024     Results from last 7 days   Lab Units 01/04/24  1615 01/04/24  1205 01/04/24  1059 01/04/24  0345 01/03/24  0839 01/03/24  0409 01/02/24  2036 01/02/24  1816 01/02/24  1629 01/02/24  1240   AST (SGOT) U/L  --   --   --   --   --   --   --   --  21 33*   ALT (SGPT) U/L  --   --   --   --   --   --   --   --  16 16   MAGNESIUM mg/dL 1.8 1.8 1.9 1.8   < > 1.8   < >  --  1.9 2.0   SODIUM mmol/L 127* 130* 128* 135*   < > 133*   < >  --  125* 115*   POTASSIUM mmol/L 4.1 3.9 3.9 4.1   < > 4.3   < >  --  3.9 4.2   CHLORIDE mmol/L 97* 98 101 103   < > 100   < >  --  91* 76*   CO2 mmol/L 18.0* 19.0* 18.6* 19.7*   < > 20.9*   < >  --  15.7* 15.7*   BUN mg/dL 10 12 11 12   < > 16   < >  --  19 23   CREATININE mg/dL 0.84 0.89 0.92 0.94   < > 1.05*   < >  --  1.33* 1.82*   GLUCOSE mg/dL 221* 250* 227* 167*   < > 186*   < > 477* 695* 978*   CALCIUM mg/dL 8.7 8.5* 8.7 8.7   < > 8.5*   < >   --  8.7 9.4   WBC 10*3/mm3  --   --   --  17.08*  --  18.93*  --  17.30*  --   --    HEMOGLOBIN g/dL  --   --   --  10.0*  --  9.6*  --  10.5*  --   --    PLATELETS 10*3/mm3  --   --   --  446  --  441  --  480*  --   --     < > = values in this interval not displayed.     Lab Results   Component Value Date    TROPONINT <6 01/02/2024     Estimated Creatinine Clearance: 87.3 mL/min (by C-G formula based on SCr of 0.84 mg/dL).  WEIGHTS:     Wt Readings from Last 1 Encounters:   01/04/24 0600 111 kg (245 lb 6 oz)   01/03/24 0600 111 kg (245 lb 6 oz)   01/02/24 1131 120 kg (265 lb)         Assessment:    DKA (diabetic ketoacidosis)    UTI (urinary tract infection)    Morbid obesity with BMI of 40.0-44.9, adult    JADEN (acute kidney injury)    Sepsis, unspecified organism    Lactic acidosis    UPJ (ureteropelvic junction) obstruction bilateral    Bilateral hydronephrosis moderate on admission    Renal calculus on right, nonobstructinbg    Vitamin D deficiency    Essential hypertension    Hyperlipidemia    Hypothyroidism    Allergic rhinitis    Back pain    Chronic liver disease    Primary osteoarthritis of right knee    Contact dermatitis    Anxiety    Dense breast tissue on mammogram    Bilateral hydrocele      Attending Physician Assessment and Plan:    1.  Cystitis/UTI with sepsis associated with bilateral UPJ obstruction and moderate bilateral hydronephrosis and possible right calyceal rupture.  Patient resuscitated aggressively with IV fluids.  Broad-spectrum antibiotics, Zosyn, started.  Urology consult is placed.  Patient being followed carefully in ICU for additional complications or signs of worsening sepsis.  Culture of urine remains negative on day #2.  New cultures collected at time of cystoscopy today by urology.  Patient currently off Zosyn which was stopped after surgery yesterday.  We will repeat blood cultures since we did have 1 of 2 initial blood cultures positive.  ID has been consulted and is  following with us.  They have started the patient on fluconazole secondary to the instrumentation yesterday and yeast that is seen in hearing.  I do plan to check a procalcitonin level with a.m. labs tomorrow.     2.  Anion gap metabolic acidosis versus possible diabetic ketoacidosis.  Improving rapidly at present time on DKA protocol with Glucomander.  Aggressive fluid resuscitation initiated in the ER is continued in the ICU.  Patient now with good urinary output and hopefully will be able to come off of IV insulin and switch to subcu dosing overnight..  Anion gap has narrowed and improved.  Patient should come off Glucomander DKA protocol sometime later today.  On postop day #1 anion gap has significantly improved.  We are asking renal to help us with follow-up on her acute kidney injury and volume status.     3.  Lactic acidosis possibly due to home use of metformin while taking no food or possibly due to acute sepsis.  Continuing rapid fluid resuscitation with improvement already noted in lactic acid.  Monitoring carefully for signs of any new changes or problems.  Lactic acidosis has also resolved on day #2 and patient's sepsis seems to be under much more stable condition at present time.  Repeat lactic acid in a.m. with other labs.      4.  Type 2 diabetes mellitus poorly controlled at present time with elevated A1c greater than 16.  Will probably need to arrange for follow-up with patient in endocrinology clinic.  For now we will ask diabetic educator to see patient and work on sliding scale insulin protocols.  Patient and her daughter are interested in Dexcom monitoring and I will see if that is available from the diabetic educator.  Discussed situation with the daughters and she may benefit from continuous 24-hour glucose monitoring issues willing to learn the process.  Will probably need to discharge charge on sliding scale insulin and may also consider mealtime insulin or long acting insulin.  Diabetic  educator to see patient and work with us on diet, glucose testing techniques, and generalized diabetes management.     5.  Pseudohyponatremia.  Seems to already be improving with resuscitation and resolution of the uncontrolled glucoses.  Will continue to follow electrolytes regularly.  Hyponatremia now corrected and in normal range.  Hyponatremia has now resolved on day #2     6.  Hypothyroidism.  Patient's Synthroid is continued by the ICU attending.  Will continue to follow this in the hospital and on an outpatient basis.  Hypothyroidism is a stable condition     7.  Hyperlipidemia.  Agree with holding statin for now.  Will resume prior to discharge and continue on outpatient basis.     8.  Obesity.  Hope to continue using agents such as Ozempic for management of patient's blood sugar levels.  This will also help with diet control and ongoing need for gradual weight loss.     9.  Acute kidney injury secondary to volume depletion with uncontrolled glucoses.  Should improve as patient's volume status normalizes.  Will continue to monitor closely.  Acute kidney injury is gradually improving with resolution of hydronephrosis and bilateral stent placement.     10.  Generalized muscle weakness and fatigue.  Suspect related to the poorly controlled diabetes and electrolyte disturbances.  Will do PT and OT evaluations after patient stabilizes.  Suspect patient will discharge to home with PT and OT and home environment.  Hopefully patient will be able to work with PT and OT in the next few days.        Plan for disposition:Where: home and home health and When:  3-4 days when medically stable     Copied text in this note has been reviewed by me and is accurate as of 01/04/2024.  Much of this dictation was completed using Dragon voice activated transcription software which can result in misspelled words and nonsensical phrases at times.         Dean Fairchild MD  1/4/2024  1230 EST

## 2024-01-05 NOTE — PROGRESS NOTES
LOS: 3 days     Chief Complaint: Antibiotic management    Interval History: Patient reports she is continuing to feel better.  Does have minimal appetite today.  Denies any fevers or chills.  Tolerating antibiotics.    Vital Signs  Temp:  [97.3 °F (36.3 °C)-97.8 °F (36.6 °C)] 97.8 °F (36.6 °C)  Heart Rate:  [93-97] 96  Resp:  [18-20] 18  BP: (105-127)/(61-83) 124/69    Physical Exam:  General: In no acute distress  HEENT: Oropharynx clear, moist mucous membranes  Respiratory: Normal work of breathing  Skin: No rashes or lesions in exposed areas  Extremities: No edema, cyanosis  Access: Peripheral IV    Antibiotics:  Anti-Infectives (From admission, onward)      Ordered     Dose/Rate Route Frequency Start Stop    01/05/24 0756  vancomycin 2250 mg/500 mL 0.9% NS IVPB ([facility])        Ordering Provider: Jorge L Us DO    20 mg/kg × 111 kg  over 135 Minutes Intravenous Once 01/05/24 0845      01/05/24 0750  Pharmacy to dose vancomycin        Ordering Provider: Jorge L Us DO     Does not apply Continuous PRN 01/05/24 0750 01/12/24 0749    01/04/24 1448  fluconazole (DIFLUCAN) tablet 400 mg        Ordering Provider: Jorge L Us DO    400 mg Oral Every 24 Hours 01/04/24 1700 01/11/24 1659    01/02/24 1636  piperacillin-tazobactam (ZOSYN) 4.5 g in iso-osmotic dextrose 100 mL IVPB (premix)        Ordering Provider: Arthur Najera MD    4.5 g  over 30 Minutes Intravenous Once 01/02/24 1652 01/02/24 1735    01/02/24 1243  cefTRIAXone (ROCEPHIN) 2,000 mg in sodium chloride 0.9 % 100 mL IVPB-VTB        Ordering Provider: Angelito Winkler MD    2,000 mg  200 mL/hr over 30 Minutes Intravenous Once 01/02/24 1259 01/02/24 1414             Results Review:     I reviewed the patient's new clinical results.    Lab Results   Component Value Date    WBC 13.04 (H) 01/05/2024    HGB 10.3 (L) 01/05/2024    HCT 32.5 (L) 01/05/2024    MCV 88.1 01/05/2024     01/05/2024     Lab Results   Component  Value Date    GLUCOSE 159 (H) 01/05/2024    BUN 8 01/05/2024    CREATININE 0.85 01/05/2024    EGFRIFNONA 54 (L) 10/24/2018    EGFRIFAFRI 83 02/23/2017    BCR 9.4 01/05/2024    CO2 22.5 01/05/2024    CALCIUM 8.9 01/05/2024    PROTENTOTREF 6.9 02/23/2017    ALBUMIN 2.1 (L) 01/05/2024    LABIL2 1.7 02/23/2017    AST 34 (H) 01/05/2024    ALT 19 01/05/2024     Microbiology:  1/2 urine culture no growth  1/2 blood cultures 2 out of 2 gram-positive bacilli  1/3 right renal fluid culture in process  1/3 left renal fluid culture in process    Assessment    #Right calyceal rupture  #Bilateral hydronephrosis status post bilateral ureteral stenting  #JADEN  #DKA  #Hyponatremia  #Morbid obesity, BMI 40  # Gram-positive bacteremia    Repeat blood cultures in process.  Blood cultures now positive 2 out of 2 for gram-positive bacilli which is more consistent with true infection.  Will start vancomycin goal -600 while following up ID and susceptibilities.  Will also continue fluconazole 400 mg daily while following up cultures from perinephric fluid.      ID will follow.

## 2024-01-05 NOTE — THERAPY EVALUATION
Patient Name: Tiana Hudson  : 1960    MRN: [id number]                              Today's Date: 2024       Admit Date: 2024    Visit Dx:     ICD-10-CM ICD-9-CM   1. Sepsis, due to unspecified organism, unspecified whether acute organ dysfunction present  A41.9 038.9     995.91   2. Acute cystitis without hematuria  N30.00 595.0   3. UPJ (ureteropelvic junction) obstruction  N13.5 593.4     Patient Active Problem List   Diagnosis    Primary osteoarthritis of right knee    UTI (urinary tract infection)    Varicose veins    Contact dermatitis    Elevated liver function tests    Leg cramp    Medial collateral ligament sprain of knee    Vitamin D deficiency    Essential hypertension    Hyperlipidemia    Abrasion    Hypothyroidism    Gestational diabetes    Morbid obesity with BMI of 40.0-44.9, adult    Allergic rhinitis    Back pain    Anxiety    H/O Postconcussion syndrome    H/O dizziness    Allergy    Vaginal candidiasis    Chronic pain of left knee    Arthritis of both knees    Inflammation of bladder severe, acute and chronic, with mucosal erosions/ulcers    Dense breast tissue on mammogram    Dizziness    Restless leg syndrome    Type 2 diabetes mellitus without complication    Arthritis of right knee    Status post total right knee replacement    Arthritis of left knee    History of total knee arthroplasty    Acquired spondylolisthesis    Screening for colorectal cancer    Precordial pain    FH ischemic heart disease    DKA (diabetic ketoacidosis)    JADEN (acute kidney injury)    Sepsis, unspecified organism    Lactic acidosis    UPJ (ureteropelvic junction) obstruction bilateral    Bilateral hydrocele    Bilateral hydronephrosis moderate on admission    Renal calculus on right, nonobstructinbg    Chronic liver disease     Past Medical History:   Diagnosis Date    Anemia     intermittently    Anxiety and depression     Arthritis     Depression     Diabetes mellitus     Gallstones     High  cholesterol     History of frequent urinary tract infections     HX OF YEAST IN BLADDER HAS PRN DIFLUCAN    History of transfusion 05/24/2017    Had 2 iron infusions.  This was when i had knee replacement    Hyperlipidemia     Hypertension     Hypothyroidism     Knee pain, bilateral     Low back pain     Lumbar stenosis     PONV (postoperative nausea and vomiting)     Positive TB test     chest x-ray neg    Seasonal allergies      Past Surgical History:   Procedure Laterality Date    APPENDECTOMY N/A 1982    Dr. Wilson    CHOLECYSTECTOMY WITH INTRAOPERATIVE CHOLANGIOGRAM N/A 10/31/2018    Procedure: laparoscopic cholecystectomy;  Surgeon: Mary Kay Mac MD;  Location: [facility] OR;  Service: General    COLONOSCOPY      CYSTOSCOPY BLADDER BIOPSY N/A 10/3/2016    Procedure: CYSTOSCOPY BLADDER BIOPSY;  Surgeon: Rei Calvert MD;  Location: [facility] OR;  Service:     CYSTOSCOPY W/ URETERAL STENT PLACEMENT Bilateral 1/3/2024    Procedure: CYSTOSCOPY BILATERAL RETROGRADE PYELOGRAM  BILATERAL URETERAL STENT INSERTION AND CATHETHER PLACEMENT;  Surgeon: Eduard Armando MD;  Location: [facility] OR;  Service: Urology;  Laterality: Bilateral;    DILATATION AND CURETTAGE N/A     ENDOSCOPY      INTRAUTERINE DEVICE INSERTION      KNEE ARTHROSCOPY W/ MENISCAL REPAIR Left     OVARIAN CYST REMOVAL Left 1982    Dr. Wilson    TOTAL KNEE ARTHROPLASTY Right 5/22/2017    Procedure: RIGHT TOTAL KNEE ARTHROPLASTY WITH ALETA NAVIGATION;  Surgeon: Ross Cruz MD;  Location: [facility];  Service:       General Information       Row Name 01/05/24 1613          Physical Therapy Time and Intention    Document Type evaluation  -     Mode of Treatment individual therapy;physical therapy  -       Row Name 01/05/24 1613          General Information    Prior Level of Function independent:;gait;transfer;bed mobility  no AD at baseline  -     Existing Precautions/Restrictions fall  -     Barriers to Rehab none  identified  -       Row Name 01/05/24 1613          Living Environment    People in Home spouse  -       Row Name 01/05/24 1613          Home Main Entrance    Number of Stairs, Main Entrance four  -       Row Name 01/05/24 1613          Cognition    Orientation Status (Cognition) oriented x 4  -       Row Name 01/05/24 1613          Safety Issues, Functional Mobility    Impairments Affecting Function (Mobility) endurance/activity tolerance;strength;pain  -               User Key  (r) = Recorded By, (t) = Taken By, (c) = Cosigned By      Initials Name Provider Type     Brandi Schmid, PT Physical Therapist                   Mobility       Row Name 01/05/24 1618          Bed Mobility    Bed Mobility supine-sit;sit-supine  -     Supine-Sit Boyle (Bed Mobility) verbal cues;nonverbal cues (demo/gesture);minimum assist (75% patient effort)  -     Sit-Supine Boyle (Bed Mobility) verbal cues;nonverbal cues (demo/gesture);minimum assist (75% patient effort)  -     Assistive Device (Bed Mobility) bed rails;head of bed elevated  -       Row Name 01/05/24 1618          Sit-Stand Transfer    Sit-Stand Boyle (Transfers) verbal cues;nonverbal cues (demo/gesture);minimum assist (75% patient effort)  -     Assistive Device (Sit-Stand Transfers) walker, front-wheeled  -     Comment, (Sit-Stand Transfer) pt unable to stand fully erect secondary R side pain when attempting to extend hips and back  -       Row Name 01/05/24 1618          Gait/Stairs (Locomotion)    Boyle Level (Gait) verbal cues;nonverbal cues (demo/gesture);minimum assist (75% patient effort)  -     Assistive Device (Gait) walker, front-wheeled  -     Distance in Feet (Gait) 4 sidesteps to HOB  -     Deviations/Abnormal Patterns (Gait) sinai decreased;gait speed decreased;stride length decreased  -               User Key  (r) = Recorded By, (t) = Taken By, (c) = Cosigned By      Initials Name Provider  Type    Brandi Jurado, PT Physical Therapist                   Obj/Interventions       Row Name 01/05/24 1626          Range of Motion Comprehensive    General Range of Motion no range of motion deficits identified  -[facility] Name 01/05/24 1626          Strength Comprehensive (MMT)    Comment, General Manual Muscle Testing (MMT) Assessment generalized weakness noted with functional mobility  -[facility] Name 01/05/24 1626          Balance    Static Standing Balance verbal cues;non-verbal cues (demo/gesture);minimal assist  -CH     Dynamic Standing Balance verbal cues;non-verbal cues (demo/gesture);minimal assist  -CH     Position/Device Used, Standing Balance walker, rolling  -CH               User Key  (r) = Recorded By, (t) = Taken By, (c) = Cosigned By      Initials Name Provider Type    Brandi Jurado, PT Physical Therapist                   Goals/Plan       Row Name 01/05/24 1631          Bed Mobility Goal 1 (PT)    Activity/Assistive Device (Bed Mobility Goal 1, PT) bed mobility activities, all  -CH     [place]live Level/Cues Needed (Bed Mobility Goal 1, PT) supervision required  -CH     Time Frame (Bed Mobility Goal 1, PT) 1 week  -[facility] Name 01/05/24 1631          Transfer Goal 1 (PT)    Activity/Assistive Device (Transfer Goal 1, PT) transfers, all;walker, rolling  -CH     Olive Level/Cues Needed (Transfer Goal 1, PT) supervision required  -CH     Time Frame (Transfer Goal 1, PT) 1 week  -[facility] Name 01/05/24 1631          Gait Training Goal 1 (PT)    Activity/Assistive Device (Gait Training Goal 1, PT) gait (walking locomotion);walker, rolling  -CH     Olive Level (Gait Training Goal 1, PT) supervision required  -CH     Distance (Gait Training Goal 1, PT) 150  -CH     Time Frame (Gait Training Goal 1, PT) 1 week  -[facility] Name 01/05/24 1631          Therapy Assessment/Plan (PT)    Planned Therapy Interventions (PT) balance training;bed mobility  training;gait training;home exercise program;patient/family education;strengthening;transfer training  -               User Key  (r) = Recorded By, (t) = Taken By, (c) = Cosigned By      Initials Name Provider Type    Brandi Jurado, PT Physical Therapist                   Clinical Impression       Row Name 01/05/24 1627          Pain    Pretreatment Pain Rating 6/10  -CH     Posttreatment Pain Rating 6/10  -     Pain Location - Side/Orientation Right  -     Pain Location - abdomen  -     Pain Intervention(s) Repositioned  -       Row Name 01/05/24 1627          Plan of Care Review    Plan of Care Reviewed With patient  -     Outcome Evaluation Pt is a 62 yo F who is post-op ureteral stents and catheter placement. Pt presents to PT with impaired functional mobility and gait secondary to generalized weakness, impaired balance, and decreasd activity tolerance. Pt may benefit from skilled PT to address strength, mobility, and gait.  -       Row Name 01/05/24 1627          Therapy Assessment/Plan (PT)    Patient/Family Therapy Goals Statement (PT) to return to [facility]  -     Rehab Potential (PT) good, to achieve stated therapy goals  -     Criteria for Skilled Interventions Met (PT) skilled treatment is necessary  -     Therapy Frequency (PT) 6 times/wk  -       Row Name 01/05/24 1627          Positioning and Restraints    Pre-Treatment Position in bed  -     Post Treatment Position bed  -     In Bed supine;call light within reach;encouraged to call for assist;exit alarm on  -               User Key  (r) = Recorded By, (t) = Taken By, (c) = Cosigned By      Initials Name Provider Type    Brandi Jurado, PT Physical Therapist                   Outcome Measures       Row Name 01/05/24 1632 01/05/24 0800       How much help from another person do you currently need...    Turning from your back to your side while in flat bed without using bedrails? 4  -CH 4  -CJ    Moving from lying on  back to sitting on the side of a flat bed without bedrails? 3  - 3  -CJ    Moving to and from a bed to a chair (including a wheelchair)? 3  - 3  -CJ    Standing up from a chair using your arms (e.g., wheelchair, bedside chair)? 3  -CH 3  -CJ    Climbing 3-5 steps with a railing? 1  -CH 2  -CJ    To walk in hospital room? 3  -CH 2  -CJ    AM-PAC 6 Clicks Score (PT) 17  - 17  -    Highest Level of Mobility Goal 5 --> Static standing  - 5 --> Static standing  -      Row Name 01/05/24 1632 01/05/24 1254       Functional Assessment    Outcome Measure Options AM-PAC 6 Clicks Basic Mobility (PT)  - AM-PAC 6 Clicks Daily Activity (OT)  -              User Key  (r) = Recorded By, (t) = Taken By, (c) = Cosigned By      Initials Name Provider Type     Brandi Schmid, PT Physical Therapist    Anu Bourgeois, OT Occupational Therapist    Yi Palomino, RN Registered Nurse                                 Physical Therapy Education       Title: PT OT SLP Therapies (In Progress)       Topic: Physical Therapy (In Progress)       Point: Mobility training (Done)       Learning Progress Summary             Patient Acceptance, E,TB,D, VU,NR by  at 1/5/2024 1632                         Point: Home exercise program (Not Started)       Learner Progress:  Not documented in this visit.              Point: Body mechanics (Done)       Learning Progress Summary             Patient Acceptance, E,TB,D, VU,NR by  at 1/5/2024 1632                         Point: Precautions (Done)       Learning Progress Summary             Patient Acceptance, E,TB,D, VU,NR by  at 1/5/2024 1632                                         User Key       Initials Effective Dates Name Provider Type [facility] 06/16/21 -  Brandi Schmid, PT Physical Therapist PT                  PT Recommendation and Plan  Planned Therapy Interventions (PT): balance training, bed mobility training, gait training, home exercise program,  patient/family education, strengthening, transfer training  Plan of Care Reviewed With: patient  Outcome Evaluation: Pt is a 62 yo F who is post-op ureteral stents and catheter placement. Pt presents to PT with impaired functional mobility and gait secondary to generalized weakness, impaired balance, and decreasd activity tolerance. Pt may benefit from skilled PT to address strength, mobility, and gait.     Time Calculation:         PT Charges       Row Name 01/05/24 1633             Time Calculation    Start Time 1445  -CH      Stop Time 1459  -CH      Time Calculation (min) 14 min  -CH      PT Received On 01/05/24  -      PT - Next Appointment 01/06/24  -      PT Goal Re-Cert Due Date 01/12/24  -         Time Calculation- PT    Total Timed Code Minutes- PT 8 minute(s)  -CH         Timed Charges    20339 - PT Therapeutic Activity Minutes 8  -CH         Total Minutes    Timed Charges Total Minutes 8  -CH       Total Minutes 8  -CH                User Key  (r) = Recorded By, (t) = Taken By, (c) = Cosigned By      Initials Name Provider Type     Brandi Schmid, PT Physical Therapist                  Therapy Charges for Today       Code Description Service Date Service Provider Modifiers Qty    [id number]  PT THERAPEUTIC ACT EA 15 MIN 1/5/2024 Brandi Schmid, PT GP 1    [id number]  PT EVAL MOD COMPLEXITY 2 1/5/2024 Brandi Schmid, PT GP 1            PT G-Codes  Outcome Measure Options: AM-PAC 6 Clicks Basic Mobility (PT)  AM-PAC 6 Clicks Score (PT): 17  AM-PAC 6 Clicks Score (OT): 15  PT Discharge Summary  Anticipated Discharge Disposition (PT): skilled nursing facility    Brandi Schmid PT  1/5/2024

## 2024-01-05 NOTE — PLAN OF CARE
Goal Outcome Evaluation:  Plan of Care Reviewed With: patient           Outcome Evaluation: Pt is a 62 yo F who is post-op ureteral stents and catheter placement. Pt presents to PT with impaired functional mobility and gait secondary to generalized weakness, impaired balance, and decreasd activity tolerance. Pt may benefit from skilled PT to address strength, mobility, and gait.      Anticipated Discharge Disposition (PT): skilled nursing facility

## 2024-01-05 NOTE — THERAPY EVALUATION
Patient Name: Tiana Hudson  : 1960    MRN: [id number]                              Today's Date: 2024       Admit Date: 2024    Visit Dx:     ICD-10-CM ICD-9-CM   1. Sepsis, due to unspecified organism, unspecified whether acute organ dysfunction present  A41.9 038.9     995.91   2. Acute cystitis without hematuria  N30.00 595.0   3. UPJ (ureteropelvic junction) obstruction  N13.5 593.4     Patient Active Problem List   Diagnosis    Primary osteoarthritis of right knee    UTI (urinary tract infection)    Varicose veins    Contact dermatitis    Elevated liver function tests    Leg cramp    Medial collateral ligament sprain of knee    Vitamin D deficiency    Essential hypertension    Hyperlipidemia    Abrasion    Hypothyroidism    Gestational diabetes    Morbid obesity with BMI of 40.0-44.9, adult    Allergic rhinitis    Back pain    Anxiety    H/O Postconcussion syndrome    H/O dizziness    Allergy    Vaginal candidiasis    Chronic pain of left knee    Arthritis of both knees    Inflammation of bladder severe, acute and chronic, with mucosal erosions/ulcers    Dense breast tissue on mammogram    Dizziness    Restless leg syndrome    Type 2 diabetes mellitus without complication    Arthritis of right knee    Status post total right knee replacement    Arthritis of left knee    History of total knee arthroplasty    Acquired spondylolisthesis    Screening for colorectal cancer    Precordial pain    FH ischemic heart disease    DKA (diabetic ketoacidosis)    JADEN (acute kidney injury)    Sepsis, unspecified organism    Lactic acidosis    UPJ (ureteropelvic junction) obstruction bilateral    Bilateral hydrocele    Bilateral hydronephrosis moderate on admission    Renal calculus on right, nonobstructinbg    Chronic liver disease     Past Medical History:   Diagnosis Date    Anemia     intermittently    Anxiety and depression     Arthritis     Depression     Diabetes mellitus     Gallstones     High  cholesterol     History of frequent urinary tract infections     HX OF YEAST IN BLADDER HAS PRN DIFLUCAN    History of transfusion 05/24/2017    Had 2 iron infusions.  This was when i had knee replacement    Hyperlipidemia     Hypertension     Hypothyroidism     Knee pain, bilateral     Low back pain     Lumbar stenosis     PONV (postoperative nausea and vomiting)     Positive TB test     chest x-ray neg    Seasonal allergies      Past Surgical History:   Procedure Laterality Date    APPENDECTOMY N/A 1982    Dr. Wilson    CHOLECYSTECTOMY WITH INTRAOPERATIVE CHOLANGIOGRAM N/A 10/31/2018    Procedure: laparoscopic cholecystectomy;  Surgeon: Mary Kay Mac MD;  Location: [facility] OR;  Service: General    COLONOSCOPY      CYSTOSCOPY BLADDER BIOPSY N/A 10/3/2016    Procedure: CYSTOSCOPY BLADDER BIOPSY;  Surgeon: Rei Calvert MD;  Location: [facility] OR;  Service:     CYSTOSCOPY W/ URETERAL STENT PLACEMENT Bilateral 1/3/2024    Procedure: CYSTOSCOPY BILATERAL RETROGRADE PYELOGRAM  BILATERAL URETERAL STENT INSERTION AND CATHETHER PLACEMENT;  Surgeon: Eduard Armando MD;  Location: [facility] OR;  Service: Urology;  Laterality: Bilateral;    DILATATION AND CURETTAGE N/A     ENDOSCOPY      INTRAUTERINE DEVICE INSERTION      KNEE ARTHROSCOPY W/ MENISCAL REPAIR Left     OVARIAN CYST REMOVAL Left 1982    Dr. Wilson    TOTAL KNEE ARTHROPLASTY Right 5/22/2017    Procedure: RIGHT TOTAL KNEE ARTHROPLASTY WITH ALETA NAVIGATION;  Surgeon: Ross Cruz MD;  Location: [facility];  Service:       General Information       Row Name 01/05/24 1246          OT Time and Intention    Document Type evaluation  -MW     Mode of Treatment individual therapy;occupational therapy  -MW       Row Name 01/05/24 1246          General Information    Patient Profile Reviewed yes  -MW     Prior Level of Function independent:  no AD use  -MW     Existing Precautions/Restrictions fall  -MW     Barriers to Rehab none identified   -[facility] Name 01/05/24 1246          Living Environment    People in Home spouse  -[facility] Name 01/05/24 1246          Home Main Entrance    Number of Stairs, Main Entrance four  -[facility] Name 01/05/24 1246          Stairs Within Home, Primary    Number of Stairs, Within Home, Primary four  -[facility] Name 01/05/24 1246          Cognition    Orientation Status (Cognition) oriented x 4  -[facility] Name 01/05/24 1246          Safety Issues, Functional Mobility    Impairments Affecting Function (Mobility) endurance/activity tolerance;strength;pain  -               User Key  (r) = Recorded By, (t) = Taken By, (c) = Cosigned By      Initials Name Provider Type     Anu Villafana OT Occupational Therapist                     Mobility/ADL's       Row Name 01/05/24 1246          Bed Mobility    Bed Mobility supine-sit;sit-supine  -     Supine-Sit [place] (Bed Mobility) contact guard  -     Sit-Supine [place] (Bed Mobility) moderate assist (50% patient effort)  -     Assistive Device (Bed Mobility) head of bed elevated;bed rails  -     Comment, (Bed Mobility) increased time to come to EOB, long sitting at times throughout before fully coming upright  -[facility] Name 01/05/24 1246          Transfers    Transfers sit-stand transfer  -MW       Row Name 01/05/24 1246          Sit-Stand Transfer    Sit-Stand [place] (Transfers) contact guard  [facility]     Assistive Device (Sit-Stand Transfers) other (see comments)  -     Comment, (Sit-Stand Transfer) no AD, x2 STS to assist with scooting up in bed, able to demo x3 lateral steps, unable to come to full upright stand  -[facility] Name 01/05/24 1246          Activities of Daily Living    BADL Assessment/Intervention lower body dressing;toileting;feeding  -MW       Row Name 01/05/24 1246          Lower Body Dressing Assessment/Training    [place] Level (Lower Body Dressing) don;socks;dependent (less than 25% patient effort)   -     Position (Lower Body Dressing) edge of bed sitting  -     Comment, (Lower Body Dressing) abd pain present  -       Row Name 01/05/24 1246          Toileting Assessment/Training    Comment, (Toileting) armando in place  -[facility] Name 01/05/24 1246          Self-Feeding Assessment/Training    [place] Level (Feeding) feeding skills;independent  -               User Key  (r) = Recorded By, (t) = Taken By, (c) = Cosigned By      Initials Name Provider Type    MW Anu Villafana OT Occupational Therapist                   Obj/Interventions       [facility] Name 01/05/24 1248          Sensory Assessment (Somatosensory)    Sensory Assessment (Somatosensory) UE sensation intact  -[facility] Name 01/05/24 1248          Vision Assessment/Intervention    Visual Impairment/Limitations WFL  -[facility] Name 01/05/24 1248          Range of Motion Comprehensive    General Range of Motion bilateral upper extremity ROM WNL  -[facility] Name 01/05/24 1248          Strength Comprehensive (MMT)    General Manual Muscle Testing (MMT) Assessment upper extremity strength deficits identified  -     Comment, General Manual Muscle Testing (MMT) Assessment generalized weakness BUE, grossly 4-/5  -[facility] Name 01/05/24 1248          Motor Skills    Motor Skills functional endurance;coordination  -     Coordination WFL;bilateral;upper extremity  -     Functional Endurance fair, limited by R quadrant pain  -[facility] Name 01/05/24 1248          Balance    Balance Assessment sitting static balance;sitting dynamic balance;sit to stand dynamic balance;standing static balance;standing dynamic balance  -     Static Sitting Balance standby assist  -     Dynamic Sitting Balance contact guard  -     Position, Sitting Balance sitting edge of bed;unsupported  -     Sit to Stand Dynamic Balance contact guard  -     Static Standing Balance contact guard  -     Dynamic Standing Balance contact guard;minimal  assist  -MW     Position/Device Used, Standing Balance unsupported  -MW     Balance Interventions weight shifting activity  -MW     Comment, Balance no overt LOBs  -MW               User Key  (r) = Recorded By, (t) = Taken By, (c) = Cosigned By      Initials Name Provider Type    Anu Bourgeois, OT Occupational Therapist                   Goals/Plan       Row Name 01/05/24 1253          Transfer Goal 1 (OT)    Activity/Assistive Device (Transfer Goal 1, OT) sit-to-stand/stand-to-sit;bed-to-chair/chair-to-bed;toilet  -MW     [place] Level/Cues Needed (Transfer Goal 1, OT) standby assist  -MW     Time Frame (Transfer Goal 1, OT) short term goal (STG);2 weeks  -MW     Progress/Outcome (Transfer Goal 1, OT) goal ongoing  -[facility] Name 01/05/24 1253          Bathing Goal 1 (OT)    Activity/Device (Bathing Goal 1, OT) upper body bathing;lower body bathing  -MW     [place] Level/Cues Needed (Bathing Goal 1, OT) standby assist  -MW     Time Frame (Bathing Goal 1, OT) short term goal (STG);2 weeks  -MW     Progress/Outcomes (Bathing Goal 1, OT) goal ongoing  -[facility] Name 01/05/24 1253          Dressing Goal 1 (OT)    Activity/Device (Dressing Goal 1, OT) lower body dressing  -MW     [place]/Cues Needed (Dressing Goal 1, OT) contact guard required  -MW     Time Frame (Dressing Goal 1, OT) short term goal (STG);2 weeks  -MW     Progress/Outcome (Dressing Goal 1, OT) goal ongoing  -[facility] Name 01/05/24 1253          Toileting Goal 1 (OT)    Activity/Device (Toileting Goal 1, OT) toileting skills, all  -MW     [place] Level/Cues Needed (Toileting Goal 1, OT) standby assist  -MW     Time Frame (Toileting Goal 1, OT) short term goal (STG);2 weeks  -MW     Progress/Outcome (Toileting Goal 1, OT) goal ongoing  -       Row Name 01/05/24 1253          Therapy Assessment/Plan (OT)    Planned Therapy Interventions (OT) activity tolerance training;neuromuscular control/coordination  retraining;patient/caregiver education/training;transfer/mobility retraining;strengthening exercise;ROM/therapeutic exercise;occupation/activity based interventions;functional balance retraining  -MW               User Key  (r) = Recorded By, (t) = Taken By, (c) = Cosigned By      Initials Name Provider Type    Anu Bourgeois OT Occupational Therapist                   Clinical Impression       Row Name 01/05/24 1248          Pain Assessment    Pretreatment Pain Rating 6/10  -MW     Posttreatment Pain Rating 6/10  -MW     Pain Location - Side/Orientation Right  -MW     Pain Location lower  -MW     Pain Location - abdomen  -MW       Row Name 01/05/24 1248          Plan of Care Review    Plan of Care Reviewed With patient;daughter  -MW     Progress no change  -MW     Outcome Evaluation Pt is a 62 yo female admitted with DKA. Workup revealed bilateral UPJ obstruction urosepsis right renal stone, s/p cystoscopy bilateral renal pelvic cultures with decompression. She is seen in CCU this date for OT [place] phillips&Carolee, reports she lives at home with her spouse, no AD use, (I) with ADLs. She presents with impaired act tolerance, generalized weakness, increased pain limiting overall (I) with ADLs. She required increased time however CGA for sup<>sit, dep for LBD due to abd pain, armando in place. She is quick to fatigue, able to tolerate EOB ~ 8-10 mins and demo x2 stands with CGA briefly to complete short lateral steps. She will continue to benefit from skilled OT to address noted functional deficits and progress (I) in order to return home as that is the goal. Continue to follow.  -       Row Name 01/05/24 3287          Therapy Assessment/Plan (OT)    Rehab Potential (OT) good, to achieve stated therapy goals  -     Criteria for Skilled Therapeutic Interventions Met (OT) yes;meets criteria;skilled treatment is necessary  -     Therapy Frequency (OT) 5 times/wk  -       Row Name 01/05/24 5418          Therapy Plan  Review/Discharge Plan (OT)    Anticipated Discharge Disposition (OT) home;home with assist  -       Row Name 01/05/24 1248          Vital Signs    O2 Delivery Pre Treatment room air  -       Row Name 01/05/24 1248          Positioning and Restraints    Pre-Treatment Position in bed  -     Post Treatment Position bed  -MW     In Bed notified nsg;fowlers;call light within reach;encouraged to call for assist;exit alarm on;with family/caregiver;side rails up x3  -MW               User Key  (r) = Recorded By, (t) = Taken By, (c) = Cosigned By      Initials Name Provider Type    Anu Bourgeois, [place] Occupational Therapist                   Outcome Measures       Row Name 01/05/24 1254          How much help from another is currently needed...    Putting on and taking off regular lower body clothing? 1  -MW     Bathing (including washing, rinsing, and drying) 2  -MW     Toileting (which includes using toilet bed pan or urinal) 2  -MW     Putting on and taking off regular upper body clothing 3  -MW     Taking care of personal grooming (such as brushing teeth) 3  -MW     Eating meals 4  -MW     AM-PAC 6 Clicks Score (OT) 15  -MW       Row Name 01/05/24 0800          How much help from another person do you currently need...    Turning from your back to your side while in flat bed without using bedrails? 4  -CJ     Moving from lying on back to sitting on the side of a flat bed without bedrails? 3  -CJ     Moving to and from a bed to a chair (including a wheelchair)? 3  -CJ     Standing up from a chair using your arms (e.g., wheelchair, bedside chair)? 3  -CJ     Climbing 3-5 steps with a railing? 2  -CJ     To walk in hospital room? 2  -CJ     AM-PAC 6 Clicks Score (PT) 17  -CJ     Highest Level of Mobility Goal 5 --> Static standing  -       Row Name 01/05/24 1254          Functional Assessment    Outcome Measure Options AM-PAC 6 Clicks Daily Activity (OT)  -MW               User Key  (r) = Recorded By, (t) =  Taken By, (c) = Cosigned By      Initials Name Provider Type     Anu Villafana OT Occupational Therapist    Yi Palomino, RN Registered Nurse                    Occupational Therapy Education       Title: PT OT SLP Therapies (In Progress)       Topic: Occupational Therapy (In Progress)       Point: ADL training (Done)       Description:   Instruct learner(s) on proper safety adaptation and remediation techniques during self care or transfers.   Instruct in proper use of assistive devices.                  Learning Progress Summary             Patient Acceptance, E, VU by  at 1/5/2024 1254    Comment: role of OT, d/c rec, GOC   Family Acceptance, E, VU by  at 1/5/2024 1254    Comment: role of OT, d/c rec, GOC                         Point: Home exercise program (Not Started)       Description:   Instruct learner(s) on appropriate technique for monitoring, assisting and/or progressing therapeutic exercises/activities.                  Learner Progress:  Not documented in this visit.              Point: Precautions (Done)       Description:   Instruct learner(s) on prescribed precautions during self-care and functional transfers.                  Learning Progress Summary             Patient Acceptance, E, VU by  at 1/5/2024 1254    Comment: role of OT, d/c rec, GOC   Family Acceptance, E, VU by  at 1/5/2024 1254    Comment: role of OT, d/c rec, GOC                         Point: Body mechanics (Done)       Description:   Instruct learner(s) on proper positioning and spine alignment during self-care, functional mobility activities and/or exercises.                  Learning Progress Summary             Patient Acceptance, E, VU by  at 1/5/2024 1254    Comment: role of OT, d/c rec, GOC   Family Acceptance, E, VU by  at 1/5/2024 1254    Comment: role of OT, d/c rec, GOC                                         User Key       Initials Effective Dates Name Provider Type Discipline     08/20/21 -   Anu Villafana, OT Occupational Therapist OT                  OT Recommendation and Plan  Planned Therapy Interventions (OT): activity tolerance training, neuromuscular control/coordination retraining, patient/caregiver education/training, transfer/mobility retraining, strengthening exercise, ROM/therapeutic exercise, occupation/activity based interventions, functional balance retraining  Therapy Frequency (OT): 5 times/wk  Plan of Care Review  Plan of Care Reviewed With: patient, daughter  Progress: no change  Outcome Evaluation: Pt is a 62 yo female admitted with DKA. Workup revealed bilateral UPJ obstruction urosepsis right renal stone, s/p cystoscopy bilateral renal pelvic cultures with decompression. She is seen in CCU this date for OT [place] phillips&Carolee, reports she lives at home with her spouse, no AD use, (I) with ADLs. She presents with impaired act tolerance, generalized weakness, increased pain limiting overall (I) with ADLs. She required increased time however CGA for sup<>sit, dep for LBD due to abd pain, armando in place. She is quick to fatigue, able to tolerate EOB ~ 8-10 mins and demo x2 stands with CGA briefly to complete short lateral steps. She will continue to benefit from skilled OT to address noted functional deficits and progress (I) in order to return home as that is the goal. Continue to follow.     Time Calculation:   Evaluation Complexity (OT)  Review Occupational Profile/Medical/Therapy History Complexity: expanded/moderate complexity  Assessment, Occupational Performance/Identification of Deficit Complexity: 3-5 performance deficits  Clinical Decision Making Complexity (OT): detailed assessment/moderate complexity  Overall Complexity of Evaluation (OT): moderate complexity     Time Calculation- OT       Row Name 01/05/24 1255             Time Calculation- OT    OT Start Time 0904  -MW      OT Stop Time 0924  -MW      OT Time Calculation (min) 20 min  -MW      Total Timed Code Minutes- OT 13  minute(s)  -MW      OT Received On 01/05/24  -MW      OT - Next Appointment 01/08/24  -MW      OT Goal Re-Cert Due Date 01/19/24  -MW         Timed Charges    71847 - OT Therapeutic Activity Minutes 13  -MW         Untimed Charges    OT Eval/Re-eval Minutes 7  -MW         Total Minutes    Timed Charges Total Minutes 13  -MW      Untimed Charges Total Minutes 7  -MW       Total Minutes 20  -MW                User Key  (r) = Recorded By, (t) = Taken By, (c) = Cosigned By      Initials Name Provider Type    Anu Bourgeois OT Occupational Therapist                  Therapy Charges for Today       Code Description Service Date Service Provider Modifiers Qty    96568032879 HC OT THERAPEUTIC ACT EA 15 MIN 1/5/2024 Anu Villafana OT GO 1    61800616333 HC OT EVAL MOD COMPLEXITY 2 1/5/2024 Anu Villafana OT GO 1                 Anu Villafana OT  1/5/2024

## 2024-01-05 NOTE — PROGRESS NOTES
"[facility] Clinical Pharmacy Services: Vancomycin Pharmacokinetic Initial Consult Note    Tiana Hudson is a 63 y.o. female who is on day 1 of pharmacy to dose vancomycin.    Indication: Bacteremia  Consulting Provider: Jorge L Us DO   Planned Duration of Therapy: 7 days   Loading Dose Ordered or Given: 2250 mg on 1/5 at 0903  MRSA PCR performed: no  Culture/Source:   Blood cultures 2/2 positive for gram+ bacilli; sent to [facility] on 1/5  Target: -600 mg/L.hr   Pertinent Vanc Dosing History: N/A  Other Antimicrobials: fluconazole 400 mg PO q24h     Vitals/Labs  Ht: 165.1 cm (65\"); Wt: 111 kg (244 lb 11.4 oz)  Temp Readings from Last 1 Encounters:   01/05/24 98.3 °F (36.8 °C) (Oral)    Estimated Creatinine Clearance: 86.2 mL/min (by C-G formula based on SCr of 0.85 mg/dL).   Results from last 7 days   Lab Units 01/05/24  0232 01/05/24  0231 01/04/24  1615 01/04/24  1205 01/04/24  1059 01/04/24  0345 01/03/24  0839 01/03/24  0409   CREATININE mg/dL 0.85  --  0.84 0.89   < > 0.94   < > 1.05*   WBC 10*3/mm3  --  13.04*  --   --   --  17.08*  --  18.93*    < > = values in this interval not displayed.     Assessment/Plan:    Vancomycin Dose:   750 mg IV every  12  hours  Predictive AUC level for the dose ordered is 640 mg/L.hr, which is within the target of 400-600 mg/L.hr  Vanc Trough has been ordered for 1/7 at 0830     Pharmacy will follow patient's kidney function and will adjust doses and obtain levels as necessary. Thank you for involving pharmacy in this patient's care. Please contact pharmacy with any questions or concerns.                           Francesco Felix, Mustapha, BAYLEE, BCPS, BCCCP  01/05/24 13:27 EST      "

## 2024-01-05 NOTE — PLAN OF CARE
Goal Outcome Evaluation:  Plan of Care Reviewed With: patient, daughter        Progress: no change  Outcome Evaluation: Pt is a 62 yo female admitted with DKA. Workup revealed bilateral UPJ obstruction urosepsis right renal stone, s/p cystoscopy bilateral renal pelvic cultures with decompression. She is seen in CCU this date for OT [place] phillips&Carolee, reports she lives at home with her spouse, no AD use, (I) with ADLs. She presents with impaired act tolerance, generalized weakness, increased pain limiting overall (I) with ADLs. She required increased time however CGA for sup<>sit, dep for LBD due to abd pain, armando in place. She is quick to fatigue, able to tolerate EOB ~ 8-10 mins and demo x2 stands with CGA briefly to complete short lateral steps. She will continue to benefit from skilled OT to address noted functional deficits and progress (I) in order to return home as that is the goal. Continue to follow.      Anticipated Discharge Disposition (OT): home, home with assist

## 2024-01-05 NOTE — PROGRESS NOTES
Patient telemetry  No pulm/cc needs at this time  Will defer medical management to Dr. Fairchild and see prn while here.

## 2024-01-05 NOTE — PROGRESS NOTES
"  Nephrology Progress note - Chart review    Miss Hudson , [facility] Chronic normocytic anemia, anxiety disorder, depression, Coumadin, edema, hypothyroidism, spinal disc degenerative disease seasonal allergies    Patient presents for urinary symptoms have DKA urosepsis and lactic acidosis.  Found to have obstructive nephrolithiasis status post cystoscopy bladder biopsy and bilateral retrograde pyelogram with bilateral ureteral stent placement , With a pseudohyponatremia, metabolic acidosis and acute kidney injury responded to medical management    Nephrology consultation was requested for the management    /69 (BP Location: Left arm, Patient Position: Lying)   Pulse 96   Temp 97.3 °F (36.3 °C)   Resp 18   Ht 165.1 cm (65\")   Wt 111 kg (244 lb 11.4 oz)   LMP 09/03/2016 Comment: IUD  SpO2 92%   BMI 40.72 kg/m²     Results from last 7 days   Lab Units 01/05/24  0232 01/04/24  1615 01/04/24  1205 01/02/24  1816 01/02/24  1629 01/02/24  1240   SODIUM mmol/L 132* 127* 130*   < > 125* 115*   POTASSIUM mmol/L 4.1 4.1 3.9   < > 3.9 4.2   CHLORIDE mmol/L 101 97* 98   < > 91* 76*   CO2 mmol/L 22.5 18.0* 19.0*   < > 15.7* 15.7*   BUN mg/dL 8 10 12   < > 19 23   CREATININE mg/dL 0.85 0.84 0.89   < > 1.33* 1.82*   CALCIUM mg/dL 8.9 8.7 8.5*   < > 8.7 9.4   BILIRUBIN mg/dL 0.4  --   --   --  0.3 0.5   ALK PHOS U/L 279*  --   --   --  188* 220*   ALT (SGPT) U/L 19  --   --   --  16 16   AST (SGOT) U/L 34*  --   --   --  21 33*   GLUCOSE mg/dL 159* 221* 250*   < > 695* 978*    < > = values in this interval not displayed.     Plan   -Patient currently respond to medical management with improvement of the chemistry, and acute kidney injury  -Full recommendations will follow              "

## 2024-01-05 NOTE — PROGRESS NOTES
Postop day 2 bilateral stent placement for bilateral UPJ obstruction antibiotics have been added fluconazole vancomycin Zosyn with final blood cultures identity pending bilateral kidneys positive for fungus    Alert no complaints urine has been clear output improved creatinine level stable    Continue stent placement Perales catheter can be removed at any time

## 2024-01-06 LAB
ALBUMIN SERPL-MCNC: 1.9 G/DL (ref 3.5–5.2)
ALBUMIN/GLOB SERPL: 0.4 G/DL
ALP SERPL-CCNC: 227 U/L (ref 39–117)
ALT SERPL W P-5'-P-CCNC: 13 U/L (ref 1–33)
ANION GAP SERPL CALCULATED.3IONS-SCNC: 10.7 MMOL/L (ref 5–15)
AST SERPL-CCNC: 14 U/L (ref 1–32)
BASOPHILS # BLD MANUAL: 0.11 10*3/MM3 (ref 0–0.2)
BASOPHILS NFR BLD MANUAL: 1 % (ref 0–1.5)
BILIRUB SERPL-MCNC: 0.4 MG/DL (ref 0–1.2)
BUN SERPL-MCNC: 7 MG/DL (ref 8–23)
BUN/CREAT SERPL: 11.5 (ref 7–25)
CALCIUM SPEC-SCNC: 8.6 MG/DL (ref 8.6–10.5)
CHLORIDE SERPL-SCNC: 98 MMOL/L (ref 98–107)
CO2 SERPL-SCNC: 22.3 MMOL/L (ref 22–29)
CREAT SERPL-MCNC: 0.61 MG/DL (ref 0.57–1)
DEPRECATED RDW RBC AUTO: 41.3 FL (ref 37–54)
EGFRCR SERPLBLD CKD-EPI 2021: 101.9 ML/MIN/1.73
ERYTHROCYTE [DISTWIDTH] IN BLOOD BY AUTOMATED COUNT: 13.7 % (ref 12.3–15.4)
GLOBULIN UR ELPH-MCNC: 4.3 GM/DL
GLUCOSE BLDC GLUCOMTR-MCNC: 140 MG/DL (ref 70–130)
GLUCOSE BLDC GLUCOMTR-MCNC: 149 MG/DL (ref 70–130)
GLUCOSE BLDC GLUCOMTR-MCNC: 164 MG/DL (ref 70–130)
GLUCOSE SERPL-MCNC: 171 MG/DL (ref 65–99)
HCT VFR BLD AUTO: 32.3 % (ref 34–46.6)
HGB BLD-MCNC: 10.1 G/DL (ref 12–15.9)
LYMPHOCYTES # BLD MANUAL: 0.32 10*3/MM3 (ref 0.7–3.1)
LYMPHOCYTES NFR BLD MANUAL: 9.1 % (ref 5–12)
MAGNESIUM SERPL-MCNC: 1.7 MG/DL (ref 1.6–2.4)
MCH RBC QN AUTO: 26 PG (ref 26.6–33)
MCHC RBC AUTO-ENTMCNC: 31.3 G/DL (ref 31.5–35.7)
MCV RBC AUTO: 83.2 FL (ref 79–97)
METAMYELOCYTES NFR BLD MANUAL: 2 % (ref 0–0)
MONOCYTES # BLD: 0.98 10*3/MM3 (ref 0.1–0.9)
NEUTROPHILS # BLD AUTO: 9.13 10*3/MM3 (ref 1.7–7)
NEUTROPHILS NFR BLD MANUAL: 84.8 % (ref 42.7–76)
NRBC SPEC MANUAL: 1 /100 WBC (ref 0–0.2)
PHOSPHATE SERPL-MCNC: 3.4 MG/DL (ref 2.5–4.5)
PLAT MORPH BLD: NORMAL
PLATELET # BLD AUTO: 384 10*3/MM3 (ref 140–450)
PMV BLD AUTO: 10.4 FL (ref 6–12)
POTASSIUM SERPL-SCNC: 3.8 MMOL/L (ref 3.5–5.2)
PROT SERPL-MCNC: 6.2 G/DL (ref 6–8.5)
RBC # BLD AUTO: 3.88 10*6/MM3 (ref 3.77–5.28)
RBC MORPH BLD: NORMAL
SODIUM SERPL-SCNC: 131 MMOL/L (ref 136–145)
VANCOMYCIN TROUGH SERPL-MCNC: 7.6 MCG/ML (ref 5–20)
VARIANT LYMPHS NFR BLD MANUAL: 3 % (ref 19.6–45.3)
WBC MORPH BLD: NORMAL
WBC NRBC COR # BLD AUTO: 10.77 10*3/MM3 (ref 3.4–10.8)

## 2024-01-06 PROCEDURE — 25010000002 VANCOMYCIN 750 MG RECONSTITUTED SOLUTION 1 EACH VIAL: Performed by: STUDENT IN AN ORGANIZED HEALTH CARE EDUCATION/TRAINING PROGRAM

## 2024-01-06 PROCEDURE — 63710000001 INSULIN GLARGINE PER 5 UNITS: Performed by: INTERNAL MEDICINE

## 2024-01-06 PROCEDURE — 82948 REAGENT STRIP/BLOOD GLUCOSE: CPT

## 2024-01-06 PROCEDURE — 84100 ASSAY OF PHOSPHORUS: CPT | Performed by: INTERNAL MEDICINE

## 2024-01-06 PROCEDURE — 85025 COMPLETE CBC W/AUTO DIFF WBC: CPT | Performed by: INTERNAL MEDICINE

## 2024-01-06 PROCEDURE — 80202 ASSAY OF VANCOMYCIN: CPT | Performed by: INTERNAL MEDICINE

## 2024-01-06 PROCEDURE — 80053 COMPREHEN METABOLIC PANEL: CPT | Performed by: INTERNAL MEDICINE

## 2024-01-06 PROCEDURE — 25010000002 ENOXAPARIN PER 10 MG: Performed by: UROLOGY

## 2024-01-06 PROCEDURE — 99232 SBSQ HOSP IP/OBS MODERATE 35: CPT | Performed by: INTERNAL MEDICINE

## 2024-01-06 PROCEDURE — 25010000002 ONDANSETRON PER 1 MG: Performed by: UROLOGY

## 2024-01-06 PROCEDURE — 25810000003 SODIUM CHLORIDE 0.9 % SOLUTION 250 ML FLEX CONT: Performed by: STUDENT IN AN ORGANIZED HEALTH CARE EDUCATION/TRAINING PROGRAM

## 2024-01-06 PROCEDURE — 83735 ASSAY OF MAGNESIUM: CPT | Performed by: INTERNAL MEDICINE

## 2024-01-06 PROCEDURE — 97530 THERAPEUTIC ACTIVITIES: CPT

## 2024-01-06 PROCEDURE — 85007 BL SMEAR W/DIFF WBC COUNT: CPT | Performed by: INTERNAL MEDICINE

## 2024-01-06 PROCEDURE — 63710000001 INSULIN LISPRO (HUMAN) PER 5 UNITS: Performed by: INTERNAL MEDICINE

## 2024-01-06 RX ORDER — PANTOPRAZOLE SODIUM 40 MG/1
40 TABLET, DELAYED RELEASE ORAL
Status: DISCONTINUED | OUTPATIENT
Start: 2024-01-06 | End: 2024-01-19 | Stop reason: HOSPADM

## 2024-01-06 RX ADMIN — INSULIN LISPRO 2 UNITS: 100 INJECTION, SOLUTION INTRAVENOUS; SUBCUTANEOUS at 08:13

## 2024-01-06 RX ADMIN — SENNOSIDES AND DOCUSATE SODIUM 2 TABLET: 50; 8.6 TABLET ORAL at 21:10

## 2024-01-06 RX ADMIN — INSULIN LISPRO 2 UNITS: 100 INJECTION, SOLUTION INTRAVENOUS; SUBCUTANEOUS at 12:58

## 2024-01-06 RX ADMIN — ROPINIROLE HYDROCHLORIDE 0.5 MG: 0.5 TABLET, FILM COATED ORAL at 21:11

## 2024-01-06 RX ADMIN — HYDROCODONE BITARTRATE AND ACETAMINOPHEN 1 TABLET: 5; 325 TABLET ORAL at 21:11

## 2024-01-06 RX ADMIN — ENOXAPARIN SODIUM 40 MG: 100 INJECTION SUBCUTANEOUS at 21:11

## 2024-01-06 RX ADMIN — ENOXAPARIN SODIUM 40 MG: 100 INJECTION SUBCUTANEOUS at 08:13

## 2024-01-06 RX ADMIN — LEVOTHYROXINE SODIUM 137 MCG: 137 TABLET ORAL at 06:49

## 2024-01-06 RX ADMIN — SENNOSIDES AND DOCUSATE SODIUM 2 TABLET: 50; 8.6 TABLET ORAL at 08:13

## 2024-01-06 RX ADMIN — VANCOMYCIN HYDROCHLORIDE 750 MG: 750 INJECTION, POWDER, LYOPHILIZED, FOR SOLUTION INTRAVENOUS at 21:21

## 2024-01-06 RX ADMIN — VANCOMYCIN HYDROCHLORIDE 750 MG: 750 INJECTION, POWDER, LYOPHILIZED, FOR SOLUTION INTRAVENOUS at 08:16

## 2024-01-06 RX ADMIN — INSULIN GLARGINE 10 UNITS: 100 INJECTION, SOLUTION SUBCUTANEOUS at 21:10

## 2024-01-06 RX ADMIN — PANTOPRAZOLE SODIUM 40 MG: 40 TABLET, DELAYED RELEASE ORAL at 14:34

## 2024-01-06 RX ADMIN — FLUCONAZOLE 400 MG: 100 TABLET ORAL at 17:04

## 2024-01-06 RX ADMIN — ONDANSETRON HYDROCHLORIDE 4 MG: 2 INJECTION, SOLUTION INTRAMUSCULAR; INTRAVENOUS at 10:48

## 2024-01-06 RX ADMIN — ESCITALOPRAM OXALATE 10 MG: 10 TABLET, FILM COATED ORAL at 08:13

## 2024-01-06 NOTE — THERAPY TREATMENT NOTE
Patient Name: Tiana Hudson  : 1960    MRN: [id number]                              Today's Date: 2024       Admit Date: 2024    Visit Dx:     ICD-10-CM ICD-9-CM   1. Sepsis, due to unspecified organism, unspecified whether acute organ dysfunction present  A41.9 038.9     995.91   2. Acute cystitis without hematuria  N30.00 595.0   3. UPJ (ureteropelvic junction) obstruction  N13.5 593.4     Patient Active Problem List   Diagnosis    Primary osteoarthritis of right knee    UTI (urinary tract infection)    Varicose veins    Contact dermatitis    Elevated liver function tests    Leg cramp    Medial collateral ligament sprain of knee    Vitamin D deficiency    Essential hypertension    Hyperlipidemia    Abrasion    Hypothyroidism    Gestational diabetes    Morbid obesity with BMI of 40.0-44.9, adult    Allergic rhinitis    Back pain    Anxiety    H/O Postconcussion syndrome    H/O dizziness    Allergy    Vaginal candidiasis    Chronic pain of left knee    Arthritis of both knees    Inflammation of bladder severe, acute and chronic, with mucosal erosions/ulcers    Dense breast tissue on mammogram    Dizziness    Restless leg syndrome    Type 2 diabetes mellitus without complication    Arthritis of right knee    Status post total right knee replacement    Arthritis of left knee    History of total knee arthroplasty    Acquired spondylolisthesis    Screening for colorectal cancer    Precordial pain    FH ischemic heart disease    DKA (diabetic ketoacidosis)    JADEN (acute kidney injury)    Sepsis, unspecified organism    Lactic acidosis    UPJ (ureteropelvic junction) obstruction bilateral    Bilateral hydrocele    Bilateral hydronephrosis moderate on admission    Renal calculus on right, nonobstructinbg    Chronic liver disease     Past Medical History:   Diagnosis Date    Anemia     intermittently    Anxiety and depression     Arthritis     Depression     Diabetes mellitus     Gallstones     High  cholesterol     History of frequent urinary tract infections     HX OF YEAST IN BLADDER HAS PRN DIFLUCAN    History of transfusion 05/24/2017    Had 2 iron infusions.  This was when i had knee replacement    Hyperlipidemia     Hypertension     Hypothyroidism     Knee pain, bilateral     Low back pain     Lumbar stenosis     PONV (postoperative nausea and vomiting)     Positive TB test     chest x-ray neg    Seasonal allergies      Past Surgical History:   Procedure Laterality Date    APPENDECTOMY N/A 1982    Dr. Wilson    CHOLECYSTECTOMY WITH INTRAOPERATIVE CHOLANGIOGRAM N/A 10/31/2018    Procedure: laparoscopic cholecystectomy;  Surgeon: Mary Kay Mac MD;  Location: [facility] OR;  Service: General    COLONOSCOPY      CYSTOSCOPY BLADDER BIOPSY N/A 10/3/2016    Procedure: CYSTOSCOPY BLADDER BIOPSY;  Surgeon: Rei Calvert MD;  Location: [facility] OR;  Service:     CYSTOSCOPY W/ URETERAL STENT PLACEMENT Bilateral 1/3/2024    Procedure: CYSTOSCOPY BILATERAL RETROGRADE PYELOGRAM  BILATERAL URETERAL STENT INSERTION AND CATHETHER PLACEMENT;  Surgeon: Eduard Armando MD;  Location: [facility] OR;  Service: Urology;  Laterality: Bilateral;    DILATATION AND CURETTAGE N/A     ENDOSCOPY      INTRAUTERINE DEVICE INSERTION      KNEE ARTHROSCOPY W/ MENISCAL REPAIR Left     OVARIAN CYST REMOVAL Left 1982    Dr. Wilson    TOTAL KNEE ARTHROPLASTY Right 5/22/2017    Procedure: RIGHT TOTAL KNEE ARTHROPLASTY WITH ALETA NAVIGATION;  Surgeon: Ross Cruz MD;  Location: [facility];  Service:       General Information       Row Name 01/06/24 1539          Physical Therapy Time and Intention    Document Type therapy note (daily note)  -PC     Mode of Treatment physical therapy  -PC               User Key  (r) = Recorded By, (t) = Taken By, (c) = Cosigned By      Initials Name Provider Type    PC Keily Cary PT Physical Therapist                   Mobility       Row Name 01/06/24 1533          Bed  Mobility    Supine-Sit [place] (Bed Mobility) minimum assist (75% patient effort)  -PC       Row Name 01/06/24 1533          Sit-Stand Transfer    Sit-Stand [place] (Transfers) contact guard  -PC     Assistive Device (Sit-Stand Transfers) walker, front-wheeled  -PC       Row Name 01/06/24 1533          Gait/Stairs (Locomotion)    [place] Level (Gait) minimum assist (75% patient effort)  -PC     Assistive Device (Gait) walker, front-wheeled  -PC     Distance in Feet (Gait) 20 ft, seated rest, 20 ft, pt used HHA for first walk, had difficulty due to lower R abdominal pain, able to walk better with a rolling walker  -PC     Deviations/Abnormal Patterns (Gait) antalgic;sinai decreased;gait speed decreased  -PC     Bilateral Gait Deviations forward flexed posture  -PC               User Key  (r) = Recorded By, (t) = Taken By, (c) = Cosigned By      Initials Name Provider Type    PC Keily Cary, PT Physical Therapist                   Obj/Interventions    No documentation.                  Goals/Plan    No documentation.                  Clinical Impression       Row Name 01/06/24 1535          Pain    Pain Location - Side/Orientation Right  -PC     Pain Location lower  -PC     Pain Location - abdomen  -PC     Pre/Posttreatment Pain Comment did not rate numerically  -PC       Row Name 01/06/24 1535          Plan of Care Review    Plan of Care Reviewed With patient;family  -PC     Progress improving  -PC     Outcome Evaluation Improved toleration of activity today, able to walk 20 ft x 2, main limitation was lower R abdominal pain, and pt was limping due to this, she did better with a rolling walker. PT will cont to follow  -PC       Row Name 01/06/24 1535          Positioning and Restraints    Pre-Treatment Position in bed  -PC     Post Treatment Position bsc  -PC     On BS commode sitting;call light within reach;encouraged to call for assist;with family/caregiver  -PC               User Key  (r) =  Recorded By, (t) = Taken By, (c) = Cosigned By      Initials Name Provider Type    PC Keily Cary, PT Physical Therapist                   Outcome Measures       Row Name 01/06/24 1537          How much help from another person do you currently need...    Turning from your back to your side while in flat bed without using bedrails? 4  -PC     Moving from lying on back to sitting on the side of a flat bed without bedrails? 3  -PC     Moving to and from a bed to a chair (including a wheelchair)? 3  -PC     Standing up from a chair using your arms (e.g., wheelchair, bedside chair)? 3  -PC     Climbing 3-5 steps with a railing? 2  -PC     To walk in hospital room? 3  -PC     AM-PAC 6 Clicks Score (PT) 18  -PC     Highest Level of Mobility Goal 6 --> Walk 10 steps or more  -PC               User Key  (r) = Recorded By, (t) = Taken By, (c) = Cosigned By      Initials Name Provider Type    PC Keily Cary, PT Physical Therapist                                 Physical Therapy Education       Title: PT OT SLP Therapies (In Progress)       Topic: Physical Therapy (In Progress)       Point: Mobility training (Done)       Learning Progress Summary             Patient Acceptance, E,D, DU by  at 1/6/2024 1537    Acceptance, E,TB,D, VU,NR by  at 1/5/2024 1632                         Point: Home exercise program (Not Started)       Learner Progress:  Not documented in this visit.              Point: Body mechanics (Done)       Learning Progress Summary             Patient Acceptance, E,D, DU by  at 1/6/2024 1537    Acceptance, E,TB,D, VU,NR by  at 1/5/2024 1632                         Point: Precautions (Done)       Learning Progress Summary             Patient Acceptance, E,D, DU by  at 1/6/2024 1537    Acceptance, E,TB,D, VU,NR by  at 1/5/2024 1632                                         User Key       Initials Effective Dates Name Provider Type [facility] 06/16/21 -  Brandi Schmid, PT Physical  Therapist PT    PC 06/16/21 -  Keily Cary PT Physical Therapist PT                  PT Recommendation and Plan     Plan of Care Reviewed With: patient, family  Progress: improving  Outcome Evaluation: Improved toleration of activity today, able to walk 20 ft x 2, main limitation was lower R abdominal pain, and pt was limping due to this, she did better with a rolling walker. PT will cont to follow     Time Calculation:         PT Charges       Row Name 01/06/24 1537             Time Calculation    Start Time 1359  -PC      Stop Time 1413  -PC      Time Calculation (min) 14 min  -PC      PT Received On 01/06/24  -PC      PT - Next Appointment 01/08/24  -PC                User Key  (r) = Recorded By, (t) = Taken By, (c) = Cosigned By      Initials Name Provider Type    PC Keily Cary PT Physical Therapist                  Therapy Charges for Today       Code Description Service Date Service Provider Modifiers Qty    [id number]  PT THERAPEUTIC ACT EA 15 MIN 1/6/2024 Keily Cary PT GP 1            PT G-Codes  Outcome Measure Options: AM-PAC 6 Clicks Basic Mobility (PT)  AM-PAC 6 Clicks Score (PT): 18  AM-PAC 6 Clicks Score (OT): 15       Keily Cary PT  1/6/2024

## 2024-01-06 NOTE — PROGRESS NOTES
LOS: 4 days     Chief Complaint: Antibiotic management    Interval History: Afebrile, tolerating antibiotics without abdominal pain vomiting diarrhea or rash.  No shortness of breath.    Vital Signs  Temp:  [98.2 °F (36.8 °C)-99.9 °F (37.7 °C)] 98.5 °F (36.9 °C)  Heart Rate:  [87-98] 88  Resp:  [18] 18  BP: (123-138)/(67-76) 138/76    Physical Exam:  General: In no acute distress  Respiratory: Being comfortably on room air  Skin: No rashes or lesions in exposed areas  Extremities: No edema, cyanosis  Access: Peripheral IV    Antibiotics:  Vancomycin dosing per pharmacy   fluconazole 400 mg p.o. every 24 hours     Results Review:    Lab Results   Component Value Date    WBC 10.77 01/06/2024    HGB 10.1 (L) 01/06/2024    HCT 32.3 (L) 01/06/2024    MCV 83.2 01/06/2024     01/06/2024     Lab Results   Component Value Date    GLUCOSE 171 (H) 01/06/2024    BUN 7 (L) 01/06/2024    CREATININE 0.61 01/06/2024    EGFRIFNONA 54 (L) 10/24/2018    EGFRIFAFRI 83 02/23/2017    BCR 11.5 01/06/2024    CO2 22.3 01/06/2024    CALCIUM 8.6 01/06/2024    PROTENTOTREF 6.9 02/23/2017    ALBUMIN 1.9 (L) 01/06/2024    LABIL2 1.7 02/23/2017    AST 14 01/06/2024    ALT 13 01/06/2024     Microbiology:  1/2 urine culture no growth  1/2 blood cultures 2 out of 2 gram-positive bacilli  1/3 right renal fluid culture yeast  1/3 left renal fluid culture yeast  1/5 bCx NGTD x 2    Assessment    #Right calyceal rupture  #Bilateral hydronephrosis status post bilateral ureteral stenting  #JADEN  #DKA  #Hyponatremia  #Morbid obesity, BMI 40  # Gram-positive bacteremia    Continue vancomycin dosing per pharmacy while awaiting identification and sensitivities of gram-positive bacilli in blood.  Repeat blood cultures have been obtained x 2 and are negative to date.  Continue fluconazole 400 mg p.o. daily.  Antibiotic duration to be determined.      We will review the patient's chart tomorrow and plan to see again on Monday.  Please call with questions  or concerns

## 2024-01-06 NOTE — PROGRESS NOTES
NATHALIA CAMPUZANO [facility]  INTERNAL MEDICINE  DEAN FAIRCHILD MD  [address] [place] [address]  Phone [phone] Fax [phone]  E-mail:  [email or web address]      INTERNAL MEDICINE DAILY PROGRESS NOTE  Dean Fairchild M.D.  2024            Patient Identification:  Name: Tiana Hudson  Age: 63 y.o.  Sex: female  :  1960  MRN: [id number]         Primary Care Physician: Dean Fairchild MD  LENGTH OF STAY 3 DAYS    Consults       Date and Time Order Name Status Description    2024  5:10 AM Inpatient Nephrology Consult Completed     2024  2:00 PM Inpatient Infectious Diseases Consult Completed     1/3/2024 10:27 AM Inpatient Urology Consult      2024 11:55 PM Urology (on-call MD unless specified) Completed     2024  3:45 PM IP General Consult (Use specialty-specific consult if known) Completed             Chief Complaint: Abdominal/flank pain with DKA, acute cystitis and sepsis, and possible calyceal rupture in the kidney     History of Present Illness:     Subjective   Interval History: Patient is a 63 y.o.female who presented at my request to the emergency room at [facility] with complaint of acute abdominal/flank pain and history of recurrent kidney stones.  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  Patient has been followed for the last few months by Dr. Rei Calvert in urology for renal calculi and actually has had a stent placed in the right kidney due to obstruction from her renal stones.  It is also noted that the patient had a recent lithotripsy.  Patient began to feel poorly after the recent  holiday and became more severely ill over the last 3 to 4 days prior  to this admission.  She has felt extremely fatigued, dizzy at times, tired, nauseated, and has spent most of her time in bed sleeping.  Family has had difficulty getting the patient to take any oral intake and became quite concerned as her condition continued to worsen.  Patient was attempting to go to work today but really was too dizzy to get in the car to drive and 2-week to actually work.  After urging from her daughter, patient called me with respect to the symptoms and at my request went to the ER for evaluation.  Patient has multiple medical comorbidities that complicate her medical care.These include most significantly recurrent urinary tract infections due to her nephrolithiasis, morbid obesity with BMI greater than 40, diabetes mellitus type 2 poorly controlled with recent addition of Ozempic to her metformin therapy, vitamin D deficiency, essential hypertension, hyperlipidemia, hypothyroidism, allergic rhinitis, back pain, contact dermatitis, anxiety, and history of dense breast tissue on mammograms.     Patient was evaluated in the emergency room by Angelito Winkler MD who was the ER attending on call time of her arrival.  Patient was seen on 1/2/2024 at 1544 by Dr. Winkler.  His HPI was consistent with the story which I given above.  Patient denied dysuria, fever, chills on his evaluation.  She did admit to having emesis and actually had some emesis while in the emergency room.  Review of systems in the emergency room was positive for diffuse abdominal pain, and vomiting.  Patient also was noted to have some significant flank pain.  All other systems reviewed were negative in the emergency room vital signs on arrival in the emergency room showed temperature of 96 °F, heart rate of 114, respiratory rate of 16, blood pressure 125/72, and O2 sat of 98%.  Patient was described as ill-appearing but in no acute distress.  She did have severe tenderness to palpation more on the right side of the abdomen and out  into the right flank area with voluntary guarding.  Labs collected in the ER showed that her lactate level was elevated at 6.0.  She had a lipase of 23, her white blood cell count was 20.12, her hemoglobin was 11.2, and her platelet count was 568,000.  Patient did have a phosphorus of 4.2, magnesium 2.0, osmolality of 317, hemoglobin A1c is 16.90, and UA showed specific gravity 1.027, 3+ glucose, moderate blood 2+, leukocyte esterase moderate 2+, nitrates negative, white blood cells 6-10, red blood cells 6-10, bacteria 2+.  Her CMP showed a glucose of 978, creatinine of 1.82, sodium of 115, potassium of 4.2, chloride of 76, CO2 of 15.7, albumin 2.4, AST 33, alk phos 220, anion gap of 23, EGFR of 31.3.  Patient did have a CT scan of her abdomen and pelvis completed which showed loculated fluid throughout the right perinephric space felt to possibly be secondary to right calyceal rupture, new mild to moderate wall thickening of the right renal pelvis and renal calyces, bilateral UPJ obstruction with moderate bilateral hydronephrosis on the left, mild new dilation of right ureter, nonobstructing right renal calculi, hepatic morphology suggestive of chronic liver disease, and reactive lymph nodes in the retrocaval area.  A chest x-ray was done which showed no acute disease other than minimal atelectasis at the base of the right lung.  Patient underwent aggressive fluid resuscitation in the emergency room and received over 3 L of IV fluid.  She was placed on an insulin drip and was also started on Zosyn therapy.  She was followed on sepsis protocol as well as DKA protocol.  Patient did receive morphine 4 mg for pain x 2.  Case was discussed with myself as her primary care attending.  Urology was also contacted about situation.  I did suggest that patient be admitted to the ICU for treatment of the DKA and severe sepsis picture.  Dr. Arthur Tena did agree to the ICU admission and will be following her in the CCU for  pulmonary/critical care.  Final diagnoses in the ER were sepsis due to unspecified organism and acute cystitis without significant hematuria.     1/2/2024.  I personally saw the patient for the first time during this hospitalization on this date in the coronary care unit room #335.  Patient was resting quietly in bed and did a peer acutely ill.  She did wake to her name and spoke a few words before falling back asleep.  Patient's 2 daughters were at the foot of her bed and did discuss the case at length with me.  Daughter Enoch, is a former nurse from here at [facility] [employer], and now works at [employer] with the hospitalist group there.  Patient has responded well to the [employer] DKA protocols and blood sugars have gradually come down to near normal levels for the patient in the 1  range.  I will full PPE for the exam including an N95 face mask, goggles, white lab coat, and gloves when touching patient.  I performed thorough hand hygiene before and after the patient visit.  Patient did have a venous blood gas which showed pH 7.40, pCO2 28, pO2 of 31, and O2 sat of 62%.  She also had recent electrolytes which showed a sodium of 131, potassium 3.4, chloride 96, CO2 19.1, BUN 17, creatinine 1.15, estimated GFR now up to 54.3.  Lactate level has been coming steadily down and currently at 2.8.  Additional labs ordered for tomorrow a.m.  Patient has been able to urinate several times and daughters have helped her up to the bathroom for this.  I actually note that she did have some urinary frequency and incontinence at home which is unusual for her.  I did speak briefly to the patient's daytime nurse who was departing soon after I arrived on the floor.  I have reviewed Dr. Arthur Tena's notes and his admission history and physical.  I do agree completely with his plan of care at present time.  Supportive care is continued to be offered for the sepsis with careful monitoring in the ICU.  Patient's anion gap metabolic  acidosis probably is related to lactic acidosis and she was continuing to take metformin even when she was not able to eat.  IV fluids are continued aggressively.  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  Probably will plan sliding scale insulin for stabilization of diabetes while here in the hospital and will train patient to continue that in the home environment.  Pseudohyponatremia will be corrected with the IV fluids.  Hypothyroidism will be addressed with continued Synthroid.  Statins are held for now.  Obesity is an ongoing problem for the patient.  At the time my exam, patient is medically stable and slowly improving.  I did relay this to the daughter to agree with my assessment.  We will continue to follow the patient with you and we will be happy to assume care of the patient when she is stable and ready to be transferred out of the ICU.  I can be reached directly for any concerns or questions at [phone].    1/3/2024.  Patient is seen again today in her room in the CCU #335.  Patient was resting quietly in bed at the time of my visit and states that her daughters were downstairs getting a bite to eat.  I did discuss the case with the patient's nurse, Tiana, who tells me that she is being preop and should be going down soon for cystoscopy and stent placement.  I will full PPE for the exam including an N95 face mask, goggles, white lab coat, and gloves when touching patient.  I performed thorough hand hygiene before and after the patient visit.  Patient is more alert today but still somewhat confused about details of her medical case.  She is able to carry on a conversation with me though and joke a bit with me while I am present in the room.  She does understand she has issues because of the obstruction bilaterally of her ureters that has resulted in hydronephrosis and hopefully can be relieved by the cystoscopy  that is planned for later this morning.  Patient is still on an insulin drip but is drastically improved compared to where she was yesterday at this time.  Review of labs shows that her sodium is now up to 133, her CO2 is 19.1, glucose is now at 134, phosphorus is at 2.1, lactate is normalized at 1.9, white blood cell count today is 18.93, hemoglobin is 9.6 today, lakelets are normal today,Blood culture from the first day of hospitalization shows anaerobic bottle gram-positive bacilli growth with identification still ongoing.  Fungal and body fluid cultures are still negative.  Urine culture remains negative.  Dr. Oquendo was following the patient for critical care today.  He has continued her antibiotics and is awaiting urology input.  Fluid resuscitation is continued.  Blood pressure now seems well-controlled.  DVT prophylaxis in place.  He had a lengthy discussion with patient's family at the time of his rounds.  Urology has seen the patient in consultation and is planning to do cystoscopy later today.  Dietitian is following the patient's case and recommending input treatment as needed.Dr. Armando did perform a cystoscopy with ureteral catheterization and stent insertion bilaterally.  His pre-op diagnosis and postop diagnoses were urosepsis with bilateral UPJ obstruction.  He did feel that he had successfully decompressed the blockages and the hydronephrotic changes.  He did speak with patient's daughter postoperatively.  Patient does seem to be much improved today.  Vital signs still show no fever.  Blood pressure is relatively stable.  Will continue to follow with you.    1/4/2024.  Patient is seen again today in her room in the CCU #335.  Patient has transitions of critical care and is in overflow for care on the floor.  Patient's daughter is present at bedside at the time of my visit.  Patient is much more awake and alert today.  She is more interactive and talkative.  I did discuss the case with the  patient's nurse.  We are going to obtain an ID consult today for help with antibiotic management and we also are going to transition the patient to regular sliding scale insulin as we begin the teaching process of getting patient on her own regimen of sliding scale insulin at the time of discharge to home.  We have also started patient on Lantus therapy at 10 units for now and may need to titrate up.  I wore full PPE for the exam today including an N95 face mask, goggles, white lab coat, and gloves when touching patient.  I performed thorough hand hygiene before and after the patient visit.  Specialist have seen patient today and their care is summarized below.Dr. Najera, the critical care attending today, felt that her mentation was slow but answering questions appropriately.  No other major findings were discovered at present time.  Antibiotics were continued for sepsis was felt to be significantly improved with anion gap now closed and lactic acidosis much better.  IV antibiotics were continued awaiting final culture results and I did consult ID to see patient for their input on the situation.  Dr. Devon Armando who completed bilateral stent placement for bilateral OP J obstruction and urosepsis is pleased with progress on day 1 postop.  He notes that urine output has been excellent.  Dr. Us from infectious disease did see the patient in consultation.  He notes that Zosyn antibiotics have been discontinued by Dr. Armando after her successful surgery.  White blood cell count is down to 17.08 today with hematocrit of 30.7.  Glucose max was 250 today estimated GFR is up to 54 and CO2 is 19.0.  He feels the significance of the positive blood culture is unclear at this point with only 1 of 2 bottles positive for gram-positive bacilli which normally would represent a contamination.  He is planning to repeat the blood cultures and will follow-up.  Urine culture was also negative except for yeast growth and due to  the recent  instrumentation he has started the patient on fluconazole 400 mg daily while cultures are pending.  New blood culture has been sent.  We will check again the lactic acid, procalcitonin, sed rate, and CRP with a.m. labs tomorrow.  Labs relatively stable today though I do note that sodium is dropped a bit to 127.  CO2 remains slightly low at 18.0 and chloride slightly low at 97.  Patient's glucoses running in the upper 100s and low 200s at present.  Electrolytes otherwise seem fairly stable.  White count is still elevated somewhat at 17.08 despite stenting yesterday.  Patient currently off antibiotics and being followed carefully by ID.  Plan to check procalcitonin and repeat blood culture with a.m. labs.  Hemoglobin is at 10.0.  Otherwise patient appears very stable at present time.  I do agree that her mentation is a little bit slower than usual but I suspect this will continue to improve as we get further from her acute DKA event.  Treatment plan reviewed with patient and her daughter today.    1/05/2024.  Patient is seen again today in her room in the CCU #335.  Patient resting quietly in bed and daughter Enoch is at bedside.  I spoke with patient's nurse during the day to discuss occasional changes in her treatment plan.  I wore full PPE for the exam including an N95 facemask, goggles, white lab coat, and gloves when touching patient.  I performed thorough hand hygiene before and after the patient visit.  Patient is much more alert today and more like her usual self.  She does laugh and participate in conversation.  Mother and daughter were very impressed with Dr. Sánchez's excellent review with them of the patient's renal condition and with the time he has been going over the various diagnostic images that have been done up to date.  He was pleased to see how significantly improved electrolytes are and felt the patient was remaining hemodynamically stable at present time.  Dr. Us from ID did  see the patient.  Blood cultures were repeated but original cultures are now positive 2 out of 2 for gram-positive bacilli which is more consistent with a true infection.  He started patient on vancomycin and will be following up on ID and's susceptibilities of the organisms.  He also continued the fluconazole 400 mg daily while following up on cultures from perinephric fluid.The pulmonary intensivist Dr. Arthur Tena saw patient briefly and found no pulmonary or critical care needs at this time.  Dr. Armando from neurology saw patient on postop day #2 after stent placement for bilateral UPJ obstruction.  Indicates that Perales catheter can be removed at any time.  Occupational Therapy began to work with the patient and feels that she will benefit from further skilled occupational therapy.  She did some bedside exercises and did do some sort steps.  She was able to sit up on the edge of the bed for 8 to 10 minutes with to stand.  Physical therapy also saw patient and felt she would continue to benefit from skilled physical therapy.  Pharmacist did consult with the patient on vancomycin and started at 750 mg IV every 12 hours.  They will be checking a trough level on 1/7/2024 at 8:30 AM.  Labs today are generally stable.  Glucose levels ranging in the 140s to 170s.  Sodium still slightly low at 132.  CO2 now normal at 22.5, albumin slightly low at 2.1, AST slightly up at 34 and alk phos slightly up at 270.  White blood cell count today is down to 13.04 and hemoglobin is stable at 10.3 with platelets of 412,000.  2 of 2 initial blood cultures were positive with final ID pending.  Sed rate remains greater than 130, procalcitonin is 3.24, and C-reactive protein is at 23.85.  Patient is still on overflow status waiting for a floor telemetry bed.        Review of Systems:               Review of systems could not be obtained due to  patient confusion. patient nonverbal.       Past Medical History:   Diagnosis Date    Anemia      intermittently    Anxiety and depression     Arthritis     Depression     Diabetes mellitus     Gallstones     High cholesterol     History of frequent urinary tract infections     HX OF YEAST IN BLADDER HAS PRN DIFLUCAN    History of transfusion 2017    Had 2 iron infusions.  This was when i had knee replacement    Hyperlipidemia     Hypertension     Hypothyroidism     Knee pain, bilateral     Low back pain     Lumbar stenosis     PONV (postoperative nausea and vomiting)     Positive TB test     chest x-ray neg    Seasonal allergies      Past Surgical History:   Procedure Laterality Date    APPENDECTOMY N/A     Dr. Wilson    CHOLECYSTECTOMY WITH INTRAOPERATIVE CHOLANGIOGRAM N/A 10/31/2018    Procedure: laparoscopic cholecystectomy;  Surgeon: Mary Kay Mac MD;  Location: [facility];  Service: General    COLONOSCOPY      CYSTOSCOPY BLADDER BIOPSY N/A 10/3/2016    Procedure: CYSTOSCOPY BLADDER BIOPSY;  Surgeon: Rei Calvert MD;  Location: [facility];  Service:     CYSTOSCOPY W/ URETERAL STENT PLACEMENT Bilateral 1/3/2024    Procedure: CYSTOSCOPY BILATERAL RETROGRADE PYELOGRAM  BILATERAL URETERAL STENT INSERTION AND CATHETHER PLACEMENT;  Surgeon: Eduard Armando MD;  Location: [facility];  Service: Urology;  Laterality: Bilateral;    DILATATION AND CURETTAGE N/A     ENDOSCOPY      INTRAUTERINE DEVICE INSERTION      KNEE ARTHROSCOPY W/ MENISCAL REPAIR Left     OVARIAN CYST REMOVAL Left     Dr. Wilson    TOTAL KNEE ARTHROPLASTY Right 2017    Procedure: RIGHT TOTAL KNEE ARTHROPLASTY WITH ALETA NAVIGATION;  Surgeon: Ross Cruz MD;  Location: [facility];  Service:      Allergies   Allergen Reactions    Sulfa Antibiotics Hives and Rash       Family History   Problem Relation Age of Onset    Hepatitis Mother     Breast cancer Mother     Heart disease Mother     Cancer Mother     Hyperlipidemia Mother              Heart failure Father     Stroke Father      Hypertension Father         Since he was 72, now 86s    Diabetes Father     Heart disease Father     Hyperlipidemia Father         Unsurs    Heart disease Maternal Grandmother     Cancer Maternal Grandfather     COPD Maternal Grandfather     Arthritis Paternal Grandmother     Diabetes Maternal Aunt     Diabetes Paternal Uncle     Malig Hyperthermia Neg Hx        Social History     Socioeconomic History    Marital status:    Tobacco Use    Smoking status: Never    Smokeless tobacco: Never    Tobacco comments:     Never smoked   Vaping Use    Vaping Use: Never used   Substance and Sexual Activity    Alcohol use: Never     Alcohol/week: 3.0 standard drinks of alcohol     Types: 1 Glasses of wine, 2 Standard drinks or equivalent per week    Drug use: Never    Sexual activity: Not Currently     Partners: Male     Birth control/protection: I.U.D.       PMH, FH, SH and ROS completed with Admission History and Physical and updated in EPIC system.        Objective     Scheduled Meds:enoxaparin, 40 mg, Subcutaneous, Q12H  escitalopram, 10 mg, Oral, Daily  fluconazole, 400 mg, Oral, Q24H  insulin glargine, 10 Units, Subcutaneous, Nightly  insulin lispro, 2-9 Units, Subcutaneous, 4x Daily AC & at Bedtime  levothyroxine, 137 mcg, Oral, Q AM  potassium phosphate, 15 mmol, Intravenous, Once  rOPINIRole, 0.5 mg, Oral, Nightly  senna-docusate sodium, 2 tablet, Oral, BID  sodium chloride, 10 mL, Intravenous, Q12H  sodium chloride, 10 mL, Intravenous, Q12H  sodium chloride, 10 mL, Intravenous, Q12H  vancomycin, 750 mg, Intravenous, Q12H      Continuous Infusions:lactated ringers, 9 mL/hr, Last Rate: 9 mL/hr (01/03/24 1527)  Pharmacy to dose vancomycin,         Vital signs in last 24 hours:  Temp:  [97.8 °F (36.6 °C)-99.9 °F (37.7 °C)] 98.4 °F (36.9 °C)  Heart Rate:  [91-98] 91  Resp:  [18] 18  BP: (123-128)/(67) 128/67    Intake/Output:    Intake/Output Summary (Last 24 hours) at 1/6/2024 0005  Last data filed at 1/5/2024  "1700  Gross per 24 hour   Intake --   Output 2400 ml   Net -2400 ml       Exam:  /67 (BP Location: Left arm, Patient Position: Lying)   Pulse 91   Temp 98.4 °F (36.9 °C) (Oral)   Resp 18   Ht 165.1 cm (65\")   Wt 111 kg (244 lb 11.4 oz)   LMP 09/03/2016 Comment: IUD  SpO2 91%   BMI 40.72 kg/m²     Constitutional:  Alert, cooperative, more readily answers questions, mild distress, AAOx3, resting comfortably, mentation is continuing to today with lower blood sugar levels and sepsis under better control.   Head:      Normocephalic, without obvious abnormality, atraumatic   Eyes:     PERRLA, conjunctiva/corneas clear, no icterus, no conjunctival                                     pallor, EOM's intact, both eyes      ENT and Mouth: Lips, tongue, gums normal; oral mucosa pink and moist   Neck:     Supple, symmetrical, trachea midline, no JVD  Respiratory:     Clear to auscultation bilaterally, respirations unlabored  Cardiovascular:  Regular rate and rhythm, S1 and S2 normal, no murmur,      no  Rub or gallop.  Pulses normal.    Gastrointestinal:   BS present x 4 Soft, non-tender, bowel sounds active,      no masses, no hepatosplenomegaly                                                     :       No hernia.  Normal exam for sex.         Musculoskeletal: Extremities normal, atraumatic, no cyanosis or edema     No arthropathy.  No deformity.  Gait normal                                                 Skin:   Skin is warm and dry,  no rashes, swelling or palpable lesions   Neurologic:  CN -XII intact, motor strength grossly intact, sensation grossly intact to light touch, no focal reflex deficits noted    Psychiatric:     Alert,oriented X3, no delusions, psychoses, depression or anxiety    Heme/Lymph/Imun:   No bruises, petechiae.  Lymph nodes normal in size/configuration       Data Review:  Lab Results   Component Value Date    CALCIUM 8.9 01/05/2024    PHOS 3.0 01/05/2024     Results from last 7 days "   Lab Units 01/05/24  0232 01/05/24  0231 01/04/24  1615 01/04/24  1205 01/04/24  1059 01/04/24  0345 01/03/24  0839 01/03/24  0409 01/02/24  1816 01/02/24  1629 01/02/24  1240   AST (SGOT) U/L 34*  --   --   --   --   --   --   --   --  21 33*   ALT (SGPT) U/L 19  --   --   --   --   --   --   --   --  16 16   MAGNESIUM mg/dL 1.7  --  1.8 1.8   < > 1.8   < > 1.8   < > 1.9 2.0   SODIUM mmol/L 132*  --  127* 130*   < > 135*   < > 133*   < > 125* 115*   POTASSIUM mmol/L 4.1  --  4.1 3.9   < > 4.1   < > 4.3   < > 3.9 4.2   CHLORIDE mmol/L 101  --  97* 98   < > 103   < > 100   < > 91* 76*   CO2 mmol/L 22.5  --  18.0* 19.0*   < > 19.7*   < > 20.9*   < > 15.7* 15.7*   BUN mg/dL 8  --  10 12   < > 12   < > 16   < > 19 23   CREATININE mg/dL 0.85  --  0.84 0.89   < > 0.94   < > 1.05*   < > 1.33* 1.82*   GLUCOSE mg/dL 159*  --  221* 250*   < > 167*   < > 186*   < > 695* 978*   CALCIUM mg/dL 8.9  --  8.7 8.5*   < > 8.7   < > 8.5*   < > 8.7 9.4   WBC 10*3/mm3  --  13.04*  --   --   --  17.08*  --  18.93*   < >  --   --    HEMOGLOBIN g/dL  --  10.3*  --   --   --  10.0*  --  9.6*   < >  --   --    PLATELETS 10*3/mm3  --  412  --   --   --  446  --  441   < >  --   --     < > = values in this interval not displayed.     Lab Results   Component Value Date    TROPONINT <6 01/02/2024     Estimated Creatinine Clearance: 86.2 mL/min (by C-G formula based on SCr of 0.85 mg/dL).  WEIGHTS:     Wt Readings from Last 1 Encounters:   01/05/24 0556 111 kg (244 lb 11.4 oz)   01/04/24 0600 111 kg (245 lb 6 oz)   01/03/24 0600 111 kg (245 lb 6 oz)   01/02/24 1131 120 kg (265 lb)         Assessment:    DKA (diabetic ketoacidosis)    UTI (urinary tract infection)    Morbid obesity with BMI of 40.0-44.9, adult    JADEN (acute kidney injury)    Sepsis, unspecified organism    Lactic acidosis    UPJ (ureteropelvic junction) obstruction bilateral    Bilateral hydronephrosis moderate on admission    Renal calculus on right, nonobstructinbg    Vitamin D  deficiency    Essential hypertension    Hyperlipidemia    Hypothyroidism    Allergic rhinitis    Back pain    Chronic liver disease    Primary osteoarthritis of right knee    Contact dermatitis    Anxiety    Dense breast tissue on mammogram    Bilateral hydrocele      Attending Physician Assessment and Plan:    1.  Cystitis/UTI with sepsis associated with bilateral UPJ obstruction and moderate bilateral hydronephrosis and possible right calyceal rupture.  Patient resuscitated aggressively with IV fluids.  Broad-spectrum antibiotics, Zosyn, started.  Urology consult is placed.  Patient being followed carefully in ICU for additional complications or signs of worsening sepsis.  Culture of urine remains negative on day #2.  New cultures collected at time of cystoscopy today by urology.  Patient currently off Zosyn which was stopped after surgery yesterday.  We will repeat blood cultures since we did have 1 of 2 initial blood cultures positive.  ID has been consulted and is following with us.  They have started the patient on fluconazole secondary to the instrumentation yesterday and yeast that is seen in hearing.  I do plan to check a procalcitonin level with a.m. labs tomorrow.  Patient also started on vancomycin due to 2 of 2 positive cultures from blood.  Additional blood cultures pending at present time.     2.  Anion gap metabolic acidosis versus possible diabetic ketoacidosis.  Improving rapidly at present time on DKA protocol with Glucomander.  Aggressive fluid resuscitation initiated in the ER is continued in the ICU.  Patient now with good urinary output and hopefully will be able to come off of IV insulin and switch to subcu dosing overnight..  Anion gap has narrowed and improved.  Patient should come off Glucomander DKA protocol sometime later today.  On postop day #1 anion gap has significantly improved.  We are asking renal to help us with follow-up on her acute kidney injury and volume status.  Acidosis  has resolved.     3.  Lactic acidosis possibly due to home use of metformin while taking no food or possibly due to acute sepsis.  Continuing rapid fluid resuscitation with improvement already noted in lactic acid.  Monitoring carefully for signs of any new changes or problems.  Lactic acidosis has also resolved on day #2 and patient's sepsis seems to be under much more stable condition at present time.  Repeat lactic acid in a.m. with other labs.  Lactic acidosis has resolved.    4.  Type 2 diabetes mellitus poorly controlled at present time with elevated A1c greater than 16.  Will probably need to arrange for follow-up with patient in endocrinology clinic.  For now we will ask diabetic educator to see patient and work on sliding scale insulin protocols.  Patient and her daughter are interested in Dexcom monitoring and I will see if that is available from the diabetic educator.  Discussed situation with the daughters and she may benefit from continuous 24-hour glucose monitoring issues willing to learn the process.  Will probably need to discharge charge on sliding scale insulin and may also consider mealtime insulin or long acting insulin.  Diabetic educator to see patient and work with us on diet, glucose testing techniques, and generalized diabetes management.  Patient doing well on sliding scale with single dose of Lantus at nighttime.     5.  Pseudohyponatremia.  Seems to already be improving with resuscitation and resolution of the uncontrolled glucoses.  Will continue to follow electrolytes regularly.  Hyponatremia now corrected and in normal range.  Hyponatremia has now resolved on day #2     6.  Hypothyroidism.  Patient's Synthroid is continued by the ICU attending.  Will continue to follow this in the hospital and on an outpatient basis.  Hypothyroidism is a stable condition     7.  Hyperlipidemia.  Agree with holding statin for now.  Will resume prior to discharge and continue on outpatient basis.     8.   Obesity.  Hope to continue using agents such as Ozempic for management of patient's blood sugar levels.  This will also help with diet control and ongoing need for gradual weight loss.     9.  Acute kidney injury secondary to volume depletion with uncontrolled glucoses.  Should improve as patient's volume status normalizes.  Will continue to monitor closely.  Acute kidney injury is gradually improving with resolution of hydronephrosis and bilateral stent placement.     10.  Generalized muscle weakness and fatigue.  Suspect related to the poorly controlled diabetes and electrolyte disturbances.  Will do PT and OT evaluations after patient stabilizes.  Suspect patient will discharge to home with PT and OT and home environment.  Hopefully patient will be able to work with PT and OT in the next few days.        Plan for disposition:Where: home and home health and When:  3-4 days when medically stable     Copied text in this note has been reviewed by me and is accurate as of 01/05/2024.  Much of this dictation was completed using Dragon voice activated transcription software which can result in misspelled words and nonsensical phrases at times.         Dr. Greg Rangel will be covering fpr me over the weekend and can be reached through my office # [phone] or at his office number.      Dean Fairchild MD  1/5/2024  1300 EST

## 2024-01-06 NOTE — PROGRESS NOTES
LOS: 3 days   Patient Care Team:  Greg Rangel MD as PCP - General  Greg Rangel MD as PCP - Family Medicine    Chief Complaint:   Chief Complaint   Patient presents with    Flank Pain     R side         Subjective    Today the patient is awake, alert, and oriented.  She says she is feeling much better.  She still somewhat anorexic but is eating some small amounts.  She is complaining of reflux and apparently she uses H2 blockers as an outpatient.  The did not seem to be on her medicine list we will start her on some Protonix to try and give her some more immediate relief.  Otherwise she indicates that she has no new complaints or problems and her family agrees that she is getting better each day.    Interval History:     Patient Complaints: Her reflux symptoms are her biggest complaint along with general fatigue and malaise  Patient Denies: She denies shortness of breath or chest pain, no nausea or vomiting  History taken from: patient chart family    Review of Systems:    Overall her review of systems are relatively benign.  As noted she is awake, alert, oriented and beginning to eat more.  Her reflux is her principal complaint.  Otherwise she says she feels well and has no new issues.  She seems to be tolerating her medications without any difficulty.  She is weak and debilitated and was only able to walk a short distance in the room with physical therapy yesterday.  Hopefully she will work with physical therapy again today and over the weekend.    Objective      Vital Signs  Temp:  [98 °F (36.7 °C)-99.2 °F (37.3 °C)] 98.9 °F (37.2 °C)  Pulse:  [] 109  Resp:  [18] 18  BP: (111-154)/(55-97) 154/97    I/O'S  Intake & Output (last 7 days)         12/30 0701 12/31 0700 12/31 0701 01/01 0700 01/01 0701 01/02 0700 01/02 0701 01/03 0700 01/03 0701 01/04 0700 01/04 0701 01/05 0700 01/05 0701 01/06 0700 01/06 0701 01/07 0700    P.O.    360 240 480      I.V. (mL/kg)    1004.7 (9.1) 6889  (56.9)       IV Piggyback    1100    250    Total Intake(mL/kg)    2464.7 (22.2) 6551 (59) 480 (4.3)  250 (2)    Urine (mL/kg/hr)    800 1075 (0.4) 1875 (0.7) 2000 (0.7) 350 (0.4)    Blood     5       Total Output    800 1080 1875 2000 350    Net    +1664.7 +5471 -1395 -2000 -100                Urine Unmeasured Occurrence    1 x                Physical Exam:   General Appearance: alert, pleasant, appears stated age, interactive, fatigued, cooperative, pale, and she is morbidly obese which makes her exam a little more difficult.  Lungs: clear to auscultation, respirations regular, respirations even, and respirations unlabored  Heart: regular rhythm & normal rate and the monitor indicates a stable rate.  Abdomen: normal bowel sounds, no guarding, no rebound tenderness, and she has some vague epigastric discomfort and also some right lower quadrant discomfort that she indicates has been there for some time.  Extremities: no edema, no cyanosis, and no redness     Results Review:     I reviewed the patient's new clinical results.  I reviewed the patient's other test results and agree with the interpretation                     Results from last 7 days   Lab Units 01/06/24  0653 01/05/24  0232 01/04/24  1615 01/04/24  1205 01/04/24  1059 01/04/24  0345 01/03/24  2358   SODIUM mmol/L 131* 132* 127* 130* 128* 135* 133*   POTASSIUM mmol/L 3.8 4.1 4.1 3.9 3.9 4.1 3.9   CHLORIDE mmol/L 98 101 97* 98 101 103 103   CO2 mmol/L 22.3 22.5 18.0* 19.0* 18.6* 19.7* 19.1*   BUN mg/dL 7* 8 10 12 11 12 13   CREATININE mg/dL 0.61 0.85 0.84 0.89 0.92 0.94 0.88   GLUCOSE mg/dL 171* 159* 221* 250* 227* 167* 134*   CALCIUM mg/dL 8.6 8.9 8.7 8.5* 8.7 8.7 8.7         Results from last 7 days   Lab Units 01/06/24  0653 01/05/24  0231 01/04/24  0345 01/03/24  0409 01/02/24  1816 01/02/24  1153   WBC 10*3/mm3 10.77 13.04* 17.08* 18.93* 17.30* 20.12*   HEMOGLOBIN g/dL 10.1* 10.3* 10.0* 9.6* 10.5* 11.2*   HEMATOCRIT % 32.3* 32.5* 30.7* 29.2* 31.7*  38.6   PLATELETS 10*3/mm3 384 412 446 441 480* 568*         Results from last 7 days   Lab Units 01/06/24  0653 01/05/24  0232 01/04/24  1615 01/04/24  1205 01/04/24  1059 01/04/24  0345 01/03/24  2358   MAGNESIUM mg/dL 1.7 1.7 1.8 1.8 1.9 1.8 1.7             Results from last 7 days   Lab Units 01/05/24  0232   CRP mg/dL 23.85*       Lab Results   Component Value Date    HGBA1C 17.70 (H) 01/02/2024    HGBA1C 16.90 (H) 01/02/2024    HGBA1C 10.20 (H) 06/21/2023    HGBA1C 6.02 (H) 05/23/2017    HGBA1C 6.55 (H) 02/23/2017    HGBA1C 5.72 (H) 10/24/2016    HGBA1C 7.0 (H) 07/18/2016     Glucose   Date/Time Value Ref Range Status   01/06/2024 1137 164 (H) 70 - 130 mg/dL Final   01/05/2024 2052 187 (H) 70 - 130 mg/dL Final   01/05/2024 1705 182 (H) 70 - 130 mg/dL Final   01/05/2024 1148 212 (H) 70 - 130 mg/dL Final   01/05/2024 0737 163 (H) 70 - 130 mg/dL Final   01/05/2024 0639 189 (H) 70 - 130 mg/dL Final   01/04/2024 2035 170 (H) 70 - 130 mg/dL Final   01/04/2024 1610 227 (H) 70 - 130 mg/dL Final       eGFR   Date Value Ref Range Status   01/06/2024 101.9 >60.0 mL/min/1.73 Final   01/05/2024 78.1 >60.0 mL/min/1.73 Final   01/04/2024 79.2 >60.0 mL/min/1.73 Final   01/04/2024 73.9 >60.0 mL/min/1.73 Final   01/04/2024 71.0 >60.0 mL/min/1.73 Final   01/04/2024 69.2 >60.0 mL/min/1.73 Final   01/03/2024 74.9 >60.0 mL/min/1.73 Final           Medication Review:   Scheduled Meds:enoxaparin, 40 mg, Subcutaneous, Q12H  escitalopram, 10 mg, Oral, Daily  fluconazole, 400 mg, Oral, Q24H  insulin glargine, 10 Units, Subcutaneous, Nightly  insulin lispro, 2-9 Units, Subcutaneous, 4x Daily AC & at Bedtime  levothyroxine, 137 mcg, Oral, Q AM  pantoprazole, 40 mg, Oral, Q AM  potassium phosphate, 15 mmol, Intravenous, Once  rOPINIRole, 0.5 mg, Oral, Nightly  senna-docusate sodium, 2 tablet, Oral, BID  vancomycin, 750 mg, Intravenous, Q12H      Continuous Infusions:lactated ringers, 9 mL/hr, Last Rate: 9 mL/hr (01/03/24 1527)  Pharmacy to  dose vancomycin,       PRN Meds:.  acetaminophen **OR** acetaminophen    senna-docusate sodium **AND** polyethylene glycol **AND** bisacodyl **AND** bisacodyl    Calcium Replacement - Follow Nurse / BPA Driven Protocol    dextrose    dextrose    glucagon (human recombinant)    HYDROcodone-acetaminophen    HYDROmorphone    ipratropium-albuterol    Magnesium Standard Dose Replacement - Follow Nurse / BPA Driven Protocol    nitroglycerin    ondansetron ODT **OR** ondansetron    Pharmacy to dose vancomycin    Phosphorus Replacement - Follow Nurse / BPA Driven Protocol    Potassium Replacement - Follow Nurse / BPA Driven Protocol                DKA (diabetic ketoacidosis)    Primary osteoarthritis of right knee    UTI (urinary tract infection)    Contact dermatitis    Vitamin D deficiency    Essential hypertension    Hyperlipidemia    Hypothyroidism    Morbid obesity with BMI of 40.0-44.9, adult    Allergic rhinitis    Back pain    Anxiety    Dense breast tissue on mammogram    JADEN (acute kidney injury)    Sepsis, unspecified organism    Lactic acidosis    UPJ (ureteropelvic junction) obstruction bilateral    Bilateral hydrocele    Bilateral hydronephrosis moderate on admission    Renal calculus on right, nonobstructinbg    Chronic liver disease      #1 Urosepsis-she had bilateral UPJ obstructions and now has stents for the hydronephrosis.  She is on IV antibiotics and seems to be making good progress.  She is followed by infectious disease who is waiting on final culture reports.  For now she seems to be very stable.  At this point her white count is normal and she is afebrile.    2.  Diabetic ketoacidosis-her acidosis has resolved and her fluid status seems to be stable.  Her blood sugars are under generally good control.  Her hemoglobin A1c was dramatically elevated at 16 on admission.    3.  Reflux-apparently she uses Pepcid as an outpatient and I do not see that on her list.  I will start her on some pantoprazole to  try and get this under control quickly.  She can go back to her H2 blocker on discharge.    4.  Hyponatremia-her sodium is nearly normal at this point with fluid resuscitation.    5.  Hypothyroidism-well-controlled on medications as an outpatient    6.  Hyperlipidemia-she is on statins as an outpatient but that is been held until discharge    7.  Obesity-unfortunately he is morbidly obese and clearly needs to make a better effort at diet, exercise, and general diabetes maintenance after this episode.    8.  JADEN-she came in acute distress and hypovolemic.  Clearly that was result of the DKA and hyperglycemia.  Her kidney function rebounded quickly with IV fluids.    9.  General weakness and malaise-considering how ill she was is not surprising she still has a lot of fatigue and weakness.  She has beginning to work with PT and her goal is to return home as opposed to going to rehab.  Hopefully PT will see her again this weekend.          Plan for disposition:Where: home    Greg Rangel MD  01/06/24  14:02 EST          Time:

## 2024-01-06 NOTE — PROGRESS NOTES
"[facility] Clinical Pharmacy Services: Vancomycin Monitoring Note    Tiana Hudson is a 63 y.o. female who is on day 2/6 of pharmacy to dose vancomycin for Bacteremia.    Previous Vancomycin Dose:   750 mg IV every  12  hours  Updated Cultures and Sensitivities:   1/2 Blood - Gram+ rods  1/3 Kidney fluid - yeast  1/5 Blood - NGTD        Vitals/Labs  Ht: 165.1 cm (65\"); Wt: 122 kg (269 lb 6.4 oz)   Temp Readings from Last 1 Encounters:   01/06/24 98 °F (36.7 °C) (Oral)     Estimated Creatinine Clearance: 126.9 mL/min (by C-G formula based on SCr of 0.61 mg/dL).       Results from last 7 days   Lab Units 01/06/24  0653 01/05/24  0232 01/05/24  0231 01/04/24  1615 01/04/24  1059 01/04/24  0345   CREATININE mg/dL 0.61 0.85  --  0.84   < > 0.94   WBC 10*3/mm3 10.77  --  13.04*  --   --  17.08*    < > = values in this interval not displayed.     Assessment/Plan  Level planned for prior to tonight's dose. Currently predicted AUC is below goal with improved SCr, however will assess level first then make changes as needed.     Current Vancomycin Dose: 750 mg IV every  12  hours; provides a predicted  mg/L.hr   Next Level Date and Time: Vanc Trough on Sat 1/6 at 2030  We will continue to monitor patient changes and renal function     Thank you for involving pharmacy in this patient's care. Please contact pharmacy with any questions or concerns.       Nimco Salinas, [facility]  Clinical Pharmacist          "

## 2024-01-06 NOTE — PLAN OF CARE
Goal Outcome Evaluation:  Plan of Care Reviewed With: patient, family        Progress: improving  Outcome Evaluation: Improved toleration of activity today, able to walk 20 ft x 2, main limitation was lower R abdominal pain, and pt was limping due to this, she did better with a rolling walker. PT will cont to follow

## 2024-01-07 LAB
ALBUMIN SERPL-MCNC: 2.1 G/DL (ref 3.5–5.2)
ALBUMIN/GLOB SERPL: 0.5 G/DL
ALP SERPL-CCNC: 229 U/L (ref 39–117)
ALT SERPL W P-5'-P-CCNC: 8 U/L (ref 1–33)
ANION GAP SERPL CALCULATED.3IONS-SCNC: 14.4 MMOL/L (ref 5–15)
AST SERPL-CCNC: 16 U/L (ref 1–32)
BILIRUB SERPL-MCNC: 0.4 MG/DL (ref 0–1.2)
BUN SERPL-MCNC: 6 MG/DL (ref 8–23)
BUN/CREAT SERPL: 9.7 (ref 7–25)
CALCIUM SPEC-SCNC: 8.9 MG/DL (ref 8.6–10.5)
CHLORIDE SERPL-SCNC: 94 MMOL/L (ref 98–107)
CO2 SERPL-SCNC: 20.6 MMOL/L (ref 22–29)
CREAT SERPL-MCNC: 0.62 MG/DL (ref 0.57–1)
DEPRECATED RDW RBC AUTO: 44.3 FL (ref 37–54)
EGFRCR SERPLBLD CKD-EPI 2021: 101.5 ML/MIN/1.73
EOSINOPHIL # BLD MANUAL: 0.55 10*3/MM3 (ref 0–0.4)
EOSINOPHIL NFR BLD MANUAL: 4 % (ref 0.3–6.2)
ERYTHROCYTE [DISTWIDTH] IN BLOOD BY AUTOMATED COUNT: 14.4 % (ref 12.3–15.4)
GLOBULIN UR ELPH-MCNC: 4.6 GM/DL
GLUCOSE BLDC GLUCOMTR-MCNC: 136 MG/DL (ref 70–130)
GLUCOSE BLDC GLUCOMTR-MCNC: 200 MG/DL (ref 70–130)
GLUCOSE BLDC GLUCOMTR-MCNC: 209 MG/DL (ref 70–130)
GLUCOSE SERPL-MCNC: 157 MG/DL (ref 65–99)
HCT VFR BLD AUTO: 35.3 % (ref 34–46.6)
HGB BLD-MCNC: 11.5 G/DL (ref 12–15.9)
LYMPHOCYTES # BLD MANUAL: 1.51 10*3/MM3 (ref 0.7–3.1)
LYMPHOCYTES NFR BLD MANUAL: 7 % (ref 5–12)
MAGNESIUM SERPL-MCNC: 1.7 MG/DL (ref 1.6–2.4)
MCH RBC QN AUTO: 27.9 PG (ref 26.6–33)
MCHC RBC AUTO-ENTMCNC: 32.6 G/DL (ref 31.5–35.7)
MCV RBC AUTO: 85.7 FL (ref 79–97)
MONOCYTES # BLD: 0.96 10*3/MM3 (ref 0.1–0.9)
NEUTROPHILS # BLD AUTO: 10.73 10*3/MM3 (ref 1.7–7)
NEUTROPHILS NFR BLD MANUAL: 78 % (ref 42.7–76)
NRBC BLD AUTO-RTO: 0.3 /100 WBC (ref 0–0.2)
PHOSPHATE SERPL-MCNC: 3.7 MG/DL (ref 2.5–4.5)
PLAT MORPH BLD: NORMAL
PLATELET # BLD AUTO: 371 10*3/MM3 (ref 140–450)
PMV BLD AUTO: 11.4 FL (ref 6–12)
POTASSIUM SERPL-SCNC: 4.8 MMOL/L (ref 3.5–5.2)
PROT SERPL-MCNC: 6.7 G/DL (ref 6–8.5)
RBC # BLD AUTO: 4.12 10*6/MM3 (ref 3.77–5.28)
RBC MORPH BLD: NORMAL
SODIUM SERPL-SCNC: 129 MMOL/L (ref 136–145)
VARIANT LYMPHS NFR BLD MANUAL: 11 % (ref 19.6–45.3)
WBC MORPH BLD: NORMAL
WBC NRBC COR # BLD AUTO: 13.75 10*3/MM3 (ref 3.4–10.8)

## 2024-01-07 PROCEDURE — 63710000001 INSULIN LISPRO (HUMAN) PER 5 UNITS: Performed by: INTERNAL MEDICINE

## 2024-01-07 PROCEDURE — 63710000001 INSULIN GLARGINE PER 5 UNITS: Performed by: INTERNAL MEDICINE

## 2024-01-07 PROCEDURE — 83735 ASSAY OF MAGNESIUM: CPT | Performed by: INTERNAL MEDICINE

## 2024-01-07 PROCEDURE — 84100 ASSAY OF PHOSPHORUS: CPT | Performed by: INTERNAL MEDICINE

## 2024-01-07 PROCEDURE — 85007 BL SMEAR W/DIFF WBC COUNT: CPT | Performed by: INTERNAL MEDICINE

## 2024-01-07 PROCEDURE — 85025 COMPLETE CBC W/AUTO DIFF WBC: CPT | Performed by: INTERNAL MEDICINE

## 2024-01-07 PROCEDURE — 25010000002 VANCOMYCIN 10 G RECONSTITUTED SOLUTION: Performed by: INTERNAL MEDICINE

## 2024-01-07 PROCEDURE — 25810000003 SODIUM CHLORIDE 0.9 % SOLUTION: Performed by: INTERNAL MEDICINE

## 2024-01-07 PROCEDURE — 25010000002 ENOXAPARIN PER 10 MG: Performed by: UROLOGY

## 2024-01-07 PROCEDURE — 99232 SBSQ HOSP IP/OBS MODERATE 35: CPT | Performed by: INTERNAL MEDICINE

## 2024-01-07 PROCEDURE — 82948 REAGENT STRIP/BLOOD GLUCOSE: CPT

## 2024-01-07 PROCEDURE — 80053 COMPREHEN METABOLIC PANEL: CPT | Performed by: INTERNAL MEDICINE

## 2024-01-07 RX ORDER — FLUCONAZOLE 200 MG/1
800 TABLET ORAL EVERY 24 HOURS
Status: DISCONTINUED | OUTPATIENT
Start: 2024-01-07 | End: 2024-01-08

## 2024-01-07 RX ADMIN — LEVOTHYROXINE SODIUM 137 MCG: 137 TABLET ORAL at 06:40

## 2024-01-07 RX ADMIN — VANCOMYCIN HYDROCHLORIDE 1250 MG: 10 INJECTION, POWDER, LYOPHILIZED, FOR SOLUTION INTRAVENOUS at 08:18

## 2024-01-07 RX ADMIN — INSULIN LISPRO 4 UNITS: 100 INJECTION, SOLUTION INTRAVENOUS; SUBCUTANEOUS at 18:22

## 2024-01-07 RX ADMIN — SENNOSIDES AND DOCUSATE SODIUM 2 TABLET: 50; 8.6 TABLET ORAL at 21:06

## 2024-01-07 RX ADMIN — INSULIN GLARGINE 10 UNITS: 100 INJECTION, SOLUTION SUBCUTANEOUS at 21:06

## 2024-01-07 RX ADMIN — INSULIN LISPRO 4 UNITS: 100 INJECTION, SOLUTION INTRAVENOUS; SUBCUTANEOUS at 21:07

## 2024-01-07 RX ADMIN — VANCOMYCIN HYDROCHLORIDE 1250 MG: 10 INJECTION, POWDER, LYOPHILIZED, FOR SOLUTION INTRAVENOUS at 18:22

## 2024-01-07 RX ADMIN — FLUCONAZOLE 800 MG: 100 TABLET ORAL at 16:15

## 2024-01-07 RX ADMIN — ESCITALOPRAM OXALATE 10 MG: 10 TABLET, FILM COATED ORAL at 08:19

## 2024-01-07 RX ADMIN — ENOXAPARIN SODIUM 40 MG: 100 INJECTION SUBCUTANEOUS at 08:18

## 2024-01-07 RX ADMIN — ROPINIROLE HYDROCHLORIDE 0.5 MG: 0.5 TABLET, FILM COATED ORAL at 21:06

## 2024-01-07 RX ADMIN — ENOXAPARIN SODIUM 40 MG: 100 INJECTION SUBCUTANEOUS at 21:06

## 2024-01-07 RX ADMIN — HYDROCODONE BITARTRATE AND ACETAMINOPHEN 1 TABLET: 5; 325 TABLET ORAL at 15:07

## 2024-01-07 RX ADMIN — PANTOPRAZOLE SODIUM 40 MG: 40 TABLET, DELAYED RELEASE ORAL at 06:40

## 2024-01-07 NOTE — PLAN OF CARE
Goal Outcome Evaluation:              Outcome Evaluation: Patient a/o x4, calm and coopertive with care. Pt was given PRN Norco for pain control per pt's request. All meds given as ordered. This RN wore approrpriate PPE during care. Blood sugar monitored and supplemental insulin given as ordered/needed. No new issues noted. See v/s and labs.

## 2024-01-07 NOTE — PROGRESS NOTES
LOS: 5 days     Chief Complaint: Antibiotic management    Interval History: Afebrile, tolerating antibiotics without abdominal pain vomiting diarrhea or rash.  No shortness of breath.    Vital Signs  Temp:  [97.7 °F (36.5 °C)-99 °F (37.2 °C)] 97.7 °F (36.5 °C)  Heart Rate:  [92-97] 92  Resp:  [16-20] 16  BP: (115-143)/(66-73) 143/69    Physical Exam:  General: In no acute distress  Respiratory: Being comfortably on room air  Skin: No rashes or lesions in exposed areas  Extremities: No edema, cyanosis  Access: Peripheral IV    Antibiotics:  Vancomycin dosing per pharmacy   fluconazole 400 mg p.o. every 24 hours     Results Review:    Lab Results   Component Value Date    WBC 13.75 (H) 01/07/2024    HGB 11.5 (L) 01/07/2024    HCT 35.3 01/07/2024    MCV 85.7 01/07/2024     01/07/2024     Lab Results   Component Value Date    GLUCOSE 157 (H) 01/07/2024    BUN 6 (L) 01/07/2024    CREATININE 0.62 01/07/2024    EGFRIFNONA 54 (L) 10/24/2018    EGFRIFAFRI 83 02/23/2017    BCR 9.7 01/07/2024    CO2 20.6 (L) 01/07/2024    CALCIUM 8.9 01/07/2024    PROTENTOTREF 6.9 02/23/2017    ALBUMIN 2.1 (L) 01/07/2024    LABIL2 1.7 02/23/2017    AST 16 01/07/2024    ALT 8 01/07/2024     Microbiology:  1/2 urine culture no growth  1/2 blood cultures 2 out of 2 gram-positive bacilli  1/3 right renal fluid culture Candida glabrata and Candida tropicalis  1/3 left renal fluid culture Candida glabrata and Candida tropicalis  1/5 bCx NGTD x 2    Assessment    #Right calyceal rupture  #Bilateral hydronephrosis status post bilateral ureteral stenting  #JADEN  #DKA  #Hyponatremia  #Morbid obesity, BMI 40  # Gram-positive bacteremia    Continue vancomycin dosing per pharmacy while awaiting identification and sensitivities of gram-positive bacilli in blood.  Repeat blood cultures have been obtained x 2 and are negative to date.      Given the presence of Candida glabrata will increase the dose of fluconazole to 800 mg p.o. daily while awaiting  sensitivity data.  Antibiotic duration to be determined.

## 2024-01-07 NOTE — PROGRESS NOTES
LOS: 3 days   Patient Care Team:  Greg Rnagel MD as PCP - General  Greg Rangel MD as PCP - Family Medicine    Chief Complaint:   Chief Complaint   Patient presents with    Flank Pain     R side         Subjective    Today the patient is awake, alert, and oriented.  She says she feels even better today than she did yesterday.  She worked with physical therapy and and walked a little further today than she did yesterday.  She is eating and drinking without difficulty.  She has no new complaints or problems that she reports.  She seems to be tolerating her medications well.  Overall she seems to be making relatively rapid progress at this point.  Fortunately she has had no further complications.    Interval History:     Patient Complaints: Besides being somewhat fatigued and tired she really has no specific complaints.  She says she feels much better in general  Patient Denies: She has no complaints of shortness of breath or chest pain, she has some vague abdominal discomfort, no complaints of nausea.  History taken from: patient chart    Review of Systems:    Overall her review of systems continues to be relatively benign.  As noted she is awake, alert, and oriented.  She says she is eating and drinking well.  She has no new specific complaint.  As noted she is working with PT and walked a little further but she still very weak and debilitated.  She has some minimal abdominal discomfort but she no longer has the reflux and epigastric discomfort since she has been started on the Protonix.  Overall she seems to be very stable.    Objective      Vital Signs  Temp:  [98 °F (36.7 °C)-99.2 °F (37.3 °C)] 98.9 °F (37.2 °C)  Pulse:  [] 109  Resp:  [18] 18  BP: (111-154)/(55-97) 154/97    I/O'S  Intake & Output (last 7 days)         12/31 0701  01/01 0700 01/01 0701  01/02 0700 01/02 0701  01/03 0700 01/03 0701 01/04 0700 01/04 0701 01/05 0700 01/05 0701 01/06 0700 01/06 0701 01/07 0700 01/07  0701  01/08 0700    P.O.   360 240 480   240    I.V. (mL/kg)   1004.7 (9.1) 6311 (56.9)        IV Piggyback   1100    250     Total Intake(mL/kg)   2464.7 (22.2) 6551 (59) 480 (4.3)  250 (2.1) 240 (2)    Urine (mL/kg/hr)   800 1075 (0.4) 1875 (0.7) 2000 (0.7) 350 (0.1)     Stool       0     Blood    5        Total Output   800 1080 1875 2000 350     Net   +1664.7 +5471 -1395 -2000 -100 +240                Urine Unmeasured Occurrence   1 x    2 x     Stool Unmeasured Occurrence       1 x             Physical Exam:   General Appearance: alert, pleasant, appears stated age, interactive, fatigued, cooperative, pale, and she is morbidly obese which makes her exam a little more difficult.  Lungs: clear to auscultation, respirations regular, respirations even, and respirations unlabored  Heart: regular rhythm & normal rate  Abdomen: normal bowel sounds, soft non-tender, no guarding, and no rebound tenderness  Extremities: no edema, no cyanosis, and no redness     Results Review:     I reviewed the patient's new clinical results.  I reviewed the patient's other test results and agree with the interpretation                     Results from last 7 days   Lab Units 01/07/24  0645 01/06/24  0653 01/05/24  0232 01/04/24  1615 01/04/24  1205 01/04/24  1059 01/04/24  0345   SODIUM mmol/L 129* 131* 132* 127* 130* 128* 135*   POTASSIUM mmol/L 4.8 3.8 4.1 4.1 3.9 3.9 4.1   CHLORIDE mmol/L 94* 98 101 97* 98 101 103   CO2 mmol/L 20.6* 22.3 22.5 18.0* 19.0* 18.6* 19.7*   BUN mg/dL 6* 7* 8 10 12 11 12   CREATININE mg/dL 0.62 0.61 0.85 0.84 0.89 0.92 0.94   GLUCOSE mg/dL 157* 171* 159* 221* 250* 227* 167*   CALCIUM mg/dL 8.9 8.6 8.9 8.7 8.5* 8.7 8.7         Results from last 7 days   Lab Units 01/07/24  0645 01/06/24  0653 01/05/24  0231 01/04/24  0345 01/03/24  0409 01/02/24  1816 01/02/24  1153   WBC 10*3/mm3 13.75* 10.77 13.04* 17.08* 18.93* 17.30* 20.12*   HEMOGLOBIN g/dL 11.5* 10.1* 10.3* 10.0* 9.6* 10.5* 11.2*   HEMATOCRIT % 35.3  32.3* 32.5* 30.7* 29.2* 31.7* 38.6   PLATELETS 10*3/mm3 371 384 412 446 441 480* 568*         Results from last 7 days   Lab Units 01/07/24  0645 01/06/24  0653 01/05/24  0232 01/04/24  1615 01/04/24  1205 01/04/24  1059 01/04/24  0345   MAGNESIUM mg/dL 1.7 1.7 1.7 1.8 1.8 1.9 1.8             Results from last 7 days   Lab Units 01/05/24  0232   CRP mg/dL 23.85*       Lab Results   Component Value Date    HGBA1C 17.70 (H) 01/02/2024    HGBA1C 16.90 (H) 01/02/2024    HGBA1C 10.20 (H) 06/21/2023    HGBA1C 6.02 (H) 05/23/2017    HGBA1C 6.55 (H) 02/23/2017    HGBA1C 5.72 (H) 10/24/2016    HGBA1C 7.0 (H) 07/18/2016     Glucose   Date/Time Value Ref Range Status   01/07/2024 0816 136 (H) 70 - 130 mg/dL Final   01/06/2024 2023 140 (H) 70 - 130 mg/dL Final   01/06/2024 1645 149 (H) 70 - 130 mg/dL Final   01/06/2024 1137 164 (H) 70 - 130 mg/dL Final   01/05/2024 2052 187 (H) 70 - 130 mg/dL Final   01/05/2024 1705 182 (H) 70 - 130 mg/dL Final   01/05/2024 1148 212 (H) 70 - 130 mg/dL Final   01/05/2024 0737 163 (H) 70 - 130 mg/dL Final       eGFR   Date Value Ref Range Status   01/07/2024 101.5 >60.0 mL/min/1.73 Final   01/06/2024 101.9 >60.0 mL/min/1.73 Final   01/05/2024 78.1 >60.0 mL/min/1.73 Final   01/04/2024 79.2 >60.0 mL/min/1.73 Final   01/04/2024 73.9 >60.0 mL/min/1.73 Final   01/04/2024 71.0 >60.0 mL/min/1.73 Final   01/04/2024 69.2 >60.0 mL/min/1.73 Final           Medication Review:   Scheduled Meds:enoxaparin, 40 mg, Subcutaneous, Q12H  escitalopram, 10 mg, Oral, Daily  fluconazole, 800 mg, Oral, Q24H  insulin glargine, 10 Units, Subcutaneous, Nightly  insulin lispro, 2-9 Units, Subcutaneous, 4x Daily AC & at Bedtime  levothyroxine, 137 mcg, Oral, Q AM  pantoprazole, 40 mg, Oral, Q AM  potassium phosphate, 15 mmol, Intravenous, Once  rOPINIRole, 0.5 mg, Oral, Nightly  senna-docusate sodium, 2 tablet, Oral, BID  vancomycin, 1,250 mg, Intravenous, Q12H      Continuous Infusions:lactated ringers, 9 mL/hr, Last Rate:  9 mL/hr (01/03/24 1527)  Pharmacy to dose vancomycin,       PRN Meds:.  acetaminophen **OR** acetaminophen    senna-docusate sodium **AND** polyethylene glycol **AND** bisacodyl **AND** bisacodyl    Calcium Replacement - Follow Nurse / BPA Driven Protocol    dextrose    dextrose    glucagon (human recombinant)    HYDROcodone-acetaminophen    HYDROmorphone    ipratropium-albuterol    Magnesium Standard Dose Replacement - Follow Nurse / BPA Driven Protocol    nitroglycerin    ondansetron ODT **OR** ondansetron    Pharmacy to dose vancomycin    Phosphorus Replacement - Follow Nurse / BPA Driven Protocol    Potassium Replacement - Follow Nurse / BPA Driven Protocol                DKA (diabetic ketoacidosis)    Primary osteoarthritis of right knee    UTI (urinary tract infection)    Contact dermatitis    Vitamin D deficiency    Essential hypertension    Hyperlipidemia    Hypothyroidism    Morbid obesity with BMI of 40.0-44.9, adult    Allergic rhinitis    Back pain    Anxiety    Dense breast tissue on mammogram    JADEN (acute kidney injury)    Sepsis, unspecified organism    Lactic acidosis    UPJ (ureteropelvic junction) obstruction bilateral    Bilateral hydrocele    Bilateral hydronephrosis moderate on admission    Renal calculus on right, nonobstructinbg    Chronic liver disease      #1 Urosepsis-she had been a lateral UPJ obstructions and now has stents for the hydronephrosis.  She is on IV antibiotics and seems to be making good progress.  Her white blood cell count is up a little bit today but she remains afebrile and says she feels better in general.    2.  Diabetic ketoacidosis-her blood sugars are generally well-controlled and her CO2 level is stable.  As noted she is eating and drinking at this point.    3.  Reflux-she has much less epigastric discomfort and reflux symptoms on the Protonix.    4.  Hyponatremia-her sodium is a little lower today but still stable overall    5.  Hypothyroidism-treated as an  outpatient    6.  Hyperlipidemia-she was on statins as an outpatient    7.  Obesity-obviously this complicates her diabetes significantly.    8.  JADEN-renal function is improved significantly with fluid resuscitation    9.  General weakness and malaise-she is working with physical therapy and walking a little further.  Nonetheless she is still very weak and debilitated.        Plan for disposition:Where: home    Greg Rangel MD  01/07/24  11:38 EST          Time:

## 2024-01-07 NOTE — PROGRESS NOTES
"[facility] Clinical Pharmacy Services: Vancomycin Monitoring Note    Tiana Hudson is a 63 y.o. female who is on day 2/7 of pharmacy to dose vancomycin for Bacteremia.    Previous Vancomycin Dose:   750 mg IV every  12  hours  Updated Cultures and Sensitivities:   1/2 Blood - Gram+ rods  1/3 Kidney fluid - yeast  1/5 Blood - NGTD    Results from last 7 days   Lab Units 01/06/24  2048   VANCOMYCIN TR mcg/mL 7.60     Vitals/Labs  Ht: 165.1 cm (65\"); Wt: 122 kg (269 lb 6.4 oz)   Temp Readings from Last 1 Encounters:   01/06/24 98.4 °F (36.9 °C) (Oral)     Estimated Creatinine Clearance: 126.9 mL/min (by C-G formula based on SCr of 0.61 mg/dL).        Results from last 7 days   Lab Units 01/06/24  0653 01/05/24  0232 01/05/24  0231 01/04/24  1615 01/04/24  1059 01/04/24  0345   CREATININE mg/dL 0.61 0.85  --  0.84   < > 0.94   WBC 10*3/mm3 10.77  --  13.04*  --   --  17.08*    < > = values in this interval not displayed.     Assessment/Plan    Current Vancomycin Dose: 750 mg IV every  12  hours; provides a predicted  mg/L.hr  which is subtherapeutic, will plan to increase dose to 1250mg q12h with predicted AUC of 471 and trough of 11  Next Level Date and Time: Vanc Trough on 1/8/23 at 0700  We will continue to monitor patient changes and renal function     Thank you for involving pharmacy in this patient's care. Please contact pharmacy with any questions or concerns.       Rossi Stubbs, PharmD  Clinical Pharmacist                 "

## 2024-01-08 ENCOUNTER — APPOINTMENT (OUTPATIENT)
Dept: CT IMAGING | Facility: HOSPITAL | Age: 64
DRG: 853 | End: 2024-01-08
Payer: COMMERCIAL

## 2024-01-08 LAB
ALBUMIN SERPL-MCNC: 2.1 G/DL (ref 3.5–5.2)
ALBUMIN/GLOB SERPL: 0.5 G/DL
ALP SERPL-CCNC: 198 U/L (ref 39–117)
ALT SERPL W P-5'-P-CCNC: 7 U/L (ref 1–33)
ANION GAP SERPL CALCULATED.3IONS-SCNC: 11 MMOL/L (ref 5–15)
AST SERPL-CCNC: 17 U/L (ref 1–32)
BILIRUB SERPL-MCNC: 0.3 MG/DL (ref 0–1.2)
BUN SERPL-MCNC: 4 MG/DL (ref 8–23)
BUN/CREAT SERPL: 6.3 (ref 7–25)
CALCIUM SPEC-SCNC: 8.6 MG/DL (ref 8.6–10.5)
CHLORIDE SERPL-SCNC: 94 MMOL/L (ref 98–107)
CO2 SERPL-SCNC: 26 MMOL/L (ref 22–29)
CREAT SERPL-MCNC: 0.64 MG/DL (ref 0.57–1)
DEPRECATED RDW RBC AUTO: 44.3 FL (ref 37–54)
EGFRCR SERPLBLD CKD-EPI 2021: 100.7 ML/MIN/1.73
ERYTHROCYTE [DISTWIDTH] IN BLOOD BY AUTOMATED COUNT: 14.1 % (ref 12.3–15.4)
FUNGUS WND CULT: ABNORMAL
FUNGUS WND CULT: ABNORMAL
GLOBULIN UR ELPH-MCNC: 4.2 GM/DL
GLUCOSE BLDC GLUCOMTR-MCNC: 183 MG/DL (ref 70–130)
GLUCOSE BLDC GLUCOMTR-MCNC: 198 MG/DL (ref 70–130)
GLUCOSE BLDC GLUCOMTR-MCNC: 218 MG/DL (ref 70–130)
GLUCOSE BLDC GLUCOMTR-MCNC: 245 MG/DL (ref 70–130)
GLUCOSE SERPL-MCNC: 169 MG/DL (ref 65–99)
HCT VFR BLD AUTO: 29.2 % (ref 34–46.6)
HGB BLD-MCNC: 9.3 G/DL (ref 12–15.9)
LYMPHOCYTES # BLD MANUAL: 0.83 10*3/MM3 (ref 0.7–3.1)
LYMPHOCYTES NFR BLD MANUAL: 11 % (ref 5–12)
MCH RBC QN AUTO: 27.4 PG (ref 26.6–33)
MCHC RBC AUTO-ENTMCNC: 31.8 G/DL (ref 31.5–35.7)
MCV RBC AUTO: 85.9 FL (ref 79–97)
MONOCYTES # BLD: 1.83 10*3/MM3 (ref 0.1–0.9)
NEUTROPHILS # BLD AUTO: 13.95 10*3/MM3 (ref 1.7–7)
NEUTROPHILS NFR BLD MANUAL: 84 % (ref 42.7–76)
PLAT MORPH BLD: NORMAL
PLATELET # BLD AUTO: 378 10*3/MM3 (ref 140–450)
PMV BLD AUTO: 10.4 FL (ref 6–12)
POTASSIUM SERPL-SCNC: 3.7 MMOL/L (ref 3.5–5.2)
PROT SERPL-MCNC: 6.3 G/DL (ref 6–8.5)
RBC # BLD AUTO: 3.4 10*6/MM3 (ref 3.77–5.28)
RBC MORPH BLD: NORMAL
SODIUM SERPL-SCNC: 131 MMOL/L (ref 136–145)
VANCOMYCIN TROUGH SERPL-MCNC: 14.7 MCG/ML (ref 5–20)
VARIANT LYMPHS NFR BLD MANUAL: 5 % (ref 19.6–45.3)
WBC MORPH BLD: NORMAL
WBC NRBC COR # BLD AUTO: 16.61 10*3/MM3 (ref 3.4–10.8)

## 2024-01-08 PROCEDURE — 80202 ASSAY OF VANCOMYCIN: CPT | Performed by: INTERNAL MEDICINE

## 2024-01-08 PROCEDURE — 25010000002 MICAFUNGIN SODIUM 100 MG RECONSTITUTED SOLUTION 1 EACH VIAL: Performed by: STUDENT IN AN ORGANIZED HEALTH CARE EDUCATION/TRAINING PROGRAM

## 2024-01-08 PROCEDURE — 80053 COMPREHEN METABOLIC PANEL: CPT | Performed by: INTERNAL MEDICINE

## 2024-01-08 PROCEDURE — 25010000002 VANCOMYCIN PER 500 MG: Performed by: STUDENT IN AN ORGANIZED HEALTH CARE EDUCATION/TRAINING PROGRAM

## 2024-01-08 PROCEDURE — 63710000001 INSULIN GLARGINE PER 5 UNITS: Performed by: INTERNAL MEDICINE

## 2024-01-08 PROCEDURE — 25010000002 VANCOMYCIN 10 G RECONSTITUTED SOLUTION: Performed by: INTERNAL MEDICINE

## 2024-01-08 PROCEDURE — 74176 CT ABD & PELVIS W/O CONTRAST: CPT

## 2024-01-08 PROCEDURE — 63710000001 INSULIN LISPRO (HUMAN) PER 5 UNITS: Performed by: INTERNAL MEDICINE

## 2024-01-08 PROCEDURE — 82948 REAGENT STRIP/BLOOD GLUCOSE: CPT

## 2024-01-08 PROCEDURE — 85025 COMPLETE CBC W/AUTO DIFF WBC: CPT | Performed by: INTERNAL MEDICINE

## 2024-01-08 PROCEDURE — 25810000003 SODIUM CHLORIDE 0.9 % SOLUTION: Performed by: INTERNAL MEDICINE

## 2024-01-08 PROCEDURE — 97535 SELF CARE MNGMENT TRAINING: CPT

## 2024-01-08 PROCEDURE — 85007 BL SMEAR W/DIFF WBC COUNT: CPT | Performed by: INTERNAL MEDICINE

## 2024-01-08 PROCEDURE — 25010000002 ENOXAPARIN PER 10 MG: Performed by: UROLOGY

## 2024-01-08 PROCEDURE — 99232 SBSQ HOSP IP/OBS MODERATE 35: CPT | Performed by: STUDENT IN AN ORGANIZED HEALTH CARE EDUCATION/TRAINING PROGRAM

## 2024-01-08 RX ORDER — VANCOMYCIN HYDROCHLORIDE 1 G/200ML
1000 INJECTION, SOLUTION INTRAVENOUS EVERY 12 HOURS
Status: DISCONTINUED | OUTPATIENT
Start: 2024-01-08 | End: 2024-01-09

## 2024-01-08 RX ADMIN — PANTOPRAZOLE SODIUM 40 MG: 40 TABLET, DELAYED RELEASE ORAL at 06:13

## 2024-01-08 RX ADMIN — MICAFUNGIN SODIUM 100 MG: 100 INJECTION, POWDER, LYOPHILIZED, FOR SOLUTION INTRAVENOUS at 10:20

## 2024-01-08 RX ADMIN — ENOXAPARIN SODIUM 40 MG: 100 INJECTION SUBCUTANEOUS at 20:47

## 2024-01-08 RX ADMIN — INSULIN GLARGINE 10 UNITS: 100 INJECTION, SOLUTION SUBCUTANEOUS at 20:47

## 2024-01-08 RX ADMIN — HYDROCODONE BITARTRATE AND ACETAMINOPHEN 1 TABLET: 5; 325 TABLET ORAL at 18:56

## 2024-01-08 RX ADMIN — INSULIN LISPRO 2 UNITS: 100 INJECTION, SOLUTION INTRAVENOUS; SUBCUTANEOUS at 08:37

## 2024-01-08 RX ADMIN — HYDROCODONE BITARTRATE AND ACETAMINOPHEN 1 TABLET: 5; 325 TABLET ORAL at 11:43

## 2024-01-08 RX ADMIN — INSULIN LISPRO 4 UNITS: 100 INJECTION, SOLUTION INTRAVENOUS; SUBCUTANEOUS at 20:48

## 2024-01-08 RX ADMIN — VANCOMYCIN HYDROCHLORIDE 1000 MG: 1 INJECTION, SOLUTION INTRAVENOUS at 23:04

## 2024-01-08 RX ADMIN — ROPINIROLE HYDROCHLORIDE 0.5 MG: 0.5 TABLET, FILM COATED ORAL at 20:47

## 2024-01-08 RX ADMIN — ENOXAPARIN SODIUM 40 MG: 100 INJECTION SUBCUTANEOUS at 08:37

## 2024-01-08 RX ADMIN — INSULIN LISPRO 4 UNITS: 100 INJECTION, SOLUTION INTRAVENOUS; SUBCUTANEOUS at 11:41

## 2024-01-08 RX ADMIN — INSULIN LISPRO 2 UNITS: 100 INJECTION, SOLUTION INTRAVENOUS; SUBCUTANEOUS at 17:49

## 2024-01-08 RX ADMIN — ESCITALOPRAM OXALATE 10 MG: 10 TABLET, FILM COATED ORAL at 08:37

## 2024-01-08 RX ADMIN — VANCOMYCIN HYDROCHLORIDE 1250 MG: 10 INJECTION, POWDER, LYOPHILIZED, FOR SOLUTION INTRAVENOUS at 08:38

## 2024-01-08 RX ADMIN — LEVOTHYROXINE SODIUM 137 MCG: 137 TABLET ORAL at 06:13

## 2024-01-08 NOTE — SIGNIFICANT NOTE
01/08/24 1527   OTHER   Discipline physical therapist   Rehab Time/Intention   Session Not Performed other (see comments)  (Patient with diabetes educator at time of PT attempt. Discussed with JOHN Storey that PT will plan to follow up tomorrow AM as appropriate.)   Recommendation   PT - Next Appointment 01/09/24

## 2024-01-08 NOTE — CASE MANAGEMENT/SOCIAL WORK
Discharge Planning Assessment  [facility]     Patient Name: Tiana Hudson  MRN: [id number]  Today's Date: 1/8/2024    Admit Date: 1/2/2024    Plan: Home with spouse   Discharge Needs Assessment       Row Name 01/08/24 1411       Living Environment    People in Home spouse    Current Living Arrangements home    Potentially Unsafe Housing Conditions none    Primary Care Provided by self    Provides Primary Care For no one    Family Caregiver if Needed child(nkechi), adult;spouse    Quality of Family Relationships helpful;involved;supportive    Able to Return to Prior Arrangements yes       Resource/Environmental Concerns    Resource/Environmental Concerns none    Transportation Concerns none       Transition Planning    Patient/Family Anticipates Transition to home with family    Patient/Family Anticipated Services at Transition none    Transportation Anticipated family or friend will provide       Discharge Needs Assessment    Readmission Within the Last 30 Days no previous admission in last 30 days    Equipment Currently Used at Home none    Concerns to be Addressed no discharge needs identified;denies needs/concerns at this time    Anticipated Changes Related to Illness none    Equipment Needed After Discharge none    Provided Post Acute Provider List? N/A    Provided Post Acute Provider Quality & Resource List? N/A                   Discharge Plan       Row Name 01/08/24 1411       Plan    Plan Home with spouse    Patient/Family in Agreement with Plan yes    Plan Comments CCP met with the patient and her daughter Enoch at bedside with the patient’s verbal permission. The patient confirmed the information on the face sheet was accurate. She states she lives in a 3-level home with her . She confirmed her PCP is Dean Fairchild. 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 She denies any history of HH/SNF. She denies using any DME. She states she enters her home through a side door  without any handrails. She states her bedroom is upstairs and there is a handrail on the left-hand side. She denies having to go to the basement for anything. She is enrolled in the [facility] program. She denies any needs and states her daughter Enoch can transport her at discharge. CCP to follow for any discharge needs that may arise. CD, CSW.                  Continued Care and Services - Admitted Since 1/2/2024    Coordination has not been started for this encounter.       Expected Discharge Date and Time       Expected Discharge Date Expected Discharge Time    Jhony 10, 2024            Demographic Summary       Row Name 01/08/24 1406       General Information    Admission Type inpatient    Arrived From emergency department    Referral Source admission list    Reason for Consult discharge planning    Preferred Language English                   Functional Status       Row Name 01/08/24 1410       Functional Status    Usual Activity Tolerance good    Current Activity Tolerance moderate       Functional Status, IADL    Medications independent    Meal Preparation independent    Housekeeping independent    Laundry independent    Shopping independent       Mental Status Summary    Recent Changes in Mental Status/Cognitive Functioning no changes       Employment/    Employment Status employed full-time    Current or Previous Occupation healthcare                   Psychosocial    No documentation.                  Abuse/Neglect    No documentation.                  Legal    No documentation.                  Substance Abuse    No documentation.                  Patient Forms    No documentation.

## 2024-01-08 NOTE — PROGRESS NOTES
"[facility] Clinical Pharmacy Services: Vancomycin Monitoring Note    Tiana Hudson is a 63 y.o. female who is on day 4/7 of pharmacy to dose vancomycin for Bacteremia.    Previous Vancomycin Dose:   1250 mg IV every  12  hours  Updated Cultures and Sensitivities: 1/5 bcx NGx3D  Results from last 7 days   Lab Units 01/08/24  0609 01/06/24  2048   VANCOMYCIN TR mcg/mL 14.70 7.60     Vitals/Labs  Ht: 165.1 cm (65\"); Wt: 115 kg (254 lb 3.1 oz)   Temp Readings from Last 1 Encounters:   01/08/24 99 °F (37.2 °C) (Oral)     Estimated Creatinine Clearance: 116.9 mL/min (by C-G formula based on SCr of 0.64 mg/dL).     Results from last 7 days   Lab Units 01/08/24  0609 01/07/24  0645 01/06/24  0653   CREATININE mg/dL 0.64 0.62 0.61   WBC 10*3/mm3 16.61* 13.75* 10.77     Assessment/Plan    Current Vancomycin Dose: 1000 mg IV every  12  hours; provides a predicted  mg/L.hr   Next Level Date and Time: Vanc Trough on 1/10 at 0630  We will continue to monitor patient changes and renal function     Thank you for involving pharmacy in this patient's care. Please contact pharmacy with any questions or concerns.       Bethany Babinski, PharmD  Clinical Pharmacist          "

## 2024-01-08 NOTE — PROGRESS NOTES
NATHALIA CAMPUZANO [facility]  INTERNAL MEDICINE  DEAN FAIRCHILD MD  [address] [place] [address]  Phone [phone] Fax [phone]  E-mail:  [email or web address]      INTERNAL MEDICINE DAILY PROGRESS NOTE  Dean Fairchild M.D.  2024            Patient Identification:  Name: Taina Hudson  Age: 63 y.o.  Sex: female  :  1960  MRN: [id number]         Primary Care Physician: Dean Fairchild MD  LENGTH OF STAY 6 DAYS    Consults       Date and Time Order Name Status Description    2024  5:10 AM Inpatient Nephrology Consult Completed     2024  2:00 PM Inpatient Infectious Diseases Consult Completed     1/3/2024 10:27 AM Inpatient Urology Consult      2024 11:55 PM Urology (on-call MD unless specified) Completed     2024  3:45 PM IP General Consult (Use specialty-specific consult if known) Completed             Chief Complaint: Abdominal/flank pain with DKA, acute cystitis and sepsis, and possible calyceal rupture in the kidney     History of Present Illness:     Subjective   Interval History: Patient is a 63 y.o.female who presented at my request to the emergency room at [facility] with complaint of acute abdominal/flank pain and history of recurrent kidney stones.  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  Patient has been followed for the last few months by Dr. Rei Calvert in urology for renal calculi and actually has had a stent placed in the right kidney due to obstruction from her renal stones.  It is also noted that the patient had a recent lithotripsy.  Patient began to feel poorly after the recent  holiday and became more severely ill over the last 3 to 4 days prior  to this admission.  She has felt extremely fatigued, dizzy at times, tired, nauseated, and has spent most of her time in bed sleeping.  Family has had difficulty getting the patient to take any oral intake and became quite concerned as her condition continued to worsen.  Patient was attempting to go to work today but really was too dizzy to get in the car to drive and 2-week to actually work.  After urging from her daughter, patient called me with respect to the symptoms and at my request went to the ER for evaluation.  Patient has multiple medical comorbidities that complicate her medical care.These include most significantly recurrent urinary tract infections due to her nephrolithiasis, morbid obesity with BMI greater than 40, diabetes mellitus type 2 poorly controlled with recent addition of Ozempic to her metformin therapy, vitamin D deficiency, essential hypertension, hyperlipidemia, hypothyroidism, allergic rhinitis, back pain, contact dermatitis, anxiety, and history of dense breast tissue on mammograms.     Patient was evaluated in the emergency room by Angelito Winkler MD who was the ER attending on call time of her arrival.  Patient was seen on 1/2/2024 at 1544 by Dr. Winkler.  His HPI was consistent with the story which I given above.  Patient denied dysuria, fever, chills on his evaluation.  She did admit to having emesis and actually had some emesis while in the emergency room.  Review of systems in the emergency room was positive for diffuse abdominal pain, and vomiting.  Patient also was noted to have some significant flank pain.  All other systems reviewed were negative in the emergency room vital signs on arrival in the emergency room showed temperature of 96 °F, heart rate of 114, respiratory rate of 16, blood pressure 125/72, and O2 sat of 98%.  Patient was described as ill-appearing but in no acute distress.  She did have severe tenderness to palpation more on the right side of the abdomen and out  into the right flank area with voluntary guarding.  Labs collected in the ER showed that her lactate level was elevated at 6.0.  She had a lipase of 23, her white blood cell count was 20.12, her hemoglobin was 11.2, and her platelet count was 568,000.  Patient did have a phosphorus of 4.2, magnesium 2.0, osmolality of 317, hemoglobin A1c is 16.90, and UA showed specific gravity 1.027, 3+ glucose, moderate blood 2+, leukocyte esterase moderate 2+, nitrates negative, white blood cells 6-10, red blood cells 6-10, bacteria 2+.  Her CMP showed a glucose of 978, creatinine of 1.82, sodium of 115, potassium of 4.2, chloride of 76, CO2 of 15.7, albumin 2.4, AST 33, alk phos 220, anion gap of 23, EGFR of 31.3.  Patient did have a CT scan of her abdomen and pelvis completed which showed loculated fluid throughout the right perinephric space felt to possibly be secondary to right calyceal rupture, new mild to moderate wall thickening of the right renal pelvis and renal calyces, bilateral UPJ obstruction with moderate bilateral hydronephrosis on the left, mild new dilation of right ureter, nonobstructing right renal calculi, hepatic morphology suggestive of chronic liver disease, and reactive lymph nodes in the retrocaval area.  A chest x-ray was done which showed no acute disease other than minimal atelectasis at the base of the right lung.  Patient underwent aggressive fluid resuscitation in the emergency room and received over 3 L of IV fluid.  She was placed on an insulin drip and was also started on Zosyn therapy.  She was followed on sepsis protocol as well as DKA protocol.  Patient did receive morphine 4 mg for pain x 2.  Case was discussed with myself as her primary care attending.  Urology was also contacted about situation.  I did suggest that patient be admitted to the ICU for treatment of the DKA and severe sepsis picture.  Dr. Arthur Tena did agree to the ICU admission and will be following her in the CCU for  pulmonary/critical care.  Final diagnoses in the ER were sepsis due to unspecified organism and acute cystitis without significant hematuria.     1/2/2024.  I personally saw the patient for the first time during this hospitalization on this date in the coronary care unit room #335.  Patient was resting quietly in bed and did a peer acutely ill.  She did wake to her name and spoke a few words before falling back asleep.  Patient's 2 daughters were at the foot of her bed and did discuss the case at length with me.  Daughter Enoch, is a former nurse from here at [employer], and now works at [employer] with the hospitalist group there.  Patient has responded well to the Glucommander DKA protocols and blood sugars have gradually come down to near normal levels for the patient in the 1  range.  I will full PPE for the exam including an N95 face mask, goggles, white lab coat, and gloves when touching patient.  I performed thorough hand hygiene before and after the patient visit.  Patient did have a venous blood gas which showed pH 7.40, pCO2 28, pO2 of 31, and O2 sat of 62%.  She also had recent electrolytes which showed a sodium of 131, potassium 3.4, chloride 96, CO2 19.1, BUN 17, creatinine 1.15, estimated GFR now up to 54.3.  Lactate level has been coming steadily down and currently at 2.8.  Additional labs ordered for tomorrow a.m.  Patient has been able to urinate several times and daughters have helped her up to the bathroom for this.  I actually note that she did have some urinary frequency and incontinence at home which is unusual for her.  I did speak briefly to the patient's daytime nurse who was departing soon after I arrived on the floor.  I have reviewed Dr. Arthur Tena's notes and his admission history and physical.  I do agree completely with his plan of care at present time.  Supportive care is continued to be offered for the sepsis with careful monitoring in the ICU.  Patient's anion gap metabolic  acidosis probably is related to lactic acidosis and she was continuing to take metformin even when she was not able to eat.  IV fluids are continued aggressively.  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  Probably will plan sliding scale insulin for stabilization of diabetes while here in the hospital and will train patient to continue that in the home environment.  Pseudohyponatremia will be corrected with the IV fluids.  Hypothyroidism will be addressed with continued Synthroid.  Statins are held for now.  Obesity is an ongoing problem for the patient.  At the time my exam, patient is medically stable and slowly improving.  I did relay this to the daughter to agree with my assessment.  We will continue to follow the patient with you and we will be happy to assume care of the patient when she is stable and ready to be transferred out of the ICU.  I can be reached directly for any concerns or questions at [phone].    1/3/2024.  Patient is seen again today in her room in the CCU #335.  Patient was resting quietly in bed at the time of my visit and states that her daughters were downstairs getting a bite to eat.  I did discuss the case with the patient's nurse, Tiana, who tells me that she is being preop and should be going down soon for cystoscopy and stent placement.  I will full PPE for the exam including an N95 face mask, goggles, white lab coat, and gloves when touching patient.  I performed thorough hand hygiene before and after the patient visit.  Patient is more alert today but still somewhat confused about details of her medical case.  She is able to carry on a conversation with me though and joke a bit with me while I am present in the room.  She does understand she has issues because of the obstruction bilaterally of her ureters that has resulted in hydronephrosis and hopefully can be relieved by the cystoscopy  that is planned for later this morning.  Patient is still on an insulin drip but is drastically improved compared to where she was yesterday at this time.  Review of labs shows that her sodium is now up to 133, her CO2 is 19.1, glucose is now at 134, phosphorus is at 2.1, lactate is normalized at 1.9, white blood cell count today is 18.93, hemoglobin is 9.6 today, lakelets are normal today,Blood culture from the first day of hospitalization shows anaerobic bottle gram-positive bacilli growth with identification still ongoing.  Fungal and body fluid cultures are still negative.  Urine culture remains negative.  Dr. Oquendo was following the patient for critical care today.  He has continued her antibiotics and is awaiting urology input.  Fluid resuscitation is continued.  Blood pressure now seems well-controlled.  DVT prophylaxis in place.  He had a lengthy discussion with patient's family at the time of his rounds.  Urology has seen the patient in consultation and is planning to do cystoscopy later today.  Dietitian is following the patient's case and recommending input treatment as needed.Dr. Armando did perform a cystoscopy with ureteral catheterization and stent insertion bilaterally.  His pre-op diagnosis and postop diagnoses were urosepsis with bilateral UPJ obstruction.  He did feel that he had successfully decompressed the blockages and the hydronephrotic changes.  He did speak with patient's daughter postoperatively.  Patient does seem to be much improved today.  Vital signs still show no fever.  Blood pressure is relatively stable.  Will continue to follow with you.    1/4/2024.  Patient is seen again today in her room in the CCU #335.  Patient has transitions of critical care and is in overflow for care on the floor.  Patient's daughter is present at bedside at the time of my visit.  Patient is much more awake and alert today.  She is more interactive and talkative.  I did discuss the case with the  patient's nurse.  We are going to obtain an ID consult today for help with antibiotic management and we also are going to transition the patient to regular sliding scale insulin as we begin the teaching process of getting patient on her own regimen of sliding scale insulin at the time of discharge to home.  We have also started patient on Lantus therapy at 10 units for now and may need to titrate up.  I wore full PPE for the exam today including an N95 face mask, goggles, white lab coat, and gloves when touching patient.  I performed thorough hand hygiene before and after the patient visit.  Specialist have seen patient today and their care is summarized below.Dr. Najera, the critical care attending today, felt that her mentation was slow but answering questions appropriately.  No other major findings were discovered at present time.  Antibiotics were continued for sepsis was felt to be significantly improved with anion gap now closed and lactic acidosis much better.  IV antibiotics were continued awaiting final culture results and I did consult ID to see patient for their input on the situation.  Dr. Devon Armando who completed bilateral stent placement for bilateral OP J obstruction and urosepsis is pleased with progress on day 1 postop.  He notes that urine output has been excellent.  Dr. Us from infectious disease did see the patient in consultation.  He notes that Zosyn antibiotics have been discontinued by Dr. Armando after her successful surgery.  White blood cell count is down to 17.08 today with hematocrit of 30.7.  Glucose max was 250 today estimated GFR is up to 54 and CO2 is 19.0.  He feels the significance of the positive blood culture is unclear at this point with only 1 of 2 bottles positive for gram-positive bacilli which normally would represent a contamination.  He is planning to repeat the blood cultures and will follow-up.  Urine culture was also negative except for yeast growth and due to  the recent  instrumentation he has started the patient on fluconazole 400 mg daily while cultures are pending.  New blood culture has been sent.  We will check again the lactic acid, procalcitonin, sed rate, and CRP with a.m. labs tomorrow.  Labs relatively stable today though I do note that sodium is dropped a bit to 127.  CO2 remains slightly low at 18.0 and chloride slightly low at 97.  Patient's glucoses running in the upper 100s and low 200s at present.  Electrolytes otherwise seem fairly stable.  White count is still elevated somewhat at 17.08 despite stenting yesterday.  Patient currently off antibiotics and being followed carefully by ID.  Plan to check procalcitonin and repeat blood culture with a.m. labs.  Hemoglobin is at 10.0.  Otherwise patient appears very stable at present time.  I do agree that her mentation is a little bit slower than usual but I suspect this will continue to improve as we get further from her acute DKA event.  Treatment plan reviewed with patient and her daughter today.    1/05/2024.  Patient is seen again today in her room in the CCU #335.  Patient resting quietly in bed and daughter Enoch is at bedside.  I spoke with patient's nurse during the day to discuss occasional changes in her treatment plan.  I wore full PPE for the exam including an N95 facemask, goggles, white lab coat, and gloves when touching patient.  I performed thorough hand hygiene before and after the patient visit.  Patient is much more alert today and more like her usual self.  She does laugh and participate in conversation.  Mother and daughter were very impressed with Dr. Sánchez's excellent review with them of the patient's renal condition and with the time he has been going over the various diagnostic images that have been done up to date.  He was pleased to see how significantly improved electrolytes are and felt the patient was remaining hemodynamically stable at present time.  Dr. Us from ID did  see the patient.  Blood cultures were repeated but original cultures are now positive 2 out of 2 for gram-positive bacilli which is more consistent with a true infection.  He started patient on vancomycin and will be following up on ID and's susceptibilities of the organisms.  He also continued the fluconazole 400 mg daily while following up on cultures from perinephric fluid.The pulmonary intensivist Dr. Arthur Tena saw patient briefly and found no pulmonary or critical care needs at this time.  Dr. Armando from neurology saw patient on postop day #2 after stent placement for bilateral UPJ obstruction.  Indicates that Perales catheter can be removed at any time.  Occupational Therapy began to work with the patient and feels that she will benefit from further skilled occupational therapy.  She did some bedside exercises and did do some sort steps.  She was able to sit up on the edge of the bed for 8 to 10 minutes with to stand.  Physical therapy also saw patient and felt she would continue to benefit from skilled physical therapy.  Pharmacist did consult with the patient on vancomycin and started at 750 mg IV every 12 hours.  They will be checking a trough level on 1/7/2024 at 8:30 AM.  Labs today are generally stable.  Glucose levels ranging in the 140s to 170s.  Sodium still slightly low at 132.  CO2 now normal at 22.5, albumin slightly low at 2.1, AST slightly up at 34 and alk phos slightly up at 270.  White blood cell count today is down to 13.04 and hemoglobin is stable at 10.3 with platelets of 412,000.  2 of 2 initial blood cultures were positive with final ID pending.  Sed rate remains greater than 130, procalcitonin is 3.24, and C-reactive protein is at 23.85.  Patient is still on overflow status waiting for a floor telemetry bed.    1/8/2024.  Patient is seen again today in her room on the coronary care unit #335.  Patient was actually up in a chair at the time of my visit and her daughter was present at  bedside.  I wore full PPE for the exam including an N95 facemask, goggles, white lab coat, and gloves when touching patient.  I performed thorough hand hygiene before and after the patient visit.  Patient is feeling much better today.  She says that she was anxious to get up and was happy that the therapist left her up in the chair.  She is able to get to the bathroom but does need some assistance still for that endeavor.  Several specialist saw the patient and their care is summarized below.  Patient remains on vancomycin therapy and pharmacy is following the dose and adjusting levels.  Dr. Us from ID did see patient again today.  He notes that repeat blood cultures remain negative and initial blood cultures are still being evaluated.  He has continued vancomycin for now and will follow-up on ID and susceptibilities.  Urine culture growing Candida glabrata and tropicalis and he has transitioned her from fluconazole therapy to micafungin 100 mg daily for now.  White blood cell count is slightly increased today at 16.61.  He is cautiously monitoring the patient for any new signs of infection or changes.Dr. Armando from urology did see the patient and was pleased with his surgical result from cystoscopy last week with bilateral stent placement for treating UPJ obstruction.  He has signed off on the case and plans to have her follow-up with him in clinic in 7 to 10 days for reevaluation following discharge.Occupational Therapy did work with patient who reported pain level of 2 out of 10 focused mainly on right abdomen where she has some redness swelling and discomfort.  There was no rebound to my exam in this area but I did elect to go ahead and do a CT scan of abdomen and pelvis to evaluate the area since she did have the extensive surgery not so long ago.  Patient is noted to fatigue quickly and has some functional deficits that need to be maximized prior to DC to home.  Patient is planning discharge to home with  spouse and daughters.  MI requested diabetic educator did meet with the patient.  Patient does need a meter for glucose testing.  Planning 4 times a day test for now.  Will use insulin pen lispro at meals.  Dietitian did meet with patient and her daughter for input on diet.  Provided printed materials and discussion.  Will be available for concerns or questions.  Labs today generally were stable.  Sodium back up to 131.  Glucose max of 245.  Patient does admit she is eating more at this time.  White blood cell count is up slightly at 16.61 of concern to ID.  They are monitoring cultures carefully.  Hemoglobin did drop from 11.5 down to 9.3 today will recheck tomorrow.  CT abdomen and pelvis was completed.  Right ureteral stent present but there is mild distention of the right extrarenal pelvis which appears to be improved compared to the exam 6 days ago.  There is right urethrocele thickening.  There is a right perinephric collection consistent with hemorrhage or urinoma's and there is a new deep collection in the right posterior lateral abdominal wall measuring 3 cm in thickness by 13 cm transverse along with muscular thickening along the right lateral abdominal wall and edema within the right lateral abdominal pelvic subcutaneous fat.  There is enlargement of this fluid collection seen.  We will have nursing notify urology in a.m. of this finding for their input.  May want to consider general surgery consultation also if pain persist in this area.        Review of Systems:               Review of systems could not be obtained due to  patient confusion. patient nonverbal.       Past Medical History:   Diagnosis Date    Anemia     intermittently    Anxiety and depression     Arthritis     Depression     Diabetes mellitus     Gallstones     High cholesterol     History of frequent urinary tract infections     HX OF YEAST IN BLADDER HAS PRN DIFLUCAN    History of transfusion 05/24/2017    Had 2 iron infusions.  This  was when i had knee replacement    Hyperlipidemia     Hypertension     Hypothyroidism     Knee pain, bilateral     Low back pain     Lumbar stenosis     PONV (postoperative nausea and vomiting)     Positive TB test     chest x-ray neg    Seasonal allergies      Past Surgical History:   Procedure Laterality Date    APPENDECTOMY N/A     Dr. Wilson    CHOLECYSTECTOMY WITH INTRAOPERATIVE CHOLANGIOGRAM N/A 10/31/2018    Procedure: laparoscopic cholecystectomy;  Surgeon: Mary Kay Mac MD;  Location: [facility] OR;  Service: General    COLONOSCOPY      CYSTOSCOPY BLADDER BIOPSY N/A 10/3/2016    Procedure: CYSTOSCOPY BLADDER BIOPSY;  Surgeon: Rei Calvert MD;  Location: [facility] OR;  Service:     CYSTOSCOPY W/ URETERAL STENT PLACEMENT Bilateral 1/3/2024    Procedure: CYSTOSCOPY BILATERAL RETROGRADE PYELOGRAM  BILATERAL URETERAL STENT INSERTION AND CATHETHER PLACEMENT;  Surgeon: Eduard Armando MD;  Location: [facility] OR;  Service: Urology;  Laterality: Bilateral;    DILATATION AND CURETTAGE N/A     ENDOSCOPY      INTRAUTERINE DEVICE INSERTION      KNEE ARTHROSCOPY W/ MENISCAL REPAIR Left     OVARIAN CYST REMOVAL Left     Dr. Wilson    TOTAL KNEE ARTHROPLASTY Right 2017    Procedure: RIGHT TOTAL KNEE ARTHROPLASTY WITH ALETA NAVIGATION;  Surgeon: Ross Cruz MD;  Location: [facility] OR;  Service:      Allergies   Allergen Reactions    Sulfa Antibiotics Hives and Rash       Family History   Problem Relation Age of Onset    Hepatitis Mother     Breast cancer Mother     Heart disease Mother     Cancer Mother     Hyperlipidemia Mother              Heart failure Father     Stroke Father     Hypertension Father         Since he was 72, now 86s    Diabetes Father     Heart disease Father     Hyperlipidemia Father         Unsurs    Heart disease Maternal Grandmother     Cancer Maternal Grandfather     COPD Maternal Grandfather     Arthritis Paternal Grandmother     Diabetes  "Maternal Aunt     Diabetes Paternal Uncle     Malig Hyperthermia Neg Hx        Social History     Socioeconomic History    Marital status:    Tobacco Use    Smoking status: Never    Smokeless tobacco: Never    Tobacco comments:     Never smoked   Vaping Use    Vaping Use: Never used   Substance and Sexual Activity    Alcohol use: Never     Alcohol/week: 3.0 standard drinks of alcohol     Types: 1 Glasses of wine, 2 Standard drinks or equivalent per week    Drug use: Never    Sexual activity: Not Currently     Partners: Male     Birth control/protection: I.U.D.       PMH, FH, SH and ROS completed with Admission History and Physical and updated in EPIC system.        Objective     Scheduled Meds:enoxaparin, 40 mg, Subcutaneous, Q12H  escitalopram, 10 mg, Oral, Daily  insulin glargine, 10 Units, Subcutaneous, Nightly  insulin lispro, 2-9 Units, Subcutaneous, 4x Daily AC & at Bedtime  levothyroxine, 137 mcg, Oral, Q AM  micafungin (MYCAMINE) IV, 100 mg, Intravenous, Q24H  pantoprazole, 40 mg, Oral, Q AM  potassium phosphate, 15 mmol, Intravenous, Once  rOPINIRole, 0.5 mg, Oral, Nightly  senna-docusate sodium, 2 tablet, Oral, BID  vancomycin, 1,000 mg, Intravenous, Q12H      Continuous Infusions:lactated ringers, 9 mL/hr, Last Rate: 9 mL/hr (01/03/24 1527)  Pharmacy to dose vancomycin,         Vital signs in last 24 hours:  Temp:  [97.7 °F (36.5 °C)-99 °F (37.2 °C)] 98 °F (36.7 °C)  Heart Rate:  [87-96] 87  Resp:  [16-18] 16  BP: (124-143)/(61-70) 127/61    Intake/Output:  No intake or output data in the 24 hours ending 01/08/24 1343      Exam:  /61 (BP Location: Left arm, Patient Position: Sitting)   Pulse 87   Temp 98 °F (36.7 °C) (Oral)   Resp 16   Ht 165.1 cm (65\")   Wt 115 kg (254 lb 3.1 oz)   LMP 09/03/2016 Comment: IUD  SpO2 95%   BMI 42.30 kg/m²     Constitutional:  Alert, cooperative, more readily answers questions, mild distress, AAOx3, resting comfortably, mentation is continuing to today " with lower blood sugar levels and sepsis under better control.  Complains of right lower quadrant pain with some guarding but no rebound.   Head:      Normocephalic, without obvious abnormality, atraumatic   Eyes:     PERRLA, conjunctiva/corneas clear, no icterus, no conjunctival                                     pallor, EOM's intact, both eyes      ENT and Mouth: Lips, tongue, gums normal; oral mucosa pink and moist   Neck:     Supple, symmetrical, trachea midline, no JVD  Respiratory:     Clear to auscultation bilaterally, respirations unlabored  Cardiovascular:  Regular rate and rhythm, S1 and S2 normal, no murmur,      no  Rub or gallop.  Pulses normal.    Gastrointestinal:   BS present x 4 Soft, non-tender, bowel sounds active,      no masses, no hepatosplenomegaly    right lower quadrant pain as above some guarding no rebound and good bowel sounds.  Overlying skin does appear erythematous                                                 :       No hernia.  Normal exam for sex.         Musculoskeletal: Extremities normal, atraumatic, no cyanosis or edema     No arthropathy.  No deformity.  Gait normal                                                 Skin:   Skin is warm and dry,  no rashes, swelling or palpable lesions   Neurologic:  CN -XII intact, motor strength grossly intact, sensation grossly intact to light touch, no focal reflex deficits noted    Psychiatric:     Alert,oriented X3, no delusions, psychoses, depression or anxiety    Heme/Lymph/Imun:   No bruises, petechiae.  Lymph nodes normal in size/configuration       Data Review:  Lab Results   Component Value Date    CALCIUM 8.6 01/08/2024    PHOS 3.7 01/07/2024     Results from last 7 days   Lab Units 01/08/24  0609 01/07/24  0645 01/06/24  0653 01/05/24  0232   AST (SGOT) U/L 17 16 14 34*   ALT (SGPT) U/L 7 8 13 19   MAGNESIUM mg/dL  --  1.7 1.7 1.7   SODIUM mmol/L 131* 129* 131* 132*   POTASSIUM mmol/L 3.7 4.8 3.8 4.1   CHLORIDE mmol/L 94* 94* 98  101   CO2 mmol/L 26.0 20.6* 22.3 22.5   BUN mg/dL 4* 6* 7* 8   CREATININE mg/dL 0.64 0.62 0.61 0.85   GLUCOSE mg/dL 169* 157* 171* 159*   CALCIUM mg/dL 8.6 8.9 8.6 8.9   WBC 10*3/mm3 16.61* 13.75* 10.77  --    HEMOGLOBIN g/dL 9.3* 11.5* 10.1*  --    PLATELETS 10*3/mm3 378 371 384  --      Lab Results   Component Value Date    TROPONINT <6 01/02/2024     Estimated Creatinine Clearance: 116.9 mL/min (by C-G formula based on SCr of 0.64 mg/dL).  WEIGHTS:     Wt Readings from Last 1 Encounters:   01/08/24 0700 115 kg (254 lb 3.1 oz)   01/07/24 0510 120 kg (263 lb 10.7 oz)   01/07/24 0349 120 kg (263 lb 10.7 oz)   01/06/24 0509 122 kg (269 lb 6.4 oz)   01/05/24 0556 111 kg (244 lb 11.4 oz)   01/04/24 0600 111 kg (245 lb 6 oz)   01/03/24 0600 111 kg (245 lb 6 oz)   01/02/24 1131 120 kg (265 lb)         Assessment:    DKA (diabetic ketoacidosis)    UTI (urinary tract infection)    Morbid obesity with BMI of 40.0-44.9, adult    JADEN (acute kidney injury)    Sepsis, unspecified organism    Lactic acidosis    UPJ (ureteropelvic junction) obstruction bilateral    Bilateral hydronephrosis moderate on admission    Renal calculus on right, nonobstructinbg    Vitamin D deficiency    Essential hypertension    Hyperlipidemia    Hypothyroidism    Allergic rhinitis    Back pain    Chronic liver disease    Primary osteoarthritis of right knee    Contact dermatitis    Anxiety    Dense breast tissue on mammogram    Bilateral hydrocele      Attending Physician Assessment and Plan:    1.  Cystitis/UTI with sepsis associated with bilateral UPJ obstruction and moderate bilateral hydronephrosis and possible right calyceal rupture.  Patient resuscitated aggressively with IV fluids.  Broad-spectrum antibiotics, Zosyn, started.  Urology consult is placed.  Patient being followed carefully in ICU for additional complications or signs of worsening sepsis.  Culture of urine remains negative on day #2.  New cultures collected at time of cystoscopy  today by urology.  Patient currently off Zosyn which was stopped after surgery yesterday.  We will repeat blood cultures since we did have 1 of 2 initial blood cultures positive.  ID has been consulted and is following with us.  They have started the patient on fluconazole secondary to the instrumentation yesterday and yeast that is seen in hearing.  I do plan to check a procalcitonin level with a.m. labs tomorrow.  Patient also started on vancomycin due to 2 of 2 positive cultures from blood.  Additional blood cultures pending at present time.     2.  Anion gap metabolic acidosis versus possible diabetic ketoacidosis.  Improving rapidly at present time on DKA protocol with Glucomander.  Aggressive fluid resuscitation initiated in the ER is continued in the ICU.  Patient now with good urinary output and hopefully will be able to come off of IV insulin and switch to subcu dosing overnight..  Anion gap has narrowed and improved.  Patient should come off Glucomander DKA protocol sometime later today.  On postop day #1 anion gap has significantly improved.  We are asking renal to help us with follow-up on her acute kidney injury and volume status.  Acidosis has resolved.     3.  Lactic acidosis possibly due to home use of metformin while taking no food or possibly due to acute sepsis.  Continuing rapid fluid resuscitation with improvement already noted in lactic acid.  Monitoring carefully for signs of any new changes or problems.  Lactic acidosis has also resolved on day #2 and patient's sepsis seems to be under much more stable condition at present time.  Repeat lactic acid in a.m. with other labs.  Lactic acidosis has resolved.    4.  Type 2 diabetes mellitus poorly controlled at present time with elevated A1c greater than 16.  Will probably need to arrange for follow-up with patient in endocrinology clinic.  For now we will ask diabetic educator to see patient and work on sliding scale insulin protocols.  Patient  and her daughter are interested in Dexcom monitoring and I will see if that is available from the diabetic educator.  Discussed situation with the daughters and she may benefit from continuous 24-hour glucose monitoring issues willing to learn the process.  Will probably need to discharge charge on sliding scale insulin and may also consider mealtime insulin or long acting insulin.  Diabetic educator to see patient and work with us on diet, glucose testing techniques, and generalized diabetes management.  Patient doing well on sliding scale with single dose of Lantus at nighttime.  Now the patient is eating more plan to increase Lantus to 16 units subcu daily.     5.  Pseudohyponatremia.  Seems to already be improving with resuscitation and resolution of the uncontrolled glucoses.  Will continue to follow electrolytes regularly.  Hyponatremia now corrected and in normal range.  Hyponatremia has now resolved on day #2     6.  Hypothyroidism.  Patient's Synthroid is continued by the ICU attending.  Will continue to follow this in the hospital and on an outpatient basis.  Hypothyroidism is a stable condition     7.  Hyperlipidemia.  Agree with holding statin for now.  Will resume prior to discharge and continue on outpatient basis.     8.  Obesity.  Hope to continue using agents such as Ozempic for management of patient's blood sugar levels.  This will also help with diet control and ongoing need for gradual weight loss.     9.  Acute kidney injury secondary to volume depletion with uncontrolled glucoses.  Should improve as patient's volume status normalizes.  Will continue to monitor closely.  Acute kidney injury is gradually improving with resolution of hydronephrosis and bilateral stent placement.     10.  Generalized muscle weakness and fatigue.  Suspect related to the poorly controlled diabetes and electrolyte disturbances.  Will do PT and OT evaluations after patient stabilizes.  Suspect patient will discharge to  home with PT and OT and home environment.  Hopefully patient will be able to work with PT and OT in the next few days.    11.  Right lower quadrant pain with fluid collections noticed on CT scan completed today 6 days after admission.  Patient complaining of significant pain with erythematous skin over the right lower quadrant.  I will ask general surgery to take a look at the situation in the a.m.  We will also notify urology of the findings in the a.m.        Plan for disposition:Where: home and home health and When:  3-4 days when medically stable     Copied text in this note has been reviewed by me and is accurate as of 01/08/2024.  Much of this dictation was completed using Dragon voice activated transcription software which can result in misspelled words and nonsensical phrases at times.     Dr. Fairchild available and can be reached at [phone]      Dean Fairchild MD  1/8/2024  6847 EST

## 2024-01-08 NOTE — NURSING NOTE
"Diabetes Education  Assessment/Teaching    Patient Name:  Tiana Hudson  YOB: 1960  MRN: [id number]  Admit Date:  1/2/2024      Assessment Date:  1/8/2024  Flowsheet Row Most Recent Value   General Information     Referral From: MD order. Meet with 64 y/o at bedside in CCU to assess needs for DM ed, insulin instructions. 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 Pt lives w/dtr at this time.    Height 165.1 cm (65\")   Height Method Stated   Weight 115 kg (254 lb 3.1 oz)   Weight Method Bed scale   Diabetes History    What type of diabetes do you have? Type 2   Length of Diabetes Diagnosis -- pt did not specify length of DM hx.   Do you test your blood sugar at home? no   Have you had high blood sugar? (>140mg/dl) yes -current a1c >16%.   Education Preferences    Barriers to Learning -- acuity.   Nutrition Information RD consult pending.    Assessment Topics    Taking Medication - Assessment Needs education  -anticipate pt to dc home new to insulin per Dr Fairchild's order. pt reports use of (injectable) Ozempic in the past; dc'd med on her own.   Problem Solving - Assessment Needs education   Healthy Coping - Assessment Competent  -supportive dtr here at bedside in CCU.   Monitoring - Assessment -- Discuss. The value of BG checks outpt.    DM Goals    Contact Plan Follow-up medical care            Flowsheet Row Most Recent Value   DM Education Needs    Meter Needs meter   Frequency of Testing 4 times a day  [-discuss checking BGs ac meals at dc.]   Medication Insulin, Pen - talk about Lantus use and Novolog and correct use of insulin pen. Provide pt current orders for lispro at meals (correction only.)    Problem Solving Hyperglycemia, Signs   Healthy Coping Appropriate   Discharge Plan Home, Follow-up with PCP   Motivation Moderate   Teaching Method Explanation, Discussion, Demonstration   Patient Response Verbalized understanding        Other Comments:  Addendum. DM educator " added order set for BG meter/supply and insulin/pen rxs for dc. Provider needs to sign at Med rec process. Thank you.   Electronically signed by:  Sandie Guevara RN  01/08/24 16:12 EST

## 2024-01-08 NOTE — PLAN OF CARE
Goal Outcome Evaluation:  Plan of Care Reviewed With: patient, family        Progress: improving  Outcome Evaluation: Pt seen for OT this AM, pt reports feeling better, reports 2/10 R abd pain. She demo's improved OOB activity this date, up to BSC with CGA and up to chair as well. She did not use AD for these short distances, however would benefit from one at d/c. Spoke with pt and daughter about letting the CCP know as well. She is able to complete toileting hygiene in static standing, she remains quick to fatigue and will continue to benefit from skilled OT to address noted functional deficits in order to maximize (I) prior to d/c home. She plans home with spouse and her daughters support at d/c.      Anticipated Discharge Disposition (OT): home, home with assist

## 2024-01-08 NOTE — CONSULTS
"Nutrition Services    Patient Name:  Tiana Hudson  YOB: 1960  MRN: [id number]  Admit Date:  1/2/2024  Assessment Date:  01/08/24    CLINICAL NUTRITION - EDUCATION     ASSESSMENT  Encounter Information         Consult from Physician, RN    Education topic Diabetes    Learner Patient, Child or Children    Learning readiness Motivated to learn    Pertinent nutrition history Has some knowledge of carbohydrates     Anthropometrics         Height Height: 165.1 cm (65\")    Weight Weight: 115 kg (254 lb 3.1 oz) (01/08/24 0700)    BMI  Body mass index is 42.3 kg/m².   Obese, Class III (40 or higher)    Comments      Medication/Biochemical          Pertinent medications Insulin      Pertinent lab values Results from last 7 days   Lab Units 01/08/24  0609 01/07/24  0645 01/06/24  0653   SODIUM mmol/L 131* 129* 131*   POTASSIUM mmol/L 3.7 4.8 3.8   CHLORIDE mmol/L 94* 94* 98   CO2 mmol/L 26.0 20.6* 22.3   BUN mg/dL 4* 6* 7*   CREATININE mg/dL 0.64 0.62 0.61   CALCIUM mg/dL 8.6 8.9 8.6   BILIRUBIN mg/dL 0.3 0.4 0.4   ALK PHOS U/L 198* 229* 227*   ALT (SGPT) U/L 7 8 13   AST (SGOT) U/L 17 16 14   GLUCOSE mg/dL 169* 157* 171*       Lab Results   Component Value Date    HGBA1C 17.70 (H) 01/02/2024        DIAGNOSIS  PES Statement         Problem  Food and nutrition knowledge deficit    Etiology Medical Diagnosis - DM    Signs/Symptoms Information deficit, Report/observation     INTERVENTION  Intervention/Evaluation         Goal   Disease management/therapy, Improved nutrition related labs, Meet nutritional needs for diagnosis/condition, Provide information , Reduce/improve symptoms    Educated regarding Appropriate portions, Beverage choices, Eating pattern, Food choices, Food preparation, Label reading, Snacks    Resources given Discussion only, Printed materials    Monitor/evaluation  Per protocol    Discharge planning Contact RD if questions/concerns     RD to follow per protocol.     Electronically signed " by:  Lesly Landrum RD  01/08/24 15:37 EST

## 2024-01-08 NOTE — PROGRESS NOTES
"  [facility] Progress Note    Chief Complaint: Feeling better    Patient present with bilateral UPJ obstructions and urosepsis stents have been placed cultures have revealed funguria renal pelvis bacterial urinary tract infection bacteremia followed by infectious disease no flank pain or discomfort to mild urgency and frequency no gross hematuria    Review of Systems:    Per HPI          Vital Signs  /70   Pulse 96   Temp 99 °F (37.2 °C) (Oral)   Resp 18   Ht 165.1 cm (65\")   Wt 115 kg (254 lb 3.1 oz)   LMP 09/03/2016 Comment: IUD  SpO2 96%   BMI 42.30 kg/m²     Physical Exam:     General Appearance:    Alert, cooperative, NAD   HEENT:    No trauma, pupils reactive, hearing intact   Back:     No CVA tenderness   Lungs:     Respirations unlabored, no wheezing                                    Results Review:     I reviewed the patient's new clinical results.  Lab Results (last 24 hours)       Procedure Component Value Units Date/Time    POC Glucose Once [[id number]]  (Abnormal) Collected: 01/08/24 0804    Specimen: Blood Updated: 01/08/24 0805     Glucose 183 mg/dL     CBC & Differential [[id number]]  (Abnormal) Collected: 01/08/24 0609    Specimen: Blood Updated: 01/08/24 0726    Narrative:      The following orders were created for panel order CBC & Differential.  Procedure                               Abnormality         Status                     ---------                               -----------         ------                     CBC Auto Differential[[id number]]        Abnormal            Final result                 Please view results for these tests on the individual orders.    CBC Auto Differential [[id number]]  (Abnormal) Collected: 01/08/24 0609    Specimen: Blood Updated: 01/08/24 0726     WBC 16.61 10*3/mm3      RBC 3.40 10*6/mm3      Hemoglobin 9.3 g/dL      Hematocrit 29.2 %      MCV 85.9 fL      MCH 27.4 pg      MCHC 31.8 g/dL      RDW 14.1 %      RDW-SD 44.3 fl      MPV 10.4 fL      " Platelets 378 10*3/mm3     Manual Differential [[id number]]  (Abnormal) Collected: 01/08/24 0609    Specimen: Blood Updated: 01/08/24 0726     Neutrophil % 84.0 %      Lymphocyte % 5.0 %      Monocyte % 11.0 %      Neutrophils Absolute 13.95 10*3/mm3      Lymphocytes Absolute 0.83 10*3/mm3      Monocytes Absolute 1.83 10*3/mm3      RBC Morphology Normal     WBC Morphology Normal     Platelet Morphology Normal    Comprehensive Metabolic Panel [[id number]]  (Abnormal) Collected: 01/08/24 0609    Specimen: Blood Updated: 01/08/24 0711     Glucose 169 mg/dL      BUN 4 mg/dL      Creatinine 0.64 mg/dL      Sodium 131 mmol/L      Potassium 3.7 mmol/L      Chloride 94 mmol/L      CO2 26.0 mmol/L      Calcium 8.6 mg/dL      Total Protein 6.3 g/dL      Albumin 2.1 g/dL      ALT (SGPT) 7 U/L      AST (SGOT) 17 U/L      Alkaline Phosphatase 198 U/L      Total Bilirubin 0.3 mg/dL      Globulin 4.2 gm/dL      A/G Ratio 0.5 g/dL      BUN/Creatinine Ratio 6.3     Anion Gap 11.0 mmol/L      eGFR 100.7 mL/min/1.73     Narrative:      GFR Normal >60  Chronic Kidney Disease <60  Kidney Failure <15      Vancomycin, Trough Please obtain prior to vancomycin administration [[id number]]  (Normal) Collected: 01/08/24 0609    Specimen: Blood Updated: 01/08/24 0706     Vancomycin Trough 14.70 mcg/mL     Narrative:      Therapeutic Ranges for Vancomycin    Vancomycin Random   5.0-40.0 mcg/mL  Vancomycin Trough   5.0-20.0 mcg/mL  Vancomycin Peak     20.0-40.0 mcg/mL    Blood Culture - Blood, Arm, Left [[id number]]  (Normal) Collected: 01/05/24 0231    Specimen: Blood from Arm, Left Updated: 01/08/24 0300     Blood Culture No growth at 3 days    Narrative:      Less than seven (7) mL's of blood was collected.  Insufficient quantity may yield false negative results.    Blood Culture - Blood, Hand, Right [[id number]]  (Normal) Collected: 01/05/24 0232    Specimen: Blood from Hand, Right Updated: 01/08/24 0300     Blood Culture No growth at 3 days     Narrative:      Less than seven (7) mL's of blood was collected.  Insufficient quantity may yield false negative results.    POC Glucose Once [[id number]]  (Abnormal) Collected: 01/07/24 2058    Specimen: Blood Updated: 01/07/24 2059     Glucose 209 mg/dL     POC Glucose Once [[id number]]  (Abnormal) Collected: 01/07/24 1612    Specimen: Blood Updated: 01/07/24 1620     Glucose 200 mg/dL           Imaging Results (Last 24 Hours)       ** No results found for the last 24 hours. **            Medication Review:   I have personally reviewed    Current Facility-Administered Medications:     acetaminophen (TYLENOL) tablet 650 mg, 650 mg, Oral, Q4H PRN, 650 mg at 01/04/24 1515 **OR** acetaminophen (TYLENOL) suppository 650 mg, 650 mg, Rectal, Q4H PRN, Eduard Armando MD    sennosides-docusate (PERICOLACE) 8.6-50 MG per tablet 2 tablet, 2 tablet, Oral, BID, 2 tablet at 01/07/24 2106 **AND** polyethylene glycol (MIRALAX) packet 17 g, 17 g, Oral, Daily PRN **AND** bisacodyl (DULCOLAX) EC tablet 5 mg, 5 mg, Oral, Daily PRN **AND** bisacodyl (DULCOLAX) suppository 10 mg, 10 mg, Rectal, Daily PRN, Eduard Armando MD    Calcium Replacement - Follow Nurse / BPA Driven Protocol, , Does not apply, PRN, Eduard Armando MD    dextrose (D50W) (25 g/50 mL) IV injection 25 g, 25 g, Intravenous, Q15 Min PRN, Dean Fairchild MD    dextrose (GLUTOSE) oral gel 15 g, 15 g, Oral, Q15 Min PRN, Dean Fairchild MD    Enoxaparin Sodium (LOVENOX) syringe 40 mg, 40 mg, Subcutaneous, Q12H, Eduard Armando MD, 40 mg at 01/08/24 0837    escitalopram (LEXAPRO) tablet 10 mg, 10 mg, Oral, Daily, Eduard Armando MD, 10 mg at 01/08/24 0837    glucagon (GLUCAGEN) injection 1 mg, 1 mg, Intramuscular, Q15 Min PRN, Danny Najera MD    HYDROcodone-acetaminophen (NORCO) 5-325 MG per tablet 1 tablet, 1 tablet, Oral, Q6H PRN, Eduard Armando MD, 1 tablet at 01/07/24 1507    HYDROmorphone (DILAUDID) injection 1 mg, 1 mg, Intravenous, Q2H  PRN, Eduard Armando MD    insulin glargine (LANTUS, SEMGLEE) injection 10 Units, 10 Units, Subcutaneous, Nightly, Dean Fairchild MD, 10 Units at 01/07/24 2106    insulin lispro (HUMALOG/ADMELOG) injection 2-9 Units, 2-9 Units, Subcutaneous, 4x Daily AC & at Bedtime, Dean Fairchild MD, 2 Units at 01/08/24 0837    ipratropium-albuterol (DUO-NEB) nebulizer solution 3 mL, 3 mL, Nebulization, Q6H PRN, Eduard Armando MD    lactated ringers infusion, 9 mL/hr, Intravenous, Continuous, Eduard Armando MD, Last Rate: 9 mL/hr at 01/03/24 1527, Restarted at 01/03/24 1600    levothyroxine (SYNTHROID, LEVOTHROID) tablet 137 mcg, 137 mcg, Oral, Q AM, Eduard Armando MD, 137 mcg at 01/08/24 0613    Magnesium Standard Dose Replacement - Follow Nurse / BPA Driven Protocol, , Does not apply, PRN, Eduard Armando MD    micafungin sodium (MYCAMINE) 100 mg in sodium chloride 0.9 % 100 mL IVPB-VTB, 100 mg, Intravenous, Q24H, Jorge L Us DO    nitroglycerin (NITROSTAT) SL tablet 0.4 mg, 0.4 mg, Sublingual, Q5 Min PRN, Eduard Armando MD    ondansetron ODT (ZOFRAN-ODT) disintegrating tablet 4 mg, 4 mg, Oral, Q6H PRN **OR** ondansetron (ZOFRAN) injection 4 mg, 4 mg, Intravenous, Q6H PRN, Eduard Armando MD, 4 mg at 01/06/24 1048    pantoprazole (PROTONIX) EC tablet 40 mg, 40 mg, Oral, Q AM, Greg Rangel MD, 40 mg at 01/08/24 0613    Pharmacy to dose vancomycin, , Does not apply, Continuous PRN, Jorge L Us DO    Phosphorus Replacement - Follow Nurse / BPA Driven Protocol, , Does not apply, PRN, Eduard Armando MD    potassium phosphate 15 mmol in 0.9% normal saline 250 mL IVPB, 15 mmol, Intravenous, Once, Dean Fairchild MD    Potassium Replacement - Follow Nurse / BPA Driven Protocol, , Does not apply, PRN, Eduard Armando MD    rOPINIRole (REQUIP) tablet 0.5 mg, 0.5 mg, Oral, Nightly, Eduard Armando MD, 0.5 mg at 01/07/24 2106    vancomycin (VANCOCIN) 1000 mg/200 mL  "dextrose 5% IVPB, 1,000 mg, Intravenous, Q12H, Jorge L Us,     Allergies:    Sulfa antibiotics    Assessment:    Active Problems:    DKA (diabetic ketoacidosis)    Primary osteoarthritis of right knee    UTI (urinary tract infection)    Contact dermatitis    Vitamin D deficiency    Essential hypertension    Hyperlipidemia    Hypothyroidism    Morbid obesity with BMI of 40.0-44.9, adult    Allergic rhinitis    Back pain    Anxiety    Dense breast tissue on mammogram    JADEN (acute kidney injury)    Sepsis, unspecified organism    Lactic acidosis    UPJ (ureteropelvic junction) obstruction bilateral    Bilateral hydrocele    Bilateral hydronephrosis moderate on admission    Renal calculus on right, nonobstructinbg    Chronic liver disease           Plan:        Eduard Armando MD    1/8/2024  09:39 EST  [facility] Progress Note    Chief Complaint:          Review of Systems:              Vital Signs  /70   Pulse 96   Temp 99 °F (37.2 °C) (Oral)   Resp 18   Ht 165.1 cm (65\")   Wt 115 kg (254 lb 3.1 oz)   LMP 09/03/2016 Comment: IUD  SpO2 96%   BMI 42.30 kg/m²     Physical Exam:     General Appearance:    Alert, cooperative, NAD   HEENT:    No trauma, pupils reactive, hearing intact   Back:     No CVA tenderness   Lungs:     Respirations unlabored, no wheezing    Heart:     Abdomen:      :     Extremities:    Lymphatic:    Skin:    Neuro/Psych:         Results Review:      Lab Results (last 24 hours)       Procedure Component Value Units Date/Time    POC Glucose Once [[id number]]  (Abnormal) Collected: 01/08/24 0804    Specimen: Blood Updated: 01/08/24 0805     Glucose 183 mg/dL     CBC & Differential [[id number]]  (Abnormal) Collected: 01/08/24 0609    Specimen: Blood Updated: 01/08/24 0726    Narrative:      The following orders were created for panel order CBC & Differential.  Procedure                               Abnormality         Status                     ---------               "                 -----------         ------                     CBC Auto Differential[[id number]]        Abnormal            Final result                 Please view results for these tests on the individual orders.    CBC Auto Differential [[id number]]  (Abnormal) Collected: 01/08/24 0609    Specimen: Blood Updated: 01/08/24 0726     WBC 16.61 10*3/mm3      RBC 3.40 10*6/mm3      Hemoglobin 9.3 g/dL      Hematocrit 29.2 %      MCV 85.9 fL      MCH 27.4 pg      MCHC 31.8 g/dL      RDW 14.1 %      RDW-SD 44.3 fl      MPV 10.4 fL      Platelets 378 10*3/mm3     Manual Differential [[id number]]  (Abnormal) Collected: 01/08/24 0609    Specimen: Blood Updated: 01/08/24 0726     Neutrophil % 84.0 %      Lymphocyte % 5.0 %      Monocyte % 11.0 %      Neutrophils Absolute 13.95 10*3/mm3      Lymphocytes Absolute 0.83 10*3/mm3      Monocytes Absolute 1.83 10*3/mm3      RBC Morphology Normal     WBC Morphology Normal     Platelet Morphology Normal    Comprehensive Metabolic Panel [[id number]]  (Abnormal) Collected: 01/08/24 0609    Specimen: Blood Updated: 01/08/24 0711     Glucose 169 mg/dL      BUN 4 mg/dL      Creatinine 0.64 mg/dL      Sodium 131 mmol/L      Potassium 3.7 mmol/L      Chloride 94 mmol/L      CO2 26.0 mmol/L      Calcium 8.6 mg/dL      Total Protein 6.3 g/dL      Albumin 2.1 g/dL      ALT (SGPT) 7 U/L      AST (SGOT) 17 U/L      Alkaline Phosphatase 198 U/L      Total Bilirubin 0.3 mg/dL      Globulin 4.2 gm/dL      A/G Ratio 0.5 g/dL      BUN/Creatinine Ratio 6.3     Anion Gap 11.0 mmol/L      eGFR 100.7 mL/min/1.73     Narrative:      GFR Normal >60  Chronic Kidney Disease <60  Kidney Failure <15      Vancomycin, Trough Please obtain prior to vancomycin administration [[id number]]  (Normal) Collected: 01/08/24 0609    Specimen: Blood Updated: 01/08/24 0706     Vancomycin Trough 14.70 mcg/mL     Narrative:      Therapeutic Ranges for Vancomycin    Vancomycin Random   5.0-40.0 mcg/mL  Vancomycin Trough   5.0-20.0  mcg/mL  Vancomycin Peak     20.0-40.0 mcg/mL    Blood Culture - Blood, Arm, Left [[id number]]  (Normal) Collected: 01/05/24 0231    Specimen: Blood from Arm, Left Updated: 01/08/24 0300     Blood Culture No growth at 3 days    Narrative:      Less than seven (7) mL's of blood was collected.  Insufficient quantity may yield false negative results.    Blood Culture - Blood, Hand, Right [[id number]]  (Normal) Collected: 01/05/24 0232    Specimen: Blood from Hand, Right Updated: 01/08/24 0300     Blood Culture No growth at 3 days    Narrative:      Less than seven (7) mL's of blood was collected.  Insufficient quantity may yield false negative results.    POC Glucose Once [[id number]]  (Abnormal) Collected: 01/07/24 2058    Specimen: Blood Updated: 01/07/24 2059     Glucose 209 mg/dL     POC Glucose Once [[id number]]  (Abnormal) Collected: 01/07/24 1612    Specimen: Blood Updated: 01/07/24 1620     Glucose 200 mg/dL           Imaging Results (Last 24 Hours)       ** No results found for the last 24 hours. **            Medication Review:   I have personally reviewed    Current Facility-Administered Medications:     acetaminophen (TYLENOL) tablet 650 mg, 650 mg, Oral, Q4H PRN, 650 mg at 01/04/24 1515 **OR** acetaminophen (TYLENOL) suppository 650 mg, 650 mg, Rectal, Q4H PRN, Eduard Armando MD    sennosides-docusate (PERICOLACE) 8.6-50 MG per tablet 2 tablet, 2 tablet, Oral, BID, 2 tablet at 01/07/24 2106 **AND** polyethylene glycol (MIRALAX) packet 17 g, 17 g, Oral, Daily PRN **AND** bisacodyl (DULCOLAX) EC tablet 5 mg, 5 mg, Oral, Daily PRN **AND** bisacodyl (DULCOLAX) suppository 10 mg, 10 mg, Rectal, Daily PRN, Eduard Armando MD    Calcium Replacement - Follow Nurse / BPA Driven Protocol, , Does not apply, PRN, Eduard Armando MD    dextrose (D50W) (25 g/50 mL) IV injection 25 g, 25 g, Intravenous, Q15 Min PRN, Dean Fairchild MD    dextrose (GLUTOSE) oral gel 15 g, 15 g, Oral, Q15 Min PRN, Dean Fairchild,  MD    Enoxaparin Sodium (LOVENOX) syringe 40 mg, 40 mg, Subcutaneous, Q12H, Eduard Armando MD, 40 mg at 01/08/24 0837    escitalopram (LEXAPRO) tablet 10 mg, 10 mg, Oral, Daily, Eduard Armando MD, 10 mg at 01/08/24 0837    glucagon (GLUCAGEN) injection 1 mg, 1 mg, Intramuscular, Q15 Min PRN, Danny Najera MD    HYDROcodone-acetaminophen (NORCO) 5-325 MG per tablet 1 tablet, 1 tablet, Oral, Q6H PRN, Eduard Armando MD, 1 tablet at 01/07/24 1507    HYDROmorphone (DILAUDID) injection 1 mg, 1 mg, Intravenous, Q2H PRN, Eduard Armando MD    insulin glargine (LANTUS, SEMGLEE) injection 10 Units, 10 Units, Subcutaneous, Nightly, Dean Fairchild MD, 10 Units at 01/07/24 2106    insulin lispro (HUMALOG/ADMELOG) injection 2-9 Units, 2-9 Units, Subcutaneous, 4x Daily AC & at Bedtime, Dean Fairchild MD, 2 Units at 01/08/24 0837    ipratropium-albuterol (DUO-NEB) nebulizer solution 3 mL, 3 mL, Nebulization, Q6H PRN, Eduard Armando MD    lactated ringers infusion, 9 mL/hr, Intravenous, Continuous, Eduard Armando MD, Last Rate: 9 mL/hr at 01/03/24 1527, Restarted at 01/03/24 1600    levothyroxine (SYNTHROID, LEVOTHROID) tablet 137 mcg, 137 mcg, Oral, Q AM, Eduard Armando MD, 137 mcg at 01/08/24 0613    Magnesium Standard Dose Replacement - Follow Nurse / BPA Driven Protocol, , Does not apply, PRN, Eduard Armando MD    micafungin sodium (MYCAMINE) 100 mg in sodium chloride 0.9 % 100 mL IVPB-VTB, 100 mg, Intravenous, Q24H, Jorge L Us,     nitroglycerin (NITROSTAT) SL tablet 0.4 mg, 0.4 mg, Sublingual, Q5 Min PRN, Eduard Armando MD    ondansetron ODT (ZOFRAN-ODT) disintegrating tablet 4 mg, 4 mg, Oral, Q6H PRN **OR** ondansetron (ZOFRAN) injection 4 mg, 4 mg, Intravenous, Q6H PRN, Eduard Armando MD, 4 mg at 01/06/24 1048    pantoprazole (PROTONIX) EC tablet 40 mg, 40 mg, Oral, Q AM, Greg Rangel MD, 40 mg at 01/08/24 0613    Pharmacy to dose vancomycin, , Does not  apply, Continuous PRN, Jorge L Us DO    Phosphorus Replacement - Follow Nurse / BPA Driven Protocol, , Does not apply, PRN, Eduard Armando MD    potassium phosphate 15 mmol in 0.9% normal saline 250 mL IVPB, 15 mmol, Intravenous, Once, Dean Fairchild MD    Potassium Replacement - Follow Nurse / BPA Driven Protocol, , Does not apply, PRN, Eduard Armando MD    rOPINIRole (REQUIP) tablet 0.5 mg, 0.5 mg, Oral, Nightly, Eduard Armando MD, 0.5 mg at 01/07/24 2106    vancomycin (VANCOCIN) 1000 mg/200 mL dextrose 5% IVPB, 1,000 mg, Intravenous, Q12H, Jorge L Us DO    Allergies:    Sulfa antibiotics    Assessment:    Active Problems:    DKA (diabetic ketoacidosis)    Primary osteoarthritis of right knee    UTI (urinary tract infection)    Contact dermatitis    Vitamin D deficiency    Essential hypertension    Hyperlipidemia    Hypothyroidism    Morbid obesity with BMI of 40.0-44.9, adult    Allergic rhinitis    Back pain    Anxiety    Dense breast tissue on mammogram    JADEN (acute kidney injury)    Sepsis, unspecified organism    Lactic acidosis    UPJ (ureteropelvic junction) obstruction bilateral    Bilateral hydrocele    Bilateral hydronephrosis moderate on admission    Renal calculus on right, nonobstructinbg    Chronic liver disease       Bilateral UPJ obstructions post stent placement renal pelvic cultures bilaterally for funguria-urinary tract infection-sepsis    Plan:    Per infectious disease continuation antibiotics per the recommendations stent remain in place I will follow-up in the office in approximately 7 to 10 days my office will call any concerns or questions please call me [phone] thank you      Eduard Armando MD    1/8/2024  09:39 EST

## 2024-01-08 NOTE — PROGRESS NOTES
LOS: 6 days     Chief Complaint: Antibiotic management    Interval History: Patient reports he is feeling well this morning.  WBC up slightly to 16 from 13.  Remains afebrile.  Tolerating antibiotics.    Vital Signs  Temp:  [97.7 °F (36.5 °C)-99 °F (37.2 °C)] 99 °F (37.2 °C)  Heart Rate:  [92-97] 96  Resp:  [16-18] 18  BP: (124-143)/(69-70) 124/70    Physical Exam:  General: In no acute distress  HEENT: Oropharynx clear, moist mucous membranes  Respiratory: Normal work of breathing  Skin: No rashes or lesions in exposed areas  Extremities: No edema, cyanosis  Access: Peripheral IV    Antibiotics:  Anti-Infectives (From admission, onward)      Ordered     Dose/Rate Route Frequency Start Stop    01/08/24 0847  vancomycin (VANCOCIN) 1000 mg/200 mL dextrose 5% IVPB        Ordering Provider: Jorge L Us DO    1,000 mg  over 60 Minutes Intravenous Every 12 Hours 01/08/24 1900 01/13/24 0659    01/08/24 0803  micafungin sodium (MYCAMINE) 100 mg in sodium chloride 0.9 % 100 mL IVPB-VTB        Ordering Provider: Jorge L Us DO    100 mg  over 60 Minutes Intravenous Every 24 Hours 01/08/24 0900 01/15/24 0859    01/05/24 0756  vancomycin 2250 mg/500 mL 0.9% NS IVPB ([facility])        Ordering Provider: Jorge L Us DO    20 mg/kg × 111 kg  over 135 Minutes Intravenous Once 01/05/24 0845 01/05/24 1118    01/05/24 0750  Pharmacy to dose vancomycin        Ordering Provider: Jorge L Us DO     Does not apply Continuous PRN 01/05/24 0750 01/12/24 0749    01/02/24 1636  piperacillin-tazobactam (ZOSYN) 4.5 g in iso-osmotic dextrose 100 mL IVPB (premix)        Ordering Provider: Arthur Najera MD    4.5 g  over 30 Minutes Intravenous Once 01/02/24 1652 01/02/24 1735    01/02/24 1243  cefTRIAXone (ROCEPHIN) 2,000 mg in sodium chloride 0.9 % 100 mL IVPB-VTB        Ordering Provider: Angelito Winkler MD    2,000 mg  200 mL/hr over 30 Minutes Intravenous Once 01/02/24 1259 01/02/24 1417              Results Review:     I reviewed the patient's new clinical results.    Lab Results   Component Value Date    WBC 16.61 (H) 01/08/2024    HGB 9.3 (L) 01/08/2024    HCT 29.2 (L) 01/08/2024    MCV 85.9 01/08/2024     01/08/2024     Lab Results   Component Value Date    GLUCOSE 169 (H) 01/08/2024    BUN 4 (L) 01/08/2024    CREATININE 0.64 01/08/2024    EGFRIFNONA 54 (L) 10/24/2018    EGFRIFAFRI 83 02/23/2017    BCR 6.3 (L) 01/08/2024    CO2 26.0 01/08/2024    CALCIUM 8.6 01/08/2024    PROTENTOTREF 6.9 02/23/2017    ALBUMIN 2.1 (L) 01/08/2024    LABIL2 1.7 02/23/2017    AST 17 01/08/2024    ALT 7 01/08/2024     Microbiology:  1/2 urine culture no growth  1/2 blood cultures 2 out of 2 gram-positive bacilli  1/3 right renal fluid culture Candida glabrata and Candida tropicalis  1/3 left renal fluid culture Candida glabrata  1/5 blood cultures no growth to date    Assessment    #Right calyceal rupture  #Bilateral hydronephrosis status post bilateral ureteral stenting  #JADEN  #DKA  #Hyponatremia  #Morbid obesity, BMI 40  # Gram-positive bacteremia    Repeat blood cultures remain negative and initial blood cultures pending identification.  Continue vancomycin goal -600 while following up ID and susceptibilities.  With Candida glabrata and tropicalis and increased WBC will transition fluconazole to micafungin 100 mg daily.      ID will follow.

## 2024-01-08 NOTE — PLAN OF CARE
Goal Outcome Evaluation:              Outcome Evaluation: Patient a/o x4, Pt up in chair on day shift with daughter and got washed up. Pt statin she is feeling better. All meds given as ordered.  No c/o pain this shift. No new issues noted. This RN wore appropriate PPE dureing care. See v/s and labs.

## 2024-01-08 NOTE — THERAPY TREATMENT NOTE
Patient Name: Tiana Hudson  : 1960    MRN: [id number]                              Today's Date: 2024       Admit Date: 2024    Visit Dx:     ICD-10-CM ICD-9-CM   1. Sepsis, due to unspecified organism, unspecified whether acute organ dysfunction present  A41.9 038.9     995.91   2. Acute cystitis without hematuria  N30.00 595.0   3. UPJ (ureteropelvic junction) obstruction  N13.5 593.4     Patient Active Problem List   Diagnosis    Primary osteoarthritis of right knee    UTI (urinary tract infection)    Varicose veins    Contact dermatitis    Elevated liver function tests    Leg cramp    Medial collateral ligament sprain of knee    Vitamin D deficiency    Essential hypertension    Hyperlipidemia    Abrasion    Hypothyroidism    Gestational diabetes    Morbid obesity with BMI of 40.0-44.9, adult    Allergic rhinitis    Back pain    Anxiety    H/O Postconcussion syndrome    H/O dizziness    Allergy    Vaginal candidiasis    Chronic pain of left knee    Arthritis of both knees    Inflammation of bladder severe, acute and chronic, with mucosal erosions/ulcers    Dense breast tissue on mammogram    Dizziness    Restless leg syndrome    Type 2 diabetes mellitus without complication    Arthritis of right knee    Status post total right knee replacement    Arthritis of left knee    History of total knee arthroplasty    Acquired spondylolisthesis    Screening for colorectal cancer    Precordial pain    FH ischemic heart disease    DKA (diabetic ketoacidosis)    JADEN (acute kidney injury)    Sepsis, unspecified organism    Lactic acidosis    UPJ (ureteropelvic junction) obstruction bilateral    Bilateral hydrocele    Bilateral hydronephrosis moderate on admission    Renal calculus on right, nonobstructinbg    Chronic liver disease     Past Medical History:   Diagnosis Date    Anemia     intermittently    Anxiety and depression     Arthritis     Depression     Diabetes mellitus     Gallstones     High  cholesterol     History of frequent urinary tract infections     HX OF YEAST IN BLADDER HAS PRN DIFLUCAN    History of transfusion 05/24/2017    Had 2 iron infusions.  This was when i had knee replacement    Hyperlipidemia     Hypertension     Hypothyroidism     Knee pain, bilateral     Low back pain     Lumbar stenosis     PONV (postoperative nausea and vomiting)     Positive TB test     chest x-ray neg    Seasonal allergies      Past Surgical History:   Procedure Laterality Date    APPENDECTOMY N/A 1982    Dr. Wilson    CHOLECYSTECTOMY WITH INTRAOPERATIVE CHOLANGIOGRAM N/A 10/31/2018    Procedure: laparoscopic cholecystectomy;  Surgeon: Mary Kay Mac MD;  Location: [facility] OR;  Service: General    COLONOSCOPY      CYSTOSCOPY BLADDER BIOPSY N/A 10/3/2016    Procedure: CYSTOSCOPY BLADDER BIOPSY;  Surgeon: Rei Calvert MD;  Location: [facility] OR;  Service:     CYSTOSCOPY W/ URETERAL STENT PLACEMENT Bilateral 1/3/2024    Procedure: CYSTOSCOPY BILATERAL RETROGRADE PYELOGRAM  BILATERAL URETERAL STENT INSERTION AND CATHETHER PLACEMENT;  Surgeon: Eduard Armando MD;  Location: [facility] OR;  Service: Urology;  Laterality: Bilateral;    DILATATION AND CURETTAGE N/A     ENDOSCOPY      INTRAUTERINE DEVICE INSERTION      KNEE ARTHROSCOPY W/ MENISCAL REPAIR Left     OVARIAN CYST REMOVAL Left 1982    Dr. Wilson    TOTAL KNEE ARTHROPLASTY Right 5/22/2017    Procedure: RIGHT TOTAL KNEE ARTHROPLASTY WITH ALETA NAVIGATION;  Surgeon: Ross Cruz MD;  Location: [facility];  Service:       General Information       Row Name 01/08/24 1242          OT Time and Intention    Document Type therapy note (daily note)  -MW     Mode of Treatment individual therapy;occupational therapy  -       Row Name 01/08/24 1242          General Information    Patient Profile Reviewed yes  -MW     Existing Precautions/Restrictions fall  -       Row Name 01/08/24 1242          Cognition    Orientation Status  (Cognition) oriented x 4  -       Row Name 01/08/24 1242          Safety Issues, Functional Mobility    Impairments Affecting Function (Mobility) endurance/activity tolerance;strength;pain  -               User Key  (r) = Recorded By, (t) = Taken By, (c) = Cosigned By      Initials Name Provider Type    Anu Bourgeois OT Occupational Therapist                     Mobility/ADL's       Row Name 01/08/24 1242          Bed Mobility    Bed Mobility supine-sit;sit-supine  -     Supine-Sit [place] (Bed Mobility) minimum assist (75% patient effort)  -     Sit-Supine [place] (Bed Mobility) not tested  -     Assistive Device (Bed Mobility) bed rails;head of bed elevated  -     Comment, (Bed Mobility) [facility] at end of session  -       Row Name 01/08/24 1242          Transfers    Transfers sit-stand transfer;toilet transfer;stand-sit transfer  -[facility] Name 01/08/24 1242          Sit-Stand Transfer    Sit-Stand [place] (Transfers) contact guard  -     Assistive Device (Sit-Stand Transfers) walker, front-wheeled  -     Comment, (Sit-Stand Transfer) multiple STS from EOB and BSC this date  -[facility] Name 01/08/24 1242          Stand-Sit Transfer    Stand-Sit [place] (Transfers) contact guard;verbal cues  -     Comment, (Stand-Sit Transfer) no AD  -[facility] Name 01/08/24 1242          Toilet Transfer    Type (Toilet Transfer) sit-stand;stand-sit  -     [place] Level (Toilet Transfer) contact guard  -     Assistive Device (Toilet Transfer) commode, 3-in-1;grab bars/safety frame  -       Row Name 01/08/24 1242          Functional Mobility    Functional Mobility- Ind. Level contact guard assist  -     Functional Mobility- Comment short distance with no AD to BSC and to chair this date  -       Row Name 01/08/24 1242          Activities of Daily Living    BADL Assessment/Intervention toileting;lower body dressing  -       Row Name 01/08/24 1242          Lower  Body Dressing Assessment/Training    [place] Level (Lower Body Dressing) don;socks;maximum assist (25% patient effort)  -     Position (Lower Body Dressing) edge of bed sitting  -       Row Name 01/08/24 1242          Toileting Assessment/Training    Comment, (Toileting) to BSC, able to complete hygiene in static standing with CGA  -               User Key  (r) = Recorded By, (t) = Taken By, (c) = Cosigned By      Initials Name Provider Type    Anu Bourgeois OT Occupational Therapist                   Obj/Interventions       Row Name 01/08/24 1246          Motor Skills    Motor Skills functional endurance  -     Functional Endurance improving  -       Row Name 01/08/24 1246          Balance    Balance Assessment sitting static balance;sitting dynamic balance;sit to stand dynamic balance;standing static balance;standing dynamic balance  -     Static Sitting Balance standby assist  -     Dynamic Sitting Balance standby assist  -     Position, Sitting Balance sitting edge of bed;unsupported  -     Sit to Stand Dynamic Balance contact guard  -     Static Standing Balance contact guard  -     Dynamic Standing Balance contact guard  -     Position/Device Used, Standing Balance unsupported  -     Balance Interventions sitting;standing;sit to stand;supported;static;dynamic;occupation based/functional task;minimal challenge;weight shifting activity  -     Comment, Balance no overt LOBs, remains quick to fatigue  -               User Key  (r) = Recorded By, (t) = Taken By, (c) = Cosigned By      Initials Name Provider Type    Anu Bourgeois OT Occupational Therapist                   Goals/Plan    No documentation.                  Clinical Impression       Row Name 01/08/24 1247          Pain Assessment    Pretreatment Pain Rating 2/10  -MW     Posttreatment Pain Rating 2/10  -MW     Pain Location - abdomen  -       Row Name 01/08/24 1247          Plan of Care Review     Plan of Care Reviewed With patient;family  -     Progress improving  -     Outcome Evaluation Pt seen for OT this AM, pt reports feeling better, reports 2/10 R abd pain. She demo's improved OOB activity this date, up to BSC with CGA and up to chair as well. She did not use AD for these short distances, however would benefit from one at d/c. Spoke with pt and daughter about letting the CCP know as well. She is able to complete toileting hygiene in static standing, she remains quick to fatigue and will continue to benefit from skilled OT to address noted functional deficits in order to maximize (I) prior to d/c home. She plans home with spouse and her daughters support at d/c.  -       Row Name 01/08/24 1247          Therapy Plan Review/Discharge Plan (OT)    Anticipated Discharge Disposition (OT) home;home with assist  -       Row Name 01/08/24 1247          Vital Signs    O2 Delivery Pre Treatment room air  -       Row Name 01/08/24 1247          Positioning and Restraints    Pre-Treatment Position in bed  -     Post Treatment Position chair  -MW     In Chair notified nsg;reclined;call light within reach;encouraged to call for assist;exit alarm on;with family/caregiver  -               User Key  (r) = Recorded By, (t) = Taken By, (c) = Cosigned By      Initials Name Provider Type    Anu Bourgeois, [place] Occupational Therapist                   Outcome Measures       Row Name 01/08/24 1251          How much help from another is currently needed...    Putting on and taking off regular lower body clothing? 2  -MW     Bathing (including washing, rinsing, and drying) 2  -MW     Toileting (which includes using toilet bed pan or urinal) 3  -MW     Putting on and taking off regular upper body clothing 3  -MW     Taking care of personal grooming (such as brushing teeth) 3  -MW     Eating meals 4  -MW     AM-PAC 6 Clicks Score (OT) 17  -MW       Row Name 01/08/24 0800          How much help from another  person do you currently need...    Turning from your back to your side while in flat bed without using bedrails? 4  -CG     Moving from lying on back to sitting on the side of a flat bed without bedrails? 3  -CG     Moving to and from a bed to a chair (including a wheelchair)? 3  -CG     Standing up from a chair using your arms (e.g., wheelchair, bedside chair)? 4  -CG     Climbing 3-5 steps with a railing? 2  -CG     To walk in hospital room? 2  -CG     AM-PAC 6 Clicks Score (PT) 18  -CG     Highest Level of Mobility Goal 6 --> Walk 10 steps or more  -CG       Row Name 01/08/24 1251          Functional Assessment    Outcome Measure Options AM-PAC 6 Clicks Daily Activity (OT)  -MW               User Key  (r) = Recorded By, (t) = Taken By, (c) = Cosigned By      Initials Name Provider Type    Anu Bourgeois OT Occupational Therapist    Raleigh Armenta, RN Registered Nurse                    Occupational Therapy Education       Title: PT OT SLP Therapies (In Progress)       Topic: Occupational Therapy (In Progress)       Point: ADL training (Done)       Description:   Instruct learner(s) on proper safety adaptation and remediation techniques during self care or transfers.   Instruct in proper use of assistive devices.                  Learning Progress Summary             Patient Acceptance, E, VU by ORIANA at 1/5/2024 1254    Comment: role of OT, d/c rec, GOC   Family Acceptance, E, VU by ORIANA at 1/5/2024 1254    Comment: role of OT, d/c rec, GOC                         Point: Home exercise program (Not Started)       Description:   Instruct learner(s) on appropriate technique for monitoring, assisting and/or progressing therapeutic exercises/activities.                  Learner Progress:  Not documented in this visit.              Point: Precautions (Done)       Description:   Instruct learner(s) on prescribed precautions during self-care and functional transfers.                  Learning Progress Summary              Patient Acceptance, E, VU by  at 1/5/2024 1254    Comment: role of OT, d/c rec, GOC   Family Acceptance, E, VU by  at 1/5/2024 1254    Comment: role of OT, d/c rec, GOC                         Point: Body mechanics (Done)       Description:   Instruct learner(s) on proper positioning and spine alignment during self-care, functional mobility activities and/or exercises.                  Learning Progress Summary             Patient Acceptance, E, VU by  at 1/5/2024 1254    Comment: role of OT, d/c rec, GOC   Family Acceptance, E, VU by MW at 1/5/2024 1254    Comment: role of OT, d/c rec, GOC                                         User Key       Initials Effective Dates Name Provider Type Discipline     08/20/21 -  Anu Villafana OT Occupational Therapist OT                  OT Recommendation and Plan  Planned Therapy Interventions (OT): activity tolerance training, neuromuscular control/coordination retraining, patient/caregiver education/training, transfer/mobility retraining, strengthening exercise, ROM/therapeutic exercise, occupation/activity based interventions, functional balance retraining  Therapy Frequency (OT): 5 times/wk  Plan of Care Review  Plan of Care Reviewed With: patient, family  Progress: improving  Outcome Evaluation: Pt seen for OT this AM, pt reports feeling better, reports 2/10 R abd pain. She demo's improved OOB activity this date, up to BSC with CGA and up to chair as well. She did not use AD for these short distances, however would benefit from one at d/c. Spoke with pt and daughter about letting the CCP know as well. She is able to complete toileting hygiene in static standing, she remains quick to fatigue and will continue to benefit from skilled OT to address noted functional deficits in order to maximize (I) prior to d/c home. She plans home with spouse and her daughters support at d/c.     Time Calculation:   Evaluation Complexity (OT)  Review Occupational  Profile/Medical/Therapy History Complexity: expanded/moderate complexity  Assessment, Occupational Performance/Identification of Deficit Complexity: 3-5 performance deficits  Clinical Decision Making Complexity (OT): detailed assessment/moderate complexity  Overall Complexity of Evaluation (OT): moderate complexity     Time Calculation- OT       Row Name 01/08/24 1251             Time Calculation- OT    OT Start Time 0845  -MW      OT Stop Time 0900  -MW      OT Time Calculation (min) 15 min  -MW      Total Timed Code Minutes- OT 15 minute(s)  -MW      OT Received On 01/08/24  -MW      OT - Next Appointment 01/09/24  -MW         Timed Charges    13055 - OT Self Care/Mgmt Minutes 15  -MW         Total Minutes    Timed Charges Total Minutes 15  -MW       Total Minutes 15  -MW                User Key  (r) = Recorded By, (t) = Taken By, (c) = Cosigned By      Initials Name Provider Type    Anu Bourgeois OT Occupational Therapist                  Therapy Charges for Today       Code Description Service Date Service Provider Modifiers Qty    82182599333 HC OT SELF CARE/MGMT/TRAIN EA 15 MIN 1/8/2024 Anu Villafana OT GO 1                 Anu Villafana OT  1/8/2024

## 2024-01-09 ENCOUNTER — APPOINTMENT (OUTPATIENT)
Dept: CT IMAGING | Facility: HOSPITAL | Age: 64
DRG: 853 | End: 2024-01-09
Payer: COMMERCIAL

## 2024-01-09 LAB
ALBUMIN SERPL-MCNC: 2.2 G/DL (ref 3.5–5.2)
ALBUMIN/GLOB SERPL: 0.5 G/DL
ALP SERPL-CCNC: 185 U/L (ref 39–117)
ALT SERPL W P-5'-P-CCNC: 9 U/L (ref 1–33)
ANION GAP SERPL CALCULATED.3IONS-SCNC: 10.5 MMOL/L (ref 5–15)
AST SERPL-CCNC: 16 U/L (ref 1–32)
BASOPHILS # BLD AUTO: 0.1 10*3/MM3 (ref 0–0.2)
BASOPHILS NFR BLD AUTO: 0.7 % (ref 0–1.5)
BILIRUB SERPL-MCNC: 0.3 MG/DL (ref 0–1.2)
BUN SERPL-MCNC: 3 MG/DL (ref 8–23)
BUN/CREAT SERPL: 4.2 (ref 7–25)
CALCIUM SPEC-SCNC: 8.6 MG/DL (ref 8.6–10.5)
CHLORIDE SERPL-SCNC: 95 MMOL/L (ref 98–107)
CO2 SERPL-SCNC: 27.5 MMOL/L (ref 22–29)
CREAT SERPL-MCNC: 0.71 MG/DL (ref 0.57–1)
DEPRECATED RDW RBC AUTO: 45.1 FL (ref 37–54)
EGFRCR SERPLBLD CKD-EPI 2021: 96.9 ML/MIN/1.73
EOSINOPHIL # BLD AUTO: 0.15 10*3/MM3 (ref 0–0.4)
EOSINOPHIL NFR BLD AUTO: 1 % (ref 0.3–6.2)
ERYTHROCYTE [DISTWIDTH] IN BLOOD BY AUTOMATED COUNT: 14.4 % (ref 12.3–15.4)
GLOBULIN UR ELPH-MCNC: 4.2 GM/DL
GLUCOSE BLDC GLUCOMTR-MCNC: 164 MG/DL (ref 70–130)
GLUCOSE BLDC GLUCOMTR-MCNC: 212 MG/DL (ref 70–130)
GLUCOSE BLDC GLUCOMTR-MCNC: 238 MG/DL (ref 70–130)
GLUCOSE BLDC GLUCOMTR-MCNC: 267 MG/DL (ref 70–130)
GLUCOSE SERPL-MCNC: 160 MG/DL (ref 65–99)
HCT VFR BLD AUTO: 32 % (ref 34–46.6)
HGB BLD-MCNC: 10.1 G/DL (ref 12–15.9)
IMM GRANULOCYTES # BLD AUTO: 0.44 10*3/MM3 (ref 0–0.05)
IMM GRANULOCYTES NFR BLD AUTO: 3 % (ref 0–0.5)
LYMPHOCYTES # BLD AUTO: 1.46 10*3/MM3 (ref 0.7–3.1)
LYMPHOCYTES NFR BLD AUTO: 10 % (ref 19.6–45.3)
MCH RBC QN AUTO: 27.5 PG (ref 26.6–33)
MCHC RBC AUTO-ENTMCNC: 31.6 G/DL (ref 31.5–35.7)
MCV RBC AUTO: 87.2 FL (ref 79–97)
MONOCYTES # BLD AUTO: 0.94 10*3/MM3 (ref 0.1–0.9)
MONOCYTES NFR BLD AUTO: 6.4 % (ref 5–12)
NEUTROPHILS NFR BLD AUTO: 11.49 10*3/MM3 (ref 1.7–7)
NEUTROPHILS NFR BLD AUTO: 78.9 % (ref 42.7–76)
NRBC BLD AUTO-RTO: 0.1 /100 WBC (ref 0–0.2)
PLATELET # BLD AUTO: 439 10*3/MM3 (ref 140–450)
PMV BLD AUTO: 10.2 FL (ref 6–12)
POTASSIUM SERPL-SCNC: 3.6 MMOL/L (ref 3.5–5.2)
POTASSIUM SERPL-SCNC: 4.4 MMOL/L (ref 3.5–5.2)
PROT SERPL-MCNC: 6.4 G/DL (ref 6–8.5)
RBC # BLD AUTO: 3.67 10*6/MM3 (ref 3.77–5.28)
SODIUM SERPL-SCNC: 133 MMOL/L (ref 136–145)
WBC NRBC COR # BLD AUTO: 14.58 10*3/MM3 (ref 3.4–10.8)

## 2024-01-09 PROCEDURE — 82948 REAGENT STRIP/BLOOD GLUCOSE: CPT

## 2024-01-09 PROCEDURE — 63710000001 INSULIN GLARGINE PER 5 UNITS: Performed by: INTERNAL MEDICINE

## 2024-01-09 PROCEDURE — 63710000001 INSULIN LISPRO (HUMAN) PER 5 UNITS: Performed by: INTERNAL MEDICINE

## 2024-01-09 PROCEDURE — 97110 THERAPEUTIC EXERCISES: CPT

## 2024-01-09 PROCEDURE — 25010000002 MICAFUNGIN SODIUM 100 MG RECONSTITUTED SOLUTION 1 EACH VIAL: Performed by: STUDENT IN AN ORGANIZED HEALTH CARE EDUCATION/TRAINING PROGRAM

## 2024-01-09 PROCEDURE — 85025 COMPLETE CBC W/AUTO DIFF WBC: CPT | Performed by: INTERNAL MEDICINE

## 2024-01-09 PROCEDURE — 99221 1ST HOSP IP/OBS SF/LOW 40: CPT | Performed by: SURGERY

## 2024-01-09 PROCEDURE — 99232 SBSQ HOSP IP/OBS MODERATE 35: CPT | Performed by: STUDENT IN AN ORGANIZED HEALTH CARE EDUCATION/TRAINING PROGRAM

## 2024-01-09 PROCEDURE — 25010000002 ENOXAPARIN PER 10 MG: Performed by: UROLOGY

## 2024-01-09 PROCEDURE — 84132 ASSAY OF SERUM POTASSIUM: CPT | Performed by: INTERNAL MEDICINE

## 2024-01-09 PROCEDURE — 80053 COMPREHEN METABOLIC PANEL: CPT | Performed by: INTERNAL MEDICINE

## 2024-01-09 PROCEDURE — 25010000002 VANCOMYCIN PER 500 MG: Performed by: STUDENT IN AN ORGANIZED HEALTH CARE EDUCATION/TRAINING PROGRAM

## 2024-01-09 RX ORDER — POTASSIUM CHLORIDE 750 MG/1
40 TABLET, FILM COATED, EXTENDED RELEASE ORAL EVERY 4 HOURS
Status: COMPLETED | OUTPATIENT
Start: 2024-01-09 | End: 2024-01-09

## 2024-01-09 RX ORDER — OXYBUTYNIN CHLORIDE 10 MG/1
10 TABLET, EXTENDED RELEASE ORAL DAILY
Status: DISCONTINUED | OUTPATIENT
Start: 2024-01-09 | End: 2024-01-19 | Stop reason: HOSPADM

## 2024-01-09 RX ADMIN — INSULIN LISPRO 6 UNITS: 100 INJECTION, SOLUTION INTRAVENOUS; SUBCUTANEOUS at 11:59

## 2024-01-09 RX ADMIN — MICAFUNGIN SODIUM 100 MG: 100 INJECTION, POWDER, LYOPHILIZED, FOR SOLUTION INTRAVENOUS at 12:00

## 2024-01-09 RX ADMIN — HYDROCODONE BITARTRATE AND ACETAMINOPHEN 1 TABLET: 5; 325 TABLET ORAL at 21:43

## 2024-01-09 RX ADMIN — INSULIN LISPRO 2 UNITS: 100 INJECTION, SOLUTION INTRAVENOUS; SUBCUTANEOUS at 08:44

## 2024-01-09 RX ADMIN — PANTOPRAZOLE SODIUM 40 MG: 40 TABLET, DELAYED RELEASE ORAL at 06:26

## 2024-01-09 RX ADMIN — VANCOMYCIN HYDROCHLORIDE 1000 MG: 1 INJECTION, SOLUTION INTRAVENOUS at 08:44

## 2024-01-09 RX ADMIN — ESCITALOPRAM OXALATE 10 MG: 10 TABLET, FILM COATED ORAL at 08:45

## 2024-01-09 RX ADMIN — HYDROCODONE BITARTRATE AND ACETAMINOPHEN 1 TABLET: 5; 325 TABLET ORAL at 13:57

## 2024-01-09 RX ADMIN — OXYBUTYNIN CHLORIDE 10 MG: 10 TABLET, EXTENDED RELEASE ORAL at 21:43

## 2024-01-09 RX ADMIN — ROPINIROLE HYDROCHLORIDE 0.5 MG: 0.5 TABLET, FILM COATED ORAL at 21:42

## 2024-01-09 RX ADMIN — ENOXAPARIN SODIUM 40 MG: 100 INJECTION SUBCUTANEOUS at 21:45

## 2024-01-09 RX ADMIN — INSULIN LISPRO 4 UNITS: 100 INJECTION, SOLUTION INTRAVENOUS; SUBCUTANEOUS at 17:11

## 2024-01-09 RX ADMIN — LEVOTHYROXINE SODIUM 137 MCG: 137 TABLET ORAL at 06:26

## 2024-01-09 RX ADMIN — ENOXAPARIN SODIUM 40 MG: 100 INJECTION SUBCUTANEOUS at 08:44

## 2024-01-09 RX ADMIN — INSULIN GLARGINE 16 UNITS: 100 INJECTION, SOLUTION SUBCUTANEOUS at 21:47

## 2024-01-09 RX ADMIN — SENNOSIDES AND DOCUSATE SODIUM 2 TABLET: 50; 8.6 TABLET ORAL at 21:43

## 2024-01-09 RX ADMIN — INSULIN LISPRO 4 UNITS: 100 INJECTION, SOLUTION INTRAVENOUS; SUBCUTANEOUS at 21:46

## 2024-01-09 RX ADMIN — POTASSIUM CHLORIDE 40 MEQ: 750 TABLET, EXTENDED RELEASE ORAL at 08:44

## 2024-01-09 RX ADMIN — POTASSIUM CHLORIDE 40 MEQ: 750 TABLET, EXTENDED RELEASE ORAL at 13:57

## 2024-01-09 RX ADMIN — VANCOMYCIN HYDROCHLORIDE 500 MG: 500 INJECTION, POWDER, LYOPHILIZED, FOR SOLUTION INTRAVENOUS at 18:00

## 2024-01-09 NOTE — PLAN OF CARE
Goal Outcome Evaluation:              Outcome Evaluation: Patient a/o x4, up in chair at beginning of shift, assist x1. Pt cont b/b, using bsc, has some urgency. All meds given as ordered. RA during the day, placed on 2L o2 NC during sleep the past 3 nights. No new issues noted. See v/s and labs.

## 2024-01-09 NOTE — THERAPY TREATMENT NOTE
Patient Name: Tiana Hudson  : 1960    MRN: [id number]                              Today's Date: 2024       Admit Date: 2024    Visit Dx:     ICD-10-CM ICD-9-CM   1. Sepsis, due to unspecified organism, unspecified whether acute organ dysfunction present  A41.9 038.9     995.91   2. Acute cystitis without hematuria  N30.00 595.0   3. UPJ (ureteropelvic junction) obstruction  N13.5 593.4     Patient Active Problem List   Diagnosis    Primary osteoarthritis of right knee    UTI (urinary tract infection)    Varicose veins    Contact dermatitis    Elevated liver function tests    Leg cramp    Medial collateral ligament sprain of knee    Vitamin D deficiency    Essential hypertension    Hyperlipidemia    Abrasion    Hypothyroidism    Gestational diabetes    Morbid obesity with BMI of 40.0-44.9, adult    Allergic rhinitis    Back pain    Anxiety    H/O Postconcussion syndrome    H/O dizziness    Allergy    Vaginal candidiasis    Chronic pain of left knee    Arthritis of both knees    Inflammation of bladder severe, acute and chronic, with mucosal erosions/ulcers    Dense breast tissue on mammogram    Dizziness    Restless leg syndrome    Type 2 diabetes mellitus without complication    Arthritis of right knee    Status post total right knee replacement    Arthritis of left knee    History of total knee arthroplasty    Acquired spondylolisthesis    Screening for colorectal cancer    Precordial pain    FH ischemic heart disease    DKA (diabetic ketoacidosis)    JADEN (acute kidney injury)    Sepsis, unspecified organism    Lactic acidosis    UPJ (ureteropelvic junction) obstruction bilateral    Bilateral hydrocele    Bilateral hydronephrosis moderate on admission    Renal calculus on right, nonobstructinbg    Chronic liver disease     Past Medical History:   Diagnosis Date    Anemia     intermittently    Anxiety and depression     Arthritis     Depression     Diabetes mellitus     Gallstones     High  cholesterol     History of frequent urinary tract infections     HX OF YEAST IN BLADDER HAS PRN DIFLUCAN    History of transfusion 05/24/2017    Had 2 iron infusions.  This was when i had knee replacement    Hyperlipidemia     Hypertension     Hypothyroidism     Knee pain, bilateral     Low back pain     Lumbar stenosis     PONV (postoperative nausea and vomiting)     Positive TB test     chest x-ray neg    Seasonal allergies      Past Surgical History:   Procedure Laterality Date    APPENDECTOMY N/A 1982    Dr. Wilson    CHOLECYSTECTOMY WITH INTRAOPERATIVE CHOLANGIOGRAM N/A 10/31/2018    Procedure: laparoscopic cholecystectomy;  Surgeon: Mary Kay Mac MD;  Location: [facility] OR;  Service: General    COLONOSCOPY      CYSTOSCOPY BLADDER BIOPSY N/A 10/3/2016    Procedure: CYSTOSCOPY BLADDER BIOPSY;  Surgeon: Rei Calvert MD;  Location: [facility] OR;  Service:     CYSTOSCOPY W/ URETERAL STENT PLACEMENT Bilateral 1/3/2024    Procedure: CYSTOSCOPY BILATERAL RETROGRADE PYELOGRAM  BILATERAL URETERAL STENT INSERTION AND CATHETHER PLACEMENT;  Surgeon: Eduard Armando MD;  Location: [facility] OR;  Service: Urology;  Laterality: Bilateral;    DILATATION AND CURETTAGE N/A     ENDOSCOPY      INTRAUTERINE DEVICE INSERTION      KNEE ARTHROSCOPY W/ MENISCAL REPAIR Left     OVARIAN CYST REMOVAL Left 1982    Dr. Wilson    TOTAL KNEE ARTHROPLASTY Right 5/22/2017    Procedure: RIGHT TOTAL KNEE ARTHROPLASTY WITH ALETA NAVIGATION;  Surgeon: Ross Cruz MD;  Location: [facility];  Service:       General Information       Row Name 01/09/24 1435          Physical Therapy Time and Intention    Document Type therapy note (daily note)  -MS     Mode of Treatment physical therapy  -MS       Row Name 01/09/24 1435          General Information    Existing Precautions/Restrictions fall  -MS       Row Name 01/09/24 1435          Cognition    Orientation Status (Cognition) oriented x 4  -MS       Row Name 01/09/24  1435          Safety Issues, Functional Mobility    Impairments Affecting Function (Mobility) endurance/activity tolerance;strength;balance  -MS     Comment, Safety Issues/Impairments (Mobility) Gait belt and non skid socks donned.  -MS               User Key  (r) = Recorded By, (t) = Taken By, (c) = Cosigned By      Initials Name Provider Type    Sallie Bartholomew, PT Physical Therapist                   Mobility       Row Name 01/09/24 1436          Bed Mobility    Bed Mobility supine-sit  -MS     Supine-Sit [place] (Bed Mobility) standby assist;verbal cues  -MS     Assistive Device (Bed Mobility) bed rails;head of bed elevated  -MS       Row Name 01/09/24 1436          Transfers    Comment, (Transfers) STS from EOB and recliner. Mild safety awareness cues.  -MS       Row Name 01/09/24 1436          Sit-Stand Transfer    Sit-Stand [place] (Transfers) contact guard;verbal cues  -MS     Assistive Device (Sit-Stand Transfers) walker, front-wheeled  -MS       Row Name 01/09/24 1436          Gait/Stairs (Locomotion)    [place] Level (Gait) contact guard;verbal cues;nonverbal cues (demo/gesture)  -MS     Assistive Device (Gait) walker, front-wheeled  -MS     Distance in Feet (Gait) 15, seated rest, 15'  -MS     Deviations/Abnormal Patterns (Gait) antalgic;sinai decreased;gait speed decreased  -MS     Bilateral Gait Deviations forward flexed posture  -MS     Comment, (Gait/Stairs) Minimally unsteady. Fatigued quickly but reported this is baseline. Agreeable to sitting UIC post PT.  -MS               User Key  (r) = Recorded By, (t) = Taken By, (c) = Cosigned By      Initials Name Provider Type    Sallie Bartholomew, PT Physical Therapist                   Obj/Interventions       Row Name 01/09/24 1439          Motor Skills    Therapeutic Exercise --  Seated LAQs, MIPs and APs x 10 reps  -MS       Row Name 01/09/24 1439          Balance    Static Sitting Balance standby assist  -MS     Dynamic  Sitting Balance standby assist  -MS     Position, Sitting Balance sitting edge of bed;unsupported  -MS     Static Standing Balance contact guard  -MS     Dynamic Standing Balance contact guard  -MS     Position/Device Used, Standing Balance unsupported  -MS               User Key  (r) = Recorded By, (t) = Taken By, (c) = Cosigned By      Initials Name Provider Type    Sallie Bartholomew, PT Physical Therapist                   Goals/Plan    No documentation.                  Clinical Impression       Row Name 01/09/24 1439          Pain    Pretreatment Pain Rating 0/10 - no pain  -MS     Posttreatment Pain Rating 0/10 - no pain  -MS       Row Name 01/09/24 1439          Plan of Care Review    Plan of Care Reviewed With patient;daughter  -MS     Outcome Evaluation Patient resting bed upon PT arrival and agreeable to PT. Patient and nsg report she has been getting up to the [facility]. Less assist needed for all mobility this date. SBA, CGA for standing and ambulated two trials of 15' CGA with no AD. Minimally unsteady and fatigued quickly though patient reported she typically ambulates short distances at baseline. Reviewed DC planning and patient plans to DC home with HH PT and assist from daughter. PT will continue to follow and advance as able.  -MS       Row Name 01/09/24 1439          Therapy Assessment/Plan (PT)    Therapy Frequency (PT) 5 times/wk  -MS       Row Name 01/09/24 1439          Vital Signs    O2 Delivery Pre Treatment room air  -MS       Row Name 01/09/24 1439          Positioning and Restraints    Pre-Treatment Position in bed  no exit alarm  -MS     In Chair notified nsg;reclined;call light within reach;encouraged to call for assist;with family/caregiver  no chair alarm- nsg aware.  -MS               User Key  (r) = Recorded By, (t) = Taken By, (c) = Cosigned By      Initials Name Provider Type    Sallie Bartholomew, PT Physical Therapist                   Outcome Measures       Row Name 01/09/24  1441 01/09/24 0800       How much help from another person do you currently need...    Turning from your back to your side while in flat bed without using bedrails? 4  -MS 4  -CG    Moving from lying on back to sitting on the side of a flat bed without bedrails? 4  -MS 4  -CG    Moving to and from a bed to a chair (including a wheelchair)? 4  -MS 4  -CG    Standing up from a chair using your arms (e.g., wheelchair, bedside chair)? 4  -MS 4  -CG    Climbing 3-5 steps with a railing? 3  -MS 3  -CG    To walk in hospital room? 3  -MS 3  -CG    AM-PAC 6 Clicks Score (PT) 22  -MS 22  -CG    Highest Level of Mobility Goal 7 --> Walk 25 feet or more  -MS 7 --> Walk 25 feet or more  -CG              User Key  (r) = Recorded By, (t) = Taken By, (c) = Cosigned By      Initials Name Provider Type    Sallie Bartholomew, PT Physical Therapist    Raleigh Armenta, RN Registered Nurse                                 Physical Therapy Education       Title: PT OT SLP Therapies (In Progress)       Topic: Physical Therapy (Done)       Point: Mobility training (Done)       Learning Progress Summary             Patient Acceptance, E,TB, VU by MS at 1/9/2024 1442    Acceptance, E,D, DU by PC at 1/6/2024 1537    Acceptance, E,TB,D, VU,NR by  at 1/5/2024 1632                         Point: Home exercise program (Done)       Learning Progress Summary             Patient Acceptance, E,TB, VU by MS at 1/9/2024 1442                         Point: Body mechanics (Done)       Learning Progress Summary             Patient Acceptance, E,TB, VU by MS at 1/9/2024 1442    Acceptance, E,D, DU by PC at 1/6/2024 1537    Acceptance, E,TB,D, VU,NR by  at 1/5/2024 1632                         Point: Precautions (Done)       Learning Progress Summary             Patient Acceptance, E,TB, VU by MS at 1/9/2024 1442    Acceptance, E,D, DU by PC at 1/6/2024 1537    Acceptance, E,TB,D, VU,NR by  at 1/5/2024 1632                                          User Key       Initials Effective Dates Name Provider Type Discipline     06/16/21 -  Brandi Schmid, PT Physical Therapist PT    PC 06/16/21 -  Keily Cary, PT Physical Therapist PT    MS 06/16/21 -  Sallie Delcid PT Physical Therapist PT                  PT Recommendation and Plan     Plan of Care Reviewed With: patient, daughter  Outcome Evaluation: Patient resting bed upon PT arrival and agreeable to PT. Patient and nsg report she has been getting up to the [facility]. Less assist needed for all mobility this date. SBA, CGA for standing and ambulated two trials of 15' CGA with no AD. Minimally unsteady and fatigued quickly though patient reported she typically ambulates short distances at baseline. Reviewed DC planning and patient plans to DC home with HH PT and assist from daughter. PT will continue to follow and advance as able.     Time Calculation:         PT Charges       Row Name 01/09/24 1435             Time Calculation    Start Time 1316  -MS      Stop Time 1339  -MS      Time Calculation (min) 23 min  -MS      PT Received On 01/09/24  -MS      PT - Next Appointment 01/10/24  -MS                User Key  (r) = Recorded By, (t) = Taken By, (c) = Cosigned By      Initials Name Provider Type    MS DelcidSallie, PT Physical Therapist                  Therapy Charges for Today       Code Description Service Date Service Provider Modifiers Qty    70719239120 HC PT THER PROC EA 15 MIN 1/9/2024 Sallie Delcid, PT GP 2            PT G-Codes  Outcome Measure Options: AM-PAC 6 Clicks Daily Activity (OT)  AM-PAC 6 Clicks Score (PT): 22  AM-PAC 6 Clicks Score (OT): 17  PT Discharge Summary  Anticipated Discharge Disposition (PT): home with assist, home with home health    Sallie Delcid, BHUPENDRA  1/9/2024

## 2024-01-09 NOTE — PLAN OF CARE
Goal Outcome Evaluation:  Plan of Care Reviewed With: patient, daughter           Outcome Evaluation: Patient resting bed upon PT arrival and agreeable to PT. Patient and nsg report she has been getting up to the [facility]. Less assist needed for all mobility this date. SBA, CGA for standing and ambulated two trials of 15' CGA with no AD. Minimally unsteady and fatigued quickly though patient reported she typically ambulates short distances at baseline. Reviewed DC planning and patient plans to DC home with HH PT and assist from daughter. PT will continue to follow and advance as able. PT frequency changed. Encourage UIC for all meals and ambulation 3x/day to promote functional mobility during hospitalization.        Anticipated Discharge Disposition (PT): home with assist, home with home health

## 2024-01-09 NOTE — PROGRESS NOTES
"[facility] Clinical Pharmacy Services: Vancomycin Monitoring Note    Tiana Hudson is a 63 y.o. female who is on day 5/7 of pharmacy to dose vancomycin for Bacteremia.    Previous Vancomycin Dose:   1000 mg IV every  12  hours  Updated Cultures and Sensitivities: less than 7mls of blood was collected which is insufficient   Results from last 7 days   Lab Units 01/08/24  0609 01/06/24  2048   VANCOMYCIN TR mcg/mL 14.70 7.60     Vitals/Labs  Ht: 165.1 cm (65\"); Wt: 114 kg (251 lb 3.2 oz)   Temp Readings from Last 1 Encounters:   01/09/24 97.9 °F (36.6 °C) (Oral)     Estimated Creatinine Clearance: 104.8 mL/min (by C-G formula based on SCr of 0.71 mg/dL).     Results from last 7 days   Lab Units 01/09/24  0645 01/08/24  0609 01/07/24  0645   CREATININE mg/dL 0.71 0.64 0.62   WBC 10*3/mm3 14.58* 16.61* 13.75*     Assessment/Plan    Current Vancomycin Dose: 500 mg IV every  8  hours; provides a predicted  mg/L.hr   Next Level Date and Time: only three days left of therapy, should not need another level  We will continue to monitor patient changes and renal function     Thank you for involving pharmacy in this patient's care. Please contact pharmacy with any questions or concerns.       Bethany Babinski, PharmD  Clinical Pharmacist          "

## 2024-01-09 NOTE — H&P (VIEW-ONLY)
FIRST UROLOGY DAILY PROGRESS NOTE      Name: Tiana Hudson  Age: 63 y.o.  Sex: female  :  1960  MRN: [id number]    Date: 2024             Interval History: LOS  7 days     Chief complaint: Persistent right abdominal and flank pain.      Patient with complaints of pain after working with occupational therapy yesterday. General medicine opted to repeat CT scan d/t pain and presence of redness and swelling of the RLQ. Dr. Armando has been closely following patient during stay and has planned for patient to following up in our office in 7-10 days. Urology called to evaluate changes noted on patient's CT scan. Patient states that right abdominal/flank pain has not worsened but states that she anticipated some improvement after stent placement. Patient denies fever, chills, nausea or vomiting.     - AVSS, good UOP  - WBC - 14.58  - Cr - 0.71    CT - Bilateral ureteral stents. Distention of the right renal pelvis improved but with persistent right urothelial thickening. Several retroperitoneal collections within the right abdomen. Some collections with no change in size. Collection along the superior  margin of the right psoas muscle medial to the right kidney that is increased in size. There has also been a more significant increase in size of the collection just deep to the right lateral abdominal pelvic wall and this collection measures 12.8 cm transverse x 18 cm in length x 3 cm in thickness. These collections may represent hematomas or urinoma  formation. Distention of the left renal pelvis without change.    Physical Exam:    General Appearance:    Alert, cooperative, NAD   Back:     Right flank tenderness   Lungs:     Respirations unlabored, no wheezing   Abdomen:  :      Soft, ND, right lower quadrant pain    Pelvic not performed,  bladder nondistended and non-tender   Neuro/Psych:   Orientation intact, mood/affect pleasant       Assessment:    Bilateral UPJ obstruction s/p stent placement    Urinary tract infection  Sepsis    Plan:    - CT independently reviewed by myself and Dr. Miller  - Recommendation for drain placement given increase in size of abdominal fluid collection and persistent abdominal pain   - Spoke with IR directly and discussed CT guided drain placement   - Discussed CT results with patient and plan for IR to place a drain with CT guidance. Patient expresses understanding.     Mami Hall, APRN  1/9/2024  11:37 EST

## 2024-01-09 NOTE — PROGRESS NOTES
LOS: 7 days     Chief Complaint: Antibiotic management    Interval History: WBC slightly improving today.  Afebrile.  Blood cultures remain pending.  Tolerating antibiotics.    Vital Signs  Temp:  [97.9 °F (36.6 °C)-98.3 °F (36.8 °C)] 97.9 °F (36.6 °C)  Heart Rate:  [86-87] 86  Resp:  [16] 16  BP: (114-127)/(59-71) 126/71    Physical Exam:  General: In no acute distress  HEENT: Oropharynx clear, moist mucous membranes  Respiratory: Normal work of breathing  Skin: No rashes or lesions in exposed areas  Extremities: No edema, cyanosis  Access: Peripheral IV    Antibiotics:  Anti-Infectives (From admission, onward)      Ordered     Dose/Rate Route Frequency Start Stop    01/08/24 0847  vancomycin (VANCOCIN) 1000 mg/200 mL dextrose 5% IVPB        Ordering Provider: Jorge L Us DO    1,000 mg  over 60 Minutes Intravenous Every 12 Hours 01/08/24 1900 01/13/24 0659    01/08/24 0803  micafungin sodium (MYCAMINE) 100 mg in sodium chloride 0.9 % 100 mL IVPB-VTB        Ordering Provider: Jorge L Us DO    100 mg  over 60 Minutes Intravenous Every 24 Hours 01/08/24 0900 01/15/24 0859    01/05/24 0756  vancomycin 2250 mg/500 mL 0.9% NS IVPB ([facility])        Ordering Provider: Jorge L Us DO    20 mg/kg × 111 kg  over 135 Minutes Intravenous Once 01/05/24 0845 01/05/24 1118    01/05/24 0750  Pharmacy to dose vancomycin        Ordering Provider: Jorge L Us DO     Does not apply Continuous PRN 01/05/24 0750 01/12/24 0749    01/02/24 1636  piperacillin-tazobactam (ZOSYN) 4.5 g in iso-osmotic dextrose 100 mL IVPB (premix)        Ordering Provider: Arthur Najera MD    4.5 g  over 30 Minutes Intravenous Once 01/02/24 1652 01/02/24 1735    01/02/24 1243  cefTRIAXone (ROCEPHIN) 2,000 mg in sodium chloride 0.9 % 100 mL IVPB-VTB        Ordering Provider: Angelito Winkler MD    2,000 mg  200 mL/hr over 30 Minutes Intravenous Once 01/02/24 1259 01/02/24 1414             Results Review:     I  reviewed the patient's new clinical results.    Lab Results   Component Value Date    WBC 14.58 (H) 01/09/2024    HGB 10.1 (L) 01/09/2024    HCT 32.0 (L) 01/09/2024    MCV 87.2 01/09/2024     01/09/2024     Lab Results   Component Value Date    GLUCOSE 160 (H) 01/09/2024    BUN 3 (L) 01/09/2024    CREATININE 0.71 01/09/2024    EGFRIFNONA 54 (L) 10/24/2018    EGFRIFAFRI 83 02/23/2017    BCR 4.2 (L) 01/09/2024    CO2 27.5 01/09/2024    CALCIUM 8.6 01/09/2024    PROTENTOTREF 6.9 02/23/2017    ALBUMIN 2.2 (L) 01/09/2024    LABIL2 1.7 02/23/2017    AST 16 01/09/2024    ALT 9 01/09/2024     Microbiology:  1/2 urine culture no growth  1/2 blood cultures 2 out of 2 gram-positive bacilli  1/3 right renal fluid culture Candida glabrata and Candida tropicalis  1/3 left renal fluid culture Candida glabrata  1/5 blood cultures no growth to date    Assessment    #Right calyceal rupture  #Bilateral hydronephrosis status post bilateral ureteral stenting  #JADEN  #DKA  #Hyponatremia  #Morbid obesity, BMI 40  # Gram-positive bacteremia    Identification of blood cultures remains pending.  Continue vancomycin goal -600 while following up ID and susceptibilities.  Continue micafungin 100 mg daily for isolated Candida.  Some debate as to the penetration of micafungin into the urine however given her situation I think this remains a viable option due to the renal penetration of micafungin.      ID will follow.

## 2024-01-09 NOTE — CONSULTS
Cc: Right lower quadrant pain    History of presenting illness:   This is a nice 63-year-old female with a history of kidney stones with a calyceal rupture and now has bilateral stents in place.  She has had some increased right lower quadrant pain and pain into the right flank.  CT was requested and this demonstrated some increase in the size of the retroperitoneal fluid collections.  There was no significant intraperitoneal abnormality noted.  She has been able to tolerate some diet.  She has had bowel function.    Past Medical History: Diabetes, hypertension, hypothyroidism    Past Surgical History: Appendectomy 1982.  Laparoscopic cholecystectomy 2018.  She has had multiple urologic procedures including recent cystoscopy and stent placement.  She has had orthopedic procedures as well.    Medications: Reviewed and reconciled in epic, currently on antibiotics per infectious disease recommendations    Allergies: Sulfa drugs    Social History: Non-smoker    Family History: Positive for breast cancer in her mother    Review of Systems:  Constitutional: Positive for weakness and decreased appetite  Neck: no swollen glands or dysphagia or odynophagia  Respiratory: negative for SOB, cough, hemoptysis or wheezing  Cardiovascular: negative for chest pain, palpitations or peripheral edema  Gastrointestinal: Positive for abdominal pain      Physical Exam:  BMI: 41.8  General: alert and oriented, appropriate, no acute distress  Eyes: No scleral icterus, extraocular movements are intact  Neck: Supple without lymphadenopathy or thyromegaly, trachea is in the midline  Respiratory: There is good bilateral chest expansion, no use of accessory muscles is noted  Cardiovascular: No jugular venous distention or peripheral edema is seen  Gastrointestinal: Soft, there is some right flank tenderness, no significant right lower quadrant tenderness to deep palpation, no obvious hernia felt      Laboratory data: White blood cell count 14.5  from 16.6 yesterday.  Hemoglobin 10.1.    Imaging data: CT images are reviewed.  There are multiple retroperitoneal fluid collection seen.  There is a 12 x 18 x 3 cm collection near the right lateral abdominal pelvic wall.  This is the dominant collection.  There is no obvious intra-abdominal issue noted.      Assessment and plan:   -Calyceal rupture and retroperitoneal collections  -I do not see any evidence of intra-abdominal process  -Okay for diet from general surgery standpoint once all procedures are completed  -Would defer management of these fluid collections to urology service  -We will see as needed      Adria Bah MD, FACS  General, Minimally Invasive and Endoscopic Surgery  [facility]    [address], [address]  [place], [place], [address]  P: [phone]  F: [phone]

## 2024-01-09 NOTE — PROGRESS NOTES
FIRST UROLOGY DAILY PROGRESS NOTE      Name: Tiana Hudson  Age: 63 y.o.  Sex: female  :  1960  MRN: [id number]    Date: 2024             Interval History: LOS  7 days     Chief complaint: Persistent right abdominal and flank pain.      Patient with complaints of pain after working with occupational therapy yesterday. General medicine opted to repeat CT scan d/t pain and presence of redness and swelling of the RLQ. Dr. Armando has been closely following patient during stay and has planned for patient to following up in our office in 7-10 days. Urology called to evaluate changes noted on patient's CT scan. Patient states that right abdominal/flank pain has not worsened but states that she anticipated some improvement after stent placement. Patient denies fever, chills, nausea or vomiting.     - AVSS, good UOP  - WBC - 14.58  - Cr - 0.71    CT - Bilateral ureteral stents. Distention of the right renal pelvis improved but with persistent right urothelial thickening. Several retroperitoneal collections within the right abdomen. Some collections with no change in size. Collection along the superior  margin of the right psoas muscle medial to the right kidney that is increased in size. There has also been a more significant increase in size of the collection just deep to the right lateral abdominal pelvic wall and this collection measures 12.8 cm transverse x 18 cm in length x 3 cm in thickness. These collections may represent hematomas or urinoma  formation. Distention of the left renal pelvis without change.    Physical Exam:    General Appearance:    Alert, cooperative, NAD   Back:     Right flank tenderness   Lungs:     Respirations unlabored, no wheezing   Abdomen:  :      Soft, ND, right lower quadrant pain    Pelvic not performed,  bladder nondistended and non-tender   Neuro/Psych:   Orientation intact, mood/affect pleasant       Assessment:    Bilateral UPJ obstruction s/p stent placement    Urinary tract infection  Sepsis    Plan:    - CT independently reviewed by myself and Dr. Miller  - Recommendation for drain placement given increase in size of abdominal fluid collection and persistent abdominal pain   - Spoke with IR directly and discussed CT guided drain placement   - Discussed CT results with patient and plan for IR to place a drain with CT guidance. Patient expresses understanding.     Mami Hall, APRN  1/9/2024  11:37 EST

## 2024-01-10 ENCOUNTER — APPOINTMENT (OUTPATIENT)
Dept: CT IMAGING | Facility: HOSPITAL | Age: 64
DRG: 853 | End: 2024-01-10
Payer: COMMERCIAL

## 2024-01-10 LAB
ALBUMIN SERPL-MCNC: 2 G/DL (ref 3.5–5.2)
ALBUMIN/GLOB SERPL: 0.5 G/DL
ALP SERPL-CCNC: 176 U/L (ref 39–117)
ALT SERPL W P-5'-P-CCNC: 7 U/L (ref 1–33)
ANION GAP SERPL CALCULATED.3IONS-SCNC: 11.4 MMOL/L (ref 5–15)
AST SERPL-CCNC: 16 U/L (ref 1–32)
BACTERIA SPEC AEROBE CULT: NORMAL
BACTERIA SPEC AEROBE CULT: NORMAL
BASOPHILS # BLD AUTO: 0.07 10*3/MM3 (ref 0–0.2)
BASOPHILS NFR BLD AUTO: 0.5 % (ref 0–1.5)
BILIRUB SERPL-MCNC: 0.4 MG/DL (ref 0–1.2)
BUN SERPL-MCNC: 3 MG/DL (ref 8–23)
BUN/CREAT SERPL: 4.3 (ref 7–25)
CALCIUM SPEC-SCNC: 8.3 MG/DL (ref 8.6–10.5)
CHLORIDE SERPL-SCNC: 95 MMOL/L (ref 98–107)
CO2 SERPL-SCNC: 25.6 MMOL/L (ref 22–29)
CREAT FLD-MCNC: 0.56 MG/DL
CREAT SERPL-MCNC: 0.7 MG/DL (ref 0.57–1)
DEPRECATED RDW RBC AUTO: 43.3 FL (ref 37–54)
EGFRCR SERPLBLD CKD-EPI 2021: 98.5 ML/MIN/1.73
EOSINOPHIL # BLD AUTO: 0.21 10*3/MM3 (ref 0–0.4)
EOSINOPHIL NFR BLD AUTO: 1.6 % (ref 0.3–6.2)
ERYTHROCYTE [DISTWIDTH] IN BLOOD BY AUTOMATED COUNT: 14.2 % (ref 12.3–15.4)
GLOBULIN UR ELPH-MCNC: 4.4 GM/DL
GLUCOSE BLDC GLUCOMTR-MCNC: 143 MG/DL (ref 70–130)
GLUCOSE BLDC GLUCOMTR-MCNC: 155 MG/DL (ref 70–130)
GLUCOSE BLDC GLUCOMTR-MCNC: 166 MG/DL (ref 70–130)
GLUCOSE BLDC GLUCOMTR-MCNC: 247 MG/DL (ref 70–130)
GLUCOSE SERPL-MCNC: 163 MG/DL (ref 65–99)
HCT VFR BLD AUTO: 31.8 % (ref 34–46.6)
HGB BLD-MCNC: 10.3 G/DL (ref 12–15.9)
IMM GRANULOCYTES # BLD AUTO: 0.27 10*3/MM3 (ref 0–0.05)
IMM GRANULOCYTES NFR BLD AUTO: 2.1 % (ref 0–0.5)
INR PPP: 1.17 (ref 0.9–1.1)
LYMPHOCYTES # BLD AUTO: 1.73 10*3/MM3 (ref 0.7–3.1)
LYMPHOCYTES NFR BLD AUTO: 13.4 % (ref 19.6–45.3)
MCH RBC QN AUTO: 27.8 PG (ref 26.6–33)
MCHC RBC AUTO-ENTMCNC: 32.4 G/DL (ref 31.5–35.7)
MCV RBC AUTO: 85.7 FL (ref 79–97)
MONOCYTES # BLD AUTO: 1.08 10*3/MM3 (ref 0.1–0.9)
MONOCYTES NFR BLD AUTO: 8.4 % (ref 5–12)
NEUTROPHILS NFR BLD AUTO: 74 % (ref 42.7–76)
NEUTROPHILS NFR BLD AUTO: 9.57 10*3/MM3 (ref 1.7–7)
NRBC BLD AUTO-RTO: 0.1 /100 WBC (ref 0–0.2)
PLATELET # BLD AUTO: 392 10*3/MM3 (ref 140–450)
PMV BLD AUTO: 10.5 FL (ref 6–12)
POTASSIUM SERPL-SCNC: 4.6 MMOL/L (ref 3.5–5.2)
PROT SERPL-MCNC: 6.4 G/DL (ref 6–8.5)
PROTHROMBIN TIME: 15.1 SECONDS (ref 11.7–14.2)
RBC # BLD AUTO: 3.71 10*6/MM3 (ref 3.77–5.28)
SODIUM SERPL-SCNC: 132 MMOL/L (ref 136–145)
VANCOMYCIN TROUGH SERPL-MCNC: 18.3 MCG/ML (ref 5–20)
WBC NRBC COR # BLD AUTO: 12.93 10*3/MM3 (ref 3.4–10.8)

## 2024-01-10 PROCEDURE — 80202 ASSAY OF VANCOMYCIN: CPT | Performed by: STUDENT IN AN ORGANIZED HEALTH CARE EDUCATION/TRAINING PROGRAM

## 2024-01-10 PROCEDURE — 87205 SMEAR GRAM STAIN: CPT | Performed by: UROLOGY

## 2024-01-10 PROCEDURE — 63710000001 INSULIN GLARGINE PER 5 UNITS: Performed by: INTERNAL MEDICINE

## 2024-01-10 PROCEDURE — 82948 REAGENT STRIP/BLOOD GLUCOSE: CPT

## 2024-01-10 PROCEDURE — 82570 ASSAY OF URINE CREATININE: CPT | Performed by: UROLOGY

## 2024-01-10 PROCEDURE — 63710000001 INSULIN LISPRO (HUMAN) PER 5 UNITS: Performed by: INTERNAL MEDICINE

## 2024-01-10 PROCEDURE — 0W9H30Z DRAINAGE OF RETROPERITONEUM WITH DRAINAGE DEVICE, PERCUTANEOUS APPROACH: ICD-10-PCS | Performed by: RADIOLOGY

## 2024-01-10 PROCEDURE — 80053 COMPREHEN METABOLIC PANEL: CPT | Performed by: INTERNAL MEDICINE

## 2024-01-10 PROCEDURE — 25010000002 VANCOMYCIN PER 500 MG: Performed by: STUDENT IN AN ORGANIZED HEALTH CARE EDUCATION/TRAINING PROGRAM

## 2024-01-10 PROCEDURE — 25010000002 FENTANYL CITRATE (PF) 50 MCG/ML SOLUTION: Performed by: RADIOLOGY

## 2024-01-10 PROCEDURE — 87015 SPECIMEN INFECT AGNT CONCNTJ: CPT | Performed by: UROLOGY

## 2024-01-10 PROCEDURE — 85025 COMPLETE CBC W/AUTO DIFF WBC: CPT | Performed by: INTERNAL MEDICINE

## 2024-01-10 PROCEDURE — 25010000002 ENOXAPARIN PER 10 MG: Performed by: UROLOGY

## 2024-01-10 PROCEDURE — 99232 SBSQ HOSP IP/OBS MODERATE 35: CPT | Performed by: STUDENT IN AN ORGANIZED HEALTH CARE EDUCATION/TRAINING PROGRAM

## 2024-01-10 PROCEDURE — C1729 CATH, DRAINAGE: HCPCS

## 2024-01-10 PROCEDURE — 25010000002 MICAFUNGIN SODIUM 100 MG RECONSTITUTED SOLUTION 1 EACH VIAL: Performed by: STUDENT IN AN ORGANIZED HEALTH CARE EDUCATION/TRAINING PROGRAM

## 2024-01-10 PROCEDURE — 99153 MOD SED SAME PHYS/QHP EA: CPT

## 2024-01-10 PROCEDURE — 99152 MOD SED SAME PHYS/QHP 5/>YRS: CPT

## 2024-01-10 PROCEDURE — 85610 PROTHROMBIN TIME: CPT

## 2024-01-10 PROCEDURE — 25010000002 MIDAZOLAM PER 1 MG: Performed by: RADIOLOGY

## 2024-01-10 PROCEDURE — 87070 CULTURE OTHR SPECIMN AEROBIC: CPT | Performed by: UROLOGY

## 2024-01-10 PROCEDURE — 25010000002 LIDOCAINE 1 % SOLUTION: Performed by: RADIOLOGY

## 2024-01-10 PROCEDURE — 49406 IMAGE CATH FLUID PERI/RETRO: CPT

## 2024-01-10 RX ORDER — LIDOCAINE HYDROCHLORIDE 10 MG/ML
20 INJECTION, SOLUTION INFILTRATION; PERINEURAL ONCE
Status: COMPLETED | OUTPATIENT
Start: 2024-01-10 | End: 2024-01-10

## 2024-01-10 RX ORDER — MIDAZOLAM HYDROCHLORIDE 1 MG/ML
INJECTION INTRAMUSCULAR; INTRAVENOUS AS NEEDED
Status: COMPLETED | OUTPATIENT
Start: 2024-01-10 | End: 2024-01-10

## 2024-01-10 RX ORDER — SODIUM CHLORIDE 9 MG/ML
INJECTION, SOLUTION INTRAVENOUS CONTINUOUS PRN
Status: COMPLETED | OUTPATIENT
Start: 2024-01-10 | End: 2024-01-10

## 2024-01-10 RX ORDER — FENTANYL CITRATE 50 UG/ML
INJECTION, SOLUTION INTRAMUSCULAR; INTRAVENOUS AS NEEDED
Status: COMPLETED | OUTPATIENT
Start: 2024-01-10 | End: 2024-01-10

## 2024-01-10 RX ADMIN — INSULIN GLARGINE 16 UNITS: 100 INJECTION, SOLUTION SUBCUTANEOUS at 21:42

## 2024-01-10 RX ADMIN — VANCOMYCIN HYDROCHLORIDE 500 MG: 500 INJECTION, POWDER, LYOPHILIZED, FOR SOLUTION INTRAVENOUS at 16:45

## 2024-01-10 RX ADMIN — HYDROCODONE BITARTRATE AND ACETAMINOPHEN 1 TABLET: 5; 325 TABLET ORAL at 15:31

## 2024-01-10 RX ADMIN — INSULIN LISPRO 2 UNITS: 100 INJECTION, SOLUTION INTRAVENOUS; SUBCUTANEOUS at 08:03

## 2024-01-10 RX ADMIN — OXYBUTYNIN CHLORIDE 10 MG: 10 TABLET, EXTENDED RELEASE ORAL at 08:03

## 2024-01-10 RX ADMIN — MICAFUNGIN SODIUM 100 MG: 100 INJECTION, POWDER, LYOPHILIZED, FOR SOLUTION INTRAVENOUS at 10:30

## 2024-01-10 RX ADMIN — HYDROCODONE BITARTRATE AND ACETAMINOPHEN 1 TABLET: 5; 325 TABLET ORAL at 21:39

## 2024-01-10 RX ADMIN — SODIUM CHLORIDE 25 ML/HR: 9 INJECTION, SOLUTION INTRAVENOUS at 14:20

## 2024-01-10 RX ADMIN — FENTANYL CITRATE 50 MCG: 50 INJECTION, SOLUTION INTRAMUSCULAR; INTRAVENOUS at 14:35

## 2024-01-10 RX ADMIN — ENOXAPARIN SODIUM 40 MG: 100 INJECTION SUBCUTANEOUS at 21:40

## 2024-01-10 RX ADMIN — VANCOMYCIN HYDROCHLORIDE 500 MG: 500 INJECTION, POWDER, LYOPHILIZED, FOR SOLUTION INTRAVENOUS at 08:03

## 2024-01-10 RX ADMIN — VANCOMYCIN HYDROCHLORIDE 500 MG: 500 INJECTION, POWDER, LYOPHILIZED, FOR SOLUTION INTRAVENOUS at 23:52

## 2024-01-10 RX ADMIN — VANCOMYCIN HYDROCHLORIDE 500 MG: 500 INJECTION, POWDER, LYOPHILIZED, FOR SOLUTION INTRAVENOUS at 00:50

## 2024-01-10 RX ADMIN — MIDAZOLAM 1 MG: 1 INJECTION INTRAMUSCULAR; INTRAVENOUS at 14:35

## 2024-01-10 RX ADMIN — INSULIN LISPRO 4 UNITS: 100 INJECTION, SOLUTION INTRAVENOUS; SUBCUTANEOUS at 21:41

## 2024-01-10 RX ADMIN — ROPINIROLE HYDROCHLORIDE 0.5 MG: 0.5 TABLET, FILM COATED ORAL at 21:39

## 2024-01-10 RX ADMIN — ESCITALOPRAM OXALATE 10 MG: 10 TABLET, FILM COATED ORAL at 08:03

## 2024-01-10 RX ADMIN — LEVOTHYROXINE SODIUM 137 MCG: 137 TABLET ORAL at 06:19

## 2024-01-10 RX ADMIN — PANTOPRAZOLE SODIUM 40 MG: 40 TABLET, DELAYED RELEASE ORAL at 06:19

## 2024-01-10 RX ADMIN — LIDOCAINE HYDROCHLORIDE 20 ML: 10 INJECTION, SOLUTION INFILTRATION; PERINEURAL at 14:38

## 2024-01-10 RX ADMIN — INSULIN LISPRO 2 UNITS: 100 INJECTION, SOLUTION INTRAVENOUS; SUBCUTANEOUS at 11:47

## 2024-01-10 NOTE — PROGRESS NOTES
"[facility] Clinical Pharmacy Services: Vancomycin Monitoring Note    Tiana Hudson is a 63 y.o. female who is on day 6/7 of pharmacy to dose vancomycin for Bacteremia.    Current Vancomycin Dose:   500 mg IV every  8  hours  Updated Cultures and Sensitivities:   1/2 bld cx 2 of 2 gram positive rods, sent out for further identification  1/3 body fluid cx (kidney): Candida glabrata, candida tropicalis  1/5 bld cx NG    Results from last 7 days   Lab Units 01/10/24  0639 01/08/24  0609 01/06/24  2048   VANCOMYCIN TR mcg/mL 18.30 14.70 7.60     Vitals/Labs  Ht: 165.1 cm (65\"); Wt: 114 kg (251 lb 3.2 oz)   Temp Readings from Last 1 Encounters:   01/10/24 98.4 °F (36.9 °C) (Oral)     Estimated Creatinine Clearance: 106.3 mL/min (by C-G formula based on SCr of 0.7 mg/dL).     Results from last 7 days   Lab Units 01/10/24  0639 01/09/24  0645 01/08/24  0609   CREATININE mg/dL 0.70 0.71 0.64   WBC 10*3/mm3 12.93* 14.58* 16.61*     Assessment/Plan  Creatinine is stable. Trough level collected this AM at ~6hr after last 500mg dose = 18.3 mcg/ml.   vancomycin Dose: continue 500 mg IV every  8  hours; provides a predicted  mg/L.hr  (goal 400-600)  Next Level Date and Time: no further levels planned unless duration of therapy is extended.  We will continue to monitor patient changes and renal function     Thank you for involving pharmacy in this patient's care. Please contact pharmacy with any questions or concerns.       Britni Lauren, PharmD  Clinical Pharmacist            "

## 2024-01-10 NOTE — PROGRESS NOTES
NATHALIA CAMPUZANO [facility]  INTERNAL MEDICINE  DEAN FAIRCHILD MD  [address] [place] [address]  Phone [phone] Fax [phone]  E-mail:  [email or web address]      INTERNAL MEDICINE DAILY PROGRESS NOTE  Dean Fairchild M.D.  2024            Patient Identification:  Name: Tiana Hudson  Age: 63 y.o.  Sex: female  :  1960  MRN: [id number]         Primary Care Physician: Dean Fairchild MD  LENGTH OF STAY 7 DAYS    Consults       Date and Time Order Name Status Description    2024  3:42 AM Inpatient General Surgery Consult Completed     2024  5:10 AM Inpatient Nephrology Consult Completed     2024  2:00 PM Inpatient Infectious Diseases Consult Completed     1/3/2024 10:27 AM Inpatient Urology Consult      2024 11:55 PM Urology (on-call MD unless specified) Completed     2024  3:45 PM IP General Consult (Use specialty-specific consult if known) Completed             Chief Complaint: Abdominal/flank pain with DKA, acute cystitis and sepsis, and possible calyceal rupture in the kidney     History of Present Illness:     Subjective   Interval History: Patient is a 63 y.o.female who presented at my request to the emergency room at [facility] with complaint of acute abdominal/flank pain and history of recurrent kidney stones.  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  Patient has been followed for the last few months by Dr. Rei Calvert in urology for renal calculi and actually has had a stent placed in the right kidney due to obstruction from her renal stones.  It is also noted that the patient had a recent lithotripsy.  Patient began to feel poorly after the recent Shreyas  holiday and became more severely ill over the last 3 to 4 days prior to this admission.  She has felt extremely fatigued, dizzy at times, tired, nauseated, and has spent most of her time in bed sleeping.  Family has had difficulty getting the patient to take any oral intake and became quite concerned as her condition continued to worsen.  Patient was attempting to go to work today but really was too dizzy to get in the car to drive and 2-week to actually work.  After urging from her daughter, patient called me with respect to the symptoms and at my request went to the ER for evaluation.  Patient has multiple medical comorbidities that complicate her medical care.These include most significantly recurrent urinary tract infections due to her nephrolithiasis, morbid obesity with BMI greater than 40, diabetes mellitus type 2 poorly controlled with recent addition of Ozempic to her metformin therapy, vitamin D deficiency, essential hypertension, hyperlipidemia, hypothyroidism, allergic rhinitis, back pain, contact dermatitis, anxiety, and history of dense breast tissue on mammograms.     Patient was evaluated in the emergency room by Angelito Winkler MD who was the ER attending on call time of her arrival.  Patient was seen on 1/2/2024 at 1544 by Dr. Winkler.  His HPI was consistent with the story which I given above.  Patient denied dysuria, fever, chills on his evaluation.  She did admit to having emesis and actually had some emesis while in the emergency room.  Review of systems in the emergency room was positive for diffuse abdominal pain, and vomiting.  Patient also was noted to have some significant flank pain.  All other systems reviewed were negative in the emergency room vital signs on arrival in the emergency room showed temperature of 96 °F, heart rate of 114, respiratory rate of 16, blood pressure 125/72, and O2 sat of 98%.  Patient was described as ill-appearing but in no acute distress.  She did have severe  tenderness to palpation more on the right side of the abdomen and out into the right flank area with voluntary guarding.  Labs collected in the ER showed that her lactate level was elevated at 6.0.  She had a lipase of 23, her white blood cell count was 20.12, her hemoglobin was 11.2, and her platelet count was 568,000.  Patient did have a phosphorus of 4.2, magnesium 2.0, osmolality of 317, hemoglobin A1c is 16.90, and UA showed specific gravity 1.027, 3+ glucose, moderate blood 2+, leukocyte esterase moderate 2+, nitrates negative, white blood cells 6-10, red blood cells 6-10, bacteria 2+.  Her CMP showed a glucose of 978, creatinine of 1.82, sodium of 115, potassium of 4.2, chloride of 76, CO2 of 15.7, albumin 2.4, AST 33, alk phos 220, anion gap of 23, EGFR of 31.3.  Patient did have a CT scan of her abdomen and pelvis completed which showed loculated fluid throughout the right perinephric space felt to possibly be secondary to right calyceal rupture, new mild to moderate wall thickening of the right renal pelvis and renal calyces, bilateral UPJ obstruction with moderate bilateral hydronephrosis on the left, mild new dilation of right ureter, nonobstructing right renal calculi, hepatic morphology suggestive of chronic liver disease, and reactive lymph nodes in the retrocaval area.  A chest x-ray was done which showed no acute disease other than minimal atelectasis at the base of the right lung.  Patient underwent aggressive fluid resuscitation in the emergency room and received over 3 L of IV fluid.  She was placed on an insulin drip and was also started on Zosyn therapy.  She was followed on sepsis protocol as well as DKA protocol.  Patient did receive morphine 4 mg for pain x 2.  Case was discussed with myself as her primary care attending.  Urology was also contacted about situation.  I did suggest that patient be admitted to the ICU for treatment of the DKA and severe sepsis picture.  Dr. Arthur Tena did  agree to the ICU admission and will be following her in the CCU for pulmonary/critical care.  Final diagnoses in the ER were sepsis due to unspecified organism and acute cystitis without significant hematuria.     1/2/2024.  I personally saw the patient for the first time during this hospitalization on this date in the coronary care unit room #335.  Patient was resting quietly in bed and did a peer acutely ill.  She did wake to her name and spoke a few words before falling back asleep.  Patient's 2 daughters were at the foot of her bed and did discuss the case at length with me.  Daughter Enoch, is a former nurse from here at [employer], and now works at [employer] with the hospitalist group there.  Patient has responded well to the Glucommander DKA protocols and blood sugars have gradually come down to near normal levels for the patient in the 1  range.  I will full PPE for the exam including an N95 face mask, goggles, white lab coat, and gloves when touching patient.  I performed thorough hand hygiene before and after the patient visit.  Patient did have a venous blood gas which showed pH 7.40, pCO2 28, pO2 of 31, and O2 sat of 62%.  She also had recent electrolytes which showed a sodium of 131, potassium 3.4, chloride 96, CO2 19.1, BUN 17, creatinine 1.15, estimated GFR now up to 54.3.  Lactate level has been coming steadily down and currently at 2.8.  Additional labs ordered for tomorrow a.m.  Patient has been able to urinate several times and daughters have helped her up to the bathroom for this.  I actually note that she did have some urinary frequency and incontinence at home which is unusual for her.  I did speak briefly to the patient's daytime nurse who was departing soon after I arrived on the floor.  I have reviewed Dr. Arthur Tena's notes and his admission history and physical.  I do agree completely with his plan of care at present time.  Supportive care is continued to be offered for the sepsis  with careful monitoring in the ICU.  Patient's anion gap metabolic acidosis probably is related to lactic acidosis and she was continuing to take metformin even when she was not able to eat.  IV fluids are continued aggressively.  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  Probably will plan sliding scale insulin for stabilization of diabetes while here in the hospital and will train patient to continue that in the home environment.  Pseudohyponatremia will be corrected with the IV fluids.  Hypothyroidism will be addressed with continued Synthroid.  Statins are held for now.  Obesity is an ongoing problem for the patient.  At the time my exam, patient is medically stable and slowly improving.  I did relay this to the daughter to agree with my assessment.  We will continue to follow the patient with you and we will be happy to assume care of the patient when she is stable and ready to be transferred out of the ICU.  I can be reached directly for any concerns or questions at [phone].    1/3/2024.  Patient is seen again today in her room in the CCU #335.  Patient was resting quietly in bed at the time of my visit and states that her daughters were downstairs getting a bite to eat.  I did discuss the case with the patient's nurse, Tiana, who tells me that she is being preop and should be going down soon for cystoscopy and stent placement.  I will full PPE for the exam including an N95 face mask, goggles, white lab coat, and gloves when touching patient.  I performed thorough hand hygiene before and after the patient visit.  Patient is more alert today but still somewhat confused about details of her medical case.  She is able to carry on a conversation with me though and joke a bit with me while I am present in the room.  She does understand she has issues because of the obstruction bilaterally of her ureters that has resulted  in hydronephrosis and hopefully can be relieved by the cystoscopy that is planned for later this morning.  Patient is still on an insulin drip but is drastically improved compared to where she was yesterday at this time.  Review of labs shows that her sodium is now up to 133, her CO2 is 19.1, glucose is now at 134, phosphorus is at 2.1, lactate is normalized at 1.9, white blood cell count today is 18.93, hemoglobin is 9.6 today, lakelets are normal today,Blood culture from the first day of hospitalization shows anaerobic bottle gram-positive bacilli growth with identification still ongoing.  Fungal and body fluid cultures are still negative.  Urine culture remains negative.  Dr. Oquendo was following the patient for critical care today.  He has continued her antibiotics and is awaiting urology input.  Fluid resuscitation is continued.  Blood pressure now seems well-controlled.  DVT prophylaxis in place.  He had a lengthy discussion with patient's family at the time of his rounds.  Urology has seen the patient in consultation and is planning to do cystoscopy later today.  Dietitian is following the patient's case and recommending input treatment as needed.Dr. Armando did perform a cystoscopy with ureteral catheterization and stent insertion bilaterally.  His pre-op diagnosis and postop diagnoses were urosepsis with bilateral UPJ obstruction.  He did feel that he had successfully decompressed the blockages and the hydronephrotic changes.  He did speak with patient's daughter postoperatively.  Patient does seem to be much improved today.  Vital signs still show no fever.  Blood pressure is relatively stable.  Will continue to follow with you.    1/4/2024.  Patient is seen again today in her room in the CCU #335.  Patient has transitions of critical care and is in overflow for care on the floor.  Patient's daughter is present at bedside at the time of my visit.  Patient is much more awake and alert today.  She is more  interactive and talkative.  I did discuss the case with the patient's nurse.  We are going to obtain an ID consult today for help with antibiotic management and we also are going to transition the patient to regular sliding scale insulin as we begin the teaching process of getting patient on her own regimen of sliding scale insulin at the time of discharge to home.  We have also started patient on Lantus therapy at 10 units for now and may need to titrate up.  I wore full PPE for the exam today including an N95 face mask, goggles, white lab coat, and gloves when touching patient.  I performed thorough hand hygiene before and after the patient visit.  Specialist have seen patient today and their care is summarized below.Dr. Najera, the critical care attending today, felt that her mentation was slow but answering questions appropriately.  No other major findings were discovered at present time.  Antibiotics were continued for sepsis was felt to be significantly improved with anion gap now closed and lactic acidosis much better.  IV antibiotics were continued awaiting final culture results and I did consult ID to see patient for their input on the situation.  Dr. Devon Armando who completed bilateral stent placement for bilateral OP J obstruction and urosepsis is pleased with progress on day 1 postop.  He notes that urine output has been excellent.  Dr. Us from infectious disease did see the patient in consultation.  He notes that Zosyn antibiotics have been discontinued by Dr. Armando after her successful surgery.  White blood cell count is down to 17.08 today with hematocrit of 30.7.  Glucose max was 250 today estimated GFR is up to 54 and CO2 is 19.0.  He feels the significance of the positive blood culture is unclear at this point with only 1 of 2 bottles positive for gram-positive bacilli which normally would represent a contamination.  He is planning to repeat the blood cultures and will follow-up.  Urine  culture was also negative except for yeast growth and due to the recent  instrumentation he has started the patient on fluconazole 400 mg daily while cultures are pending.  New blood culture has been sent.  We will check again the lactic acid, procalcitonin, sed rate, and CRP with a.m. labs tomorrow.  Labs relatively stable today though I do note that sodium is dropped a bit to 127.  CO2 remains slightly low at 18.0 and chloride slightly low at 97.  Patient's glucoses running in the upper 100s and low 200s at present.  Electrolytes otherwise seem fairly stable.  White count is still elevated somewhat at 17.08 despite stenting yesterday.  Patient currently off antibiotics and being followed carefully by ID.  Plan to check procalcitonin and repeat blood culture with a.m. labs.  Hemoglobin is at 10.0.  Otherwise patient appears very stable at present time.  I do agree that her mentation is a little bit slower than usual but I suspect this will continue to improve as we get further from her acute DKA event.  Treatment plan reviewed with patient and her daughter today.    1/05/2024.  Patient is seen again today in her room in the CCU #335.  Patient resting quietly in bed and daughter Enoch is at bedside.  I spoke with patient's nurse during the day to discuss occasional changes in her treatment plan.  I wore full PPE for the exam including an N95 facemask, goggles, white lab coat, and gloves when touching patient.  I performed thorough hand hygiene before and after the patient visit.  Patient is much more alert today and more like her usual self.  She does laugh and participate in conversation.  Mother and daughter were very impressed with Dr. Sánchez's excellent review with them of the patient's renal condition and with the time he has been going over the various diagnostic images that have been done up to date.  He was pleased to see how significantly improved electrolytes are and felt the patient was remaining  hemodynamically stable at present time.  Dr. Us from ID did see the patient.  Blood cultures were repeated but original cultures are now positive 2 out of 2 for gram-positive bacilli which is more consistent with a true infection.  He started patient on vancomycin and will be following up on ID and's susceptibilities of the organisms.  He also continued the fluconazole 400 mg daily while following up on cultures from perinephric fluid.The pulmonary intensivist Dr. Arthur Tena saw patient briefly and found no pulmonary or critical care needs at this time.  Dr. Armando from neurology saw patient on postop day #2 after stent placement for bilateral UPJ obstruction.  Indicates that Perales catheter can be removed at any time.  Occupational Therapy began to work with the patient and feels that she will benefit from further skilled occupational therapy.  She did some bedside exercises and did do some sort steps.  She was able to sit up on the edge of the bed for 8 to 10 minutes with to stand.  Physical therapy also saw patient and felt she would continue to benefit from skilled physical therapy.  Pharmacist did consult with the patient on vancomycin and started at 750 mg IV every 12 hours.  They will be checking a trough level on 1/7/2024 at 8:30 AM.  Labs today are generally stable.  Glucose levels ranging in the 140s to 170s.  Sodium still slightly low at 132.  CO2 now normal at 22.5, albumin slightly low at 2.1, AST slightly up at 34 and alk phos slightly up at 270.  White blood cell count today is down to 13.04 and hemoglobin is stable at 10.3 with platelets of 412,000.  2 of 2 initial blood cultures were positive with final ID pending.  Sed rate remains greater than 130, procalcitonin is 3.24, and C-reactive protein is at 23.85.  Patient is still on overflow status waiting for a floor telemetry bed.    1/8/2024.  Patient is seen again today in her room on the coronary care unit #335.  Patient was actually up in a  chair at the time of my visit and her daughter was present at bedside.  I wore full PPE for the exam including an N95 facemask, goggles, white lab coat, and gloves when touching patient.  I performed thorough hand hygiene before and after the patient visit.  Patient is feeling much better today.  She says that she was anxious to get up and was happy that the therapist left her up in the chair.  She is able to get to the bathroom but does need some assistance still for that endeavor.  Several specialist saw the patient and their care is summarized below.  Patient remains on vancomycin therapy and pharmacy is following the dose and adjusting levels.  Dr. Us from ID did see patient again today.  He notes that repeat blood cultures remain negative and initial blood cultures are still being evaluated.  He has continued vancomycin for now and will follow-up on ID and susceptibilities.  Urine culture growing Candida glabrata and tropicalis and he has transitioned her from fluconazole therapy to micafungin 100 mg daily for now.  White blood cell count is slightly increased today at 16.61.  He is cautiously monitoring the patient for any new signs of infection or changes.Dr. Armando from urology did see the patient and was pleased with his surgical result from cystoscopy last week with bilateral stent placement for treating UPJ obstruction.  He has signed off on the case and plans to have her follow-up with him in clinic in 7 to 10 days for reevaluation following discharge.Occupational Therapy did work with patient who reported pain level of 2 out of 10 focused mainly on right abdomen where she has some redness swelling and discomfort.  There was no rebound to my exam in this area but I did elect to go ahead and do a CT scan of abdomen and pelvis to evaluate the area since she did have the extensive surgery not so long ago.  Patient is noted to fatigue quickly and has some functional deficits that need to be maximized  prior to DC to home.  Patient is planning discharge to home with spouse and daughters.  MI requested diabetic educator did meet with the patient.  Patient does need a meter for glucose testing.  Planning 4 times a day test for now.  Will use insulin pen lispro at meals.  Dietitian did meet with patient and her daughter for input on diet.  Provided printed materials and discussion.  Will be available for concerns or questions.  Labs today generally were stable.  Sodium back up to 131.  Glucose max of 245.  Patient does admit she is eating more at this time.  White blood cell count is up slightly at 16.61 of concern to ID.  They are monitoring cultures carefully.  Hemoglobin did drop from 11.5 down to 9.3 today will recheck tomorrow.  CT abdomen and pelvis was completed.  Right ureteral stent present but there is mild distention of the right extrarenal pelvis which appears to be improved compared to the exam 6 days ago.  There is right urethrocele thickening.  There is a right perinephric collection consistent with hemorrhage or urinoma's and there is a new deep collection in the right posterior lateral abdominal wall measuring 3 cm in thickness by 13 cm transverse along with muscular thickening along the right lateral abdominal wall and edema within the right lateral abdominal pelvic subcutaneous fat.  There is enlargement of this fluid collection seen.  We will have nursing notify urology in a.m. of this finding for their input.  May want to consider general surgery consultation also if pain persist in this area.    1/9/2024.  Patient is seen again today in the CCU room #335.  Patient's daughter is at bedside and patient is resting quietly in bedside chair.  Patient appears much more awake and alert today back to her usual personality.  I wore full PPE for the exam including an N95 face mask, goggles, white lab coat, and gloves when touching patient.  I performed thorough hand hygiene before and after the patient  visit.  Nursing reports the patient has been using bedside commode with some urgency and loose bowel movements at times.  This is why she continues on IV vancomycin.  Patient did require O2 2 L per nasal cannula at night while sleeping.  No other new issues noted.  Dr. Us from ID saw patient again today.  He continues to monitor her blood cultures and has continued her on the IV vancomycin along with micafungin for treatment of yeast infection.  Urology did see patient regarding findings on CT scan.  These were reviewed with Dr. Salomon.  There are fluid collections in the right lateral abdominal pelvic wall measuring 12.8 cm x 18 cm x 3 cm which may represent hematomas or urinoma months.  Left renal pelvis is still dilated slightly.  Urology did speak with the IR department directly and arrange for CT-guided drain placement which the patient is agreeable to having.Physical therapy did work with the patient today.  She required contact-guard assist for standing and ambulated 15 feet with no assist.  Patient was minimally unsteady and fatigued quickly though patient indicated these were the usual distances that she does ambulate.  Patient plans to DC home with home health and assistance from her daughter.  PT will continue to follow and advance as able.  Patient encouraged to get up in chair for all meals and ambulate at least 3 times a day to promote functional mobility during hospital stay.  General surgery did see patient at my request regarding the right lower quadrant pain.  They feel that it is primarily due to the retroperitoneal fluid collections which have increased in size.  They deferred further treatment decisions to urology and felt there was no intraperitoneal abnormalities noted.  Labs today do show glucose is up slightly more as patient is eating more.  Lantus has been increased for today to 16 units once daily.  Sodium is now up to 133.  Glucose was up at 212 this a.m. white blood cell count is  improved down from 16.61 to a level of 14.58 today.  Hemoglobin is up slightly at 10.1 and does remain stable.  Platelet count is also stable at 439,000.  Patient planning discharge to home over the weekend if drain is able to successfully remove fluid from the retroperitoneal fluid collection that is causing pain in the right lower quadrant.  Final antibiotic plan is still pending from ID.  We are continuing to follow closely and encourage further activity on the part of the patient.        Review of Systems:               Review of systems could not be obtained due to  patient confusion. patient nonverbal.       Past Medical History:   Diagnosis Date    Anemia     intermittently    Anxiety and depression     Arthritis     Depression     Diabetes mellitus     Gallstones     High cholesterol     History of frequent urinary tract infections     HX OF YEAST IN BLADDER HAS PRN DIFLUCAN    History of transfusion 05/24/2017    Had 2 iron infusions.  This was when i had knee replacement    Hyperlipidemia     Hypertension     Hypothyroidism     Knee pain, bilateral     Low back pain     Lumbar stenosis     PONV (postoperative nausea and vomiting)     Positive TB test     chest x-ray neg    Seasonal allergies      Past Surgical History:   Procedure Laterality Date    APPENDECTOMY N/A 1982    Dr. Wilson    CHOLECYSTECTOMY WITH INTRAOPERATIVE CHOLANGIOGRAM N/A 10/31/2018    Procedure: laparoscopic cholecystectomy;  Surgeon: Mary Kay Mac MD;  Location: [facility] OR;  Service: General    COLONOSCOPY      CYSTOSCOPY BLADDER BIOPSY N/A 10/3/2016    Procedure: CYSTOSCOPY BLADDER BIOPSY;  Surgeon: Rei Calvert MD;  Location: [facility] OR;  Service:     CYSTOSCOPY W/ URETERAL STENT PLACEMENT Bilateral 1/3/2024    Procedure: CYSTOSCOPY BILATERAL RETROGRADE PYELOGRAM  BILATERAL URETERAL STENT INSERTION AND CATHETHER PLACEMENT;  Surgeon: Eduard Armando MD;  Location: [facility] OR;  Service: Urology;  Laterality:  Bilateral;    DILATATION AND CURETTAGE N/A     ENDOSCOPY      INTRAUTERINE DEVICE INSERTION      KNEE ARTHROSCOPY W/ MENISCAL REPAIR Left     OVARIAN CYST REMOVAL Left     Dr. Wilson    TOTAL KNEE ARTHROPLASTY Right 2017    Procedure: RIGHT TOTAL KNEE ARTHROPLASTY WITH ALETA NAVIGATION;  Surgeon: Ross Cruz MD;  Location: [facility] OR;  Service:      Allergies   Allergen Reactions    Sulfa Antibiotics Hives and Rash       Family History   Problem Relation Age of Onset    Hepatitis Mother     Breast cancer Mother     Heart disease Mother     Cancer Mother     Hyperlipidemia Mother              Heart failure Father     Stroke Father     Hypertension Father         Since he was 72, now 86s    Diabetes Father     Heart disease Father     Hyperlipidemia Father         Unsurs    Heart disease Maternal Grandmother     Cancer Maternal Grandfather     COPD Maternal Grandfather     Arthritis Paternal Grandmother     Diabetes Maternal Aunt     Diabetes Paternal Uncle     Malig Hyperthermia Neg Hx        Social History     Socioeconomic History    Marital status:    Tobacco Use    Smoking status: Never    Smokeless tobacco: Never    Tobacco comments:     Never smoked   Vaping Use    Vaping Use: Never used   Substance and Sexual Activity    Alcohol use: Never     Alcohol/week: 3.0 standard drinks of alcohol     Types: 1 Glasses of wine, 2 Standard drinks or equivalent per week    Drug use: Never    Sexual activity: Not Currently     Partners: Male     Birth control/protection: I.U.D.       PMH, FH, SH and ROS completed with Admission History and Physical and updated in EPIC system.        Objective     Scheduled Meds:enoxaparin, 40 mg, Subcutaneous, Q12H  escitalopram, 10 mg, Oral, Daily  insulin glargine, 16 Units, Subcutaneous, Nightly  insulin lispro, 2-9 Units, Subcutaneous, 4x Daily AC & at Bedtime  levothyroxine, 137 mcg, Oral, Q AM  micafungin (MYCAMINE) IV, 100 mg, Intravenous,  "Q24H  oxybutynin XL, 10 mg, Oral, Daily  pantoprazole, 40 mg, Oral, Q AM  potassium phosphate, 15 mmol, Intravenous, Once  rOPINIRole, 0.5 mg, Oral, Nightly  senna-docusate sodium, 2 tablet, Oral, BID  vancomycin, 500 mg, Intravenous, Q8H      Continuous Infusions:lactated ringers, 9 mL/hr, Last Rate: 9 mL/hr (01/03/24 1527)  Pharmacy to dose vancomycin,         Vital signs in last 24 hours:  Temp:  [97.9 °F (36.6 °C)-98.6 °F (37 °C)] 98.6 °F (37 °C)  Heart Rate:  [] 82  Resp:  [16-18] 18  BP: (114-139)/(59-90) 115/59    Intake/Output:    Intake/Output Summary (Last 24 hours) at 1/9/2024 2142  Last data filed at 1/9/2024 1929  Gross per 24 hour   Intake --   Output 300 ml   Net -300 ml         Exam:  /59 (BP Location: Right arm, Patient Position: Lying)   Pulse 82   Temp 98.6 °F (37 °C) (Oral)   Resp 18   Ht 165.1 cm (65\")   Wt 114 kg (251 lb 3.2 oz)   LMP 09/03/2016 Comment: IUD  SpO2 96%   BMI 41.80 kg/m²     Constitutional:  Alert, cooperative, continues to be more alert each day and up in chair for longer periods of time,, resting comfortably,  with lower blood sugar levels and sepsis under better control.  Complains of right lower quadrant pain with some guarding but no rebound.  Redness of the skin over area of interest is noted.  Head:      Normocephalic, without obvious abnormality, atraumatic   Eyes:     PERRLA, conjunctiva/corneas clear, no icterus, no conjunctival                                     pallor, EOM's intact, both eyes      ENT and Mouth: Lips, tongue, gums normal; oral mucosa pink and moist   Neck:     Supple, symmetrical, trachea midline, no JVD  Respiratory:     Clear to auscultation bilaterally, respirations unlabored  Cardiovascular:  Regular rate and rhythm, S1 and S2 normal, no murmur,      no  Rub or gallop.  Pulses normal.    Gastrointestinal:   BS present x 4 Soft, non-tender, bowel sounds active,      no masses, no hepatosplenomegaly    right lower quadrant pain " as above some guarding no rebound and good bowel sounds.  Overlying skin does appear erythematous                                                 :       No hernia.  Normal exam for sex.         Musculoskeletal: Extremities normal, atraumatic, no cyanosis or edema     No arthropathy.  No deformity.  Gait normal                                                 Skin:   Skin is warm and dry,  no rashes, swelling or palpable lesions   Neurologic:  CN -XII intact, motor strength grossly intact, sensation grossly intact to light touch, no focal reflex deficits noted    Psychiatric:     Alert,oriented X3, no delusions, psychoses, depression or anxiety    Heme/Lymph/Imun:   No bruises, petechiae.  Lymph nodes normal in size/configuration       Data Review:  Lab Results   Component Value Date    CALCIUM 8.6 01/09/2024    PHOS 3.7 01/07/2024     Results from last 7 days   Lab Units 01/09/24  1812 01/09/24  0645 01/08/24  0609 01/07/24  0645 01/06/24  0653 01/05/24  0232   AST (SGOT) U/L  --  16 17 16 14 34*   ALT (SGPT) U/L  --  9 7 8 13 19   MAGNESIUM mg/dL  --   --   --  1.7 1.7 1.7   SODIUM mmol/L  --  133* 131* 129* 131* 132*   POTASSIUM mmol/L 4.4 3.6 3.7 4.8 3.8 4.1   CHLORIDE mmol/L  --  95* 94* 94* 98 101   CO2 mmol/L  --  27.5 26.0 20.6* 22.3 22.5   BUN mg/dL  --  3* 4* 6* 7* 8   CREATININE mg/dL  --  0.71 0.64 0.62 0.61 0.85   GLUCOSE mg/dL  --  160* 169* 157* 171* 159*   CALCIUM mg/dL  --  8.6 8.6 8.9 8.6 8.9   WBC 10*3/mm3  --  14.58* 16.61* 13.75* 10.77  --    HEMOGLOBIN g/dL  --  10.1* 9.3* 11.5* 10.1*  --    PLATELETS 10*3/mm3  --  439 378 371 384  --      Lab Results   Component Value Date    TROPONINT <6 01/02/2024     Estimated Creatinine Clearance: 104.8 mL/min (by C-G formula based on SCr of 0.71 mg/dL).  WEIGHTS:     Wt Readings from Last 1 Encounters:   01/09/24 0653 114 kg (251 lb 3.2 oz)   01/08/24 0700 115 kg (254 lb 3.1 oz)   01/07/24 0510 120 kg (263 lb 10.7 oz)   01/07/24 0349 120 kg (263 lb 10.7  oz)   01/06/24 0509 122 kg (269 lb 6.4 oz)   01/05/24 0556 111 kg (244 lb 11.4 oz)   01/04/24 0600 111 kg (245 lb 6 oz)   01/03/24 0600 111 kg (245 lb 6 oz)   01/02/24 1131 120 kg (265 lb)         Assessment:    DKA (diabetic ketoacidosis)    UTI (urinary tract infection)    Morbid obesity with BMI of 40.0-44.9, adult    JADEN (acute kidney injury)    Sepsis, unspecified organism    Lactic acidosis    UPJ (ureteropelvic junction) obstruction bilateral    Bilateral hydronephrosis moderate on admission    Renal calculus on right, nonobstructinbg    Vitamin D deficiency    Essential hypertension    Hyperlipidemia    Hypothyroidism    Allergic rhinitis    Back pain    Chronic liver disease    Primary osteoarthritis of right knee    Contact dermatitis    Anxiety    Dense breast tissue on mammogram    Bilateral hydrocele      Attending Physician Assessment and Plan:    1.  Cystitis/UTI with sepsis associated with bilateral UPJ obstruction and moderate bilateral hydronephrosis and possible right calyceal rupture.  Patient resuscitated aggressively with IV fluids.  Broad-spectrum antibiotics, Zosyn, started.  Urology consult is placed.  Patient being followed carefully in ICU for additional complications or signs of worsening sepsis.  Culture of urine remains negative on day #2.  New cultures collected at time of cystoscopy today by urology.  Patient currently off Zosyn which was stopped after surgery yesterday.  We will repeat blood cultures since we did have 1 of 2 initial blood cultures positive.  ID has been consulted and is following with us.  They have started the patient on fluconazole secondary to the instrumentation yesterday and yeast that is seen in hearing.  I do plan to check a procalcitonin level with a.m. labs tomorrow.  Patient also started on vancomycin due to 2 of 2 positive cultures from blood.  Additional blood cultures pending at present time.  ID continues to follow on 1/9/2024.  Vancomycin and  micafungin are continued for treatment of ongoing infections.     2.  Anion gap metabolic acidosis versus possible diabetic ketoacidosis.  Improving rapidly at present time on DKA protocol with Glucomander.  Aggressive fluid resuscitation initiated in the ER is continued in the ICU.  Patient now with good urinary output and hopefully will be able to come off of IV insulin and switch to subcu dosing overnight..  Anion gap has narrowed and improved.  Patient should come off Glucomander DKA protocol sometime later today.  On postop day #1 anion gap has significantly improved.  We are asking renal to help us with follow-up on her acute kidney injury and volume status.  Acidosis has resolved.  Acidosis has resolved.     3.  Lactic acidosis possibly due to home use of metformin while taking no food or possibly due to acute sepsis.  Continuing rapid fluid resuscitation with improvement already noted in lactic acid.  Monitoring carefully for signs of any new changes or problems.  Lactic acidosis has also resolved on day #2 and patient's sepsis seems to be under much more stable condition at present time.  Repeat lactic acid in a.m. with other labs.  Lactic acidosis has resolved.    4.  Type 2 diabetes mellitus poorly controlled at present time with elevated A1c greater than 16.  Will probably need to arrange for follow-up with patient in endocrinology clinic.  For now we will ask diabetic educator to see patient and work on sliding scale insulin protocols.  Patient and her daughter are interested in Dexcom monitoring and I will see if that is available from the diabetic educator.  Discussed situation with the daughters and she may benefit from continuous 24-hour glucose monitoring issues willing to learn the process.  Will probably need to discharge charge on sliding scale insulin and may also consider mealtime insulin or long acting insulin.  Diabetic educator to see patient and work with us on diet, glucose testing  techniques, and generalized diabetes management.  Patient doing well on sliding scale with single dose of Lantus at nighttime.  Now the patient is eating more plan to increase Lantus to 16 units subcu daily.  We will plan on rechecking hemoglobin A1c in 6 to 8 weeks.     5.  Pseudohyponatremia.  Seems to already be improving with resuscitation and resolution of the uncontrolled glucoses.  Will continue to follow electrolytes regularly.  Hyponatremia now corrected and in normal range.  Hyponatremia has now resolved on day #2     6.  Hypothyroidism.  Patient's Synthroid is continued by the ICU attending.  Will continue to follow this in the hospital and on an outpatient basis.  Hypothyroidism is a stable condition     7.  Hyperlipidemia.  Agree with holding statin for now.  Will resume prior to discharge and continue on outpatient basis.     8.  Obesity.  Hope to continue using agents such as Ozempic for management of patient's blood sugar levels.  This will also help with diet control and ongoing need for gradual weight loss.     9.  Acute kidney injury secondary to volume depletion with uncontrolled glucoses.  Should improve as patient's volume status normalizes.  Will continue to monitor closely.  Acute kidney injury is gradually improving with resolution of hydronephrosis and bilateral stent placement.     10.  Generalized muscle weakness and fatigue.  Suspect related to the poorly controlled diabetes and electrolyte disturbances.  Will do PT and OT evaluations after patient stabilizes.  Suspect patient will discharge to home with PT and OT and home environment.  Hopefully patient will be able to work with PT and OT in the next few days.    11.  Right lower quadrant pain with fluid collections noticed on CT scan completed today 6 days after admission.  Patient complaining of significant pain with erythematous skin over the right lower quadrant.  I will ask general surgery to take a look at the situation in the  a.m.  We will also notify urology of the findings in the a.m.        Plan for disposition:Where: home and home health and When: 2-3 days when medically stable     Copied text in this note has been reviewed by me and is accurate as of 01/09/2024.  Much of this dictation was completed using Dragon voice activated transcription software which can result in misspelled words and nonsensical phrases at times.     Dr. Fairchild available and can be reached at [phone]      Dean Fairchild MD  1/09/2024  1255 EST

## 2024-01-11 LAB
ALBUMIN SERPL-MCNC: 2.1 G/DL (ref 3.5–5.2)
ALBUMIN/GLOB SERPL: 0.5 G/DL
ALP SERPL-CCNC: 169 U/L (ref 39–117)
ALT SERPL W P-5'-P-CCNC: 7 U/L (ref 1–33)
ANION GAP SERPL CALCULATED.3IONS-SCNC: 9.4 MMOL/L (ref 5–15)
AST SERPL-CCNC: 10 U/L (ref 1–32)
BASOPHILS # BLD AUTO: 0.07 10*3/MM3 (ref 0–0.2)
BASOPHILS NFR BLD AUTO: 0.6 % (ref 0–1.5)
BILIRUB SERPL-MCNC: 0.4 MG/DL (ref 0–1.2)
BUN SERPL-MCNC: 4 MG/DL (ref 8–23)
BUN/CREAT SERPL: 5.1 (ref 7–25)
CALCIUM SPEC-SCNC: 8.8 MG/DL (ref 8.6–10.5)
CHLORIDE SERPL-SCNC: 94 MMOL/L (ref 98–107)
CO2 SERPL-SCNC: 27.6 MMOL/L (ref 22–29)
CREAT SERPL-MCNC: 0.78 MG/DL (ref 0.57–1)
DEPRECATED RDW RBC AUTO: 42.5 FL (ref 37–54)
EGFRCR SERPLBLD CKD-EPI 2021: 86.5 ML/MIN/1.73
EOSINOPHIL # BLD AUTO: 0.17 10*3/MM3 (ref 0–0.4)
EOSINOPHIL NFR BLD AUTO: 1.4 % (ref 0.3–6.2)
ERYTHROCYTE [DISTWIDTH] IN BLOOD BY AUTOMATED COUNT: 14.1 % (ref 12.3–15.4)
GLOBULIN UR ELPH-MCNC: 4.3 GM/DL
GLUCOSE BLDC GLUCOMTR-MCNC: 156 MG/DL (ref 70–130)
GLUCOSE BLDC GLUCOMTR-MCNC: 202 MG/DL (ref 70–130)
GLUCOSE BLDC GLUCOMTR-MCNC: 247 MG/DL (ref 70–130)
GLUCOSE BLDC GLUCOMTR-MCNC: 389 MG/DL (ref 70–130)
GLUCOSE SERPL-MCNC: 162 MG/DL (ref 65–99)
HCT VFR BLD AUTO: 31.6 % (ref 34–46.6)
HGB BLD-MCNC: 9.8 G/DL (ref 12–15.9)
IMM GRANULOCYTES # BLD AUTO: 0.2 10*3/MM3 (ref 0–0.05)
IMM GRANULOCYTES NFR BLD AUTO: 1.7 % (ref 0–0.5)
LYMPHOCYTES # BLD AUTO: 1.51 10*3/MM3 (ref 0.7–3.1)
LYMPHOCYTES NFR BLD AUTO: 12.7 % (ref 19.6–45.3)
MCH RBC QN AUTO: 26 PG (ref 26.6–33)
MCHC RBC AUTO-ENTMCNC: 31 G/DL (ref 31.5–35.7)
MCV RBC AUTO: 83.8 FL (ref 79–97)
MONOCYTES # BLD AUTO: 1.22 10*3/MM3 (ref 0.1–0.9)
MONOCYTES NFR BLD AUTO: 10.2 % (ref 5–12)
NEUTROPHILS NFR BLD AUTO: 73.4 % (ref 42.7–76)
NEUTROPHILS NFR BLD AUTO: 8.74 10*3/MM3 (ref 1.7–7)
NRBC BLD AUTO-RTO: 0 /100 WBC (ref 0–0.2)
PLATELET # BLD AUTO: 502 10*3/MM3 (ref 140–450)
PMV BLD AUTO: 9.6 FL (ref 6–12)
POTASSIUM SERPL-SCNC: 4.4 MMOL/L (ref 3.5–5.2)
PROT SERPL-MCNC: 6.4 G/DL (ref 6–8.5)
RBC # BLD AUTO: 3.77 10*6/MM3 (ref 3.77–5.28)
SODIUM SERPL-SCNC: 131 MMOL/L (ref 136–145)
WBC NRBC COR # BLD AUTO: 11.91 10*3/MM3 (ref 3.4–10.8)

## 2024-01-11 PROCEDURE — 63710000001 INSULIN GLARGINE PER 5 UNITS: Performed by: INTERNAL MEDICINE

## 2024-01-11 PROCEDURE — 82948 REAGENT STRIP/BLOOD GLUCOSE: CPT

## 2024-01-11 PROCEDURE — 97530 THERAPEUTIC ACTIVITIES: CPT

## 2024-01-11 PROCEDURE — 25010000002 VANCOMYCIN PER 500 MG: Performed by: STUDENT IN AN ORGANIZED HEALTH CARE EDUCATION/TRAINING PROGRAM

## 2024-01-11 PROCEDURE — 85025 COMPLETE CBC W/AUTO DIFF WBC: CPT | Performed by: INTERNAL MEDICINE

## 2024-01-11 PROCEDURE — 97110 THERAPEUTIC EXERCISES: CPT

## 2024-01-11 PROCEDURE — 80053 COMPREHEN METABOLIC PANEL: CPT | Performed by: INTERNAL MEDICINE

## 2024-01-11 PROCEDURE — 25010000002 MICAFUNGIN SODIUM 100 MG RECONSTITUTED SOLUTION 1 EACH VIAL: Performed by: STUDENT IN AN ORGANIZED HEALTH CARE EDUCATION/TRAINING PROGRAM

## 2024-01-11 PROCEDURE — 99232 SBSQ HOSP IP/OBS MODERATE 35: CPT | Performed by: STUDENT IN AN ORGANIZED HEALTH CARE EDUCATION/TRAINING PROGRAM

## 2024-01-11 PROCEDURE — 25010000002 ENOXAPARIN PER 10 MG: Performed by: UROLOGY

## 2024-01-11 PROCEDURE — 63710000001 INSULIN LISPRO (HUMAN) PER 5 UNITS: Performed by: INTERNAL MEDICINE

## 2024-01-11 RX ADMIN — INSULIN LISPRO 4 UNITS: 100 INJECTION, SOLUTION INTRAVENOUS; SUBCUTANEOUS at 11:34

## 2024-01-11 RX ADMIN — LEVOTHYROXINE SODIUM 137 MCG: 137 TABLET ORAL at 06:20

## 2024-01-11 RX ADMIN — OXYBUTYNIN CHLORIDE 10 MG: 10 TABLET, EXTENDED RELEASE ORAL at 08:12

## 2024-01-11 RX ADMIN — HYDROCODONE BITARTRATE AND ACETAMINOPHEN 1 TABLET: 5; 325 TABLET ORAL at 15:17

## 2024-01-11 RX ADMIN — SENNOSIDES AND DOCUSATE SODIUM 2 TABLET: 50; 8.6 TABLET ORAL at 08:13

## 2024-01-11 RX ADMIN — INSULIN LISPRO 2 UNITS: 100 INJECTION, SOLUTION INTRAVENOUS; SUBCUTANEOUS at 08:12

## 2024-01-11 RX ADMIN — ROPINIROLE HYDROCHLORIDE 0.5 MG: 0.5 TABLET, FILM COATED ORAL at 20:36

## 2024-01-11 RX ADMIN — VANCOMYCIN HYDROCHLORIDE 500 MG: 500 INJECTION, POWDER, LYOPHILIZED, FOR SOLUTION INTRAVENOUS at 16:09

## 2024-01-11 RX ADMIN — INSULIN LISPRO 8 UNITS: 100 INJECTION, SOLUTION INTRAVENOUS; SUBCUTANEOUS at 21:56

## 2024-01-11 RX ADMIN — PANTOPRAZOLE SODIUM 40 MG: 40 TABLET, DELAYED RELEASE ORAL at 06:20

## 2024-01-11 RX ADMIN — SENNOSIDES AND DOCUSATE SODIUM 2 TABLET: 50; 8.6 TABLET ORAL at 20:36

## 2024-01-11 RX ADMIN — VANCOMYCIN HYDROCHLORIDE 500 MG: 500 INJECTION, POWDER, LYOPHILIZED, FOR SOLUTION INTRAVENOUS at 09:10

## 2024-01-11 RX ADMIN — ENOXAPARIN SODIUM 40 MG: 100 INJECTION SUBCUTANEOUS at 20:37

## 2024-01-11 RX ADMIN — INSULIN GLARGINE 16 UNITS: 100 INJECTION, SOLUTION SUBCUTANEOUS at 21:56

## 2024-01-11 RX ADMIN — HYDROCODONE BITARTRATE AND ACETAMINOPHEN 1 TABLET: 5; 325 TABLET ORAL at 20:46

## 2024-01-11 RX ADMIN — ENOXAPARIN SODIUM 40 MG: 100 INJECTION SUBCUTANEOUS at 08:12

## 2024-01-11 RX ADMIN — MICAFUNGIN SODIUM 100 MG: 100 INJECTION, POWDER, LYOPHILIZED, FOR SOLUTION INTRAVENOUS at 10:21

## 2024-01-11 RX ADMIN — INSULIN LISPRO 4 UNITS: 100 INJECTION, SOLUTION INTRAVENOUS; SUBCUTANEOUS at 16:54

## 2024-01-11 RX ADMIN — ESCITALOPRAM OXALATE 10 MG: 10 TABLET, FILM COATED ORAL at 08:12

## 2024-01-11 NOTE — PLAN OF CARE
Goal Outcome Evaluation:  Plan of Care Reviewed With: patient     Progress: improving  Outcome Evaluation: Patient sitting UIC upon arrival and reported feeling a little bit better. Patient was able to stand with SBA and ambulated multiple ambulation trials within room requiring SBA-CGA. Patient does demo a forward flexed posture and reached for furniture at times for support- discussed implementing RW though patient declined. Daughter at bedside and DC plans were reviewed. Plan is home with family assistance. Patient declined HH PT to follow up or any equipment needs. PT will continue to follow and advance as able. PT will begin following peripherally. All goals updated and revised as appropriate.      Anticipated Discharge Disposition (PT): home with assist, home with home health (patient declining HH)

## 2024-01-11 NOTE — PLAN OF CARE
Goal Outcome Evaluation:  Plan of Care Reviewed With: patient, daughter        Progress: improving  Outcome Evaluation: Pt seen for OT, noted improvements with ADLs, pt reports she has been getting to BSC (I) from chair. She reports overall feeling better. She engaged in UE ther ex and STS activity this date with SPV and able to stand more upright. she has no concerns for ADLs at home, daughter and spouse can assist as needed. no further acute OT needs, family agreeable. will sign off.      Anticipated Discharge Disposition (OT): home, home with assist

## 2024-01-11 NOTE — PROGRESS NOTES
NATHALIA CAMPUZANO [facility]  INTERNAL MEDICINE  DEAN FAIRCHILD MD  [address] [place] [address]  Phone [phone] Fax [phone]  E-mail:  [email or web address]      INTERNAL MEDICINE DAILY PROGRESS NOTE  Dean Fairchild M.D.  1/10/2024            Patient Identification:  Name: Tiana Hudson  Age: 63 y.o.  Sex: female  :  1960  MRN: [id number]         Primary Care Physician: Dean Fairchild MD  LENGTH OF STAY 8 DAYS    Consults       Date and Time Order Name Status Description    2024  3:42 AM Inpatient General Surgery Consult Completed     2024  5:10 AM Inpatient Nephrology Consult Completed     2024  2:00 PM Inpatient Infectious Diseases Consult Completed     1/3/2024 10:27 AM Inpatient Urology Consult      2024 11:55 PM Urology (on-call MD unless specified) Completed     2024  3:45 PM IP General Consult (Use specialty-specific consult if known) Completed             Chief Complaint: Abdominal/flank pain with DKA, acute cystitis and sepsis, and possible calyceal rupture in the kidney     History of Present Illness:     Subjective   Interval History: Patient is a 63 y.o.female who presented at my request to the emergency room at [facility] with complaint of acute abdominal/flank pain and history of recurrent kidney stones.  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  Patient has been followed for the last few months by Dr. Rei Calvert in urology for renal calculi and actually has had a stent placed in the right kidney due to obstruction from her renal stones.  It is also noted that the patient had a recent lithotripsy.  Patient began to feel poorly after the recent Shreyas  holiday and became more severely ill over the last 3 to 4 days prior to this admission.  She has felt extremely fatigued, dizzy at times, tired, nauseated, and has spent most of her time in bed sleeping.  Family has had difficulty getting the patient to take any oral intake and became quite concerned as her condition continued to worsen.  Patient was attempting to go to work today but really was too dizzy to get in the car to drive and 2-week to actually work.  After urging from her daughter, patient called me with respect to the symptoms and at my request went to the ER for evaluation.  Patient has multiple medical comorbidities that complicate her medical care.These include most significantly recurrent urinary tract infections due to her nephrolithiasis, morbid obesity with BMI greater than 40, diabetes mellitus type 2 poorly controlled with recent addition of Ozempic to her metformin therapy, vitamin D deficiency, essential hypertension, hyperlipidemia, hypothyroidism, allergic rhinitis, back pain, contact dermatitis, anxiety, and history of dense breast tissue on mammograms.     Patient was evaluated in the emergency room by Angelito Winkler MD who was the ER attending on call time of her arrival.  Patient was seen on 1/2/2024 at 1544 by Dr. Winkler.  His HPI was consistent with the story which I given above.  Patient denied dysuria, fever, chills on his evaluation.  She did admit to having emesis and actually had some emesis while in the emergency room.  Review of systems in the emergency room was positive for diffuse abdominal pain, and vomiting.  Patient also was noted to have some significant flank pain.  All other systems reviewed were negative in the emergency room vital signs on arrival in the emergency room showed temperature of 96 °F, heart rate of 114, respiratory rate of 16, blood pressure 125/72, and O2 sat of 98%.  Patient was described as ill-appearing but in no acute distress.  She did have severe  tenderness to palpation more on the right side of the abdomen and out into the right flank area with voluntary guarding.  Labs collected in the ER showed that her lactate level was elevated at 6.0.  She had a lipase of 23, her white blood cell count was 20.12, her hemoglobin was 11.2, and her platelet count was 568,000.  Patient did have a phosphorus of 4.2, magnesium 2.0, osmolality of 317, hemoglobin A1c is 16.90, and UA showed specific gravity 1.027, 3+ glucose, moderate blood 2+, leukocyte esterase moderate 2+, nitrates negative, white blood cells 6-10, red blood cells 6-10, bacteria 2+.  Her CMP showed a glucose of 978, creatinine of 1.82, sodium of 115, potassium of 4.2, chloride of 76, CO2 of 15.7, albumin 2.4, AST 33, alk phos 220, anion gap of 23, EGFR of 31.3.  Patient did have a CT scan of her abdomen and pelvis completed which showed loculated fluid throughout the right perinephric space felt to possibly be secondary to right calyceal rupture, new mild to moderate wall thickening of the right renal pelvis and renal calyces, bilateral UPJ obstruction with moderate bilateral hydronephrosis on the left, mild new dilation of right ureter, nonobstructing right renal calculi, hepatic morphology suggestive of chronic liver disease, and reactive lymph nodes in the retrocaval area.  A chest x-ray was done which showed no acute disease other than minimal atelectasis at the base of the right lung.  Patient underwent aggressive fluid resuscitation in the emergency room and received over 3 L of IV fluid.  She was placed on an insulin drip and was also started on Zosyn therapy.  She was followed on sepsis protocol as well as DKA protocol.  Patient did receive morphine 4 mg for pain x 2.  Case was discussed with myself as her primary care attending.  Urology was also contacted about situation.  I did suggest that patient be admitted to the ICU for treatment of the DKA and severe sepsis picture.  Dr. Arthur Tena did  agree to the ICU admission and will be following her in the CCU for pulmonary/critical care.  Final diagnoses in the ER were sepsis due to unspecified organism and acute cystitis without significant hematuria.     1/2/2024.  I personally saw the patient for the first time during this hospitalization on this date in the coronary care unit room #335.  Patient was resting quietly in bed and did a peer acutely ill.  She did wake to her name and spoke a few words before falling back asleep.  Patient's 2 daughters were at the foot of her bed and did discuss the case at length with me.  Daughter Enoch, is a former nurse from here at [employer], and now works at [employer] with the hospitalist group there.  Patient has responded well to the Glucommander DKA protocols and blood sugars have gradually come down to near normal levels for the patient in the 1  range.  I will full PPE for the exam including an N95 face mask, goggles, white lab coat, and gloves when touching patient.  I performed thorough hand hygiene before and after the patient visit.  Patient did have a venous blood gas which showed pH 7.40, pCO2 28, pO2 of 31, and O2 sat of 62%.  She also had recent electrolytes which showed a sodium of 131, potassium 3.4, chloride 96, CO2 19.1, BUN 17, creatinine 1.15, estimated GFR now up to 54.3.  Lactate level has been coming steadily down and currently at 2.8.  Additional labs ordered for tomorrow a.m.  Patient has been able to urinate several times and daughters have helped her up to the bathroom for this.  I actually note that she did have some urinary frequency and incontinence at home which is unusual for her.  I did speak briefly to the patient's daytime nurse who was departing soon after I arrived on the floor.  I have reviewed Dr. Arthur Tena's notes and his admission history and physical.  I do agree completely with his plan of care at present time.  Supportive care is continued to be offered for the sepsis  with careful monitoring in the ICU.  Patient's anion gap metabolic acidosis probably is related to lactic acidosis and she was continuing to take metformin even when she was not able to eat.  IV fluids are continued aggressively.  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  Probably will plan sliding scale insulin for stabilization of diabetes while here in the hospital and will train patient to continue that in the home environment.  Pseudohyponatremia will be corrected with the IV fluids.  Hypothyroidism will be addressed with continued Synthroid.  Statins are held for now.  Obesity is an ongoing problem for the patient.  At the time my exam, patient is medically stable and slowly improving.  I did relay this to the daughter to agree with my assessment.  We will continue to follow the patient with you and we will be happy to assume care of the patient when she is stable and ready to be transferred out of the ICU.  I can be reached directly for any concerns or questions at [phone].    1/3/2024.  Patient is seen again today in her room in the CCU #335.  Patient was resting quietly in bed at the time of my visit and states that her daughters were downstairs getting a bite to eat.  I did discuss the case with the patient's nurse, Tiana, who tells me that she is being preop and should be going down soon for cystoscopy and stent placement.  I will full PPE for the exam including an N95 face mask, goggles, white lab coat, and gloves when touching patient.  I performed thorough hand hygiene before and after the patient visit.  Patient is more alert today but still somewhat confused about details of her medical case.  She is able to carry on a conversation with me though and joke a bit with me while I am present in the room.  She does understand she has issues because of the obstruction bilaterally of her ureters that has resulted  in hydronephrosis and hopefully can be relieved by the cystoscopy that is planned for later this morning.  Patient is still on an insulin drip but is drastically improved compared to where she was yesterday at this time.  Review of labs shows that her sodium is now up to 133, her CO2 is 19.1, glucose is now at 134, phosphorus is at 2.1, lactate is normalized at 1.9, white blood cell count today is 18.93, hemoglobin is 9.6 today, lakelets are normal today,Blood culture from the first day of hospitalization shows anaerobic bottle gram-positive bacilli growth with identification still ongoing.  Fungal and body fluid cultures are still negative.  Urine culture remains negative.  Dr. Oquendo was following the patient for critical care today.  He has continued her antibiotics and is awaiting urology input.  Fluid resuscitation is continued.  Blood pressure now seems well-controlled.  DVT prophylaxis in place.  He had a lengthy discussion with patient's family at the time of his rounds.  Urology has seen the patient in consultation and is planning to do cystoscopy later today.  Dietitian is following the patient's case and recommending input treatment as needed.Dr. Armando did perform a cystoscopy with ureteral catheterization and stent insertion bilaterally.  His pre-op diagnosis and postop diagnoses were urosepsis with bilateral UPJ obstruction.  He did feel that he had successfully decompressed the blockages and the hydronephrotic changes.  He did speak with patient's daughter postoperatively.  Patient does seem to be much improved today.  Vital signs still show no fever.  Blood pressure is relatively stable.  Will continue to follow with you.    1/4/2024.  Patient is seen again today in her room in the CCU #335.  Patient has transitions of critical care and is in overflow for care on the floor.  Patient's daughter is present at bedside at the time of my visit.  Patient is much more awake and alert today.  She is more  interactive and talkative.  I did discuss the case with the patient's nurse.  We are going to obtain an ID consult today for help with antibiotic management and we also are going to transition the patient to regular sliding scale insulin as we begin the teaching process of getting patient on her own regimen of sliding scale insulin at the time of discharge to home.  We have also started patient on Lantus therapy at 10 units for now and may need to titrate up.  I wore full PPE for the exam today including an N95 face mask, goggles, white lab coat, and gloves when touching patient.  I performed thorough hand hygiene before and after the patient visit.  Specialist have seen patient today and their care is summarized below.Dr. Najera, the critical care attending today, felt that her mentation was slow but answering questions appropriately.  No other major findings were discovered at present time.  Antibiotics were continued for sepsis was felt to be significantly improved with anion gap now closed and lactic acidosis much better.  IV antibiotics were continued awaiting final culture results and I did consult ID to see patient for their input on the situation.  Dr. Devon Armando who completed bilateral stent placement for bilateral OP J obstruction and urosepsis is pleased with progress on day 1 postop.  He notes that urine output has been excellent.  Dr. Us from infectious disease did see the patient in consultation.  He notes that Zosyn antibiotics have been discontinued by Dr. Armando after her successful surgery.  White blood cell count is down to 17.08 today with hematocrit of 30.7.  Glucose max was 250 today estimated GFR is up to 54 and CO2 is 19.0.  He feels the significance of the positive blood culture is unclear at this point with only 1 of 2 bottles positive for gram-positive bacilli which normally would represent a contamination.  He is planning to repeat the blood cultures and will follow-up.  Urine  culture was also negative except for yeast growth and due to the recent  instrumentation he has started the patient on fluconazole 400 mg daily while cultures are pending.  New blood culture has been sent.  We will check again the lactic acid, procalcitonin, sed rate, and CRP with a.m. labs tomorrow.  Labs relatively stable today though I do note that sodium is dropped a bit to 127.  CO2 remains slightly low at 18.0 and chloride slightly low at 97.  Patient's glucoses running in the upper 100s and low 200s at present.  Electrolytes otherwise seem fairly stable.  White count is still elevated somewhat at 17.08 despite stenting yesterday.  Patient currently off antibiotics and being followed carefully by ID.  Plan to check procalcitonin and repeat blood culture with a.m. labs.  Hemoglobin is at 10.0.  Otherwise patient appears very stable at present time.  I do agree that her mentation is a little bit slower than usual but I suspect this will continue to improve as we get further from her acute DKA event.  Treatment plan reviewed with patient and her daughter today.    1/05/2024.  Patient is seen again today in her room in the CCU #335.  Patient resting quietly in bed and daughter Enoch is at bedside.  I spoke with patient's nurse during the day to discuss occasional changes in her treatment plan.  I wore full PPE for the exam including an N95 facemask, goggles, white lab coat, and gloves when touching patient.  I performed thorough hand hygiene before and after the patient visit.  Patient is much more alert today and more like her usual self.  She does laugh and participate in conversation.  Mother and daughter were very impressed with Dr. Sánchez's excellent review with them of the patient's renal condition and with the time he has been going over the various diagnostic images that have been done up to date.  He was pleased to see how significantly improved electrolytes are and felt the patient was remaining  hemodynamically stable at present time.  Dr. Us from ID did see the patient.  Blood cultures were repeated but original cultures are now positive 2 out of 2 for gram-positive bacilli which is more consistent with a true infection.  He started patient on vancomycin and will be following up on ID and's susceptibilities of the organisms.  He also continued the fluconazole 400 mg daily while following up on cultures from perinephric fluid.The pulmonary intensivist Dr. Arthur Tena saw patient briefly and found no pulmonary or critical care needs at this time.  Dr. Armando from neurology saw patient on postop day #2 after stent placement for bilateral UPJ obstruction.  Indicates that Perales catheter can be removed at any time.  Occupational Therapy began to work with the patient and feels that she will benefit from further skilled occupational therapy.  She did some bedside exercises and did do some sort steps.  She was able to sit up on the edge of the bed for 8 to 10 minutes with to stand.  Physical therapy also saw patient and felt she would continue to benefit from skilled physical therapy.  Pharmacist did consult with the patient on vancomycin and started at 750 mg IV every 12 hours.  They will be checking a trough level on 1/7/2024 at 8:30 AM.  Labs today are generally stable.  Glucose levels ranging in the 140s to 170s.  Sodium still slightly low at 132.  CO2 now normal at 22.5, albumin slightly low at 2.1, AST slightly up at 34 and alk phos slightly up at 270.  White blood cell count today is down to 13.04 and hemoglobin is stable at 10.3 with platelets of 412,000.  2 of 2 initial blood cultures were positive with final ID pending.  Sed rate remains greater than 130, procalcitonin is 3.24, and C-reactive protein is at 23.85.  Patient is still on overflow status waiting for a floor telemetry bed.    1/8/2024.  Patient is seen again today in her room on the coronary care unit #335.  Patient was actually up in a  chair at the time of my visit and her daughter was present at bedside.  I wore full PPE for the exam including an N95 facemask, goggles, white lab coat, and gloves when touching patient.  I performed thorough hand hygiene before and after the patient visit.  Patient is feeling much better today.  She says that she was anxious to get up and was happy that the therapist left her up in the chair.  She is able to get to the bathroom but does need some assistance still for that endeavor.  Several specialist saw the patient and their care is summarized below.  Patient remains on vancomycin therapy and pharmacy is following the dose and adjusting levels.  Dr. Us from ID did see patient again today.  He notes that repeat blood cultures remain negative and initial blood cultures are still being evaluated.  He has continued vancomycin for now and will follow-up on ID and susceptibilities.  Urine culture growing Candida glabrata and tropicalis and he has transitioned her from fluconazole therapy to micafungin 100 mg daily for now.  White blood cell count is slightly increased today at 16.61.  He is cautiously monitoring the patient for any new signs of infection or changes.Dr. Armando from urology did see the patient and was pleased with his surgical result from cystoscopy last week with bilateral stent placement for treating UPJ obstruction.  He has signed off on the case and plans to have her follow-up with him in clinic in 7 to 10 days for reevaluation following discharge.Occupational Therapy did work with patient who reported pain level of 2 out of 10 focused mainly on right abdomen where she has some redness swelling and discomfort.  There was no rebound to my exam in this area but I did elect to go ahead and do a CT scan of abdomen and pelvis to evaluate the area since she did have the extensive surgery not so long ago.  Patient is noted to fatigue quickly and has some functional deficits that need to be maximized  prior to DC to home.  Patient is planning discharge to home with spouse and daughters.  MI requested diabetic educator did meet with the patient.  Patient does need a meter for glucose testing.  Planning 4 times a day test for now.  Will use insulin pen lispro at meals.  Dietitian did meet with patient and her daughter for input on diet.  Provided printed materials and discussion.  Will be available for concerns or questions.  Labs today generally were stable.  Sodium back up to 131.  Glucose max of 245.  Patient does admit she is eating more at this time.  White blood cell count is up slightly at 16.61 of concern to ID.  They are monitoring cultures carefully.  Hemoglobin did drop from 11.5 down to 9.3 today will recheck tomorrow.  CT abdomen and pelvis was completed.  Right ureteral stent present but there is mild distention of the right extrarenal pelvis which appears to be improved compared to the exam 6 days ago.  There is right urethrocele thickening.  There is a right perinephric collection consistent with hemorrhage or urinoma's and there is a new deep collection in the right posterior lateral abdominal wall measuring 3 cm in thickness by 13 cm transverse along with muscular thickening along the right lateral abdominal wall and edema within the right lateral abdominal pelvic subcutaneous fat.  There is enlargement of this fluid collection seen.  We will have nursing notify urology in a.m. of this finding for their input.  May want to consider general surgery consultation also if pain persist in this area.    1/9/2024.  Patient is seen again today in the CCU room #335.  Patient's daughter is at bedside and patient is resting quietly in bedside chair.  Patient appears much more awake and alert today back to her usual personality.  I wore full PPE for the exam including an N95 face mask, goggles, white lab coat, and gloves when touching patient.  I performed thorough hand hygiene before and after the patient  visit.  Nursing reports the patient has been using bedside commode with some urgency and loose bowel movements at times.  This is why she continues on IV vancomycin.  Patient did require O2 2 L per nasal cannula at night while sleeping.  No other new issues noted.  Dr. Us from ID saw patient again today.  He continues to monitor her blood cultures and has continued her on the IV vancomycin along with micafungin for treatment of yeast infection.  Urology did see patient regarding findings on CT scan.  These were reviewed with Dr. Salomon.  There are fluid collections in the right lateral abdominal pelvic wall measuring 12.8 cm x 18 cm x 3 cm which may represent hematomas or urinoma months.  Left renal pelvis is still dilated slightly.  Urology did speak with the IR department directly and arrange for CT-guided drain placement which the patient is agreeable to having.Physical therapy did work with the patient today.  She required contact-guard assist for standing and ambulated 15 feet with no assist.  Patient was minimally unsteady and fatigued quickly though patient indicated these were the usual distances that she does ambulate.  Patient plans to DC home with home health and assistance from her daughter.  PT will continue to follow and advance as able.  Patient encouraged to get up in chair for all meals and ambulate at least 3 times a day to promote functional mobility during hospital stay.  General surgery did see patient at my request regarding the right lower quadrant pain.  They feel that it is primarily due to the retroperitoneal fluid collections which have increased in size.  They deferred further treatment decisions to urology and felt there was no intraperitoneal abnormalities noted.  Labs today do show glucose is up slightly more as patient is eating more.  Lantus has been increased for today to 16 units once daily.  Sodium is now up to 133.  Glucose was up at 212 this a.m. white blood cell count is  improved down from 16.61 to a level of 14.58 today.  Hemoglobin is up slightly at 10.1 and does remain stable.  Platelet count is also stable at 439,000.  Patient planning discharge to home over the weekend if drain is able to successfully remove fluid from the retroperitoneal fluid collection that is causing pain in the right lower quadrant.  Final antibiotic plan is still pending from ID.  We are continuing to follow closely and encourage further activity on the part of the patient.    1/10/2024.  Patient is seen again today in her room in the CCU room #335.  Patient is up in chair at bedside watching television.  She recognizes me immediately upon my entry into her room and smiles appropriately.  I wore full PPE for the exam including an N95 facemask, goggles, white lab coat, and gloves.  I performed thorough hand hygiene before and after the patient visit.  I did discuss the patient's care with her nurse who was present in the [place]artney outside her room.  Patient did have successful placement of the drain today in the pocket of fluid accumulation in the right lower abdominal wall.  Procedure was felt to be quite successful and 50 cc of fluid were sent to the lab for evaluation and analysis.  Patient has an ongoing drain that has very opaque brownish-white material draining from the fluid collection at present time.  Patient states that she feels 100% better with significant improvement in the pain that she was experiencing in the right lower quadrant prior to this drainage.  Vital signs today indicate no fevers with current temperature of 98.1 pulse 86 respiratory rate 18 blood pressure 120/65 and oxygen sat 96% on room air.Review of patient's labs shows that her sodium is very stable at 132 glucoses have been primarily in the 1 43-1 63 range today.  Patient's white blood cell count continues to come down slowly and is at 12.93 and her hemoglobin is stable at 10.3.  Body fluid culture from drainage is pending.   Patient does remain on Lantus 16 units along with sliding scale insulin at present time and seems to be very comfortable with management of her diabetes.  We will plan follow-up on outpatient basis with endocrinology following discharge.  Patient was seen by Dr. Us today and he has continued the vancomycin therapy awaiting culture results from the fluid drained from her right lower quadrant and awaiting final ID is on organism which grew from blood earlier in admission.  He is still continuing to treat for fungal infection that was collected at time of drainage from stents placed in ureters and is using micafungin for treatment of this infection.  Dr. Campos performed procedure in radiology without complications or problems.  He has suggested that patient have a repeat CT scan of the area prior to discharge to home.        Review of Systems:               Review of systems could not be obtained due to  patient confusion. patient nonverbal.       Past Medical History:   Diagnosis Date    Anemia     intermittently    Anxiety and depression     Arthritis     Depression     Diabetes mellitus     Gallstones     High cholesterol     History of frequent urinary tract infections     HX OF YEAST IN BLADDER HAS PRN DIFLUCAN    History of transfusion 05/24/2017    Had 2 iron infusions.  This was when i had knee replacement    Hyperlipidemia     Hypertension     Hypothyroidism     Knee pain, bilateral     Low back pain     Lumbar stenosis     PONV (postoperative nausea and vomiting)     Positive TB test     chest x-ray neg    Seasonal allergies      Past Surgical History:   Procedure Laterality Date    APPENDECTOMY N/A 1982    Dr. Wilson    CHOLECYSTECTOMY WITH INTRAOPERATIVE CHOLANGIOGRAM N/A 10/31/2018    Procedure: laparoscopic cholecystectomy;  Surgeon: Mary Kay Mac MD;  Location: [facility];  Service: General    COLONOSCOPY      CYSTOSCOPY BLADDER BIOPSY N/A 10/3/2016    Procedure: CYSTOSCOPY BLADDER BIOPSY;   Surgeon: Rei Calvert MD;  Location: [facility] OR;  Service:     CYSTOSCOPY W/ URETERAL STENT PLACEMENT Bilateral 1/3/2024    Procedure: CYSTOSCOPY BILATERAL RETROGRADE PYELOGRAM  BILATERAL URETERAL STENT INSERTION AND CATHETHER PLACEMENT;  Surgeon: Eduard Armando MD;  Location: [facility] OR;  Service: Urology;  Laterality: Bilateral;    DILATATION AND CURETTAGE N/A     ENDOSCOPY      INTRAUTERINE DEVICE INSERTION      KNEE ARTHROSCOPY W/ MENISCAL REPAIR Left     OVARIAN CYST REMOVAL Left     Dr. Wilson    TOTAL KNEE ARTHROPLASTY Right 2017    Procedure: RIGHT TOTAL KNEE ARTHROPLASTY WITH ALETA NAVIGATION;  Surgeon: Ross Cruz MD;  Location: [facility] OR;  Service:      Allergies   Allergen Reactions    Sulfa Antibiotics Hives and Rash       Family History   Problem Relation Age of Onset    Hepatitis Mother     Breast cancer Mother     Heart disease Mother     Cancer Mother     Hyperlipidemia Mother              Heart failure Father     Stroke Father     Hypertension Father         Since he was 72, now 86s    Diabetes Father     Heart disease Father     Hyperlipidemia Father         Unsurs    Heart disease Maternal Grandmother     Cancer Maternal Grandfather     COPD Maternal Grandfather     Arthritis Paternal Grandmother     Diabetes Maternal Aunt     Diabetes Paternal Uncle     Malig Hyperthermia Neg Hx        Social History     Socioeconomic History    Marital status:    Tobacco Use    Smoking status: Never    Smokeless tobacco: Never    Tobacco comments:     Never smoked   Vaping Use    Vaping Use: Never used   Substance and Sexual Activity    Alcohol use: Never     Alcohol/week: 3.0 standard drinks of alcohol     Types: 1 Glasses of wine, 2 Standard drinks or equivalent per week    Drug use: Never    Sexual activity: Not Currently     Partners: Male     Birth control/protection: I.U.D.       PMH, FH, SH and ROS completed with Admission History and Physical  "and updated in EPIC system.        Objective     Scheduled Meds:enoxaparin, 40 mg, Subcutaneous, Q12H  escitalopram, 10 mg, Oral, Daily  insulin glargine, 16 Units, Subcutaneous, Nightly  insulin lispro, 2-9 Units, Subcutaneous, 4x Daily AC & at Bedtime  levothyroxine, 137 mcg, Oral, Q AM  micafungin (MYCAMINE) IV, 100 mg, Intravenous, Q24H  oxybutynin XL, 10 mg, Oral, Daily  pantoprazole, 40 mg, Oral, Q AM  potassium phosphate, 15 mmol, Intravenous, Once  rOPINIRole, 0.5 mg, Oral, Nightly  senna-docusate sodium, 2 tablet, Oral, BID  vancomycin, 500 mg, Intravenous, Q8H      Continuous Infusions:lactated ringers, 9 mL/hr, Last Rate: 9 mL/hr (01/03/24 1527)  Pharmacy to dose vancomycin,         Vital signs in last 24 hours:  Temp:  [97.6 °F (36.4 °C)-98.4 °F (36.9 °C)] 98.4 °F (36.9 °C)  Heart Rate:  [80-93] 86  Resp:  [12-22] 21  BP: (118-129)/(58-83) 119/69    Intake/Output:    Intake/Output Summary (Last 24 hours) at 1/10/2024 1934  Last data filed at 1/10/2024 1844  Gross per 24 hour   Intake 440 ml   Output 860 ml   Net -420 ml         Exam:  /69 (BP Location: Right arm, Patient Position: Lying)   Pulse 86   Temp 98.4 °F (36.9 °C) (Oral)   Resp 21   Ht 165.1 cm (65\")   Wt 114 kg (251 lb 3.2 oz)   LMP 09/03/2016 Comment: IUD  SpO2 96%   BMI 41.80 kg/m²     Constitutional:  Patient up in chair.  Much more alert.  Normal personality.  Pain significantly improved and almost gone.  Redness over right lower quadrant also significantly improved.   Eyes:     PERRLA, conjunctiva/corneas clear, no icterus, no conjunctival                                     pallor, EOM's intact, both eyes      ENT and Mouth: Lips, tongue, gums normal; oral mucosa pink and moist   Neck:     Supple, symmetrical, trachea midline, no JVD  Respiratory:     Clear to auscultation bilaterally, respirations unlabored  Cardiovascular:  Regular rate and rhythm, S1 and S2 normal, no murmur,      no  Rub or gallop.  Pulses normal.  "   Gastrointestinal:   BS present x 4 Soft, non-tender, bowel sounds active,      no masses, no hepatosplenomegaly    right lower quadrant pain almost totally resolved with no rebound guarding or other abnormalities.  Overlying skin does appear erythematous                                                 :       No hernia.  Normal exam for sex.         Musculoskeletal: Extremities normal, atraumatic, no cyanosis or edema     No arthropathy.  No deformity.  Gait normal                                                 Skin:   Skin is warm and dry,  no rashes, swelling or palpable lesions.  Erythema resolved over right lower quadrant area pain   Neurologic:  CN -XII intact, motor strength grossly intact, sensation grossly intact to light touch, no focal reflex deficits noted    Psychiatric:     Alert,oriented X3, no delusions, psychoses, depression or anxiety    Heme/Lymph/Imun:   No bruises, petechiae.  Lymph nodes normal in size/configuration       Data Review:  Lab Results   Component Value Date    CALCIUM 8.3 (L) 01/10/2024    PHOS 3.7 01/07/2024     Results from last 7 days   Lab Units 01/10/24  0639 01/09/24  1812 01/09/24  0645 01/08/24  0609 01/07/24  0645 01/06/24  0653 01/05/24  0232   AST (SGOT) U/L 16  --  16 17 16 14 34*   ALT (SGPT) U/L 7  --  9 7 8 13 19   MAGNESIUM mg/dL  --   --   --   --  1.7 1.7 1.7   SODIUM mmol/L 132*  --  133* 131* 129* 131* 132*   POTASSIUM mmol/L 4.6 4.4 3.6 3.7 4.8 3.8 4.1   CHLORIDE mmol/L 95*  --  95* 94* 94* 98 101   CO2 mmol/L 25.6  --  27.5 26.0 20.6* 22.3 22.5   BUN mg/dL 3*  --  3* 4* 6* 7* 8   CREATININE mg/dL 0.70  --  0.71 0.64 0.62 0.61 0.85   GLUCOSE mg/dL 163*  --  160* 169* 157* 171* 159*   CALCIUM mg/dL 8.3*  --  8.6 8.6 8.9 8.6 8.9   WBC 10*3/mm3 12.93*  --  14.58* 16.61* 13.75* 10.77  --    HEMOGLOBIN g/dL 10.3*  --  10.1* 9.3* 11.5* 10.1*  --    PLATELETS 10*3/mm3 392  --  439 378 371 384  --      Lab Results   Component Value Date    TROPONINT <6 01/02/2024      Estimated Creatinine Clearance: 106.3 mL/min (by C-G formula based on SCr of 0.7 mg/dL).  WEIGHTS:     Wt Readings from Last 1 Encounters:   01/09/24 0653 114 kg (251 lb 3.2 oz)   01/08/24 0700 115 kg (254 lb 3.1 oz)   01/07/24 0510 120 kg (263 lb 10.7 oz)   01/07/24 0349 120 kg (263 lb 10.7 oz)   01/06/24 0509 122 kg (269 lb 6.4 oz)   01/05/24 0556 111 kg (244 lb 11.4 oz)   01/04/24 0600 111 kg (245 lb 6 oz)   01/03/24 0600 111 kg (245 lb 6 oz)   01/02/24 1131 120 kg (265 lb)         Assessment:    DKA (diabetic ketoacidosis)    UTI (urinary tract infection)    Morbid obesity with BMI of 40.0-44.9, adult    JADEN (acute kidney injury)    Sepsis, unspecified organism    Lactic acidosis    UPJ (ureteropelvic junction) obstruction bilateral    Bilateral hydronephrosis moderate on admission    Renal calculus on right, nonobstructinbg    Vitamin D deficiency    Essential hypertension    Hyperlipidemia    Hypothyroidism    Allergic rhinitis    Back pain    Chronic liver disease    Primary osteoarthritis of right knee    Contact dermatitis    Anxiety    Dense breast tissue on mammogram    Bilateral hydrocele      Attending Physician Assessment and Plan:    1.  Cystitis/UTI with sepsis associated with bilateral UPJ obstruction and moderate bilateral hydronephrosis and possible right calyceal rupture.  Patient resuscitated aggressively with IV fluids.  Broad-spectrum antibiotics, Zosyn, started.  Urology consult is placed.  Patient being followed carefully in ICU for additional complications or signs of worsening sepsis.  Culture of urine remains negative on day #2.  New cultures collected at time of cystoscopy today by urology.  Patient currently off Zosyn which was stopped after surgery yesterday.  We will repeat blood cultures since we did have 1 of 2 initial blood cultures positive.  ID has been consulted and is following with us.  They have started the patient on fluconazole secondary to the instrumentation yesterday  and yeast that is seen in hearing.  I do plan to check a procalcitonin level with a.m. labs tomorrow.  Patient also started on vancomycin due to 2 of 2 positive cultures from blood.  Additional blood cultures pending at present time.  ID continues to follow on 1/9/2024.  Vancomycin and micafungin are continued for treatment of ongoing infections.  Antibiotics unchanged at this point.  Hoping culture from fluid collection obtained in IR today will be useful in guiding antibiotic therapy.  ID continues to follow.     2.  Anion gap metabolic acidosis versus possible diabetic ketoacidosis.  Improving rapidly at present time on DKA protocol with Glucomander.  Aggressive fluid resuscitation initiated in the ER is continued in the ICU.  Patient now with good urinary output and hopefully will be able to come off of IV insulin and switch to subcu dosing overnight..  Anion gap has narrowed and improved.  Patient should come off Glucomander DKA protocol sometime later today.  On postop day #1 anion gap has significantly improved.  We are asking renal to help us with follow-up on her acute kidney injury and volume status.  Acidosis has resolved.  Acidosis has resolved.  Diabetes now under control with Lantus 16 units daily and sliding scale insulin.     3.  Lactic acidosis possibly due to home use of metformin while taking no food or possibly due to acute sepsis.  Continuing rapid fluid resuscitation with improvement already noted in lactic acid.  Monitoring carefully for signs of any new changes or problems.  Lactic acidosis has also resolved on day #2 and patient's sepsis seems to be under much more stable condition at present time.  Repeat lactic acid in a.m. with other labs.  Lactic acidosis has resolved.    4.  Type 2 diabetes mellitus poorly controlled at present time with elevated A1c greater than 16.  Will probably need to arrange for follow-up with patient in endocrinology clinic.  For now we will ask diabetic educator  to see patient and work on sliding scale insulin protocols.  Patient and her daughter are interested in Dexcom monitoring and I will see if that is available from the diabetic educator.  Discussed situation with the daughters and she may benefit from continuous 24-hour glucose monitoring issues willing to learn the process.  Will probably need to discharge charge on sliding scale insulin and may also consider mealtime insulin or long acting insulin.  Diabetic educator to see patient and work with us on diet, glucose testing techniques, and generalized diabetes management.  Patient doing well on sliding scale with single dose of Lantus at nighttime.  Now the patient is eating more plan to increase Lantus to 16 units subcu daily.  We will plan on rechecking hemoglobin A1c in 6 to 8 weeks.  Diabetes much improved.  Stable on Lantus and sliding scale insulin     5.  Pseudohyponatremia.  Seems to already be improving with resuscitation and resolution of the uncontrolled glucoses.  Will continue to follow electrolytes regularly.  Hyponatremia now corrected and in normal range.  Hyponatremia has now resolved on day #2. Sodium now normalized.     6.  Hypothyroidism.  Patient's Synthroid is continued by the ICU attending.  Will continue to follow this in the hospital and on an outpatient basis.  Hypothyroidism is a stable condition     7.  Hyperlipidemia.  Agree with holding statin for now.  Will resume prior to discharge and continue on outpatient basis.     8.  Obesity.  Hope to continue using agents such as Ozempic for management of patient's blood sugar levels.  This will also help with diet control and ongoing need for gradual weight loss.     9.  Acute kidney injury secondary to volume depletion with uncontrolled glucoses.  Should improve as patient's volume status normalizes.  Will continue to monitor closely.  Acute kidney injury is gradually improving with resolution of hydronephrosis and bilateral stent placement.   Acute kidney injury totally resolved.     10.  Generalized muscle weakness and fatigue.  Suspect related to the poorly controlled diabetes and electrolyte disturbances.  Will do PT and OT evaluations after patient stabilizes.  Suspect patient will discharge to home with PT and OT and home environment.  Hopefully patient will be able to work with PT and OT in the next few days.    11.  Right lower quadrant pain with fluid collections noticed on CT scan completed today 6 days after admission.  Patient complaining of significant pain with erythematous skin over the right lower quadrant.  I will ask general surgery to take a look at the situation in the a.m.  We will also notify urology of the findings in the a.m.        Plan for disposition:Where: home and home health and When: 2-3 days when medically stable     Copied text in this note has been reviewed by me and is accurate as of 01/10/2024.  Much of this dictation was completed using Dragon voice activated transcription software which can result in misspelled words and nonsensical phrases at times.     Dr. Fairchild available and can be reached at [phone]      Dean Fairchild MD  1/10/2024  2034 EST

## 2024-01-11 NOTE — THERAPY DISCHARGE NOTE
Patient Name: Tiana Hudson  : 1960    MRN: [id number]                              Today's Date: 2024       Admit Date: 2024    Visit Dx:     ICD-10-CM ICD-9-CM   1. Sepsis, due to unspecified organism, unspecified whether acute organ dysfunction present  A41.9 038.9     995.91   2. Acute cystitis without hematuria  N30.00 595.0   3. UPJ (ureteropelvic junction) obstruction  N13.5 593.4     Patient Active Problem List   Diagnosis    Primary osteoarthritis of right knee    UTI (urinary tract infection)    Varicose veins    Contact dermatitis    Elevated liver function tests    Leg cramp    Medial collateral ligament sprain of knee    Vitamin D deficiency    Essential hypertension    Hyperlipidemia    Abrasion    Hypothyroidism    Gestational diabetes    Morbid obesity with BMI of 40.0-44.9, adult    Allergic rhinitis    Back pain    Anxiety    H/O Postconcussion syndrome    H/O dizziness    Allergy    Vaginal candidiasis    Chronic pain of left knee    Arthritis of both knees    Inflammation of bladder severe, acute and chronic, with mucosal erosions/ulcers    Dense breast tissue on mammogram    Dizziness    Restless leg syndrome    Type 2 diabetes mellitus without complication    Arthritis of right knee    Status post total right knee replacement    Arthritis of left knee    History of total knee arthroplasty    Acquired spondylolisthesis    Screening for colorectal cancer    Precordial pain    FH ischemic heart disease    DKA (diabetic ketoacidosis)    JADEN (acute kidney injury)    Sepsis, unspecified organism    Lactic acidosis    UPJ (ureteropelvic junction) obstruction bilateral    Bilateral hydrocele    Bilateral hydronephrosis moderate on admission    Renal calculus on right, nonobstructinbg    Chronic liver disease     Past Medical History:   Diagnosis Date    Anemia     intermittently    Anxiety and depression     Arthritis     Depression     Diabetes mellitus     Gallstones     High  cholesterol     History of frequent urinary tract infections     HX OF YEAST IN BLADDER HAS PRN DIFLUCAN    History of transfusion 05/24/2017    Had 2 iron infusions.  This was when i had knee replacement    Hyperlipidemia     Hypertension     Hypothyroidism     Knee pain, bilateral     Low back pain     Lumbar stenosis     PONV (postoperative nausea and vomiting)     Positive TB test     chest x-ray neg    Seasonal allergies      Past Surgical History:   Procedure Laterality Date    APPENDECTOMY N/A 1982    Dr. Wilson    CHOLECYSTECTOMY WITH INTRAOPERATIVE CHOLANGIOGRAM N/A 10/31/2018    Procedure: laparoscopic cholecystectomy;  Surgeon: Mary Kay Mac MD;  Location: [facility] OR;  Service: General    COLONOSCOPY      CYSTOSCOPY BLADDER BIOPSY N/A 10/3/2016    Procedure: CYSTOSCOPY BLADDER BIOPSY;  Surgeon: Rei Calvert MD;  Location: [facility] OR;  Service:     CYSTOSCOPY W/ URETERAL STENT PLACEMENT Bilateral 1/3/2024    Procedure: CYSTOSCOPY BILATERAL RETROGRADE PYELOGRAM  BILATERAL URETERAL STENT INSERTION AND CATHETHER PLACEMENT;  Surgeon: Eduard Armando MD;  Location: [facility];  Service: Urology;  Laterality: Bilateral;    DILATATION AND CURETTAGE N/A     ENDOSCOPY      INTRAUTERINE DEVICE INSERTION      KNEE ARTHROSCOPY W/ MENISCAL REPAIR Left     OVARIAN CYST REMOVAL Left 1982    Dr. Wilson    TOTAL KNEE ARTHROPLASTY Right 5/22/2017    Procedure: RIGHT TOTAL KNEE ARTHROPLASTY WITH ALETA NAVIGATION;  Surgeon: Ross Cruz MD;  Location: [facility];  Service:       General Information       Row Name 01/11/24 1509          OT Time and Intention    Document Type therapy note (daily note);discharge treatment  -MW     Mode of Treatment occupational therapy;individual therapy  -       Row Name 01/11/24 150          General Information    Patient Profile Reviewed yes  -MW     Existing Precautions/Restrictions fall  RLQ drain  -       Row Name 01/11/24 1507          Cognition     Orientation Status (Cognition) oriented x 4  -       Row Name 01/11/24 1509          Safety Issues, Functional Mobility    Impairments Affecting Function (Mobility) endurance/activity tolerance  -               User Key  (r) = Recorded By, (t) = Taken By, (c) = Cosigned By      Initials Name Provider Type    Anu Bourgeois OT Occupational Therapist                     Mobility/ADL's       Row Name 01/11/24 1509          Bed Mobility    Comment, (Bed Mobility) NT UIC  -       Row Name 01/11/24 1509          Transfers    Transfers sit-stand transfer  -       Row Name 01/11/24 1509          Sit-Stand Transfer    Sit-Stand [place] (Transfers) supervision  -     Comment, (Sit-Stand Transfer) no AD, x2 STS completed  -       Row Name 01/11/24 1509          Stand-Sit Transfer    Stand-Sit [place] (Transfers) supervision  -       Row Name 01/11/24 1509          Toilet Transfer    Comment, (Toilet Transfer) pt reports she has been getting up to [facility] ([place])  -[facility] Name 01/11/24 1509          Activities of Daily Living    BADL Assessment/Intervention bathing  -       Row Name 01/11/24 1509          Bathing Assessment/Intervention    Comment, (Bathing) daughter assist with bathing and was finishing on entry  -               User Key  (r) = Recorded By, (t) = Taken By, (c) = Cosigned By      Initials Name Provider Type    Anu Bourgeois OT Occupational Therapist                   Obj/Interventions       Row Name 01/11/24 1511          Shoulder (Therapeutic Exercise)    Shoulder (Therapeutic Exercise) AROM (active range of motion)  -     Shoulder AROM (Therapeutic Exercise) bilateral;flexion;extension;horizontal aBduction/aDduction;15 repititions;2 sets;sitting  -       Row Name 01/11/24 1511          Elbow/Forearm (Therapeutic Exercise)    Elbow/Forearm (Therapeutic Exercise) AROM (active range of motion)  -     Elbow/Forearm AROM (Therapeutic Exercise)  bilateral;flexion;extension;15 repititions;2 sets;sitting  -       Row Name 01/11/24 1511          Motor Skills    Therapeutic Exercise shoulder;elbow/forearm  -       Row Name 01/11/24 1511          Balance    Balance Assessment sitting static balance;sitting dynamic balance;sit to stand dynamic balance;standing static balance  -MW     Static Sitting Balance modified independence  -MW     Dynamic Sitting Balance modified independence  -MW     Position, Sitting Balance sitting in chair  -     Sit to Stand Dynamic Balance supervision  -     Static Standing Balance supervision  -     Dynamic Standing Balance not tested  -     Position/Device Used, Standing Balance unsupported  -               User Key  (r) = Recorded By, (t) = Taken By, (c) = Cosigned By      Initials Name Provider Type    Anu Bourgeois OT Occupational Therapist                   Goals/Plan    No documentation.                  Clinical Impression       Row Name 01/11/24 1513 01/11/24 1511       Pain Assessment    Pretreatment Pain Rating 2/10  -MW --    Posttreatment Pain Rating 2/10  -MW --    Pain Location - Side/Orientation Right  - Right  -MW    Pain Location -- lower  -MW    Pain Location - abdomen  - abdomen  -      Row Name 01/11/24 1513          Plan of Care Review    Plan of Care Reviewed With patient;daughter  -     Progress improving  -     Outcome Evaluation Pt seen for OT, noted improvements with ADLs, pt reports she has been getting to [facility] ([facility]) from chair. She reports overall feeling better. She engaged in UE ther ex and STS activity this date with SPV and able to stand more upright. she has no concerns for ADLs at home, daughter and spouse can assist as needed. no further acute OT needs, family agreeable. will sign off.  -       Row Name 01/11/24 1513 01/11/24 1511       Therapy Plan Review/Discharge Plan (OT)    Anticipated Discharge Disposition (OT) home;home with assist  -MW home;home with assist   -MW      Row Name 01/11/24 1513 01/11/24 1511       Vital Signs    O2 Delivery Pre Treatment room air  -MW room air  Simultaneous filing. User may be unaware of other data.  -MW      Row Name 01/11/24 1513 01/11/24 1511       Positioning and Restraints    Pre-Treatment Position sitting in chair/recliner  -MW sitting in chair/recliner  Simultaneous filing. User may be unaware of other data.  -MW    Post Treatment Position chair  -MW chair  Simultaneous filing. User may be unaware of other data.  -MW    In Chair notified nsg;reclined;call light within reach;encouraged to call for assist;exit alarm on  -MW reclined;notified nsg;call light within reach;encouraged to call for assist;with family/caregiver  -MW              User Key  (r) = Recorded By, (t) = Taken By, (c) = Cosigned By      Initials Name Provider Type    Anu Bourgeois, OT Occupational Therapist                   Outcome Measures       Row Name 01/11/24 1516          How much help from another is currently needed...    Putting on and taking off regular lower body clothing? 4  -MW     Bathing (including washing, rinsing, and drying) 4  -MW     Toileting (which includes using toilet bed pan or urinal) 4  -MW     Putting on and taking off regular upper body clothing 4  -MW     Taking care of personal grooming (such as brushing teeth) 4  -MW     Eating meals 4  -MW     AM-PAC 6 Clicks Score (OT) 24  -MW       Row Name 01/11/24 1515 01/11/24 0800       How much help from another person do you currently need...    Turning from your back to your side while in flat bed without using bedrails? 4  -MS 4  -BR    Moving from lying on back to sitting on the side of a flat bed without bedrails? 4  -MS 4  -BR    Moving to and from a bed to a chair (including a wheelchair)? 4  -MS 4  -BR    Standing up from a chair using your arms (e.g., wheelchair, bedside chair)? 4  -MS 4  -BR    Climbing 3-5 steps with a railing? 3  -MS 3  -BR    To walk in hospital room? 3  -MS  3  -BR    AM-PAC 6 Clicks Score (PT) 22  -MS 22  -BR    Highest Level of Mobility Goal 7 --> Walk 25 feet or more  -MS 7 --> Walk 25 feet or more  -BR      Row Name 01/11/24 1516          Functional Assessment    Outcome Measure Options AM-PAC 6 Clicks Daily Activity (OT)  -               User Key  (r) = Recorded By, (t) = Taken By, (c) = Cosigned By      Initials Name Provider Type    Sallie Bartholomew, PT Physical Therapist    Vero Mayorga, RN Registered Nurse    Anu Bourgeois OT Occupational Therapist                    Occupational Therapy Education       Title: PT OT SLP Therapies (In Progress)       Topic: Occupational Therapy (In Progress)       Point: ADL training (Done)       Description:   Instruct learner(s) on proper safety adaptation and remediation techniques during self care or transfers.   Instruct in proper use of assistive devices.                  Learning Progress Summary             Patient Acceptance, E, VU by MW at 1/5/2024 1254    Comment: role of OT, d/c rec, GOC   Family Acceptance, E, VU by  at 1/5/2024 1254    Comment: role of OT, d/c rec, GOC                         Point: Home exercise program (Not Started)       Description:   Instruct learner(s) on appropriate technique for monitoring, assisting and/or progressing therapeutic exercises/activities.                  Learner Progress:  Not documented in this visit.              Point: Precautions (Done)       Description:   Instruct learner(s) on prescribed precautions during self-care and functional transfers.                  Learning Progress Summary             Patient Acceptance, E, VU by MW at 1/5/2024 1254    Comment: role of OT, d/c rec, GOC   Family Acceptance, E, VU by  at 1/5/2024 1254    Comment: role of OT, d/c rec, GOC                         Point: Body mechanics (Done)       Description:   Instruct learner(s) on proper positioning and spine alignment during self-care, functional mobility activities  and/or exercises.                  Learning Progress Summary             Patient Acceptance, E, VU by  at 1/5/2024 1254    Comment: role of OT, d/c rec, GOC   Family Acceptance, E, VU by  at 1/5/2024 1254    Comment: role of OT, d/c rec, GOC                                         User Key       Initials Effective Dates Name Provider Type Discipline     08/20/21 -  Anu Villafana, OT Occupational Therapist OT                  OT Recommendation and Plan  Planned Therapy Interventions (OT): activity tolerance training, neuromuscular control/coordination retraining, patient/caregiver education/training, transfer/mobility retraining, strengthening exercise, ROM/therapeutic exercise, occupation/activity based interventions, functional balance retraining  Therapy Frequency (OT): 5 times/wk  Plan of Care Review  Plan of Care Reviewed With: patient, daughter  Progress: improving  Outcome Evaluation: Pt seen for OT, noted improvements with ADLs, pt reports she has been getting to [facility] (I) from chair. She reports overall feeling better. She engaged in UE ther ex and STS activity this date with [facility] and able to stand more upright. she has no concerns for ADLs at home, daughter and spouse can assist as needed. no further acute OT needs, family agreeable. will sign off.     Time Calculation:   Evaluation Complexity (OT)  Review Occupational Profile/Medical/Therapy History Complexity: expanded/moderate complexity  Assessment, Occupational Performance/Identification of Deficit Complexity: 3-5 performance deficits  Clinical Decision Making Complexity (OT): detailed assessment/moderate complexity  Overall Complexity of Evaluation (OT): moderate complexity     Time Calculation- OT       Row Name 01/11/24 1516             Time Calculation- OT    OT Start Time 1403  -MW      OT Stop Time 1418  -MW      OT Time Calculation (min) 15 min  -MW      Total Timed Code Minutes- OT 15 minute(s)  -MW      OT Received On 01/11/24  -          Timed Charges    96338 - OT Therapeutic Activity Minutes 15  -MW         Total Minutes    Timed Charges Total Minutes 15  -MW       Total Minutes 15  -MW                User Key  (r) = Recorded By, (t) = Taken By, (c) = Cosigned By      Initials Name Provider Type    Anu Bourgeois OT Occupational Therapist                  Therapy Charges for Today       Code Description Service Date Service Provider Modifiers Qty    22292796902 HC OT THERAPEUTIC ACT EA 15 MIN 1/11/2024 Anu Villafana OT GO 1                 Anu Villafana OT  1/11/2024

## 2024-01-11 NOTE — PROGRESS NOTES
NATHALIA CAMPUZANO [facility] [place] [facility]  INTERNAL MEDICINE  DEAN FAIRCHILD MD  [address] [place] [address]  Phone [phone] Fax [phone]  E-mail:  [email or web address]      INTERNAL MEDICINE DAILY PROGRESS NOTE  Dean Fairchild M.D.  2024            Patient Identification:  Name: Tiana Hudson  Age: 63 y.o.  Sex: female  :  1960  MRN: [id number]         Primary Care Physician: Dean Fairchild MD  LENGTH OF STAY 10 DAYS    Consults       Date and Time Order Name Status Description    2024  3:42 AM Inpatient General Surgery Consult Completed     2024  5:10 AM Inpatient Nephrology Consult Completed     2024  2:00 PM Inpatient Infectious Diseases Consult Completed     1/3/2024 10:27 AM Inpatient Urology Consult      2024 11:55 PM Urology (on-call MD unless specified) Completed     2024  3:45 PM IP General Consult (Use specialty-specific consult if known) Completed             Chief Complaint: Abdominal/flank pain with DKA, acute cystitis and sepsis, and possible calyceal rupture in the kidney     History of Present Illness:     Subjective   Interval History: Patient is a 63 y.o.female who presented at my request to the emergency room at [facility] with complaint of acute abdominal/flank pain and history of recurrent kidney stones.  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  Patient has been followed for the last few months by Dr. Rei Calvert in urology for renal calculi and actually has had a stent placed in the right kidney due to obstruction from her renal stones.  It is also noted that the patient had a recent lithotripsy.  Patient began to feel poorly after the recent Shrey[place]  holiday and became more severely ill over the last 3 to 4 days prior to this admission.  She has felt extremely fatigued, dizzy at times, tired, nauseated, and has spent most of her time in bed sleeping.  Family has had difficulty getting the patient to take any oral intake and became quite concerned as her condition continued to worsen.  Patient was attempting to go to work today but really was too dizzy to get in the car to drive and 2-week to actually work.  After urging from her daughter, patient called me with respect to the symptoms and at my request went to the ER for evaluation.  Patient has multiple medical comorbidities that complicate her medical care.These include most significantly recurrent urinary tract infections due to her nephrolithiasis, morbid obesity with BMI greater than 40, diabetes mellitus type 2 poorly controlled with recent addition of Ozempic to her metformin therapy, vitamin D deficiency, essential hypertension, hyperlipidemia, hypothyroidism, allergic rhinitis, back pain, contact dermatitis, anxiety, and history of dense breast tissue on mammograms.     Patient was evaluated in the emergency room by Angelito Winkler MD who was the ER attending on call time of her arrival.  Patient was seen on 1/2/2024 at 1544 by Dr. Winkler.  His HPI was consistent with the story which I given above.  Patient denied dysuria, fever, chills on his evaluation.  She did admit to having emesis and actually had some emesis while in the emergency room.  Review of systems in the emergency room was positive for diffuse abdominal pain, and vomiting.  Patient also was noted to have some significant flank pain.  All other systems reviewed were negative in the emergency room vital signs on arrival in the emergency room showed temperature of 96 °F, heart rate of 114, respiratory rate of 16, blood pressure 125/72, and O2 sat of 98%.  Patient was described as ill-appearing but in no acute distress.  She did have severe  tenderness to palpation more on the right side of the abdomen and out into the right flank area with voluntary guarding.  Labs collected in the ER showed that her lactate level was elevated at 6.0.  She had a lipase of 23, her white blood cell count was 20.12, her hemoglobin was 11.2, and her platelet count was 568,000.  Patient did have a phosphorus of 4.2, magnesium 2.0, osmolality of 317, hemoglobin A1c is 16.90, and UA showed specific gravity 1.027, 3+ glucose, moderate blood 2+, leukocyte esterase moderate 2+, nitrates negative, white blood cells 6-10, red blood cells 6-10, bacteria 2+.  Her CMP showed a glucose of 978, creatinine of 1.82, sodium of 115, potassium of 4.2, chloride of 76, CO2 of 15.7, albumin 2.4, AST 33, alk phos 220, anion gap of 23, EGFR of 31.3.  Patient did have a CT scan of her abdomen and pelvis completed which showed loculated fluid throughout the right perinephric space felt to possibly be secondary to right calyceal rupture, new mild to moderate wall thickening of the right renal pelvis and renal calyces, bilateral UPJ obstruction with moderate bilateral hydronephrosis on the left, mild new dilation of right ureter, nonobstructing right renal calculi, hepatic morphology suggestive of chronic liver disease, and reactive lymph nodes in the retrocaval area.  A chest x-ray was done which showed no acute disease other than minimal atelectasis at the base of the right lung.  Patient underwent aggressive fluid resuscitation in the emergency room and received over 3 L of IV fluid.  She was placed on an insulin drip and was also started on Zosyn therapy.  She was followed on sepsis protocol as well as DKA protocol.  Patient did receive morphine 4 mg for pain x 2.  Case was discussed with myself as her primary care attending.  Urology was also contacted about situation.  I did suggest that patient be admitted to the ICU for treatment of the DKA and severe sepsis picture.  Dr. Arthur Tena did  agree to the ICU admission and will be following her in the CCU for pulmonary/critical care.  Final diagnoses in the ER were sepsis due to unspecified organism and acute cystitis without significant hematuria.     1/2/2024.  I personally saw the patient for the first time during this hospitalization on this date in the coronary care unit room #335.  Patient was resting quietly in bed and did a peer acutely ill.  She did wake to her name and spoke a few words before falling back asleep.  Patient's 2 daughters were at the foot of her bed and did discuss the case at length with me.  Daughter Enoch, is a former nurse from here at [employer], and now works at [employer] with the hospitalist group there.  Patient has responded well to the Glucommander DKA protocols and blood sugars have gradually come down to near normal levels for the patient in the 1  range.  I will full PPE for the exam including an N95 face mask, goggles, white lab coat, and gloves when touching patient.  I performed thorough hand hygiene before and after the patient visit.  Patient did have a venous blood gas which showed pH 7.40, pCO2 28, pO2 of 31, and O2 sat of 62%.  She also had recent electrolytes which showed a sodium of 131, potassium 3.4, chloride 96, CO2 19.1, BUN 17, creatinine 1.15, estimated GFR now up to 54.3.  Lactate level has been coming steadily down and currently at 2.8.  Additional labs ordered for tomorrow a.m.  Patient has been able to urinate several times and daughters have helped her up to the bathroom for this.  I actually note that she did have some urinary frequency and incontinence at home which is unusual for her.  I did speak briefly to the patient's daytime nurse who was departing soon after I arrived on the floor.  I have reviewed Dr. Arthur Tena's notes and his admission history and physical.  I do agree completely with his plan of care at present time.  Supportive care is continued to be offered for the sepsis  with careful monitoring in the ICU.  Patient's anion gap metabolic acidosis probably is related to lactic acidosis and she was continuing to take metformin even when she was not able to eat.  IV fluids are continued aggressively.  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  Probably will plan sliding scale insulin for stabilization of diabetes while here in the hospital and will train patient to continue that in the home environment.  Pseudohyponatremia will be corrected with the IV fluids.  Hypothyroidism will be addressed with continued Synthroid.  Statins are held for now.  Obesity is an ongoing problem for the patient.  At the time my exam, patient is medically stable and slowly improving.  I did relay this to the daughter to agree with my assessment.  We will continue to follow the patient with you and we will be happy to assume care of the patient when she is stable and ready to be transferred out of the ICU.  I can be reached directly for any concerns or questions at [phone].    1/3/2024.  Patient is seen again today in her room in the CCU #335.  Patient was resting quietly in bed at the time of my visit and states that her daughters were downstairs getting a bite to eat.  I did discuss the case with the patient's nurse, Tiana, who tells me that she is being preop and should be going down soon for cystoscopy and stent placement.  I will full PPE for the exam including an N95 face mask, goggles, white lab coat, and gloves when touching patient.  I performed thorough hand hygiene before and after the patient visit.  Patient is more alert today but still somewhat confused about details of her medical case.  She is able to carry on a conversation with me though and joke a bit with me while I am present in the room.  She does understand she has issues because of the obstruction bilaterally of her ureters that has resulted  in hydronephrosis and hopefully can be relieved by the cystoscopy that is planned for later this morning.  Patient is still on an insulin drip but is drastically improved compared to where she was yesterday at this time.  Review of labs shows that her sodium is now up to 133, her CO2 is 19.1, glucose is now at 134, phosphorus is at 2.1, lactate is normalized at 1.9, white blood cell count today is 18.93, hemoglobin is 9.6 today, lakelets are normal today,Blood culture from the first day of hospitalization shows anaerobic bottle gram-positive bacilli growth with identification still ongoing.  Fungal and body fluid cultures are still negative.  Urine culture remains negative.  Dr. Oquendo was following the patient for critical care today.  He has continued her antibiotics and is awaiting urology input.  Fluid resuscitation is continued.  Blood pressure now seems well-controlled.  DVT prophylaxis in place.  He had a lengthy discussion with patient's family at the time of his rounds.  Urology has seen the patient in consultation and is planning to do cystoscopy later today.  Dietitian is following the patient's case and recommending input treatment as needed.Dr. Armando did perform a cystoscopy with ureteral catheterization and stent insertion bilaterally.  His pre-op diagnosis and postop diagnoses were urosepsis with bilateral UPJ obstruction.  He did feel that he had successfully decompressed the blockages and the hydronephrotic changes.  He did speak with patient's daughter postoperatively.  Patient does seem to be much improved today.  Vital signs still show no fever.  Blood pressure is relatively stable.  Will continue to follow with you.    1/4/2024.  Patient is seen again today in her room in the CCU #335.  Patient has transitions of critical care and is in overflow for care on the floor.  Patient's daughter is present at bedside at the time of my visit.  Patient is much more awake and alert today.  She is more  interactive and talkative.  I did discuss the case with the patient's nurse.  We are going to obtain an ID consult today for help with antibiotic management and we also are going to transition the patient to regular sliding scale insulin as we begin the teaching process of getting patient on her own regimen of sliding scale insulin at the time of discharge to home.  We have also started patient on Lantus therapy at 10 units for now and may need to titrate up.  I wore full PPE for the exam today including an N95 face mask, goggles, white lab coat, and gloves when touching patient.  I performed thorough hand hygiene before and after the patient visit.  Specialist have seen patient today and their care is summarized below.Dr. Najera, the critical care attending today, felt that her mentation was slow but answering questions appropriately.  No other major findings were discovered at present time.  Antibiotics were continued for sepsis was felt to be significantly improved with anion gap now closed and lactic acidosis much better.  IV antibiotics were continued awaiting final culture results and I did consult ID to see patient for their input on the situation.  Dr. Devon Armando who completed bilateral stent placement for bilateral OP J obstruction and urosepsis is pleased with progress on day 1 postop.  He notes that urine output has been excellent.  Dr. Us from infectious disease did see the patient in consultation.  He notes that Zosyn antibiotics have been discontinued by Dr. Armando after her successful surgery.  White blood cell count is down to 17.08 today with hematocrit of 30.7.  Glucose max was 250 today estimated GFR is up to 54 and CO2 is 19.0.  He feels the significance of the positive blood culture is unclear at this point with only 1 of 2 bottles positive for gram-positive bacilli which normally would represent a contamination.  He is planning to repeat the blood cultures and will follow-up.  Urine  culture was also negative except for yeast growth and due to the recent  instrumentation he has started the patient on fluconazole 400 mg daily while cultures are pending.  New blood culture has been sent.  We will check again the lactic acid, procalcitonin, sed rate, and CRP with a.m. labs tomorrow.  Labs relatively stable today though I do note that sodium is dropped a bit to 127.  CO2 remains slightly low at 18.0 and chloride slightly low at 97.  Patient's glucoses running in the upper 100s and low 200s at present.  Electrolytes otherwise seem fairly stable.  White count is still elevated somewhat at 17.08 despite stenting yesterday.  Patient currently off antibiotics and being followed carefully by ID.  Plan to check procalcitonin and repeat blood culture with a.m. labs.  Hemoglobin is at 10.0.  Otherwise patient appears very stable at present time.  I do agree that her mentation is a little bit slower than usual but I suspect this will continue to improve as we get further from her acute DKA event.  Treatment plan reviewed with patient and her daughter today.    1/05/2024.  Patient is seen again today in her room in the CCU #335.  Patient resting quietly in bed and daughter Enoch is at bedside.  I spoke with patient's nurse during the day to discuss occasional changes in her treatment plan.  I wore full PPE for the exam including an N95 facemask, goggles, white lab coat, and gloves when touching patient.  I performed thorough hand hygiene before and after the patient visit.  Patient is much more alert today and more like her usual self.  She does laugh and participate in conversation.  Mother and daughter were very impressed with Dr. Sánchez's excellent review with them of the patient's renal condition and with the time he has been going over the various diagnostic images that have been done up to date.  He was pleased to see how significantly improved electrolytes are and felt the patient was remaining  hemodynamically stable at present time.  Dr. Us from ID did see the patient.  Blood cultures were repeated but original cultures are now positive 2 out of 2 for gram-positive bacilli which is more consistent with a true infection.  He started patient on vancomycin and will be following up on ID and's susceptibilities of the organisms.  He also continued the fluconazole 400 mg daily while following up on cultures from perinephric fluid.The pulmonary intensivist Dr. Arthur Tena saw patient briefly and found no pulmonary or critical care needs at this time.  Dr. Armando from neurology saw patient on postop day #2 after stent placement for bilateral UPJ obstruction.  Indicates that Perales catheter can be removed at any time.  Occupational Therapy began to work with the patient and feels that she will benefit from further skilled occupational therapy.  She did some bedside exercises and did do some sort steps.  She was able to sit up on the edge of the bed for 8 to 10 minutes with to stand.  Physical therapy also saw patient and felt she would continue to benefit from skilled physical therapy.  Pharmacist did consult with the patient on vancomycin and started at 750 mg IV every 12 hours.  They will be checking a trough level on 1/7/2024 at 8:30 AM.  Labs today are generally stable.  Glucose levels ranging in the 140s to 170s.  Sodium still slightly low at 132.  CO2 now normal at 22.5, albumin slightly low at 2.1, AST slightly up at 34 and alk phos slightly up at 270.  White blood cell count today is down to 13.04 and hemoglobin is stable at 10.3 with platelets of 412,000.  2 of 2 initial blood cultures were positive with final ID pending.  Sed rate remains greater than 130, procalcitonin is 3.24, and C-reactive protein is at 23.85.  Patient is still on overflow status waiting for a floor telemetry bed.    1/8/2024.  Patient is seen again today in her room on the coronary care unit #335.  Patient was actually up in a  chair at the time of my visit and her daughter was present at bedside.  I wore full PPE for the exam including an N95 facemask, goggles, white lab coat, and gloves when touching patient.  I performed thorough hand hygiene before and after the patient visit.  Patient is feeling much better today.  She says that she was anxious to get up and was happy that the therapist left her up in the chair.  She is able to get to the bathroom but does need some assistance still for that endeavor.  Several specialist saw the patient and their care is summarized below.  Patient remains on vancomycin therapy and pharmacy is following the dose and adjusting levels.  Dr. Us from ID did see patient again today.  He notes that repeat blood cultures remain negative and initial blood cultures are still being evaluated.  He has continued vancomycin for now and will follow-up on ID and susceptibilities.  Urine culture growing Candida glabrata and tropicalis and he has transitioned her from fluconazole therapy to micafungin 100 mg daily for now.  White blood cell count is slightly increased today at 16.61.  He is cautiously monitoring the patient for any new signs of infection or changes.Dr. Armando from urology did see the patient and was pleased with his surgical result from cystoscopy last week with bilateral stent placement for treating UPJ obstruction.  He has signed off on the case and plans to have her follow-up with him in clinic in 7 to 10 days for reevaluation following discharge.Occupational Therapy did work with patient who reported pain level of 2 out of 10 focused mainly on right abdomen where she has some redness swelling and discomfort.  There was no rebound to my exam in this area but I did elect to go ahead and do a CT scan of abdomen and pelvis to evaluate the area since she did have the extensive surgery not so long ago.  Patient is noted to fatigue quickly and has some functional deficits that need to be maximized  prior to DC to home.  Patient is planning discharge to home with spouse and daughters.  MI requested diabetic educator did meet with the patient.  Patient does need a meter for glucose testing.  Planning 4 times a day test for now.  Will use insulin pen lispro at meals.  Dietitian did meet with patient and her daughter for input on diet.  Provided printed materials and discussion.  Will be available for concerns or questions.  Labs today generally were stable.  Sodium back up to 131.  Glucose max of 245.  Patient does admit she is eating more at this time.  White blood cell count is up slightly at 16.61 of concern to ID.  They are monitoring cultures carefully.  Hemoglobin did drop from 11.5 down to 9.3 today will recheck tomorrow.  CT abdomen and pelvis was completed.  Right ureteral stent present but there is mild distention of the right extrarenal pelvis which appears to be improved compared to the exam 6 days ago.  There is right urethrocele thickening.  There is a right perinephric collection consistent with hemorrhage or urinoma's and there is a new deep collection in the right posterior lateral abdominal wall measuring 3 cm in thickness by 13 cm transverse along with muscular thickening along the right lateral abdominal wall and edema within the right lateral abdominal pelvic subcutaneous fat.  There is enlargement of this fluid collection seen.  We will have nursing notify urology in a.m. of this finding for their input.  May want to consider general surgery consultation also if pain persist in this area.    1/9/2024.  Patient is seen again today in the CCU room #335.  Patient's daughter is at bedside and patient is resting quietly in bedside chair.  Patient appears much more awake and alert today back to her usual personality.  I wore full PPE for the exam including an N95 face mask, goggles, white lab coat, and gloves when touching patient.  I performed thorough hand hygiene before and after the patient  visit.  Nursing reports the patient has been using bedside commode with some urgency and loose bowel movements at times.  This is why she continues on IV vancomycin.  Patient did require O2 2 L per nasal cannula at night while sleeping.  No other new issues noted.  Dr. Us from ID saw patient again today.  He continues to monitor her blood cultures and has continued her on the IV vancomycin along with micafungin for treatment of yeast infection.  Urology did see patient regarding findings on CT scan.  These were reviewed with Dr. Salomon.  There are fluid collections in the right lateral abdominal pelvic wall measuring 12.8 cm x 18 cm x 3 cm which may represent hematomas or urinoma months.  Left renal pelvis is still dilated slightly.  Urology did speak with the IR department directly and arrange for CT-guided drain placement which the patient is agreeable to having.Physical therapy did work with the patient today.  She required contact-guard assist for standing and ambulated 15 feet with no assist.  Patient was minimally unsteady and fatigued quickly though patient indicated these were the usual distances that she does ambulate.  Patient plans to DC home with home health and assistance from her daughter.  PT will continue to follow and advance as able.  Patient encouraged to get up in chair for all meals and ambulate at least 3 times a day to promote functional mobility during hospital stay.  General surgery did see patient at my request regarding the right lower quadrant pain.  They feel that it is primarily due to the retroperitoneal fluid collections which have increased in size.  They deferred further treatment decisions to urology and felt there was no intraperitoneal abnormalities noted.  Labs today do show glucose is up slightly more as patient is eating more.  Lantus has been increased for today to 16 units once daily.  Sodium is now up to 133.  Glucose was up at 212 this a.m. white blood cell count is  improved down from 16.61 to a level of 14.58 today.  Hemoglobin is up slightly at 10.1 and does remain stable.  Platelet count is also stable at 439,000.  Patient planning discharge to home over the weekend if drain is able to successfully remove fluid from the retroperitoneal fluid collection that is causing pain in the right lower quadrant.  Final antibiotic plan is still pending from ID.  We are continuing to follow closely and encourage further activity on the part of the patient.    1/10/2024.  Patient is seen again today in her room in the CCU room #335.  Patient is up in chair at bedside watching television.  She recognizes me immediately upon my entry into her room and smiles appropriately.  I wore full PPE for the exam including an N95 facemask, goggles, white lab coat, and gloves.  I performed thorough hand hygiene before and after the patient visit.  I did discuss the patient's care with her nurse who was present in the [place]artney outside her room.  Patient did have successful placement of the drain today in the pocket of fluid accumulation in the right lower abdominal wall.  Procedure was felt to be quite successful and 50 cc of fluid were sent to the lab for evaluation and analysis.  Patient has an ongoing drain that has very opaque brownish-white material draining from the fluid collection at present time.  Patient states that she feels 100% better with significant improvement in the pain that she was experiencing in the right lower quadrant prior to this drainage.  Vital signs today indicate no fevers with current temperature of 98.1 pulse 86 respiratory rate 18 blood pressure 120/65 and oxygen sat 96% on room air.Review of patient's labs shows that her sodium is very stable at 132 glucoses have been primarily in the 1 43-1 63 range today.  Patient's white blood cell count continues to come down slowly and is at 12.93 and her hemoglobin is stable at 10.3.  Body fluid culture from drainage is pending.   Patient does remain on Lantus 16 units along with sliding scale insulin at present time and seems to be very comfortable with management of her diabetes.  We will plan follow-up on outpatient basis with endocrinology following discharge.  Patient was seen by Dr. Us today and he has continued the vancomycin therapy awaiting culture results from the fluid drained from her right lower quadrant and awaiting final ID is on organism which grew from blood earlier in admission.  He is still continuing to treat for fungal infection that was collected at time of drainage from stents placed in ureters and is using micafungin for treatment of this infection.  Dr. Campos performed procedure in radiology without complications or problems.  He has suggested that patient have a repeat CT scan of the area prior to discharge to home.    1/11/2023.  Patient is seen again today in her room on CCU room #335.  Patient is up in her chair watching television and indicates to me that she is often sleeping in the chair because it is easier to get up to urinate from the chair then to get up from the bed.  I did speak with the patient's nurse earlier in the day.  I wore full PPE for the exam including an N95 facemask, goggles, white lab coat, and gloves when touching patient.  I performed thorough hand hygiene before and after the patient.  Patient's appetite continues to slowly improve.  She also is gradually getting stronger and gaining more independence.  PT did discuss with her using a walker to ambulate because she seems to be much safer with that at the present time the patient is somewhat stubborn and insist on walking alone despite poor posture at times.  Review of today's labs show that her blood sugars have ranged from 156 up to a max of 389.  I will plan on increasing her insulin through her Lantus by another 2 units each day up to 18 units daily.  Other labs are very stable.  Sodium is 131, BUN slightly low at 4, albumin low at  2.1.  White blood cell count continues to come down and is at 11.91 today.  Hemoglobin is at 9.8 and platelet count is 502,000.  Culture still pending from drain placement but so far with no growth.  Other cultures remain positive as before.  Per ID patient to continue on vancomycin for now awaiting those culture results.  Anticipate possible discharge the patient over the weekend depending on decisions that come from infectious disease.  Otherwise clinical condition seems to be quite stable.Review of vital signs reveals that temperature is 98.9, pulse 81, respirations 20, blood pressure 110/82 and her room air sat is 96%.  We continue to follow while awaiting decision on antibiotics.        Review of Systems:               Review of systems could not be obtained due to  patient confusion. patient nonverbal.       Past Medical History:   Diagnosis Date    Anemia     intermittently    Anxiety and depression     Arthritis     Depression     Diabetes mellitus     Gallstones     High cholesterol     History of frequent urinary tract infections     HX OF YEAST IN BLADDER HAS PRN DIFLUCAN    History of transfusion 05/24/2017    Had 2 iron infusions.  This was when i had knee replacement    Hyperlipidemia     Hypertension     Hypothyroidism     Knee pain, bilateral     Low back pain     Lumbar stenosis     PONV (postoperative nausea and vomiting)     Positive TB test     chest x-ray neg    Seasonal allergies      Past Surgical History:   Procedure Laterality Date    APPENDECTOMY N/A 1982    Dr. Wilson    CHOLECYSTECTOMY WITH INTRAOPERATIVE CHOLANGIOGRAM N/A 10/31/2018    Procedure: laparoscopic cholecystectomy;  Surgeon: Mary Kay Mac MD;  Location: [facility] OR;  Service: General    COLONOSCOPY      CYSTOSCOPY BLADDER BIOPSY N/A 10/3/2016    Procedure: CYSTOSCOPY BLADDER BIOPSY;  Surgeon: Rei Calvert MD;  Location: [facility] OR;  Service:     CYSTOSCOPY W/ URETERAL STENT PLACEMENT Bilateral 1/3/2024     Procedure: CYSTOSCOPY BILATERAL RETROGRADE PYELOGRAM  BILATERAL URETERAL STENT INSERTION AND CATHETHER PLACEMENT;  Surgeon: Eduard Armando MD;  Location: [facility] MAIN OR;  Service: Urology;  Laterality: Bilateral;    DILATATION AND CURETTAGE N/A     ENDOSCOPY      INTRAUTERINE DEVICE INSERTION      KNEE ARTHROSCOPY W/ MENISCAL REPAIR Left     OVARIAN CYST REMOVAL Left     Dr. Wilson    TOTAL KNEE ARTHROPLASTY Right 2017    Procedure: RIGHT TOTAL KNEE ARTHROPLASTY WITH ALETA NAVIGATION;  Surgeon: Ross Cruz MD;  Location: [facility] MAIN OR;  Service:      Allergies   Allergen Reactions    Sulfa Antibiotics Hives and Rash       Family History   Problem Relation Age of Onset    Hepatitis Mother     Breast cancer Mother     Heart disease Mother     Cancer Mother     Hyperlipidemia Mother              Heart failure Father     Stroke Father     Hypertension Father         Since he was 72, now 86s    Diabetes Father     Heart disease Father     Hyperlipidemia Father         Unsurs    Heart disease Maternal Grandmother     Cancer Maternal Grandfather     COPD Maternal Grandfather     Arthritis Paternal Grandmother     Diabetes Maternal Aunt     Diabetes Paternal Uncle     Malig Hyperthermia Neg Hx        Social History     Socioeconomic History    Marital status:    Tobacco Use    Smoking status: Never    Smokeless tobacco: Never    Tobacco comments:     Never smoked   Vaping Use    Vaping Use: Never used   Substance and Sexual Activity    Alcohol use: Never     Alcohol/week: 3.0 standard drinks of alcohol     Types: 1 Glasses of wine, 2 Standard drinks or equivalent per week    Drug use: Never    Sexual activity: Not Currently     Partners: Male     Birth control/protection: I.U.D.       PMH, FH, SH and ROS completed with Admission History and Physical and updated in EPIC system.        Objective     Scheduled Meds:enoxaparin, 40 mg, Subcutaneous, Q12H  escitalopram, 10 mg, Oral,  "Daily  insulin glargine, 16 Units, Subcutaneous, Nightly  insulin lispro, 2-9 Units, Subcutaneous, 4x Daily AC & at Bedtime  levothyroxine, 137 mcg, Oral, Q AM  micafungin (MYCAMINE) IV, 100 mg, Intravenous, Q24H  oxybutynin XL, 10 mg, Oral, Daily  pantoprazole, 40 mg, Oral, Q AM  potassium phosphate, 15 mmol, Intravenous, Once  rOPINIRole, 0.5 mg, Oral, Nightly  senna-docusate sodium, 2 tablet, Oral, BID  vancomycin, 500 mg, Intravenous, Q8H      Continuous Infusions:lactated ringers, 9 mL/hr, Last Rate: 9 mL/hr (01/03/24 1527)  Pharmacy to dose vancomycin,         Vital signs in last 24 hours:  Temp:  [97.9 °F (36.6 °C)-98.7 °F (37.1 °C)] 98.7 °F (37.1 °C)  Heart Rate:  [75-86] 75  Resp:  [18-20] 20  BP: (103-121)/(58-72) 121/58    Intake/Output:    Intake/Output Summary (Last 24 hours) at 1/11/2024 1817  Last data filed at 1/11/2024 1700  Gross per 24 hour   Intake 820 ml   Output 595 ml   Net 225 ml         Exam:  /58 (BP Location: Right arm, Patient Position: Lying)   Pulse 75   Temp 98.7 °F (37.1 °C) (Oral)   Resp 20   Ht 165.1 cm (65\")   Wt 113 kg (249 lb)   LMP 09/03/2016 Comment: IUD  SpO2 93%   BMI 41.44 kg/m²     Constitutional:  Patient up in chair.  Much more alert.  Normal personality.  Pain significantly improved and almost gone.  Redness over right lower quadrant also significantly improved.  Blood pressures stable.   Eyes:     PERRLA, conjunctiva/corneas clear, no icterus, no conjunctival                                     pallor, EOM's intact, both eyes      ENT and Mouth: Lips, tongue, gums normal; oral mucosa pink and moist   Neck:     Supple, symmetrical, trachea midline, no JVD  Respiratory:     Clear to auscultation bilaterally, respirations unlabored  Cardiovascular:  Regular rate and rhythm, S1 and S2 normal, no murmur,      no  Rub or gallop.  Pulses normal.    Gastrointestinal:   BS present x 4 Soft, non-tender, bowel sounds active,      no masses, no hepatosplenomegaly    " right lower quadrant pain almost totally resolved with no rebound guarding or other abnormalities.  Overlying skin does appear erythematous                                                 :       No hernia.  Normal exam for sex.         Musculoskeletal: Extremities normal, atraumatic, no cyanosis or edema     No arthropathy.  No deformity.  Gait normal                                                 Skin:   Skin is warm and dry,  no rashes, swelling or palpable lesions.  Erythema resolved over right lower quadrant area pain   Neurologic:  CN -XII intact, motor strength grossly intact, sensation grossly intact to light touch, no focal reflex deficits noted    Psychiatric:     Alert,oriented X3, no delusions, psychoses, depression or anxiety    Heme/Lymph/Imun:   No bruises, petechiae.  Lymph nodes normal in size/configuration       Data Review:  Lab Results   Component Value Date    CALCIUM 8.8 01/11/2024    PHOS 3.7 01/07/2024     Results from last 7 days   Lab Units 01/11/24  0734 01/10/24  0639 01/09/24  1812 01/09/24  0645 01/08/24  0609 01/07/24  0645 01/06/24  0653 01/05/24  0232   AST (SGOT) U/L 10 16  --  16   < > 16 14 34*   ALT (SGPT) U/L 7 7  --  9   < > 8 13 19   MAGNESIUM mg/dL  --   --   --   --   --  1.7 1.7 1.7   SODIUM mmol/L 131* 132*  --  133*   < > 129* 131* 132*   POTASSIUM mmol/L 4.4 4.6 4.4 3.6   < > 4.8 3.8 4.1   CHLORIDE mmol/L 94* 95*  --  95*   < > 94* 98 101   CO2 mmol/L 27.6 25.6  --  27.5   < > 20.6* 22.3 22.5   BUN mg/dL 4* 3*  --  3*   < > 6* 7* 8   CREATININE mg/dL 0.78 0.70  --  0.71   < > 0.62 0.61 0.85   GLUCOSE mg/dL 162* 163*  --  160*   < > 157* 171* 159*   CALCIUM mg/dL 8.8 8.3*  --  8.6   < > 8.9 8.6 8.9   WBC 10*3/mm3 11.91* 12.93*  --  14.58*   < > 13.75* 10.77  --    HEMOGLOBIN g/dL 9.8* 10.3*  --  10.1*   < > 11.5* 10.1*  --    PLATELETS 10*3/mm3 502* 392  --  439   < > 371 384  --     < > = values in this interval not displayed.     Lab Results   Component Value Date     TROPONINT <6 01/02/2024     Estimated Creatinine Clearance: 94.9 mL/min (by C-G formula based on SCr of 0.78 mg/dL).  WEIGHTS:     Wt Readings from Last 1 Encounters:   01/11/24 0600 113 kg (249 lb)   01/09/24 0653 114 kg (251 lb 3.2 oz)   01/08/24 0700 115 kg (254 lb 3.1 oz)   01/07/24 0510 120 kg (263 lb 10.7 oz)   01/07/24 0349 120 kg (263 lb 10.7 oz)   01/06/24 0509 122 kg (269 lb 6.4 oz)   01/05/24 0556 111 kg (244 lb 11.4 oz)   01/04/24 0600 111 kg (245 lb 6 oz)   01/03/24 0600 111 kg (245 lb 6 oz)   01/02/24 1131 120 kg (265 lb)         Assessment:    DKA (diabetic ketoacidosis)    UTI (urinary tract infection)    Morbid obesity with BMI of 40.0-44.9, adult    JADEN (acute kidney injury)    Sepsis, unspecified organism    Lactic acidosis    UPJ (ureteropelvic junction) obstruction bilateral    Bilateral hydronephrosis moderate on admission    Renal calculus on right, nonobstructinbg    Vitamin D deficiency    Essential hypertension    Hyperlipidemia    Hypothyroidism    Allergic rhinitis    Back pain    Chronic liver disease    Primary osteoarthritis of right knee    Contact dermatitis    Anxiety    Dense breast tissue on mammogram    Bilateral hydrocele      Attending Physician Assessment and Plan:    1.  Cystitis/UTI with sepsis associated with bilateral UPJ obstruction and moderate bilateral hydronephrosis and possible right calyceal rupture.  Patient resuscitated aggressively with IV fluids.  Broad-spectrum antibiotics, Zosyn, started.  Urology consult is placed.  Patient being followed carefully in ICU for additional complications or signs of worsening sepsis.  Culture of urine remains negative on day #2.  New cultures collected at time of cystoscopy today by urology.  Patient currently off Zosyn which was stopped after surgery yesterday.  We will repeat blood cultures since we did have 1 of 2 initial blood cultures positive.  ID has been consulted and is following with us.  They have started the patient  on fluconazole secondary to the instrumentation yesterday and yeast that is seen in hearing.  I do plan to check a procalcitonin level with a.m. labs tomorrow.  Patient also started on vancomycin due to 2 of 2 positive cultures from blood.  Additional blood cultures pending at present time.  ID continues to follow on 1/9/2024.  Vancomycin and micafungin are continued for treatment of ongoing infections.  Antibiotics unchanged at this point.  Hoping culture from fluid collection obtained in IR today will be useful in guiding antibiotic therapy.  ID continues to follow.  Vancomycin still ongoing.  Final decision on treatment to come soon with respect to cultures.      2.  Anion gap metabolic acidosis versus possible diabetic ketoacidosis.  Improving rapidly at present time on DKA protocol with Glucomander.  Aggressive fluid resuscitation initiated in the ER is continued in the ICU.  Patient now with good urinary output and hopefully will be able to come off of IV insulin and switch to subcu dosing overnight..  Anion gap has narrowed and improved.  Patient should come off Glucomander DKA protocol sometime later today.  On postop day #1 anion gap has significantly improved.  We are asking renal to help us with follow-up on her acute kidney injury and volume status.  Acidosis has resolved.  Acidosis has resolved.  Diabetes now under control with Lantus 16 units daily and sliding scale insulin.     3.  Lactic acidosis possibly due to home use of metformin while taking no food or possibly due to acute sepsis.  Continuing rapid fluid resuscitation with improvement already noted in lactic acid.  Monitoring carefully for signs of any new changes or problems.  Lactic acidosis has also resolved on day #2 and patient's sepsis seems to be under much more stable condition at present time.  Repeat lactic acid in a.m. with other labs.  Lactic acidosis has resolved.    4.  Type 2 diabetes mellitus poorly controlled at present time  with elevated A1c greater than 16.  Will probably need to arrange for follow-up with patient in endocrinology clinic.  For now we will ask diabetic educator to see patient and work on sliding scale insulin protocols.  Patient and her daughter are interested in Dexcom monitoring and I will see if that is available from the diabetic educator.  Discussed situation with the daughters and she may benefit from continuous 24-hour glucose monitoring issues willing to learn the process.  Will probably need to discharge charge on sliding scale insulin and may also consider mealtime insulin or long acting insulin.  Diabetic educator to see patient and work with us on diet, glucose testing techniques, and generalized diabetes management.  Patient doing well on sliding scale with single dose of Lantus at nighttime.  Now the patient is eating more plan to increase Lantus to 16 units subcu daily.  We will plan on rechecking hemoglobin A1c in 6 to 8 weeks.  Diabetes much improved.  Stable on Lantus and sliding scale insulin.  Continuing to increase Lantus slowly to 18 units daily.      5.  Pseudohyponatremia.  Seems to already be improving with resuscitation and resolution of the uncontrolled glucoses.  Will continue to follow electrolytes regularly.  Hyponatremia now corrected and in normal range.  Hyponatremia has now resolved on day #2. Sodium now normalized.     6.  Hypothyroidism.  Patient's Synthroid is continued by the ICU attending.  Will continue to follow this in the hospital and on an outpatient basis.  Hypothyroidism is a stable condition     7.  Hyperlipidemia.  Agree with holding statin for now.  Will resume prior to discharge and continue on outpatient basis.     8.  Obesity.  Hope to continue using agents such as Ozempic for management of patient's blood sugar levels.  This will also help with diet control and ongoing need for gradual weight loss.     9.  Acute kidney injury secondary to volume depletion with  uncontrolled glucoses.  Should improve as patient's volume status normalizes.  Will continue to monitor closely.  Acute kidney injury is gradually improving with resolution of hydronephrosis and bilateral stent placement.  Acute kidney injury totally resolved.     10.  Generalized muscle weakness and fatigue.  Suspect related to the poorly controlled diabetes and electrolyte disturbances.  Will do PT and OT evaluations after patient stabilizes.  Suspect patient will discharge to home with PT and OT and home environment.  Hopefully patient will be able to work with PT and OT in the next few days.    11.  Right lower quadrant pain with fluid collections noticed on CT scan completed today 6 days after admission.  Patient complaining of significant pain with erythematous skin over the right lower quadrant.  I will ask general surgery to take a look at the situation in the a.m.  We will also notify urology of the findings in the a.m.        Plan for disposition:Where: home and home health and When: 2-3 days when medically stable     Copied text in this note has been reviewed by me and is accurate as of 01/11/2024.  Much of this dictation was completed using Dragon voice activated transcription software which can result in misspelled words and nonsensical phrases at times.     Dr. Fairchild available and can be reached at [phone]      Dean Fairchild MD  1/11/2024 2034 EST

## 2024-01-11 NOTE — THERAPY TREATMENT NOTE
Patient Name: Tiana Hudson  : 1960    MRN: [id number]                              Today's Date: 2024       Admit Date: 2024    Visit Dx:     ICD-10-CM ICD-9-CM   1. Sepsis, due to unspecified organism, unspecified whether acute organ dysfunction present  A41.9 038.9     995.91   2. Acute cystitis without hematuria  N30.00 595.0   3. UPJ (ureteropelvic junction) obstruction  N13.5 593.4     Patient Active Problem List   Diagnosis    Primary osteoarthritis of right knee    UTI (urinary tract infection)    Varicose veins    Contact dermatitis    Elevated liver function tests    Leg cramp    Medial collateral ligament sprain of knee    Vitamin D deficiency    Essential hypertension    Hyperlipidemia    Abrasion    Hypothyroidism    Gestational diabetes    Morbid obesity with BMI of 40.0-44.9, adult    Allergic rhinitis    Back pain    Anxiety    H/O Postconcussion syndrome    H/O dizziness    Allergy    Vaginal candidiasis    Chronic pain of left knee    Arthritis of both knees    Inflammation of bladder severe, acute and chronic, with mucosal erosions/ulcers    Dense breast tissue on mammogram    Dizziness    Restless leg syndrome    Type 2 diabetes mellitus without complication    Arthritis of right knee    Status post total right knee replacement    Arthritis of left knee    History of total knee arthroplasty    Acquired spondylolisthesis    Screening for colorectal cancer    Precordial pain    FH ischemic heart disease    DKA (diabetic ketoacidosis)    JADEN (acute kidney injury)    Sepsis, unspecified organism    Lactic acidosis    UPJ (ureteropelvic junction) obstruction bilateral    Bilateral hydrocele    Bilateral hydronephrosis moderate on admission    Renal calculus on right, nonobstructinbg    Chronic liver disease     Past Medical History:   Diagnosis Date    Anemia     intermittently    Anxiety and depression     Arthritis     Depression     Diabetes mellitus     Gallstones     High  cholesterol     History of frequent urinary tract infections     HX OF YEAST IN BLADDER HAS PRN DIFLUCAN    History of transfusion 05/24/2017    Had 2 iron infusions.  This was when i had knee replacement    Hyperlipidemia     Hypertension     Hypothyroidism     Knee pain, bilateral     Low back pain     Lumbar stenosis     PONV (postoperative nausea and vomiting)     Positive TB test     chest x-ray neg    Seasonal allergies      Past Surgical History:   Procedure Laterality Date    APPENDECTOMY N/A 1982    Dr. Wilson    CHOLECYSTECTOMY WITH INTRAOPERATIVE CHOLANGIOGRAM N/A 10/31/2018    Procedure: laparoscopic cholecystectomy;  Surgeon: Mary Kay Mac MD;  Location: [facility] OR;  Service: General    COLONOSCOPY      CYSTOSCOPY BLADDER BIOPSY N/A 10/3/2016    Procedure: CYSTOSCOPY BLADDER BIOPSY;  Surgeon: Rei Calvert MD;  Location: [facility] OR;  Service:     CYSTOSCOPY W/ URETERAL STENT PLACEMENT Bilateral 1/3/2024    Procedure: CYSTOSCOPY BILATERAL RETROGRADE PYELOGRAM  BILATERAL URETERAL STENT INSERTION AND CATHETHER PLACEMENT;  Surgeon: Eduard Armando MD;  Location: [facility] OR;  Service: Urology;  Laterality: Bilateral;    DILATATION AND CURETTAGE N/A     ENDOSCOPY      INTRAUTERINE DEVICE INSERTION      KNEE ARTHROSCOPY W/ MENISCAL REPAIR Left     OVARIAN CYST REMOVAL Left 1982    Dr. Wilson    TOTAL KNEE ARTHROPLASTY Right 5/22/2017    Procedure: RIGHT TOTAL KNEE ARTHROPLASTY WITH ALEAT NAVIGATION;  Surgeon: Ross Cruz MD;  Location: [facility];  Service:       General Information       Row Name 01/11/24 1501          Physical Therapy Time and Intention    Document Type therapy note (daily note)  -MS     Mode of Treatment physical therapy  -MS       Row Name 01/11/24 1501          General Information    Existing Precautions/Restrictions fall  RLQ pig tail  -MS       Row Name 01/11/24 1501          Cognition    Orientation Status (Cognition) oriented x 4  -MS       Row  Name 01/11/24 1501          Safety Issues, Functional Mobility    Impairments Affecting Function (Mobility) endurance/activity tolerance;strength;balance  -MS     Comment, Safety Issues/Impairments (Mobility) Gait belt and non skid socks donned.  -MS               User Key  (r) = Recorded By, (t) = Taken By, (c) = Cosigned By      Initials Name Provider Type    MS DelcidSallie, PT Physical Therapist                   Mobility       Row Name 01/11/24 1507          Bed Mobility    Comment, (Bed Mobility) NT- sitting UIC upon PT arrival.  -MS       Row Name 01/11/24 1507          Transfers    Comment, (Transfers) Sequencing and hand placement cues. 3 STS from recliner.  -MS       Row Name 01/11/24 1507          Sit-Stand Transfer    Sit-Stand [place] (Transfers) standby assist;supervision  -MS     Assistive Device (Sit-Stand Transfers) --  -MS       Row Name 01/11/24 1507          Gait/Stairs (Locomotion)    [place] Level (Gait) standby assist;contact guard;verbal cues;nonverbal cues (demo/gesture)  -MS     Assistive Device (Gait) --  -MS     Distance in Feet (Gait) 12' x 2 trials, 5'  -MS     Deviations/Abnormal Patterns (Gait) antalgic;sinai decreased;gait speed decreased  -MS     Bilateral Gait Deviations forward flexed posture  -MS     Comment, (Gait/Stairs) No overt LOB or veering noted. Forward flexed posture and occasionally reaching for furniture. Discussed implementing RW but declined.  -MS               User Key  (r) = Recorded By, (t) = Taken By, (c) = Cosigned By      Initials Name Provider Type    MS DelcidSallie, PT Physical Therapist                   Obj/Interventions    No documentation.                  Goals/Plan       Row Name 01/11/24 1515          Bed Mobility Goal 1 (PT)    Activity/Assistive Device (Bed Mobility Goal 1, PT) bed mobility activities, all  -MS     [place] Level/Cues Needed (Bed Mobility Goal 1, PT) supervision required  -MS     Time Frame (Bed Mobility  Goal 1, PT) 1 week  -MS     Progress/Outcomes (Bed Mobility Goal 1, PT) goal ongoing  -MS       Row Name 01/11/24 1515          Transfer Goal 1 (PT)    Activity/Assistive Device (Transfer Goal 1, PT) transfers, all;walker, rolling  -MS     [place] Level/Cues Needed (Transfer Goal 1, PT) supervision required  -MS     Time Frame (Transfer Goal 1, PT) 1 week  -MS     Progress/Outcome (Transfer Goal 1, PT) goal ongoing  -MS       Row Name 01/11/24 1515          Gait Training Goal 1 (PT)    Activity/Assistive Device (Gait Training Goal 1, PT) gait (walking locomotion);walker, rolling  -MS     [place] Level (Gait Training Goal 1, PT) supervision required  -MS     Distance (Gait Training Goal 1, PT) 50  -MS     Time Frame (Gait Training Goal 1, PT) 1 week  -MS     Progress/Outcome (Gait Training Goal 1, PT) goal revised this date  -MS               User Key  (r) = Recorded By, (t) = Taken By, (c) = Cosigned By      Initials Name Provider Type    MS Sallie Delcid, PT Physical Therapist                   Clinical Impression       Row Name 01/11/24 1511          Pain    Pretreatment Pain Rating 0/10 - no pain  Simultaneous filing. User may be unaware of other data.  -MS     Posttreatment Pain Rating 0/10 - no pain  Simultaneous filing. User may be unaware of other data.  -MS       Row Name 01/11/24 1511          Plan of Care Review    Plan of Care Reviewed With patient  Simultaneous filing. User may be unaware of other data.  -MS     Progress improving  Simultaneous filing. User may be unaware of other data.  -MS     Outcome Evaluation Patient sitting UIC upon arrival and reported feeling a little bit better. Patient was able to stand with SBA and ambulated multiple ambulation trials within room requiring SBA-CGA. Patient does demo a forward flexed posture and reached for furniture at times for support- discussed implementing RW though patient declined. Daughter at bedside and DC plans were reviewed. Plan is  home with family assistance. Patient declined  PT to follow up or any equipment needs. PT will continue to follow and advance as able. PT will begin following peripherally. All goals updated and revised as appropriate.  -MS       Row Name 01/11/24 1511          Therapy Assessment/Plan (PT)    Therapy Frequency (PT) 3 times/wk  -MS       Row Name 01/11/24 1511          Positioning and Restraints    On BS commode sitting;call light within reach;with family/caregiver  -MS               User Key  (r) = Recorded By, (t) = Taken By, (c) = Cosigned By      Initials Name Provider Type    MS DelcidSallie, PT Physical Therapist                   Outcome Measures       Row Name 01/11/24 1515 01/11/24 0800       How much help from another person do you currently need...    Turning from your back to your side while in flat bed without using bedrails? 4  -MS 4  -BR    Moving from lying on back to sitting on the side of a flat bed without bedrails? 4  -MS 4  -BR    Moving to and from a bed to a chair (including a wheelchair)? 4  -MS 4  -BR    Standing up from a chair using your arms (e.g., wheelchair, bedside chair)? 4  -MS 4  -BR    Climbing 3-5 steps with a railing? 3  -MS 3  -BR    To walk in hospital room? 3  -MS 3  -BR    AM-PAC 6 Clicks Score (PT) 22  -MS 22  -BR    Highest Level of Mobility Goal 7 --> Walk 25 feet or more  -MS 7 --> Walk 25 feet or more  -BR      Row Name 01/11/24 1516          Functional Assessment    Outcome Measure Options AM-PAC 6 Clicks Daily Activity (OT)  -MW               User Key  (r) = Recorded By, (t) = Taken By, (c) = Cosigned By      Initials Name Provider Type    MS DelcidSallie, PT Physical Therapist    Vero Mayorga, RN Registered Nurse    Anu Bourgeois OT Occupational Therapist                                 Physical Therapy Education       Title: PT OT SLP Therapies (In Progress)       Topic: Physical Therapy (Done)       Point: Mobility training (Done)        Learning Progress Summary             Patient Acceptance, E,TB, VU by MS at 1/11/2024 1516    Acceptance, E,TB, VU by MS at 1/9/2024 1442    Acceptance, E,D, DU by PC at 1/6/2024 1537    Acceptance, E,TB,D, VU,NR by  at 1/5/2024 1632                         Point: Home exercise program (Done)       Learning Progress Summary             Patient Acceptance, E,TB, VU by MS at 1/11/2024 1516    Acceptance, E,TB, VU by MS at 1/9/2024 1442                         Point: Body mechanics (Done)       Learning Progress Summary             Patient Acceptance, E,TB, VU by MS at 1/11/2024 1516    Acceptance, E,TB, VU by MS at 1/9/2024 1442    Acceptance, E,D, DU by PC at 1/6/2024 1537    Acceptance, E,TB,D, VU,NR by  at 1/5/2024 1632                         Point: Precautions (Done)       Learning Progress Summary             Patient Acceptance, E,TB, VU by MS at 1/11/2024 1516    Acceptance, E,TB, VU by MS at 1/9/2024 1442    Acceptance, E,D, DU by PC at 1/6/2024 1537    Acceptance, E,TB,D, VU,NR by  at 1/5/2024 1632                                         User Key       Initials Effective Dates Name Provider Type Discipline     06/16/21 -  Brandi Schmid, PT Physical Therapist PT     06/16/21 -  Keily Cary, PT Physical Therapist PT    MS 06/16/21 -  Sallie Delcid, PT Physical Therapist PT                  PT Recommendation and Plan     Plan of Care Reviewed With: patient (Simultaneous filing. User may be unaware of other data.)  Progress: improving (Simultaneous filing. User may be unaware of other data.)  Outcome Evaluation: Patient sitting UIC upon arrival and reported feeling a little bit better. Patient was able to stand with SBA and ambulated multiple ambulation trials within room requiring SBA-CGA. Patient does demo a forward flexed posture and reached for furniture at times for support- discussed implementing RW though patient declined. Daughter at bedside and DC plans were reviewed. Plan is home  with family assistance. Patient declined HH PT to follow up or any equipment needs. PT will continue to follow and advance as able. PT will begin following peripherally. All goals updated and revised as appropriate.     Time Calculation:         PT Charges       Row Name 01/11/24 1510             Time Calculation    Start Time 1447  -MS      Stop Time 1511  -MS      Time Calculation (min) 24 min  -MS      PT Received On 01/11/24  -MS      PT - Next Appointment 01/15/24  -MS      PT Goal Re-Cert Due Date 01/18/24  -MS                User Key  (r) = Recorded By, (t) = Taken By, (c) = Cosigned By      Initials Name Provider Type    Sallie Bartholomew, PT Physical Therapist                  Therapy Charges for Today       Code Description Service Date Service Provider Modifiers Qty    51747045442  PT THER PROC EA 15 MIN 1/11/2024 Sallie Delcid, PT GP 1    [id number]  PT THERAPEUTIC ACT EA 15 MIN 1/11/2024 Sallie Delcid, PT GP 1            PT G-Codes  Outcome Measure Options: AM-PAC 6 Clicks Daily Activity (OT)  AM-PAC 6 Clicks Score (PT): 22  AM-PAC 6 Clicks Score (OT): 24  PT Discharge Summary  Anticipated Discharge Disposition (PT): home with assist, home with home health (patient declining HH)    Sallie Delcid PT  1/11/2024

## 2024-01-11 NOTE — PROGRESS NOTES
LOS: 9 days     Chief Complaint: Antibiotic management    Interval History: Patient reports he is feeling better this morning.  Right flank pain is improved after drain placement.  Denies fevers or chills.  WBC down to 11.    Vital Signs  Temp:  [97.9 °F (36.6 °C)-98.3 °F (36.8 °C)] 98 °F (36.7 °C)  Heart Rate:  [75-87] 75  Resp:  [12-22] 18  BP: (103-129)/(60-83) 111/72    Physical Exam:  General: In no acute distress  HEENT: Oropharynx clear, moist mucous membranes  Respiratory: Normal work of breathing  Skin: No rashes or lesions in exposed areas  Extremities: No edema, cyanosis  Access: Peripheral IV    Antibiotics:  Anti-Infectives (From admission, onward)      Ordered     Dose/Rate Route Frequency Start Stop    01/09/24 1144  vancomycin (VANCOCIN) 500 mg in sodium chloride 0.9 % 100 mL IVPB-VTB        Ordering Provider: Jorge L Us DO    500 mg  200 mL/hr over 30 Minutes Intravenous Every 8 Hours 01/09/24 1644 01/12/24 1644    01/08/24 0803  micafungin sodium (MYCAMINE) 100 mg in sodium chloride 0.9 % 100 mL IVPB-VTB        Ordering Provider: Jorge L Us DO    100 mg  over 60 Minutes Intravenous Every 24 Hours 01/08/24 0900 01/15/24 0859    01/05/24 0756  vancomycin 2250 mg/500 mL 0.9% NS IVPB ([facility])        Ordering Provider: Jorge L Us DO    20 mg/kg × 111 kg  over 135 Minutes Intravenous Once 01/05/24 0845 01/05/24 1118    01/05/24 0750  Pharmacy to dose vancomycin        Ordering Provider: Jorge L Us DO     Does not apply Continuous PRN 01/05/24 0750 01/12/24 0749    01/02/24 1636  piperacillin-tazobactam (ZOSYN) 4.5 g in iso-osmotic dextrose 100 mL IVPB (premix)        Ordering Provider: Arthur Najera MD    4.5 g  over 30 Minutes Intravenous Once 01/02/24 1652 01/02/24 1735    01/02/24 1243  cefTRIAXone (ROCEPHIN) 2,000 mg in sodium chloride 0.9 % 100 mL IVPB-VTB        Ordering Provider: Angelito Winkler MD    2,000 mg  200 mL/hr over 30 Minutes  Intravenous Once 01/02/24 1259 01/02/24 1414             Results Review:     I reviewed the patient's new clinical results.    Lab Results   Component Value Date    WBC 11.91 (H) 01/11/2024    HGB 9.8 (L) 01/11/2024    HCT 31.6 (L) 01/11/2024    MCV 83.8 01/11/2024     (H) 01/11/2024     Lab Results   Component Value Date    GLUCOSE 162 (H) 01/11/2024    BUN 4 (L) 01/11/2024    CREATININE 0.78 01/11/2024    EGFRIFNONA 54 (L) 10/24/2018    EGFRIFAFRI 83 02/23/2017    BCR 5.1 (L) 01/11/2024    CO2 27.6 01/11/2024    CALCIUM 8.8 01/11/2024    PROTENTOTREF 6.9 02/23/2017    ALBUMIN 2.1 (L) 01/11/2024    LABIL2 1.7 02/23/2017    AST 10 01/11/2024    ALT 7 01/11/2024     Microbiology:  1/2 urine culture no growth  1/2 blood cultures 2 out of 2 gram-positive bacilli  1/3 right renal fluid culture Candida glabrata and Candida tropicalis  1/3 left renal fluid culture Candida glabrata  1/5 blood cultures no growth to date  1/10 retroperitoneal fluid culture in process    Assessment    #Right calyceal rupture  #Bilateral hydronephrosis status post bilateral ureteral stenting  #Abdominal abscess  #JADEN, improved  #DKA, improved  #Hyponatremia, improved  #Morbid obesity, BMI 41  #Gram-positive bacteremia    Status post IR drainage yesterday with new cultures pending.  For now continue vancomycin goal -600 while following up ID and susceptibilities.  Continue micafungin 100 mg daily for isolated Candida.    ID will follow.

## 2024-01-12 LAB
BACTERIA FLD CULT: ABNORMAL
BACTERIA SPEC AEROBE CULT: ABNORMAL
GLUCOSE BLDC GLUCOMTR-MCNC: 172 MG/DL (ref 70–130)
GLUCOSE BLDC GLUCOMTR-MCNC: 183 MG/DL (ref 70–130)
GLUCOSE BLDC GLUCOMTR-MCNC: 242 MG/DL (ref 70–130)
GLUCOSE BLDC GLUCOMTR-MCNC: 242 MG/DL (ref 70–130)
GLUCOSE BLDC GLUCOMTR-MCNC: 243 MG/DL (ref 70–130)
GRAM STN SPEC: ABNORMAL
ISOLATED FROM: ABNORMAL

## 2024-01-12 PROCEDURE — 99232 SBSQ HOSP IP/OBS MODERATE 35: CPT | Performed by: STUDENT IN AN ORGANIZED HEALTH CARE EDUCATION/TRAINING PROGRAM

## 2024-01-12 PROCEDURE — 25010000002 ENOXAPARIN PER 10 MG: Performed by: UROLOGY

## 2024-01-12 PROCEDURE — 63710000001 INSULIN LISPRO (HUMAN) PER 5 UNITS: Performed by: INTERNAL MEDICINE

## 2024-01-12 PROCEDURE — 25010000002 MICAFUNGIN SODIUM 100 MG RECONSTITUTED SOLUTION 1 EACH VIAL: Performed by: STUDENT IN AN ORGANIZED HEALTH CARE EDUCATION/TRAINING PROGRAM

## 2024-01-12 PROCEDURE — 25010000002 VANCOMYCIN PER 500 MG: Performed by: STUDENT IN AN ORGANIZED HEALTH CARE EDUCATION/TRAINING PROGRAM

## 2024-01-12 PROCEDURE — 63710000001 INSULIN GLARGINE PER 5 UNITS: Performed by: INTERNAL MEDICINE

## 2024-01-12 PROCEDURE — 82948 REAGENT STRIP/BLOOD GLUCOSE: CPT

## 2024-01-12 RX ADMIN — INSULIN LISPRO 2 UNITS: 100 INJECTION, SOLUTION INTRAVENOUS; SUBCUTANEOUS at 08:32

## 2024-01-12 RX ADMIN — INSULIN LISPRO 4 UNITS: 100 INJECTION, SOLUTION INTRAVENOUS; SUBCUTANEOUS at 21:02

## 2024-01-12 RX ADMIN — ENOXAPARIN SODIUM 40 MG: 100 INJECTION SUBCUTANEOUS at 20:53

## 2024-01-12 RX ADMIN — MICAFUNGIN SODIUM 100 MG: 100 INJECTION, POWDER, LYOPHILIZED, FOR SOLUTION INTRAVENOUS at 10:01

## 2024-01-12 RX ADMIN — INSULIN GLARGINE 18 UNITS: 100 INJECTION, SOLUTION SUBCUTANEOUS at 20:54

## 2024-01-12 RX ADMIN — HYDROCODONE BITARTRATE AND ACETAMINOPHEN 1 TABLET: 5; 325 TABLET ORAL at 10:07

## 2024-01-12 RX ADMIN — VANCOMYCIN HYDROCHLORIDE 500 MG: 500 INJECTION, POWDER, LYOPHILIZED, FOR SOLUTION INTRAVENOUS at 08:32

## 2024-01-12 RX ADMIN — ROPINIROLE HYDROCHLORIDE 0.5 MG: 0.5 TABLET, FILM COATED ORAL at 20:54

## 2024-01-12 RX ADMIN — LEVOTHYROXINE SODIUM 137 MCG: 137 TABLET ORAL at 05:43

## 2024-01-12 RX ADMIN — VANCOMYCIN HYDROCHLORIDE 500 MG: 500 INJECTION, POWDER, LYOPHILIZED, FOR SOLUTION INTRAVENOUS at 00:57

## 2024-01-12 RX ADMIN — ENOXAPARIN SODIUM 40 MG: 100 INJECTION SUBCUTANEOUS at 08:32

## 2024-01-12 RX ADMIN — VANCOMYCIN HYDROCHLORIDE 500 MG: 500 INJECTION, POWDER, LYOPHILIZED, FOR SOLUTION INTRAVENOUS at 16:46

## 2024-01-12 RX ADMIN — ESCITALOPRAM OXALATE 10 MG: 10 TABLET, FILM COATED ORAL at 08:33

## 2024-01-12 RX ADMIN — PANTOPRAZOLE SODIUM 40 MG: 40 TABLET, DELAYED RELEASE ORAL at 05:43

## 2024-01-12 RX ADMIN — OXYBUTYNIN CHLORIDE 10 MG: 10 TABLET, EXTENDED RELEASE ORAL at 08:33

## 2024-01-12 RX ADMIN — INSULIN LISPRO 4 UNITS: 100 INJECTION, SOLUTION INTRAVENOUS; SUBCUTANEOUS at 12:30

## 2024-01-12 RX ADMIN — INSULIN LISPRO 4 UNITS: 100 INJECTION, SOLUTION INTRAVENOUS; SUBCUTANEOUS at 16:45

## 2024-01-12 RX ADMIN — HYDROCODONE BITARTRATE AND ACETAMINOPHEN 1 TABLET: 5; 325 TABLET ORAL at 21:01

## 2024-01-12 NOTE — PROGRESS NOTES
"    [facility]OLOG[facility] DAILY PROGRESS NOTE      Name: Tiana Hudson  Age: 63 y.o.  Sex: female  :  1960  MRN: [id number]    Date: 2024             Interval History:   Doing better  Feels better  R retroperitoneal drain putting out purulent fluid       Physical Exam:  /63 (BP Location: Right arm, Patient Position: Lying)   Pulse 82   Temp 98.5 °F (36.9 °C) (Oral)   Resp 20   Ht 165.1 cm (65\")   Wt 115 kg (254 lb 3.1 oz)   LMP 2016 Comment: IUD  SpO2 94%   BMI 42.30 kg/m²     General Appearance:    Alert, cooperative, NAD   Back:     Right flank tenderness   Lungs:     Respirations unlabored, no wheezing   Abdomen:  :      Soft, ND, right lower quadrant pain    Pelvic not performed,  bladder nondistended and non-tender; R Drain purulent fluid    Neuro/Psych:   Orientation intact, mood/affect pleasant       Assessment:    Bilateral UPJ obstruction s/p stent placement   Urinary tract infection  Sepsis      Plan:    - Maintain R Retroperitoneal drain - anticipate keeping until output is clear/scant and infection-free  - Awaiting final cultures  - Appreciate ID recommendations   - Ultimately, Urology will be coordinating outpatient study of both ureters - urology schedulers have been messaged.  - Urology will follow     Sheldon Miller MD  2024  07:06 EST  "

## 2024-01-12 NOTE — PROGRESS NOTES
LOS: 10 days     Chief Complaint: Antibiotic management    Interval History: Patient remains afebrile with slight improvement in WBC.  Tolerating antibiotics.    Vital Signs  Temp:  [98.5 °F (36.9 °C)-98.9 °F (37.2 °C)] 98.5 °F (36.9 °C)  Heart Rate:  [78-82] 82  Resp:  [18-20] 18  BP: (110-121)/(58-63) 114/62    Physical Exam:  General: In no acute distress  HEENT: Oropharynx clear, moist mucous membranes  Respiratory: Normal work of breathing  Skin: No rashes or lesions in exposed areas  Extremities: No edema, cyanosis  Access: Peripheral IV    Antibiotics:  Anti-Infectives (From admission, onward)      Ordered     Dose/Rate Route Frequency Start Stop    01/09/24 1144  vancomycin (VANCOCIN) 500 mg in sodium chloride 0.9 % 100 mL IVPB-VTB        Ordering Provider: Jorge L Us DO    500 mg  200 mL/hr over 30 Minutes Intravenous Every 8 Hours 01/09/24 1644 01/12/24 0902    01/08/24 0803  micafungin sodium (MYCAMINE) 100 mg in sodium chloride 0.9 % 100 mL IVPB-VTB        Ordering Provider: Jorge L Us DO    100 mg  over 60 Minutes Intravenous Every 24 Hours 01/08/24 0900 01/15/24 0859    01/05/24 0756  vancomycin 2250 mg/500 mL 0.9% NS IVPB ([facility])        Ordering Provider: Jorge L Us DO    20 mg/kg × 111 kg  over 135 Minutes Intravenous Once 01/05/24 0845 01/05/24 1118    01/05/24 0750  Pharmacy to dose vancomycin        Ordering Provider: Jorge L Us DO     Does not apply Continuous PRN 01/05/24 0750 01/12/24 0749    01/02/24 1636  piperacillin-tazobactam (ZOSYN) 4.5 g in iso-osmotic dextrose 100 mL IVPB (premix)        Ordering Provider: Arthur Najera MD    4.5 g  over 30 Minutes Intravenous Once 01/02/24 1652 01/02/24 1735    01/02/24 1243  cefTRIAXone (ROCEPHIN) 2,000 mg in sodium chloride 0.9 % 100 mL IVPB-VTB        Ordering Provider: Angelito Winkler MD    2,000 mg  200 mL/hr over 30 Minutes Intravenous Once 01/02/24 1259 01/02/24 1414             Results  Review:     I reviewed the patient's new clinical results.    Lab Results   Component Value Date    WBC 11.91 (H) 01/11/2024    HGB 9.8 (L) 01/11/2024    HCT 31.6 (L) 01/11/2024    MCV 83.8 01/11/2024     (H) 01/11/2024     Lab Results   Component Value Date    GLUCOSE 162 (H) 01/11/2024    BUN 4 (L) 01/11/2024    CREATININE 0.78 01/11/2024    EGFRIFNONA 54 (L) 10/24/2018    EGFRIFAFRI 83 02/23/2017    BCR 5.1 (L) 01/11/2024    CO2 27.6 01/11/2024    CALCIUM 8.8 01/11/2024    PROTENTOTREF 6.9 02/23/2017    ALBUMIN 2.1 (L) 01/11/2024    LABIL2 1.7 02/23/2017    AST 10 01/11/2024    ALT 7 01/11/2024     Microbiology:  1/2 urine culture no growth  1/2 blood cultures 2 out of 2 gram-positive bacilli  1/3 right renal fluid culture Candida glabrata and Candida tropicalis  1/3 left renal fluid culture Candida glabrata  1/5 blood cultures no growth to date  1/10 retroperitoneal fluid culture in process    Assessment    #Right calyceal rupture  #Bilateral hydronephrosis status post bilateral ureteral stenting  #Abdominal abscess  #JADEN, improved  #DKA, improved  #Hyponatremia, improved  #Morbid obesity, BMI 41  #Gram-positive bacteremia    Repeat drainage cultures remain pending and will follow these up.  For now continue vancomycin goal -600 while following up ID and susceptibilities.  Discussed with micro today the prior positive blood cultures and they are going to send out a new sample to a rep due to mixed sample on prior send out. Continue micafungin 100 mg daily for isolated Candida.    ID will follow.

## 2024-01-12 NOTE — PROGRESS NOTES
"[facility] Clinical Pharmacy Services: Vancomycin Monitoring Note    Tiana Hudson is a 63 y.o. female who is on day 8 of pharmacy to dose vancomycin for Bacteremia.    Current Vancomycin Dose:   500 mg IV every  8  hours  Updated Cultures and Sensitivities:   1/2 bld cx 2 of 2 gram positive rods, sent out for further identification  1/3 body fluid cx (kidney): Candida glabrata, candida tropicalis  1/5 bld cx NG  1/10 body fluid cx pending    Results from last 7 days   Lab Units 01/10/24  0639 01/08/24  0609 01/06/24  2048   VANCOMYCIN TR mcg/mL 18.30 14.70 7.60     Vitals/Labs  Ht: 165.1 cm (65\"); Wt: 115 kg (254 lb 3.1 oz)   Temp Readings from Last 1 Encounters:   01/12/24 98 °F (36.7 °C) (Oral)     Estimated Creatinine Clearance: 95.9 mL/min (by C-G formula based on SCr of 0.78 mg/dL).     Results from last 7 days   Lab Units 01/11/24  0734 01/10/24  0639 01/09/24  0645   CREATININE mg/dL 0.78 0.70 0.71   WBC 10*3/mm3 11.91* 12.93* 14.58*     Assessment/Plan  Vancomycin duration of therapy extended while awaiting identification of organism in blood culture (sent out to external lab)  Vancomycin Dose: continue 500 mg IV every  8  hours; provides a predicted  mg/L.hr  (goal 400-600)  Next Level Date and Time: Ordered trough level prior to 0800 dose tomorrow AM to evaluate.   We will continue to monitor patient changes and renal function     Thank you for involving pharmacy in this patient's care. Please contact pharmacy with any questions or concerns.       Britni Lauren, PharmD  Clinical Pharmacist                "

## 2024-01-12 NOTE — CASE MANAGEMENT/SOCIAL WORK
Continued Stay Note  [facility]     Patient Name: Tiana Hudson  MRN: [id number]  Today's Date: 1/12/2024    Admit Date: 1/2/2024    Plan: Home   Discharge Plan       Row Name 01/12/24 1045       Plan    Plan Home    Patient/Family in Agreement with Plan yes    Plan Comments Patient plans home at discharge. PT and OT evals noted. CCP following should discharge needs arise. Rody LUIS RN CCP                   Discharge Codes    No documentation.                 Expected Discharge Date and Time       Expected Discharge Date Expected Discharge Time    Jhony 10, 2024               Rody Roth RN

## 2024-01-13 LAB
ALBUMIN SERPL-MCNC: 2 G/DL (ref 3.5–5.2)
ALBUMIN/GLOB SERPL: 0.5 G/DL
ALP SERPL-CCNC: 146 U/L (ref 39–117)
ALT SERPL W P-5'-P-CCNC: 9 U/L (ref 1–33)
ANION GAP SERPL CALCULATED.3IONS-SCNC: 11.5 MMOL/L (ref 5–15)
AST SERPL-CCNC: 13 U/L (ref 1–32)
BASOPHILS # BLD AUTO: 0.09 10*3/MM3 (ref 0–0.2)
BASOPHILS NFR BLD AUTO: 0.9 % (ref 0–1.5)
BILIRUB SERPL-MCNC: 0.5 MG/DL (ref 0–1.2)
BUN SERPL-MCNC: 5 MG/DL (ref 8–23)
BUN/CREAT SERPL: 6.8 (ref 7–25)
CALCIUM SPEC-SCNC: 8.7 MG/DL (ref 8.6–10.5)
CHLORIDE SERPL-SCNC: 95 MMOL/L (ref 98–107)
CO2 SERPL-SCNC: 26.5 MMOL/L (ref 22–29)
CREAT SERPL-MCNC: 0.73 MG/DL (ref 0.57–1)
CREAT SERPL-MCNC: 0.73 MG/DL (ref 0.57–1)
DEPRECATED RDW RBC AUTO: 46.1 FL (ref 37–54)
EGFRCR SERPLBLD CKD-EPI 2021: 93.7 ML/MIN/1.73
EGFRCR SERPLBLD CKD-EPI 2021: 93.7 ML/MIN/1.73
EOSINOPHIL # BLD AUTO: 0.19 10*3/MM3 (ref 0–0.4)
EOSINOPHIL NFR BLD AUTO: 2 % (ref 0.3–6.2)
ERYTHROCYTE [DISTWIDTH] IN BLOOD BY AUTOMATED COUNT: 14.5 % (ref 12.3–15.4)
GLOBULIN UR ELPH-MCNC: 4.3 GM/DL
GLUCOSE BLDC GLUCOMTR-MCNC: 223 MG/DL (ref 70–130)
GLUCOSE BLDC GLUCOMTR-MCNC: 233 MG/DL (ref 70–130)
GLUCOSE BLDC GLUCOMTR-MCNC: 339 MG/DL (ref 70–130)
GLUCOSE BLDC GLUCOMTR-MCNC: 357 MG/DL (ref 70–130)
GLUCOSE SERPL-MCNC: 159 MG/DL (ref 65–99)
HCT VFR BLD AUTO: 29 % (ref 34–46.6)
HGB BLD-MCNC: 9.1 G/DL (ref 12–15.9)
LYMPHOCYTES # BLD AUTO: 1.27 10*3/MM3 (ref 0.7–3.1)
LYMPHOCYTES NFR BLD AUTO: 13.4 % (ref 19.6–45.3)
MCH RBC QN AUTO: 27.1 PG (ref 26.6–33)
MCHC RBC AUTO-ENTMCNC: 31.4 G/DL (ref 31.5–35.7)
MCV RBC AUTO: 86.3 FL (ref 79–97)
MONOCYTES # BLD AUTO: 1.16 10*3/MM3 (ref 0.1–0.9)
MONOCYTES NFR BLD AUTO: 12.2 % (ref 5–12)
NEUTROPHILS NFR BLD AUTO: 6.72 10*3/MM3 (ref 1.7–7)
NEUTROPHILS NFR BLD AUTO: 70.7 % (ref 42.7–76)
PLAT MORPH BLD: NORMAL
PLATELET # BLD AUTO: 528 10*3/MM3 (ref 140–450)
PMV BLD AUTO: 10.2 FL (ref 6–12)
POTASSIUM SERPL-SCNC: 4.2 MMOL/L (ref 3.5–5.2)
PROT SERPL-MCNC: 6.3 G/DL (ref 6–8.5)
RBC # BLD AUTO: 3.36 10*6/MM3 (ref 3.77–5.28)
RBC MORPH BLD: NORMAL
SODIUM SERPL-SCNC: 133 MMOL/L (ref 136–145)
VANCOMYCIN TROUGH SERPL-MCNC: 11.3 MCG/ML (ref 5–20)
WBC MORPH BLD: NORMAL
WBC NRBC COR # BLD AUTO: 9.51 10*3/MM3 (ref 3.4–10.8)

## 2024-01-13 PROCEDURE — 63710000001 INSULIN LISPRO (HUMAN) PER 5 UNITS: Performed by: INTERNAL MEDICINE

## 2024-01-13 PROCEDURE — 63710000001 INSULIN GLARGINE PER 5 UNITS: Performed by: INTERNAL MEDICINE

## 2024-01-13 PROCEDURE — 80053 COMPREHEN METABOLIC PANEL: CPT | Performed by: INTERNAL MEDICINE

## 2024-01-13 PROCEDURE — 25010000002 VANCOMYCIN PER 500 MG: Performed by: STUDENT IN AN ORGANIZED HEALTH CARE EDUCATION/TRAINING PROGRAM

## 2024-01-13 PROCEDURE — 85025 COMPLETE CBC W/AUTO DIFF WBC: CPT | Performed by: INTERNAL MEDICINE

## 2024-01-13 PROCEDURE — 99233 SBSQ HOSP IP/OBS HIGH 50: CPT | Performed by: INTERNAL MEDICINE

## 2024-01-13 PROCEDURE — 25010000002 ENOXAPARIN PER 10 MG: Performed by: UROLOGY

## 2024-01-13 PROCEDURE — 82948 REAGENT STRIP/BLOOD GLUCOSE: CPT

## 2024-01-13 PROCEDURE — 80202 ASSAY OF VANCOMYCIN: CPT | Performed by: STUDENT IN AN ORGANIZED HEALTH CARE EDUCATION/TRAINING PROGRAM

## 2024-01-13 PROCEDURE — 85007 BL SMEAR W/DIFF WBC COUNT: CPT | Performed by: INTERNAL MEDICINE

## 2024-01-13 PROCEDURE — 82565 ASSAY OF CREATININE: CPT | Performed by: STUDENT IN AN ORGANIZED HEALTH CARE EDUCATION/TRAINING PROGRAM

## 2024-01-13 PROCEDURE — 25010000002 MICAFUNGIN SODIUM 100 MG RECONSTITUTED SOLUTION 1 EACH VIAL: Performed by: STUDENT IN AN ORGANIZED HEALTH CARE EDUCATION/TRAINING PROGRAM

## 2024-01-13 RX ORDER — VANCOMYCIN HYDROCHLORIDE 1 G/200ML
1000 INJECTION, SOLUTION INTRAVENOUS EVERY 12 HOURS
Status: DISCONTINUED | OUTPATIENT
Start: 2024-01-13 | End: 2024-01-15

## 2024-01-13 RX ADMIN — INSULIN GLARGINE 10 UNITS: 100 INJECTION, SOLUTION SUBCUTANEOUS at 08:00

## 2024-01-13 RX ADMIN — INSULIN LISPRO 4 UNITS: 100 INJECTION, SOLUTION INTRAVENOUS; SUBCUTANEOUS at 12:12

## 2024-01-13 RX ADMIN — INSULIN LISPRO 8 UNITS: 100 INJECTION, SOLUTION INTRAVENOUS; SUBCUTANEOUS at 22:05

## 2024-01-13 RX ADMIN — MICAFUNGIN SODIUM 100 MG: 100 INJECTION, POWDER, LYOPHILIZED, FOR SOLUTION INTRAVENOUS at 09:22

## 2024-01-13 RX ADMIN — INSULIN LISPRO 7 UNITS: 100 INJECTION, SOLUTION INTRAVENOUS; SUBCUTANEOUS at 16:45

## 2024-01-13 RX ADMIN — ACETAMINOPHEN 650 MG: 325 TABLET, FILM COATED ORAL at 15:09

## 2024-01-13 RX ADMIN — ROPINIROLE HYDROCHLORIDE 0.5 MG: 0.5 TABLET, FILM COATED ORAL at 20:35

## 2024-01-13 RX ADMIN — ENOXAPARIN SODIUM 40 MG: 100 INJECTION SUBCUTANEOUS at 08:00

## 2024-01-13 RX ADMIN — INSULIN LISPRO 4 UNITS: 100 INJECTION, SOLUTION INTRAVENOUS; SUBCUTANEOUS at 08:19

## 2024-01-13 RX ADMIN — VANCOMYCIN HYDROCHLORIDE 500 MG: 500 INJECTION, POWDER, LYOPHILIZED, FOR SOLUTION INTRAVENOUS at 07:52

## 2024-01-13 RX ADMIN — VANCOMYCIN HYDROCHLORIDE 1000 MG: 1 INJECTION, SOLUTION INTRAVENOUS at 16:45

## 2024-01-13 RX ADMIN — HYDROCODONE BITARTRATE AND ACETAMINOPHEN 1 TABLET: 5; 325 TABLET ORAL at 20:35

## 2024-01-13 RX ADMIN — LEVOTHYROXINE SODIUM 137 MCG: 137 TABLET ORAL at 05:53

## 2024-01-13 RX ADMIN — ESCITALOPRAM OXALATE 10 MG: 10 TABLET, FILM COATED ORAL at 08:00

## 2024-01-13 RX ADMIN — ENOXAPARIN SODIUM 40 MG: 100 INJECTION SUBCUTANEOUS at 20:36

## 2024-01-13 RX ADMIN — VANCOMYCIN HYDROCHLORIDE 500 MG: 500 INJECTION, POWDER, LYOPHILIZED, FOR SOLUTION INTRAVENOUS at 01:11

## 2024-01-13 RX ADMIN — PANTOPRAZOLE SODIUM 40 MG: 40 TABLET, DELAYED RELEASE ORAL at 05:53

## 2024-01-13 RX ADMIN — INSULIN GLARGINE 10 UNITS: 100 INJECTION, SOLUTION SUBCUTANEOUS at 20:35

## 2024-01-13 RX ADMIN — OXYBUTYNIN CHLORIDE 10 MG: 10 TABLET, EXTENDED RELEASE ORAL at 08:00

## 2024-01-13 NOTE — PROGRESS NOTES
NATHALIA CAMPUZANO [facility]  INTERNAL MEDICINE  DEAN FAIRCHILD MD  [address] [place] [address]  Phone [phone] Fax [phone]  E-mail:  [email or web address]      INTERNAL MEDICINE DAILY PROGRESS NOTE  Dean Fairchild M.D.  2024            Patient Identification:  Name: Tiana Hudson  Age: 63 y.o.  Sex: female  :  1960  MRN: [id number]         Primary Care Physician: Dean Fairchild MD  LENGTH OF STAY 11 DAYS    Consults       Date and Time Order Name Status Description    2024  3:42 AM Inpatient General Surgery Consult Completed     2024  5:10 AM Inpatient Nephrology Consult Completed     2024  2:00 PM Inpatient Infectious Diseases Consult Completed     1/3/2024 10:27 AM Inpatient Urology Consult      2024 11:55 PM Urology (on-call MD unless specified) Completed     2024  3:45 PM IP General Consult (Use specialty-specific consult if known) Completed             Chief Complaint: Abdominal/flank pain with DKA, acute cystitis and sepsis, and possible calyceal rupture in the kidney     History of Present Illness:     Subjective   Interval History: Patient is a 63 y.o.female who presented at my request to the emergency room at [facility] with complaint of acute abdominal/flank pain and history of recurrent kidney stones.  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  Patient has been followed for the last few months by Dr. Rei Calvert in urology for renal calculi and actually has had a stent placed in the right kidney due to obstruction from her renal stones.  It is also noted that the patient had a recent lithotripsy.  Patient began to feel poorly after the recent Shreyas  holiday and became more severely ill over the last 3 to 4 days prior to this admission.  She has felt extremely fatigued, dizzy at times, tired, nauseated, and has spent most of her time in bed sleeping.  Family has had difficulty getting the patient to take any oral intake and became quite concerned as her condition continued to worsen.  Patient was attempting to go to work today but really was too dizzy to get in the car to drive and 2-week to actually work.  After urging from her daughter, patient called me with respect to the symptoms and at my request went to the ER for evaluation.  Patient has multiple medical comorbidities that complicate her medical care.These include most significantly recurrent urinary tract infections due to her nephrolithiasis, morbid obesity with BMI greater than 40, diabetes mellitus type 2 poorly controlled with recent addition of Ozempic to her metformin therapy, vitamin D deficiency, essential hypertension, hyperlipidemia, hypothyroidism, allergic rhinitis, back pain, contact dermatitis, anxiety, and history of dense breast tissue on mammograms.     Patient was evaluated in the emergency room by Angelito Winkler MD who was the ER attending on call time of her arrival.  Patient was seen on 1/2/2024 at 1544 by Dr. Winkler.  His HPI was consistent with the story which I given above.  Patient denied dysuria, fever, chills on his evaluation.  She did admit to having emesis and actually had some emesis while in the emergency room.  Review of systems in the emergency room was positive for diffuse abdominal pain, and vomiting.  Patient also was noted to have some significant flank pain.  All other systems reviewed were negative in the emergency room vital signs on arrival in the emergency room showed temperature of 96 °F, heart rate of 114, respiratory rate of 16, blood pressure 125/72, and O2 sat of 98%.  Patient was described as ill-appearing but in no acute distress.  She did have severe  tenderness to palpation more on the right side of the abdomen and out into the right flank area with voluntary guarding.  Labs collected in the ER showed that her lactate level was elevated at 6.0.  She had a lipase of 23, her white blood cell count was 20.12, her hemoglobin was 11.2, and her platelet count was 568,000.  Patient did have a phosphorus of 4.2, magnesium 2.0, osmolality of 317, hemoglobin A1c is 16.90, and UA showed specific gravity 1.027, 3+ glucose, moderate blood 2+, leukocyte esterase moderate 2+, nitrates negative, white blood cells 6-10, red blood cells 6-10, bacteria 2+.  Her CMP showed a glucose of 978, creatinine of 1.82, sodium of 115, potassium of 4.2, chloride of 76, CO2 of 15.7, albumin 2.4, AST 33, alk phos 220, anion gap of 23, EGFR of 31.3.  Patient did have a CT scan of her abdomen and pelvis completed which showed loculated fluid throughout the right perinephric space felt to possibly be secondary to right calyceal rupture, new mild to moderate wall thickening of the right renal pelvis and renal calyces, bilateral UPJ obstruction with moderate bilateral hydronephrosis on the left, mild new dilation of right ureter, nonobstructing right renal calculi, hepatic morphology suggestive of chronic liver disease, and reactive lymph nodes in the retrocaval area.  A chest x-ray was done which showed no acute disease other than minimal atelectasis at the base of the right lung.  Patient underwent aggressive fluid resuscitation in the emergency room and received over 3 L of IV fluid.  She was placed on an insulin drip and was also started on Zosyn therapy.  She was followed on sepsis protocol as well as DKA protocol.  Patient did receive morphine 4 mg for pain x 2.  Case was discussed with myself as her primary care attending.  Urology was also contacted about situation.  I did suggest that patient be admitted to the ICU for treatment of the DKA and severe sepsis picture.  Dr. Arthur Tena did  agree to the ICU admission and will be following her in the CCU for pulmonary/critical care.  Final diagnoses in the ER were sepsis due to unspecified organism and acute cystitis without significant hematuria.     1/2/2024.  I personally saw the patient for the first time during this hospitalization on this date in the coronary care unit room #335.  Patient was resting quietly in bed and did a peer acutely ill.  She did wake to her name and spoke a few words before falling back asleep.  Patient's 2 daughters were at the foot of her bed and did discuss the case at length with me.  Daughter Enoch, is a former nurse from here at [facility], and now works at [employer] with the hospitalist group there.  Patient has responded well to the Glucommander DKA protocols and blood sugars have gradually come down to near normal levels for the patient in the 1  range.  I will full PPE for the exam including an N95 face mask, goggles, white lab coat, and gloves when touching patient.  I performed thorough hand hygiene before and after the patient visit.  Patient did have a venous blood gas which showed pH 7.40, pCO2 28, pO2 of 31, and O2 sat of 62%.  She also had recent electrolytes which showed a sodium of 131, potassium 3.4, chloride 96, CO2 19.1, BUN 17, creatinine 1.15, estimated GFR now up to 54.3.  Lactate level has been coming steadily down and currently at 2.8.  Additional labs ordered for tomorrow a.m.  Patient has been able to urinate several times and daughters have helped her up to the bathroom for this.  I actually note that she did have some urinary frequency and incontinence at home which is unusual for her.  I did speak briefly to the patient's daytime nurse who was departing soon after I arrived on the floor.  I have reviewed Dr. Arthur Tena's notes and his admission history and physical.  I do agree completely with his plan of care at present time.  Supportive care is continued to be offered for the sepsis  with careful monitoring in the ICU.  Patient's anion gap metabolic acidosis probably is related to lactic acidosis and she was continuing to take metformin even when she was not able to eat.  IV fluids are continued aggressively.  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  Probably will plan sliding scale insulin for stabilization of diabetes while here in the hospital and will train patient to continue that in the home environment.  Pseudohyponatremia will be corrected with the IV fluids.  Hypothyroidism will be addressed with continued Synthroid.  Statins are held for now.  Obesity is an ongoing problem for the patient.  At the time my exam, patient is medically stable and slowly improving.  I did relay this to the daughter to agree with my assessment.  We will continue to follow the patient with you and we will be happy to assume care of the patient when she is stable and ready to be transferred out of the ICU.  I can be reached directly for any concerns or questions at [phone].    1/3/2024.  Patient is seen again today in her room in the CCU #335.  Patient was resting quietly in bed at the time of my visit and states that her daughters were downstairs getting a bite to eat.  I did discuss the case with the patient's nurse, Tiana, who tells me that she is being preop and should be going down soon for cystoscopy and stent placement.  I will full PPE for the exam including an N95 face mask, goggles, white lab coat, and gloves when touching patient.  I performed thorough hand hygiene before and after the patient visit.  Patient is more alert today but still somewhat confused about details of her medical case.  She is able to carry on a conversation with me though and joke a bit with me while I am present in the room.  She does understand she has issues because of the obstruction bilaterally of her ureters that has resulted  in hydronephrosis and hopefully can be relieved by the cystoscopy that is planned for later this morning.  Patient is still on an insulin drip but is drastically improved compared to where she was yesterday at this time.  Review of labs shows that her sodium is now up to 133, her CO2 is 19.1, glucose is now at 134, phosphorus is at 2.1, lactate is normalized at 1.9, white blood cell count today is 18.93, hemoglobin is 9.6 today, lakelets are normal today,Blood culture from the first day of hospitalization shows anaerobic bottle gram-positive bacilli growth with identification still ongoing.  Fungal and body fluid cultures are still negative.  Urine culture remains negative.  Dr. Oquendo was following the patient for critical care today.  He has continued her antibiotics and is awaiting urology input.  Fluid resuscitation is continued.  Blood pressure now seems well-controlled.  DVT prophylaxis in place.  He had a lengthy discussion with patient's family at the time of his rounds.  Urology has seen the patient in consultation and is planning to do cystoscopy later today.  Dietitian is following the patient's case and recommending input treatment as needed.Dr. Armando did perform a cystoscopy with ureteral catheterization and stent insertion bilaterally.  His pre-op diagnosis and postop diagnoses were urosepsis with bilateral UPJ obstruction.  He did feel that he had successfully decompressed the blockages and the hydronephrotic changes.  He did speak with patient's daughter postoperatively.  Patient does seem to be much improved today.  Vital signs still show no fever.  Blood pressure is relatively stable.  Will continue to follow with you.    1/4/2024.  Patient is seen again today in her room in the CCU #335.  Patient has transitions of critical care and is in overflow for care on the floor.  Patient's daughter is present at bedside at the time of my visit.  Patient is much more awake and alert today.  She is more  interactive and talkative.  I did discuss the case with the patient's nurse.  We are going to obtain an ID consult today for help with antibiotic management and we also are going to transition the patient to regular sliding scale insulin as we begin the teaching process of getting patient on her own regimen of sliding scale insulin at the time of discharge to home.  We have also started patient on Lantus therapy at 10 units for now and may need to titrate up.  I wore full PPE for the exam today including an N95 face mask, goggles, white lab coat, and gloves when touching patient.  I performed thorough hand hygiene before and after the patient visit.  Specialist have seen patient today and their care is summarized below.Dr. Najera, the critical care attending today, felt that her mentation was slow but answering questions appropriately.  No other major findings were discovered at present time.  Antibiotics were continued for sepsis was felt to be significantly improved with anion gap now closed and lactic acidosis much better.  IV antibiotics were continued awaiting final culture results and I did consult ID to see patient for their input on the situation.  Dr. Devon Armando who completed bilateral stent placement for bilateral OP J obstruction and urosepsis is pleased with progress on day 1 postop.  He notes that urine output has been excellent.  Dr. Us from infectious disease did see the patient in consultation.  He notes that Zosyn antibiotics have been discontinued by Dr. Armando after her successful surgery.  White blood cell count is down to 17.08 today with hematocrit of 30.7.  Glucose max was 250 today estimated GFR is up to 54 and CO2 is 19.0.  He feels the significance of the positive blood culture is unclear at this point with only 1 of 2 bottles positive for gram-positive bacilli which normally would represent a contamination.  He is planning to repeat the blood cultures and will follow-up.  Urine  culture was also negative except for yeast growth and due to the recent  instrumentation he has started the patient on fluconazole 400 mg daily while cultures are pending.  New blood culture has been sent.  We will check again the lactic acid, procalcitonin, sed rate, and CRP with a.m. labs tomorrow.  Labs relatively stable today though I do note that sodium is dropped a bit to 127.  CO2 remains slightly low at 18.0 and chloride slightly low at 97.  Patient's glucoses running in the upper 100s and low 200s at present.  Electrolytes otherwise seem fairly stable.  White count is still elevated somewhat at 17.08 despite stenting yesterday.  Patient currently off antibiotics and being followed carefully by ID.  Plan to check procalcitonin and repeat blood culture with a.m. labs.  Hemoglobin is at 10.0.  Otherwise patient appears very stable at present time.  I do agree that her mentation is a little bit slower than usual but I suspect this will continue to improve as we get further from her acute DKA event.  Treatment plan reviewed with patient and her daughter today.    1/05/2024.  Patient is seen again today in her room in the CCU #335.  Patient resting quietly in bed and daughter Enoch is at bedside.  I spoke with patient's nurse during the day to discuss occasional changes in her treatment plan.  I wore full PPE for the exam including an N95 facemask, goggles, white lab coat, and gloves when touching patient.  I performed thorough hand hygiene before and after the patient visit.  Patient is much more alert today and more like her usual self.  She does laugh and participate in conversation.  Mother and daughter were very impressed with Dr. Sánchez's excellent review with them of the patient's renal condition and with the time he has been going over the various diagnostic images that have been done up to date.  He was pleased to see how significantly improved electrolytes are and felt the patient was remaining  hemodynamically stable at present time.  Dr. Us from ID did see the patient.  Blood cultures were repeated but original cultures are now positive 2 out of 2 for gram-positive bacilli which is more consistent with a true infection.  He started patient on vancomycin and will be following up on ID and's susceptibilities of the organisms.  He also continued the fluconazole 400 mg daily while following up on cultures from perinephric fluid.The pulmonary intensivist Dr. Arthur Tena saw patient briefly and found no pulmonary or critical care needs at this time.  Dr. Armando from neurology saw patient on postop day #2 after stent placement for bilateral UPJ obstruction.  Indicates that Perales catheter can be removed at any time.  Occupational Therapy began to work with the patient and feels that she will benefit from further skilled occupational therapy.  She did some bedside exercises and did do some sort steps.  She was able to sit up on the edge of the bed for 8 to 10 minutes with to stand.  Physical therapy also saw patient and felt she would continue to benefit from skilled physical therapy.  Pharmacist did consult with the patient on vancomycin and started at 750 mg IV every 12 hours.  They will be checking a trough level on 1/7/2024 at 8:30 AM.  Labs today are generally stable.  Glucose levels ranging in the 140s to 170s.  Sodium still slightly low at 132.  CO2 now normal at 22.5, albumin slightly low at 2.1, AST slightly up at 34 and alk phos slightly up at 270.  White blood cell count today is down to 13.04 and hemoglobin is stable at 10.3 with platelets of 412,000.  2 of 2 initial blood cultures were positive with final ID pending.  Sed rate remains greater than 130, procalcitonin is 3.24, and C-reactive protein is at 23.85.  Patient is still on overflow status waiting for a floor telemetry bed.    1/8/2024.  Patient is seen again today in her room on the coronary care unit #335.  Patient was actually up in a  chair at the time of my visit and her daughter was present at bedside.  I wore full PPE for the exam including an N95 facemask, goggles, white lab coat, and gloves when touching patient.  I performed thorough hand hygiene before and after the patient visit.  Patient is feeling much better today.  She says that she was anxious to get up and was happy that the therapist left her up in the chair.  She is able to get to the bathroom but does need some assistance still for that endeavor.  Several specialist saw the patient and their care is summarized below.  Patient remains on vancomycin therapy and pharmacy is following the dose and adjusting levels.  Dr. Us from ID did see patient again today.  He notes that repeat blood cultures remain negative and initial blood cultures are still being evaluated.  He has continued vancomycin for now and will follow-up on ID and susceptibilities.  Urine culture growing Candida glabrata and tropicalis and he has transitioned her from fluconazole therapy to micafungin 100 mg daily for now.  White blood cell count is slightly increased today at 16.61.  He is cautiously monitoring the patient for any new signs of infection or changes.Dr. Armando from urology did see the patient and was pleased with his surgical result from cystoscopy last week with bilateral stent placement for treating UPJ obstruction.  He has signed off on the case and plans to have her follow-up with him in clinic in 7 to 10 days for reevaluation following discharge.Occupational Therapy did work with patient who reported pain level of 2 out of 10 focused mainly on right abdomen where she has some redness swelling and discomfort.  There was no rebound to my exam in this area but I did elect to go ahead and do a CT scan of abdomen and pelvis to evaluate the area since she did have the extensive surgery not so long ago.  Patient is noted to fatigue quickly and has some functional deficits that need to be maximized  prior to DC to home.  Patient is planning discharge to home with spouse and daughters.  MI requested diabetic educator did meet with the patient.  Patient does need a meter for glucose testing.  Planning 4 times a day test for now.  Will use insulin pen lispro at meals.  Dietitian did meet with patient and her daughter for input on diet.  Provided printed materials and discussion.  Will be available for concerns or questions.  Labs today generally were stable.  Sodium back up to 131.  Glucose max of 245.  Patient does admit she is eating more at this time.  White blood cell count is up slightly at 16.61 of concern to ID.  They are monitoring cultures carefully.  Hemoglobin did drop from 11.5 down to 9.3 today will recheck tomorrow.  CT abdomen and pelvis was completed.  Right ureteral stent present but there is mild distention of the right extrarenal pelvis which appears to be improved compared to the exam 6 days ago.  There is right urethrocele thickening.  There is a right perinephric collection consistent with hemorrhage or urinoma's and there is a new deep collection in the right posterior lateral abdominal wall measuring 3 cm in thickness by 13 cm transverse along with muscular thickening along the right lateral abdominal wall and edema within the right lateral abdominal pelvic subcutaneous fat.  There is enlargement of this fluid collection seen.  We will have nursing notify urology in a.m. of this finding for their input.  May want to consider general surgery consultation also if pain persist in this area.    1/9/2024.  Patient is seen again today in the CCU room #335.  Patient's daughter is at bedside and patient is resting quietly in bedside chair.  Patient appears much more awake and alert today back to her usual personality.  I wore full PPE for the exam including an N95 face mask, goggles, white lab coat, and gloves when touching patient.  I performed thorough hand hygiene before and after the patient  visit.  Nursing reports the patient has been using bedside commode with some urgency and loose bowel movements at times.  This is why she continues on IV vancomycin.  Patient did require O2 2 L per nasal cannula at night while sleeping.  No other new issues noted.  Dr. Us from ID saw patient again today.  He continues to monitor her blood cultures and has continued her on the IV vancomycin along with micafungin for treatment of yeast infection.  Urology did see patient regarding findings on CT scan.  These were reviewed with Dr. Salomon.  There are fluid collections in the right lateral abdominal pelvic wall measuring 12.8 cm x 18 cm x 3 cm which may represent hematomas or urinoma months.  Left renal pelvis is still dilated slightly.  Urology did speak with the IR department directly and arrange for CT-guided drain placement which the patient is agreeable to having.Physical therapy did work with the patient today.  She required contact-guard assist for standing and ambulated 15 feet with no assist.  Patient was minimally unsteady and fatigued quickly though patient indicated these were the usual distances that she does ambulate.  Patient plans to DC home with home health and assistance from her daughter.  PT will continue to follow and advance as able.  Patient encouraged to get up in chair for all meals and ambulate at least 3 times a day to promote functional mobility during hospital stay.  General surgery did see patient at my request regarding the right lower quadrant pain.  They feel that it is primarily due to the retroperitoneal fluid collections which have increased in size.  They deferred further treatment decisions to urology and felt there was no intraperitoneal abnormalities noted.  Labs today do show glucose is up slightly more as patient is eating more.  Lantus has been increased for today to 16 units once daily.  Sodium is now up to 133.  Glucose was up at 212 this a.m. white blood cell count is  improved down from 16.61 to a level of 14.58 today.  Hemoglobin is up slightly at 10.1 and does remain stable.  Platelet count is also stable at 439,000.  Patient planning discharge to home over the weekend if drain is able to successfully remove fluid from the retroperitoneal fluid collection that is causing pain in the right lower quadrant.  Final antibiotic plan is still pending from ID.  We are continuing to follow closely and encourage further activity on the part of the patient.    1/10/2024.  Patient is seen again today in her room in the CCU room #335.  Patient is up in chair at bedside watching television.  She recognizes me immediately upon my entry into her room and smiles appropriately.  I wore full PPE for the exam including an N95 facemask, goggles, white lab coat, and gloves.  I performed thorough hand hygiene before and after the patient visit.  I did discuss the patient's care with her nurse who was present in the [place]ney outside her room.  Patient did have successful placement of the drain today in the pocket of fluid accumulation in the right lower abdominal wall.  Procedure was felt to be quite successful and 50 cc of fluid were sent to the lab for evaluation and analysis.  Patient has an ongoing drain that has very opaque brownish-white material draining from the fluid collection at present time.  Patient states that she feels 100% better with significant improvement in the pain that she was experiencing in the right lower quadrant prior to this drainage.  Vital signs today indicate no fevers with current temperature of 98.1 pulse 86 respiratory rate 18 blood pressure 120/65 and oxygen sat 96% on room air.Review of patient's labs shows that her sodium is very stable at 132 glucoses have been primarily in the 1 43-1 63 range today.  Patient's white blood cell count continues to come down slowly and is at 12.93 and her hemoglobin is stable at 10.3.  Body fluid culture from drainage is pending.   Patient does remain on Lantus 16 units along with sliding scale insulin at present time and seems to be very comfortable with management of her diabetes.  We will plan follow-up on outpatient basis with endocrinology following discharge.  Patient was seen by Dr. Us today and he has continued the vancomycin therapy awaiting culture results from the fluid drained from her right lower quadrant and awaiting final ID is on organism which grew from blood earlier in admission.  He is still continuing to treat for fungal infection that was collected at time of drainage from stents placed in ureters and is using micafungin for treatment of this infection.  Dr. Campos performed procedure in radiology without complications or problems.  He has suggested that patient have a repeat CT scan of the area prior to discharge to home.    1/11/2023.  Patient is seen again today in her room on CCU room #335.  Patient is up in her chair watching television and indicates to me that she is often sleeping in the chair because it is easier to get up to urinate from the chair then to get up from the bed.  I did speak with the patient's nurse earlier in the day.  I wore full PPE for the exam including an N95 facemask, goggles, white lab coat, and gloves when touching patient.  I performed thorough hand hygiene before and after the patient.  Patient's appetite continues to slowly improve.  She also is gradually getting stronger and gaining more independence.  PT did discuss with her using a walker to ambulate because she seems to be much safer with that at the present time the patient is somewhat stubborn and insist on walking alone despite poor posture at times.  Review of today's labs show that her blood sugars have ranged from 156 up to a max of 389.  I will plan on increasing her insulin through her Lantus by another 2 units each day up to 18 units daily.  Other labs are very stable.  Sodium is 131, BUN slightly low at 4, albumin low at  2.1.  White blood cell count continues to come down and is at 11.91 today.  Hemoglobin is at 9.8 and platelet count is 502,000.  Culture still pending from drain placement but so far with no growth.  Other cultures remain positive as before.  Per ID patient to continue on vancomycin for now awaiting those culture results.  Anticipate possible discharge the patient over the weekend depending on decisions that come from infectious disease.  Otherwise clinical condition seems to be quite stable.Review of vital signs reveals that temperature is 98.9, pulse 81, respirations 20, blood pressure 110/82 and her room air sat is 96%.  We continue to follow while awaiting decision on antibiotics.    1/12/2024.  Patient is seen once again today in her room in the CCU room #335.  Patient was resting quietly in bed at the time of my visit.  I wore full PPE for the exam including an N95 face mask, goggles, white lab coat, and gloves when touching patient.  I performed thorough hand hygiene before and after the patient visit.  Patient has had a very quiet day.  She was seen by Dr. Miller from urology earlier this morning.  Per his plan, drain in right retroperitoneal area is to be maintained until output is clear and scant and patient is felt to be infection free.  We still are awaiting culture results from the drain.  Urology plans to coordinate outpatient study of both ureters to determine patency with a follow-up in office 1 week after discharge.Dr. Us from infectious disease saw patient again also today.  Drainage cultures from the retroperitoneal drain remain pending.  For now he continued vancomycin.  He also talked with micro in the lab today about the prior positive blood cultures and they are going to resend the new sample due to the mixed sample that came out on prior study.  He is continuing micafungin 100 mg daily for now due to the isolated Candida in patient's stents.  Pharmacy continues to adjust vancomycin  levels.  New labs ordered for tomorrow AM.  Patient hoping for discharge soon if final decisions can be made regarding antibiotic therapy.  Vital signs remained stable with no fever pulse 82 respiratory rate 18 and blood pressure 128/64 with 94% on sat with room air.        Review of Systems:               Review of systems could not be obtained due to  patient confusion. patient nonverbal.       Past Medical History:   Diagnosis Date    Anemia     intermittently    Anxiety and depression     Arthritis     Depression     Diabetes mellitus     Gallstones     High cholesterol     History of frequent urinary tract infections     HX OF YEAST IN BLADDER HAS PRN DIFLUCAN    History of transfusion 05/24/2017    Had 2 iron infusions.  This was when i had knee replacement    Hyperlipidemia     Hypertension     Hypothyroidism     Knee pain, bilateral     Low back pain     Lumbar stenosis     PONV (postoperative nausea and vomiting)     Positive TB test     chest x-ray neg    Seasonal allergies      Past Surgical History:   Procedure Laterality Date    APPENDECTOMY N/A 1982    Dr. Wilson    CHOLECYSTECTOMY WITH INTRAOPERATIVE CHOLANGIOGRAM N/A 10/31/2018    Procedure: laparoscopic cholecystectomy;  Surgeon: Mary Kay Mac MD;  Location: [facility];  Service: General    COLONOSCOPY      CYSTOSCOPY BLADDER BIOPSY N/A 10/3/2016    Procedure: CYSTOSCOPY BLADDER BIOPSY;  Surgeon: Rei Calvert MD;  Location: [facility] OR;  Service:     CYSTOSCOPY W/ URETERAL STENT PLACEMENT Bilateral 1/3/2024    Procedure: CYSTOSCOPY BILATERAL RETROGRADE PYELOGRAM  BILATERAL URETERAL STENT INSERTION AND CATHETHER PLACEMENT;  Surgeon: Eduard Armando MD;  Location: [facility];  Service: Urology;  Laterality: Bilateral;    DILATATION AND CURETTAGE N/A     ENDOSCOPY      INTRAUTERINE DEVICE INSERTION      KNEE ARTHROSCOPY W/ MENISCAL REPAIR Left     OVARIAN CYST REMOVAL Left 1982    Dr. Wilson    TOTAL KNEE ARTHROPLASTY Right  2017    Procedure: RIGHT TOTAL KNEE ARTHROPLASTY WITH ALETA NAVIGATION;  Surgeon: Ross Cruz MD;  Location: [facility] MAIN OR;  Service:      Allergies   Allergen Reactions    Sulfa Antibiotics Hives and Rash       Family History   Problem Relation Age of Onset    Hepatitis Mother     Breast cancer Mother     Heart disease Mother     Cancer Mother     Hyperlipidemia Mother              Heart failure Father     Stroke Father     Hypertension Father         Since he was 72, now 86s    Diabetes Father     Heart disease Father     Hyperlipidemia Father         Unsurs    Heart disease Maternal Grandmother     Cancer Maternal Grandfather     COPD Maternal Grandfather     Arthritis Paternal Grandmother     Diabetes Maternal Aunt     Diabetes Paternal Uncle     Malig Hyperthermia Neg Hx        Social History     Socioeconomic History    Marital status:    Tobacco Use    Smoking status: Never    Smokeless tobacco: Never    Tobacco comments:     Never smoked   Vaping Use    Vaping Use: Never used   Substance and Sexual Activity    Alcohol use: Never     Alcohol/week: 3.0 standard drinks of alcohol     Types: 1 Glasses of wine, 2 Standard drinks or equivalent per week    Drug use: Never    Sexual activity: Not Currently     Partners: Male     Birth control/protection: I.U.D.       PMH, FH, SH and ROS completed with Admission History and Physical and updated in EPIC system.        Objective     Scheduled Meds:enoxaparin, 40 mg, Subcutaneous, Q12H  escitalopram, 10 mg, Oral, Daily  insulin glargine, 18 Units, Subcutaneous, Nightly  insulin lispro, 2-9 Units, Subcutaneous, 4x Daily AC & at Bedtime  levothyroxine, 137 mcg, Oral, Q AM  micafungin (MYCAMINE) IV, 100 mg, Intravenous, Q24H  oxybutynin XL, 10 mg, Oral, Daily  pantoprazole, 40 mg, Oral, Q AM  potassium phosphate, 15 mmol, Intravenous, Once  rOPINIRole, 0.5 mg, Oral, Nightly  senna-docusate sodium, 2 tablet, Oral, BID  vancomycin, 500 mg,  "Intravenous, Q8H      Continuous Infusions:lactated ringers, 9 mL/hr, Last Rate: 9 mL/hr (01/03/24 1527)  Pharmacy to dose vancomycin,         Vital signs in last 24 hours:  Temp:  [98 °F (36.7 °C)-98.7 °F (37.1 °C)] 98.4 °F (36.9 °C)  Heart Rate:  [82-93] 92  Resp:  [18-21] 20  BP: (112-128)/(55-71) 122/55    Intake/Output:    Intake/Output Summary (Last 24 hours) at 1/13/2024 0324  Last data filed at 1/12/2024 1800  Gross per 24 hour   Intake 1740 ml   Output 1620 ml   Net 120 ml         Exam:  /55 (BP Location: Left arm, Patient Position: Lying)   Pulse 92   Temp 98.4 °F (36.9 °C) (Oral)   Resp 20   Ht 165.1 cm (65\")   Wt 115 kg (254 lb 3.1 oz)   LMP 09/03/2016 Comment: IUD  SpO2 92%   BMI 42.30 kg/m²     Constitutional:  Patient up in chair.  Continues to more alert.  Normal personality.  Pain significantly improved and almost gone.  Drain continues to produce opaque brownish-green drainage.  Redness over right lower quadrant also significantly improved.  Blood pressures stable.   Eyes:     PERRLA, conjunctiva/corneas clear, no icterus, no conjunctival                                     pallor, EOM's intact, both eyes      ENT and Mouth: Lips, tongue, gums normal; oral mucosa pink and moist   Neck:     Supple, symmetrical, trachea midline, no JVD  Respiratory:     Clear to auscultation bilaterally, respirations unlabored  Cardiovascular:  Regular rate and rhythm, S1 and S2 normal, no murmur,      no  Rub or gallop.  Pulses normal.    Gastrointestinal:   BS present x 4 Soft, non-tender, bowel sounds active,      no masses, no hepatosplenomegaly    right lower quadrant pain almost totally resolved with no rebound guarding or other abnormalities.  Overlying skin does appear erythematous                                                 :       No hernia.  Normal exam for sex.         Musculoskeletal: Extremities normal, atraumatic, no cyanosis or edema     No arthropathy.  No deformity.  Gait normal  "                                                Skin:   Skin is warm and dry,  no rashes, swelling or palpable lesions.  Erythema resolved over right lower quadrant area pain   Neurologic:  CN -XII intact, motor strength grossly intact, sensation grossly intact to light touch, no focal reflex deficits noted    Psychiatric:     Alert,oriented X3, no delusions, psychoses, depression or anxiety    Heme/Lymph/Imun:   No bruises, petechiae.  Lymph nodes normal in size/configuration       Data Review:  Lab Results   Component Value Date    CALCIUM 8.8 01/11/2024    PHOS 3.7 01/07/2024     Results from last 7 days   Lab Units 01/11/24  0734 01/10/24  0639 01/09/24  1812 01/09/24  0645 01/08/24  0609 01/07/24  0645 01/06/24  0653   AST (SGOT) U/L 10 16  --  16   < > 16 14   ALT (SGPT) U/L 7 7  --  9   < > 8 13   MAGNESIUM mg/dL  --   --   --   --   --  1.7 1.7   SODIUM mmol/L 131* 132*  --  133*   < > 129* 131*   POTASSIUM mmol/L 4.4 4.6 4.4 3.6   < > 4.8 3.8   CHLORIDE mmol/L 94* 95*  --  95*   < > 94* 98   CO2 mmol/L 27.6 25.6  --  27.5   < > 20.6* 22.3   BUN mg/dL 4* 3*  --  3*   < > 6* 7*   CREATININE mg/dL 0.78 0.70  --  0.71   < > 0.62 0.61   GLUCOSE mg/dL 162* 163*  --  160*   < > 157* 171*   CALCIUM mg/dL 8.8 8.3*  --  8.6   < > 8.9 8.6   WBC 10*3/mm3 11.91* 12.93*  --  14.58*   < > 13.75* 10.77   HEMOGLOBIN g/dL 9.8* 10.3*  --  10.1*   < > 11.5* 10.1*   PLATELETS 10*3/mm3 502* 392  --  439   < > 371 384    < > = values in this interval not displayed.     Lab Results   Component Value Date    TROPONINT <6 01/02/2024     Estimated Creatinine Clearance: 95.9 mL/min (by C-G formula based on SCr of 0.78 mg/dL).  WEIGHTS:     Wt Readings from Last 1 Encounters:   01/12/24 0605 115 kg (254 lb 3.1 oz)   01/11/24 0600 113 kg (249 lb)   01/09/24 0653 114 kg (251 lb 3.2 oz)   01/08/24 0700 115 kg (254 lb 3.1 oz)   01/07/24 0510 120 kg (263 lb 10.7 oz)   01/07/24 0349 120 kg (263 lb 10.7 oz)   01/06/24 0509 122 kg (269 lb 6.4  oz)   01/05/24 0556 111 kg (244 lb 11.4 oz)   01/04/24 0600 111 kg (245 lb 6 oz)   01/03/24 0600 111 kg (245 lb 6 oz)   01/02/24 1131 120 kg (265 lb)         Assessment:    DKA (diabetic ketoacidosis)    UTI (urinary tract infection)    Morbid obesity with BMI of 40.0-44.9, adult    JADEN (acute kidney injury)    Sepsis, unspecified organism    Lactic acidosis    UPJ (ureteropelvic junction) obstruction bilateral    Bilateral hydronephrosis moderate on admission    Renal calculus on right, nonobstructinbg    Vitamin D deficiency    Essential hypertension    Hyperlipidemia    Hypothyroidism    Allergic rhinitis    Back pain    Chronic liver disease    Primary osteoarthritis of right knee    Contact dermatitis    Anxiety    Dense breast tissue on mammogram    Bilateral hydrocele      Attending Physician Assessment and Plan:    1.  Cystitis/UTI with sepsis associated with bilateral UPJ obstruction and moderate bilateral hydronephrosis and possible right calyceal rupture.  Patient resuscitated aggressively with IV fluids.  Broad-spectrum antibiotics, Zosyn, started.  Urology consult is placed.  Patient being followed carefully in ICU for additional complications or signs of worsening sepsis.  Culture of urine remains negative on day #2.  New cultures collected at time of cystoscopy today by urology.  Patient currently off Zosyn which was stopped after surgery yesterday.  We will repeat blood cultures since we did have 1 of 2 initial blood cultures positive.  ID has been consulted and is following with us.  They have started the patient on fluconazole secondary to the instrumentation yesterday and yeast that is seen in hearing.  I do plan to check a procalcitonin level with a.m. labs tomorrow.  Patient also started on vancomycin due to 2 of 2 positive cultures from blood.  Additional blood cultures pending at present time.  ID continues to follow on 1/9/2024.  Vancomycin and micafungin are continued for treatment of  ongoing infections.  Antibiotics unchanged at this point.  Hoping culture from fluid collection obtained in IR today will be useful in guiding antibiotic therapy.  ID continues to follow.  Vancomycin still ongoing.  Final decision on treatment to come soon with respect to cultures.  Awaiting final culture results.  Lab resubmitting blood culture for ID.  Urine cultures negative.  Drainage from abscess culture still pending.    2.  Anion gap metabolic acidosis versus possible diabetic ketoacidosis.  Improving rapidly at present time on DKA protocol with Glucomander.  Aggressive fluid resuscitation initiated in the ER is continued in the ICU.  Patient now with good urinary output and hopefully will be able to come off of IV insulin and switch to subcu dosing overnight..  Anion gap has narrowed and improved.  Patient should come off Glucomander DKA protocol sometime later today.  On postop day #1 anion gap has significantly improved.  We are asking renal to help us with follow-up on her acute kidney injury and volume status.  Acidosis has resolved.  Acidosis has resolved.  Diabetes now under control with Lantus 16 units daily and sliding scale insulin anion gap remains normal now.     3.  Lactic acidosis possibly due to home use of metformin while taking no food or possibly due to acute sepsis.  Continuing rapid fluid resuscitation with improvement already noted in lactic acid.  Monitoring carefully for signs of any new changes or problems.  Lactic acidosis has also resolved on day #2 and patient's sepsis seems to be under much more stable condition at present time.  Repeat lactic acid in a.m. with other labs.  Lactic acidosis has resolved.    4.  Type 2 diabetes mellitus poorly controlled at present time with elevated A1c greater than 16.  Will probably need to arrange for follow-up with patient in endocrinology clinic.  For now we will ask diabetic educator to see patient and work on sliding scale insulin protocols.   Patient and her daughter are interested in Dexcom monitoring and I will see if that is available from the diabetic educator.  Discussed situation with the daughters and she may benefit from continuous 24-hour glucose monitoring issues willing to learn the process.  Will probably need to discharge charge on sliding scale insulin and may also consider mealtime insulin or long acting insulin.  Diabetic educator to see patient and work with us on diet, glucose testing techniques, and generalized diabetes management.  Patient doing well on sliding scale with single dose of Lantus at nighttime.  Now the patient is eating more plan to increase Lantus to 16 units subcu daily.  We will plan on rechecking hemoglobin A1c in 6 to 8 weeks.  Diabetes much improved.  Stable on Lantus and sliding scale insulin.  Continuing to increase Lantus slowly to 18 units daily.  Splitting Lantus into 2 doses daily and will give 10 units subcu twice daily since glucose is still running a bit high.    5.  Pseudohyponatremia.  Seems to already be improving with resuscitation and resolution of the uncontrolled glucoses.  Will continue to follow electrolytes regularly.  Hyponatremia now corrected and in normal range.  Hyponatremia has now resolved on day #2. Sodium now normalized.     6.  Hypothyroidism.  Patient's Synthroid is continued by the ICU attending.  Will continue to follow this in the hospital and on an outpatient basis.  Hypothyroidism is a stable condition     7.  Hyperlipidemia.  Agree with holding statin for now.  Will resume prior to discharge and continue on outpatient basis.     8.  Obesity.  Hope to continue using agents such as Ozempic for management of patient's blood sugar levels.  This will also help with diet control and ongoing need for gradual weight loss.     9.  Acute kidney injury secondary to volume depletion with uncontrolled glucoses.  Should improve as patient's volume status normalizes.  Will continue to monitor  closely.  Acute kidney injury is gradually improving with resolution of hydronephrosis and bilateral stent placement.  Acute kidney injury totally resolved.     10.  Generalized muscle weakness and fatigue.  Suspect related to the poorly controlled diabetes and electrolyte disturbances.  Will do PT and OT evaluations after patient stabilizes.  Suspect patient will discharge to home with PT and OT and home environment.  Hopefully patient will be able to work with PT and OT in the next few days.  Muscle strengthening ongoing.    11.  Right lower quadrant pain with fluid collections noticed on CT scan completed today 6 days after admission.  Patient complaining of significant pain with erythematous skin over the right lower quadrant.  I will ask general surgery to take a look at the situation in the a.m.  We will also notify urology of the findings in the a.m.        Plan for disposition:Where: home and home health and When: 2-3 days when medically stable     Copied text in this note has been reviewed by me and is accurate as of 01/12/2024.  Much of this dictation was completed using Dragon voice activated transcription software which can result in misspelled words and nonsensical phrases at times.     Dr. Fairchild available and can be reached at [phone]      Dean Fairchild MD  1/12/2024  3543 EST

## 2024-01-13 NOTE — PROGRESS NOTES
"[facility] Clinical Pharmacy Services: Vancomycin Monitoring Note    Tiana Hudson is a 63 y.o. female who is on day 9 of pharmacy to dose vancomycin for Bacteremia.    Current Vancomycin Dose:   500 mg IV every  8  hours  Updated Cultures and Sensitivities:   1/2 bld cx 2 of 2 gram positive rods, sent out for further identification  1/3 body fluid cx (kidney): Candida glabrata, candida tropicalis  1/5 bld cx NG  1/10 body fluid cx (retroperitoneum): lactobacillus sp.    Results from last 7 days   Lab Units 01/13/24  0716 01/10/24  0639 01/08/24  0609   VANCOMYCIN TR mcg/mL 11.30 18.30 14.70     Vitals/Labs  Ht: 165.1 cm (65\"); Wt: 116 kg (255 lb 1.2 oz)   Temp Readings from Last 1 Encounters:   01/13/24 98.6 °F (37 °C) (Oral)     Estimated Creatinine Clearance: 103 mL/min (by C-G formula based on SCr of 0.73 mg/dL).     Results from last 7 days   Lab Units 01/13/24  0716 01/11/24  0734 01/10/24  0639   CREATININE mg/dL 0.73  0.73 0.78 0.70   WBC 10*3/mm3 9.51 11.91* 12.93*     Assessment/Plan  Vancomycin duration of therapy extended while awaiting identification of organism in blood culture (sent out to external lab). Trough level collected this AM, predicted AUC = 363 mg/L.h with current dose (below goal 400-600). Creatinine is stable.   Vancomycin Dose: change vancomycin to 1000mg IV q12h; provides a predicted  mg/L.hr  (goal 400-600)  Next Level Date and Time: No level ordered for now pending plans for duration of therapy.   We will continue to monitor patient changes and renal function     Thank you for involving pharmacy in this patient's care. Please contact pharmacy with any questions or concerns.       Britni Lauren, PharmD  Clinical Pharmacist                  "

## 2024-01-13 NOTE — PROGRESS NOTES
F/U for bilateral UPJ obstruction s/p stent placement 01/03/2024,  UTI, and sepsis. Also has right retroperitoneal drain placed by IR on 01/10/2024.     Patient awake and sitting up in chair; eating lunch    AVSS; c/o mild discomfort at site of RAJESH drain  Abdomen soft and nontender; denies pain...  Good UOP; urine clear yellow  Continued purulent drainage from RAJESH drain     Crit 0.73  Hg 9.1  WBC 9.51  Retroperitoneal fluid + for lactobacillus  Currently on IV vancomycin    PLAN;  Continue IV antibiotics per ID  Maintain R Retroperitoneal drain - anticipate keeping until output is clear/scant and infection-free.   Will continue to follow

## 2024-01-13 NOTE — PROGRESS NOTES
ID note for retroperitoneal abscess   subjective: She is still having copious drain output.  She is not having any difficulties with the antibiotics.  Afebrile.    Vital Signs  Temp:  [98.3 °F (36.8 °C)-99.1 °F (37.3 °C)] 98.6 °F (37 °C)  Heart Rate:  [87-93] 89  Resp:  [18-21] 18  BP: (113-128)/(55-70) 124/70    Physical Exam:  General: Sickly, nontoxic  Respiratory: Normal work of breathing  Skin: No rashes or lesions in exposed areas  Extremities: No edema, cyanosis  Access: Peripheral IV  Abdomen soft and nontender       Results Review:    White count 9.51, hemoglobin 9.1, platelet 528  Creatinine 0.73  Glucose 159 through 243    Microbiology:  1/2 urine culture no growth  1/2 blood cultures 2 out of 2 gram-positive bacilli  1/3 right renal fluid culture Candida glabrata and Candida tropicalis  1/3 left renal fluid culture Candida glabrata  1/5 blood cultures no growth to date  1/10 retroperitoneal fluid Lactobacillus    Assessment    1.  Sepsis secondary to #2, #3  2.  Bilateral hydronephrosis status post bilateral ureteral stenting with retroperitoneal abscess status post drain placement  3.  Gram-positive bacteremia      Recent body fluid culture grew lactobacillus   Continue vancomycin for an AUC of 400-600 which will cover lactobacillus and original bacteremia.  Vancomycin level came back today at 11.3 giving subtherapeutic level and vancomycin increased to 1 g IV every 12 hours  Continue micafungin 100 mg IV every 24 hours to cover Candida glabrata and tropicalis.  Formal sensitivities of the glabrata are pending at reference laboratory.  Formal sensitivities of positive blood cultures are also pending at reference laboratory

## 2024-01-14 LAB
ALBUMIN SERPL-MCNC: 2.1 G/DL (ref 3.5–5.2)
ALBUMIN/GLOB SERPL: 0.5 G/DL
ALP SERPL-CCNC: 142 U/L (ref 39–117)
ALT SERPL W P-5'-P-CCNC: 9 U/L (ref 1–33)
ANION GAP SERPL CALCULATED.3IONS-SCNC: 9 MMOL/L (ref 5–15)
AST SERPL-CCNC: 12 U/L (ref 1–32)
BACTERIA FLD CULT: ABNORMAL
BACTERIA FLD CULT: ABNORMAL
BASOPHILS # BLD AUTO: 0.09 10*3/MM3 (ref 0–0.2)
BASOPHILS NFR BLD AUTO: 0.8 % (ref 0–1.5)
BILIRUB SERPL-MCNC: 0.4 MG/DL (ref 0–1.2)
BUN SERPL-MCNC: 6 MG/DL (ref 8–23)
BUN/CREAT SERPL: 7.9 (ref 7–25)
CALCIUM SPEC-SCNC: 8.8 MG/DL (ref 8.6–10.5)
CHLORIDE SERPL-SCNC: 93 MMOL/L (ref 98–107)
CO2 SERPL-SCNC: 27 MMOL/L (ref 22–29)
CREAT SERPL-MCNC: 0.76 MG/DL (ref 0.57–1)
DEPRECATED RDW RBC AUTO: 44.9 FL (ref 37–54)
EGFRCR SERPLBLD CKD-EPI 2021: 89.3 ML/MIN/1.73
EOSINOPHIL # BLD AUTO: 0.2 10*3/MM3 (ref 0–0.4)
EOSINOPHIL NFR BLD AUTO: 1.8 % (ref 0.3–6.2)
ERYTHROCYTE [DISTWIDTH] IN BLOOD BY AUTOMATED COUNT: 14.4 % (ref 12.3–15.4)
GLOBULIN UR ELPH-MCNC: 4.2 GM/DL
GLUCOSE BLDC GLUCOMTR-MCNC: 210 MG/DL (ref 70–130)
GLUCOSE BLDC GLUCOMTR-MCNC: 221 MG/DL (ref 70–130)
GLUCOSE BLDC GLUCOMTR-MCNC: 235 MG/DL (ref 70–130)
GLUCOSE BLDC GLUCOMTR-MCNC: 237 MG/DL (ref 70–130)
GLUCOSE BLDC GLUCOMTR-MCNC: 247 MG/DL (ref 70–130)
GLUCOSE SERPL-MCNC: 218 MG/DL (ref 65–99)
GRAM STN SPEC: ABNORMAL
GRAM STN SPEC: ABNORMAL
HCT VFR BLD AUTO: 28.2 % (ref 34–46.6)
HGB BLD-MCNC: 9.1 G/DL (ref 12–15.9)
IMM GRANULOCYTES # BLD AUTO: 0.08 10*3/MM3 (ref 0–0.05)
IMM GRANULOCYTES NFR BLD AUTO: 0.7 % (ref 0–0.5)
LYMPHOCYTES # BLD AUTO: 1.62 10*3/MM3 (ref 0.7–3.1)
LYMPHOCYTES NFR BLD AUTO: 14.7 % (ref 19.6–45.3)
MCH RBC QN AUTO: 27.7 PG (ref 26.6–33)
MCHC RBC AUTO-ENTMCNC: 32.3 G/DL (ref 31.5–35.7)
MCV RBC AUTO: 85.7 FL (ref 79–97)
MONOCYTES # BLD AUTO: 1.31 10*3/MM3 (ref 0.1–0.9)
MONOCYTES NFR BLD AUTO: 11.9 % (ref 5–12)
NEUTROPHILS NFR BLD AUTO: 7.69 10*3/MM3 (ref 1.7–7)
NEUTROPHILS NFR BLD AUTO: 70.1 % (ref 42.7–76)
NRBC BLD AUTO-RTO: 0 /100 WBC (ref 0–0.2)
PLATELET # BLD AUTO: 550 10*3/MM3 (ref 140–450)
PMV BLD AUTO: 9.2 FL (ref 6–12)
POTASSIUM SERPL-SCNC: 4.1 MMOL/L (ref 3.5–5.2)
PROT SERPL-MCNC: 6.3 G/DL (ref 6–8.5)
RBC # BLD AUTO: 3.29 10*6/MM3 (ref 3.77–5.28)
SODIUM SERPL-SCNC: 129 MMOL/L (ref 136–145)
VANCOMYCIN TROUGH SERPL-MCNC: 12.6 MCG/ML (ref 5–20)
WBC NRBC COR # BLD AUTO: 10.99 10*3/MM3 (ref 3.4–10.8)

## 2024-01-14 PROCEDURE — 25010000002 MICAFUNGIN SODIUM 100 MG RECONSTITUTED SOLUTION 1 EACH VIAL: Performed by: INTERNAL MEDICINE

## 2024-01-14 PROCEDURE — 25010000002 ENOXAPARIN PER 10 MG: Performed by: UROLOGY

## 2024-01-14 PROCEDURE — 85025 COMPLETE CBC W/AUTO DIFF WBC: CPT | Performed by: INTERNAL MEDICINE

## 2024-01-14 PROCEDURE — 25010000002 VANCOMYCIN PER 500 MG: Performed by: STUDENT IN AN ORGANIZED HEALTH CARE EDUCATION/TRAINING PROGRAM

## 2024-01-14 PROCEDURE — 63710000001 INSULIN LISPRO (HUMAN) PER 5 UNITS: Performed by: INTERNAL MEDICINE

## 2024-01-14 PROCEDURE — 63710000001 INSULIN GLARGINE PER 5 UNITS: Performed by: INTERNAL MEDICINE

## 2024-01-14 PROCEDURE — 82948 REAGENT STRIP/BLOOD GLUCOSE: CPT

## 2024-01-14 PROCEDURE — 99232 SBSQ HOSP IP/OBS MODERATE 35: CPT | Performed by: INTERNAL MEDICINE

## 2024-01-14 PROCEDURE — 80202 ASSAY OF VANCOMYCIN: CPT | Performed by: INTERNAL MEDICINE

## 2024-01-14 PROCEDURE — 80053 COMPREHEN METABOLIC PANEL: CPT | Performed by: INTERNAL MEDICINE

## 2024-01-14 RX ADMIN — HYDROCODONE BITARTRATE AND ACETAMINOPHEN 1 TABLET: 5; 325 TABLET ORAL at 17:44

## 2024-01-14 RX ADMIN — ROPINIROLE HYDROCHLORIDE 0.5 MG: 0.5 TABLET, FILM COATED ORAL at 20:37

## 2024-01-14 RX ADMIN — ENOXAPARIN SODIUM 40 MG: 100 INJECTION SUBCUTANEOUS at 08:02

## 2024-01-14 RX ADMIN — INSULIN LISPRO 4 UNITS: 100 INJECTION, SOLUTION INTRAVENOUS; SUBCUTANEOUS at 11:18

## 2024-01-14 RX ADMIN — VANCOMYCIN HYDROCHLORIDE 1000 MG: 1 INJECTION, SOLUTION INTRAVENOUS at 17:49

## 2024-01-14 RX ADMIN — LEVOTHYROXINE SODIUM 137 MCG: 137 TABLET ORAL at 06:21

## 2024-01-14 RX ADMIN — ESCITALOPRAM OXALATE 10 MG: 10 TABLET, FILM COATED ORAL at 08:02

## 2024-01-14 RX ADMIN — INSULIN LISPRO 4 UNITS: 100 INJECTION, SOLUTION INTRAVENOUS; SUBCUTANEOUS at 08:02

## 2024-01-14 RX ADMIN — INSULIN LISPRO 4 UNITS: 100 INJECTION, SOLUTION INTRAVENOUS; SUBCUTANEOUS at 16:30

## 2024-01-14 RX ADMIN — INSULIN GLARGINE 12 UNITS: 100 INJECTION, SOLUTION SUBCUTANEOUS at 20:30

## 2024-01-14 RX ADMIN — INSULIN LISPRO 4 UNITS: 100 INJECTION, SOLUTION INTRAVENOUS; SUBCUTANEOUS at 20:30

## 2024-01-14 RX ADMIN — VANCOMYCIN HYDROCHLORIDE 1000 MG: 1 INJECTION, SOLUTION INTRAVENOUS at 06:21

## 2024-01-14 RX ADMIN — ENOXAPARIN SODIUM 40 MG: 100 INJECTION SUBCUTANEOUS at 20:30

## 2024-01-14 RX ADMIN — OXYBUTYNIN CHLORIDE 10 MG: 10 TABLET, EXTENDED RELEASE ORAL at 08:02

## 2024-01-14 RX ADMIN — INSULIN GLARGINE 12 UNITS: 100 INJECTION, SOLUTION SUBCUTANEOUS at 08:02

## 2024-01-14 RX ADMIN — PANTOPRAZOLE SODIUM 40 MG: 40 TABLET, DELAYED RELEASE ORAL at 06:21

## 2024-01-14 RX ADMIN — MICAFUNGIN SODIUM 100 MG: 100 INJECTION, POWDER, LYOPHILIZED, FOR SOLUTION INTRAVENOUS at 10:33

## 2024-01-14 NOTE — PROGRESS NOTES
ID note for retroperitoneal abscess   subjective: Still with abundant drain outpt. She is not having any difficulties with the antibiotics.  Afebrile.    Vital Signs  Temp:  [98.2 °F (36.8 °C)-99 °F (37.2 °C)] 98.8 °F (37.1 °C)  Heart Rate:  [89-98] 98  Resp:  [18-24] 18  BP: (113-144)/(54-73) 117/64    Physical Exam:  General: Sickly, nontoxic  Respiratory: Normal work of breathing  Skin: No rashes or lesions in exposed areas  Extremities: No edema, cyanosis  Access: Peripheral IV  Abdomen soft and nontender       Results Review:    White count 10.99, hemoglobin 9.1, platelet 550  Creatinine 0.76  Glucose 210-339    Microbiology:  1/2 urine culture no growth  1/2 blood cultures 2 out of 2 gram-positive bacilli  1/3 right renal fluid culture Candida glabrata and Candida tropicalis  1/3 left renal fluid culture Candida glabrata  1/5 blood cultures no growth to date  1/10 retroperitoneal fluid Lactobacillus    Assessment    1.  Sepsis secondary to #2, #3  2.  Bilateral hydronephrosis status post bilateral ureteral stenting with retroperitoneal abscess status post drain placement  3.  Gram-positive bacteremia      Recent body fluid culture grew lactobacillus   Continue vancomycin for an AUC of 400-600 which should should cover lactobacillus and original bacteremia.    Continue micafungin 100 mg IV every 24 hours to cover Candida glabrata and tropicalis.    Formal sensitivities of the glabrata and GPR are pending at reference laboratory.

## 2024-01-14 NOTE — PROGRESS NOTES
F/U for bilateral UPJ obstruction s/p stent placement 01/03/2024, UTI, and sepsis. Also has right retroperitoneal drain placed by IR on 01/10/2024.      Patient lying in bed watching television     AVSS; mild discomfort in RLQ at drain site  Abdomen obese, soft and nontender  Good UOP; urine clear yellow  RAJESH drain with thick white purulent drainage.     Crit - 0.76  Hg - 9.1  WBC - 10.99  Retroperitoneal fluid + for lactobacillus  Currently on IV vancomycin     PLAN;  Continue IV antibiotics per ID  Maintain Rt Retroperitoneal drain - anticipate keeping until output is clear/scant and infection-free.   Will continue to follow

## 2024-01-14 NOTE — PROGRESS NOTES
[facility]AL[facility]PUZAN[facility]  INTERNAL MEDICINE  DEAN FAIRCHILD MD  [address] [place] [address]  Phone [phone] Fax [phone]  E-mail:  [email or web address]      INTERNAL MEDICINE DAILY PROGRESS NOTE  Dean Fairchild M.D.  2024            Patient Identification:  Name: Tiana Hudson  Age: 63 y.o.  Sex: female  :  1960  MRN: [id number]         Primary Care Physician: Dean Fairchild MD  LENGTH OF STAY 12 DAYS    Consults       Date and Time Order Name Status Description    2024  3:42 AM Inpatient General Surgery Consult Completed     2024  5:10 AM Inpatient Nephrology Consult Completed     2024  2:00 PM Inpatient Infectious Diseases Consult Completed     1/3/2024 10:27 AM Inpatient Urology Consult      2024 11:55 PM Urology (on-call MD unless specified) Completed     2024  3:45 PM IP General Consult (Use specialty-specific consult if known) Completed             Chief Complaint: Abdominal/flank pain with DKA, acute cystitis and sepsis, and possible calyceal rupture in the kidney     History of Present Illness:     Subjective   Interval History: Patient is a 63 y.o.female who presented at my request to the emergency room at [facility] with complaint of acute abdominal/flank pain and history of recurrent kidney stones.  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  Patient has been followed for the last few months by Dr. Rei Calvert in urology for renal calculi and actually has had a stent placed in the right kidney due to obstruction from her renal stones.  It is also noted that the patient had a recent lithotripsy.  Patient began to feel poorly after the recent Shrey[place]  holiday and became more severely ill over the last 3 to 4 days prior to this admission.  She has felt extremely fatigued, dizzy at times, tired, nauseated, and has spent most of her time in bed sleeping.  Family has had difficulty getting the patient to take any oral intake and became quite concerned as her condition continued to worsen.  Patient was attempting to go to work today but really was too dizzy to get in the car to drive and 2-week to actually work.  After urging from her daughter, patient called me with respect to the symptoms and at my request went to the ER for evaluation.  Patient has multiple medical comorbidities that complicate her medical care.These include most significantly recurrent urinary tract infections due to her nephrolithiasis, morbid obesity with BMI greater than 40, diabetes mellitus type 2 poorly controlled with recent addition of Ozempic to her metformin therapy, vitamin D deficiency, essential hypertension, hyperlipidemia, hypothyroidism, allergic rhinitis, back pain, contact dermatitis, anxiety, and history of dense breast tissue on mammograms.     Patient was evaluated in the emergency room by Angelito Winkler MD who was the ER attending on call time of her arrival.  Patient was seen on 1/2/2024 at 1544 by Dr. Winkler.  His HPI was consistent with the story which I given above.  Patient denied dysuria, fever, chills on his evaluation.  She did admit to having emesis and actually had some emesis while in the emergency room.  Review of systems in the emergency room was positive for diffuse abdominal pain, and vomiting.  Patient also was noted to have some significant flank pain.  All other systems reviewed were negative in the emergency room vital signs on arrival in the emergency room showed temperature of 96 °F, heart rate of 114, respiratory rate of 16, blood pressure 125/72, and O2 sat of 98%.  Patient was described as ill-appearing but in no acute distress.  She did have severe  tenderness to palpation more on the right side of the abdomen and out into the right flank area with voluntary guarding.  Labs collected in the ER showed that her lactate level was elevated at 6.0.  She had a lipase of 23, her white blood cell count was 20.12, her hemoglobin was 11.2, and her platelet count was 568,000.  Patient did have a phosphorus of 4.2, magnesium 2.0, osmolality of 317, hemoglobin A1c is 16.90, and UA showed specific gravity 1.027, 3+ glucose, moderate blood 2+, leukocyte esterase moderate 2+, nitrates negative, white blood cells 6-10, red blood cells 6-10, bacteria 2+.  Her CMP showed a glucose of 978, creatinine of 1.82, sodium of 115, potassium of 4.2, chloride of 76, CO2 of 15.7, albumin 2.4, AST 33, alk phos 220, anion gap of 23, EGFR of 31.3.  Patient did have a CT scan of her abdomen and pelvis completed which showed loculated fluid throughout the right perinephric space felt to possibly be secondary to right calyceal rupture, new mild to moderate wall thickening of the right renal pelvis and renal calyces, bilateral UPJ obstruction with moderate bilateral hydronephrosis on the left, mild new dilation of right ureter, nonobstructing right renal calculi, hepatic morphology suggestive of chronic liver disease, and reactive lymph nodes in the retrocaval area.  A chest x-ray was done which showed no acute disease other than minimal atelectasis at the base of the right lung.  Patient underwent aggressive fluid resuscitation in the emergency room and received over 3 L of IV fluid.  She was placed on an insulin drip and was also started on Zosyn therapy.  She was followed on sepsis protocol as well as DKA protocol.  Patient did receive morphine 4 mg for pain x 2.  Case was discussed with myself as her primary care attending.  Urology was also contacted about situation.  I did suggest that patient be admitted to the ICU for treatment of the DKA and severe sepsis picture.  Dr. Arthur Tena did  agree to the ICU admission and will be following her in the CCU for pulmonary/critical care.  Final diagnoses in the ER were sepsis due to unspecified organism and acute cystitis without significant hematuria.     1/2/2024.  I personally saw the patient for the first time during this hospitalization on this date in the coronary care unit room #335.  Patient was resting quietly in bed and did a peer acutely ill.  She did wake to her name and spoke a few words before falling back asleep.  Patient's 2 daughters were at the foot of her bed and did discuss the case at length with me.  Daughter Enoch, is a former nurse from here at [facility] [employer], and now works at [employer] with the hospitalist group there.  Patient has responded well to the Glucommander DKA protocols and blood sugars have gradually come down to near normal levels for the patient in the 1  range.  I will full PPE for the exam including an N95 face mask, goggles, white lab coat, and gloves when touching patient.  I performed thorough hand hygiene before and after the patient visit.  Patient did have a venous blood gas which showed pH 7.40, pCO2 28, pO2 of 31, and O2 sat of 62%.  She also had recent electrolytes which showed a sodium of 131, potassium 3.4, chloride 96, CO2 19.1, BUN 17, creatinine 1.15, estimated GFR now up to 54.3.  Lactate level has been coming steadily down and currently at 2.8.  Additional labs ordered for tomorrow a.m.  Patient has been able to urinate several times and daughters have helped her up to the bathroom for this.  I actually note that she did have some urinary frequency and incontinence at home which is unusual for her.  I did speak briefly to the patient's daytime nurse who was departing soon after I arrived on the floor.  I have reviewed Dr. Arthur Tena's notes and his admission history and physical.  I do agree completely with his plan of care at present time.  Supportive care is continued to be offered for the sepsis  with careful monitoring in the ICU.  Patient's anion gap metabolic acidosis probably is related to lactic acidosis and she was continuing to take metformin even when she was not able to eat.  IV fluids are continued aggressively.  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  Probably will plan sliding scale insulin for stabilization of diabetes while here in the hospital and will train patient to continue that in the home environment.  Pseudohyponatremia will be corrected with the IV fluids.  Hypothyroidism will be addressed with continued Synthroid.  Statins are held for now.  Obesity is an ongoing problem for the patient.  At the time my exam, patient is medically stable and slowly improving.  I did relay this to the daughter to agree with my assessment.  We will continue to follow the patient with you and we will be happy to assume care of the patient when she is stable and ready to be transferred out of the ICU.  I can be reached directly for any concerns or questions at [phone].    1/3/2024.  Patient is seen again today in her room in the CCU #335.  Patient was resting quietly in bed at the time of my visit and states that her daughters were downstairs getting a bite to eat.  I did discuss the case with the patient's nurse, Tiana, who tells me that she is being preop and should be going down soon for cystoscopy and stent placement.  I will full PPE for the exam including an N95 face mask, goggles, white lab coat, and gloves when touching patient.  I performed thorough hand hygiene before and after the patient visit.  Patient is more alert today but still somewhat confused about details of her medical case.  She is able to carry on a conversation with me though and joke a bit with me while I am present in the room.  She does understand she has issues because of the obstruction bilaterally of her ureters that has resulted  in hydronephrosis and hopefully can be relieved by the cystoscopy that is planned for later this morning.  Patient is still on an insulin drip but is drastically improved compared to where she was yesterday at this time.  Review of labs shows that her sodium is now up to 133, her CO2 is 19.1, glucose is now at 134, phosphorus is at 2.1, lactate is normalized at 1.9, white blood cell count today is 18.93, hemoglobin is 9.6 today, lakelets are normal today,Blood culture from the first day of hospitalization shows anaerobic bottle gram-positive bacilli growth with identification still ongoing.  Fungal and body fluid cultures are still negative.  Urine culture remains negative.  Dr. Oquendo was following the patient for critical care today.  He has continued her antibiotics and is awaiting urology input.  Fluid resuscitation is continued.  Blood pressure now seems well-controlled.  DVT prophylaxis in place.  He had a lengthy discussion with patient's family at the time of his rounds.  Urology has seen the patient in consultation and is planning to do cystoscopy later today.  Dietitian is following the patient's case and recommending input treatment as needed.Dr. Armando did perform a cystoscopy with ureteral catheterization and stent insertion bilaterally.  His pre-op diagnosis and postop diagnoses were urosepsis with bilateral UPJ obstruction.  He did feel that he had successfully decompressed the blockages and the hydronephrotic changes.  He did speak with patient's daughter postoperatively.  Patient does seem to be much improved today.  Vital signs still show no fever.  Blood pressure is relatively stable.  Will continue to follow with you.    1/4/2024.  Patient is seen again today in her room in the CCU #335.  Patient has transitions of critical care and is in overflow for care on the floor.  Patient's daughter is present at bedside at the time of my visit.  Patient is much more awake and alert today.  She is more  interactive and talkative.  I did discuss the case with the patient's nurse.  We are going to obtain an ID consult today for help with antibiotic management and we also are going to transition the patient to regular sliding scale insulin as we begin the teaching process of getting patient on her own regimen of sliding scale insulin at the time of discharge to home.  We have also started patient on Lantus therapy at 10 units for now and may need to titrate up.  I wore full PPE for the exam today including an N95 face mask, goggles, white lab coat, and gloves when touching patient.  I performed thorough hand hygiene before and after the patient visit.  Specialist have seen patient today and their care is summarized below.Dr. Najera, the critical care attending today, felt that her mentation was slow but answering questions appropriately.  No other major findings were discovered at present time.  Antibiotics were continued for sepsis was felt to be significantly improved with anion gap now closed and lactic acidosis much better.  IV antibiotics were continued awaiting final culture results and I did consult ID to see patient for their input on the situation.  Dr. Devon Armando who completed bilateral stent placement for bilateral OP J obstruction and urosepsis is pleased with progress on day 1 postop.  He notes that urine output has been excellent.  Dr. Us from infectious disease did see the patient in consultation.  He notes that Zosyn antibiotics have been discontinued by Dr. Armando after her successful surgery.  White blood cell count is down to 17.08 today with hematocrit of 30.7.  Glucose max was 250 today estimated GFR is up to 54 and CO2 is 19.0.  He feels the significance of the positive blood culture is unclear at this point with only 1 of 2 bottles positive for gram-positive bacilli which normally would represent a contamination.  He is planning to repeat the blood cultures and will follow-up.  Urine  culture was also negative except for yeast growth and due to the recent  instrumentation he has started the patient on fluconazole 400 mg daily while cultures are pending.  New blood culture has been sent.  We will check again the lactic acid, procalcitonin, sed rate, and CRP with a.m. labs tomorrow.  Labs relatively stable today though I do note that sodium is dropped a bit to 127.  CO2 remains slightly low at 18.0 and chloride slightly low at 97.  Patient's glucoses running in the upper 100s and low 200s at present.  Electrolytes otherwise seem fairly stable.  White count is still elevated somewhat at 17.08 despite stenting yesterday.  Patient currently off antibiotics and being followed carefully by ID.  Plan to check procalcitonin and repeat blood culture with a.m. labs.  Hemoglobin is at 10.0.  Otherwise patient appears very stable at present time.  I do agree that her mentation is a little bit slower than usual but I suspect this will continue to improve as we get further from her acute DKA event.  Treatment plan reviewed with patient and her daughter today.    1/05/2024.  Patient is seen again today in her room in the CCU #335.  Patient resting quietly in bed and daughter Enoch is at bedside.  I spoke with patient's nurse during the day to discuss occasional changes in her treatment plan.  I wore full PPE for the exam including an N95 facemask, goggles, white lab coat, and gloves when touching patient.  I performed thorough hand hygiene before and after the patient visit.  Patient is much more alert today and more like her usual self.  She does laugh and participate in conversation.  Mother and daughter were very impressed with Dr. Sánchez's excellent review with them of the patient's renal condition and with the time he has been going over the various diagnostic images that have been done up to date.  He was pleased to see how significantly improved electrolytes are and felt the patient was remaining  hemodynamically stable at present time.  Dr. Us from ID did see the patient.  Blood cultures were repeated but original cultures are now positive 2 out of 2 for gram-positive bacilli which is more consistent with a true infection.  He started patient on vancomycin and will be following up on ID and's susceptibilities of the organisms.  He also continued the fluconazole 400 mg daily while following up on cultures from perinephric fluid.The pulmonary intensivist Dr. Arthur Tena saw patient briefly and found no pulmonary or critical care needs at this time.  Dr. Armando from neurology saw patient on postop day #2 after stent placement for bilateral UPJ obstruction.  Indicates that Perales catheter can be removed at any time.  Occupational Therapy began to work with the patient and feels that she will benefit from further skilled occupational therapy.  She did some bedside exercises and did do some sort steps.  She was able to sit up on the edge of the bed for 8 to 10 minutes with to stand.  Physical therapy also saw patient and felt she would continue to benefit from skilled physical therapy.  Pharmacist did consult with the patient on vancomycin and started at 750 mg IV every 12 hours.  They will be checking a trough level on 1/7/2024 at 8:30 AM.  Labs today are generally stable.  Glucose levels ranging in the 140s to 170s.  Sodium still slightly low at 132.  CO2 now normal at 22.5, albumin slightly low at 2.1, AST slightly up at 34 and alk phos slightly up at 270.  White blood cell count today is down to 13.04 and hemoglobin is stable at 10.3 with platelets of 412,000.  2 of 2 initial blood cultures were positive with final ID pending.  Sed rate remains greater than 130, procalcitonin is 3.24, and C-reactive protein is at 23.85.  Patient is still on overflow status waiting for a floor telemetry bed.    1/8/2024.  Patient is seen again today in her room on the coronary care unit #335.  Patient was actually up in a  chair at the time of my visit and her daughter was present at bedside.  I wore full PPE for the exam including an N95 facemask, goggles, white lab coat, and gloves when touching patient.  I performed thorough hand hygiene before and after the patient visit.  Patient is feeling much better today.  She says that she was anxious to get up and was happy that the therapist left her up in the chair.  She is able to get to the bathroom but does need some assistance still for that endeavor.  Several specialist saw the patient and their care is summarized below.  Patient remains on vancomycin therapy and pharmacy is following the dose and adjusting levels.  Dr. Us from ID did see patient again today.  He notes that repeat blood cultures remain negative and initial blood cultures are still being evaluated.  He has continued vancomycin for now and will follow-up on ID and susceptibilities.  Urine culture growing Candida glabrata and tropicalis and he has transitioned her from fluconazole therapy to micafungin 100 mg daily for now.  White blood cell count is slightly increased today at 16.61.  He is cautiously monitoring the patient for any new signs of infection or changes.Dr. Armando from urology did see the patient and was pleased with his surgical result from cystoscopy last week with bilateral stent placement for treating UPJ obstruction.  He has signed off on the case and plans to have her follow-up with him in clinic in 7 to 10 days for reevaluation following discharge.Occupational Therapy did work with patient who reported pain level of 2 out of 10 focused mainly on right abdomen where she has some redness swelling and discomfort.  There was no rebound to my exam in this area but I did elect to go ahead and do a CT scan of abdomen and pelvis to evaluate the area since she did have the extensive surgery not so long ago.  Patient is noted to fatigue quickly and has some functional deficits that need to be maximized  prior to DC to home.  Patient is planning discharge to home with spouse and daughters.  MI requested diabetic educator did meet with the patient.  Patient does need a meter for glucose testing.  Planning 4 times a day test for now.  Will use insulin pen lispro at meals.  Dietitian did meet with patient and her daughter for input on diet.  Provided printed materials and discussion.  Will be available for concerns or questions.  Labs today generally were stable.  Sodium back up to 131.  Glucose max of 245.  Patient does admit she is eating more at this time.  White blood cell count is up slightly at 16.61 of concern to ID.  They are monitoring cultures carefully.  Hemoglobin did drop from 11.5 down to 9.3 today will recheck tomorrow.  CT abdomen and pelvis was completed.  Right ureteral stent present but there is mild distention of the right extrarenal pelvis which appears to be improved compared to the exam 6 days ago.  There is right urethrocele thickening.  There is a right perinephric collection consistent with hemorrhage or urinoma's and there is a new deep collection in the right posterior lateral abdominal wall measuring 3 cm in thickness by 13 cm transverse along with muscular thickening along the right lateral abdominal wall and edema within the right lateral abdominal pelvic subcutaneous fat.  There is enlargement of this fluid collection seen.  We will have nursing notify urology in a.m. of this finding for their input.  May want to consider general surgery consultation also if pain persist in this area.    1/9/2024.  Patient is seen again today in the CCU room #335.  Patient's daughter is at bedside and patient is resting quietly in bedside chair.  Patient appears much more awake and alert today back to her usual personality.  I wore full PPE for the exam including an N95 face mask, goggles, white lab coat, and gloves when touching patient.  I performed thorough hand hygiene before and after the patient  visit.  Nursing reports the patient has been using bedside commode with some urgency and loose bowel movements at times.  This is why she continues on IV vancomycin.  Patient did require O2 2 L per nasal cannula at night while sleeping.  No other new issues noted.  Dr. Us from ID saw patient again today.  He continues to monitor her blood cultures and has continued her on the IV vancomycin along with micafungin for treatment of yeast infection.  Urology did see patient regarding findings on CT scan.  These were reviewed with Dr. Salomon.  There are fluid collections in the right lateral abdominal pelvic wall measuring 12.8 cm x 18 cm x 3 cm which may represent hematomas or urinoma months.  Left renal pelvis is still dilated slightly.  Urology did speak with the IR department directly and arrange for CT-guided drain placement which the patient is agreeable to having.Physical therapy did work with the patient today.  She required contact-guard assist for standing and ambulated 15 feet with no assist.  Patient was minimally unsteady and fatigued quickly though patient indicated these were the usual distances that she does ambulate.  Patient plans to DC home with home health and assistance from her daughter.  PT will continue to follow and advance as able.  Patient encouraged to get up in chair for all meals and ambulate at least 3 times a day to promote functional mobility during hospital stay.  General surgery did see patient at my request regarding the right lower quadrant pain.  They feel that it is primarily due to the retroperitoneal fluid collections which have increased in size.  They deferred further treatment decisions to urology and felt there was no intraperitoneal abnormalities noted.  Labs today do show glucose is up slightly more as patient is eating more.  Lantus has been increased for today to 16 units once daily.  Sodium is now up to 133.  Glucose was up at 212 this a.m. white blood cell count is  improved down from 16.61 to a level of 14.58 today.  Hemoglobin is up slightly at 10.1 and does remain stable.  Platelet count is also stable at 439,000.  Patient planning discharge to home over the weekend if drain is able to successfully remove fluid from the retroperitoneal fluid collection that is causing pain in the right lower quadrant.  Final antibiotic plan is still pending from ID.  We are continuing to follow closely and encourage further activity on the part of the patient.    1/10/2024.  Patient is seen again today in her room in the CCU room #335.  Patient is up in chair at bedside watching television.  She recognizes me immediately upon my entry into her room and smiles appropriately.  I wore full PPE for the exam including an N95 facemask, goggles, white lab coat, and gloves.  I performed thorough hand hygiene before and after the patient visit.  I did discuss the patient's care with her nurse who was present in the [place]ney outside her room.  Patient did have successful placement of the drain today in the pocket of fluid accumulation in the right lower abdominal wall.  Procedure was felt to be quite successful and 50 cc of fluid were sent to the lab for evaluation and analysis.  Patient has an ongoing drain that has very opaque brownish-white material draining from the fluid collection at present time.  Patient states that she feels 100% better with significant improvement in the pain that she was experiencing in the right lower quadrant prior to this drainage.  Vital signs today indicate no fevers with current temperature of 98.1 pulse 86 respiratory rate 18 blood pressure 120/65 and oxygen sat 96% on room air.Review of patient's labs shows that her sodium is very stable at 132 glucoses have been primarily in the 1 43-1 63 range today.  Patient's white blood cell count continues to come down slowly and is at 12.93 and her hemoglobin is stable at 10.3.  Body fluid culture from drainage is pending.   Patient does remain on Lantus 16 units along with sliding scale insulin at present time and seems to be very comfortable with management of her diabetes.  We will plan follow-up on outpatient basis with endocrinology following discharge.  Patient was seen by Dr. Us today and he has continued the vancomycin therapy awaiting culture results from the fluid drained from her right lower quadrant and awaiting final ID is on organism which grew from blood earlier in admission.  He is still continuing to treat for fungal infection that was collected at time of drainage from stents placed in ureters and is using micafungin for treatment of this infection.  Dr. Campos performed procedure in radiology without complications or problems.  He has suggested that patient have a repeat CT scan of the area prior to discharge to home.    1/11/2023.  Patient is seen again today in her room on CCU room #335.  Patient is up in her chair watching television and indicates to me that she is often sleeping in the chair because it is easier to get up to urinate from the chair then to get up from the bed.  I did speak with the patient's nurse earlier in the day.  I wore full PPE for the exam including an N95 facemask, goggles, white lab coat, and gloves when touching patient.  I performed thorough hand hygiene before and after the patient.  Patient's appetite continues to slowly improve.  She also is gradually getting stronger and gaining more independence.  PT did discuss with her using a walker to ambulate because she seems to be much safer with that at the present time the patient is somewhat stubborn and insist on walking alone despite poor posture at times.  Review of today's labs show that her blood sugars have ranged from 156 up to a max of 389.  I will plan on increasing her insulin through her Lantus by another 2 units each day up to 18 units daily.  Other labs are very stable.  Sodium is 131, BUN slightly low at 4, albumin low at  2.1.  White blood cell count continues to come down and is at 11.91 today.  Hemoglobin is at 9.8 and platelet count is 502,000.  Culture still pending from drain placement but so far with no growth.  Other cultures remain positive as before.  Per ID patient to continue on vancomycin for now awaiting those culture results.  Anticipate possible discharge the patient over the weekend depending on decisions that come from infectious disease.  Otherwise clinical condition seems to be quite stable.Review of vital signs reveals that temperature is 98.9, pulse 81, respirations 20, blood pressure 110/82 and her room air sat is 96%.  We continue to follow while awaiting decision on antibiotics.    1/12/2024.  Patient is seen once again today in her room in the CCU room #335.  Patient was resting quietly in bed at the time of my visit.  I wore full PPE for the exam including an N95 face mask, goggles, white lab coat, and gloves when touching patient.  I performed thorough hand hygiene before and after the patient visit.  Patient has had a very quiet day.  She was seen by Dr. Miller from urology earlier this morning.  Per his plan, drain in right retroperitoneal area is to be maintained until output is clear and scant and patient is felt to be infection free.  We still are awaiting culture results from the drain.  Urology plans to coordinate outpatient study of both ureters to determine patency with a follow-up in office 1 week after discharge.Dr. Us from infectious disease saw patient again also today.  Drainage cultures from the retroperitoneal drain remain pending.  For now he continued vancomycin.  He also talked with micro in the lab today about the prior positive blood cultures and they are going to resend the new sample due to the mixed sample that came out on prior study.  He is continuing micafungin 100 mg daily for now due to the isolated Candida in patient's stents.  Pharmacy continues to adjust vancomycin  levels.  New labs ordered for tomorrow AM.  Patient hoping for discharge soon if final decisions can be made regarding antibiotic therapy.  Vital signs remained stable with no fever pulse 82 respiratory rate 18 and blood pressure 128/64 with 94% on sat with room air.    1/13/2024.  Patient is seen again today in her room in the CCU room #335.  Patient is resting quietly in bed at the time of my visit watching television.  I did discuss the case briefly with the patient's nurse.  I wore full PPE for the exam including an N95 face mask, goggles, white lab coat, and gloves when touching the patient.  I performed thorough hand hygiene before and after the patient visit.  Patient remains in good spirits today and actually was more talkative than in the past.  Patient's  is helping from afar with a move that her daughter is doing today.  Most of the family is involved with moving the daughter to returning home.  Patient's drain continues to drain cloudy whitish pus-like appearing material with the amount of drainage decreased slightly.  Culture has grown lactobacillus which hopefully will be sensitive to the same vancomycin that the patient is taking for her blood culture that was positive.  Rereview of the sensitivity blood culture is still pending in the lab at present time.  ID did see the patient and will continue to follow.  Hopefully will have final cultures back by Monday and be able to discharge patient early in the week to home with home IV antibiotics as necessary.  Patient's daughter is an RN and will be able to coordinate infusion of those medications at home level.  Urology did see the patient today and suggested continuing the IV antibiotics and the right retroperitoneal drain.  They also plan to continue following for now.  No other major changes occurred with the patient today.  We are continuing to follow until she is ready for discharge to home.        Review of Systems:               Review of  systems could not be obtained due to  patient confusion. patient nonverbal.       Past Medical History:   Diagnosis Date    Anemia     intermittently    Anxiety and depression     Arthritis     Depression     Diabetes mellitus     Gallstones     High cholesterol     History of frequent urinary tract infections     HX OF YEAST IN BLADDER HAS PRN DIFLUCAN    History of transfusion 05/24/2017    Had 2 iron infusions.  This was when i had knee replacement    Hyperlipidemia     Hypertension     Hypothyroidism     Knee pain, bilateral     Low back pain     Lumbar stenosis     PONV (postoperative nausea and vomiting)     Positive TB test     chest x-ray neg    Seasonal allergies      Past Surgical History:   Procedure Laterality Date    APPENDECTOMY N/A 1982    Dr. Wilson    CHOLECYSTECTOMY WITH INTRAOPERATIVE CHOLANGIOGRAM N/A 10/31/2018    Procedure: laparoscopic cholecystectomy;  Surgeon: Mary Kay Mac MD;  Location: [facility];  Service: General    COLONOSCOPY      CYSTOSCOPY BLADDER BIOPSY N/A 10/3/2016    Procedure: CYSTOSCOPY BLADDER BIOPSY;  Surgeon: Rei Calvert MD;  Location: [facility];  Service:     CYSTOSCOPY W/ URETERAL STENT PLACEMENT Bilateral 1/3/2024    Procedure: CYSTOSCOPY BILATERAL RETROGRADE PYELOGRAM  BILATERAL URETERAL STENT INSERTION AND CATHETHER PLACEMENT;  Surgeon: Eduard Armando MD;  Location: [facility] OR;  Service: Urology;  Laterality: Bilateral;    DILATATION AND CURETTAGE N/A     ENDOSCOPY      INTRAUTERINE DEVICE INSERTION      KNEE ARTHROSCOPY W/ MENISCAL REPAIR Left     OVARIAN CYST REMOVAL Left 1982    Dr. Wilson    TOTAL KNEE ARTHROPLASTY Right 5/22/2017    Procedure: RIGHT TOTAL KNEE ARTHROPLASTY WITH ALETA NAVIGATION;  Surgeon: Ross Cruz MD;  Location: [facility];  Service:      Allergies   Allergen Reactions    Sulfa Antibiotics Hives and Rash       Family History   Problem Relation Age of Onset    Hepatitis Mother     Breast cancer Mother      Heart disease Mother     Cancer Mother     Hyperlipidemia Mother              Heart failure Father     Stroke Father     Hypertension Father         Since he was 72, now 86s    Diabetes Father     Heart disease Father     Hyperlipidemia Father         Unsurs    Heart disease Maternal Grandmother     Cancer Maternal Grandfather     COPD Maternal Grandfather     Arthritis Paternal Grandmother     Diabetes Maternal Aunt     Diabetes Paternal Uncle     Malig Hyperthermia Neg Hx        Social History     Socioeconomic History    Marital status:    Tobacco Use    Smoking status: Never    Smokeless tobacco: Never    Tobacco comments:     Never smoked   Vaping Use    Vaping Use: Never used   Substance and Sexual Activity    Alcohol use: Never     Alcohol/week: 3.0 standard drinks of alcohol     Types: 1 Glasses of wine, 2 Standard drinks or equivalent per week    Drug use: Never    Sexual activity: Not Currently     Partners: Male     Birth control/protection: I.U.D.       PMH, FH, SH and ROS completed with Admission History and Physical and updated in EPIC system.        Objective     Scheduled Meds:enoxaparin, 40 mg, Subcutaneous, Q12H  escitalopram, 10 mg, Oral, Daily  insulin glargine, 10 Units, Subcutaneous, Q12H  insulin lispro, 2-9 Units, Subcutaneous, 4x Daily AC & at Bedtime  levothyroxine, 137 mcg, Oral, Q AM  micafungin (MYCAMINE) IV, 100 mg, Intravenous, Q24H  oxybutynin XL, 10 mg, Oral, Daily  pantoprazole, 40 mg, Oral, Q AM  potassium phosphate, 15 mmol, Intravenous, Once  rOPINIRole, 0.5 mg, Oral, Nightly  senna-docusate sodium, 2 tablet, Oral, BID  vancomycin, 1,000 mg, Intravenous, Q12H      Continuous Infusions:lactated ringers, 9 mL/hr, Last Rate: 9 mL/hr (24 1527)  Pharmacy to dose vancomycin,         Vital signs in last 24 hours:  Temp:  [98.4 °F (36.9 °C)-99.1 °F (37.3 °C)] 98.4 °F (36.9 °C)  Heart Rate:  [87-96] 94  Resp:  [18-24] 24  BP: (113-144)/(54-73)  "113/54    Intake/Output:    Intake/Output Summary (Last 24 hours) at 1/13/2024 2340  Last data filed at 1/13/2024 2300  Gross per 24 hour   Intake 120 ml   Output 75 ml   Net 45 ml         Exam:  /54 (BP Location: Left arm, Patient Position: Lying)   Pulse 94   Temp 98.4 °F (36.9 °C) (Oral)   Resp 24   Ht 165.1 cm (65\")   Wt 116 kg (255 lb 1.2 oz)   LMP 09/03/2016 Comment: IUD  SpO2 94%   BMI 42.45 kg/m²     Constitutional:  Patient resting in bed today.  Continues to more alert.  Normal personality.  Pain significantly improved,.  Drain continues to produce opaque whitish-tan drainage.  Redness over right lower quadrant also significantly improved.  Blood pressures stable.   Eyes:     PERRLA, conjunctiva/corneas clear, no icterus, no conjunctival                                     pallor, EOM's intact, both eyes      ENT and Mouth: Lips, tongue, gums normal; oral mucosa pink and moist   Neck:     Supple, symmetrical, trachea midline, no JVD  Respiratory:     Clear to auscultation bilaterally, respirations unlabored  Cardiovascular:  Regular rate and rhythm, S1 and S2 normal, no murmur,      no  Rub or gallop.  Pulses normal.    Gastrointestinal:   BS present x 4 Soft, non-tender, bowel sounds active,      no masses, no hepatosplenomegaly    right lower quadrant pain almost totally resolved with no rebound guarding or other abnormalities.  Overlying skin does appear erythematous.  Retroperitoneal drain remains in place.  Will plan CT scan prior to discharge.                                               :       No hernia.  Normal exam for sex.         Musculoskeletal: Extremities normal, atraumatic, no cyanosis or edema     No arthropathy.  No deformity.  Gait normal                                                 Skin:   Skin is warm and dry,  no rashes, swelling or palpable lesions.  Erythema resolved over right lower quadrant area pain   Neurologic:  CN -XII intact, motor strength grossly intact, " sensation grossly intact to light touch, no focal reflex deficits noted    Psychiatric:     Alert,oriented X3, no delusions, psychoses, depression or anxiety    Heme/Lymph/Imun:   No bruises, petechiae.  Lymph nodes normal in size/configuration       Data Review:  Lab Results   Component Value Date    CALCIUM 8.7 01/13/2024    PHOS 3.7 01/07/2024     Results from last 7 days   Lab Units 01/13/24  0716 01/11/24  0734 01/10/24  0639 01/08/24  0609 01/07/24  0645   AST (SGOT) U/L 13 10 16   < > 16   ALT (SGPT) U/L 9 7 7   < > 8   MAGNESIUM mg/dL  --   --   --   --  1.7   SODIUM mmol/L 133* 131* 132*   < > 129*   POTASSIUM mmol/L 4.2 4.4 4.6   < > 4.8   CHLORIDE mmol/L 95* 94* 95*   < > 94*   CO2 mmol/L 26.5 27.6 25.6   < > 20.6*   BUN mg/dL 5* 4* 3*   < > 6*   CREATININE mg/dL 0.73  0.73 0.78 0.70   < > 0.62   GLUCOSE mg/dL 159* 162* 163*   < > 157*   CALCIUM mg/dL 8.7 8.8 8.3*   < > 8.9   WBC 10*3/mm3 9.51 11.91* 12.93*   < > 13.75*   HEMOGLOBIN g/dL 9.1* 9.8* 10.3*   < > 11.5*   PLATELETS 10*3/mm3 528* 502* 392   < > 371    < > = values in this interval not displayed.     Lab Results   Component Value Date    TROPONINT <6 01/02/2024     Estimated Creatinine Clearance: 103 mL/min (by C-G formula based on SCr of 0.73 mg/dL).  WEIGHTS:     Wt Readings from Last 1 Encounters:   01/13/24 0627 116 kg (255 lb 1.2 oz)   01/12/24 0605 115 kg (254 lb 3.1 oz)   01/11/24 0600 113 kg (249 lb)   01/09/24 0653 114 kg (251 lb 3.2 oz)   01/08/24 0700 115 kg (254 lb 3.1 oz)   01/07/24 0510 120 kg (263 lb 10.7 oz)   01/07/24 0349 120 kg (263 lb 10.7 oz)   01/06/24 0509 122 kg (269 lb 6.4 oz)   01/05/24 0556 111 kg (244 lb 11.4 oz)   01/04/24 0600 111 kg (245 lb 6 oz)   01/03/24 0600 111 kg (245 lb 6 oz)   01/02/24 1131 120 kg (265 lb)         Assessment:    DKA (diabetic ketoacidosis)    UTI (urinary tract infection)    Morbid obesity with BMI of 40.0-44.9, adult    JADEN (acute kidney injury)    Sepsis, unspecified organism    Lactic  acidosis    UPJ (ureteropelvic junction) obstruction bilateral    Bilateral hydronephrosis moderate on admission    Renal calculus on right, nonobstructinbg    Vitamin D deficiency    Essential hypertension    Hyperlipidemia    Hypothyroidism    Allergic rhinitis    Back pain    Chronic liver disease    Primary osteoarthritis of right knee    Contact dermatitis    Anxiety    Dense breast tissue on mammogram    Bilateral hydrocele      Attending Physician Assessment and Plan:    1.  Cystitis/UTI with sepsis associated with bilateral UPJ obstruction and moderate bilateral hydronephrosis and possible right calyceal rupture.  Patient resuscitated aggressively with IV fluids.  Broad-spectrum antibiotics, Zosyn, started.  Urology consult is placed.  Patient being followed carefully in ICU for additional complications or signs of worsening sepsis.  Culture of urine remains negative on day #2.  New cultures collected at time of cystoscopy today by urology.  Patient currently off Zosyn which was stopped after surgery yesterday.  We will repeat blood cultures since we did have 1 of 2 initial blood cultures positive.  ID has been consulted and is following with us.  They have started the patient on fluconazole secondary to the instrumentation yesterday and yeast that is seen in hearing.  I do plan to check a procalcitonin level with a.m. labs tomorrow.  Patient also started on vancomycin due to 2 of 2 positive cultures from blood.  Additional blood cultures pending at present time.  ID continues to follow on 1/9/2024.  Vancomycin and micafungin are continued for treatment of ongoing infections.  Antibiotics unchanged at this point.  Hoping culture from fluid collection obtained in IR today will be useful in guiding antibiotic therapy.  ID continues to follow.  Vancomycin still ongoing.  Final decision on treatment to come soon with respect to cultures.  Awaiting final culture results.  Lab resubmitting blood culture for ID.   Urine cultures negative.  Drainage from abscess culture still pending.  On 1/13/2024.  Still on vancomycin.  Awaiting final culture sensitivities.    2.  Anion gap metabolic acidosis versus possible diabetic ketoacidosis.  Improving rapidly at present time on DKA protocol with Glucomander.  Aggressive fluid resuscitation initiated in the ER is continued in the ICU.  Patient now with good urinary output and hopefully will be able to come off of IV insulin and switch to subcu dosing overnight..  Anion gap has narrowed and improved.  Patient should come off Glucomander DKA protocol sometime later today.  On postop day #1 anion gap has significantly improved.  We are asking renal to help us with follow-up on her acute kidney injury and volume status.  Acidosis has resolved.  Acidosis has resolved.  Diabetes now under control with Lantus 16 units daily and sliding scale insulin anion gap remains normal now.     3.  Lactic acidosis possibly due to home use of metformin while taking no food or possibly due to acute sepsis.  Continuing rapid fluid resuscitation with improvement already noted in lactic acid.  Monitoring carefully for signs of any new changes or problems.  Lactic acidosis has also resolved on day #2 and patient's sepsis seems to be under much more stable condition at present time.  Repeat lactic acid in a.m. with other labs.  Lactic acidosis has resolved.    4.  Type 2 diabetes mellitus poorly controlled at present time with elevated A1c greater than 16.  Will probably need to arrange for follow-up with patient in endocrinology clinic.  For now we will ask diabetic educator to see patient and work on sliding scale insulin protocols.  Patient and her daughter are interested in Dexcom monitoring and I will see if that is available from the diabetic educator.  Discussed situation with the daughters and she may benefit from continuous 24-hour glucose monitoring issues willing to learn the process.  Will probably  need to discharge charge on sliding scale insulin and may also consider mealtime insulin or long acting insulin.  Diabetic educator to see patient and work with us on diet, glucose testing techniques, and generalized diabetes management.  Patient doing well on sliding scale with single dose of Lantus at nighttime.  Now the patient is eating more plan to increase Lantus to 16 units subcu daily.  We will plan on rechecking hemoglobin A1c in 6 to 8 weeks.  Diabetes much improved.  Stable on Lantus and sliding scale insulin.  Continuing to increase Lantus slowly to 18 units daily.  Splitting Lantus into 2 doses daily and will give 10 units subcu twice daily since glucose is still running a bit high.  Tolerating insulin well.  Patient eating better and blood sugars slightly higher.  Continuing Lovenox on 1/13/2024 but increasing dose to 12 units twice daily since blood sugars are still running higher above 200 especially later in the day.    5.  Pseudohyponatremia.  Seems to already be improving with resuscitation and resolution of the uncontrolled glucoses.  Will continue to follow electrolytes regularly.  Hyponatremia now corrected and in normal range.  Hyponatremia has now resolved on day #2. Sodium now normalized.     6.  Hypothyroidism.  Patient's Synthroid is continued by the ICU attending.  Will continue to follow this in the hospital and on an outpatient basis.  Hypothyroidism is a stable condition     7.  Hyperlipidemia.  Agree with holding statin for now.  Will resume prior to discharge and continue on outpatient basis.     8.  Obesity.  Hope to continue using agents such as Ozempic for management of patient's blood sugar levels.  This will also help with diet control and ongoing need for gradual weight loss.     9.  Acute kidney injury secondary to volume depletion with uncontrolled glucoses.  Should improve as patient's volume status normalizes.  Will continue to monitor closely.  Acute kidney injury is  gradually improving with resolution of hydronephrosis and bilateral stent placement.  Acute kidney injury totally resolved.     10.  Generalized muscle weakness and fatigue.  Suspect related to the poorly controlled diabetes and electrolyte disturbances.  Will do PT and OT evaluations after patient stabilizes.  Suspect patient will discharge to home with PT and OT and home environment.  Hopefully patient will be able to work with PT and OT in the next few days.  Muscle strengthening ongoing.    11.  Right lower quadrant pain with fluid collections noticed on CT scan completed today 6 days after admission.  Patient complaining of significant pain with erythematous skin over the right lower quadrant.  I will ask general surgery to take a look at the situation in the a.m.  We will also notify urology of the findings in the a.m.        Plan for disposition:Where: home and home health and When: 2-3 days when medically stable     Copied text in this note has been reviewed by me and is accurate as of 01/13/2024.  Much of this dictation was completed using Dragon voice activated transcription software which can result in misspelled words and nonsensical phrases at times.     Dr. Fairchild available and can be reached at [phone]      Dean Fairchild MD  1/13/2024  2880 EST

## 2024-01-15 ENCOUNTER — APPOINTMENT (OUTPATIENT)
Dept: GENERAL RADIOLOGY | Facility: HOSPITAL | Age: 64
DRG: 853 | End: 2024-01-15
Payer: COMMERCIAL

## 2024-01-15 LAB
ALBUMIN SERPL-MCNC: 2.2 G/DL (ref 3.5–5.2)
ALBUMIN/GLOB SERPL: 0.5 G/DL
ALP SERPL-CCNC: 137 U/L (ref 39–117)
ALT SERPL W P-5'-P-CCNC: 7 U/L (ref 1–33)
ANION GAP SERPL CALCULATED.3IONS-SCNC: 11.5 MMOL/L (ref 5–15)
AST SERPL-CCNC: 14 U/L (ref 1–32)
BACTERIA UR QL AUTO: ABNORMAL /HPF
BACTERIA UR QL AUTO: ABNORMAL /HPF
BASOPHILS # BLD AUTO: 0.09 10*3/MM3 (ref 0–0.2)
BASOPHILS NFR BLD AUTO: 0.8 % (ref 0–1.5)
BILIRUB SERPL-MCNC: 0.6 MG/DL (ref 0–1.2)
BILIRUB UR QL STRIP: NEGATIVE
BILIRUB UR QL STRIP: NEGATIVE
BUN SERPL-MCNC: 6 MG/DL (ref 8–23)
BUN/CREAT SERPL: 7.4 (ref 7–25)
CALCIUM SPEC-SCNC: 8.6 MG/DL (ref 8.6–10.5)
CHLORIDE SERPL-SCNC: 94 MMOL/L (ref 98–107)
CLARITY UR: CLEAR
CLARITY UR: CLEAR
CO2 SERPL-SCNC: 26.5 MMOL/L (ref 22–29)
COLOR UR: YELLOW
COLOR UR: YELLOW
CORTIS SERPL-MCNC: 23.9 MCG/DL
CREAT SERPL-MCNC: 0.81 MG/DL (ref 0.57–1)
CRP SERPL-MCNC: 24.73 MG/DL (ref 0–0.5)
DEPRECATED RDW RBC AUTO: 42.8 FL (ref 37–54)
EGFRCR SERPLBLD CKD-EPI 2021: 82.7 ML/MIN/1.73
EOSINOPHIL # BLD AUTO: 0.2 10*3/MM3 (ref 0–0.4)
EOSINOPHIL NFR BLD AUTO: 1.8 % (ref 0.3–6.2)
ERYTHROCYTE [DISTWIDTH] IN BLOOD BY AUTOMATED COUNT: 14.2 % (ref 12.3–15.4)
ERYTHROCYTE [SEDIMENTATION RATE] IN BLOOD: 84 MM/HR (ref 0–30)
GLOBULIN UR ELPH-MCNC: 4.5 GM/DL
GLUCOSE BLDC GLUCOMTR-MCNC: 190 MG/DL (ref 70–130)
GLUCOSE BLDC GLUCOMTR-MCNC: 214 MG/DL (ref 70–130)
GLUCOSE BLDC GLUCOMTR-MCNC: 258 MG/DL (ref 70–130)
GLUCOSE BLDC GLUCOMTR-MCNC: 321 MG/DL (ref 70–130)
GLUCOSE SERPL-MCNC: 215 MG/DL (ref 65–99)
GLUCOSE UR STRIP-MCNC: ABNORMAL MG/DL
GLUCOSE UR STRIP-MCNC: ABNORMAL MG/DL
HCT VFR BLD AUTO: 28.5 % (ref 34–46.6)
HGB BLD-MCNC: 8.9 G/DL (ref 12–15.9)
HGB UR QL STRIP.AUTO: ABNORMAL
HGB UR QL STRIP.AUTO: ABNORMAL
HYALINE CASTS UR QL AUTO: ABNORMAL /LPF
HYALINE CASTS UR QL AUTO: ABNORMAL /LPF
IMM GRANULOCYTES # BLD AUTO: 0.08 10*3/MM3 (ref 0–0.05)
IMM GRANULOCYTES NFR BLD AUTO: 0.7 % (ref 0–0.5)
KETONES UR QL STRIP: NEGATIVE
KETONES UR QL STRIP: NEGATIVE
LEUKOCYTE ESTERASE UR QL STRIP.AUTO: ABNORMAL
LEUKOCYTE ESTERASE UR QL STRIP.AUTO: ABNORMAL
LYMPHOCYTES # BLD AUTO: 1.52 10*3/MM3 (ref 0.7–3.1)
LYMPHOCYTES NFR BLD AUTO: 13.4 % (ref 19.6–45.3)
MCH RBC QN AUTO: 26.1 PG (ref 26.6–33)
MCHC RBC AUTO-ENTMCNC: 31.2 G/DL (ref 31.5–35.7)
MCV RBC AUTO: 83.6 FL (ref 79–97)
MONOCYTES # BLD AUTO: 1.43 10*3/MM3 (ref 0.1–0.9)
MONOCYTES NFR BLD AUTO: 12.6 % (ref 5–12)
MUCOUS THREADS URNS QL MICRO: ABNORMAL /HPF
NEUTROPHILS NFR BLD AUTO: 70.7 % (ref 42.7–76)
NEUTROPHILS NFR BLD AUTO: 8.01 10*3/MM3 (ref 1.7–7)
NITRITE UR QL STRIP: NEGATIVE
NITRITE UR QL STRIP: NEGATIVE
NRBC BLD AUTO-RTO: 0 /100 WBC (ref 0–0.2)
OSMOLALITY UR: 225 MOSM/KG
PH UR STRIP.AUTO: 7.5 [PH] (ref 5–8)
PH UR STRIP.AUTO: 8 [PH] (ref 5–8)
PLATELET # BLD AUTO: 603 10*3/MM3 (ref 140–450)
PMV BLD AUTO: 9.2 FL (ref 6–12)
POTASSIUM SERPL-SCNC: 3.9 MMOL/L (ref 3.5–5.2)
PROT SERPL-MCNC: 6.7 G/DL (ref 6–8.5)
PROT UR QL STRIP: ABNORMAL
PROT UR QL STRIP: NEGATIVE
RBC # BLD AUTO: 3.41 10*6/MM3 (ref 3.77–5.28)
RBC # UR STRIP: ABNORMAL /HPF
RBC # UR STRIP: ABNORMAL /HPF
REF LAB TEST METHOD: ABNORMAL
REF LAB TEST METHOD: ABNORMAL
SODIUM SERPL-SCNC: 132 MMOL/L (ref 136–145)
SODIUM UR-SCNC: 64 MMOL/L
SP GR UR STRIP: 1.01 (ref 1–1.03)
SP GR UR STRIP: 1.01 (ref 1–1.03)
SQUAMOUS #/AREA URNS HPF: ABNORMAL /HPF
SQUAMOUS #/AREA URNS HPF: ABNORMAL /HPF
TRANS CELLS #/AREA URNS HPF: ABNORMAL /HPF
TSH SERPL DL<=0.05 MIU/L-ACNC: 9.29 UIU/ML (ref 0.27–4.2)
UROBILINOGEN UR QL STRIP: ABNORMAL
UROBILINOGEN UR QL STRIP: ABNORMAL
WBC # UR STRIP: ABNORMAL /HPF
WBC # UR STRIP: ABNORMAL /HPF
WBC NRBC COR # BLD AUTO: 11.33 10*3/MM3 (ref 3.4–10.8)

## 2024-01-15 PROCEDURE — 99232 SBSQ HOSP IP/OBS MODERATE 35: CPT | Performed by: STUDENT IN AN ORGANIZED HEALTH CARE EDUCATION/TRAINING PROGRAM

## 2024-01-15 PROCEDURE — 63710000001 INSULIN LISPRO (HUMAN) PER 5 UNITS: Performed by: INTERNAL MEDICINE

## 2024-01-15 PROCEDURE — 74022 RADEX COMPL AQT ABD SERIES: CPT

## 2024-01-15 PROCEDURE — 81001 URINALYSIS AUTO W/SCOPE: CPT | Performed by: INTERNAL MEDICINE

## 2024-01-15 PROCEDURE — 63710000001 INSULIN GLARGINE PER 5 UNITS: Performed by: INTERNAL MEDICINE

## 2024-01-15 PROCEDURE — 85652 RBC SED RATE AUTOMATED: CPT | Performed by: INTERNAL MEDICINE

## 2024-01-15 PROCEDURE — 25010000002 VANCOMYCIN PER 500 MG: Performed by: STUDENT IN AN ORGANIZED HEALTH CARE EDUCATION/TRAINING PROGRAM

## 2024-01-15 PROCEDURE — 81001 URINALYSIS AUTO W/SCOPE: CPT | Performed by: HOSPITALIST

## 2024-01-15 PROCEDURE — 25010000002 MICAFUNGIN SODIUM 100 MG RECONSTITUTED SOLUTION 1 EACH VIAL: Performed by: INTERNAL MEDICINE

## 2024-01-15 PROCEDURE — 25010000002 AMPICILLIN-SULBACTAM PER 1.5 G: Performed by: STUDENT IN AN ORGANIZED HEALTH CARE EDUCATION/TRAINING PROGRAM

## 2024-01-15 PROCEDURE — 83935 ASSAY OF URINE OSMOLALITY: CPT | Performed by: HOSPITALIST

## 2024-01-15 PROCEDURE — 82533 TOTAL CORTISOL: CPT | Performed by: HOSPITALIST

## 2024-01-15 PROCEDURE — 84300 ASSAY OF URINE SODIUM: CPT | Performed by: HOSPITALIST

## 2024-01-15 PROCEDURE — 80053 COMPREHEN METABOLIC PANEL: CPT | Performed by: INTERNAL MEDICINE

## 2024-01-15 PROCEDURE — 84443 ASSAY THYROID STIM HORMONE: CPT | Performed by: HOSPITALIST

## 2024-01-15 PROCEDURE — 86140 C-REACTIVE PROTEIN: CPT | Performed by: INTERNAL MEDICINE

## 2024-01-15 PROCEDURE — 87086 URINE CULTURE/COLONY COUNT: CPT | Performed by: INTERNAL MEDICINE

## 2024-01-15 PROCEDURE — 82948 REAGENT STRIP/BLOOD GLUCOSE: CPT

## 2024-01-15 PROCEDURE — 25010000002 FUROSEMIDE PER 20 MG: Performed by: HOSPITALIST

## 2024-01-15 PROCEDURE — 25010000002 ENOXAPARIN PER 10 MG: Performed by: UROLOGY

## 2024-01-15 PROCEDURE — 85025 COMPLETE CBC W/AUTO DIFF WBC: CPT | Performed by: INTERNAL MEDICINE

## 2024-01-15 RX ORDER — FUROSEMIDE 10 MG/ML
20 INJECTION INTRAMUSCULAR; INTRAVENOUS ONCE
Status: COMPLETED | OUTPATIENT
Start: 2024-01-15 | End: 2024-01-15

## 2024-01-15 RX ADMIN — FUROSEMIDE 20 MG: 10 INJECTION, SOLUTION INTRAMUSCULAR; INTRAVENOUS at 11:30

## 2024-01-15 RX ADMIN — ENOXAPARIN SODIUM 40 MG: 100 INJECTION SUBCUTANEOUS at 08:38

## 2024-01-15 RX ADMIN — ENOXAPARIN SODIUM 40 MG: 100 INJECTION SUBCUTANEOUS at 20:34

## 2024-01-15 RX ADMIN — LEVOTHYROXINE SODIUM 137 MCG: 137 TABLET ORAL at 06:19

## 2024-01-15 RX ADMIN — ESCITALOPRAM OXALATE 10 MG: 10 TABLET, FILM COATED ORAL at 08:38

## 2024-01-15 RX ADMIN — AMPICILLIN SODIUM AND SULBACTAM SODIUM 3 G: 2; 1 INJECTION, POWDER, FOR SOLUTION INTRAMUSCULAR; INTRAVENOUS at 16:57

## 2024-01-15 RX ADMIN — INSULIN LISPRO 4 UNITS: 100 INJECTION, SOLUTION INTRAVENOUS; SUBCUTANEOUS at 20:34

## 2024-01-15 RX ADMIN — VANCOMYCIN HYDROCHLORIDE 1000 MG: 1 INJECTION, SOLUTION INTRAVENOUS at 06:19

## 2024-01-15 RX ADMIN — INSULIN LISPRO 6 UNITS: 100 INJECTION, SOLUTION INTRAVENOUS; SUBCUTANEOUS at 16:57

## 2024-01-15 RX ADMIN — INSULIN LISPRO 4 UNITS: 100 INJECTION, SOLUTION INTRAVENOUS; SUBCUTANEOUS at 11:30

## 2024-01-15 RX ADMIN — INSULIN LISPRO 2 UNITS: 100 INJECTION, SOLUTION INTRAVENOUS; SUBCUTANEOUS at 08:38

## 2024-01-15 RX ADMIN — INSULIN GLARGINE 14 UNITS: 100 INJECTION, SOLUTION SUBCUTANEOUS at 22:07

## 2024-01-15 RX ADMIN — MICAFUNGIN SODIUM 100 MG: 100 INJECTION, POWDER, LYOPHILIZED, FOR SOLUTION INTRAVENOUS at 09:50

## 2024-01-15 RX ADMIN — HYDROCODONE BITARTRATE AND ACETAMINOPHEN 1 TABLET: 5; 325 TABLET ORAL at 20:34

## 2024-01-15 RX ADMIN — ROPINIROLE HYDROCHLORIDE 0.5 MG: 0.5 TABLET, FILM COATED ORAL at 20:34

## 2024-01-15 RX ADMIN — PANTOPRAZOLE SODIUM 40 MG: 40 TABLET, DELAYED RELEASE ORAL at 06:19

## 2024-01-15 RX ADMIN — AMPICILLIN SODIUM AND SULBACTAM SODIUM 3 G: 2; 1 INJECTION, POWDER, FOR SOLUTION INTRAMUSCULAR; INTRAVENOUS at 22:07

## 2024-01-15 RX ADMIN — OXYBUTYNIN CHLORIDE 10 MG: 10 TABLET, EXTENDED RELEASE ORAL at 08:37

## 2024-01-15 RX ADMIN — AMPICILLIN SODIUM AND SULBACTAM SODIUM 3 G: 2; 1 INJECTION, POWDER, FOR SOLUTION INTRAMUSCULAR; INTRAVENOUS at 11:30

## 2024-01-15 RX ADMIN — INSULIN GLARGINE 14 UNITS: 100 INJECTION, SOLUTION SUBCUTANEOUS at 08:38

## 2024-01-15 NOTE — PLAN OF CARE
Goal Outcome Evaluation:  Pt is alert/oriented x's 4. Pt has been resting well throughout the night. Pt is easy to arouse and has no c /o pain at this time.

## 2024-01-15 NOTE — PROGRESS NOTES
NATHALIA CAMPUZANO [facility]  INTERNAL MEDICINE  DEAN FAIRCHILD MD  [address] [place] [address]  Phone [phone] Fax [phone]  E-mail:  [email or web address]      INTERNAL MEDICINE DAILY PROGRESS NOTE  Dean Fairchild M.D.  2024            Patient Identification:  Name: Tiana Hudson  Age: 63 y.o.  Sex: female  :  1960  MRN: [id number]         Primary Care Physician: Dean Fairchild MD  LENGTH OF STAY 13 DAYS    Consults       Date and Time Order Name Status Description    2024  3:42 AM Inpatient General Surgery Consult Completed     2024  5:10 AM Inpatient Nephrology Consult Completed     2024  2:00 PM Inpatient Infectious Diseases Consult Completed     1/3/2024 10:27 AM Inpatient Urology Consult      2024 11:55 PM Urology (on-call MD unless specified) Completed     2024  3:45 PM IP General Consult (Use specialty-specific consult if known) Completed             Chief Complaint: Abdominal/flank pain with DKA, acute cystitis and sepsis, and possible calyceal rupture in the kidney     History of Present Illness:     Subjective   Interval History: Patient is a 63 y.o.female who presented at my request to the emergency room at [facility] with complaint of acute abdominal/flank pain and history of recurrent kidney stones.  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  Patient has been followed for the last few months by Dr. Rei Calvert in urology for renal calculi and actually has had a stent placed in the right kidney due to obstruction from her renal stones.  It is also noted that the patient had a recent lithotripsy.  Patient began to feel poorly after the recent Shreyas  holiday and became more severely ill over the last 3 to 4 days prior to this admission.  She has felt extremely fatigued, dizzy at times, tired, nauseated, and has spent most of her time in bed sleeping.  Family has had difficulty getting the patient to take any oral intake and became quite concerned as her condition continued to worsen.  Patient was attempting to go to work today but really was too dizzy to get in the car to drive and 2-week to actually work.  After urging from her daughter, patient called me with respect to the symptoms and at my request went to the ER for evaluation.  Patient has multiple medical comorbidities that complicate her medical care.These include most significantly recurrent urinary tract infections due to her nephrolithiasis, morbid obesity with BMI greater than 40, diabetes mellitus type 2 poorly controlled with recent addition of Ozempic to her metformin therapy, vitamin D deficiency, essential hypertension, hyperlipidemia, hypothyroidism, allergic rhinitis, back pain, contact dermatitis, anxiety, and history of dense breast tissue on mammograms.     Patient was evaluated in the emergency room by Angelito Winkler MD who was the ER attending on call time of her arrival.  Patient was seen on 1/2/2024 at 1544 by Dr. Winkler.  His HPI was consistent with the story which I given above.  Patient denied dysuria, fever, chills on his evaluation.  She did admit to having emesis and actually had some emesis while in the emergency room.  Review of systems in the emergency room was positive for diffuse abdominal pain, and vomiting.  Patient also was noted to have some significant flank pain.  All other systems reviewed were negative in the emergency room vital signs on arrival in the emergency room showed temperature of 96 °F, heart rate of 114, respiratory rate of 16, blood pressure 125/72, and O2 sat of 98%.  Patient was described as ill-appearing but in no acute distress.  She did have severe  tenderness to palpation more on the right side of the abdomen and out into the right flank area with voluntary guarding.  Labs collected in the ER showed that her lactate level was elevated at 6.0.  She had a lipase of 23, her white blood cell count was 20.12, her hemoglobin was 11.2, and her platelet count was 568,000.  Patient did have a phosphorus of 4.2, magnesium 2.0, osmolality of 317, hemoglobin A1c is 16.90, and UA showed specific gravity 1.027, 3+ glucose, moderate blood 2+, leukocyte esterase moderate 2+, nitrates negative, white blood cells 6-10, red blood cells 6-10, bacteria 2+.  Her CMP showed a glucose of 978, creatinine of 1.82, sodium of 115, potassium of 4.2, chloride of 76, CO2 of 15.7, albumin 2.4, AST 33, alk phos 220, anion gap of 23, EGFR of 31.3.  Patient did have a CT scan of her abdomen and pelvis completed which showed loculated fluid throughout the right perinephric space felt to possibly be secondary to right calyceal rupture, new mild to moderate wall thickening of the right renal pelvis and renal calyces, bilateral UPJ obstruction with moderate bilateral hydronephrosis on the left, mild new dilation of right ureter, nonobstructing right renal calculi, hepatic morphology suggestive of chronic liver disease, and reactive lymph nodes in the retrocaval area.  A chest x-ray was done which showed no acute disease other than minimal atelectasis at the base of the right lung.  Patient underwent aggressive fluid resuscitation in the emergency room and received over 3 L of IV fluid.  She was placed on an insulin drip and was also started on Zosyn therapy.  She was followed on sepsis protocol as well as DKA protocol.  Patient did receive morphine 4 mg for pain x 2.  Case was discussed with myself as her primary care attending.  Urology was also contacted about situation.  I did suggest that patient be admitted to the ICU for treatment of the DKA and severe sepsis picture.  Dr. Arthur Tena did  agree to the ICU admission and will be following her in the CCU for pulmonary/critical care.  Final diagnoses in the ER were sepsis due to unspecified organism and acute cystitis without significant hematuria.     1/2/2024.  I personally saw the patient for the first time during this hospitalization on this date in the coronary care unit room #335.  Patient was resting quietly in bed and did a peer acutely ill.  She did wake to her name and spoke a few words before falling back asleep.  Patient's 2 daughters were at the foot of her bed and did discuss the case at length with me.  Daughter Enoch, is a former nurse from here at [facility], and now works at [employer] with the hospitalist group there.  Patient has responded well to the Glucommander DKA protocols and blood sugars have gradually come down to near normal levels for the patient in the 1  range.  I will full PPE for the exam including an N95 face mask, goggles, white lab coat, and gloves when touching patient.  I performed thorough hand hygiene before and after the patient visit.  Patient did have a venous blood gas which showed pH 7.40, pCO2 28, pO2 of 31, and O2 sat of 62%.  She also had recent electrolytes which showed a sodium of 131, potassium 3.4, chloride 96, CO2 19.1, BUN 17, creatinine 1.15, estimated GFR now up to 54.3.  Lactate level has been coming steadily down and currently at 2.8.  Additional labs ordered for tomorrow a.m.  Patient has been able to urinate several times and daughters have helped her up to the bathroom for this.  I actually note that she did have some urinary frequency and incontinence at home which is unusual for her.  I did speak briefly to the patient's daytime nurse who was departing soon after I arrived on the floor.  I have reviewed Dr. Arthur Tena's notes and his admission history and physical.  I do agree completely with his plan of care at present time.  Supportive care is continued to be offered for the sepsis  with careful monitoring in the ICU.  Patient's anion gap metabolic acidosis probably is related to lactic acidosis and she was continuing to take metformin even when she was not able to eat.  IV fluids are continued aggressively.  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  Probably will plan sliding scale insulin for stabilization of diabetes while here in the hospital and will train patient to continue that in the home environment.  Pseudohyponatremia will be corrected with the IV fluids.  Hypothyroidism will be addressed with continued Synthroid.  Statins are held for now.  Obesity is an ongoing problem for the patient.  At the time my exam, patient is medically stable and slowly improving.  I did relay this to the daughter to agree with my assessment.  We will continue to follow the patient with you and we will be happy to assume care of the patient when she is stable and ready to be transferred out of the ICU.  I can be reached directly for any concerns or questions at [phone].    1/3/2024.  Patient is seen again today in her room in the CCU #335.  Patient was resting quietly in bed at the time of my visit and states that her daughters were downstairs getting a bite to eat.  I did discuss the case with the patient's nurse, Tiana, who tells me that she is being preop and should be going down soon for cystoscopy and stent placement.  I will full PPE for the exam including an N95 face mask, goggles, white lab coat, and gloves when touching patient.  I performed thorough hand hygiene before and after the patient visit.  Patient is more alert today but still somewhat confused about details of her medical case.  She is able to carry on a conversation with me though and joke a bit with me while I am present in the room.  She does understand she has issues because of the obstruction bilaterally of her ureters that has resulted  in hydronephrosis and hopefully can be relieved by the cystoscopy that is planned for later this morning.  Patient is still on an insulin drip but is drastically improved compared to where she was yesterday at this time.  Review of labs shows that her sodium is now up to 133, her CO2 is 19.1, glucose is now at 134, phosphorus is at 2.1, lactate is normalized at 1.9, white blood cell count today is 18.93, hemoglobin is 9.6 today, lakelets are normal today,Blood culture from the first day of hospitalization shows anaerobic bottle gram-positive bacilli growth with identification still ongoing.  Fungal and body fluid cultures are still negative.  Urine culture remains negative.  Dr. Oquendo was following the patient for critical care today.  He has continued her antibiotics and is awaiting urology input.  Fluid resuscitation is continued.  Blood pressure now seems well-controlled.  DVT prophylaxis in place.  He had a lengthy discussion with patient's family at the time of his rounds.  Urology has seen the patient in consultation and is planning to do cystoscopy later today.  Dietitian is following the patient's case and recommending input treatment as needed.Dr. Armando did perform a cystoscopy with ureteral catheterization and stent insertion bilaterally.  His pre-op diagnosis and postop diagnoses were urosepsis with bilateral UPJ obstruction.  He did feel that he had successfully decompressed the blockages and the hydronephrotic changes.  He did speak with patient's daughter postoperatively.  Patient does seem to be much improved today.  Vital signs still show no fever.  Blood pressure is relatively stable.  Will continue to follow with you.    1/4/2024.  Patient is seen again today in her room in the CCU #335.  Patient has transitions of critical care and is in overflow for care on the floor.  Patient's daughter is present at bedside at the time of my visit.  Patient is much more awake and alert today.  She is more  interactive and talkative.  I did discuss the case with the patient's nurse.  We are going to obtain an ID consult today for help with antibiotic management and we also are going to transition the patient to regular sliding scale insulin as we begin the teaching process of getting patient on her own regimen of sliding scale insulin at the time of discharge to home.  We have also started patient on Lantus therapy at 10 units for now and may need to titrate up.  I wore full PPE for the exam today including an N95 face mask, goggles, white lab coat, and gloves when touching patient.  I performed thorough hand hygiene before and after the patient visit.  Specialist have seen patient today and their care is summarized below.Dr. Najera, the critical care attending today, felt that her mentation was slow but answering questions appropriately.  No other major findings were discovered at present time.  Antibiotics were continued for sepsis was felt to be significantly improved with anion gap now closed and lactic acidosis much better.  IV antibiotics were continued awaiting final culture results and I did consult ID to see patient for their input on the situation.  Dr. Devon Armando who completed bilateral stent placement for bilateral OP J obstruction and urosepsis is pleased with progress on day 1 postop.  He notes that urine output has been excellent.  Dr. Us from infectious disease did see the patient in consultation.  He notes that Zosyn antibiotics have been discontinued by Dr. Armando after her successful surgery.  White blood cell count is down to 17.08 today with hematocrit of 30.7.  Glucose max was 250 today estimated GFR is up to 54 and CO2 is 19.0.  He feels the significance of the positive blood culture is unclear at this point with only 1 of 2 bottles positive for gram-positive bacilli which normally would represent a contamination.  He is planning to repeat the blood cultures and will follow-up.  Urine  culture was also negative except for yeast growth and due to the recent  instrumentation he has started the patient on fluconazole 400 mg daily while cultures are pending.  New blood culture has been sent.  We will check again the lactic acid, procalcitonin, sed rate, and CRP with a.m. labs tomorrow.  Labs relatively stable today though I do note that sodium is dropped a bit to 127.  CO2 remains slightly low at 18.0 and chloride slightly low at 97.  Patient's glucoses running in the upper 100s and low 200s at present.  Electrolytes otherwise seem fairly stable.  White count is still elevated somewhat at 17.08 despite stenting yesterday.  Patient currently off antibiotics and being followed carefully by ID.  Plan to check procalcitonin and repeat blood culture with a.m. labs.  Hemoglobin is at 10.0.  Otherwise patient appears very stable at present time.  I do agree that her mentation is a little bit slower than usual but I suspect this will continue to improve as we get further from her acute DKA event.  Treatment plan reviewed with patient and her daughter today.    1/05/2024.  Patient is seen again today in her room in the CCU #335.  Patient resting quietly in bed and daughter Enoch is at bedside.  I spoke with patient's nurse during the day to discuss occasional changes in her treatment plan.  I wore full PPE for the exam including an N95 facemask, goggles, white lab coat, and gloves when touching patient.  I performed thorough hand hygiene before and after the patient visit.  Patient is much more alert today and more like her usual self.  She does laugh and participate in conversation.  Mother and daughter were very impressed with Dr. Sánchez's excellent review with them of the patient's renal condition and with the time he has been going over the various diagnostic images that have been done up to date.  He was pleased to see how significantly improved electrolytes are and felt the patient was remaining  hemodynamically stable at present time.  Dr. Us from ID did see the patient.  Blood cultures were repeated but original cultures are now positive 2 out of 2 for gram-positive bacilli which is more consistent with a true infection.  He started patient on vancomycin and will be following up on ID and's susceptibilities of the organisms.  He also continued the fluconazole 400 mg daily while following up on cultures from perinephric fluid.The pulmonary intensivist Dr. Arthur Tena saw patient briefly and found no pulmonary or critical care needs at this time.  Dr. Armando from neurology saw patient on postop day #2 after stent placement for bilateral UPJ obstruction.  Indicates that Perales catheter can be removed at any time.  Occupational Therapy began to work with the patient and feels that she will benefit from further skilled occupational therapy.  She did some bedside exercises and did do some sort steps.  She was able to sit up on the edge of the bed for 8 to 10 minutes with to stand.  Physical therapy also saw patient and felt she would continue to benefit from skilled physical therapy.  Pharmacist did consult with the patient on vancomycin and started at 750 mg IV every 12 hours.  They will be checking a trough level on 1/7/2024 at 8:30 AM.  Labs today are generally stable.  Glucose levels ranging in the 140s to 170s.  Sodium still slightly low at 132.  CO2 now normal at 22.5, albumin slightly low at 2.1, AST slightly up at 34 and alk phos slightly up at 270.  White blood cell count today is down to 13.04 and hemoglobin is stable at 10.3 with platelets of 412,000.  2 of 2 initial blood cultures were positive with final ID pending.  Sed rate remains greater than 130, procalcitonin is 3.24, and C-reactive protein is at 23.85.  Patient is still on overflow status waiting for a floor telemetry bed.    1/8/2024.  Patient is seen again today in her room on the coronary care unit #335.  Patient was actually up in a  chair at the time of my visit and her daughter was present at bedside.  I wore full PPE for the exam including an N95 facemask, goggles, white lab coat, and gloves when touching patient.  I performed thorough hand hygiene before and after the patient visit.  Patient is feeling much better today.  She says that she was anxious to get up and was happy that the therapist left her up in the chair.  She is able to get to the bathroom but does need some assistance still for that endeavor.  Several specialist saw the patient and their care is summarized below.  Patient remains on vancomycin therapy and pharmacy is following the dose and adjusting levels.  Dr. Us from ID did see patient again today.  He notes that repeat blood cultures remain negative and initial blood cultures are still being evaluated.  He has continued vancomycin for now and will follow-up on ID and susceptibilities.  Urine culture growing Candida glabrata and tropicalis and he has transitioned her from fluconazole therapy to micafungin 100 mg daily for now.  White blood cell count is slightly increased today at 16.61.  He is cautiously monitoring the patient for any new signs of infection or changes.Dr. Armando from urology did see the patient and was pleased with his surgical result from cystoscopy last week with bilateral stent placement for treating UPJ obstruction.  He has signed off on the case and plans to have her follow-up with him in clinic in 7 to 10 days for reevaluation following discharge.Occupational Therapy did work with patient who reported pain level of 2 out of 10 focused mainly on right abdomen where she has some redness swelling and discomfort.  There was no rebound to my exam in this area but I did elect to go ahead and do a CT scan of abdomen and pelvis to evaluate the area since she did have the extensive surgery not so long ago.  Patient is noted to fatigue quickly and has some functional deficits that need to be maximized  prior to DC to home.  Patient is planning discharge to home with spouse and daughters.  MI requested diabetic educator did meet with the patient.  Patient does need a meter for glucose testing.  Planning 4 times a day test for now.  Will use insulin pen lispro at meals.  Dietitian did meet with patient and her daughter for input on diet.  Provided printed materials and discussion.  Will be available for concerns or questions.  Labs today generally were stable.  Sodium back up to 131.  Glucose max of 245.  Patient does admit she is eating more at this time.  White blood cell count is up slightly at 16.61 of concern to ID.  They are monitoring cultures carefully.  Hemoglobin did drop from 11.5 down to 9.3 today will recheck tomorrow.  CT abdomen and pelvis was completed.  Right ureteral stent present but there is mild distention of the right extrarenal pelvis which appears to be improved compared to the exam 6 days ago.  There is right urethrocele thickening.  There is a right perinephric collection consistent with hemorrhage or urinoma's and there is a new deep collection in the right posterior lateral abdominal wall measuring 3 cm in thickness by 13 cm transverse along with muscular thickening along the right lateral abdominal wall and edema within the right lateral abdominal pelvic subcutaneous fat.  There is enlargement of this fluid collection seen.  We will have nursing notify urology in a.m. of this finding for their input.  May want to consider general surgery consultation also if pain persist in this area.    1/9/2024.  Patient is seen again today in the CCU room #335.  Patient's daughter is at bedside and patient is resting quietly in bedside chair.  Patient appears much more awake and alert today back to her usual personality.  I wore full PPE for the exam including an N95 face mask, goggles, white lab coat, and gloves when touching patient.  I performed thorough hand hygiene before and after the patient  visit.  Nursing reports the patient has been using bedside commode with some urgency and loose bowel movements at times.  This is why she continues on IV vancomycin.  Patient did require O2 2 L per nasal cannula at night while sleeping.  No other new issues noted.  Dr. Us from ID saw patient again today.  He continues to monitor her blood cultures and has continued her on the IV vancomycin along with micafungin for treatment of yeast infection.  Urology did see patient regarding findings on CT scan.  These were reviewed with Dr. Salomon.  There are fluid collections in the right lateral abdominal pelvic wall measuring 12.8 cm x 18 cm x 3 cm which may represent hematomas or urinoma months.  Left renal pelvis is still dilated slightly.  Urology did speak with the IR department directly and arrange for CT-guided drain placement which the patient is agreeable to having.Physical therapy did work with the patient today.  She required contact-guard assist for standing and ambulated 15 feet with no assist.  Patient was minimally unsteady and fatigued quickly though patient indicated these were the usual distances that she does ambulate.  Patient plans to DC home with home health and assistance from her daughter.  PT will continue to follow and advance as able.  Patient encouraged to get up in chair for all meals and ambulate at least 3 times a day to promote functional mobility during hospital stay.  General surgery did see patient at my request regarding the right lower quadrant pain.  They feel that it is primarily due to the retroperitoneal fluid collections which have increased in size.  They deferred further treatment decisions to urology and felt there was no intraperitoneal abnormalities noted.  Labs today do show glucose is up slightly more as patient is eating more.  Lantus has been increased for today to 16 units once daily.  Sodium is now up to 133.  Glucose was up at 212 this a.m. white blood cell count is  improved down from 16.61 to a level of 14.58 today.  Hemoglobin is up slightly at 10.1 and does remain stable.  Platelet count is also stable at 439,000.  Patient planning discharge to home over the weekend if drain is able to successfully remove fluid from the retroperitoneal fluid collection that is causing pain in the right lower quadrant.  Final antibiotic plan is still pending from ID.  We are continuing to follow closely and encourage further activity on the part of the patient.    1/10/2024.  Patient is seen again today in her room in the CCU room #335.  Patient is up in chair at bedside watching television.  She recognizes me immediately upon my entry into her room and smiles appropriately.  I wore full PPE for the exam including an N95 facemask, goggles, white lab coat, and gloves.  I performed thorough hand hygiene before and after the patient visit.  I did discuss the patient's care with her nurse who was present in the [place]ney outside her room.  Patient did have successful placement of the drain today in the pocket of fluid accumulation in the right lower abdominal wall.  Procedure was felt to be quite successful and 50 cc of fluid were sent to the lab for evaluation and analysis.  Patient has an ongoing drain that has very opaque brownish-white material draining from the fluid collection at present time.  Patient states that she feels 100% better with significant improvement in the pain that she was experiencing in the right lower quadrant prior to this drainage.  Vital signs today indicate no fevers with current temperature of 98.1 pulse 86 respiratory rate 18 blood pressure 120/65 and oxygen sat 96% on room air.Review of patient's labs shows that her sodium is very stable at 132 glucoses have been primarily in the 1 43-1 63 range today.  Patient's white blood cell count continues to come down slowly and is at 12.93 and her hemoglobin is stable at 10.3.  Body fluid culture from drainage is pending.   Patient does remain on Lantus 16 units along with sliding scale insulin at present time and seems to be very comfortable with management of her diabetes.  We will plan follow-up on outpatient basis with endocrinology following discharge.  Patient was seen by Dr. Us today and he has continued the vancomycin therapy awaiting culture results from the fluid drained from her right lower quadrant and awaiting final ID is on organism which grew from blood earlier in admission.  He is still continuing to treat for fungal infection that was collected at time of drainage from stents placed in ureters and is using micafungin for treatment of this infection.  Dr. Campos performed procedure in radiology without complications or problems.  He has suggested that patient have a repeat CT scan of the area prior to discharge to home.    1/11/2023.  Patient is seen again today in her room on CCU room #335.  Patient is up in her chair watching television and indicates to me that she is often sleeping in the chair because it is easier to get up to urinate from the chair then to get up from the bed.  I did speak with the patient's nurse earlier in the day.  I wore full PPE for the exam including an N95 facemask, goggles, white lab coat, and gloves when touching patient.  I performed thorough hand hygiene before and after the patient.  Patient's appetite continues to slowly improve.  She also is gradually getting stronger and gaining more independence.  PT did discuss with her using a walker to ambulate because she seems to be much safer with that at the present time the patient is somewhat stubborn and insist on walking alone despite poor posture at times.  Review of today's labs show that her blood sugars have ranged from 156 up to a max of 389.  I will plan on increasing her insulin through her Lantus by another 2 units each day up to 18 units daily.  Other labs are very stable.  Sodium is 131, BUN slightly low at 4, albumin low at  2.1.  White blood cell count continues to come down and is at 11.91 today.  Hemoglobin is at 9.8 and platelet count is 502,000.  Culture still pending from drain placement but so far with no growth.  Other cultures remain positive as before.  Per ID patient to continue on vancomycin for now awaiting those culture results.  Anticipate possible discharge the patient over the weekend depending on decisions that come from infectious disease.  Otherwise clinical condition seems to be quite stable.Review of vital signs reveals that temperature is 98.9, pulse 81, respirations 20, blood pressure 110/82 and her room air sat is 96%.  We continue to follow while awaiting decision on antibiotics.    1/12/2024.  Patient is seen once again today in her room in the CCU room #335.  Patient was resting quietly in bed at the time of my visit.  I wore full PPE for the exam including an N95 face mask, goggles, white lab coat, and gloves when touching patient.  I performed thorough hand hygiene before and after the patient visit.  Patient has had a very quiet day.  She was seen by Dr. Miller from urology earlier this morning.  Per his plan, drain in right retroperitoneal area is to be maintained until output is clear and scant and patient is felt to be infection free.  We still are awaiting culture results from the drain.  Urology plans to coordinate outpatient study of both ureters to determine patency with a follow-up in office 1 week after discharge.Dr. Us from infectious disease saw patient again also today.  Drainage cultures from the retroperitoneal drain remain pending.  For now he continued vancomycin.  He also talked with micro in the lab today about the prior positive blood cultures and they are going to resend the new sample due to the mixed sample that came out on prior study.  He is continuing micafungin 100 mg daily for now due to the isolated Candida in patient's stents.  Pharmacy continues to adjust vancomycin  levels.  New labs ordered for tomorrow AM.  Patient hoping for discharge soon if final decisions can be made regarding antibiotic therapy.  Vital signs remained stable with no fever pulse 82 respiratory rate 18 and blood pressure 128/64 with 94% on sat with room air.    1/13/2024.  Patient is seen again today in her room in the CCU room #335.  Patient is resting quietly in bed at the time of my visit watching television.  I did discuss the case briefly with the patient's nurse.  I wore full PPE for the exam including an N95 face mask, goggles, white lab coat, and gloves when touching the patient.  I performed thorough hand hygiene before and after the patient visit.  Patient remains in good spirits today and actually was more talkative than in the past.  Patient's  is helping from afar with a move that her daughter is doing today.  Most of the family is involved with moving the daughter to returning home.  Patient's drain continues to drain cloudy whitish pus-like appearing material with the amount of drainage decreased slightly.  Culture has grown lactobacillus which hopefully will be sensitive to the same vancomycin that the patient is taking for her blood culture that was positive.  Rereview of the sensitivity blood culture is still pending in the lab at present time.  ID did see the patient and will continue to follow.  Hopefully will have final cultures back by Monday and be able to discharge patient early in the week to home with home IV antibiotics as necessary.  Patient's daughter is an RN and will be able to coordinate infusion of those medications at home level.  Urology did see the patient today and suggested continuing the IV antibiotics and the right retroperitoneal drain.  They also plan to continue following for now.  No other major changes occurred with the patient today.  We are continuing to follow until she is ready for discharge to home.    1/14/2024.  Patient is seen again today in her  room on CCU #335.  Patient was resting quietly in bed watching television and her nurse was at bedside while phlebotomy was drawing her vancomycin trough level.  Patient not feeling as well today due to low grade temperature elevations.Tmax 99.1 overnight last night.  I wore full PPE for the exam including an N-95 face mask, goggles, gloves and white lab coat.  I performed thorough hand hygiene before and after the patient visit. Patient's retroperitoneal drain is still draining opaque, thick pus which has culture positive for lactobacillus.  We are still waiting on final culture results and sensitivities for both bacteria that have grown, one from the drainage and one from the blood.  Patient's appetite is decreased today.  Daughter visited earlier and helped her with bath and clean up.  Labs show sodium down to 125.(Requesting input from nephrology on the hyponatremia) Glucoses remain more elevated in last 24 hours possibly due to brewing infection. Planning to continue gradually increasing the Lantus dose to 14 units bid. WBC is up to 10.99 and hemoglobin is stable at 9.1.  Platelets are up to 550,000.  Will check UA, Sed rate / CRP in am.  Also plan to check AAS x-ray in am.  Will continue monitoring patient carefully.        Review of Systems:               Review of systems could not be obtained due to  patient confusion. patient nonverbal.       Past Medical History:   Diagnosis Date    Anemia     intermittently    Anxiety and depression     Arthritis     Depression     Diabetes mellitus     Gallstones     High cholesterol     History of frequent urinary tract infections     HX OF YEAST IN BLADDER HAS PRN DIFLUCAN    History of transfusion 05/24/2017    Had 2 iron infusions.  This was when i had knee replacement    Hyperlipidemia     Hypertension     Hypothyroidism     Knee pain, bilateral     Low back pain     Lumbar stenosis     PONV (postoperative nausea and vomiting)     Positive TB test     chest x-ray  neg    Seasonal allergies      Past Surgical History:   Procedure Laterality Date    APPENDECTOMY N/A     Dr. Wilson    CHOLECYSTECTOMY WITH INTRAOPERATIVE CHOLANGIOGRAM N/A 10/31/2018    Procedure: laparoscopic cholecystectomy;  Surgeon: Mary Kay Mac MD;  [facility];  Service: General    COLONOSCOPY      CYSTOSCOPY BLADDER BIOPSY N/A 10/3/2016    Procedure: CYSTOSCOPY BLADDER BIOPSY;  Surgeon: Rei Calvert MD;  Location: [facility] MAIN OR;  Service:     CYSTOSCOPY W/ URETERAL STENT PLACEMENT Bilateral 1/3/2024    Procedure: CYSTOSCOPY BILATERAL RETROGRADE PYELOGRAM  BILATERAL URETERAL STENT INSERTION AND CATHETHER PLACEMENT;  Surgeon: Eduard Armando MD;  Location: [facility] MAIN OR;  Service: Urology;  Laterality: Bilateral;    DILATATION AND CURETTAGE N/A     ENDOSCOPY      INTRAUTERINE DEVICE INSERTION      KNEE ARTHROSCOPY W/ MENISCAL REPAIR Left     OVARIAN CYST REMOVAL Left     Dr. Wilson    TOTAL KNEE ARTHROPLASTY Right 2017    Procedure: RIGHT TOTAL KNEE ARTHROPLASTY WITH ALETA NAVIGATION;  Surgeon: Ross Cruz MD;  Location: [facility] MAIN OR;  Service:      Allergies   Allergen Reactions    Sulfa Antibiotics Hives and Rash       Family History   Problem Relation Age of Onset    Hepatitis Mother     Breast cancer Mother     Heart disease Mother     Cancer Mother     Hyperlipidemia Mother              Heart failure Father     Stroke Father     Hypertension Father         Since he was 72, now 86s    Diabetes Father     Heart disease Father     Hyperlipidemia Father         Unsurs    Heart disease Maternal Grandmother     Cancer Maternal Grandfather     COPD Maternal Grandfather     Arthritis Paternal Grandmother     Diabetes Maternal Aunt     Diabetes Paternal Uncle     Malig Hyperthermia Neg Hx        Social History     Socioeconomic History    Marital status:    Tobacco Use    Smoking status: Never    Smokeless tobacco: Never    Tobacco comments:  "    Never smoked   Vaping Use    Vaping Use: Never used   Substance and Sexual Activity    Alcohol use: Never     Alcohol/week: 3.0 standard drinks of alcohol     Types: 1 Glasses of wine, 2 Standard drinks or equivalent per week    Drug use: Never    Sexual activity: Not Currently     Partners: Male     Birth control/protection: I.U.D.       PMH, FH, SH and ROS completed with Admission History and Physical and updated in EPIC system.        Objective     Scheduled Meds:enoxaparin, 40 mg, Subcutaneous, Q12H  escitalopram, 10 mg, Oral, Daily  insulin glargine, 12 Units, Subcutaneous, Q12H  insulin lispro, 2-9 Units, Subcutaneous, 4x Daily AC & at Bedtime  levothyroxine, 137 mcg, Oral, Q AM  micafungin (MYCAMINE) IV, 100 mg, Intravenous, Q24H  oxybutynin XL, 10 mg, Oral, Daily  pantoprazole, 40 mg, Oral, Q AM  potassium phosphate, 15 mmol, Intravenous, Once  rOPINIRole, 0.5 mg, Oral, Nightly  senna-docusate sodium, 2 tablet, Oral, BID  vancomycin, 1,000 mg, Intravenous, Q12H      Continuous Infusions:lactated ringers, 9 mL/hr, Last Rate: 9 mL/hr (01/03/24 1527)  Pharmacy to dose vancomycin,         Vital signs in last 24 hours:  Temp:  [98.1 °F (36.7 °C)-98.8 °F (37.1 °C)] 98.1 °F (36.7 °C)  Heart Rate:  [91-98] 91  Resp:  [18-22] 20  BP: (110-140)/(61-74) 123/65    Intake/Output:    Intake/Output Summary (Last 24 hours) at 1/15/2024 0027  Last data filed at 1/14/2024 2313  Gross per 24 hour   Intake --   Output 1130 ml   Net -1130 ml         Exam:  /65 (BP Location: Left arm, Patient Position: Lying)   Pulse 91   Temp 98.1 °F (36.7 °C) (Oral)   Resp 20   Ht 165.1 cm (65\")   Wt 115 kg (254 lb 10.1 oz)   LMP 09/03/2016 Comment: IUD  SpO2 91%   BMI 42.37 kg/m²     Constitutional:  Patient resting in bed today.  Continues to more alert.  Normal personality.  Pain significantly improved,.  Drain continues to produce opaque whitish-tan drainage.  Redness over right lower quadrant remains significantly " improved.  Blood pressures stable. Low grade fever elevations noted today.  Patient not feeling as well.  WBC is up slightly.     Eyes:     PERRLA, conjunctiva/corneas clear, no icterus, no conjunctival                                     pallor, EOM's intact, both eyes      ENT and Mouth: Lips, tongue, gums normal; oral mucosa pink and moist   Neck:     Supple, symmetrical, trachea midline, no JVD  Respiratory:     Clear to auscultation bilaterally, respirations unlabored  Cardiovascular:  Regular rate and rhythm, S1 and S2 normal, no murmur,      no  Rub or gallop.  Pulses normal.    Gastrointestinal:   BS present x 4 Soft, non-tender, bowel sounds active,      no masses, no hepatosplenomegaly    right lower quadrant pain almost totally resolved with no rebound guarding or other abnormalities.  Retroperitoneal drain remains in place.  Will plan CT scan prior to discharge.                                               :       No hernia.  Normal exam for sex.         Musculoskeletal: Extremities normal, atraumatic, no cyanosis or edema     No arthropathy.  No deformity.  Gait normal                                                 Skin:   Skin is warm and dry,  no rashes, swelling or palpable lesions.  Erythema resolved over right lower quadrant area pain   Neurologic:  CN -XII intact, motor strength grossly intact, sensation grossly intact to light touch, no focal reflex deficits noted    Psychiatric:     Alert,oriented X3, no delusions, psychoses, depression or anxiety    Heme/Lymph/Imun:   No bruises, petechiae.  Lymph nodes normal in size/configuration       Data Review:  Lab Results   Component Value Date    CALCIUM 8.8 01/14/2024    PHOS 3.7 01/07/2024     Results from last 7 days   Lab Units 01/14/24  0705 01/13/24  0716 01/11/24  0734   AST (SGOT) U/L 12 13 10   ALT (SGPT) U/L 9 9 7   SODIUM mmol/L 129* 133* 131*   POTASSIUM mmol/L 4.1 4.2 4.4   CHLORIDE mmol/L 93* 95* 94*   CO2 mmol/L 27.0 26.5 27.6   BUN  mg/dL 6* 5* 4*   CREATININE mg/dL 0.76 0.73  0.73 0.78   GLUCOSE mg/dL 218* 159* 162*   CALCIUM mg/dL 8.8 8.7 8.8   WBC 10*3/mm3 10.99* 9.51 11.91*   HEMOGLOBIN g/dL 9.1* 9.1* 9.8*   PLATELETS 10*3/mm3 550* 528* 502*     Lab Results   Component Value Date    TROPONINT <6 01/02/2024     Estimated Creatinine Clearance: 98.9 mL/min (by C-G formula based on SCr of 0.76 mg/dL).  WEIGHTS:     Wt Readings from Last 1 Encounters:   01/14/24 0600 115 kg (254 lb 10.1 oz)   01/13/24 0627 116 kg (255 lb 1.2 oz)   01/12/24 0605 115 kg (254 lb 3.1 oz)   01/11/24 0600 113 kg (249 lb)   01/09/24 0653 114 kg (251 lb 3.2 oz)   01/08/24 0700 115 kg (254 lb 3.1 oz)   01/07/24 0510 120 kg (263 lb 10.7 oz)   01/07/24 0349 120 kg (263 lb 10.7 oz)   01/06/24 0509 122 kg (269 lb 6.4 oz)   01/05/24 0556 111 kg (244 lb 11.4 oz)   01/04/24 0600 111 kg (245 lb 6 oz)   01/03/24 0600 111 kg (245 lb 6 oz)   01/02/24 1131 120 kg (265 lb)         Assessment:    DKA (diabetic ketoacidosis)    UTI (urinary tract infection)    Morbid obesity with BMI of 40.0-44.9, adult    JADEN (acute kidney injury)    Sepsis, unspecified organism    Lactic acidosis    UPJ (ureteropelvic junction) obstruction bilateral    Bilateral hydronephrosis moderate on admission    Renal calculus on right, nonobstructinbg    Vitamin D deficiency    Essential hypertension    Hyperlipidemia    Hypothyroidism    Allergic rhinitis    Back pain    Chronic liver disease    Primary osteoarthritis of right knee    Contact dermatitis    Anxiety    Dense breast tissue on mammogram    Bilateral hydrocele      Attending Physician Assessment and Plan:    1.  Cystitis/UTI with sepsis associated with bilateral UPJ obstruction and moderate bilateral hydronephrosis and possible right calyceal rupture.  Patient resuscitated aggressively with IV fluids.  Broad-spectrum antibiotics, Zosyn, started.  Urology consult is placed.  Patient being followed carefully in ICU for additional complications or  signs of worsening sepsis.  Culture of urine remains negative on day #2.  New cultures collected at time of cystoscopy today by urology.  Patient currently off Zosyn which was stopped after surgery yesterday.  We will repeat blood cultures since we did have 1 of 2 initial blood cultures positive.  ID has been consulted and is following with us.  They have started the patient on fluconazole secondary to the instrumentation yesterday and yeast that is seen in hearing.  I do plan to check a procalcitonin level with a.m. labs tomorrow.  Patient also started on vancomycin due to 2 of 2 positive cultures from blood.  Additional blood cultures pending at present time.  ID continues to follow on 1/9/2024.  Vancomycin and micafungin are continued for treatment of ongoing infections.  Antibiotics unchanged at this point.  Hoping culture from fluid collection obtained in IR today will be useful in guiding antibiotic therapy.  ID continues to follow.  Vancomycin still ongoing.  Final decision on treatment to come soon with respect to cultures.  Awaiting final culture results.  Lab resubmitting blood culture for ID.  Urine cultures negative.  Drainage from abscess culture still pending.  On 1/13/2024.  Still on vancomycin.  Awaiting final culture sensitivities.1/14/2024 patient beginning to show some elevations in WBC and low grade temperature elevations.    2.  Anion gap metabolic acidosis versus possible diabetic ketoacidosis.  Improving rapidly at present time on DKA protocol with Glucomander.  Aggressive fluid resuscitation initiated in the ER is continued in the ICU.  Patient now with good urinary output and hopefully will be able to come off of IV insulin and switch to subcu dosing overnight..  Anion gap has narrowed and improved.  Patient should come off Glucomander DKA protocol sometime later today.  On postop day #1 anion gap has significantly improved.  We are asking renal to help us with follow-up on her acute kidney  injury and volume status.  Acidosis has resolved.  Acidosis has resolved.  Diabetes now under control with Lantus 16 units daily and sliding scale insulin anion gap remains normal now.     3.  Lactic acidosis possibly due to home use of metformin while taking no food or possibly due to acute sepsis.  Continuing rapid fluid resuscitation with improvement already noted in lactic acid.  Monitoring carefully for signs of any new changes or problems.  Lactic acidosis has also resolved on day #2 and patient's sepsis seems to be under much more stable condition at present time.  Repeat lactic acid in a.m. with other labs.  Lactic acidosis has resolved.    4.  Type 2 diabetes mellitus poorly controlled at present time with elevated A1c greater than 16.  Will probably need to arrange for follow-up with patient in endocrinology clinic.  For now we will ask diabetic educator to see patient and work on sliding scale insulin protocols.  Patient and her daughter are interested in Dexcom monitoring and I will see if that is available from the diabetic educator.  Discussed situation with the daughters and she may benefit from continuous 24-hour glucose monitoring issues willing to learn the process.  Will probably need to discharge charge on sliding scale insulin and may also consider mealtime insulin or long acting insulin.  Diabetic educator to see patient and work with us on diet, glucose testing techniques, and generalized diabetes management.  Patient doing well on sliding scale with single dose of Lantus at nighttime.  Now the patient is eating more plan to increase Lantus to 16 units subcu daily.  We will plan on rechecking hemoglobin A1c in 6 to 8 weeks.  Diabetes much improved.  Stable on Lantus and sliding scale insulin.  Continuing to increase Lantus slowly to 18 units daily.  Splitting Lantus into 2 doses daily and will give 10 units subcu twice daily since glucose is still running a bit high.  Tolerating insulin well.   Patient eating better and blood sugars slightly higher.  Continuing Lovenox on 1/13/2024 but increasing dose to 12 units twice daily since blood sugars are still running higher above 200 especially later in the day.1/14/2023 further increasing Lantus dose to 14 units bid.    5.  Pseudohyponatremia.  Seems to already be improving with resuscitation and resolution of the uncontrolled glucoses.  Will continue to follow electrolytes regularly.  Hyponatremia now corrected and in normal range.  Hyponatremia has now resolved on day #2. Sodium now normalized.Low sodium now reoccurring on 1/14/2024.  I am asking for nephrology to evaluate.     6.  Hypothyroidism.  Patient's Synthroid is continued by the ICU attending.  Will continue to follow this in the hospital and on an outpatient basis.  Hypothyroidism is a stable condition     7.  Hyperlipidemia.  Agree with holding statin for now.  Will resume prior to discharge and continue on outpatient basis.     8.  Obesity.  Hope to continue using agents such as Ozempic for management of patient's blood sugar levels.  This will also help with diet control and ongoing need for gradual weight loss.     9.  Acute kidney injury secondary to volume depletion with uncontrolled glucoses.  Should improve as patient's volume status normalizes.  Will continue to monitor closely.  Acute kidney injury is gradually improving with resolution of hydronephrosis and bilateral stent placement.  Acute kidney injury totally resolved.     10.  Generalized muscle weakness and fatigue.  Suspect related to the poorly controlled diabetes and electrolyte disturbances.  Will do PT and OT evaluations after patient stabilizes.  Suspect patient will discharge to home with PT and OT and home environment.  Hopefully patient will be able to work with PT and OT in the next few days.  Muscle strengthening ongoing.    11.  Right lower quadrant pain with fluid collections noticed on CT scan completed today 6 days  after admission.  Patient complaining of significant pain with erythematous skin over the right lower quadrant.  I will ask general surgery to take a look at the situation in the a.m.  We will also notify urology of the findings in the a.m.        Plan for disposition:Where: home and home health and When: 2-3 days when medically stable     Copied text in this note has been reviewed by me and is accurate as of 01/14/2024.  Much of this dictation was completed using Dragon voice activated transcription software which can result in misspelled words and nonsensical phrases at times.     Dr. Fairchild available and can be reached at [phone]      Dean Fairchild MD  1/14/2024  1700 EST

## 2024-01-15 NOTE — PLAN OF CARE
Goal Outcome Evaluation: Pt resting in bed with no signs of distress noted at this time. Pt has been given incentive spirometer and educated how and why to use. VS stable. Bed in lowest position with siderails up X2. Call light within reach. RN will continue to monitor.

## 2024-01-15 NOTE — SIGNIFICANT NOTE
01/15/24 1534   OTHER   Discipline physical therapist   Rehab Time/Intention   Session Not Performed patient/family declined treatment  (Patient pleasantly declined reporting she has had diarrhea all day and has been on and off BSC. Declined sitting up in recliner. Notified JOHN Kerr. PT will plan to follow up tomorrow.)   Recommendation   PT - Next Appointment 01/16/24

## 2024-01-15 NOTE — PROGRESS NOTES
[facility]: Vancomycin Level Monitoring Note    Tiana Hudson is a 63 y.o. female who is on day 10 of pharmacy to dose vancomycin for Bacteremia.    Estimated Creatinine Clearance: 98.9 mL/min (by C-G formula based on SCr of 0.76 mg/dL).    Current Vanc Dose: 1000 mg IV every  12  hours  Updated Cultures and Sensitivities:   1/2 bld cx 2 of 2 gram positive rods, sent out for further identification  1/3 body fluid cx (kidney): Candida glabrata, candida tropicalis  1/5 bld cx NG  1/10 body fluid cx (retroperitoneum): lactobacillus sp.  Results from last 7 days   Lab Units 01/14/24  1736 01/13/24  0716 01/10/24  0639   VANCOMYCIN TR mcg/mL 12.60 11.30 18.30   Predicted AUC at current dose:472 mg/L.hr  Next Level Date and Time: Vanc Trough will deferred to days shift clinical due to DOT.     No changes at this time. Pharmacy is continuing to monitor and will adjust as needed.    Loy Oakely [facility]  Clinical Pharmacist

## 2024-01-15 NOTE — PROGRESS NOTES
LOS: 13 days     Chief Complaint: Antibiotic management    Interval History: Patient reports she is feeling better.  Less right-sided flank pain.  Denies any fevers or chills.  Continues to have purulent output in her drain.    Vital Signs  Temp:  [98.1 °F (36.7 °C)-98.8 °F (37.1 °C)] 98.1 °F (36.7 °C)  Heart Rate:  [89-96] 89  Resp:  [18-24] 24  BP: (110-140)/(61-74) 137/70    Physical Exam:  General: In no acute distress  HEENT: Oropharynx clear, moist mucous membranes  Respiratory: Normal work of breathing  Skin: No rashes or lesions in exposed areas  Extremities: No edema, cyanosis  Access: Peripheral IV    Antibiotics:  Anti-Infectives (From admission, onward)      Ordered     Dose/Rate Route Frequency Start Stop    01/15/24 0101  Pharmacy to dose vancomycin        Ordering Provider: Dean Fairchild MD     Does not apply Continuous PRN 01/15/24 0044 01/20/24 0043    01/13/24 0928  vancomycin (VANCOCIN) 1000 mg/200 mL dextrose 5% IVPB        Ordering Provider: Jorge L Us DO    1,000 mg  over 60 Minutes Intravenous Every 12 Hours 01/13/24 1700 01/17/24 1759    01/12/24 1329  Pharmacy to dose vancomycin        Ordering Provider: Micky Car MD     Does not apply Continuous PRN 01/12/24 1328 01/15/24 2359    01/09/24 1144  vancomycin (VANCOCIN) 500 mg in sodium chloride 0.9 % 100 mL IVPB-VTB        Ordering Provider: Jorge L Us DO    500 mg  200 mL/hr over 30 Minutes Intravenous Every 8 Hours 01/09/24 1644 01/12/24 0902    01/08/24 0803  micafungin sodium (MYCAMINE) 100 mg in sodium chloride 0.9 % 100 mL IVPB-VTB        Ordering Provider: Micky Car MD    100 mg  over 60 Minutes Intravenous Every 24 Hours 01/08/24 0900 01/20/24 1121    01/05/24 0756  vancomycin 2250 mg/500 mL 0.9% NS IVPB ([facility])        Ordering Provider: Jorge L Us DO    20 mg/kg × 111 kg  over 135 Minutes Intravenous Once 01/05/24 0845 01/05/24 1118    01/05/24 7056  Pharmacy to  dose vancomycin        Ordering Provider: Jorge L Us, DO     Does not apply Continuous PRN 01/05/24 0750 01/12/24 0749    01/02/24 1636  piperacillin-tazobactam (ZOSYN) 4.5 g in iso-osmotic dextrose 100 mL IVPB (premix)        Ordering Provider: Arthur Najera MD    4.5 g  over 30 Minutes Intravenous Once 01/02/24 1652 01/02/24 1735    01/02/24 1243  cefTRIAXone (ROCEPHIN) 2,000 mg in sodium chloride 0.9 % 100 mL IVPB-VTB        Ordering Provider: Angelito Winkler MD    2,000 mg  200 mL/hr over 30 Minutes Intravenous Once 01/02/24 1259 01/02/24 1414             Results Review:     I reviewed the patient's new clinical results.    Lab Results   Component Value Date    WBC 11.33 (H) 01/15/2024    HGB 8.9 (L) 01/15/2024    HCT 28.5 (L) 01/15/2024    MCV 83.6 01/15/2024     (H) 01/15/2024     Lab Results   Component Value Date    GLUCOSE 215 (H) 01/15/2024    BUN 6 (L) 01/15/2024    CREATININE 0.81 01/15/2024    EGFRIFNONA 54 (L) 10/24/2018    EGFRIFAFRI 83 02/23/2017    BCR 7.4 01/15/2024    CO2 26.5 01/15/2024    CALCIUM 8.6 01/15/2024    PROTENTOTREF 6.9 02/23/2017    ALBUMIN 2.2 (L) 01/15/2024    LABIL2 1.7 02/23/2017    AST 14 01/15/2024    ALT 7 01/15/2024     Microbiology:  1/2 urine culture no growth  1/2 blood cultures 2 out of 2 gram-positive bacilli  1/3 right renal fluid culture Candida glabrata and Candida tropicalis  1/3 left renal fluid culture Candida glabrata  1/5 blood cultures no growth to date  1/10 retroperitoneal fluid culture lactobacillus    Assessment    #Right calyceal rupture  #Bilateral hydronephrosis status post bilateral ureteral stenting  #Abdominal abscess  #JADEN, improved  #DKA, improved  #Hyponatremia, improved  #Morbid obesity, BMI 41  #Gram-positive bacteremia    Send out blood cultures remain pending.  Fluconazole susceptibilities also pending for Candida glabrata. Continue micafungin 100 mg daily for isolated Candida.  Plan to de-escalate vancomycin today to  Unasyn 3 g every 6 hours.    ID will follow.

## 2024-01-15 NOTE — CONSULTS
[facility] Consult Note      Patient Name: Tiana Hudson  : 1960  MRN: [id number]  Primary Care Physician:  Dean Fairchild MD  Referring Physician: No ref. provider found  Date of admission: 2024    Subjective     Reason for Consult:   Hyponatremia     HPI:   Tiana Hudson is a 63 y.o. female known to have history of type 2 diabetes mellitus, hypertension, coronary artery disease, history of depression and history of nephrolithiasis who presented to the hospital on 2023 with bilateral flank pain, severe  sepsis due to right calyceal rupture with bilateral UPJ obstruction status post cystoscopy and bilateral stent placements on 1/3/2023.  Patient was found also to have retroperitoneal abscess status post drain placement.   -Patient was admitted to ICU and started antibiotic with blood culture showing gram positive bacteremia and antifungal for Candida glabrata currently followed by ID.  -Her hospital course was complicated by acute kidney injury with creatinine that was up to 1.8 from normal baseline.  Creatinine improved to 0.8 on 1/15/2024.  -She was noted to have worsening hyponatremia with sodium at 127 on admission now up to 132 so nephrology was consulted for evaluation and management.  -The patient is a history of chronic hyponatremia since May 2023.  She denies any personal history of hyponatremia.  Denies any nausea or vomiting.  Noted decreased oral intake for the past couple of weeks.      Review of Systems:   14 point review of systems is otherwise negative except for mentioned above on HPI    Personal History     Past Medical History:   Diagnosis Date    Anemia     intermittently    Anxiety and depression     Arthritis     Depression     Diabetes mellitus     Gallstones     High cholesterol     History of frequent urinary tract infections     HX OF YEAST IN BLADDER HAS PRN DIFLUCAN    History of transfusion 2017    Had 2 iron infusions.  This  was when i had knee replacement    Hyperlipidemia     Hypertension     Hypothyroidism     Knee pain, bilateral     Low back pain     Lumbar stenosis     PONV (postoperative nausea and vomiting)     Positive TB test     chest x-ray neg    Seasonal allergies        Past Surgical History:   Procedure Laterality Date    APPENDECTOMY N/A 1982    Dr. Wilson    CHOLECYSTECTOMY WITH INTRAOPERATIVE CHOLANGIOGRAM N/A 10/31/2018    Procedure: laparoscopic cholecystectomy;  Surgeon: Mary Kay Mac MD;  Location: [facility];  Service: General    COLONOSCOPY      CYSTOSCOPY BLADDER BIOPSY N/A 10/3/2016    Procedure: CYSTOSCOPY BLADDER BIOPSY;  Surgeon: Rei Calvert MD;  Location: [facility] OR;  Service:     CYSTOSCOPY W/ URETERAL STENT PLACEMENT Bilateral 1/3/2024    Procedure: CYSTOSCOPY BILATERAL RETROGRADE PYELOGRAM  BILATERAL URETERAL STENT INSERTION AND CATHETHER PLACEMENT;  Surgeon: Eduard Armando MD;  Location: [facility] OR;  Service: Urology;  Laterality: Bilateral;    DILATATION AND CURETTAGE N/A     ENDOSCOPY      INTRAUTERINE DEVICE INSERTION      KNEE ARTHROSCOPY W/ MENISCAL REPAIR Left     OVARIAN CYST REMOVAL Left 1982    Dr. Wilson    TOTAL KNEE ARTHROPLASTY Right 5/22/2017    Procedure: RIGHT TOTAL KNEE ARTHROPLASTY WITH ALETA NAVIGATION;  Surgeon: Ross Cruz MD;  Location: [facility];  Service:        Family History: family history includes Arthritis in her paternal grandmother; Breast cancer in her mother; COPD in her maternal grandfather; Cancer in her maternal grandfather and mother; Diabetes in her father, maternal aunt, and paternal uncle; Heart disease in her father, maternal grandmother, and mother; Heart failure in her father; Hepatitis in her mother; Hyperlipidemia in her father and mother; Hypertension in her father; Stroke in her father.    Social History:  reports that she has never smoked. She has never used smokeless tobacco. She reports that she does not drink  alcohol and does not use drugs.    Home Medications:  Prior to Admission medications    Medication Sig Start Date End Date Taking? Authorizing Provider   amLODIPine-benazepril (LOTREL 5-20) 5-20 MG per capsule Take 1 capsule by mouth daily.  Patient taking differently: Take 1 capsule by mouth Every Morning. 7/18/16  Yes Dean Fairchild MD   Dulaglutide 1.5 MG/0.5ML solution pen-injector Inject  under the skin into the appropriate area as directed 1 (One) Time Per Week. SUNDAY   Yes Lucinda Willis MD   furosemide (LASIX) 20 MG tablet Take 0.5 tablets by mouth Daily As Needed. 2/26/18  Yes Lucinda Willis MD   levothyroxine (SYNTHROID, LEVOTHROID) 137 MCG tablet TAKE 1 TABLET BY MOUTH EVERY DAY IN THE MORNING ON AN EMPTY STOMACH 3/25/22  Yes Lucinda Willis MD   loratadine (CLARITIN) 10 MG tablet Take 1 tablet by mouth Daily.   Yes Lucinda Willis MD   metFORMIN (GLUCOPHAGE) 500 MG tablet Take 1 tablet by mouth 2 (Two) Times a Day With Meals. 4/7/17  Yes Dean Fairchild MD   multivitamin (THERAGRAN) tablet tablet Take  by mouth Daily.   Yes Lucinda Willis MD   oxybutynin XL (DITROPAN-XL) 10 MG 24 hr tablet Take 1 tablet by mouth Daily.   Yes Lucinda Willis MD   pravastatin (PRAVACHOL) 40 MG tablet Take 1 tablet by mouth daily. 7/18/16  Yes Dean Fairchild MD   rOPINIRole (REQUIP) 0.5 MG tablet Take 1 tablet by mouth every night at bedtime. 7/25/17  Yes Fabiana Kamara APRN   tiZANidine (ZANAFLEX) 4 MG tablet Take 1 tablet by mouth At Night As Needed for Muscle Spasms.   Yes Lucinda Willis MD   traMADol (ULTRAM) 50 MG tablet Take 0.5 tablets by mouth 2 (Two) Times a Day As Needed. for pain 10/26/20  Yes Lucinda Willis MD   uribel (URO-MP) 118 MG capsule capsule Take 1 capsule by mouth 3 (Three) Times a Day. 3/26/18  Yes Lucinda Willis MD   VITAMIN D PO Take  by mouth.   Yes Lucinda Willis MD   diclofenac (VOLTAREN) 50 MG EC tablet Take 1  tablet by mouth 2 (Two) Times a Day As Needed (pain). 3/6/19   Joe Gil IV, MD   escitalopram (LEXAPRO) 10 MG tablet Take 1 tablet by mouth Daily.    Provider, MD Lucinda   potassium chloride (K-DUR,KLOR-CON) 20 MEQ CR tablet Take 20 mEq by mouth As Needed (only when takin Lasix). 2/26/18   Provider, MD Lucinda       Allergies:  Allergies   Allergen Reactions    Sulfa Antibiotics Hives and Rash       Objective     Vitals:   Temp:  [98.1 °F (36.7 °C)-98.8 °F (37.1 °C)] 98.1 °F (36.7 °C)  Heart Rate:  [89-96] 89  Resp:  [18-24] 24  BP: (110-140)/(61-74) 137/70    Intake/Output Summary (Last 24 hours) at 1/15/2024 0815  Last data filed at 1/15/2024 0453  Gross per 24 hour   Intake --   Output 1150 ml   Net -1150 ml       Physical Exam:   Constitutional: Alert awake oriented.  Obese  HEENT: Anicteric sclera  Neck: Supple, no thyromegaly, no lymphadenopathy, trachea at midline, no JVD  Respiratory: Clear to auscultation bilaterally, nonlabored respiration  Cardiovascular: RRR, no murmurs, no rubs or gallops, no carotid bruit  Gastrointestinal: Positive bowel sounds, abdomen is soft, nontender and distended abdomen.  Abdominal drain noted.  : No palpable bladder  Musculoskeletal: Trace to 1+ pitting edema bilateral lower extremities, no clubbing or cyanosis  Psychiatric: Appropriate affect, cooperative  Neurologic: Oriented x3, moving all extremities, normal speech and mental status  Skin: Warm and dry       Scheduled Meds:     enoxaparin, 40 mg, Subcutaneous, Q12H  escitalopram, 10 mg, Oral, Daily  insulin glargine, 14 Units, Subcutaneous, Q12H  insulin lispro, 2-9 Units, Subcutaneous, 4x Daily AC & at Bedtime  levothyroxine, 137 mcg, Oral, Q AM  micafungin (MYCAMINE) IV, 100 mg, Intravenous, Q24H  oxybutynin XL, 10 mg, Oral, Daily  pantoprazole, 40 mg, Oral, Q AM  potassium phosphate, 15 mmol, Intravenous, Once  rOPINIRole, 0.5 mg, Oral, Nightly  senna-docusate sodium, 2 tablet, Oral,  BID  vancomycin, 1,000 mg, Intravenous, Q12H      IV Meds:   lactated ringers, 9 mL/hr, Last Rate: 9 mL/hr (01/03/24 1527)  Pharmacy to dose vancomycin,   Pharmacy to dose vancomycin,         Results Reviewed:   I have personally reviewed the results from the time of this admission to 1/15/2024 08:53 EST     Lab Results   Component Value Date    GLUCOSE 215 (H) 01/15/2024    CALCIUM 8.6 01/15/2024     (L) 01/15/2024    K 3.9 01/15/2024    CO2 26.5 01/15/2024    CL 94 (L) 01/15/2024    BUN 6 (L) 01/15/2024    CREATININE 0.81 01/15/2024    EGFRIFAFRI 83 02/23/2017    EGFRIFNONA 54 (L) 10/24/2018    BCR 7.4 01/15/2024    ANIONGAP 11.5 01/15/2024      Lab Results   Component Value Date    MG 1.7 01/07/2024    PHOS 3.7 01/07/2024    ALBUMIN 2.2 (L) 01/15/2024           Assessment / Plan     ASSESSMENT:  Acute on top of chronic hyponatremia.  Multifactorial.  Euvolemic to hypervolemic etiology of worsening sodium level is likely secondary to decreased solute intake.  Patient has history of chronic hyponatremia likely related to SIADH due to SSRI use and ACE inhibitor use.  Acute kidney injury secondary prerenal state secondary to DKA/sepsis/postobstructive etiology.  Bilateral hydronephrosis  Right calyceal rupture secondary to nephrolithiasis status post bilateral double-J placement  Gram-positive bacteremia on vancomycin  Candiduria on antifungal followed by ID  Anemia likely secondary to inflammation  DKA  resolved         PLAN:  Patient is currently hypervolemic  We will give 1 dose of Lasix 20 mg IV stat  Avoid hypotonic fluid  Will check TSH, morning cortisol, urine sodium, urine osmolality.  Fluid restriction 1500 cc per 24 hours  Labs in a.m.      Thank you for involving us in the care of Tiana Hudson.  Please feel free to call with any questions.    Rashaun Hoffman MD  01/15/24  08:53 EST    [facility]  [phone]    Parts of this note may be an electronic  transcription/translation of spoken language to printed text using the Dragon dictation system.

## 2024-01-16 LAB
ALBUMIN SERPL-MCNC: 1.8 G/DL (ref 3.5–5.2)
ALBUMIN/GLOB SERPL: 0.4 G/DL
ALP SERPL-CCNC: 130 U/L (ref 39–117)
ALT SERPL W P-5'-P-CCNC: 8 U/L (ref 1–33)
ANION GAP SERPL CALCULATED.3IONS-SCNC: 12.2 MMOL/L (ref 5–15)
AST SERPL-CCNC: 15 U/L (ref 1–32)
BACTERIA FLD CULT: ABNORMAL
BACTERIA FLD CULT: ABNORMAL
BACTERIA SPEC AEROBE CULT: NO GROWTH
BASOPHILS # BLD AUTO: 0.09 10*3/MM3 (ref 0–0.2)
BASOPHILS NFR BLD AUTO: 0.9 % (ref 0–1.5)
BILIRUB SERPL-MCNC: 0.5 MG/DL (ref 0–1.2)
BUN SERPL-MCNC: 7 MG/DL (ref 8–23)
BUN/CREAT SERPL: 9.6 (ref 7–25)
CALCIUM SPEC-SCNC: 8.6 MG/DL (ref 8.6–10.5)
CHLORIDE SERPL-SCNC: 92 MMOL/L (ref 98–107)
CO2 SERPL-SCNC: 25.8 MMOL/L (ref 22–29)
CREAT SERPL-MCNC: 0.73 MG/DL (ref 0.57–1)
DEPRECATED RDW RBC AUTO: 45.4 FL (ref 37–54)
EGFRCR SERPLBLD CKD-EPI 2021: 93.7 ML/MIN/1.73
EOSINOPHIL # BLD AUTO: 0.18 10*3/MM3 (ref 0–0.4)
EOSINOPHIL NFR BLD AUTO: 1.9 % (ref 0.3–6.2)
ERYTHROCYTE [DISTWIDTH] IN BLOOD BY AUTOMATED COUNT: 14.6 % (ref 12.3–15.4)
GLOBULIN UR ELPH-MCNC: 4.6 GM/DL
GLUCOSE BLDC GLUCOMTR-MCNC: 200 MG/DL (ref 70–130)
GLUCOSE BLDC GLUCOMTR-MCNC: 265 MG/DL (ref 70–130)
GLUCOSE BLDC GLUCOMTR-MCNC: 273 MG/DL (ref 70–130)
GLUCOSE BLDC GLUCOMTR-MCNC: 314 MG/DL (ref 70–130)
GLUCOSE SERPL-MCNC: 191 MG/DL (ref 65–99)
GRAM STN SPEC: ABNORMAL
GRAM STN SPEC: ABNORMAL
HCT VFR BLD AUTO: 29.1 % (ref 34–46.6)
HGB BLD-MCNC: 9.2 G/DL (ref 12–15.9)
IMM GRANULOCYTES # BLD AUTO: 0.06 10*3/MM3 (ref 0–0.05)
IMM GRANULOCYTES NFR BLD AUTO: 0.6 % (ref 0–0.5)
LYMPHOCYTES # BLD AUTO: 1.27 10*3/MM3 (ref 0.7–3.1)
LYMPHOCYTES NFR BLD AUTO: 13.3 % (ref 19.6–45.3)
MCH RBC QN AUTO: 27.4 PG (ref 26.6–33)
MCHC RBC AUTO-ENTMCNC: 31.6 G/DL (ref 31.5–35.7)
MCV RBC AUTO: 86.6 FL (ref 79–97)
MONOCYTES # BLD AUTO: 1.36 10*3/MM3 (ref 0.1–0.9)
MONOCYTES NFR BLD AUTO: 14.2 % (ref 5–12)
NEUTROPHILS NFR BLD AUTO: 6.6 10*3/MM3 (ref 1.7–7)
NEUTROPHILS NFR BLD AUTO: 69.1 % (ref 42.7–76)
NRBC BLD AUTO-RTO: 0 /100 WBC (ref 0–0.2)
PHOSPHATE SERPL-MCNC: 3.6 MG/DL (ref 2.5–4.5)
PLATELET # BLD AUTO: 534 10*3/MM3 (ref 140–450)
PMV BLD AUTO: 9.4 FL (ref 6–12)
POTASSIUM SERPL-SCNC: 3.6 MMOL/L (ref 3.5–5.2)
POTASSIUM SERPL-SCNC: 4.5 MMOL/L (ref 3.5–5.2)
PROT SERPL-MCNC: 6.4 G/DL (ref 6–8.5)
RBC # BLD AUTO: 3.36 10*6/MM3 (ref 3.77–5.28)
SODIUM SERPL-SCNC: 130 MMOL/L (ref 136–145)
URATE SERPL-MCNC: 2.9 MG/DL (ref 2.4–5.7)
WBC NRBC COR # BLD AUTO: 9.56 10*3/MM3 (ref 3.4–10.8)

## 2024-01-16 PROCEDURE — 25010000002 ENOXAPARIN PER 10 MG: Performed by: UROLOGY

## 2024-01-16 PROCEDURE — 84550 ASSAY OF BLOOD/URIC ACID: CPT | Performed by: INTERNAL MEDICINE

## 2024-01-16 PROCEDURE — 85025 COMPLETE CBC W/AUTO DIFF WBC: CPT | Performed by: INTERNAL MEDICINE

## 2024-01-16 PROCEDURE — 99232 SBSQ HOSP IP/OBS MODERATE 35: CPT | Performed by: STUDENT IN AN ORGANIZED HEALTH CARE EDUCATION/TRAINING PROGRAM

## 2024-01-16 PROCEDURE — 97110 THERAPEUTIC EXERCISES: CPT

## 2024-01-16 PROCEDURE — 84100 ASSAY OF PHOSPHORUS: CPT | Performed by: INTERNAL MEDICINE

## 2024-01-16 PROCEDURE — 63710000001 INSULIN LISPRO (HUMAN) PER 5 UNITS: Performed by: INTERNAL MEDICINE

## 2024-01-16 PROCEDURE — 63710000001 INSULIN GLARGINE PER 5 UNITS: Performed by: INTERNAL MEDICINE

## 2024-01-16 PROCEDURE — 25010000002 MICAFUNGIN SODIUM 100 MG RECONSTITUTED SOLUTION 1 EACH VIAL: Performed by: INTERNAL MEDICINE

## 2024-01-16 PROCEDURE — 25010000002 AMPICILLIN-SULBACTAM PER 1.5 G: Performed by: STUDENT IN AN ORGANIZED HEALTH CARE EDUCATION/TRAINING PROGRAM

## 2024-01-16 PROCEDURE — 84132 ASSAY OF SERUM POTASSIUM: CPT | Performed by: HOSPITALIST

## 2024-01-16 PROCEDURE — 82948 REAGENT STRIP/BLOOD GLUCOSE: CPT

## 2024-01-16 PROCEDURE — 80053 COMPREHEN METABOLIC PANEL: CPT | Performed by: INTERNAL MEDICINE

## 2024-01-16 RX ORDER — POTASSIUM CHLORIDE 750 MG/1
40 TABLET, FILM COATED, EXTENDED RELEASE ORAL EVERY 4 HOURS
Status: COMPLETED | OUTPATIENT
Start: 2024-01-16 | End: 2024-01-16

## 2024-01-16 RX ORDER — TORSEMIDE 20 MG/1
40 TABLET ORAL DAILY
Status: DISCONTINUED | OUTPATIENT
Start: 2024-01-16 | End: 2024-01-19 | Stop reason: HOSPADM

## 2024-01-16 RX ADMIN — OXYBUTYNIN CHLORIDE 10 MG: 10 TABLET, EXTENDED RELEASE ORAL at 08:14

## 2024-01-16 RX ADMIN — AMPICILLIN SODIUM AND SULBACTAM SODIUM 3 G: 2; 1 INJECTION, POWDER, FOR SOLUTION INTRAMUSCULAR; INTRAVENOUS at 16:32

## 2024-01-16 RX ADMIN — INSULIN LISPRO 7 UNITS: 100 INJECTION, SOLUTION INTRAVENOUS; SUBCUTANEOUS at 16:32

## 2024-01-16 RX ADMIN — HYDROCODONE BITARTRATE AND ACETAMINOPHEN 1 TABLET: 5; 325 TABLET ORAL at 16:32

## 2024-01-16 RX ADMIN — AMPICILLIN SODIUM AND SULBACTAM SODIUM 3 G: 2; 1 INJECTION, POWDER, FOR SOLUTION INTRAMUSCULAR; INTRAVENOUS at 21:03

## 2024-01-16 RX ADMIN — MICAFUNGIN SODIUM 100 MG: 100 INJECTION, POWDER, LYOPHILIZED, FOR SOLUTION INTRAVENOUS at 08:24

## 2024-01-16 RX ADMIN — INSULIN LISPRO 6 UNITS: 100 INJECTION, SOLUTION INTRAVENOUS; SUBCUTANEOUS at 11:33

## 2024-01-16 RX ADMIN — LEVOTHYROXINE SODIUM 137 MCG: 137 TABLET ORAL at 06:11

## 2024-01-16 RX ADMIN — INSULIN LISPRO 4 UNITS: 100 INJECTION, SOLUTION INTRAVENOUS; SUBCUTANEOUS at 08:14

## 2024-01-16 RX ADMIN — AMPICILLIN SODIUM AND SULBACTAM SODIUM 3 G: 2; 1 INJECTION, POWDER, FOR SOLUTION INTRAMUSCULAR; INTRAVENOUS at 04:25

## 2024-01-16 RX ADMIN — ESCITALOPRAM OXALATE 10 MG: 10 TABLET, FILM COATED ORAL at 08:14

## 2024-01-16 RX ADMIN — AMPICILLIN SODIUM AND SULBACTAM SODIUM 3 G: 2; 1 INJECTION, POWDER, FOR SOLUTION INTRAMUSCULAR; INTRAVENOUS at 09:59

## 2024-01-16 RX ADMIN — POTASSIUM CHLORIDE 40 MEQ: 750 TABLET, EXTENDED RELEASE ORAL at 08:14

## 2024-01-16 RX ADMIN — INSULIN GLARGINE 14 UNITS: 100 INJECTION, SOLUTION SUBCUTANEOUS at 20:25

## 2024-01-16 RX ADMIN — INSULIN LISPRO 6 UNITS: 100 INJECTION, SOLUTION INTRAVENOUS; SUBCUTANEOUS at 20:25

## 2024-01-16 RX ADMIN — PANTOPRAZOLE SODIUM 40 MG: 40 TABLET, DELAYED RELEASE ORAL at 06:11

## 2024-01-16 RX ADMIN — ENOXAPARIN SODIUM 40 MG: 100 INJECTION SUBCUTANEOUS at 20:25

## 2024-01-16 RX ADMIN — ENOXAPARIN SODIUM 40 MG: 100 INJECTION SUBCUTANEOUS at 08:14

## 2024-01-16 RX ADMIN — POTASSIUM CHLORIDE 40 MEQ: 750 TABLET, EXTENDED RELEASE ORAL at 12:22

## 2024-01-16 RX ADMIN — ACETAMINOPHEN 650 MG: 325 TABLET, FILM COATED ORAL at 09:25

## 2024-01-16 RX ADMIN — INSULIN GLARGINE 14 UNITS: 100 INJECTION, SOLUTION SUBCUTANEOUS at 08:14

## 2024-01-16 RX ADMIN — ROPINIROLE HYDROCHLORIDE 0.5 MG: 0.5 TABLET, FILM COATED ORAL at 20:28

## 2024-01-16 RX ADMIN — TORSEMIDE 40 MG: 20 TABLET ORAL at 09:59

## 2024-01-16 NOTE — PROGRESS NOTES
"Nutrition Services    Patient Name:  Tiana Hudson  YOB: 1960  MRN: [id number]  Admit Date:  1/2/2024    Nutrition Follow Up  Assessment Date:  01/16/24    Pt states she has no questions or request at this time. She is tolerating diet with adequate po intake.    RD to follow    Encounter Information         Current Summary DKA resolved  hyponatremia  Bilateral UPJ obstruction status post stent 1/3/2024.  Right calyceal rupture   Drain remains with purulent output      Current Nutrition Orders & Evaluation of Intake       Oral Nutrition     Current PO Diet Diet: Regular/House Diet, Fluid Restriction (240 mL/tray) Diets, Cardiac Diets; Healthy Heart (2-3 Na+); 1500 mL/day; Texture: Regular Texture (IDDSI 7); Fluid Consistency: Thin (IDDSI 0)   Supplement n/a   PO Evaluation     PO Intake % 50-75%    Factors Affecting Intake  No factors at this time   --  Anthropometrics          Height    Weight Height: 165.1 cm (65\")  Weight: 108 kg (238 lb 1.6 oz) (01/16/24 0529)    BMI kg/m2 Body mass index is 39.62 kg/m².  Obese, Class II (35 - 39.9)    Weight trend Loss     Labs        Pertinent Labs Reviewed, listed below     Results from last 7 days   Lab Units 01/16/24  0715 01/15/24  0714 01/14/24  0705   SODIUM mmol/L 130* 132* 129*   POTASSIUM mmol/L 3.6 3.9 4.1   CHLORIDE mmol/L 92* 94* 93*   CO2 mmol/L 25.8 26.5 27.0   BUN mg/dL 7* 6* 6*   CREATININE mg/dL 0.73 0.81 0.76   CALCIUM mg/dL 8.6 8.6 8.8   BILIRUBIN mg/dL 0.5 0.6 0.4   ALK PHOS U/L 130* 137* 142*   ALT (SGPT) U/L 8 7 9   AST (SGOT) U/L 15 14 12   GLUCOSE mg/dL 191* 215* 218*     Results from last 7 days   Lab Units 01/16/24  0715   PHOSPHORUS mg/dL 3.6   HEMOGLOBIN g/dL 9.2*   HEMATOCRIT % 29.1*   WBC 10*3/mm3 9.56   ALBUMIN g/dL 1.8*     Results from last 7 days   Lab Units 01/16/24  0715 01/15/24  0714 01/14/24  0705 01/13/24  0716 01/11/24  0734 01/10/24  0733   INR   --   --   --   --   --  1.17*   PLATELETS 10*3/mm3 534* 603* 550* 528* " 502*  --      SARS-CoV-2 PCR   Date Value Ref Range Status   12/09/2020 Not Detected Not Detected Final     Comment:     Nucleic acid specific to SARS-CoV-2 (COVID-19) virus was not detected in  this sample by the TaqPath (TM) COVID-19 Combo Kit.          SARS-CoV-2 (COVID-19) nucleic acid testing performed using [employer] Aptima (R) SARS-CoV-2 Assay or [employer]box TaqPath (TM)  COVID-19 Combo Kit as indicated above under Emergency Use Authorization (EUA) from the FDA. Aptima (R) and TaqPath (TM) test performance  characteristics verified by [facility] in accordance with the FDAs Guidance Document (Policy for Diagnostic Tests for Coronavirus Disease-2019  during the Public Health Emergency) issued on March 16, 2020. The laboratory is regulated under CLIA as qualified to perform high-complexity testing  Unless otherwise noted, all testing was performed at [facility], CLIA No. [id number], [place] State Licensee No. [id number]     Lab Results   Component Value Date    HGBA1C 17.70 (H) 01/02/2024          Medications            Scheduled Medications ampicillin-sulbactam, 3 g, Intravenous, Q6H  enoxaparin, 40 mg, Subcutaneous, Q12H  escitalopram, 10 mg, Oral, Daily  insulin glargine, 14 Units, Subcutaneous, Q12H  insulin lispro, 2-9 Units, Subcutaneous, 4x Daily AC & at Bedtime  levothyroxine, 137 mcg, Oral, Q AM  micafungin (MYCAMINE) IV, 100 mg, Intravenous, Q24H  oxybutynin XL, 10 mg, Oral, Daily  pantoprazole, 40 mg, Oral, Q AM  potassium chloride ER, 40 mEq, Oral, Q4H  rOPINIRole, 0.5 mg, Oral, Nightly  senna-docusate sodium, 2 tablet, Oral, BID  torsemide, 40 mg, Oral, Daily        Infusions      PRN Medications   acetaminophen **OR** acetaminophen    senna-docusate sodium **AND** polyethylene glycol **AND** bisacodyl **AND** bisacodyl    Calcium Replacement - Follow Nurse / BPA Driven Protocol    dextrose    dextrose    glucagon (human recombinant)    HYDROcodone-acetaminophen    ipratropium-albuterol     Magnesium Standard Dose Replacement - Follow Nurse / BPA Driven Protocol    nitroglycerin    ondansetron ODT **OR** ondansetron    Phosphorus Replacement - Follow Nurse / BPA Driven Protocol    Potassium Replacement - Follow Nurse / BPA Driven Protocol     Physical Findings          General Appearance alert, obese   Oral/Mouth Cavity WDL   Edema  1+ (trace)   Gastrointestinal abdominal pain, last bowel movement: 1/15   Skin  bruising   Tubes/Drains/Lines drain, latral RLQ pigtail   NFPE Not indicated at this time   --  NUTRITION INTERVENTION / PLAN OF CARE  Intervention Goal         Intervention Goal(s) Maintain nutrition status     Nutrition Intervention         RD Action Interview for preferences and Continue to monitor     Nutrition Prescription         Diet Prescription     Supplement Prescription n/a   EN/PN Prescription    New Prescription Ordered? No changes at this time   --  Monitor/Evaluation        Monitor Per protocol   Discharge Needs Pending clinical course     RD to follow up per protocol.    Electronically signed by:  Lesly Landrum RD  01/16/24 10:49 EST

## 2024-01-16 NOTE — CASE MANAGEMENT/SOCIAL WORK
Continued Stay Note  [facility]     Patient Name: Tiana Hudson  MRN: [id number]  Today's Date: 1/16/2024    Admit Date: 1/2/2024    Plan: Home with family support   Discharge Plan       Row Name 01/16/24 0940       Plan    Plan Comments Met with patient and daughter Enoch at bedside to discuss discharge plans. Possible discharge to daughter’s home today. Declined home health services, daughter is a nurse and will work with patient. Daughter to transport. Has Meds to Beds. Will continue to follow for new or changing discharge needs.  Snow TALAMANTES RN [facility]      Row Name 01/16/24 0938       Plan    Plan Home with family support    Patient/Family in Agreement with Plan yes                   Discharge Codes    No documentation.                 Expected Discharge Date and Time       Expected Discharge Date Expected Discharge Time    Jan 16, 2024               Snow Shabazz RN

## 2024-01-16 NOTE — CASE MANAGEMENT/SOCIAL WORK
Continued Stay Note  [facility]     Patient Name: Tiana Hudson  MRN: [id number]  Today's Date: 1/16/2024    Admit Date: 1/2/2024    Plan: Home with family support   Discharge Plan       Row Name 01/16/24 9867       Plan    Plan Comments Spoke with patient about PT recommendations for a rolling walker. Patient would like to have [facility] provide, spoke to Moe she will follow.  Will continue to follow for new or changing discharge needs.  Snow TALAMANTES RN CCP                   Discharge Codes    No documentation.                 Expected Discharge Date and Time       Expected Discharge Date Expected Discharge Time    Jan 16, 2024               Snow Shabazz RN

## 2024-01-16 NOTE — DISCHARGE PLACEMENT REQUEST
"Tiana Hudson (63 y.o. Female)       Date of Birth   1960    Social Security Number       Address   [address] [place] [address]    Home Phone   [phone]    MRN   [id number]       Roman Catholic   Zoroastrian    Marital Status                               Admission Date   1/2/24    Admission Type   Emergency    Admitting Provider       Attending Provider   Dean Fairchild MD    Department, Room/Bed   [facility], N335/1       Discharge Date       Discharge Disposition       Discharge Destination                                 Attending Provider: Dean Fairchild MD    Allergies: Sulfa Antibiotics    Isolation: None   Infection: None   Code Status: CPR    Ht: 165.1 cm (65\")   Wt: 108 kg (238 lb 1.6 oz)    Admission Cmt: None   Principal Problem: DKA (diabetic ketoacidosis) [E11.10]                   Active Insurance as of 1/2/2024       Primary Coverage       Payor Plan Insurance Group Employer/Plan Group    [facility] [id number]       Payor Plan Address Payor Plan Phone Number Payor Plan Fax Number Effective Dates    [address]   1/1/2019 - None Entered    [facility] [phone]         Subscriber Name Subscriber Birth Date Member ID       VONDA HUDSON 1960 [id number]                     Emergency Contacts        (Rel.) Home Phone Work Phone Mobile Phone    Vonda Hudson \"Hunter\" (Spouse) [phone] -- [phone]    TristanEnoch (Daughter) [phone] -- [phone]                "

## 2024-01-16 NOTE — PLAN OF CARE
Goal Outcome Evaluation:  Plan of Care Reviewed With: patient, daughter     Outcome Evaluation: Patient pleasant and agreeable to PT- sitting up in recliner. SBA-CGA for transfers and ambulated a total of 100' CGA with a RW. Increased time required and endurance deficits noted. Patient ambulated well with a RW and was encouraged to use at DC- discussed with CCP regarding recommendation. PT will continue to follow peripherally.    Anticipated Discharge Disposition (PT): home with assist, home with home health

## 2024-01-16 NOTE — PROGRESS NOTES
LOS: 14 days     Chief Complaint: Antibiotic management    Interval History: Drain remains with purulent output.  Tolerating antibiotics well.  Denies any fevers or chills.  Abdominal pain better.    Vital Signs  Temp:  [98.3 °F (36.8 °C)-100.4 °F (38 °C)] 98.7 °F (37.1 °C)  Heart Rate:  [] 90  Resp:  [17-33] 17  BP: (122-145)/(56-74) 122/63    Physical Exam:  General: In no acute distress  HEENT: Oropharynx clear, moist mucous membranes  Respiratory: Normal work of breathing  Skin: No rashes or lesions in exposed areas  Extremities: No edema, cyanosis  Access: Peripheral IV    Antibiotics:  Anti-Infectives (From admission, onward)      Ordered     Dose/Rate Route Frequency Start Stop    01/15/24 0858  ampicillin-sulbactam (UNASYN) 3 g in sodium chloride 0.9 % 100 mL IVPB-VTB        Ordering Provider: Jorge L Us DO    3 g  over 30 Minutes Intravenous Every 6 Hours 01/15/24 1000 01/22/24 0959    01/09/24 1144  vancomycin (VANCOCIN) 500 mg in sodium chloride 0.9 % 100 mL IVPB-VTB        Ordering Provider: Jorge L Us DO    500 mg  200 mL/hr over 30 Minutes Intravenous Every 8 Hours 01/09/24 1644 01/12/24 0902    01/08/24 0803  micafungin sodium (MYCAMINE) 100 mg in sodium chloride 0.9 % 100 mL IVPB-VTB        Ordering Provider: Micky Car MD    100 mg  over 60 Minutes Intravenous Every 24 Hours 01/08/24 0900 01/20/24 1121    01/05/24 0756  vancomycin 2250 mg/500 mL 0.9% NS IVPB ([facility])        Ordering Provider: Jorge L Us DO    20 mg/kg × 111 kg  over 135 Minutes Intravenous Once 01/05/24 0845 01/05/24 1118    01/05/24 0750  Pharmacy to dose vancomycin        Ordering Provider: Jorge L Us DO     Does not apply Continuous PRN 01/05/24 0750 01/12/24 0749    01/02/24 1636  piperacillin-tazobactam (ZOSYN) 4.5 g in iso-osmotic dextrose 100 mL IVPB (premix)        Ordering Provider: Arthur Najera MD    4.5 g  over 30 Minutes Intravenous Once 01/02/24  1652 01/02/24 1735    01/02/24 1243  cefTRIAXone (ROCEPHIN) 2,000 mg in sodium chloride 0.9 % 100 mL IVPB-VTB        Ordering Provider: Angelito Winkler MD    2,000 mg  200 mL/hr over 30 Minutes Intravenous Once 01/02/24 1259 01/02/24 1414             Results Review:     I reviewed the patient's new clinical results.    Lab Results   Component Value Date    WBC 9.56 01/16/2024    HGB 9.2 (L) 01/16/2024    HCT 29.1 (L) 01/16/2024    MCV 86.6 01/16/2024     (H) 01/16/2024     Lab Results   Component Value Date    GLUCOSE 191 (H) 01/16/2024    BUN 7 (L) 01/16/2024    CREATININE 0.73 01/16/2024    EGFRIFNONA 54 (L) 10/24/2018    EGFRIFAFRI 83 02/23/2017    BCR 9.6 01/16/2024    CO2 25.8 01/16/2024    CALCIUM 8.6 01/16/2024    PROTENTOTREF 6.9 02/23/2017    ALBUMIN 1.8 (L) 01/16/2024    LABIL2 1.7 02/23/2017    AST 15 01/16/2024    ALT 8 01/16/2024     Microbiology:  1/2 urine culture no growth  1/2 blood cultures 2 out of 2 gram-positive bacilli  1/3 right renal fluid culture Candida glabrata and Candida tropicalis  1/3 left renal fluid culture Candida glabrata  1/5 blood cultures no growth to date  1/10 retroperitoneal fluid culture lactobacillus    Assessment    #Right calyceal rupture  #Bilateral hydronephrosis status post bilateral ureteral stenting  #Abdominal abscess  #JADEN, improved  #DKA, improved  #Hyponatremia, improved  #Morbid obesity, BMI 41  #Gram-positive bacteremia    Continue micafungin 100 mg daily for isolated Candida.  Continue Unasyn 3 g every 6 hours for lactobacillus.  Send outs discussed with microbiology today and remain pending.    ID will follow.

## 2024-01-16 NOTE — PROGRESS NOTES
[facility] Progress Note      Patient Name: Tiana Hudson  : 1960  MRN: [id number]  Primary Care Physician:  Dean Fairchild MD  Date of admission: 2024    Subjective     Interval History:   Follow-up hyponatremia.  Slept well.  Appetite good.  Bowels moving.  A little loose yesterday.  Urinating.  Not all measured.  Recieved IV Lasix yesterday but urine output subsequent to that not closely recorded.  Still with some mild right flank pain.  No hematuria.  Review of Systems:   As noted above    Objective     Vitals:   Temp:  [98.3 °F (36.8 °C)-100.4 °F (38 °C)] 98.7 °F (37.1 °C)  Heart Rate:  [] 90  Resp:  [17-33] 17  BP: (122-145)/(56-74) 122/63  Flow (L/min):  [2] 2    Intake/Output Summary (Last 24 hours) at 2024 0814  Last data filed at 2024 0628  Gross per 24 hour   Intake 1010 ml   Output 385 ml   Net 625 ml       Physical Exam:    General Appearance: alert, oriented x 3, no acute distress .  Lying in bed.  Skin: warm and dry  HEENT: oral mucosa normal, nonicteric sclera  Neck: supple, no JVD  Lungs: Clear to auscultation bilaterally.  Unlabored.  Heart: RRR, normal S1 and S2  Abdomen: soft, nontender, +bs. RLQ drain  : no palpable bladder  Extremities: Trace to 1+ lower extremity edema  Neuro: normal speech and mental status     Scheduled Meds:     ampicillin-sulbactam, 3 g, Intravenous, Q6H  enoxaparin, 40 mg, Subcutaneous, Q12H  escitalopram, 10 mg, Oral, Daily  insulin glargine, 14 Units, Subcutaneous, Q12H  insulin lispro, 2-9 Units, Subcutaneous, 4x Daily AC & at Bedtime  levothyroxine, 137 mcg, Oral, Q AM  micafungin (MYCAMINE) IV, 100 mg, Intravenous, Q24H  oxybutynin XL, 10 mg, Oral, Daily  pantoprazole, 40 mg, Oral, Q AM  potassium chloride ER, 40 mEq, Oral, Q4H  rOPINIRole, 0.5 mg, Oral, Nightly  senna-docusate sodium, 2 tablet, Oral, BID  torsemide, 40 mg, Oral, Daily      IV Meds:        Results Reviewed:   I have personally  reviewed the results from the time of this admission to 1/16/2024 08:14 EST     Results from last 7 days   Lab Units 01/16/24  0715 01/15/24  0714 01/14/24  0705   SODIUM mmol/L 130* 132* 129*   POTASSIUM mmol/L 3.6 3.9 4.1   CHLORIDE mmol/L 92* 94* 93*   CO2 mmol/L 25.8 26.5 27.0   BUN mg/dL 7* 6* 6*   CREATININE mg/dL 0.73 0.81 0.76   CALCIUM mg/dL 8.6 8.6 8.8   BILIRUBIN mg/dL 0.5 0.6 0.4   ALK PHOS U/L 130* 137* 142*   ALT (SGPT) U/L 8 7 9   AST (SGOT) U/L 15 14 12   GLUCOSE mg/dL 191* 215* 218*       Estimated Creatinine Clearance: 98.9 mL/min (by C-G formula based on SCr of 0.73 mg/dL).    Results from last 7 days   Lab Units 01/16/24  0715   PHOSPHORUS mg/dL 3.6       Results from last 7 days   Lab Units 01/16/24  0715   URIC ACID mg/dL 2.9       Results from last 7 days   Lab Units 01/16/24  0715 01/15/24  0714 01/14/24  0705 01/13/24  0716 01/11/24  0734   WBC 10*3/mm3 9.56 11.33* 10.99* 9.51 11.91*   HEMOGLOBIN g/dL 9.2* 8.9* 9.1* 9.1* 9.8*   PLATELETS 10*3/mm3 534* 603* 550* 528* 502*       Results from last 7 days   Lab Units 01/10/24  0733   INR  1.17*       Assessment / Plan     ASSESSMENT:  Hyponatremia .  Sodium corrects to 132 given her glucose 191.  TSH elevated, suggest increasing replacement.  Cortisol normal.  Mild volume excess by exam.  Will continue diuretic in oral form.  Bilateral UPJ obstruction status post stent 1/3/2024.  Right calyceal rupture.  Right retroperitoneal drain placed by interventional radiology 1/10/2024 for abscess drainage.  Candida glabrata and Candida tropicalis from right renal fluid culture and Candida glabrata from the left renal fluid culture 1/3/2024.  Lactobacillus from retroperitoneal fluid culture 1/10/2024.  Gram-positive bacteremia 1-24.  Currently on ampicillin- sulbactam and micafungin.  3.  Hypothyroid on replacement.  TSH elevated at 9 , would increase the placement today.  4.  Anemia of chronic disease.  5. DM2 with recent DKA. BS range  [id number].  PLAN:  Oral diuretic today.  Continue p.o. fluid restriction.    Thank you for involving us in the care of Tiana VALE Hudson.  Please feel free to call with any questions.    Jeri Harrington MD  01/16/24  08:14 [facility]    [facility]  [phone]    Please note that portions of this note were completed with a voice recognition program.

## 2024-01-16 NOTE — PLAN OF CARE
Goal Outcome Evaluation:   Pt is alert/oriented x's 4. Pt rested well throughout the night w/o any complaints. HOB 20 degrees, rails up x's 2, call light within reach. Pt is easy to arouse if needed.

## 2024-01-16 NOTE — THERAPY TREATMENT NOTE
Patient Name: Tiana Hudson  : 1960    MRN: [id number]                              Today's Date: 2024       Admit Date: 2024    Visit Dx:     ICD-10-CM ICD-9-CM   1. Sepsis, due to unspecified organism, unspecified whether acute organ dysfunction present  A41.9 038.9     995.91   2. Acute cystitis without hematuria  N30.00 595.0   3. UPJ (ureteropelvic junction) obstruction  N13.5 593.4     Patient Active Problem List   Diagnosis    Primary osteoarthritis of right knee    UTI (urinary tract infection)    Varicose veins    Contact dermatitis    Elevated liver function tests    Leg cramp    Medial collateral ligament sprain of knee    Vitamin D deficiency    Essential hypertension    Hyperlipidemia    Abrasion    Hypothyroidism    Gestational diabetes    Morbid obesity with BMI of 40.0-44.9, adult    Allergic rhinitis    Back pain    Anxiety    H/O Postconcussion syndrome    H/O dizziness    Allergy    Vaginal candidiasis    Chronic pain of left knee    Arthritis of both knees    Inflammation of bladder severe, acute and chronic, with mucosal erosions/ulcers    Dense breast tissue on mammogram    Dizziness    Restless leg syndrome    Type 2 diabetes mellitus without complication    Arthritis of right knee    Status post total right knee replacement    Arthritis of left knee    History of total knee arthroplasty    Acquired spondylolisthesis    Screening for colorectal cancer    Precordial pain    FH ischemic heart disease    DKA (diabetic ketoacidosis)    JADEN (acute kidney injury)    Sepsis, unspecified organism    Lactic acidosis    UPJ (ureteropelvic junction) obstruction bilateral    Bilateral hydrocele    Bilateral hydronephrosis moderate on admission    Renal calculus on right, nonobstructinbg    Chronic liver disease     Past Medical History:   Diagnosis Date    Anemia     intermittently    Anxiety and depression     Arthritis     Depression     Diabetes mellitus     Gallstones     High  cholesterol     History of frequent urinary tract infections     HX OF YEAST IN BLADDER HAS PRN DIFLUCAN    History of transfusion 05/24/2017    Had 2 iron infusions.  This was when i had knee replacement    Hyperlipidemia     Hypertension     Hypothyroidism     Knee pain, bilateral     Low back pain     Lumbar stenosis     PONV (postoperative nausea and vomiting)     Positive TB test     chest x-ray neg    Seasonal allergies      Past Surgical History:   Procedure Laterality Date    APPENDECTOMY N/A 1982    Dr. Wilson    CHOLECYSTECTOMY WITH INTRAOPERATIVE CHOLANGIOGRAM N/A 10/31/2018    Procedure: laparoscopic cholecystectomy;  Surgeon: Mary Kay Mac MD;  Location: [facility] OR;  Service: General    COLONOSCOPY      CYSTOSCOPY BLADDER BIOPSY N/A 10/3/2016    Procedure: CYSTOSCOPY BLADDER BIOPSY;  Surgeon: Rei Calvert MD;  Location: [facility] OR;  Service:     CYSTOSCOPY W/ URETERAL STENT PLACEMENT Bilateral 1/3/2024    Procedure: CYSTOSCOPY BILATERAL RETROGRADE PYELOGRAM  BILATERAL URETERAL STENT INSERTION AND CATHETHER PLACEMENT;  Surgeon: Eduard Armando MD;  Location: [facility] OR;  Service: Urology;  Laterality: Bilateral;    DILATATION AND CURETTAGE N/A     ENDOSCOPY      INTRAUTERINE DEVICE INSERTION      KNEE ARTHROSCOPY W/ MENISCAL REPAIR Left     OVARIAN CYST REMOVAL Left 1982    Dr. Wilson    TOTAL KNEE ARTHROPLASTY Right 5/22/2017    Procedure: RIGHT TOTAL KNEE ARTHROPLASTY WITH ALETA NAVIGATION;  Surgeon: Ross Cruz MD;  Location: [facility] OR;  Service:       General Information       Row Name 01/16/24 1439          Physical Therapy Time and Intention    Document Type therapy note (daily note)  -MS     Mode of Treatment physical therapy  -MS       Row Name 01/16/24 1439          General Information    Existing Precautions/Restrictions fall  RLQ drain  -MS     Barriers to Rehab none identified  -MS       Row Name 01/16/24 1439          Cognition    Orientation Status  (Cognition) oriented x 4  -MS       Row Name 01/16/24 1439          Safety Issues, Functional Mobility    Safety Issues Affecting Function (Mobility) judgment;positioning of assistive device;sequencing abilities;insight into deficits/self-awareness  -MS     Comment, Safety Issues/Impairments (Mobility) Gait belt and non skid socks donned.  -MS               User Key  (r) = Recorded By, (t) = Taken By, (c) = Cosigned By      Initials Name Provider Type    Sallie Bartholomew, PT Physical Therapist                   Mobility       Row Name 01/16/24 1444          Bed Mobility    Comment, (Bed Mobility) NT- [facility] upon PT arrival.  -MS       Row Name 01/16/24 1444          Transfers    Comment, (Transfers) Multiple STS from recliner and [facility]. SBA for toileting.  -MS       Row Name 01/16/24 1444          Sit-Stand Transfer    Sit-Stand [place] (Transfers) standby assist;contact guard;verbal cues  -MS     Assistive Device (Sit-Stand Transfers) walker, front-wheeled  -MS       Row Name 01/16/24 1444          Gait/Stairs (Locomotion)    [place] Level (Gait) contact guard;verbal cues;nonverbal cues (demo/gesture)  -MS     Assistive Device (Gait) walker, front-wheeled  -MS     Distance in Feet (Gait) 60, 40'  -MS     Deviations/Abnormal Patterns (Gait) antalgic;sinai decreased;gait speed decreased  -MS     Bilateral Gait Deviations forward flexed posture  -MS     Comment, (Gait/Stairs) No overt LOB or veering noted. Endurance deficits post mobility. Steady with RW.  -MS               User Key  (r) = Recorded By, (t) = Taken By, (c) = Cosigned By      Initials Name Provider Type    Sallie Bartholomew, PT Physical Therapist                   Obj/Interventions    No documentation.                  Goals/Plan    No documentation.                  Clinical Impression       Row Name 01/16/24 1446          Pain    Pretreatment Pain Rating 3/10  -MS     Posttreatment Pain Rating 3/10  -MS     Pain Location -  Side/Orientation Right  -MS     Pain Location lower  -MS     Pain Location - flank  -MS       Row Name 01/16/24 1446          Plan of Care Review    Plan of Care Reviewed With patient;daughter  -MS     Outcome Evaluation Patient pleasant and agreeable to PT- sitting up in recliner. SBA-CGA for transfers and ambulated a total of 100' CGA with a RW. Increased time required and endurance deficits noted. Patient ambulated well with a RW and was encouraged to use at DC- discussed with CCP recommendation. PT will continue to follow peripherally.  -MS       Row Name 01/16/24 1446          Vital Signs    O2 Delivery Pre Treatment room air  -MS       Row Name 01/16/24 1446          Positioning and Restraints    Pre-Treatment Position sitting in chair/recliner  -MS     Post Treatment Position chair  -MS     In Chair notified nsg;reclined;call light within reach;encouraged to call for assist;with family/caregiver  -MS               User Key  (r) = Recorded By, (t) = Taken By, (c) = Cosigned By      Initials Name Provider Type    MS Sallie Delcid, PT Physical Therapist                   Outcome Measures       Row Name 01/16/24 1448 01/16/24 0814       How much help from another person do you currently need...    Turning from your back to your side while in flat bed without using bedrails? 4  -MS 4  -IB    Moving from lying on back to sitting on the side of a flat bed without bedrails? 4  -MS 4  -IB    Moving to and from a bed to a chair (including a wheelchair)? 3  -MS 4  -IB    Standing up from a chair using your arms (e.g., wheelchair, bedside chair)? 3  -MS 4  -IB    Climbing 3-5 steps with a railing? 3  -MS 3  -IB    To walk in hospital room? 3  -MS 3  -IB    AM-PAC 6 Clicks Score (PT) 20  -MS 22  -IB    Highest Level of Mobility Goal 6 --> Walk 10 steps or more  -MS 7 --> Walk 25 feet or more  -IB              User Key  (r) = Recorded By, (t) = Taken By, (c) = Cosigned By      Initials Name Provider Type    SUSI Petit  Mary Alice MCLAIN, RN Registered Nurse    MS Sallie Delcid, PT Physical Therapist                                 Physical Therapy Education       Title: PT OT SLP Therapies (In Progress)       Topic: Physical Therapy (Done)       Point: Mobility training (Done)       Learning Progress Summary             Patient Acceptance, E,TB, VU by MS at 1/11/2024 1516    Acceptance, E,TB, VU by MS at 1/9/2024 1442    Acceptance, E,D, DU by  at 1/6/2024 1537    Acceptance, E,TB,D, VU,NR by  at 1/5/2024 1632                         Point: Home exercise program (Done)       Learning Progress Summary             Patient Acceptance, E,TB, VU by MS at 1/11/2024 1516    Acceptance, E,TB, VU by MS at 1/9/2024 1442                         Point: Body mechanics (Done)       Learning Progress Summary             Patient Acceptance, E,TB, VU by MS at 1/11/2024 1516    Acceptance, E,TB, VU by MS at 1/9/2024 1442    Acceptance, E,D, DU by  at 1/6/2024 1537    Acceptance, E,TB,D, VU,NR by  at 1/5/2024 1632                         Point: Precautions (Done)       Learning Progress Summary             Patient Acceptance, E,TB, VU by MS at 1/11/2024 1516    Acceptance, E,TB, VU by MS at 1/9/2024 1442    Acceptance, E,D, DU by  at 1/6/2024 1537    Acceptance, E,TB,D, VU,NR by  at 1/5/2024 1632                                         User Key       Initials Effective Dates Name Provider Type Discipline     06/16/21 -  Brandi Schmid, PT Physical Therapist PT     06/16/21 -  Keily Cary, PT Physical Therapist PT    MS 06/16/21 -  Sallie Delcid PT Physical Therapist PT                  PT Recommendation and Plan     Plan of Care Reviewed With: patient, daughter  Progress: improving (Simultaneous filing. User may be unaware of other data.)  Outcome Evaluation: Patient pleasant and agreeable to PT- sitting up in recliner. SBA-CGA for transfers and ambulated a total of 100' CGA with a RW. Increased time required and endurance  deficits noted. Patient ambulated well with a RW and was encouraged to use at DC- discussed with CCP recommendation. PT will continue to follow peripherally.     Time Calculation:         PT Charges       Row Name 01/16/24 1438             Time Calculation    Start Time 1330  -MS      Stop Time 1353  -MS      Time Calculation (min) 23 min  -MS      PT Received On 01/16/24  -MS      PT - Next Appointment 01/19/24  -MS      PT Goal Re-Cert Due Date 01/23/24  -MS                User Key  (r) = Recorded By, (t) = Taken By, (c) = Cosigned By      Initials Name Provider Type    Sallie Bartholomew, PT Physical Therapist                  Therapy Charges for Today       Code Description Service Date Service Provider Modifiers Qty    90814461511 HC PT THER PROC EA 15 MIN 1/16/2024 Sallie Delcid, PT GP 2            PT G-Codes  Outcome Measure Options: AM-PAC 6 Clicks Daily Activity (OT)  AM-PAC 6 Clicks Score (PT): 20  AM-PAC 6 Clicks Score (OT): 24  PT Discharge Summary  Anticipated Discharge Disposition (PT): home with assist, home with home health    Sallie Delcid, BHUPENDRA  1/16/2024     6

## 2024-01-16 NOTE — PROGRESS NOTES
NATHALIA CAMPUZANO [facility]  INTERNAL MEDICINE  DEAN FAIRCHILD MD  [address] [place] [address]  Phone [phone] Fax [phone]  E-mail:  [email or web address]      INTERNAL MEDICINE DAILY PROGRESS NOTE  Dean Fairchild M.D.  1/15/2024            Patient Identification:  Name: Tiana Hudson  Age: 63 y.o.  Sex: female  :  1960  MRN: [id number]         Primary Care Physician: Dean Fairchild MD  LENGTH OF STAY 14 DAYS    Consults       Date and Time Order Name Status Description    1/15/2024  5:58 AM Inpatient Nephrology Consult Completed     2024  3:42 AM Inpatient General Surgery Consult Completed     2024  5:10 AM Inpatient Nephrology Consult Completed     2024  2:00 PM Inpatient Infectious Diseases Consult Completed     1/3/2024 10:27 AM Inpatient Urology Consult      2024 11:55 PM Urology (on-call MD unless specified) Completed     2024  3:45 PM IP General Consult (Use specialty-specific consult if known) Completed             Chief Complaint: Abdominal/flank pain with DKA, acute cystitis and sepsis, and possible calyceal rupture in the kidney     History of Present Illness:     Subjective   Interval History: Patient is a 63 y.o.female who presented at my request to the emergency room at [facility] with complaint of acute abdominal/flank pain and history of recurrent kidney stones.  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  Patient has been followed for the last few months by Dr. Rei Calvert in urology for renal calculi and actually has had a stent placed in the right kidney due to obstruction from her renal stones.  It is also noted that the patient had a recent  lithotripsy.  Patient began to feel poorly after the recent Christmas holiday and became more severely ill over the last 3 to 4 days prior to this admission.  She has felt extremely fatigued, dizzy at times, tired, nauseated, and has spent most of her time in bed sleeping.  Family has had difficulty getting the patient to take any oral intake and became quite concerned as her condition continued to worsen.  Patient was attempting to go to work today but really was too dizzy to get in the car to drive and 2-week to actually work.  After urging from her daughter, patient called me with respect to the symptoms and at my request went to the ER for evaluation.  Patient has multiple medical comorbidities that complicate her medical care.These include most significantly recurrent urinary tract infections due to her nephrolithiasis, morbid obesity with BMI greater than 40, diabetes mellitus type 2 poorly controlled with recent addition of Ozempic to her metformin therapy, vitamin D deficiency, essential hypertension, hyperlipidemia, hypothyroidism, allergic rhinitis, back pain, contact dermatitis, anxiety, and history of dense breast tissue on mammograms.     Patient was evaluated in the emergency room by Angelito Winkler MD who was the ER attending on call time of her arrival.  Patient was seen on 1/2/2024 at 1544 by Dr. Winkler.  His HPI was consistent with the story which I given above.  Patient denied dysuria, fever, chills on his evaluation.  She did admit to having emesis and actually had some emesis while in the emergency room.  Review of systems in the emergency room was positive for diffuse abdominal pain, and vomiting.  Patient also was noted to have some significant flank pain.  All other systems reviewed were negative in the emergency room vital signs on arrival in the emergency room showed temperature of 96 °F, heart rate of 114, respiratory rate of 16, blood pressure 125/72, and O2 sat of 98%.  Patient was  described as ill-appearing but in no acute distress.  She did have severe tenderness to palpation more on the right side of the abdomen and out into the right flank area with voluntary guarding.  Labs collected in the ER showed that her lactate level was elevated at 6.0.  She had a lipase of 23, her white blood cell count was 20.12, her hemoglobin was 11.2, and her platelet count was 568,000.  Patient did have a phosphorus of 4.2, magnesium 2.0, osmolality of 317, hemoglobin A1c is 16.90, and UA showed specific gravity 1.027, 3+ glucose, moderate blood 2+, leukocyte esterase moderate 2+, nitrates negative, white blood cells 6-10, red blood cells 6-10, bacteria 2+.  Her CMP showed a glucose of 978, creatinine of 1.82, sodium of 115, potassium of 4.2, chloride of 76, CO2 of 15.7, albumin 2.4, AST 33, alk phos 220, anion gap of 23, EGFR of 31.3.  Patient did have a CT scan of her abdomen and pelvis completed which showed loculated fluid throughout the right perinephric space felt to possibly be secondary to right calyceal rupture, new mild to moderate wall thickening of the right renal pelvis and renal calyces, bilateral UPJ obstruction with moderate bilateral hydronephrosis on the left, mild new dilation of right ureter, nonobstructing right renal calculi, hepatic morphology suggestive of chronic liver disease, and reactive lymph nodes in the retrocaval area.  A chest x-ray was done which showed no acute disease other than minimal atelectasis at the base of the right lung.  Patient underwent aggressive fluid resuscitation in the emergency room and received over 3 L of IV fluid.  She was placed on an insulin drip and was also started on Zosyn therapy.  She was followed on sepsis protocol as well as DKA protocol.  Patient did receive morphine 4 mg for pain x 2.  Case was discussed with myself as her primary care attending.  Urology was also contacted about situation.  I did suggest that patient be admitted to the ICU  for treatment of the DKA and severe sepsis picture.  Dr. Arthur Tena did agree to the ICU admission and will be following her in the CCU for pulmonary/critical care.  Final diagnoses in the ER were sepsis due to unspecified organism and acute cystitis without significant hematuria.     1/2/2024.  I personally saw the patient for the first time during this hospitalization on this date in the coronary care unit room #335.  Patient was resting quietly in bed and did a peer acutely ill.  She did wake to her name and spoke a few words before falling back asleep.  Patient's 2 daughters were at the foot of her bed and did discuss the case at length with me.  Daughter Enoch, is a former nurse from here at [employer], and now works at [employer] with the hospitalist group there.  Patient has responded well to the [employer] DKA protocols and blood sugars have gradually come down to near normal levels for the patient in the 1  range.  I will full PPE for the exam including an N95 face mask, goggles, white lab coat, and gloves when touching patient.  I performed thorough hand hygiene before and after the patient visit.  Patient did have a venous blood gas which showed pH 7.40, pCO2 28, pO2 of 31, and O2 sat of 62%.  She also had recent electrolytes which showed a sodium of 131, potassium 3.4, chloride 96, CO2 19.1, BUN 17, creatinine 1.15, estimated GFR now up to 54.3.  Lactate level has been coming steadily down and currently at 2.8.  Additional labs ordered for tomorrow a.m.  Patient has been able to urinate several times and daughters have helped her up to the bathroom for this.  I actually note that she did have some urinary frequency and incontinence at home which is unusual for her.  I did speak briefly to the patient's daytime nurse who was departing soon after I arrived on the floor.  I have reviewed Dr. Arthur Tena's notes and his admission history and physical.  I do agree completely with his plan of care at  present time.  Supportive care is continued to be offered for the sepsis with careful monitoring in the ICU.  Patient's anion gap metabolic acidosis probably is related to lactic acidosis and she was continuing to take metformin even when she was not able to eat.  IV fluids are continued aggressively.  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  Probably will plan sliding scale insulin for stabilization of diabetes while here in the hospital and will train patient to continue that in the home environment.  Pseudohyponatremia will be corrected with the IV fluids.  Hypothyroidism will be addressed with continued Synthroid.  Statins are held for now.  Obesity is an ongoing problem for the patient.  At the time my exam, patient is medically stable and slowly improving.  I did relay this to the daughter to agree with my assessment.  We will continue to follow the patient with you and we will be happy to assume care of the patient when she is stable and ready to be transferred out of the ICU.  I can be reached directly for any concerns or questions at [phone].    1/3/2024.  Patient is seen again today in her room in the CCU #335.  Patient was resting quietly in bed at the time of my visit and states that her daughters were downstairs getting a bite to eat.  I did discuss the case with the patient's nurse, Tiana, who tells me that she is being preop and should be going down soon for cystoscopy and stent placement.  I will full PPE for the exam including an N95 face mask, goggles, white lab coat, and gloves when touching patient.  I performed thorough hand hygiene before and after the patient visit.  Patient is more alert today but still somewhat confused about details of her medical case.  She is able to carry on a conversation with me though and joke a bit with me while I am present in the room.  She does understand she has issues  because of the obstruction bilaterally of her ureters that has resulted in hydronephrosis and hopefully can be relieved by the cystoscopy that is planned for later this morning.  Patient is still on an insulin drip but is drastically improved compared to where she was yesterday at this time.  Review of labs shows that her sodium is now up to 133, her CO2 is 19.1, glucose is now at 134, phosphorus is at 2.1, lactate is normalized at 1.9, white blood cell count today is 18.93, hemoglobin is 9.6 today, lakelets are normal today,Blood culture from the first day of hospitalization shows anaerobic bottle gram-positive bacilli growth with identification still ongoing.  Fungal and body fluid cultures are still negative.  Urine culture remains negative.  Dr. Oquendo was following the patient for critical care today.  He has continued her antibiotics and is awaiting urology input.  Fluid resuscitation is continued.  Blood pressure now seems well-controlled.  DVT prophylaxis in place.  He had a lengthy discussion with patient's family at the time of his rounds.  Urology has seen the patient in consultation and is planning to do cystoscopy later today.  Dietitian is following the patient's case and recommending input treatment as needed.Dr. Armando did perform a cystoscopy with ureteral catheterization and stent insertion bilaterally.  His pre-op diagnosis and postop diagnoses were urosepsis with bilateral UPJ obstruction.  He did feel that he had successfully decompressed the blockages and the hydronephrotic changes.  He did speak with patient's daughter postoperatively.  Patient does seem to be much improved today.  Vital signs still show no fever.  Blood pressure is relatively stable.  Will continue to follow with you.    1/4/2024.  Patient is seen again today in her room in the CCU #335.  Patient has transitions of critical care and is in overflow for care on the floor.  Patient's daughter is present at bedside at the time  of my visit.  Patient is much more awake and alert today.  She is more interactive and talkative.  I did discuss the case with the patient's nurse.  We are going to obtain an ID consult today for help with antibiotic management and we also are going to transition the patient to regular sliding scale insulin as we begin the teaching process of getting patient on her own regimen of sliding scale insulin at the time of discharge to home.  We have also started patient on Lantus therapy at 10 units for now and may need to titrate up.  I wore full PPE for the exam today including an N95 face mask, goggles, white lab coat, and gloves when touching patient.  I performed thorough hand hygiene before and after the patient visit.  Specialist have seen patient today and their care is summarized below.Dr. Najera, the critical care attending today, felt that her mentation was slow but answering questions appropriately.  No other major findings were discovered at present time.  Antibiotics were continued for sepsis was felt to be significantly improved with anion gap now closed and lactic acidosis much better.  IV antibiotics were continued awaiting final culture results and I did consult ID to see patient for their input on the situation.  Dr. Devon Armando who completed bilateral stent placement for bilateral OP J obstruction and urosepsis is pleased with progress on day 1 postop.  He notes that urine output has been excellent.  Dr. Us from infectious disease did see the patient in consultation.  He notes that Zosyn antibiotics have been discontinued by Dr. Armando after her successful surgery.  White blood cell count is down to 17.08 today with hematocrit of 30.7.  Glucose max was 250 today estimated GFR is up to 54 and CO2 is 19.0.  He feels the significance of the positive blood culture is unclear at this point with only 1 of 2 bottles positive for gram-positive bacilli which normally would represent a contamination.   He is planning to repeat the blood cultures and will follow-up.  Urine culture was also negative except for yeast growth and due to the recent  instrumentation he has started the patient on fluconazole 400 mg daily while cultures are pending.  New blood culture has been sent.  We will check again the lactic acid, procalcitonin, sed rate, and CRP with a.m. labs tomorrow.  Labs relatively stable today though I do note that sodium is dropped a bit to 127.  CO2 remains slightly low at 18.0 and chloride slightly low at 97.  Patient's glucoses running in the upper 100s and low 200s at present.  Electrolytes otherwise seem fairly stable.  White count is still elevated somewhat at 17.08 despite stenting yesterday.  Patient currently off antibiotics and being followed carefully by ID.  Plan to check procalcitonin and repeat blood culture with a.m. labs.  Hemoglobin is at 10.0.  Otherwise patient appears very stable at present time.  I do agree that her mentation is a little bit slower than usual but I suspect this will continue to improve as we get further from her acute DKA event.  Treatment plan reviewed with patient and her daughter today.    1/05/2024.  Patient is seen again today in her room in the CCU #335.  Patient resting quietly in bed and daughter Enoch is at bedside.  I spoke with patient's nurse during the day to discuss occasional changes in her treatment plan.  I wore full PPE for the exam including an N95 facemask, goggles, white lab coat, and gloves when touching patient.  I performed thorough hand hygiene before and after the patient visit.  Patient is much more alert today and more like her usual self.  She does laugh and participate in conversation.  Mother and daughter were very impressed with Dr. Sánchez's excellent review with them of the patient's renal condition and with the time he has been going over the various diagnostic images that have been done up to date.  He was pleased to see how  significantly improved electrolytes are and felt the patient was remaining hemodynamically stable at present time.  Dr. Us from ID did see the patient.  Blood cultures were repeated but original cultures are now positive 2 out of 2 for gram-positive bacilli which is more consistent with a true infection.  He started patient on vancomycin and will be following up on ID and's susceptibilities of the organisms.  He also continued the fluconazole 400 mg daily while following up on cultures from perinephric fluid.The pulmonary intensivist Dr. Arthur Tena saw patient briefly and found no pulmonary or critical care needs at this time.  Dr. Armando from neurology saw patient on postop day #2 after stent placement for bilateral UPJ obstruction.  Indicates that Perales catheter can be removed at any time.  Occupational Therapy began to work with the patient and feels that she will benefit from further skilled occupational therapy.  She did some bedside exercises and did do some sort steps.  She was able to sit up on the edge of the bed for 8 to 10 minutes with to stand.  Physical therapy also saw patient and felt she would continue to benefit from skilled physical therapy.  Pharmacist did consult with the patient on vancomycin and started at 750 mg IV every 12 hours.  They will be checking a trough level on 1/7/2024 at 8:30 AM.  Labs today are generally stable.  Glucose levels ranging in the 140s to 170s.  Sodium still slightly low at 132.  CO2 now normal at 22.5, albumin slightly low at 2.1, AST slightly up at 34 and alk phos slightly up at 270.  White blood cell count today is down to 13.04 and hemoglobin is stable at 10.3 with platelets of 412,000.  2 of 2 initial blood cultures were positive with final ID pending.  Sed rate remains greater than 130, procalcitonin is 3.24, and C-reactive protein is at 23.85.  Patient is still on overflow status waiting for a floor telemetry bed.    1/8/2024.  Patient is seen again today  in her room on the coronary care unit #335.  Patient was actually up in a chair at the time of my visit and her daughter was present at bedside.  I wore full PPE for the exam including an N95 facemask, goggles, white lab coat, and gloves when touching patient.  I performed thorough hand hygiene before and after the patient visit.  Patient is feeling much better today.  She says that she was anxious to get up and was happy that the therapist left her up in the chair.  She is able to get to the bathroom but does need some assistance still for that endeavor.  Several specialist saw the patient and their care is summarized below.  Patient remains on vancomycin therapy and pharmacy is following the dose and adjusting levels.  Dr. Us from ID did see patient again today.  He notes that repeat blood cultures remain negative and initial blood cultures are still being evaluated.  He has continued vancomycin for now and will follow-up on ID and susceptibilities.  Urine culture growing Candida glabrata and tropicalis and he has transitioned her from fluconazole therapy to micafungin 100 mg daily for now.  White blood cell count is slightly increased today at 16.61.  He is cautiously monitoring the patient for any new signs of infection or changes.Dr. Armando from urology did see the patient and was pleased with his surgical result from cystoscopy last week with bilateral stent placement for treating UPJ obstruction.  He has signed off on the case and plans to have her follow-up with him in clinic in 7 to 10 days for reevaluation following discharge.Occupational Therapy did work with patient who reported pain level of 2 out of 10 focused mainly on right abdomen where she has some redness swelling and discomfort.  There was no rebound to my exam in this area but I did elect to go ahead and do a CT scan of abdomen and pelvis to evaluate the area since she did have the extensive surgery not so long ago.  Patient is noted to  fatigue quickly and has some functional deficits that need to be maximized prior to DC to home.  Patient is planning discharge to home with spouse and daughters.  MI requested diabetic educator did meet with the patient.  Patient does need a meter for glucose testing.  Planning 4 times a day test for now.  Will use insulin pen lispro at meals.  Dietitian did meet with patient and her daughter for input on diet.  Provided printed materials and discussion.  Will be available for concerns or questions.  Labs today generally were stable.  Sodium back up to 131.  Glucose max of 245.  Patient does admit she is eating more at this time.  White blood cell count is up slightly at 16.61 of concern to ID.  They are monitoring cultures carefully.  Hemoglobin did drop from 11.5 down to 9.3 today will recheck tomorrow.  CT abdomen and pelvis was completed.  Right ureteral stent present but there is mild distention of the right extrarenal pelvis which appears to be improved compared to the exam 6 days ago.  There is right urethrocele thickening.  There is a right perinephric collection consistent with hemorrhage or urinoma's and there is a new deep collection in the right posterior lateral abdominal wall measuring 3 cm in thickness by 13 cm transverse along with muscular thickening along the right lateral abdominal wall and edema within the right lateral abdominal pelvic subcutaneous fat.  There is enlargement of this fluid collection seen.  We will have nursing notify urology in a.m. of this finding for their input.  May want to consider general surgery consultation also if pain persist in this area.    1/9/2024.  Patient is seen again today in the CCU room #335.  Patient's daughter is at bedside and patient is resting quietly in bedside chair.  Patient appears much more awake and alert today back to her usual personality.  I wore full PPE for the exam including an N95 face mask, goggles, white lab coat, and gloves when touching  patient.  I performed thorough hand hygiene before and after the patient visit.  Nursing reports the patient has been using bedside commode with some urgency and loose bowel movements at times.  This is why she continues on IV vancomycin.  Patient did require O2 2 L per nasal cannula at night while sleeping.  No other new issues noted.  Dr. Us from ID saw patient again today.  He continues to monitor her blood cultures and has continued her on the IV vancomycin along with micafungin for treatment of yeast infection.  Urology did see patient regarding findings on CT scan.  These were reviewed with Dr. Salomon.  There are fluid collections in the right lateral abdominal pelvic wall measuring 12.8 cm x 18 cm x 3 cm which may represent hematomas or urinoma months.  Left renal pelvis is still dilated slightly.  Urology did speak with the IR department directly and arrange for CT-guided drain placement which the patient is agreeable to having.Physical therapy did work with the patient today.  She required contact-guard assist for standing and ambulated 15 feet with no assist.  Patient was minimally unsteady and fatigued quickly though patient indicated these were the usual distances that she does ambulate.  Patient plans to DC home with home health and assistance from her daughter.  PT will continue to follow and advance as able.  Patient encouraged to get up in chair for all meals and ambulate at least 3 times a day to promote functional mobility during hospital stay.  General surgery did see patient at my request regarding the right lower quadrant pain.  They feel that it is primarily due to the retroperitoneal fluid collections which have increased in size.  They deferred further treatment decisions to urology and felt there was no intraperitoneal abnormalities noted.  Labs today do show glucose is up slightly more as patient is eating more.  Lantus has been increased for today to 16 units once daily.  Sodium is  now up to 133.  Glucose was up at 212 this a.m. white blood cell count is improved down from 16.61 to a level of 14.58 today.  Hemoglobin is up slightly at 10.1 and does remain stable.  Platelet count is also stable at 439,000.  Patient planning discharge to home over the weekend if drain is able to successfully remove fluid from the retroperitoneal fluid collection that is causing pain in the right lower quadrant.  Final antibiotic plan is still pending from ID.  We are continuing to follow closely and encourage further activity on the part of the patient.    1/10/2024.  Patient is seen again today in her room in the CCU room #335.  Patient is up in chair at bedside watching television.  She recognizes me immediately upon my entry into her room and smiles appropriately.  I wore full PPE for the exam including an N95 facemask, goggles, white lab coat, and gloves.  I performed thorough hand hygiene before and after the patient visit.  I did discuss the patient's care with her nurse who was present in the [place]ney outside her room.  Patient did have successful placement of the drain today in the pocket of fluid accumulation in the right lower abdominal wall.  Procedure was felt to be quite successful and 50 cc of fluid were sent to the lab for evaluation and analysis.  Patient has an ongoing drain that has very opaque brownish-white material draining from the fluid collection at present time.  Patient states that she feels 100% better with significant improvement in the pain that she was experiencing in the right lower quadrant prior to this drainage.  Vital signs today indicate no fevers with current temperature of 98.1 pulse 86 respiratory rate 18 blood pressure 120/65 and oxygen sat 96% on room air.Review of patient's labs shows that her sodium is very stable at 132 glucoses have been primarily in the 1 43-1 63 range today.  Patient's white blood cell count continues to come down slowly and is at 12.93 and her  hemoglobin is stable at 10.3.  Body fluid culture from drainage is pending.  Patient does remain on Lantus 16 units along with sliding scale insulin at present time and seems to be very comfortable with management of her diabetes.  We will plan follow-up on outpatient basis with endocrinology following discharge.  Patient was seen by Dr. Us today and he has continued the vancomycin therapy awaiting culture results from the fluid drained from her right lower quadrant and awaiting final ID is on organism which grew from blood earlier in admission.  He is still continuing to treat for fungal infection that was collected at time of drainage from stents placed in ureters and is using micafungin for treatment of this infection.  Dr. Campos performed procedure in radiology without complications or problems.  He has suggested that patient have a repeat CT scan of the area prior to discharge to home.    1/11/2023.  Patient is seen again today in her room on CCU room #335.  Patient is up in her chair watching television and indicates to me that she is often sleeping in the chair because it is easier to get up to urinate from the chair then to get up from the bed.  I did speak with the patient's nurse earlier in the day.  I wore full PPE for the exam including an N95 facemask, goggles, white lab coat, and gloves when touching patient.  I performed thorough hand hygiene before and after the patient.  Patient's appetite continues to slowly improve.  She also is gradually getting stronger and gaining more independence.  PT did discuss with her using a walker to ambulate because she seems to be much safer with that at the present time the patient is somewhat stubborn and insist on walking alone despite poor posture at times.  Review of today's labs show that her blood sugars have ranged from 156 up to a max of 389.  I will plan on increasing her insulin through her Lantus by another 2 units each day up to 18 units daily.   Other labs are very stable.  Sodium is 131, BUN slightly low at 4, albumin low at 2.1.  White blood cell count continues to come down and is at 11.91 today.  Hemoglobin is at 9.8 and platelet count is 502,000.  Culture still pending from drain placement but so far with no growth.  Other cultures remain positive as before.  Per ID patient to continue on vancomycin for now awaiting those culture results.  Anticipate possible discharge the patient over the weekend depending on decisions that come from infectious disease.  Otherwise clinical condition seems to be quite stable.Review of vital signs reveals that temperature is 98.9, pulse 81, respirations 20, blood pressure 110/82 and her room air sat is 96%.  We continue to follow while awaiting decision on antibiotics.    1/12/2024.  Patient is seen once again today in her room in the CCU room #335.  Patient was resting quietly in bed at the time of my visit.  I wore full PPE for the exam including an N95 face mask, goggles, white lab coat, and gloves when touching patient.  I performed thorough hand hygiene before and after the patient visit.  Patient has had a very quiet day.  She was seen by Dr. Millre from urology earlier this morning.  Per his plan, drain in right retroperitoneal area is to be maintained until output is clear and scant and patient is felt to be infection free.  We still are awaiting culture results from the drain.  Urology plans to coordinate outpatient study of both ureters to determine patency with a follow-up in office 1 week after discharge.Dr. Us from infectious disease saw patient again also today.  Drainage cultures from the retroperitoneal drain remain pending.  For now he continued vancomycin.  He also talked with micro in the lab today about the prior positive blood cultures and they are going to resend the new sample due to the mixed sample that came out on prior study.  He is continuing micafungin 100 mg daily for now due to the  isolated Candida in patient's stents.  Pharmacy continues to adjust vancomycin levels.  New labs ordered for tomorrow AM.  Patient hoping for discharge soon if final decisions can be made regarding antibiotic therapy.  Vital signs remained stable with no fever pulse 82 respiratory rate 18 and blood pressure 128/64 with 94% on sat with room air.    1/13/2024.  Patient is seen again today in her room in the CCU room #335.  Patient is resting quietly in bed at the time of my visit watching television.  I did discuss the case briefly with the patient's nurse.  I wore full PPE for the exam including an N95 face mask, goggles, white lab coat, and gloves when touching the patient.  I performed thorough hand hygiene before and after the patient visit.  Patient remains in good spirits today and actually was more talkative than in the past.  Patient's  is helping from afar with a move that her daughter is doing today.  Most of the family is involved with moving the daughter to returning home.  Patient's drain continues to drain cloudy whitish pus-like appearing material with the amount of drainage decreased slightly.  Culture has grown lactobacillus which hopefully will be sensitive to the same vancomycin that the patient is taking for her blood culture that was positive.  Rereview of the sensitivity blood culture is still pending in the lab at present time.  ID did see the patient and will continue to follow.  Hopefully will have final cultures back by Monday and be able to discharge patient early in the week to home with home IV antibiotics as necessary.  Patient's daughter is an RN and will be able to coordinate infusion of those medications at home level.  Urology did see the patient today and suggested continuing the IV antibiotics and the right retroperitoneal drain.  They also plan to continue following for now.  No other major changes occurred with the patient today.  We are continuing to follow until she is  ready for discharge to home.    1/14/2024.  Patient is seen again today in her room on CCU #335.  Patient was resting quietly in bed watching television and her nurse was at bedside while phlebotomy was drawing her vancomycin trough level.  Patient not feeling as well today due to low grade temperature elevations.Tmax 99.1 overnight last night.  I wore full PPE for the exam including an N-95 face mask, goggles, gloves and white lab coat.  I performed thorough hand hygiene before and after the patient visit. Patient's retroperitoneal drain is still draining opaque, thick pus which has culture positive for lactobacillus.  We are still waiting on final culture results and sensitivities for both bacteria that have grown, one from the drainage and one from the blood.  Patient's appetite is decreased today.  Daughter visited earlier and helped her with bath and clean up.  Labs show sodium down to 125.(Requesting input from nephrology on the hyponatremia) Glucoses remain more elevated in last 24 hours possibly due to brewing infection. Planning to continue gradually increasing the Lantus dose to 14 units bid. WBC is up to 10.99 and hemoglobin is stable at 9.1.  Platelets are up to 550,000.  Will check UA, Sed rate / CRP in am.  Also plan to check AAS x-ray in am.  Will continue monitoring patient carefully.    1/15/2024.  Patient is seen again today in the CCU room #335.  Patient is resting quietly in bed but still appears a bit flushed.  No family is in the room at the present time.  I wore full PPE for the exam including an N95 facemask, goggles, white lab coat, and gloves when touching patient.  I performed thorough hand hygiene before and after the patient visit.  Patient is in a good mood today and anxious to go home soon.  We did have patient seen by the nephrology team today.  They feel the patient is actually somewhat hypervolemic and actually gave her 1 dose of Lasix 20 mg IV stat.  They want to avoid hypotonic  fluids with the patient.  They are checking a cortisol level urine sodium and urine osmolality.  They have placed the patient on fluid restriction at 1500 cc for 24 hours and will be continuing to follow labs tomorrow.  Dr. Us saw patient from infectious disease.  Send out blood cultures still are pending.  Fluconazole susceptibilities also pending for Candida glabrata.  He is continuing the patient on micafungin 100 mg daily for the Candida.  He is planning to de-escalate the antibiotics from vancomycin today to Unasyn 3 g every 6 hours and plans to follow further.  Hopefully can transition to an oral antibiotic at the time of discharge to home.  Physical therapy saw patient but patient and family declined treatment because she has had diarrhea all day and has been up and down on the bedside commode.  Labs show sodium up to 132 today.  Chloride 54.  Albumin remains low at 2.2.  Cortisol level 23.90 appears normal.  TSH slightly up at 5.2.  C-reactive protein still high at 24.73.  White blood cell count up to 11.33.  Hemoglobin 8.9.  Sed rate still up at 84.  UA checked yesterday still relatively unremarkable with small leukocytes and small blood.  Urine culture so far negative.Abdominal x-ray series shows minimal atelectasis at right base.  Otherwise unremarkable.  UPJ drains appear to be still in correct place.Glucoses today have ranged from 321 glucoses today have ranged from 190 earlier this morning to a high of 321 later this evening.  Will continue to monitor tomorrow and consider an additional increase in Lantus if these elevations persist.    Review of Systems:               Review of systems could not be obtained due to  patient confusion. patient nonverbal.       Past Medical History:   Diagnosis Date    Anemia     intermittently    Anxiety and depression     Arthritis     Depression     Diabetes mellitus     Gallstones     High cholesterol     History of frequent urinary tract infections     HX OF  YEAST IN BLADDER HAS PRN DIFLUCAN    History of transfusion 2017    Had 2 iron infusions.  This was when i had knee replacement    Hyperlipidemia     Hypertension     Hypothyroidism     Knee pain, bilateral     Low back pain     Lumbar stenosis     PONV (postoperative nausea and vomiting)     Positive TB test     chest x-ray neg    Seasonal allergies      Past Surgical History:   Procedure Laterality Date    APPENDECTOMY N/A     Dr. Wilson    CHOLECYSTECTOMY WITH INTRAOPERATIVE CHOLANGIOGRAM N/A 10/31/2018    Procedure: laparoscopic cholecystectomy;  Surgeon: Mary Kay Mac MD;  Location: [facility] OR;  Service: General    COLONOSCOPY      CYSTOSCOPY BLADDER BIOPSY N/A 10/3/2016    Procedure: CYSTOSCOPY BLADDER BIOPSY;  Surgeon: Rei Calvert MD;  Location: [facility] OR;  Service:     CYSTOSCOPY W/ URETERAL STENT PLACEMENT Bilateral 1/3/2024    Procedure: CYSTOSCOPY BILATERAL RETROGRADE PYELOGRAM  BILATERAL URETERAL STENT INSERTION AND CATHETHER PLACEMENT;  Surgeon: Eduard Armando MD;  Location: [facility] OR;  Service: Urology;  Laterality: Bilateral;    DILATATION AND CURETTAGE N/A     ENDOSCOPY      INTRAUTERINE DEVICE INSERTION      KNEE ARTHROSCOPY W/ MENISCAL REPAIR Left     OVARIAN CYST REMOVAL Left     Dr. Wilson    TOTAL KNEE ARTHROPLASTY Right 2017    Procedure: RIGHT TOTAL KNEE ARTHROPLASTY WITH ALETA NAVIGATION;  Surgeon: Ross Cruz MD;  Location: [facility] OR;  Service:      Allergies   Allergen Reactions    Sulfa Antibiotics Hives and Rash       Family History   Problem Relation Age of Onset    Hepatitis Mother     Breast cancer Mother     Heart disease Mother     Cancer Mother     Hyperlipidemia Mother              Heart failure Father     Stroke Father     Hypertension Father         Since he was 72, now 86s    Diabetes Father     Heart disease Father     Hyperlipidemia Father         Unsurs    Heart disease Maternal Grandmother     Cancer  "Maternal Grandfather     COPD Maternal Grandfather     Arthritis Paternal Grandmother     Diabetes Maternal Aunt     Diabetes Paternal Uncle     Malig Hyperthermia Neg Hx        Social History     Socioeconomic History    Marital status:    Tobacco Use    Smoking status: Never    Smokeless tobacco: Never    Tobacco comments:     Never smoked   Vaping Use    Vaping Use: Never used   Substance and Sexual Activity    Alcohol use: Never     Alcohol/week: 3.0 standard drinks of alcohol     Types: 1 Glasses of wine, 2 Standard drinks or equivalent per week    Drug use: Never    Sexual activity: Not Currently     Partners: Male     Birth control/protection: I.U.D.       PMH, FH, SH and ROS completed with Admission History and Physical and updated in EPIC system.        Objective     Scheduled Meds:ampicillin-sulbactam, 3 g, Intravenous, Q6H  enoxaparin, 40 mg, Subcutaneous, Q12H  escitalopram, 10 mg, Oral, Daily  insulin glargine, 14 Units, Subcutaneous, Q12H  insulin lispro, 2-9 Units, Subcutaneous, 4x Daily AC & at Bedtime  levothyroxine, 137 mcg, Oral, Q AM  micafungin (MYCAMINE) IV, 100 mg, Intravenous, Q24H  oxybutynin XL, 10 mg, Oral, Daily  pantoprazole, 40 mg, Oral, Q AM  potassium phosphate, 15 mmol, Intravenous, Once  rOPINIRole, 0.5 mg, Oral, Nightly  senna-docusate sodium, 2 tablet, Oral, BID      Continuous Infusions:       Vital signs in last 24 hours:  Temp:  [98.3 °F (36.8 °C)-100.4 °F (38 °C)] 98.6 °F (37 °C)  Heart Rate:  [] 100  Resp:  [17-33] 17  BP: (125-145)/(56-74) 125/74    Intake/Output:    Intake/Output Summary (Last 24 hours) at 1/16/2024 0337  Last data filed at 1/15/2024 1505  Gross per 24 hour   Intake 720 ml   Output 485 ml   Net 235 ml         Exam:  /74 (BP Location: Left arm, Patient Position: Lying)   Pulse 100   Temp 98.6 °F (37 °C) (Oral)   Resp 17   Ht 165.1 cm (65\")   Wt 115 kg (254 lb 10.1 oz)   LMP 09/03/2016 Comment: IUD  SpO2 96%   BMI 42.37 kg/m² "     Constitutional:  Patient resting in bed today.  Continues to more alert.  Patient reports some diarrhea today after changing IV antibiotic therapy.  Patient certainly at risk for C. difficile infection with long-term antibiotics but has been on vancomycin up till this point.  Normal personality.  Pain significantly improved,.  Drain continues to produce opaque whitish-tan drainage.  Redness over right lower quadrant remains significantly improved.  Blood pressures stable. Low grade fever elevations noted today.  Patient not feeling as well.  WBC is up slightly.     Eyes:     PERRLA, conjunctiva/corneas clear, no icterus, no conjunctival                                     pallor, EOM's intact, both eyes      ENT and Mouth: Lips, tongue, gums normal; oral mucosa pink and moist   Neck:     Supple, symmetrical, trachea midline, no JVD  Respiratory:     Clear to auscultation bilaterally, respirations unlabored  Cardiovascular:  Regular rate and rhythm, S1 and S2 normal, no murmur,      no  Rub or gallop.  Pulses normal.    Gastrointestinal:   BS present x 4 Soft, non-tender, bowel sounds active,      no masses, no hepatosplenomegaly    right lower quadrant pain almost totally resolved with no rebound guarding or other abnormalities.  Retroperitoneal drain remains in place.  Will plan CT scan prior to discharge.                                               :       No hernia.  Normal exam for sex.         Musculoskeletal: Extremities normal, atraumatic, no cyanosis or edema     No arthropathy.  No deformity.  Gait normal                                                 Skin:   Skin is warm and dry,  no rashes, swelling or palpable lesions.  Erythema resolved over right lower quadrant area pain   Neurologic:  CN -XII intact, motor strength grossly intact, sensation grossly intact to light touch, no focal reflex deficits noted    Psychiatric:     Alert,oriented X3, no delusions, psychoses, depression or anxiety     Heme/Lymph/Imun:   No bruises, petechiae.  Lymph nodes normal in size/configuration       Data Review:  Lab Results   Component Value Date    CALCIUM 8.6 01/15/2024    PHOS 3.7 01/07/2024     Results from last 7 days   Lab Units 01/15/24  0714 01/14/24  0705 01/13/24  0716   AST (SGOT) U/L 14 12 13   ALT (SGPT) U/L 7 9 9   SODIUM mmol/L 132* 129* 133*   POTASSIUM mmol/L 3.9 4.1 4.2   CHLORIDE mmol/L 94* 93* 95*   CO2 mmol/L 26.5 27.0 26.5   BUN mg/dL 6* 6* 5*   CREATININE mg/dL 0.81 0.76 0.73  0.73   GLUCOSE mg/dL 215* 218* 159*   CALCIUM mg/dL 8.6 8.8 8.7   WBC 10*3/mm3 11.33* 10.99* 9.51   HEMOGLOBIN g/dL 8.9* 9.1* 9.1*   PLATELETS 10*3/mm3 603* 550* 528*     Lab Results   Component Value Date    TROPONINT <6 01/02/2024     Estimated Creatinine Clearance: 92.8 mL/min (by C-G formula based on SCr of 0.81 mg/dL).  WEIGHTS:     Wt Readings from Last 1 Encounters:   01/14/24 0600 115 kg (254 lb 10.1 oz)   01/13/24 0627 116 kg (255 lb 1.2 oz)   01/12/24 0605 115 kg (254 lb 3.1 oz)   01/11/24 0600 113 kg (249 lb)   01/09/24 0653 114 kg (251 lb 3.2 oz)   01/08/24 0700 115 kg (254 lb 3.1 oz)   01/07/24 0510 120 kg (263 lb 10.7 oz)   01/07/24 0349 120 kg (263 lb 10.7 oz)   01/06/24 0509 122 kg (269 lb 6.4 oz)   01/05/24 0556 111 kg (244 lb 11.4 oz)   01/04/24 0600 111 kg (245 lb 6 oz)   01/03/24 0600 111 kg (245 lb 6 oz)   01/02/24 1131 120 kg (265 lb)         Assessment:    DKA (diabetic ketoacidosis)    UTI (urinary tract infection)    Morbid obesity with BMI of 40.0-44.9, adult    JADEN (acute kidney injury)    Sepsis, unspecified organism    Lactic acidosis    UPJ (ureteropelvic junction) obstruction bilateral    Bilateral hydronephrosis moderate on admission    Renal calculus on right, nonobstructinbg    Vitamin D deficiency    Essential hypertension    Hyperlipidemia    Hypothyroidism    Allergic rhinitis    Back pain    Chronic liver disease    Primary osteoarthritis of right knee    Contact dermatitis    Anxiety     Dense breast tissue on mammogram    Bilateral hydrocele      Attending Physician Assessment and Plan:    1.  Cystitis/UTI with sepsis associated with bilateral UPJ obstruction and moderate bilateral hydronephrosis and possible right calyceal rupture.  Patient resuscitated aggressively with IV fluids.  Broad-spectrum antibiotics, Zosyn, started.  Urology consult is placed.  Patient being followed carefully in ICU for additional complications or signs of worsening sepsis.  Culture of urine remains negative on day #2.  New cultures collected at time of cystoscopy today by urology.  Patient currently off Zosyn which was stopped after surgery yesterday.  We will repeat blood cultures since we did have 1 of 2 initial blood cultures positive.  ID has been consulted and is following with us.  They have started the patient on fluconazole secondary to the instrumentation yesterday and yeast that is seen in hearing.  I do plan to check a procalcitonin level with a.m. labs tomorrow.  Patient also started on vancomycin due to 2 of 2 positive cultures from blood.  Additional blood cultures pending at present time.  ID continues to follow on 1/9/2024.  Vancomycin and micafungin are continued for treatment of ongoing infections.  Antibiotics unchanged at this point.  Hoping culture from fluid collection obtained in IR today will be useful in guiding antibiotic therapy.  ID continues to follow.  Vancomycin still ongoing.  Final decision on treatment to come soon with respect to cultures.  Awaiting final culture results.  Lab resubmitting blood culture for ID.  Urine cultures negative.  Drainage from abscess culture still pending.  On 1/13/2024.  Still on vancomycin.  Awaiting final culture sensitivities.1/14/2024 patient beginning to show some elevations in WBC and low grade temperature elevations.  Repeat UA on day #14 unremarkable and urine culture pending 1/15/2024.  Now transitioning from vancomycin to IV Unasyn per ID.   Continue to follow and await final culture results.    2.  Anion gap metabolic acidosis versus possible diabetic ketoacidosis.  Improving rapidly at present time on DKA protocol with Glucomander.  Aggressive fluid resuscitation initiated in the ER is continued in the ICU.  Patient now with good urinary output and hopefully will be able to come off of IV insulin and switch to subcu dosing overnight..  Anion gap has narrowed and improved.  Patient should come off Glucomander DKA protocol sometime later today.  On postop day #1 anion gap has significantly improved.  We are asking renal to help us with follow-up on her acute kidney injury and volume status.  Acidosis has resolved.  Acidosis has resolved.  Diabetes now under control with Lantus 16 units daily and sliding scale insulin anion gap remains normal now.     3.  Lactic acidosis possibly due to home use of metformin while taking no food or possibly due to acute sepsis.  Continuing rapid fluid resuscitation with improvement already noted in lactic acid.  Monitoring carefully for signs of any new changes or problems.  Lactic acidosis has also resolved on day #2 and patient's sepsis seems to be under much more stable condition at present time.  Repeat lactic acid in a.m. with other labs.  Lactic acidosis has resolved.  Continuing sliding scale insulin with slow increases in doses.    4.  Type 2 diabetes mellitus poorly controlled at present time with elevated A1c greater than 16.  Will probably need to arrange for follow-up with patient in endocrinology clinic.  For now we will ask diabetic educator to see patient and work on sliding scale insulin protocols.  Patient and her daughter are interested in Dexcom monitoring and I will see if that is available from the diabetic educator.  Discussed situation with the daughters and she may benefit from continuous 24-hour glucose monitoring issues willing to learn the process.  Will probably need to discharge charge on  sliding scale insulin and may also consider mealtime insulin or long acting insulin.  Diabetic educator to see patient and work with us on diet, glucose testing techniques, and generalized diabetes management.  Patient doing well on sliding scale with single dose of Lantus at nighttime.  Now the patient is eating more plan to increase Lantus to 16 units subcu daily.  We will plan on rechecking hemoglobin A1c in 6 to 8 weeks.  Diabetes much improved.  Stable on Lantus and sliding scale insulin.  Continuing to increase Lantus slowly to 18 units daily.  Splitting Lantus into 2 doses daily and will give 10 units subcu twice daily since glucose is still running a bit high.  Tolerating insulin well.  Patient eating better and blood sugars slightly higher.  Continuing Lovenox on 1/13/2024 but increasing dose to 12 units twice daily since blood sugars are still running higher above 200 especially later in the day.1/14/2023 further increasing Lantus dose to 14 units bid.  Will increase Lantus to 15 units twice daily on 1/14/2023.  May need to add some mealtime insulin if blood sugars remain elevated especially in the evening when it seems to get highest.    5.  Pseudohyponatremia.  Seems to already be improving with resuscitation and resolution of the uncontrolled glucoses.  Will continue to follow electrolytes regularly.  Hyponatremia now corrected and in normal range.  Hyponatremia has now resolved on day #2. Sodium now normalized.Low sodium now reoccurring on 1/14/2024.  I am asking for nephrology to evaluate.     6.  Hypothyroidism.  Patient's Synthroid is continued by the ICU attending.  Will continue to follow this in the hospital and on an outpatient basis.  Hypothyroidism is a stable condition     7.  Hyperlipidemia.  Agree with holding statin for now.  Will resume prior to discharge and continue on outpatient basis.     8.  Obesity.  Hope to continue using agents such as Ozempic for management of patient's blood  sugar levels.  This will also help with diet control and ongoing need for gradual weight loss.     9.  Acute kidney injury secondary to volume depletion with uncontrolled glucoses.  Should improve as patient's volume status normalizes.  Will continue to monitor closely.  Acute kidney injury is gradually improving with resolution of hydronephrosis and bilateral stent placement.  Acute kidney injury totally resolved.     10.  Generalized muscle weakness and fatigue.  Suspect related to the poorly controlled diabetes and electrolyte disturbances.  Will do PT and OT evaluations after patient stabilizes.  Suspect patient will discharge to home with PT and OT and home environment.  Hopefully patient will be able to work with PT and OT in the next few days.  Muscle strengthening ongoing.    11.  Right lower quadrant pain with fluid collections noticed on CT scan completed today 6 days after admission.  Patient complaining of significant pain with erythematous skin over the right lower quadrant.  I will ask general surgery to take a look at the situation in the a.m.  We will also notify urology of the findings in the a.m. no pain at present in right lower quadrant.  Continuing to monitor drainage which does persist at present time.        Plan for disposition:Where: home and home health and When: 2-3 days when medically stable     Copied text in this note has been reviewed by me and is accurate as of 01/15/2024.  Much of this dictation was completed using Dragon voice activated transcription software which can result in misspelled words and nonsensical phrases at times.     Dr. Fairchild available and can be reached at [phone]      Dean Fairchild MD  1/15/2024  12:45 EST

## 2024-01-16 NOTE — PLAN OF CARE
Goal Outcome Evaluation:           Progress: improving  Outcome Evaluation: vss. nad. chaparrita. a&ox4. RAJESH intact. ambulates ad [place] to [facility]. pain tolerated with PO PRN meds. plan to dc home when medically cleared. ongoing care plan discussed with pt and daughter at bedside.

## 2024-01-17 ENCOUNTER — APPOINTMENT (OUTPATIENT)
Dept: CT IMAGING | Facility: HOSPITAL | Age: 64
DRG: 853 | End: 2024-01-17
Payer: COMMERCIAL

## 2024-01-17 LAB
ALBUMIN SERPL-MCNC: 2.4 G/DL (ref 3.5–5.2)
ALBUMIN/GLOB SERPL: 0.5 G/DL
ALP SERPL-CCNC: 135 U/L (ref 39–117)
ALT SERPL W P-5'-P-CCNC: 9 U/L (ref 1–33)
ANION GAP SERPL CALCULATED.3IONS-SCNC: 13.6 MMOL/L (ref 5–15)
AST SERPL-CCNC: 19 U/L (ref 1–32)
BASOPHILS # BLD AUTO: 0.1 10*3/MM3 (ref 0–0.2)
BASOPHILS NFR BLD AUTO: 0.9 % (ref 0–1.5)
BILIRUB SERPL-MCNC: 0.5 MG/DL (ref 0–1.2)
BUN SERPL-MCNC: 8 MG/DL (ref 8–23)
BUN/CREAT SERPL: 9.5 (ref 7–25)
CALCIUM SPEC-SCNC: 8.7 MG/DL (ref 8.6–10.5)
CHLORIDE SERPL-SCNC: 91 MMOL/L (ref 98–107)
CO2 SERPL-SCNC: 28.4 MMOL/L (ref 22–29)
CREAT FLD-MCNC: 0.84 MG/DL
CREAT SERPL-MCNC: 0.84 MG/DL (ref 0.57–1)
DEPRECATED RDW RBC AUTO: 44.9 FL (ref 37–54)
EGFRCR SERPLBLD CKD-EPI 2021: 79.2 ML/MIN/1.73
EOSINOPHIL # BLD AUTO: 0.22 10*3/MM3 (ref 0–0.4)
EOSINOPHIL NFR BLD AUTO: 2 % (ref 0.3–6.2)
ERYTHROCYTE [DISTWIDTH] IN BLOOD BY AUTOMATED COUNT: 14.5 % (ref 12.3–15.4)
GLOBULIN UR ELPH-MCNC: 4.4 GM/DL
GLUCOSE BLDC GLUCOMTR-MCNC: 224 MG/DL (ref 70–130)
GLUCOSE BLDC GLUCOMTR-MCNC: 224 MG/DL (ref 70–130)
GLUCOSE BLDC GLUCOMTR-MCNC: 333 MG/DL (ref 70–130)
GLUCOSE SERPL-MCNC: 168 MG/DL (ref 65–99)
HCT VFR BLD AUTO: 29.2 % (ref 34–46.6)
HGB BLD-MCNC: 9.1 G/DL (ref 12–15.9)
IMM GRANULOCYTES # BLD AUTO: 0.07 10*3/MM3 (ref 0–0.05)
IMM GRANULOCYTES NFR BLD AUTO: 0.6 % (ref 0–0.5)
LYMPHOCYTES # BLD AUTO: 1.57 10*3/MM3 (ref 0.7–3.1)
LYMPHOCYTES NFR BLD AUTO: 14.4 % (ref 19.6–45.3)
MAGNESIUM SERPL-MCNC: 1.8 MG/DL (ref 1.6–2.4)
MCH RBC QN AUTO: 26.6 PG (ref 26.6–33)
MCHC RBC AUTO-ENTMCNC: 31.2 G/DL (ref 31.5–35.7)
MCV RBC AUTO: 85.4 FL (ref 79–97)
MONOCYTES # BLD AUTO: 1.33 10*3/MM3 (ref 0.1–0.9)
MONOCYTES NFR BLD AUTO: 12.2 % (ref 5–12)
NEUTROPHILS NFR BLD AUTO: 69.9 % (ref 42.7–76)
NEUTROPHILS NFR BLD AUTO: 7.64 10*3/MM3 (ref 1.7–7)
NRBC BLD AUTO-RTO: 0.1 /100 WBC (ref 0–0.2)
PHOSPHATE SERPL-MCNC: 3.8 MG/DL (ref 2.5–4.5)
PLATELET # BLD AUTO: 541 10*3/MM3 (ref 140–450)
PMV BLD AUTO: 9.6 FL (ref 6–12)
POTASSIUM SERPL-SCNC: 3.9 MMOL/L (ref 3.5–5.2)
PROT SERPL-MCNC: 6.8 G/DL (ref 6–8.5)
RBC # BLD AUTO: 3.42 10*6/MM3 (ref 3.77–5.28)
SODIUM SERPL-SCNC: 133 MMOL/L (ref 136–145)
WBC NRBC COR # BLD AUTO: 10.93 10*3/MM3 (ref 3.4–10.8)

## 2024-01-17 PROCEDURE — 99232 SBSQ HOSP IP/OBS MODERATE 35: CPT | Performed by: STUDENT IN AN ORGANIZED HEALTH CARE EDUCATION/TRAINING PROGRAM

## 2024-01-17 PROCEDURE — 25010000002 ENOXAPARIN PER 10 MG: Performed by: UROLOGY

## 2024-01-17 PROCEDURE — 80053 COMPREHEN METABOLIC PANEL: CPT | Performed by: INTERNAL MEDICINE

## 2024-01-17 PROCEDURE — 83735 ASSAY OF MAGNESIUM: CPT | Performed by: INTERNAL MEDICINE

## 2024-01-17 PROCEDURE — 82570 ASSAY OF URINE CREATININE: CPT | Performed by: UROLOGY

## 2024-01-17 PROCEDURE — 74177 CT ABD & PELVIS W/CONTRAST: CPT

## 2024-01-17 PROCEDURE — 85025 COMPLETE CBC W/AUTO DIFF WBC: CPT | Performed by: INTERNAL MEDICINE

## 2024-01-17 PROCEDURE — 82948 REAGENT STRIP/BLOOD GLUCOSE: CPT

## 2024-01-17 PROCEDURE — 25010000002 AMPICILLIN-SULBACTAM PER 1.5 G: Performed by: STUDENT IN AN ORGANIZED HEALTH CARE EDUCATION/TRAINING PROGRAM

## 2024-01-17 PROCEDURE — 63710000001 INSULIN GLARGINE PER 5 UNITS: Performed by: INTERNAL MEDICINE

## 2024-01-17 PROCEDURE — 84100 ASSAY OF PHOSPHORUS: CPT | Performed by: INTERNAL MEDICINE

## 2024-01-17 PROCEDURE — 25510000001 IOPAMIDOL 61 % SOLUTION: Performed by: INTERNAL MEDICINE

## 2024-01-17 PROCEDURE — 25010000002 MICAFUNGIN SODIUM 100 MG RECONSTITUTED SOLUTION 1 EACH VIAL: Performed by: INTERNAL MEDICINE

## 2024-01-17 PROCEDURE — 0 DIATRIZOATE MEGLUMINE & SODIUM PER 1 ML: Performed by: INTERNAL MEDICINE

## 2024-01-17 PROCEDURE — 63710000001 INSULIN LISPRO (HUMAN) PER 5 UNITS: Performed by: INTERNAL MEDICINE

## 2024-01-17 RX ORDER — FAMOTIDINE 40 MG/1
40 TABLET, FILM COATED ORAL NIGHTLY
COMMUNITY
End: 2024-01-19 | Stop reason: HOSPADM

## 2024-01-17 RX ORDER — FERROUS SULFATE 325(65) MG
1 TABLET ORAL 2 TIMES DAILY WITH MEALS
COMMUNITY
Start: 2024-01-01

## 2024-01-17 RX ORDER — LEVOTHYROXINE SODIUM 0.15 MG/1
150 TABLET ORAL
Status: DISCONTINUED | OUTPATIENT
Start: 2024-01-17 | End: 2024-01-19 | Stop reason: HOSPADM

## 2024-01-17 RX ADMIN — HYDROCODONE BITARTRATE AND ACETAMINOPHEN 1 TABLET: 5; 325 TABLET ORAL at 13:04

## 2024-01-17 RX ADMIN — INSULIN LISPRO 4 UNITS: 100 INJECTION, SOLUTION INTRAVENOUS; SUBCUTANEOUS at 12:17

## 2024-01-17 RX ADMIN — DIATRIZOATE MEGLUMINE AND DIATRIZOATE SODIUM 30 ML: 660; 100 LIQUID ORAL; RECTAL at 07:50

## 2024-01-17 RX ADMIN — ENOXAPARIN SODIUM 40 MG: 100 INJECTION SUBCUTANEOUS at 22:33

## 2024-01-17 RX ADMIN — TORSEMIDE 40 MG: 20 TABLET ORAL at 08:31

## 2024-01-17 RX ADMIN — INSULIN LISPRO 4 UNITS: 100 INJECTION, SOLUTION INTRAVENOUS; SUBCUTANEOUS at 22:33

## 2024-01-17 RX ADMIN — ESCITALOPRAM OXALATE 10 MG: 10 TABLET, FILM COATED ORAL at 08:31

## 2024-01-17 RX ADMIN — INSULIN LISPRO 2 UNITS: 100 INJECTION, SOLUTION INTRAVENOUS; SUBCUTANEOUS at 08:32

## 2024-01-17 RX ADMIN — INSULIN GLARGINE 18 UNITS: 100 INJECTION, SOLUTION SUBCUTANEOUS at 22:33

## 2024-01-17 RX ADMIN — ENOXAPARIN SODIUM 40 MG: 100 INJECTION SUBCUTANEOUS at 08:31

## 2024-01-17 RX ADMIN — INSULIN GLARGINE 18 UNITS: 100 INJECTION, SOLUTION SUBCUTANEOUS at 08:32

## 2024-01-17 RX ADMIN — IOPAMIDOL 85 ML: 612 INJECTION, SOLUTION INTRAVENOUS at 09:43

## 2024-01-17 RX ADMIN — AMPICILLIN SODIUM AND SULBACTAM SODIUM 3 G: 2; 1 INJECTION, POWDER, FOR SOLUTION INTRAMUSCULAR; INTRAVENOUS at 12:17

## 2024-01-17 RX ADMIN — AMPICILLIN SODIUM AND SULBACTAM SODIUM 3 G: 2; 1 INJECTION, POWDER, FOR SOLUTION INTRAMUSCULAR; INTRAVENOUS at 16:55

## 2024-01-17 RX ADMIN — LEVOTHYROXINE SODIUM 150 MCG: 150 TABLET ORAL at 08:31

## 2024-01-17 RX ADMIN — OXYBUTYNIN CHLORIDE 10 MG: 10 TABLET, EXTENDED RELEASE ORAL at 08:33

## 2024-01-17 RX ADMIN — PANTOPRAZOLE SODIUM 40 MG: 40 TABLET, DELAYED RELEASE ORAL at 06:16

## 2024-01-17 RX ADMIN — INSULIN LISPRO 7 UNITS: 100 INJECTION, SOLUTION INTRAVENOUS; SUBCUTANEOUS at 16:55

## 2024-01-17 RX ADMIN — AMPICILLIN SODIUM AND SULBACTAM SODIUM 3 G: 2; 1 INJECTION, POWDER, FOR SOLUTION INTRAMUSCULAR; INTRAVENOUS at 22:32

## 2024-01-17 RX ADMIN — MICAFUNGIN SODIUM 100 MG: 100 INJECTION, POWDER, LYOPHILIZED, FOR SOLUTION INTRAVENOUS at 10:59

## 2024-01-17 RX ADMIN — AMPICILLIN SODIUM AND SULBACTAM SODIUM 3 G: 2; 1 INJECTION, POWDER, FOR SOLUTION INTRAMUSCULAR; INTRAVENOUS at 03:32

## 2024-01-17 RX ADMIN — ROPINIROLE HYDROCHLORIDE 0.5 MG: 0.5 TABLET, FILM COATED ORAL at 22:33

## 2024-01-17 NOTE — PLAN OF CARE
Alert, vss. 1L NC while sleeping. Ampicillin Q6. CT abdomen today. RAJESH drain + output.       Problem: Adult Inpatient Plan of Care  Goal: Absence of Hospital-Acquired Illness or Injury  Intervention: Prevent Skin Injury  Recent Flowsheet Documentation  Taken 1/17/2024 0400 by Jamilah Damon RN  Body Position:   right   side-lying  Taken 1/17/2024 0152 by Jamilah Damon RN  Body Position: position changed independently  Taken 1/16/2024 2338 by Jamilah Damon RN  Body Position:   right   side-lying  Skin Protection: adhesive use limited  Taken 1/16/2024 2148 by aJmilah Damon RN  Body Position: head facing, right  Taken 1/16/2024 1916 by Jamilah Damon RN  Body Position:   right   side-lying  Skin Protection: adhesive use limited   Goal Outcome Evaluation:

## 2024-01-17 NOTE — PROGRESS NOTES
NATHALIA CAMPUZANO [facility]  INTERNAL MEDICINE  DEAN FAIRCHILD MD  [address] [place] [address]  Phone [phone] Fax [phone]  E-mail:  [email or web address]      INTERNAL MEDICINE DAILY PROGRESS NOTE  Dean Fairchild M.D.  2024            Patient Identification:  Name: Tiana Hudson  Age: 63 y.o.  Sex: female  :  1960  MRN: [id number]         Primary Care Physician: Dean Fairchild MD  LENGTH OF STAY 15 DAYS    Consults       Date and Time Order Name Status Description    1/15/2024  5:58 AM Inpatient Nephrology Consult Completed     2024  3:42 AM Inpatient General Surgery Consult Completed     2024  5:10 AM Inpatient Nephrology Consult Completed     2024  2:00 PM Inpatient Infectious Diseases Consult Completed     1/3/2024 10:27 AM Inpatient Urology Consult      2024 11:55 PM Urology (on-call MD unless specified) Completed     2024  3:45 PM IP General Consult (Use specialty-specific consult if known) Completed             Chief Complaint: Abdominal/flank pain with DKA, acute cystitis and sepsis, and possible calyceal rupture in the kidney     History of Present Illness:     Subjective   Interval History: Patient is a 63 y.o.female who presented at my request to the emergency room at [facility] with complaint of acute abdominal/flank pain and history of recurrent kidney stones.  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  Patient has been followed for the last few months by Dr. Rei Calvert in urology for renal calculi and actually has had a stent placed in the right kidney due to obstruction from her renal stones.  It is also noted that the patient had a recent  lithotripsy.  Patient began to feel poorly after the recent Christmas holiday and became more severely ill over the last 3 to 4 days prior to this admission.  She has felt extremely fatigued, dizzy at times, tired, nauseated, and has spent most of her time in bed sleeping.  Family has had difficulty getting the patient to take any oral intake and became quite concerned as her condition continued to worsen.  Patient was attempting to go to work today but really was too dizzy to get in the car to drive and 2-week to actually work.  After urging from her daughter, patient called me with respect to the symptoms and at my request went to the ER for evaluation.  Patient has multiple medical comorbidities that complicate her medical care.These include most significantly recurrent urinary tract infections due to her nephrolithiasis, morbid obesity with BMI greater than 40, diabetes mellitus type 2 poorly controlled with recent addition of Ozempic to her metformin therapy, vitamin D deficiency, essential hypertension, hyperlipidemia, hypothyroidism, allergic rhinitis, back pain, contact dermatitis, anxiety, and history of dense breast tissue on mammograms.     Patient was evaluated in the emergency room by Angelito Winkler MD who was the ER attending on call time of her arrival.  Patient was seen on 1/2/2024 at 1544 by Dr. Winkler.  His HPI was consistent with the story which I given above.  Patient denied dysuria, fever, chills on his evaluation.  She did admit to having emesis and actually had some emesis while in the emergency room.  Review of systems in the emergency room was positive for diffuse abdominal pain, and vomiting.  Patient also was noted to have some significant flank pain.  All other systems reviewed were negative in the emergency room vital signs on arrival in the emergency room showed temperature of 96 °F, heart rate of 114, respiratory rate of 16, blood pressure 125/72, and O2 sat of 98%.  Patient was  described as ill-appearing but in no acute distress.  She did have severe tenderness to palpation more on the right side of the abdomen and out into the right flank area with voluntary guarding.  Labs collected in the ER showed that her lactate level was elevated at 6.0.  She had a lipase of 23, her white blood cell count was 20.12, her hemoglobin was 11.2, and her platelet count was 568,000.  Patient did have a phosphorus of 4.2, magnesium 2.0, osmolality of 317, hemoglobin A1c is 16.90, and UA showed specific gravity 1.027, 3+ glucose, moderate blood 2+, leukocyte esterase moderate 2+, nitrates negative, white blood cells 6-10, red blood cells 6-10, bacteria 2+.  Her CMP showed a glucose of 978, creatinine of 1.82, sodium of 115, potassium of 4.2, chloride of 76, CO2 of 15.7, albumin 2.4, AST 33, alk phos 220, anion gap of 23, EGFR of 31.3.  Patient did have a CT scan of her abdomen and pelvis completed which showed loculated fluid throughout the right perinephric space felt to possibly be secondary to right calyceal rupture, new mild to moderate wall thickening of the right renal pelvis and renal calyces, bilateral UPJ obstruction with moderate bilateral hydronephrosis on the left, mild new dilation of right ureter, nonobstructing right renal calculi, hepatic morphology suggestive of chronic liver disease, and reactive lymph nodes in the retrocaval area.  A chest x-ray was done which showed no acute disease other than minimal atelectasis at the base of the right lung.  Patient underwent aggressive fluid resuscitation in the emergency room and received over 3 L of IV fluid.  She was placed on an insulin drip and was also started on Zosyn therapy.  She was followed on sepsis protocol as well as DKA protocol.  Patient did receive morphine 4 mg for pain x 2.  Case was discussed with myself as her primary care attending.  Urology was also contacted about situation.  I did suggest that patient be admitted to the ICU  for treatment of the DKA and severe sepsis picture.  Dr. Arthur Tena did agree to the ICU admission and will be following her in the CCU for pulmonary/critical care.  Final diagnoses in the ER were sepsis due to unspecified organism and acute cystitis without significant hematuria.     1/2/2024.  I personally saw the patient for the first time during this hospitalization on this date in the coronary care unit room #335.  Patient was resting quietly in bed and did a peer acutely ill.  She did wake to her name and spoke a few words before falling back asleep.  Patient's 2 daughters were at the foot of her bed and did discuss the case at length with me.  Daughter Enoch, is a former nurse from here at [facility] [facility] [employer], and now works at [employer] with the hospitalist group there.  Patient has responded well to the Glucommander DKA protocols and blood sugars have gradually come down to near normal levels for the patient in the 1  range.  I will full PPE for the exam including an N95 face mask, goggles, white lab coat, and gloves when touching patient.  I performed thorough hand hygiene before and after the patient visit.  Patient did have a venous blood gas which showed pH 7.40, pCO2 28, pO2 of 31, and O2 sat of 62%.  She also had recent electrolytes which showed a sodium of 131, potassium 3.4, chloride 96, CO2 19.1, BUN 17, creatinine 1.15, estimated GFR now up to 54.3.  Lactate level has been coming steadily down and currently at 2.8.  Additional labs ordered for tomorrow a.m.  Patient has been able to urinate several times and daughters have helped her up to the bathroom for this.  I actually note that she did have some urinary frequency and incontinence at home which is unusual for her.  I did speak briefly to the patient's daytime nurse who was departing soon after I arrived on the floor.  I have reviewed Dr. Arthur Tena's notes and his admission history and physical.  I do agree completely with his plan of care at  present time.  Supportive care is continued to be offered for the sepsis with careful monitoring in the ICU.  Patient's anion gap metabolic acidosis probably is related to lactic acidosis and she was continuing to take metformin even when she was not able to eat.  IV fluids are continued aggressively.  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  Probably will plan sliding scale insulin for stabilization of diabetes while here in the hospital and will train patient to continue that in the home environment.  Pseudohyponatremia will be corrected with the IV fluids.  Hypothyroidism will be addressed with continued Synthroid.  Statins are held for now.  Obesity is an ongoing problem for the patient.  At the time my exam, patient is medically stable and slowly improving.  I did relay this to the daughter to agree with my assessment.  We will continue to follow the patient with you and we will be happy to assume care of the patient when she is stable and ready to be transferred out of the ICU.  I can be reached directly for any concerns or questions at [phone].    1/3/2024.  Patient is seen again today in her room in the CCU #335.  Patient was resting quietly in bed at the time of my visit and states that her daughters were downstairs getting a bite to eat.  I did discuss the case with the patient's nurse, Tiana, who tells me that she is being preop and should be going down soon for cystoscopy and stent placement.  I will full PPE for the exam including an N95 face mask, goggles, white lab coat, and gloves when touching patient.  I performed thorough hand hygiene before and after the patient visit.  Patient is more alert today but still somewhat confused about details of her medical case.  She is able to carry on a conversation with me though and joke a bit with me while I am present in the room.  She does understand she has issues  because of the obstruction bilaterally of her ureters that has resulted in hydronephrosis and hopefully can be relieved by the cystoscopy that is planned for later this morning.  Patient is still on an insulin drip but is drastically improved compared to where she was yesterday at this time.  Review of labs shows that her sodium is now up to 133, her CO2 is 19.1, glucose is now at 134, phosphorus is at 2.1, lactate is normalized at 1.9, white blood cell count today is 18.93, hemoglobin is 9.6 today, lakelets are normal today,Blood culture from the first day of hospitalization shows anaerobic bottle gram-positive bacilli growth with identification still ongoing.  Fungal and body fluid cultures are still negative.  Urine culture remains negative.  Dr. Oquendo was following the patient for critical care today.  He has continued her antibiotics and is awaiting urology input.  Fluid resuscitation is continued.  Blood pressure now seems well-controlled.  DVT prophylaxis in place.  He had a lengthy discussion with patient's family at the time of his rounds.  Urology has seen the patient in consultation and is planning to do cystoscopy later today.  Dietitian is following the patient's case and recommending input treatment as needed.Dr. Armando did perform a cystoscopy with ureteral catheterization and stent insertion bilaterally.  His pre-op diagnosis and postop diagnoses were urosepsis with bilateral UPJ obstruction.  He did feel that he had successfully decompressed the blockages and the hydronephrotic changes.  He did speak with patient's daughter postoperatively.  Patient does seem to be much improved today.  Vital signs still show no fever.  Blood pressure is relatively stable.  Will continue to follow with you.    1/4/2024.  Patient is seen again today in her room in the CCU #335.  Patient has transitions of critical care and is in overflow for care on the floor.  Patient's daughter is present at bedside at the time  of my visit.  Patient is much more awake and alert today.  She is more interactive and talkative.  I did discuss the case with the patient's nurse.  We are going to obtain an ID consult today for help with antibiotic management and we also are going to transition the patient to regular sliding scale insulin as we begin the teaching process of getting patient on her own regimen of sliding scale insulin at the time of discharge to home.  We have also started patient on Lantus therapy at 10 units for now and may need to titrate up.  I wore full PPE for the exam today including an N95 face mask, goggles, white lab coat, and gloves when touching patient.  I performed thorough hand hygiene before and after the patient visit.  Specialist have seen patient today and their care is summarized below.Dr. Najera, the critical care attending today, felt that her mentation was slow but answering questions appropriately.  No other major findings were discovered at present time.  Antibiotics were continued for sepsis was felt to be significantly improved with anion gap now closed and lactic acidosis much better.  IV antibiotics were continued awaiting final culture results and I did consult ID to see patient for their input on the situation.  Dr. Devon Armando who completed bilateral stent placement for bilateral OP J obstruction and urosepsis is pleased with progress on day 1 postop.  He notes that urine output has been excellent.  Dr. Us from infectious disease did see the patient in consultation.  He notes that Zosyn antibiotics have been discontinued by Dr. Armando after her successful surgery.  White blood cell count is down to 17.08 today with hematocrit of 30.7.  Glucose max was 250 today estimated GFR is up to 54 and CO2 is 19.0.  He feels the significance of the positive blood culture is unclear at this point with only 1 of 2 bottles positive for gram-positive bacilli which normally would represent a contamination.   He is planning to repeat the blood cultures and will follow-up.  Urine culture was also negative except for yeast growth and due to the recent  instrumentation he has started the patient on fluconazole 400 mg daily while cultures are pending.  New blood culture has been sent.  We will check again the lactic acid, procalcitonin, sed rate, and CRP with a.m. labs tomorrow.  Labs relatively stable today though I do note that sodium is dropped a bit to 127.  CO2 remains slightly low at 18.0 and chloride slightly low at 97.  Patient's glucoses running in the upper 100s and low 200s at present.  Electrolytes otherwise seem fairly stable.  White count is still elevated somewhat at 17.08 despite stenting yesterday.  Patient currently off antibiotics and being followed carefully by ID.  Plan to check procalcitonin and repeat blood culture with a.m. labs.  Hemoglobin is at 10.0.  Otherwise patient appears very stable at present time.  I do agree that her mentation is a little bit slower than usual but I suspect this will continue to improve as we get further from her acute DKA event.  Treatment plan reviewed with patient and her daughter today.    1/05/2024.  Patient is seen again today in her room in the CCU #335.  Patient resting quietly in bed and daughter Enoch is at bedside.  I spoke with patient's nurse during the day to discuss occasional changes in her treatment plan.  I wore full PPE for the exam including an N95 facemask, goggles, white lab coat, and gloves when touching patient.  I performed thorough hand hygiene before and after the patient visit.  Patient is much more alert today and more like her usual self.  She does laugh and participate in conversation.  Mother and daughter were very impressed with Dr. Sánchez's excellent review with them of the patient's renal condition and with the time he has been going over the various diagnostic images that have been done up to date.  He was pleased to see how  significantly improved electrolytes are and felt the patient was remaining hemodynamically stable at present time.  Dr. Us from ID did see the patient.  Blood cultures were repeated but original cultures are now positive 2 out of 2 for gram-positive bacilli which is more consistent with a true infection.  He started patient on vancomycin and will be following up on ID and's susceptibilities of the organisms.  He also continued the fluconazole 400 mg daily while following up on cultures from perinephric fluid.The pulmonary intensivist Dr. Arthur Tena saw patient briefly and found no pulmonary or critical care needs at this time.  Dr. Armando from neurology saw patient on postop day #2 after stent placement for bilateral UPJ obstruction.  Indicates that Perales catheter can be removed at any time.  Occupational Therapy began to work with the patient and feels that she will benefit from further skilled occupational therapy.  She did some bedside exercises and did do some sort steps.  She was able to sit up on the edge of the bed for 8 to 10 minutes with to stand.  Physical therapy also saw patient and felt she would continue to benefit from skilled physical therapy.  Pharmacist did consult with the patient on vancomycin and started at 750 mg IV every 12 hours.  They will be checking a trough level on 1/7/2024 at 8:30 AM.  Labs today are generally stable.  Glucose levels ranging in the 140s to 170s.  Sodium still slightly low at 132.  CO2 now normal at 22.5, albumin slightly low at 2.1, AST slightly up at 34 and alk phos slightly up at 270.  White blood cell count today is down to 13.04 and hemoglobin is stable at 10.3 with platelets of 412,000.  2 of 2 initial blood cultures were positive with final ID pending.  Sed rate remains greater than 130, procalcitonin is 3.24, and C-reactive protein is at 23.85.  Patient is still on overflow status waiting for a floor telemetry bed.    1/8/2024.  Patient is seen again today  in her room on the coronary care unit #335.  Patient was actually up in a chair at the time of my visit and her daughter was present at bedside.  I wore full PPE for the exam including an N95 facemask, goggles, white lab coat, and gloves when touching patient.  I performed thorough hand hygiene before and after the patient visit.  Patient is feeling much better today.  She says that she was anxious to get up and was happy that the therapist left her up in the chair.  She is able to get to the bathroom but does need some assistance still for that endeavor.  Several specialist saw the patient and their care is summarized below.  Patient remains on vancomycin therapy and pharmacy is following the dose and adjusting levels.  Dr. Us from ID did see patient again today.  He notes that repeat blood cultures remain negative and initial blood cultures are still being evaluated.  He has continued vancomycin for now and will follow-up on ID and susceptibilities.  Urine culture growing Candida glabrata and tropicalis and he has transitioned her from fluconazole therapy to micafungin 100 mg daily for now.  White blood cell count is slightly increased today at 16.61.  He is cautiously monitoring the patient for any new signs of infection or changes.Dr. Armando from urology did see the patient and was pleased with his surgical result from cystoscopy last week with bilateral stent placement for treating UPJ obstruction.  He has signed off on the case and plans to have her follow-up with him in clinic in 7 to 10 days for reevaluation following discharge.Occupational Therapy did work with patient who reported pain level of 2 out of 10 focused mainly on right abdomen where she has some redness swelling and discomfort.  There was no rebound to my exam in this area but I did elect to go ahead and do a CT scan of abdomen and pelvis to evaluate the area since she did have the extensive surgery not so long ago.  Patient is noted to  fatigue quickly and has some functional deficits that need to be maximized prior to DC to home.  Patient is planning discharge to home with spouse and daughters.  MI requested diabetic educator did meet with the patient.  Patient does need a meter for glucose testing.  Planning 4 times a day test for now.  Will use insulin pen lispro at meals.  Dietitian did meet with patient and her daughter for input on diet.  Provided printed materials and discussion.  Will be available for concerns or questions.  Labs today generally were stable.  Sodium back up to 131.  Glucose max of 245.  Patient does admit she is eating more at this time.  White blood cell count is up slightly at 16.61 of concern to ID.  They are monitoring cultures carefully.  Hemoglobin did drop from 11.5 down to 9.3 today will recheck tomorrow.  CT abdomen and pelvis was completed.  Right ureteral stent present but there is mild distention of the right extrarenal pelvis which appears to be improved compared to the exam 6 days ago.  There is right urethrocele thickening.  There is a right perinephric collection consistent with hemorrhage or urinoma's and there is a new deep collection in the right posterior lateral abdominal wall measuring 3 cm in thickness by 13 cm transverse along with muscular thickening along the right lateral abdominal wall and edema within the right lateral abdominal pelvic subcutaneous fat.  There is enlargement of this fluid collection seen.  We will have nursing notify urology in a.m. of this finding for their input.  May want to consider general surgery consultation also if pain persist in this area.    1/9/2024.  Patient is seen again today in the CCU room #335.  Patient's daughter is at bedside and patient is resting quietly in bedside chair.  Patient appears much more awake and alert today back to her usual personality.  I wore full PPE for the exam including an N95 face mask, goggles, white lab coat, and gloves when touching  patient.  I performed thorough hand hygiene before and after the patient visit.  Nursing reports the patient has been using bedside commode with some urgency and loose bowel movements at times.  This is why she continues on IV vancomycin.  Patient did require O2 2 L per nasal cannula at night while sleeping.  No other new issues noted.  Dr. Us from ID saw patient again today.  He continues to monitor her blood cultures and has continued her on the IV vancomycin along with micafungin for treatment of yeast infection.  Urology did see patient regarding findings on CT scan.  These were reviewed with Dr. Salomon.  There are fluid collections in the right lateral abdominal pelvic wall measuring 12.8 cm x 18 cm x 3 cm which may represent hematomas or urinoma months.  Left renal pelvis is still dilated slightly.  Urology did speak with the IR department directly and arrange for CT-guided drain placement which the patient is agreeable to having.Physical therapy did work with the patient today.  She required contact-guard assist for standing and ambulated 15 feet with no assist.  Patient was minimally unsteady and fatigued quickly though patient indicated these were the usual distances that she does ambulate.  Patient plans to DC home with home health and assistance from her daughter.  PT will continue to follow and advance as able.  Patient encouraged to get up in chair for all meals and ambulate at least 3 times a day to promote functional mobility during hospital stay.  General surgery did see patient at my request regarding the right lower quadrant pain.  They feel that it is primarily due to the retroperitoneal fluid collections which have increased in size.  They deferred further treatment decisions to urology and felt there was no intraperitoneal abnormalities noted.  Labs today do show glucose is up slightly more as patient is eating more.  Lantus has been increased for today to 16 units once daily.  Sodium is  now up to 133.  Glucose was up at 212 this a.m. white blood cell count is improved down from 16.61 to a level of 14.58 today.  Hemoglobin is up slightly at 10.1 and does remain stable.  Platelet count is also stable at 439,000.  Patient planning discharge to home over the weekend if drain is able to successfully remove fluid from the retroperitoneal fluid collection that is causing pain in the right lower quadrant.  Final antibiotic plan is still pending from ID.  We are continuing to follow closely and encourage further activity on the part of the patient.    1/10/2024.  Patient is seen again today in her room in the CCU room #335.  Patient is up in chair at bedside watching television.  She recognizes me immediately upon my entry into her room and smiles appropriately.  I wore full PPE for the exam including an N95 facemask, goggles, white lab coat, and gloves.  I performed thorough hand hygiene before and after the patient visit.  I did discuss the patient's care with her nurse who was present in the [place] outside her room.  Patient did have successful placement of the drain today in the pocket of fluid accumulation in the right lower abdominal wall.  Procedure was felt to be quite successful and 50 cc of fluid were sent to the lab for evaluation and analysis.  Patient has an ongoing drain that has very opaque brownish-white material draining from the fluid collection at present time.  Patient states that she feels 100% better with significant improvement in the pain that she was experiencing in the right lower quadrant prior to this drainage.  Vital signs today indicate no fevers with current temperature of 98.1 pulse 86 respiratory rate 18 blood pressure 120/65 and oxygen sat 96% on room air.Review of patient's labs shows that her sodium is very stable at 132 glucoses have been primarily in the 1 43-1 63 range today.  Patient's white blood cell count continues to come down slowly and is at 12.93 and her  hemoglobin is stable at 10.3.  Body fluid culture from drainage is pending.  Patient does remain on Lantus 16 units along with sliding scale insulin at present time and seems to be very comfortable with management of her diabetes.  We will plan follow-up on outpatient basis with endocrinology following discharge.  Patient was seen by Dr. Us today and he has continued the vancomycin therapy awaiting culture results from the fluid drained from her right lower quadrant and awaiting final ID is on organism which grew from blood earlier in admission.  He is still continuing to treat for fungal infection that was collected at time of drainage from stents placed in ureters and is using micafungin for treatment of this infection.  Dr. Campos performed procedure in radiology without complications or problems.  He has suggested that patient have a repeat CT scan of the area prior to discharge to home.    1/11/2023.  Patient is seen again today in her room on CCU room #335.  Patient is up in her chair watching television and indicates to me that she is often sleeping in the chair because it is easier to get up to urinate from the chair then to get up from the bed.  I did speak with the patient's nurse earlier in the day.  I wore full PPE for the exam including an N95 facemask, goggles, white lab coat, and gloves when touching patient.  I performed thorough hand hygiene before and after the patient.  Patient's appetite continues to slowly improve.  She also is gradually getting stronger and gaining more independence.  PT did discuss with her using a walker to ambulate because she seems to be much safer with that at the present time the patient is somewhat stubborn and insist on walking alone despite poor posture at times.  Review of today's labs show that her blood sugars have ranged from 156 up to a max of 389.  I will plan on increasing her insulin through her Lantus by another 2 units each day up to 18 units daily.   Other labs are very stable.  Sodium is 131, BUN slightly low at 4, albumin low at 2.1.  White blood cell count continues to come down and is at 11.91 today.  Hemoglobin is at 9.8 and platelet count is 502,000.  Culture still pending from drain placement but so far with no growth.  Other cultures remain positive as before.  Per ID patient to continue on vancomycin for now awaiting those culture results.  Anticipate possible discharge the patient over the weekend depending on decisions that come from infectious disease.  Otherwise clinical condition seems to be quite stable.Review of vital signs reveals that temperature is 98.9, pulse 81, respirations 20, blood pressure 110/82 and her room air sat is 96%.  We continue to follow while awaiting decision on antibiotics.    1/12/2024.  Patient is seen once again today in her room in the CCU room #335.  Patient was resting quietly in bed at the time of my visit.  I wore full PPE for the exam including an N95 face mask, goggles, white lab coat, and gloves when touching patient.  I performed thorough hand hygiene before and after the patient visit.  Patient has had a very quiet day.  She was seen by Dr. Miller from urology earlier this morning.  Per his plan, drain in right retroperitoneal area is to be maintained until output is clear and scant and patient is felt to be infection free.  We still are awaiting culture results from the drain.  Urology plans to coordinate outpatient study of both ureters to determine patency with a follow-up in office 1 week after discharge.Dr. Us from infectious disease saw patient again also today.  Drainage cultures from the retroperitoneal drain remain pending.  For now he continued vancomycin.  He also talked with micro in the lab today about the prior positive blood cultures and they are going to resend the new sample due to the mixed sample that came out on prior study.  He is continuing micafungin 100 mg daily for now due to the  isolated Candida in patient's stents.  Pharmacy continues to adjust vancomycin levels.  New labs ordered for tomorrow AM.  Patient hoping for discharge soon if final decisions can be made regarding antibiotic therapy.  Vital signs remained stable with no fever pulse 82 respiratory rate 18 and blood pressure 128/64 with 94% on sat with room air.    1/13/2024.  Patient is seen again today in her room in the CCU room #335.  Patient is resting quietly in bed at the time of my visit watching television.  I did discuss the case briefly with the patient's nurse.  I wore full PPE for the exam including an N95 face mask, goggles, white lab coat, and gloves when touching the patient.  I performed thorough hand hygiene before and after the patient visit.  Patient remains in good spirits today and actually was more talkative than in the past.  Patient's  is helping from afar with a move that her daughter is doing today.  Most of the family is involved with moving the daughter to returning home.  Patient's drain continues to drain cloudy whitish pus-like appearing material with the amount of drainage decreased slightly.  Culture has grown lactobacillus which hopefully will be sensitive to the same vancomycin that the patient is taking for her blood culture that was positive.  Rereview of the sensitivity blood culture is still pending in the lab at present time.  ID did see the patient and will continue to follow.  Hopefully will have final cultures back by Monday and be able to discharge patient early in the week to home with home IV antibiotics as necessary.  Patient's daughter is an RN and will be able to coordinate infusion of those medications at home level.  Urology did see the patient today and suggested continuing the IV antibiotics and the right retroperitoneal drain.  They also plan to continue following for now.  No other major changes occurred with the patient today.  We are continuing to follow until she is  ready for discharge to home.    1/14/2024.  Patient is seen again today in her room on CCU #335.  Patient was resting quietly in bed watching television and her nurse was at bedside while phlebotomy was drawing her vancomycin trough level.  Patient not feeling as well today due to low grade temperature elevations.Tmax 99.1 overnight last night.  I wore full PPE for the exam including an N-95 face mask, goggles, gloves and white lab coat.  I performed thorough hand hygiene before and after the patient visit. Patient's retroperitoneal drain is still draining opaque, thick pus which has culture positive for lactobacillus.  We are still waiting on final culture results and sensitivities for both bacteria that have grown, one from the drainage and one from the blood.  Patient's appetite is decreased today.  Daughter visited earlier and helped her with bath and clean up.  Labs show sodium down to 125.(Requesting input from nephrology on the hyponatremia) Glucoses remain more elevated in last 24 hours possibly due to brewing infection. Planning to continue gradually increasing the Lantus dose to 14 units bid. WBC is up to 10.99 and hemoglobin is stable at 9.1.  Platelets are up to 550,000.  Will check UA, Sed rate / CRP in am.  Also plan to check AAS x-ray in am.  Will continue monitoring patient carefully.    1/15/2024.  Patient is seen again today in the CCU room #335.  Patient is resting quietly in bed but still appears a bit flushed.  No family is in the room at the present time.  I wore full PPE for the exam including an N95 facemask, goggles, white lab coat, and gloves when touching patient.  I performed thorough hand hygiene before and after the patient visit.  Patient is in a good mood today and anxious to go home soon.  We did have patient seen by the nephrology team today.  They feel the patient is actually somewhat hypervolemic and actually gave her 1 dose of Lasix 20 mg IV stat.  They want to avoid hypotonic  fluids with the patient.  They are checking a cortisol level urine sodium and urine osmolality.  They have placed the patient on fluid restriction at 1500 cc for 24 hours and will be continuing to follow labs tomorrow.  Dr. Us saw patient from infectious disease.  Send out blood cultures still are pending.  Fluconazole susceptibilities also pending for Candida glabrata.  He is continuing the patient on micafungin 100 mg daily for the Candida.  He is planning to de-escalate the antibiotics from vancomycin today to Unasyn 3 g every 6 hours and plans to follow further.  Hopefully can transition to an oral antibiotic at the time of discharge to home.  Physical therapy saw patient but patient and family declined treatment because she has had diarrhea all day and has been up and down on the bedside commode.  Labs show sodium up to 132 today.  Chloride 54.  Albumin remains low at 2.2.  Cortisol level 23.90 appears normal.  TSH slightly up at 5.2.  C-reactive protein still high at 24.73.  White blood cell count up to 11.33.  Hemoglobin 8.9.  Sed rate still up at 84.  UA checked yesterday still relatively unremarkable with small leukocytes and small blood.  Urine culture so far negative.Abdominal x-ray series shows minimal atelectasis at right base.  Otherwise unremarkable.  UPJ drains appear to be still in correct place.Glucoses today have ranged from 321 glucoses today have ranged from 190 earlier this morning to a high of 321 later this evening.  Will continue to monitor tomorrow and consider an additional increase in Lantus if these elevations persist.    1/16/2024.  Patient is seen again today in her room in the CCU #335.  Patient is resting quietly in bed at the time of my visit but wakes as I enter the door immediately and starts talking with me.  She does appear more comfortable today, less flushed, no fevers.  Vital signs much improved with temperature of 98.9, pulse 97, respiratory rate 14, blood pressure  103/55, and room air sat of 94%.  Patient is now on Unasyn every 6 hours in place of the previous vancomycin.  I will full PPE for the exam including an N95 face mask, goggles, white lab coat, and gloves when touching patient.  I performed thorough hand hygiene before and after the patient visit.  I did review notes from specialist today and discussed the case with the patient's nurse.  Patient was seen by Dr. Harrington from renal.  She found that due to the elevated glucoses patient's sodium corrects to 132.  She did suggest increasing patient's TSH dose slightly which we will do.  Cortisol level was found to be normal.  Cultures of had no changes today.  Patient continues to show anemia of chronic disease.  Diabetes range has been from 1 91-3 21 today. Fluid restrictions were continued.  Oral diuretic given again today.  Dr. Us from ID continue the micafungin and the Unasyn.  Final ID plan for antibiotics still pending.  We will proceed to CT scan of abdomen and pelvis as requested previously by radiology prior to patient's discharge so that we can be sure that drain has effectively drained all the fluid in her abdominal wall.  This will also check on position of the UPJ stents.  I did complete order for rolling walker as requested by CCP.  I have sent a note to CCP  detailing additional discharge planning considerations which are somewhat contingent on whether or not ID feels home IV antibiotics and antifungals are necessary.  If this is the case, then I also feel patient needs home health also to coordinate infusions as well as to draw labs as we need them for monitoring of patient's infections and other issues such as her hyponatremia.  Blood glucose levels remain somewhat elevated despite the increased doses of Lantus that I have added.  In an effort to even better control this I would like to increase the Lantus dose to 18 mg twice a day and hopefully we will find that this reduces the need for such high  doses of sliding scale insulin which has ranged from 4 units to 7 units over the course of the day.  Next step would be consideration of adding mealtime insulin if this does not resolve the problem.  I will also ask diabetic educator to again look at patient's regimen to see if she has any recommendations with respect to adjustment of doses.  We certainly are going to need to have this patient follow-up with endocrinology on an outpatient basis to work on better regulation of blood sugars.  Nurses do report that patient is supplementing her hospital diet with food brought in from home at times.  Overall patient seems to be fairly stable medically at present time.      Review of Systems:               Review of systems could not be obtained due to  patient confusion. patient nonverbal.       Past Medical History:   Diagnosis Date    Anemia     intermittently    Anxiety and depression     Arthritis     Depression     Diabetes mellitus     Gallstones     High cholesterol     History of frequent urinary tract infections     HX OF YEAST IN BLADDER HAS PRN DIFLUCAN    History of transfusion 05/24/2017    Had 2 iron infusions.  This was when i had knee replacement    Hyperlipidemia     Hypertension     Hypothyroidism     Knee pain, bilateral     Low back pain     Lumbar stenosis     PONV (postoperative nausea and vomiting)     Positive TB test     chest x-ray neg    Seasonal allergies      Past Surgical History:   Procedure Laterality Date    APPENDECTOMY N/A 1982    Dr. Wilson    CHOLECYSTECTOMY WITH INTRAOPERATIVE CHOLANGIOGRAM N/A 10/31/2018    Procedure: laparoscopic cholecystectomy;  Surgeon: Mary Kay Mac MD;  Location: [facility] OR;  Service: General    COLONOSCOPY      CYSTOSCOPY BLADDER BIOPSY N/A 10/3/2016    Procedure: CYSTOSCOPY BLADDER BIOPSY;  Surgeon: Rei Calvert MD;  Location: [facility] OR;  Service:     CYSTOSCOPY W/ URETERAL STENT PLACEMENT Bilateral 1/3/2024    Procedure: CYSTOSCOPY  BILATERAL RETROGRADE PYELOGRAM  BILATERAL URETERAL STENT INSERTION AND CATHETHER PLACEMENT;  Surgeon: Eduard Armando MD;  Location: [facility] MAIN OR;  Service: Urology;  Laterality: Bilateral;    DILATATION AND CURETTAGE N/A     ENDOSCOPY      INTRAUTERINE DEVICE INSERTION      KNEE ARTHROSCOPY W/ MENISCAL REPAIR Left     OVARIAN CYST REMOVAL Left     Dr. Wilson    TOTAL KNEE ARTHROPLASTY Right 2017    Procedure: RIGHT TOTAL KNEE ARTHROPLASTY WITH ALETA NAVIGATION;  Surgeon: Ross Cruz MD;  Location: [facility] MAIN OR;  Service:      Allergies   Allergen Reactions    Sulfa Antibiotics Hives and Rash       Family History   Problem Relation Age of Onset    Hepatitis Mother     Breast cancer Mother     Heart disease Mother     Cancer Mother     Hyperlipidemia Mother              Heart failure Father     Stroke Father     Hypertension Father         Since he was 72, now 86s    Diabetes Father     Heart disease Father     Hyperlipidemia Father         Unsurs    Heart disease Maternal Grandmother     Cancer Maternal Grandfather     COPD Maternal Grandfather     Arthritis Paternal Grandmother     Diabetes Maternal Aunt     Diabetes Paternal Uncle     Malig Hyperthermia Neg Hx        Social History     Socioeconomic History    Marital status:    Tobacco Use    Smoking status: Never    Smokeless tobacco: Never    Tobacco comments:     Never smoked   Vaping Use    Vaping Use: Never used   Substance and Sexual Activity    Alcohol use: Never     Alcohol/week: 3.0 standard drinks of alcohol     Types: 1 Glasses of wine, 2 Standard drinks or equivalent per week    Drug use: Never    Sexual activity: Not Currently     Partners: Male     Birth control/protection: I.U.D.       PMH, FH, SH and ROS completed with Admission History and Physical and updated in EPIC system.        Objective     Scheduled Meds:ampicillin-sulbactam, 3 g, Intravenous, Q6H  enoxaparin, 40 mg, Subcutaneous,  "Q12H  escitalopram, 10 mg, Oral, Daily  insulin glargine, 14 Units, Subcutaneous, Q12H  insulin lispro, 2-9 Units, Subcutaneous, 4x Daily AC & at Bedtime  levothyroxine, 137 mcg, Oral, Q AM  micafungin (MYCAMINE) IV, 100 mg, Intravenous, Q24H  oxybutynin XL, 10 mg, Oral, Daily  pantoprazole, 40 mg, Oral, Q AM  rOPINIRole, 0.5 mg, Oral, Nightly  senna-docusate sodium, 2 tablet, Oral, BID  torsemide, 40 mg, Oral, Daily      Continuous Infusions:       Vital signs in last 24 hours:  Temp:  [98.1 °F (36.7 °C)-98.9 °F (37.2 °C)] 98.6 °F (37 °C)  Heart Rate:  [87-97] 90  Resp:  [14-17] 15  BP: ()/(55-74) 94/63    Intake/Output:    Intake/Output Summary (Last 24 hours) at 1/16/2024 2356  Last data filed at 1/16/2024 2338  Gross per 24 hour   Intake 1610 ml   Output 105 ml   Net 1505 ml         Exam:  BP 94/63 (BP Location: Left arm, Patient Position: Lying)   Pulse 90   Temp 98.6 °F (37 °C) (Oral)   Resp 15   Ht 165.1 cm (65\")   Wt 108 kg (238 lb 1.6 oz)   LMP 09/03/2016 Comment: IUD  SpO2 92%   BMI 39.62 kg/m²     Constitutional:  Patient resting in bed today.  Continues to more alert.  Patient reports some loose stool today but improving.  Normal personality.  Pain significantly improved,.  Drain continues to produce opaque whitish-tan drainage. Blood pressures stable  Patient now feeling  better  WBC is now normal..     Eyes:     PERRLA, conjunctiva/corneas clear, no icterus, no conjunctival                                     pallor, EOM's intact, both eyes      ENT and Mouth: Lips, tongue, gums normal; oral mucosa pink and moist   Neck:     Supple, symmetrical, trachea midline, no JVD  Respiratory:     Clear to auscultation bilaterally, respirations unlabored  Cardiovascular:  Regular rate and rhythm, S1 and S2 normal, no murmur,      no  Rub or gallop.  Pulses normal.    Gastrointestinal:   BS present x 4 Soft, non-tender, bowel sounds active,      no masses, no hepatosplenomegaly    right lower " quadrant pain almost totally resolved with no rebound guarding or other abnormalities.  Retroperitoneal drain remains in place.  Will plan CT scan prior to discharge.                                               :       No hernia.  Normal exam for sex.         Musculoskeletal: Extremities normal, atraumatic, no cyanosis or edema     No arthropathy.  No deformity.  Gait normal                                                 Skin:   Skin is warm and dry,  no rashes, swelling or palpable lesions.  Erythema resolved over right lower quadrant area pain   Neurologic:  CN -XII intact, motor strength grossly intact, sensation grossly intact to light touch, no focal reflex deficits noted    Psychiatric:     Alert,oriented X3, no delusions, psychoses, depression or anxiety    Heme/Lymph/Imun:   No bruises, petechiae.  Lymph nodes normal in size/configuration       Data Review:  Lab Results   Component Value Date    CALCIUM 8.6 01/16/2024    PHOS 3.6 01/16/2024     Results from last 7 days   Lab Units 01/16/24  1709 01/16/24  0715 01/15/24  0714 01/14/24  0705   AST (SGOT) U/L  --  15 14 12   ALT (SGPT) U/L  --  8 7 9   SODIUM mmol/L  --  130* 132* 129*   POTASSIUM mmol/L 4.5 3.6 3.9 4.1   CHLORIDE mmol/L  --  92* 94* 93*   CO2 mmol/L  --  25.8 26.5 27.0   BUN mg/dL  --  7* 6* 6*   CREATININE mg/dL  --  0.73 0.81 0.76   GLUCOSE mg/dL  --  191* 215* 218*   CALCIUM mg/dL  --  8.6 8.6 8.8   WBC 10*3/mm3  --  9.56 11.33* 10.99*   HEMOGLOBIN g/dL  --  9.2* 8.9* 9.1*   PLATELETS 10*3/mm3  --  534* 603* 550*     Lab Results   Component Value Date    TROPONINT <6 01/02/2024     Estimated Creatinine Clearance: 98.9 mL/min (by C-G formula based on SCr of 0.73 mg/dL).  WEIGHTS:     Wt Readings from Last 1 Encounters:   01/16/24 0529 108 kg (238 lb 1.6 oz)   01/14/24 0600 115 kg (254 lb 10.1 oz)   01/13/24 0627 116 kg (255 lb 1.2 oz)   01/12/24 0605 115 kg (254 lb 3.1 oz)   01/11/24 0600 113 kg (249 lb)   01/09/24 0653 114 kg (251 lb  3.2 oz)   01/08/24 0700 115 kg (254 lb 3.1 oz)   01/07/24 0510 120 kg (263 lb 10.7 oz)   01/07/24 0349 120 kg (263 lb 10.7 oz)   01/06/24 0509 122 kg (269 lb 6.4 oz)   01/05/24 0556 111 kg (244 lb 11.4 oz)   01/04/24 0600 111 kg (245 lb 6 oz)   01/03/24 0600 111 kg (245 lb 6 oz)   01/02/24 1131 120 kg (265 lb)         Assessment:    DKA (diabetic ketoacidosis)    UTI (urinary tract infection)    Morbid obesity with BMI of 40.0-44.9, adult    JADEN (acute kidney injury)    Sepsis, unspecified organism    Lactic acidosis    UPJ (ureteropelvic junction) obstruction bilateral    Bilateral hydronephrosis moderate on admission    Renal calculus on right, nonobstructinbg    Vitamin D deficiency    Essential hypertension    Hyperlipidemia    Hypothyroidism    Allergic rhinitis    Back pain    Chronic liver disease    Primary osteoarthritis of right knee    Contact dermatitis    Anxiety    Dense breast tissue on mammogram    Bilateral hydrocele      Attending Physician Assessment and Plan:    1.  Cystitis/UTI with sepsis associated with bilateral UPJ obstruction and moderate bilateral hydronephrosis and possible right calyceal rupture.  Patient resuscitated aggressively with IV fluids.  Broad-spectrum antibiotics, Zosyn, started.  Urology consult is placed.  Patient being followed carefully in ICU for additional complications or signs of worsening sepsis.  Culture of urine remains negative on day #2.  New cultures collected at time of cystoscopy today by urology.  Patient currently off Zosyn which was stopped after surgery yesterday.  We will repeat blood cultures since we did have 1 of 2 initial blood cultures positive.  ID has been consulted and is following with us.  They have started the patient on fluconazole secondary to the instrumentation yesterday and yeast that is seen in hearing.  I do plan to check a procalcitonin level with a.m. labs tomorrow.  Patient also started on vancomycin due to 2 of 2 positive cultures  from blood.  Additional blood cultures pending at present time.  ID continues to follow on 1/9/2024.  Vancomycin and micafungin are continued for treatment of ongoing infections.  Antibiotics unchanged at this point.  Hoping culture from fluid collection obtained in IR today will be useful in guiding antibiotic therapy.  ID continues to follow.  Vancomycin still ongoing.  Final decision on treatment to come soon with respect to cultures.  Awaiting final culture results.  Lab resubmitting blood culture for ID.  Urine cultures negative.  Drainage from abscess culture still pending.  On 1/13/2024.  Still on vancomycin.  Awaiting final culture sensitivities.1/14/2024 patient beginning to show some elevations in WBC and low grade temperature elevations.  Repeat UA on day #14 unremarkable and urine culture pending 1/15/2024.  Now transitioning from vancomycin to IV Unasyn per ID.  Continue to follow and await final culture results.  Still awaiting final plan for antibiotics by ID with anticipated discharge to home in the next 2 to 3 days.    2.  Anion gap metabolic acidosis versus possible diabetic ketoacidosis.  Improving rapidly at present time on DKA protocol with Glucomander.  Aggressive fluid resuscitation initiated in the ER is continued in the ICU.  Patient now with good urinary output and hopefully will be able to come off of IV insulin and switch to subcu dosing overnight..  Anion gap has narrowed and improved.  Patient should come off Glucomander DKA protocol sometime later today.  On postop day #1 anion gap has significantly improved.  We are asking renal to help us with follow-up on her acute kidney injury and volume status.  Acidosis has resolved.  Acidosis has resolved.  Diabetes now under control with Lantus 16 units daily and sliding scale insulin anion gap remains normal now.     3.  Lactic acidosis possibly due to home use of metformin while taking no food or possibly due to acute sepsis.  Continuing  rapid fluid resuscitation with improvement already noted in lactic acid.  Monitoring carefully for signs of any new changes or problems.  Lactic acidosis has also resolved on day #2 and patient's sepsis seems to be under much more stable condition at present time.  Repeat lactic acid in a.m. with other labs.  Lactic acidosis has resolved.  Continuing sliding scale insulin with slow increases in doses.    4.  Type 2 diabetes mellitus poorly controlled at present time with elevated A1c greater than 16.  Will probably need to arrange for follow-up with patient in endocrinology clinic.  For now we will ask diabetic educator to see patient and work on sliding scale insulin protocols.  Patient and her daughter are interested in Dexcom monitoring and I will see if that is available from the diabetic educator.  Discussed situation with the daughters and she may benefit from continuous 24-hour glucose monitoring issues willing to learn the process.  Will probably need to discharge charge on sliding scale insulin and may also consider mealtime insulin or long acting insulin.  Diabetic educator to see patient and work with us on diet, glucose testing techniques, and generalized diabetes management.  Patient doing well on sliding scale with single dose of Lantus at nighttime.  Now the patient is eating more plan to increase Lantus to 16 units subcu daily.  We will plan on rechecking hemoglobin A1c in 6 to 8 weeks.  Diabetes much improved.  Stable on Lantus and sliding scale insulin.  Continuing to increase Lantus slowly to 18 units daily.  Splitting Lantus into 2 doses daily and will give 10 units subcu twice daily since glucose is still running a bit high.  Tolerating insulin well.  Patient eating better and blood sugars slightly higher.  Continuing Lovenox on 1/13/2024 but increasing dose to 12 units twice daily since blood sugars are still running higher above 200 especially later in the day.1/14/2023 further increasing  Lantus dose to 14 units bid.  Will increase Lantus to 15 units twice daily on 1/14/2023.  May need to add some mealtime insulin if blood sugars remain elevated especially in the evening when it seems to get highest.  Lantus now boosted to 18 units twice daily hoping good control elevated glucoses a bit better.  May need to consider mealtime insulin but will continue to monitor.  Placed consult to diabetic educator for her input on this issue.    5.  Pseudohyponatremia.  Seems to already be improving with resuscitation and resolution of the uncontrolled glucoses.  Will continue to follow electrolytes regularly.  Hyponatremia now corrected and in normal range.  Hyponatremia has now resolved on day #2. Sodium now normalized.Low sodium now reoccurring on 1/14/2024.  I am asking for nephrology to evaluate.  Nephrology is added fluid restriction.  Sodium is improved.  Continuing to monitor.     6.  Hypothyroidism.  Patient's Synthroid is continued by the ICU attending.  Will continue to follow this in the hospital and on an outpatient basis.  Hypothyroidism is a stable condition.  Have elected to increase levothyroxine to 150 mcg daily which hopefully will help with the elevation in the TSH.  Will check again in 4 to 6 weeks as outpatient and consider additional increase if needed.     7.  Hyperlipidemia.  Agree with holding statin for now.  Will resume prior to discharge and continue on outpatient basis.     8.  Obesity.  Hope to continue using agents such as Ozempic for management of patient's blood sugar levels.  This will also help with diet control and ongoing need for gradual weight loss.     9.  Acute kidney injury secondary to volume depletion with uncontrolled glucoses.  Should improve as patient's volume status normalizes.  Will continue to monitor closely.  Acute kidney injury is gradually improving with resolution of hydronephrosis and bilateral stent placement.  Acute kidney injury totally resolved.     10.   Generalized muscle weakness and fatigue.  Suspect related to the poorly controlled diabetes and electrolyte disturbances.  Will do PT and OT evaluations after patient stabilizes.  Suspect patient will discharge to home with PT and OT and home environment.  Hopefully patient will be able to work with PT and OT in the next few days.  Muscle strengthening ongoing.    11.  Right lower quadrant pain with fluid collections noticed on CT scan completed today 6 days after admission.  Patient complaining of significant pain with erythematous skin over the right lower quadrant.  I will ask general surgery to take a look at the situation in the a.m.  We will also notify urology of the findings in the a.m. no pain at present in right lower quadrant.  Continuing to monitor drainage which does persist at present time.  Planning repeat CT scan of abdomen in a.m.  in anticipation of upcoming discharge to home.        Plan for disposition:Where: home and home health and When: 2-3 days when medically stable     Copied text in this note has been reviewed by me and is accurate as of 01/16/2024.  Much of this dictation was completed using Dragon voice activated transcription software which can result in misspelled words and nonsensical phrases at times.     Dr. Fairchild available and can be reached at [phone]      Dean Fairchild MD  1/16/2024  20:40 EST

## 2024-01-17 NOTE — PROGRESS NOTES
LOS: 15 days     Chief Complaint: Antibiotic management    Interval History: Patient remains afebrile today.  Tolerating antibiotics.    Vital Signs  Temp:  [98.1 °F (36.7 °C)-98.9 °F (37.2 °C)] 98.7 °F (37.1 °C)  Heart Rate:  [87-97] 92  Resp:  [14-18] 18  BP: ()/(55-64) 118/64    Physical Exam:  General: In no acute distress  HEENT: Oropharynx clear, moist mucous membranes  Respiratory: Normal work of breathing  Skin: No rashes or lesions in exposed areas  Extremities: No edema, cyanosis  Access: Peripheral IV    Antibiotics:  Anti-Infectives (From admission, onward)      Ordered     Dose/Rate Route Frequency Start Stop    01/15/24 0858  ampicillin-sulbactam (UNASYN) 3 g in sodium chloride 0.9 % 100 mL IVPB-VTB        Ordering Provider: Jorge L Us DO    3 g  over 30 Minutes Intravenous Every 6 Hours 01/15/24 1000 01/22/24 0959    01/09/24 1144  vancomycin (VANCOCIN) 500 mg in sodium chloride 0.9 % 100 mL IVPB-VTB        Ordering Provider: Jorge L Us DO    500 mg  200 mL/hr over 30 Minutes Intravenous Every 8 Hours 01/09/24 1644 01/12/24 0902    01/08/24 0803  micafungin sodium (MYCAMINE) 100 mg in sodium chloride 0.9 % 100 mL IVPB-VTB        Ordering Provider: Micky Car MD    100 mg  over 60 Minutes Intravenous Every 24 Hours 01/08/24 0900 01/20/24 1121    01/05/24 0756  vancomycin 2250 mg/500 mL 0.9% NS IVPB ([facility])        Ordering Provider: Jorge L Us DO    20 mg/kg × 111 kg  over 135 Minutes Intravenous Once 01/05/24 0845 01/05/24 1118    01/05/24 0750  Pharmacy to dose vancomycin        Ordering Provider: Jorge L Us DO     Does not apply Continuous PRN 01/05/24 0750 01/12/24 0749    01/02/24 1636  piperacillin-tazobactam (ZOSYN) 4.5 g in iso-osmotic dextrose 100 mL IVPB (premix)        Ordering Provider: Arthur Najera MD    4.5 g  over 30 Minutes Intravenous Once 01/02/24 1652 01/02/24 1735    01/02/24 1243  cefTRIAXone (ROCEPHIN) 2,000  mg in sodium chloride 0.9 % 100 mL IVPB-VTB        Ordering Provider: Angelito Winkler MD    2,000 mg  200 mL/hr over 30 Minutes Intravenous Once 01/02/24 1259 01/02/24 1414             Results Review:     I reviewed the patient's new clinical results.    Lab Results   Component Value Date    WBC 10.93 (H) 01/17/2024    HGB 9.1 (L) 01/17/2024    HCT 29.2 (L) 01/17/2024    MCV 85.4 01/17/2024     (H) 01/17/2024     Lab Results   Component Value Date    GLUCOSE 168 (H) 01/17/2024    BUN 8 01/17/2024    CREATININE 0.84 01/17/2024    EGFRIFNONA 54 (L) 10/24/2018    EGFRIFAFRI 83 02/23/2017    BCR 9.5 01/17/2024    CO2 28.4 01/17/2024    CALCIUM 8.7 01/17/2024    PROTENTOTREF 6.9 02/23/2017    ALBUMIN 2.4 (L) 01/17/2024    LABIL2 1.7 02/23/2017    AST 19 01/17/2024    ALT 9 01/17/2024     Microbiology:  1/2 urine culture no growth  1/2 blood cultures 2 out of 2 gram-positive bacilli  1/3 right renal fluid culture Candida glabrata and Candida tropicalis  1/3 left renal fluid culture Candida glabrata  1/5 blood cultures no growth to date  1/10 retroperitoneal fluid culture lactobacillus    Assessment    #Right calyceal rupture  #Bilateral hydronephrosis status post bilateral ureteral stenting  #Abdominal abscess  #JADEN, improved  #DKA, improved  #Hyponatremia, improved  #Morbid obesity, BMI 41  #Gram-positive bacteremia    Continue micafungin 100 mg daily today.  Fortunately Candida glabrata is dose-dependent susceptible to fluconazole which gives a good option for further de-escalation in the future.  Continue Unasyn 3 g every 6 hours for lactobacillus.    ID will follow.

## 2024-01-17 NOTE — CONSULTS
FIRST UROLOGY CONSULT      Patient Identification:  NAME:  Tiana Hudson  Age:  63 y.o.   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  He had a drain placed last week.  Bilateral stents have been placed due to UPJ obstruction with calyceal rupture and fluid tracking to the retroperitoneum.  Drain output is not significant it is certainly very purulent.  ID has seen her as well.  I did send off some fluid for creatinine level just to see if this is a persistent urine leakage but it looks pretty self-contained.  The interesting part is all the cellulitis of her pannus in the right flank that rolls over to the side and is quite cellulitic.  I do not see an obvious fluid collection or so feel fluctuance but she certainly has a significant amount of fluid penetration into this fat pad.  I do not know whether some of this purulent fluid has tracked along the drain or not.  When you look at her 6 repeat CAT scan compared to the one on the 10th she has had just minor improvement in drain collection.  She is feeling better.      Past medical history:  Past Medical History:   Diagnosis Date    Anemia     intermittently    Anxiety and depression     Arthritis     Depression     Diabetes mellitus     Gallstones     High cholesterol     History of frequent urinary tract infections     HX OF YEAST IN BLADDER HAS PRN DIFLUCAN    History of transfusion 2017    Had 2 iron infusions.  This was when i had knee replacement    Hyperlipidemia     Hypertension     Hypothyroidism     Knee pain, bilateral     Low back pain     Lumbar stenosis     PONV (postoperative nausea and vomiting)     Positive TB test     chest x-ray neg     Seasonal allergies        Past surgical history:  Past Surgical History:   Procedure Laterality Date    APPENDECTOMY N/A 1982    Dr. Wilson    CHOLECYSTECTOMY WITH INTRAOPERATIVE CHOLANGIOGRAM N/A 10/31/2018    Procedure: laparoscopic cholecystectomy;  Surgeon: Mary Kay Mac MD;  Location: [facility] MAIN OR;  Service: General    COLONOSCOPY      CYSTOSCOPY BLADDER BIOPSY N/A 10/3/2016    Procedure: CYSTOSCOPY BLADDER BIOPSY;  Surgeon: Rei Calvert MD;  Location: [facility] MAIN OR;  Service:     CYSTOSCOPY W/ URETERAL STENT PLACEMENT Bilateral 1/3/2024    Procedure: CYSTOSCOPY BILATERAL RETROGRADE PYELOGRAM  BILATERAL URETERAL STENT INSERTION AND CATHETHER PLACEMENT;  Surgeon: Eduard Armando MD;  Location: [facility] MAIN OR;  Service: Urology;  Laterality: Bilateral;    DILATATION AND CURETTAGE N/A     ENDOSCOPY      INTRAUTERINE DEVICE INSERTION      KNEE ARTHROSCOPY W/ MENISCAL REPAIR Left     OVARIAN CYST REMOVAL Left 1982    Dr. Wilson    TOTAL KNEE ARTHROPLASTY Right 5/22/2017    Procedure: RIGHT TOTAL KNEE ARTHROPLASTY WITH ALETA NAVIGATION;  Surgeon: Ross Cruz MD;  Location: [facility] MAIN OR;  Service:        Allergies:  Sulfa antibiotics    Home medications:  Medications Prior to Admission   Medication Sig Dispense Refill Last Dose    amLODIPine-benazepril (LOTREL 5-20) 5-20 MG per capsule Take 1 capsule by mouth daily. (Patient taking differently: Take 1 capsule by mouth Every Morning.) 90 capsule 3 1/12/2024    Dulaglutide 1.5 MG/0.5ML solution pen-injector Inject  under the skin into the appropriate area as directed 1 (One) Time Per Week. SUNDAY   Past Week    furosemide (LASIX) 20 MG tablet Take 0.5 tablets by mouth Daily As Needed.  1 Past Week    levothyroxine (SYNTHROID, LEVOTHROID) 137 MCG tablet TAKE 1 TABLET BY MOUTH EVERY DAY IN THE MORNING ON AN EMPTY STOMACH   1/12/2024    loratadine (CLARITIN) 10 MG tablet Take 1 tablet by mouth Daily.   Past Month    metFORMIN (GLUCOPHAGE) 500 MG  tablet Take 1 tablet by mouth 2 (Two) Times a Day With Meals. 360 tablet 0 Past Month    multivitamin (THERAGRAN) tablet tablet Take  by mouth Daily.   1/12/2024    oxybutynin XL (DITROPAN-XL) 10 MG 24 hr tablet Take 1 tablet by mouth Daily.   1/12/2024    pravastatin (PRAVACHOL) 40 MG tablet Take 1 tablet by mouth daily. 90 tablet 3 1/8/2024    rOPINIRole (REQUIP) 0.5 MG tablet Take 1 tablet by mouth every night at bedtime. 30 tablet 0 1/12/2024    tiZANidine (ZANAFLEX) 4 MG tablet Take 1 tablet by mouth At Night As Needed for Muscle Spasms.   1/12/2024    traMADol (ULTRAM) 50 MG tablet Take 0.5 tablets by mouth 2 (Two) Times a Day As Needed. for pain   Past Week    uribel (URO-MP) 118 MG capsule capsule Take 1 capsule by mouth 3 (Three) Times a Day.  2 1/12/2024    VITAMIN D PO Take  by mouth Daily.   1/12/2024    diclofenac (VOLTAREN) 50 MG EC tablet Take 1 tablet by mouth 2 (Two) Times a Day As Needed (pain). 60 tablet 2 Unknown    escitalopram (LEXAPRO) 10 MG tablet Take 1 tablet by mouth Daily.   Unknown    famotidine (PEPCID) 40 MG tablet Take 1 tablet by mouth Every Night.       ferrous sulfate 325 (65 FE) MG tablet Take 1 tablet by mouth 2 (Two) Times a Day With Meals.       potassium chloride (K-DUR,KLOR-CON) 20 MEQ CR tablet Take 20 mEq by mouth As Needed (only when takin Lasix).          Hospital medications:  ampicillin-sulbactam, 3 g, Intravenous, Q6H  enoxaparin, 40 mg, Subcutaneous, Q12H  escitalopram, 10 mg, Oral, Daily  insulin glargine, 18 Units, Subcutaneous, Q12H  insulin lispro, 2-9 Units, Subcutaneous, 4x Daily AC & at Bedtime  levothyroxine, 150 mcg, Oral, Q AM  micafungin (MYCAMINE) IV, 100 mg, Intravenous, Q24H  oxybutynin XL, 10 mg, Oral, Daily  pantoprazole, 40 mg, Oral, Q AM  rOPINIRole, 0.5 mg, Oral, Nightly  senna-docusate sodium, 2 tablet, Oral, BID  torsemide, 40 mg, Oral, Daily           acetaminophen **OR** acetaminophen    senna-docusate sodium **AND** polyethylene glycol **AND**  bisacodyl **AND** bisacodyl    Calcium Replacement - Follow Nurse / BPA Driven Protocol    dextrose    dextrose    glucagon (human recombinant)    HYDROcodone-acetaminophen    ipratropium-albuterol    Magnesium Standard Dose Replacement - Follow Nurse / BPA Driven Protocol    nitroglycerin    ondansetron ODT **OR** ondansetron    Phosphorus Replacement - Follow Nurse / BPA Driven Protocol    Potassium Replacement - Follow Nurse / BPA Driven Protocol    Family history:  Family History   Problem Relation Age of Onset    Hepatitis Mother     Breast cancer Mother     Heart disease Mother     Cancer Mother     Hyperlipidemia Mother              Heart failure Father     Stroke Father     Hypertension Father         Since he was 72, now 86s    Diabetes Father     Heart disease Father     Hyperlipidemia Father         Unsurs    Heart disease Maternal Grandmother     Cancer Maternal Grandfather     COPD Maternal Grandfather     Arthritis Paternal Grandmother     Diabetes Maternal Aunt     Diabetes Paternal Uncle     Malig Hyperthermia Neg Hx        Social history:  Social History     Tobacco Use    Smoking status: Never    Smokeless tobacco: Never    Tobacco comments:     Never smoked   Vaping Use    Vaping Use: Never used   Substance Use Topics    Alcohol use: Never     Alcohol/week: 3.0 standard drinks of alcohol     Types: 1 Glasses of wine, 2 Standard drinks or equivalent per week    Drug use: Never       Review of systems:    Negative 12-system ROS except for the following: As above      Objective:  TMax 24 hours:   Temp (24hrs), Av.4 °F (36.9 °C), Min:97.3 °F (36.3 °C), Max:98.9 °F (37.2 °C)      Vitals Ranges:   Temp:  [97.3 °F (36.3 °C)-98.9 °F (37.2 °C)] 97.3 °F (36.3 °C)  Heart Rate:  [90-97] 94  Resp:  [14-18] 18  BP: ()/(55-64) 109/56    Intake/Output Last 3 shifts:  I/O last 3 completed shifts:  In: 1910 [P.O.:1310; IV Piggyback:600]  Out: 105 [Drains:105]     Physical Exam:       General  Appearance:    Alert, cooperative, in no acute distress   Head:    Normocephalic, without obvious abnormality, atraumatic   Eyes:          PERRL, conjunctivae and corneas clear   Ears:    Normal external inspection   Throat:   No oral lesions, oral mucosa moist   Neck:   Supple, no LAD, trachea midline   Back:     No CVA tenderness   Lungs:     Respirations unlabored, symmetric excursion    Heart:    RRR, intact peripheral pulses   Abdomen:   Soft and benign with a drain to the right flank this pannus to the right side is certainly much less tender but it is quite cellulitic.   :  Deferred   Extremities:   No edema, no deformity   Skin:   No bleeding, bruising or rashes   Neuro/Psych:   Orientation intact, mood/affect pleasant, no focal findings       Results review:   I reviewed the patient's new clinical results.    Data review:  Lab Results (last 24 hours)       Procedure Component Value Units Date/Time    POC Glucose Once [[id number]]  (Abnormal) Collected: 01/17/24 1151    Specimen: Blood Updated: 01/17/24 1152     Glucose 224 mg/dL     Comprehensive Metabolic Panel [[id number]]  (Abnormal) Collected: 01/17/24 0747    Specimen: Blood Updated: 01/17/24 0817     Glucose 168 mg/dL      BUN 8 mg/dL      Creatinine 0.84 mg/dL      Sodium 133 mmol/L      Potassium 3.9 mmol/L      Chloride 91 mmol/L      CO2 28.4 mmol/L      Calcium 8.7 mg/dL      Total Protein 6.8 g/dL      Albumin 2.4 g/dL      ALT (SGPT) 9 U/L      AST (SGOT) 19 U/L      Alkaline Phosphatase 135 U/L      Total Bilirubin 0.5 mg/dL      Globulin 4.4 gm/dL      A/G Ratio 0.5 g/dL      BUN/Creatinine Ratio 9.5     Anion Gap 13.6 mmol/L      eGFR 79.2 mL/min/1.73     Narrative:      GFR Normal >60  Chronic Kidney Disease <60  Kidney Failure <15      Phosphorus [[id number]]  (Normal) Collected: 01/17/24 0747    Specimen: Blood Updated: 01/17/24 0815     Phosphorus 3.8 mg/dL     Magnesium [[id number]]  (Normal) Collected: 01/17/24 0747    Specimen: Blood  Updated: 01/17/24 0815     Magnesium 1.8 mg/dL     CBC & Differential [[id number]]  (Abnormal) Collected: 01/17/24 0747    Specimen: Blood Updated: 01/17/24 0801    Narrative:      The following orders were created for panel order CBC & Differential.  Procedure                               Abnormality         Status                     ---------                               -----------         ------                     CBC Auto Differential[[id number]]        Abnormal            Final result                 Please view results for these tests on the individual orders.    CBC Auto Differential [[id number]]  (Abnormal) Collected: 01/17/24 0747    Specimen: Blood Updated: 01/17/24 0801     WBC 10.93 10*3/mm3      RBC 3.42 10*6/mm3      Hemoglobin 9.1 g/dL      Hematocrit 29.2 %      MCV 85.4 fL      MCH 26.6 pg      MCHC 31.2 g/dL      RDW 14.5 %      RDW-SD 44.9 fl      MPV 9.6 fL      Platelets 541 10*3/mm3      Neutrophil % 69.9 %      Lymphocyte % 14.4 %      Monocyte % 12.2 %      Eosinophil % 2.0 %      Basophil % 0.9 %      Immature Grans % 0.6 %      Neutrophils, Absolute 7.64 10*3/mm3      Lymphocytes, Absolute 1.57 10*3/mm3      Monocytes, Absolute 1.33 10*3/mm3      Eosinophils, Absolute 0.22 10*3/mm3      Basophils, Absolute 0.10 10*3/mm3      Immature Grans, Absolute 0.07 10*3/mm3      nRBC 0.1 /100 WBC     POC Glucose Once [[id number]]  (Abnormal) Collected: 01/16/24 2004    Specimen: Blood Updated: 01/16/24 2005     Glucose 265 mg/dL     Potassium [[id number]]  (Normal) Collected: 01/16/24 1709    Specimen: Blood Updated: 01/16/24 1750     Potassium 4.5 mmol/L              Imaging:  Imaging Results (Last 24 Hours)       Procedure Component Value Units Date/Time    CT Abdomen Pelvis With Contrast [[id number]] Collected: 01/17/24 1135     Updated: 01/17/24 1421    Narrative:      CT ABDOMEN AND PELVIS WITH IV CONTRAST     HISTORY: Abdominal pain. Follow-up abdominal wall abscess.     TECHNIQUE:  CT includes  axial imaging from the lung bases to the  trochanters with intravenous contrast and with use of oral contrast.  Data reconstructed in coronal and sagittal planes. Radiation dose  reduction techniques were utilized, including automated exposure control  and exposure modulation based on body size.     COMPARISON: CT abdomen and pelvis 01/08/2024, CT-guided abscess imaging  01/10/2024     FINDINGS: Moderate right pleural effusion with partial collapse right  lower lobe. Effusion is increased in size compared to the exam  01/08/2024. Left lung base appears clear.     Right ureteral stent is present and there is mild distention of the  right renal pelvis with urothelial thickening and this appears similar  to the previous exam. Left ureteral stent is present and there is  distention of the left extrarenal pelvis without change.     Since the previous exam there has been placement of a pigtail drainage  catheter into a collection along the right posterolateral abdominal  wall. This collection exhibits a decrease in size compared to the  previous exam though continues to contain bubbles of air and mild fluid.  Collection extends inferiorly lateral to the right iliac crest. Caudal  to the drain this collection measures approximately 9.4 x 1.9 cm.     There are multilobular collections that contain fluid and fat within the  posterior medial upper abdominal wall. Collection is extending adjacent  to the right adrenal gland and right kidney and appears similar to the  prior exam 01/08/2024. Collections contact the posterior lateral right  kidney. Collections extend caudally along the anterior margin of the  right psoas muscle where there is a collection that measures 4.8 x 2.8  cm and this component of the collection is increased in size from 4 x  2.4 cm on the previous exam.     There is no free intraperitoneal gas. Spleen, liver, adrenal glands  appear within normal limits. There has been previous cholecystectomy.  There is  edema subcutaneous fat along the right posterolateral flank.       Impression:      1. Since the exam 9 days ago there has been placement of a pigtail  drainage catheter into a collection along the right posterolateral  abdominal wall and this collection is mildly decreased in size though  there remains bubbles of air and fluid within the collection particular  extending superolateral to the right iliac crest where a component of  the collection measures 9.4 x 1.9 cm in axial dimension.  2. Multiple retroperitoneal collections extending posterior to the right  kidney and right adrenal gland are overall similar in size and  configuration compared to the prior exam 01/08/2024 though there is a  component of the collection that extends into the right psoas muscle  that is increased in size.  3. Bilateral ureteral stents are present. There is distention of the  extrarenal pelvises bilaterally similar the previous exam. Right  urothelial thickening.  4. Moderate right pleural effusion with adjacent right basilar  consolidation. Effusion exhibits mild increase in size compared to the  exam 9 days ago.      Radiation dose reduction techniques were utilized, including automated  exposure control and exposure modulation based on body size.        This report was finalized on 1/17/2024 2:18 PM by Dr. Agustin Begum M.D on Workstation: [id number]                  Assessment:       DKA (diabetic ketoacidosis)    Primary osteoarthritis of right knee    UTI (urinary tract infection)    Contact dermatitis    Vitamin D deficiency    Essential hypertension    Hyperlipidemia    Hypothyroidism    Morbid obesity with BMI of 40.0-44.9, adult    Allergic rhinitis    Back pain    Anxiety    Dense breast tissue on mammogram    JADEN (acute kidney injury)    Sepsis, unspecified organism    Lactic acidosis    UPJ (ureteropelvic junction) obstruction bilateral    Bilateral hydrocele    Bilateral hydronephrosis moderate on admission    Renal  calculus on right, nonobstructinbg    Chronic liver disease    Retroperitoneal abscess secondary to urinary extravasation from calyceal rupture due to obstruction now with optimal drainage of her upper tracts with stents and percutaneous drainage of the retroperitoneal abscess.  The abscess still is draining and we have some fluid pending for creatinine level.  Cellulitis of her pannus    Plan:     I do not see anything that I think needs to have a secondary drain placed I think we just need to continue to be patient and let this drain continue to do its work.  She is very comfortable with it some time.  The drain needs to be irrigated periodically to keep it from getting obstructed.  We will continue to follow.    Marvin Martinez MD  01/17/24  16:27 EST

## 2024-01-17 NOTE — PROGRESS NOTES
[facility] Progress Note      Patient Name: Tiana Hudson  : 1960  MRN: [id number]  Primary Care Physician:  Dean Fairchild MD  Date of admission: 2024    Subjective     Interval History:   Follow-up hyponatremia.  Urine not measured. Drain 105 cc. Eating. Bowels moving. Pain better right flank.   Review of Systems:   As noted above    Objective     Vitals:   Temp:  [98.1 °F (36.7 °C)-98.9 °F (37.2 °C)] 98.7 °F (37.1 °C)  Heart Rate:  [87-97] 92  Resp:  [14-18] 18  BP: ()/(55-64) 118/64  Flow (L/min):  [1] 1    Intake/Output Summary (Last 24 hours) at 2024 0832  Last data filed at 2024 0324  Gross per 24 hour   Intake 1620 ml   Output 105 ml   Net 1515 ml       Physical Exam:    General Appearance: alert, oriented x 3, no acute distress .  Lying in bed.  Skin: warm and dry  HEENT: oral mucosa normal, nonicteric sclera  Neck: supple, no JVD  Lungs: Clear to auscultation anteriorly.  Decreased bs right base posteriorly. Unlabored.  Heart: RRR, normal S1 and S2  Abdomen: soft, nontender, +bs. Right flank drain. Body wall edema.   : no palpable bladder  Extremities: Trace to 1+ lower extremity edema  Neuro: normal speech and mental status     Scheduled Meds:     ampicillin-sulbactam, 3 g, Intravenous, Q6H  enoxaparin, 40 mg, Subcutaneous, Q12H  escitalopram, 10 mg, Oral, Daily  insulin glargine, 18 Units, Subcutaneous, Q12H  insulin lispro, 2-9 Units, Subcutaneous, 4x Daily AC & at Bedtime  levothyroxine, 150 mcg, Oral, Q AM  micafungin (MYCAMINE) IV, 100 mg, Intravenous, Q24H  oxybutynin XL, 10 mg, Oral, Daily  pantoprazole, 40 mg, Oral, Q AM  rOPINIRole, 0.5 mg, Oral, Nightly  senna-docusate sodium, 2 tablet, Oral, BID  torsemide, 40 mg, Oral, Daily      IV Meds:        Results Reviewed:   I have personally reviewed the results from the time of this admission to 2024 08:32 EST     Results from last 7 days   Lab Units 24  0747 24  1700  01/16/24  0715 01/15/24  0714   SODIUM mmol/L 133*  --  130* 132*   POTASSIUM mmol/L 3.9 4.5 3.6 3.9   CHLORIDE mmol/L 91*  --  92* 94*   CO2 mmol/L 28.4  --  25.8 26.5   BUN mg/dL 8  --  7* 6*   CREATININE mg/dL 0.84  --  0.73 0.81   CALCIUM mg/dL 8.7  --  8.6 8.6   BILIRUBIN mg/dL 0.5  --  0.5 0.6   ALK PHOS U/L 135*  --  130* 137*   ALT (SGPT) U/L 9  --  8 7   AST (SGOT) U/L 19  --  15 14   GLUCOSE mg/dL 168*  --  191* 215*       Estimated Creatinine Clearance: 87.3 mL/min (by C-G formula based on SCr of 0.84 mg/dL).    Results from last 7 days   Lab Units 01/17/24  0747 01/16/24  0715   MAGNESIUM mg/dL 1.8  --    PHOSPHORUS mg/dL 3.8 3.6       Results from last 7 days   Lab Units 01/16/24  0715   URIC ACID mg/dL 2.9       Results from last 7 days   Lab Units 01/17/24  0747 01/16/24  0715 01/15/24  0714 01/14/24  0705 01/13/24  0716   WBC 10*3/mm3 10.93* 9.56 11.33* 10.99* 9.51   HEMOGLOBIN g/dL 9.1* 9.2* 8.9* 9.1* 9.1*   PLATELETS 10*3/mm3 541* 534* 603* 550* 528*               Assessment / Plan     ASSESSMENT:  Hyponatremia .  Improving. Sodium corrects to 134 given her glucose 168.  TSH elevated, replacement increased.  Cortisol normal.  Will continue po diuretic.   Bilateral UPJ obstruction status post stent 1/3/2024.  Right calyceal rupture.  Right retroperitoneal drain placed by interventional radiology 1/10/2024 for abscess drainage.  Candida glabrata and Candida tropicalis from right renal fluid culture and Candida glabrata from the left renal fluid culture 1/3/2024.  Lactobacillus from retroperitoneal fluid culture 1/10/2024.  Gram-positive bacilli bacteremia  1/2/24.  Currently on ampicillin- sulbactam and micafungin.  3.  Hypothyroid on replacement.  TSH elevated at 9 , levothyroxine increased.   4.  Anemia of chronic disease.  5. DM2 with recent DKA. BS range 191-314.  Dr. Fairchild addressing.   PLAN:  Continue po torsemide.   Continue p.o. fluid restriction.  3. DW patient and daughter.   Thank you for  involving us in the care of Tiana Hudson.  Please feel free to call with any questions.    Jeri Harrington MD  01/17/24  08:32 [facility]    [facility]  [phone]    Please note that portions of this note were completed with a voice recognition program.

## 2024-01-18 ENCOUNTER — APPOINTMENT (OUTPATIENT)
Dept: CARDIOLOGY | Facility: HOSPITAL | Age: 64
DRG: 853 | End: 2024-01-18
Payer: COMMERCIAL

## 2024-01-18 PROBLEM — K68.19: Status: ACTIVE | Noted: 2024-01-18

## 2024-01-18 PROBLEM — R39.0 EXTRAVASATION OF URINE: Status: ACTIVE | Noted: 2024-01-18

## 2024-01-18 PROBLEM — L03.311 CELLULITIS OF ABDOMINAL WALL: Status: ACTIVE | Noted: 2024-01-18

## 2024-01-18 LAB
ALBUMIN SERPL-MCNC: 2.3 G/DL (ref 3.5–5.2)
ALBUMIN/GLOB SERPL: 0.5 G/DL
ALP SERPL-CCNC: 144 U/L (ref 39–117)
ALT SERPL W P-5'-P-CCNC: 12 U/L (ref 1–33)
ANION GAP SERPL CALCULATED.3IONS-SCNC: 13.1 MMOL/L (ref 5–15)
AORTIC ARCH: 2.8 CM
ASCENDING AORTA: 2.6 CM
AST SERPL-CCNC: 15 U/L (ref 1–32)
BASOPHILS # BLD AUTO: 0.1 10*3/MM3 (ref 0–0.2)
BASOPHILS NFR BLD AUTO: 1 % (ref 0–1.5)
BH CV ECHO MEAS - ACS: 1.65 CM
BH CV ECHO MEAS - AO MAX PG: 15.8 MMHG
BH CV ECHO MEAS - AO MEAN PG: 8.9 MMHG
BH CV ECHO MEAS - AO ROOT DIAM: 2.46 CM
BH CV ECHO MEAS - AO V2 MAX: 198.8 CM/SEC
BH CV ECHO MEAS - AO V2 VTI: 36.4 CM
BH CV ECHO MEAS - AVA(I,D): 2.41 CM2
BH CV ECHO MEAS - EDV(CUBED): 77.6 ML
BH CV ECHO MEAS - EDV(MOD-SP2): 45 ML
BH CV ECHO MEAS - EDV(MOD-SP4): 75 ML
BH CV ECHO MEAS - EF(MOD-BP): 57.5 %
BH CV ECHO MEAS - EF(MOD-SP2): 60 %
BH CV ECHO MEAS - EF(MOD-SP4): 60 %
BH CV ECHO MEAS - ESV(CUBED): 24 ML
BH CV ECHO MEAS - ESV(MOD-SP2): 18 ML
BH CV ECHO MEAS - ESV(MOD-SP4): 30 ML
BH CV ECHO MEAS - FS: 32.3 %
BH CV ECHO MEAS - IVS/LVPW: 0.92 CM
BH CV ECHO MEAS - IVSD: 0.77 CM
BH CV ECHO MEAS - LAT PEAK E' VEL: 10.6 CM/SEC
BH CV ECHO MEAS - LV MASS(C)D: 104.4 GRAMS
BH CV ECHO MEAS - LV MAX PG: 8.3 MMHG
BH CV ECHO MEAS - LV MEAN PG: 4.8 MMHG
BH CV ECHO MEAS - LV V1 MAX: 143.7 CM/SEC
BH CV ECHO MEAS - LV V1 VTI: 28.9 CM
BH CV ECHO MEAS - LVIDD: 4.3 CM
BH CV ECHO MEAS - LVIDS: 2.9 CM
BH CV ECHO MEAS - LVOT AREA: 3 CM2
BH CV ECHO MEAS - LVOT DIAM: 1.97 CM
BH CV ECHO MEAS - LVPWD: 0.83 CM
BH CV ECHO MEAS - MED PEAK E' VEL: 7.9 CM/SEC
BH CV ECHO MEAS - MV A DUR: 0.12 SEC
BH CV ECHO MEAS - MV A MAX VEL: 108.8 CM/SEC
BH CV ECHO MEAS - MV DEC SLOPE: 314.7 CM/SEC2
BH CV ECHO MEAS - MV DEC TIME: 0.23 SEC
BH CV ECHO MEAS - MV E MAX VEL: 90.8 CM/SEC
BH CV ECHO MEAS - MV E/A: 0.83
BH CV ECHO MEAS - MV MAX PG: 5.1 MMHG
BH CV ECHO MEAS - MV MEAN PG: 2.07 MMHG
BH CV ECHO MEAS - MV P1/2T: 75.8 MSEC
BH CV ECHO MEAS - MV V2 VTI: 22.7 CM
BH CV ECHO MEAS - MVA(P1/2T): 2.9 CM2
BH CV ECHO MEAS - MVA(VTI): 3.9 CM2
BH CV ECHO MEAS - PA ACC TIME: 0.13 SEC
BH CV ECHO MEAS - PA V2 MAX: 119.4 CM/SEC
BH CV ECHO MEAS - RAP SYSTOLE: 3 MMHG
BH CV ECHO MEAS - RV MAX PG: 2.22 MMHG
BH CV ECHO MEAS - RV V1 MAX: 74.6 CM/SEC
BH CV ECHO MEAS - RV V1 VTI: 15.7 CM
BH CV ECHO MEAS - RVSP: 28 MMHG
BH CV ECHO MEAS - SUP REN AO DIAM: 2 CM
BH CV ECHO MEAS - SV(LVOT): 87.8 ML
BH CV ECHO MEAS - SV(MOD-SP2): 27 ML
BH CV ECHO MEAS - SV(MOD-SP4): 45 ML
BH CV ECHO MEAS - TAPSE (>1.6): 1.75 CM
BH CV ECHO MEAS - TR MAX PG: 25 MMHG
BH CV ECHO MEAS - TR MAX VEL: 249.8 CM/SEC
BH CV ECHO MEASUREMENTS AVERAGE E/E' RATIO: 9.82
BH CV XLRA - RV BASE: 3.8 CM
BH CV XLRA - RV LENGTH: 6.4 CM
BH CV XLRA - RV MID: 3.3 CM
BH CV XLRA - TDI S': 11.2 CM/SEC
BILIRUB SERPL-MCNC: 0.5 MG/DL (ref 0–1.2)
BUN SERPL-MCNC: 8 MG/DL (ref 8–23)
BUN/CREAT SERPL: 9.5 (ref 7–25)
CALCIUM SPEC-SCNC: 9 MG/DL (ref 8.6–10.5)
CHLORIDE SERPL-SCNC: 89 MMOL/L (ref 98–107)
CO2 SERPL-SCNC: 30.9 MMOL/L (ref 22–29)
CREAT SERPL-MCNC: 0.84 MG/DL (ref 0.57–1)
DEPRECATED RDW RBC AUTO: 43.1 FL (ref 37–54)
EGFRCR SERPLBLD CKD-EPI 2021: 79.2 ML/MIN/1.73
EOSINOPHIL # BLD AUTO: 0.22 10*3/MM3 (ref 0–0.4)
EOSINOPHIL NFR BLD AUTO: 2.2 % (ref 0.3–6.2)
ERYTHROCYTE [DISTWIDTH] IN BLOOD BY AUTOMATED COUNT: 14.3 % (ref 12.3–15.4)
GLOBULIN UR ELPH-MCNC: 4.4 GM/DL
GLUCOSE BLDC GLUCOMTR-MCNC: 180 MG/DL (ref 70–130)
GLUCOSE BLDC GLUCOMTR-MCNC: 190 MG/DL (ref 70–130)
GLUCOSE BLDC GLUCOMTR-MCNC: 264 MG/DL (ref 70–130)
GLUCOSE BLDC GLUCOMTR-MCNC: 280 MG/DL (ref 70–130)
GLUCOSE BLDC GLUCOMTR-MCNC: 322 MG/DL (ref 70–130)
GLUCOSE SERPL-MCNC: 178 MG/DL (ref 65–99)
HCT VFR BLD AUTO: 28.1 % (ref 34–46.6)
HGB BLD-MCNC: 8.6 G/DL (ref 12–15.9)
IMM GRANULOCYTES # BLD AUTO: 0.09 10*3/MM3 (ref 0–0.05)
IMM GRANULOCYTES NFR BLD AUTO: 0.9 % (ref 0–0.5)
LEFT ATRIUM VOLUME INDEX: 17 ML/M2
LYMPHOCYTES # BLD AUTO: 1.39 10*3/MM3 (ref 0.7–3.1)
LYMPHOCYTES NFR BLD AUTO: 14.1 % (ref 19.6–45.3)
MCH RBC QN AUTO: 25.5 PG (ref 26.6–33)
MCHC RBC AUTO-ENTMCNC: 30.6 G/DL (ref 31.5–35.7)
MCV RBC AUTO: 83.4 FL (ref 79–97)
MONOCYTES # BLD AUTO: 1.44 10*3/MM3 (ref 0.1–0.9)
MONOCYTES NFR BLD AUTO: 14.6 % (ref 5–12)
NEUTROPHILS NFR BLD AUTO: 6.63 10*3/MM3 (ref 1.7–7)
NEUTROPHILS NFR BLD AUTO: 67.2 % (ref 42.7–76)
NRBC BLD AUTO-RTO: 0 /100 WBC (ref 0–0.2)
PLATELET # BLD AUTO: 514 10*3/MM3 (ref 140–450)
PMV BLD AUTO: 9.4 FL (ref 6–12)
POTASSIUM SERPL-SCNC: 3.4 MMOL/L (ref 3.5–5.2)
POTASSIUM SERPL-SCNC: 4.5 MMOL/L (ref 3.5–5.2)
PROT SERPL-MCNC: 6.7 G/DL (ref 6–8.5)
RBC # BLD AUTO: 3.37 10*6/MM3 (ref 3.77–5.28)
SINUS: 2.6 CM
SODIUM SERPL-SCNC: 133 MMOL/L (ref 136–145)
STJ: 2.2 CM
WBC NRBC COR # BLD AUTO: 9.87 10*3/MM3 (ref 3.4–10.8)

## 2024-01-18 PROCEDURE — 85025 COMPLETE CBC W/AUTO DIFF WBC: CPT | Performed by: INTERNAL MEDICINE

## 2024-01-18 PROCEDURE — 84132 ASSAY OF SERUM POTASSIUM: CPT | Performed by: INTERNAL MEDICINE

## 2024-01-18 PROCEDURE — 93306 TTE W/DOPPLER COMPLETE: CPT

## 2024-01-18 PROCEDURE — 63710000001 INSULIN LISPRO (HUMAN) PER 5 UNITS: Performed by: INTERNAL MEDICINE

## 2024-01-18 PROCEDURE — 80053 COMPREHEN METABOLIC PANEL: CPT | Performed by: INTERNAL MEDICINE

## 2024-01-18 PROCEDURE — 25010000002 ENOXAPARIN PER 10 MG: Performed by: UROLOGY

## 2024-01-18 PROCEDURE — 63710000001 INSULIN GLARGINE PER 5 UNITS: Performed by: INTERNAL MEDICINE

## 2024-01-18 PROCEDURE — 25010000002 AMPICILLIN-SULBACTAM PER 1.5 G: Performed by: STUDENT IN AN ORGANIZED HEALTH CARE EDUCATION/TRAINING PROGRAM

## 2024-01-18 PROCEDURE — 25010000002 MICAFUNGIN SODIUM 100 MG RECONSTITUTED SOLUTION 1 EACH VIAL: Performed by: INTERNAL MEDICINE

## 2024-01-18 PROCEDURE — 99232 SBSQ HOSP IP/OBS MODERATE 35: CPT | Performed by: STUDENT IN AN ORGANIZED HEALTH CARE EDUCATION/TRAINING PROGRAM

## 2024-01-18 PROCEDURE — 93306 TTE W/DOPPLER COMPLETE: CPT | Performed by: INTERNAL MEDICINE

## 2024-01-18 PROCEDURE — 82948 REAGENT STRIP/BLOOD GLUCOSE: CPT

## 2024-01-18 RX ORDER — POTASSIUM CHLORIDE 750 MG/1
40 TABLET, FILM COATED, EXTENDED RELEASE ORAL EVERY 4 HOURS
Status: COMPLETED | OUTPATIENT
Start: 2024-01-18 | End: 2024-01-18

## 2024-01-18 RX ORDER — ENOXAPARIN SODIUM 100 MG/ML
40 INJECTION SUBCUTANEOUS EVERY 24 HOURS
Status: DISCONTINUED | OUTPATIENT
Start: 2024-01-19 | End: 2024-01-19 | Stop reason: HOSPADM

## 2024-01-18 RX ADMIN — INSULIN LISPRO 6 UNITS: 100 INJECTION, SOLUTION INTRAVENOUS; SUBCUTANEOUS at 21:06

## 2024-01-18 RX ADMIN — POTASSIUM CHLORIDE 40 MEQ: 750 TABLET, EXTENDED RELEASE ORAL at 08:50

## 2024-01-18 RX ADMIN — TORSEMIDE 40 MG: 20 TABLET ORAL at 08:50

## 2024-01-18 RX ADMIN — LEVOTHYROXINE SODIUM 150 MCG: 150 TABLET ORAL at 06:08

## 2024-01-18 RX ADMIN — AMPICILLIN SODIUM AND SULBACTAM SODIUM 3 G: 2; 1 INJECTION, POWDER, FOR SOLUTION INTRAMUSCULAR; INTRAVENOUS at 21:07

## 2024-01-18 RX ADMIN — ENOXAPARIN SODIUM 40 MG: 100 INJECTION SUBCUTANEOUS at 08:50

## 2024-01-18 RX ADMIN — ESCITALOPRAM OXALATE 10 MG: 10 TABLET, FILM COATED ORAL at 08:50

## 2024-01-18 RX ADMIN — INSULIN LISPRO 2 UNITS: 100 INJECTION, SOLUTION INTRAVENOUS; SUBCUTANEOUS at 08:50

## 2024-01-18 RX ADMIN — AMPICILLIN SODIUM AND SULBACTAM SODIUM 3 G: 2; 1 INJECTION, POWDER, FOR SOLUTION INTRAMUSCULAR; INTRAVENOUS at 10:25

## 2024-01-18 RX ADMIN — INSULIN LISPRO 7 UNITS: 100 INJECTION, SOLUTION INTRAVENOUS; SUBCUTANEOUS at 11:12

## 2024-01-18 RX ADMIN — INSULIN GLARGINE 20 UNITS: 100 INJECTION, SOLUTION SUBCUTANEOUS at 08:50

## 2024-01-18 RX ADMIN — POTASSIUM CHLORIDE 40 MEQ: 750 TABLET, EXTENDED RELEASE ORAL at 12:51

## 2024-01-18 RX ADMIN — INSULIN GLARGINE 20 UNITS: 100 INJECTION, SOLUTION SUBCUTANEOUS at 21:06

## 2024-01-18 RX ADMIN — AMPICILLIN SODIUM AND SULBACTAM SODIUM 3 G: 2; 1 INJECTION, POWDER, FOR SOLUTION INTRAMUSCULAR; INTRAVENOUS at 15:44

## 2024-01-18 RX ADMIN — HYDROCODONE BITARTRATE AND ACETAMINOPHEN 1 TABLET: 5; 325 TABLET ORAL at 12:02

## 2024-01-18 RX ADMIN — OXYBUTYNIN CHLORIDE 10 MG: 10 TABLET, EXTENDED RELEASE ORAL at 08:50

## 2024-01-18 RX ADMIN — MICAFUNGIN SODIUM 100 MG: 100 INJECTION, POWDER, LYOPHILIZED, FOR SOLUTION INTRAVENOUS at 08:56

## 2024-01-18 RX ADMIN — INSULIN LISPRO 6 UNITS: 100 INJECTION, SOLUTION INTRAVENOUS; SUBCUTANEOUS at 16:10

## 2024-01-18 RX ADMIN — AMPICILLIN SODIUM AND SULBACTAM SODIUM 3 G: 2; 1 INJECTION, POWDER, FOR SOLUTION INTRAMUSCULAR; INTRAVENOUS at 04:04

## 2024-01-18 RX ADMIN — PANTOPRAZOLE SODIUM 40 MG: 40 TABLET, DELAYED RELEASE ORAL at 06:08

## 2024-01-18 RX ADMIN — HYDROCODONE BITARTRATE AND ACETAMINOPHEN 1 TABLET: 5; 325 TABLET ORAL at 21:06

## 2024-01-18 RX ADMIN — ROPINIROLE HYDROCHLORIDE 0.5 MG: 0.5 TABLET, FILM COATED ORAL at 21:06

## 2024-01-18 NOTE — CASE MANAGEMENT/SOCIAL WORK
Continued Stay Note  [facility]     Patient Name: Tiana Hudson  MRN: [id number]  Today's Date: 1/18/2024    Admit Date: 1/2/2024    Plan: Home w/ HH (pending) & rolling walker from [facility]   Discharge Plan       Row Name 01/18/24 1343       Plan    Plan Home w/ HH (pending) & rolling walker from [facility]    Plan Comments CCP spoke with patient at bedside regarding dc plans. Per request of MD, CCP discussed HH with patinet and daughter. Patient is agreeable to HH and has no preference on agency. She does not want a hospital bed at dc. [facility] to deliver rolling walker at bedside prior to dc. Patient denies any further dc needs. CCP following pending HH referrals and to notify [facility] when dc. PRIETO SHINE                   Discharge Codes    No documentation.                 Expected Discharge Date and Time       Expected Discharge Date Expected Discharge Time    Jan 16, 2024               PRIETO Donald

## 2024-01-18 NOTE — DISCHARGE PLACEMENT REQUEST
"Tiana Hudson (63 y.o. Female)       Date of Birth   1960    Social Security Number       Address   [address] [place] [address]    Home Phone   [phone]    MRN   [id number]       Sikhism   Sabianist    Marital Status                               Admission Date   1/2/24    Admission Type   Emergency    Admitting Provider       Attending Provider   Dean Fairchild MD    Department, Room/Bed   [facility], N335/1       Discharge Date       Discharge Disposition       Discharge Destination                                 Attending Provider: Dean Fairchild MD    Allergies: Sulfa Antibiotics    Isolation: None   Infection: None   Code Status: CPR    Ht: 165.1 cm (65\")   Wt: 103 kg (228 lb)    Admission Cmt: None   Principal Problem: DKA (diabetic ketoacidosis) [E11.10]                   Active Insurance as of 1/2/2024       Primary Coverage       Payor Plan Insurance Group Employer/Plan Group    [facility] [id number]       Payor Plan Address Payor Plan Phone Number Payor Plan Fax Number Effective Dates    [address]   1/1/2019 - None Entered    [facility] [phone]         Subscriber Name Subscriber Birth Date Member ID       VONDA HUDSON 1960 [id number]                     Emergency Contacts        (Rel.) Home Phone Work Phone Mobile Phone    Vonda Hudson \"Hunter\" (Spouse) [phone] -- [phone]    TristanEnoch (Daughter) [phone] -- [phone]                "

## 2024-01-18 NOTE — PROGRESS NOTES
LOS: 16 days     Chief Complaint: Antibiotic management    Interval History: Patient remains afebrile with no leukocytosis.  Tolerating antibiotics.    Vital Signs  Temp:  [97.3 °F (36.3 °C)-99.7 °F (37.6 °C)] 98.2 °F (36.8 °C)  Heart Rate:  [87-98] 89  Resp:  [18-20] 18  BP: (107-123)/(56-71) 111/64    Physical Exam:  General: In no acute distress  HEENT: Oropharynx clear, moist mucous membranes  Respiratory: Normal work of breathing  Skin: No rashes or lesions in exposed areas  Extremities: No edema, cyanosis  Access: Peripheral IV    Antibiotics:  Anti-Infectives (From admission, onward)      Ordered     Dose/Rate Route Frequency Start Stop    01/15/24 0858  ampicillin-sulbactam (UNASYN) 3 g in sodium chloride 0.9 % 100 mL IVPB-VTB        Ordering Provider: Jorge L Us DO    3 g  over 30 Minutes Intravenous Every 6 Hours 01/15/24 1000 01/22/24 0959    01/09/24 1144  vancomycin (VANCOCIN) 500 mg in sodium chloride 0.9 % 100 mL IVPB-VTB        Ordering Provider: Jorge L Us DO    500 mg  200 mL/hr over 30 Minutes Intravenous Every 8 Hours 01/09/24 1644 01/12/24 0902    01/08/24 0803  micafungin sodium (MYCAMINE) 100 mg in sodium chloride 0.9 % 100 mL IVPB-VTB        Ordering Provider: Micky Car MD    100 mg  over 60 Minutes Intravenous Every 24 Hours 01/08/24 0900 01/20/24 1121    01/05/24 0756  vancomycin 2250 mg/500 mL 0.9% NS IVPB ([facility])        Ordering Provider: Jorge L Us DO    20 mg/kg × 111 kg  over 135 Minutes Intravenous Once 01/05/24 0845 01/05/24 1118    01/05/24 0750  Pharmacy to dose vancomycin        Ordering Provider: Jorge L Us DO     Does not apply Continuous PRN 01/05/24 0750 01/12/24 0749    01/02/24 1636  piperacillin-tazobactam (ZOSYN) 4.5 g in iso-osmotic dextrose 100 mL IVPB (premix)        Ordering Provider: Arthur Najera MD    4.5 g  over 30 Minutes Intravenous Once 01/02/24 1652 01/02/24 1735    01/02/24 1243  cefTRIAXone  (ROCEPHIN) 2,000 mg in sodium chloride 0.9 % 100 mL IVPB-VTB        Ordering Provider: Angelito Winkler MD    2,000 mg  200 mL/hr over 30 Minutes Intravenous Once 01/02/24 1259 01/02/24 1414             Results Review:     I reviewed the patient's new clinical results.    Lab Results   Component Value Date    WBC 9.87 01/18/2024    HGB 8.6 (L) 01/18/2024    HCT 28.1 (L) 01/18/2024    MCV 83.4 01/18/2024     (H) 01/18/2024     Lab Results   Component Value Date    GLUCOSE 168 (H) 01/17/2024    BUN 8 01/17/2024    CREATININE 0.84 01/17/2024    EGFRIFNONA 54 (L) 10/24/2018    EGFRIFAFRI 83 02/23/2017    BCR 9.5 01/17/2024    CO2 28.4 01/17/2024    CALCIUM 8.7 01/17/2024    PROTENTOTREF 6.9 02/23/2017    ALBUMIN 2.4 (L) 01/17/2024    LABIL2 1.7 02/23/2017    AST 19 01/17/2024    ALT 9 01/17/2024     Microbiology:  1/2 urine culture no growth  1/2 blood cultures 2 out of 2 gram-positive bacilli  1/3 right renal fluid culture Candida glabrata and Candida tropicalis  1/3 left renal fluid culture Candida glabrata  1/5 blood cultures no growth to date  1/10 retroperitoneal fluid culture lactobacillus    Assessment    #Right calyceal rupture  #Bilateral hydronephrosis status post bilateral ureteral stenting  #Abdominal abscess  #JADEN, improved  #DKA, improved  #Hyponatremia, improved  #Morbid obesity, BMI 41  #Gram-positive bacteremia    Continue Unasyn 3 g every 6 hours and micafungin 100 mg daily.    ID will follow.

## 2024-01-18 NOTE — PROGRESS NOTES
"Nutrition Services    Patient Name:  Tiana Hudson  YOB: 1960  MRN: [id number]  Admit Date:  1/2/2024    Nutrition Follow Up  Assessment Date:  01/18/24    Pt continues to tolerate po diet. PO intake %, on 1200ml fluid restriction/day.  RAJESH drains continue, ECHO scheduled today.  Labs: Na 133, k 3.4, glu 322  Last BM 1/17  RD to follow    Encounter Information         Current Summary DKA resolved  hyponatremia  Bilateral UPJ obstruction status post stent 1/3/2024.  Right calyceal rupture   Drain remains with purulent output      Current Nutrition Orders & Evaluation of Intake       Oral Nutrition     Current PO Diet Diet: Regular/House Diet, Fluid Restriction (240 mL/tray) Diets, Cardiac Diets; Healthy Heart (2-3 Na+); Other (Specify mL/day) (1200); Texture: Regular Texture (IDDSI 7); Fluid Consistency: Thin (IDDSI 0)   Supplement n/a   PO Evaluation     PO Intake % 50-75%    Factors Affecting Intake  No factors at this time   --  Anthropometrics          Height    Weight Height: 165.1 cm (65\")  Weight: 103 kg (228 lb) (01/18/24 1028)    BMI kg/m2 Body mass index is 37.94 kg/m².  Obese, Class II (35 - 39.9)    Weight trend Loss     Labs        Pertinent Labs Reviewed, listed below     Results from last 7 days   Lab Units 01/18/24  0710 01/17/24  0747 01/16/24  1709 01/16/24  0715   SODIUM mmol/L 133* 133*  --  130*   POTASSIUM mmol/L 3.4* 3.9 4.5 3.6   CHLORIDE mmol/L 89* 91*  --  92*   CO2 mmol/L 30.9* 28.4  --  25.8   BUN mg/dL 8 8  --  7*   CREATININE mg/dL 0.84 0.84  --  0.73   CALCIUM mg/dL 9.0 8.7  --  8.6   BILIRUBIN mg/dL 0.5 0.5  --  0.5   ALK PHOS U/L 144* 135*  --  130*   ALT (SGPT) U/L 12 9  --  8   AST (SGOT) U/L 15 19  --  15   GLUCOSE mg/dL 178* 168*  --  191*     Results from last 7 days   Lab Units 01/18/24  0710 01/17/24  0747   MAGNESIUM mg/dL  --  1.8   PHOSPHORUS mg/dL  --  3.8   HEMOGLOBIN g/dL 8.6* 9.1*   HEMATOCRIT % 28.1* 29.2*   WBC 10*3/mm3 9.87 10.93*   ALBUMIN " g/dL 2.3* 2.4*     Results from last 7 days   Lab Units 01/18/24  0710 01/17/24  0747 01/16/24  0715 01/15/24  0714 01/14/24  0705   PLATELETS 10*3/mm3 514* 541* 534* 603* 550*     SARS-CoV-2 PCR   Date Value Ref Range Status   12/09/2020 Not Detected Not Detected Final     Comment:     Nucleic acid specific to SARS-CoV-2 (COVID-19) virus was not detected in  this sample by the TaqPath (TM) COVID-19 Combo Kit.          SARS-CoV-2 (COVID-19) nucleic acid testing performed using Imperator Aptima (R) SARS-CoV-2 Assay or [employer] TaqPath (TM)  COVID-19 Combo Kit as indicated above under Emergency Use Authorization (EUA) from the FDA. Aptima (R) and TaqPath (TM) test performance  characteristics verified by [facility] in accordance with the FDAs Guidance Document (Policy for Diagnostic Tests for Coronavirus Disease-2019  during the Public Health Emergency) issued on March 16, 2020. The laboratory is regulated under CLIA as qualified to perform high-complexity testing  Unless otherwise noted, all testing was performed at [facility], CLIA No. [id number], [place] State Licensee No. [id number]     Lab Results   Component Value Date    HGBA1C 17.70 (H) 01/02/2024          Medications            Scheduled Medications ampicillin-sulbactam, 3 g, Intravenous, Q6H  [START ON 1/19/2024] enoxaparin, 40 mg, Subcutaneous, Q24H  escitalopram, 10 mg, Oral, Daily  insulin glargine, 20 Units, Subcutaneous, Q12H  insulin lispro, 2-9 Units, Subcutaneous, 4x Daily AC & at Bedtime  levothyroxine, 150 mcg, Oral, Q AM  micafungin (MYCAMINE) IV, 100 mg, Intravenous, Q24H  oxybutynin XL, 10 mg, Oral, Daily  pantoprazole, 40 mg, Oral, Q AM  rOPINIRole, 0.5 mg, Oral, Nightly  senna-docusate sodium, 2 tablet, Oral, BID  torsemide, 40 mg, Oral, Daily        Infusions      PRN Medications   acetaminophen **OR** acetaminophen    senna-docusate sodium **AND** polyethylene glycol **AND** bisacodyl **AND** bisacodyl    Calcium Replacement -  Follow Nurse / BPA Driven Protocol    dextrose    dextrose    glucagon (human recombinant)    HYDROcodone-acetaminophen    ipratropium-albuterol    Magnesium Standard Dose Replacement - Follow Nurse / BPA Driven Protocol    nitroglycerin    ondansetron ODT **OR** ondansetron    Phosphorus Replacement - Follow Nurse / BPA Driven Protocol    Potassium Replacement - Follow Nurse / BPA Driven Protocol     Physical Findings          General Appearance alert, obese   Oral/Mouth Cavity WDL   Edema  1+ (trace), 2+ (mild)   Gastrointestinal abdominal pain, last bowel movement: 1/17   Skin  bruising   Tubes/Drains/Lines drain, latral RLQ pigtail   NFPE Not indicated at this time   --  NUTRITION INTERVENTION / PLAN OF CARE  Intervention Goal         Intervention Goal(s) Maintain nutrition status, Reduce/improve symptoms, Maintain intake, and Appropriate weight loss     Nutrition Intervention         RD Action Interview for preferences and Continue to monitor     Nutrition Prescription         Diet Prescription     Supplement Prescription n/a   EN/PN Prescription    New Prescription Ordered? No changes at this time   --  Monitor/Evaluation        Monitor Per protocol   Discharge Needs Pending clinical course     RD to follow up per protocol.    Electronically signed by:  Lesly Landrum RD  01/18/24 13:59 EST

## 2024-01-18 NOTE — PROGRESS NOTES
[facility] Progress Note      Patient Name: Tiana Hudson  : 1960  MRN: [id number]  Primary Care Physician:  Dean Fairchild MD  Date of admission: 2024    Subjective     Interval History:   Follow-up hyponatremia.   Clarified IV intake with nurse,  it was not 1569 for the first shift.  Continues to have a lot of output.  Labs drawn but not resulted yet.  Pulmonary consult for right pleural effusion noted.  Review of Systems:   As noted above    Objective     Vitals:   Temp:  [97.3 °F (36.3 °C)-99.7 °F (37.6 °C)] 98.2 °F (36.8 °C)  Heart Rate:  [87-98] 89  Resp:  [18-20] 18  BP: (107-123)/(56-71) 111/64  Flow (L/min):  [2] 2    Intake/Output Summary (Last 24 hours) at 2024 0748  Last data filed at 2024 0600  Gross per 24 hour   Intake 2675 ml   Output 1355 ml   Net 1320 ml       Physical Exam:    General Appearance: alert, oriented x 3, no acute distress .  Lying in bed.  Skin: warm and dry  HEENT: oral mucosa normal, nonicteric sclera. On 2 L NC 02  Neck: supple, no JVD  Lungs: Clear to auscultation anteriorly.  Decreased bs right base posteriorly. Unlabored.  Heart: RRR, normal S1 and S2  Abdomen: soft, nontender, +bs. Right flank drain. Pannus edematous. Body wall edema.   : no palpable bladder  Extremities: Trace  lower extremity edema  Neuro: normal speech and mental status     Scheduled Meds:     ampicillin-sulbactam, 3 g, Intravenous, Q6H  enoxaparin, 40 mg, Subcutaneous, Q12H  escitalopram, 10 mg, Oral, Daily  insulin glargine, 20 Units, Subcutaneous, Q12H  insulin lispro, 2-9 Units, Subcutaneous, 4x Daily AC & at Bedtime  levothyroxine, 150 mcg, Oral, Q AM  micafungin (MYCAMINE) IV, 100 mg, Intravenous, Q24H  oxybutynin XL, 10 mg, Oral, Daily  pantoprazole, 40 mg, Oral, Q AM  rOPINIRole, 0.5 mg, Oral, Nightly  senna-docusate sodium, 2 tablet, Oral, BID  torsemide, 40 mg, Oral, Daily      IV Meds:        Results Reviewed:   I have personally  reviewed the results from the time of this admission to 1/18/2024 07:48 EST     Results from last 7 days   Lab Units 01/17/24  0747 01/16/24  1709 01/16/24  0715 01/15/24  0714   SODIUM mmol/L 133*  --  130* 132*   POTASSIUM mmol/L 3.9 4.5 3.6 3.9   CHLORIDE mmol/L 91*  --  92* 94*   CO2 mmol/L 28.4  --  25.8 26.5   BUN mg/dL 8  --  7* 6*   CREATININE mg/dL 0.84  --  0.73 0.81   CALCIUM mg/dL 8.7  --  8.6 8.6   BILIRUBIN mg/dL 0.5  --  0.5 0.6   ALK PHOS U/L 135*  --  130* 137*   ALT (SGPT) U/L 9  --  8 7   AST (SGOT) U/L 19  --  15 14   GLUCOSE mg/dL 168*  --  191* 215*       Estimated Creatinine Clearance: 85.9 mL/min (by C-G formula based on SCr of 0.84 mg/dL).    Results from last 7 days   Lab Units 01/17/24  0747 01/16/24  0715   MAGNESIUM mg/dL 1.8  --    PHOSPHORUS mg/dL 3.8 3.6       Results from last 7 days   Lab Units 01/16/24  0715   URIC ACID mg/dL 2.9       Results from last 7 days   Lab Units 01/17/24  0747 01/16/24  0715 01/15/24  0714 01/14/24  0705 01/13/24  0716   WBC 10*3/mm3 10.93* 9.56 11.33* 10.99* 9.51   HEMOGLOBIN g/dL 9.1* 9.2* 8.9* 9.1* 9.1*   PLATELETS 10*3/mm3 541* 534* 603* 550* 528*               Assessment / Plan     ASSESSMENT:  Hyponatremia .  Likely due to volume excess.  Patient with pleural effusion and peripheral edema.  Labs pending.  TSH elevated, replacement increased.  Cortisol normal.  Continue po diuretic.   Bilateral UPJ obstruction status post stent 1/3/2024.  Right calyceal rupture.  Right retroperitoneal drain placed by interventional radiology 1/10/2024 for abscess drainage.  Candida glabrata and Candida tropicalis from right renal fluid culture and Candida glabrata from the left renal fluid culture 1/3/2024.  Lactobacillus from retroperitoneal fluid culture 1/10/2024.  Gram-positive bacilli bacteremia  1/2/24.  Currently on ampicillin- sulbactam and micafungin.  3.  Hypothyroid on replacement.  TSH elevated at 9 , levothyroxine increased.   4.  Anemia of chronic  disease.  5. DM2 with recent DKA. BS range 190-333.  Dr. Fairchild addressing.   PLAN:  Continue po torsemide.   Continue p.o. fluid restriction.  3.  Check pending labs.  4.  Monitor for diuretic toxicity.  5. DW patient and daughter .  Thank you for involving us in the care of Tiana Hudson.  Please feel free to call with any questions.    Jeri Harrington MD  01/18/24  07:48 [facility]    [facility]  [phone]    Please note that portions of this note were completed with a voice recognition program.

## 2024-01-18 NOTE — CONSULTS
Referring Provider: Dr. Fairchild  Reason for Consultation: Pleural effusion    Patient Care Team:  Dean Fairchild MD as PCP - General (Internal Medicine)  Ross Cruz MD as Consulting Physician (Orthopedic Surgery)    Chief complaint:   Weakness    History of present illness:    Subjective   This is a 63-year-old female patient, lifelong non-smoker with no prior history of lung disease.  She was admitted on 1/2 for sepsis, lactic acidosis and UTI with bilateral UPJ obstruction and right renal stone requiring stent placement on 1/3/2024.  Patient was seen by our service.  We signed off on 1//24.    We are consulted again for medical management and due to the presence of pleural effusion.  Chart reviewed since the events.  Course was complicated by abdominal abscess secondary to urinary extravasation from Cath-Secure rupture.  Cultures from the right renal fluid collection showed Candida.  Patient is on micafungin.  Furthermore, patient had uncontrolled DM type II and hyponatremia and both have improved with therapy.    Patient underwent CT of the abdomen on 1/17 for follow-up on the abdominal abscess and extubated right pleural effusion for which we are consulted.    Patient is currently on 2 L.  She denies dyspnea.  Echo on 5/19/2020 showed normal EF with no diastolic dysfunction.  Patient has no fever.  Latest WBC on 1/17 was 10 with no left shift.  There is no loculation in the right pleural space on the limited CT of the abdomen.      Review of Systems  Constitutional: No fever or chills.   ENMT: No sinus congestion.  Loud snoring.  Cardiovascular: No chest pain, palpitation but legs swelling.    Respiratory: No dyspnea, cough or wheezing.  Gastrointestinal: No constipation, diarrhea or abdominal pain   Neurology: No headache, weakness, numbness or dizziness.  Frequent nocturnal awakening.  Musculoskeletal: No joints pain, stiffness or swelling.   Psychiatry: No depression.  Genitourinary: No  dysuria or frequent urination but nocturia  Endo: No weight changes. No cold or warm intolerance.  Lymphatic: No swollen glands.  Integumentary: No rash.    History  Past Medical History:   Diagnosis Date    Anemia     intermittently    Anxiety and depression     Arthritis     Depression     Diabetes mellitus     Gallstones     High cholesterol     History of frequent urinary tract infections     HX OF YEAST IN BLADDER HAS PRN DIFLUCAN    History of transfusion 05/24/2017    Had 2 iron infusions.  This was when i had knee replacement    Hyperlipidemia     Hypertension     Hypothyroidism     Knee pain, bilateral     Low back pain     Lumbar stenosis     PONV (postoperative nausea and vomiting)     Positive TB test     chest x-ray neg    Seasonal allergies    ,   Past Surgical History:   Procedure Laterality Date    APPENDECTOMY N/A 1982    Dr. Wilson    CHOLECYSTECTOMY WITH INTRAOPERATIVE CHOLANGIOGRAM N/A 10/31/2018    Procedure: laparoscopic cholecystectomy;  Surgeon: Mary Kay Mac MD;  Location: [facility];  Service: General    COLONOSCOPY      CYSTOSCOPY BLADDER BIOPSY N/A 10/3/2016    Procedure: CYSTOSCOPY BLADDER BIOPSY;  Surgeon: Rei Calvert MD;  Location: [facility];  Service:     CYSTOSCOPY W/ URETERAL STENT PLACEMENT Bilateral 1/3/2024    Procedure: CYSTOSCOPY BILATERAL RETROGRADE PYELOGRAM  BILATERAL URETERAL STENT INSERTION AND CATHETHER PLACEMENT;  Surgeon: Eduard Armando MD;  Location: [facility];  Service: Urology;  Laterality: Bilateral;    DILATATION AND CURETTAGE N/A     ENDOSCOPY      INTRAUTERINE DEVICE INSERTION      KNEE ARTHROSCOPY W/ MENISCAL REPAIR Left     OVARIAN CYST REMOVAL Left 1982    Dr. Wilson    TOTAL KNEE ARTHROPLASTY Right 5/22/2017    Procedure: RIGHT TOTAL KNEE ARTHROPLASTY WITH ALETA NAVIGATION;  Surgeon: Ross Cruz MD;  Location: [facility];  Service:    ,   Family History   Problem Relation Age of Onset    Hepatitis Mother     Breast  cancer Mother     Heart disease Mother     Cancer Mother     Hyperlipidemia Mother              Heart failure Father     Stroke Father     Hypertension Father         Since he was 72, now 86s    Diabetes Father     Heart disease Father     Hyperlipidemia Father         Unsurs    Heart disease Maternal Grandmother     Cancer Maternal Grandfather     COPD Maternal Grandfather     Arthritis Paternal Grandmother     Diabetes Maternal Aunt     Diabetes Paternal Uncle     Malig Hyperthermia Neg Hx    ,   Social History     Socioeconomic History    Marital status:    Tobacco Use    Smoking status: Never    Smokeless tobacco: Never    Tobacco comments:     Never smoked   Vaping Use    Vaping Use: Never used   Substance and Sexual Activity    Alcohol use: Never     Alcohol/week: 3.0 standard drinks of alcohol     Types: 1 Glasses of wine, 2 Standard drinks or equivalent per week    Drug use: Never    Sexual activity: Not Currently     Partners: Male     Birth control/protection: I.U.D.     E-cigarette/Vaping    E-cigarette/Vaping Use Never User      E-cigarette/Vaping Substances     E-cigarette/Vaping Devices         ,   Medications Prior to Admission   Medication Sig Dispense Refill Last Dose    amLODIPine-benazepril (LOTREL 5-20) 5-20 MG per capsule Take 1 capsule by mouth daily. (Patient taking differently: Take 1 capsule by mouth Every Morning.) 90 capsule 3 2024    Dulaglutide 1.5 MG/0.5ML solution pen-injector Inject  under the skin into the appropriate area as directed 1 (One) Time Per Week.    Past Week    furosemide (LASIX) 20 MG tablet Take 0.5 tablets by mouth Daily As Needed.  1 Past Week    levothyroxine (SYNTHROID, LEVOTHROID) 137 MCG tablet TAKE 1 TABLET BY MOUTH EVERY DAY IN THE MORNING ON AN EMPTY STOMACH   2024    loratadine (CLARITIN) 10 MG tablet Take 1 tablet by mouth Daily.   Past Month    metFORMIN (GLUCOPHAGE) 500 MG tablet Take 1 tablet by mouth 2 (Two) Times a Day  With Meals. 360 tablet 0 Past Month    multivitamin (THERAGRAN) tablet tablet Take  by mouth Daily.   1/12/2024    oxybutynin XL (DITROPAN-XL) 10 MG 24 hr tablet Take 1 tablet by mouth Daily.   1/12/2024    pravastatin (PRAVACHOL) 40 MG tablet Take 1 tablet by mouth daily. 90 tablet 3 1/8/2024    rOPINIRole (REQUIP) 0.5 MG tablet Take 1 tablet by mouth every night at bedtime. 30 tablet 0 1/12/2024    tiZANidine (ZANAFLEX) 4 MG tablet Take 1 tablet by mouth At Night As Needed for Muscle Spasms.   1/12/2024    traMADol (ULTRAM) 50 MG tablet Take 0.5 tablets by mouth 2 (Two) Times a Day As Needed. for pain   Past Week    uribel (URO-MP) 118 MG capsule capsule Take 1 capsule by mouth 3 (Three) Times a Day.  2 1/12/2024    VITAMIN D PO Take  by mouth Daily.   1/12/2024    diclofenac (VOLTAREN) 50 MG EC tablet Take 1 tablet by mouth 2 (Two) Times a Day As Needed (pain). 60 tablet 2 Unknown    escitalopram (LEXAPRO) 10 MG tablet Take 1 tablet by mouth Daily.   Unknown    famotidine (PEPCID) 40 MG tablet Take 1 tablet by mouth Every Night.       ferrous sulfate 325 (65 FE) MG tablet Take 1 tablet by mouth 2 (Two) Times a Day With Meals.       potassium chloride (K-DUR,KLOR-CON) 20 MEQ CR tablet Take 20 mEq by mouth As Needed (only when takin Lasix).      , Scheduled Meds:  ampicillin-sulbactam, 3 g, Intravenous, Q6H  enoxaparin, 40 mg, Subcutaneous, Q12H  escitalopram, 10 mg, Oral, Daily  insulin glargine, 20 Units, Subcutaneous, Q12H  insulin lispro, 2-9 Units, Subcutaneous, 4x Daily AC & at Bedtime  levothyroxine, 150 mcg, Oral, Q AM  micafungin (MYCAMINE) IV, 100 mg, Intravenous, Q24H  oxybutynin XL, 10 mg, Oral, Daily  pantoprazole, 40 mg, Oral, Q AM  rOPINIRole, 0.5 mg, Oral, Nightly  senna-docusate sodium, 2 tablet, Oral, BID  torsemide, 40 mg, Oral, Daily   , Continuous Infusions:   , PRN Meds:    acetaminophen **OR** acetaminophen    senna-docusate sodium **AND** polyethylene glycol **AND** bisacodyl **AND**  bisacodyl    Calcium Replacement - Follow Nurse / BPA Driven Protocol    dextrose    dextrose    glucagon (human recombinant)    HYDROcodone-acetaminophen    ipratropium-albuterol    Magnesium Standard Dose Replacement - Follow Nurse / BPA Driven Protocol    nitroglycerin    ondansetron ODT **OR** ondansetron    Phosphorus Replacement - Follow Nurse / BPA Driven Protocol    Potassium Replacement - Follow Nurse / BPA Driven Protocol, and Allergies:  Sulfa antibiotics    Objective     Vital Signs   Temp:  [97.3 °F (36.3 °C)-99.7 °F (37.6 °C)] 98.4 °F (36.9 °C)  Heart Rate:  [87-98] 89  Resp:  [18-20] 18  BP: (107-123)/(56-71) 107/61    PPE used per hospital policy    Physical Exam:  Constitutional: Not in acute distress.  Eyes: Injected conjunctivae, EOMI. pupils equal reactive to light.  ENMT: Mukherjee 3.  Mallampati 3.  Neck: Large. Trachea midline. No thyromegaly  Heart: RRR, no murmur  Lungs: Diminished air entry at the right base.  Minimal left lower lobe crackles.  Non labored breathing.   Abdomen: Obese. Soft. No tenderness or dullness. No HSM.  Extremities: No cyanosis, clubbing but pitting edema in legs, R >L.  Warm extremities and well-perfused.  Neuro: Conscious, alert, oriented x3.  Strength 5/5 in arms.  Psych: Appropriate mood and affect.    Integumentary: No rash.  Normal skin turgor  Lymphatic: No palpable cervical or supraclavicular lymph nodes.      Diagnostic imaging:  I personally and independently reviewed the following images:   Lung portion of CT of the abdomen 1/17/2024: See interpretation above in the HPI.    Laboratory workup:    Results from last 7 days   Lab Units 01/17/24  0747 01/16/24  1709 01/16/24  0715 01/15/24  0714   SODIUM mmol/L 133*  --  130* 132*   POTASSIUM mmol/L 3.9 4.5 3.6 3.9   CHLORIDE mmol/L 91*  --  92* 94*   CO2 mmol/L 28.4  --  25.8 26.5   BUN mg/dL 8  --  7* 6*   CREATININE mg/dL 0.84  --  0.73 0.81   GLUCOSE mg/dL 168*  --  191* 215*   CALCIUM mg/dL 8.7  --  8.6 8.6          Results from last 7 days   Lab Units 01/17/24  0747 01/16/24  0715 01/15/24  0714   WBC 10*3/mm3 10.93* 9.56 11.33*   HEMOGLOBIN g/dL 9.1* 9.2* 8.9*   HEMATOCRIT % 29.2* 29.1* 28.5*   PLATELETS 10*3/mm3 541* 534* 603*               Assessment     Bilateral UPJ and right renal stone obstruction s/p stent placement 1/3/24  Severe sepsis, improved  Hyponatremia, improving.  Moderate right pleural effusion: Probably secondary to hypoalbuminemia secondary to decreased nutrition while patient is being hospitalized in addition to administration of IV fluid.  RLL atelectasis, compressive  Uncontrolled DM type II with hyperglycemia, A1c 17 on 12/2/2024  Loud snoring and nocturia + obesity (BMI 39) + crowded upper airway anatomy, all suggestive for sleep apnea    Recommendations:    Initiate incentive spirometry  Continue diuretic therapy per nephrology for fluid overload.  Will get an echocardiogram for completeness and due to the isolated right pleural effusion.  At this point, the effusion is moderate and does not appear to be causing much symptoms if any and therefore, I would recommend conservative management (diuretics as tolerated and outpatient follow-up), rather than drainage.  Considering getting Doppler of the lower extremities to evaluate for DVT as right leg appears to be larger than the left.  However the edema is likely secondary to hypoalbuminemia and decreased mobility.  Agree with diuresis  Consider outpatient sleep study.    Rashel Duke MD  01/18/24  06:14 EST

## 2024-01-18 NOTE — PROGRESS NOTES
NATHALIA CAMPUZANO [facility]  INTERNAL MEDICINE  DEAN FAIRCHILD MD  [address] [place] [address]  Phone [phone] Fax [phone]  E-mail:  [email or web address]      INTERNAL MEDICINE DAILY PROGRESS NOTE  Dean Fairchild M.D.  2024            Patient Identification:  Name: Tiana Hudson  Age: 63 y.o.  Sex: female  :  1960  MRN: [id number]         Primary Care Physician: Dean Fairchild MD  LENGTH OF STAY 17 DAYS    Consults       Date and Time Order Name Status Description    2024  2:29 AM Inpatient Pulmonology Consult Completed     2024  1:42 PM Inpatient Urology Consult Completed     1/15/2024  5:58 AM Inpatient Nephrology Consult Completed     2024  3:42 AM Inpatient General Surgery Consult Completed     2024  5:10 AM Inpatient Nephrology Consult Completed     2024  2:00 PM Inpatient Infectious Diseases Consult Completed     1/3/2024 10:27 AM Inpatient Urology Consult Completed     2024 11:55 PM Urology (on-call MD unless specified) Completed     2024  3:45 PM IP General Consult (Use specialty-specific consult if known) Completed             Chief Complaint: Abdominal/flank pain with DKA, acute cystitis and sepsis, and possible calyceal rupture in the kidney     History of Present Illness:     Subjective   Interval History: Patient is a 63 y.o.female who presented at my request to the emergency room at [facility] with complaint of acute abdominal/flank pain and history of recurrent kidney stones.  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  Patient has been followed for the last few months by Dr. Rei Calvert in urology for renal calculi and actually has had a  stent placed in the right kidney due to obstruction from her renal stones.  It is also noted that the patient had a recent lithotripsy.  Patient began to feel poorly after the recent Christmas holiday and became more severely ill over the last 3 to 4 days prior to this admission.  She has felt extremely fatigued, dizzy at times, tired, nauseated, and has spent most of her time in bed sleeping.  Family has had difficulty getting the patient to take any oral intake and became quite concerned as her condition continued to worsen.  Patient was attempting to go to work today but really was too dizzy to get in the car to drive and 2-week to actually work.  After urging from her daughter, patient called me with respect to the symptoms and at my request went to the ER for evaluation.  Patient has multiple medical comorbidities that complicate her medical care.These include most significantly recurrent urinary tract infections due to her nephrolithiasis, morbid obesity with BMI greater than 40, diabetes mellitus type 2 poorly controlled with recent addition of Ozempic to her metformin therapy, vitamin D deficiency, essential hypertension, hyperlipidemia, hypothyroidism, allergic rhinitis, back pain, contact dermatitis, anxiety, and history of dense breast tissue on mammograms.     Patient was evaluated in the emergency room by Angelito Winkler MD who was the ER attending on call time of her arrival.  Patient was seen on 1/2/2024 at 1544 by Dr. Winkler.  His HPI was consistent with the story which I given above.  Patient denied dysuria, fever, chills on his evaluation.  She did admit to having emesis and actually had some emesis while in the emergency room.  Review of systems in the emergency room was positive for diffuse abdominal pain, and vomiting.  Patient also was noted to have some significant flank pain.  All other systems reviewed were negative in the emergency room vital signs on arrival in the emergency room showed  temperature of 96 °F, heart rate of 114, respiratory rate of 16, blood pressure 125/72, and O2 sat of 98%.  Patient was described as ill-appearing but in no acute distress.  She did have severe tenderness to palpation more on the right side of the abdomen and out into the right flank area with voluntary guarding.  Labs collected in the ER showed that her lactate level was elevated at 6.0.  She had a lipase of 23, her white blood cell count was 20.12, her hemoglobin was 11.2, and her platelet count was 568,000.  Patient did have a phosphorus of 4.2, magnesium 2.0, osmolality of 317, hemoglobin A1c is 16.90, and UA showed specific gravity 1.027, 3+ glucose, moderate blood 2+, leukocyte esterase moderate 2+, nitrates negative, white blood cells 6-10, red blood cells 6-10, bacteria 2+.  Her CMP showed a glucose of 978, creatinine of 1.82, sodium of 115, potassium of 4.2, chloride of 76, CO2 of 15.7, albumin 2.4, AST 33, alk phos 220, anion gap of 23, EGFR of 31.3.  Patient did have a CT scan of her abdomen and pelvis completed which showed loculated fluid throughout the right perinephric space felt to possibly be secondary to right calyceal rupture, new mild to moderate wall thickening of the right renal pelvis and renal calyces, bilateral UPJ obstruction with moderate bilateral hydronephrosis on the left, mild new dilation of right ureter, nonobstructing right renal calculi, hepatic morphology suggestive of chronic liver disease, and reactive lymph nodes in the retrocaval area.  A chest x-ray was done which showed no acute disease other than minimal atelectasis at the base of the right lung.  Patient underwent aggressive fluid resuscitation in the emergency room and received over 3 L of IV fluid.  She was placed on an insulin drip and was also started on Zosyn therapy.  She was followed on sepsis protocol as well as DKA protocol.  Patient did receive morphine 4 mg for pain x 2.  Case was discussed with myself as her  primary care attending.  Urology was also contacted about situation.  I did suggest that patient be admitted to the ICU for treatment of the DKA and severe sepsis picture.  Dr. Arthur Tena did agree to the ICU admission and will be following her in the CCU for pulmonary/critical care.  Final diagnoses in the ER were sepsis due to unspecified organism and acute cystitis without significant hematuria.     1/2/2024.  I personally saw the patient for the first time during this hospitalization on this date in the coronary care unit room #335.  Patient was resting quietly in bed and did a peer acutely ill.  She did wake to her name and spoke a few words before falling back asleep.  Patient's 2 daughters were at the foot of her bed and did discuss the case at length with me.  Daughter Enoch, is a former nurse from here at [facility] [facility], and now works at [employer] with the hospitalist group there.  Patient has responded well to the [facility]comm[employer] DKA protocols and blood sugars have gradually come down to near normal levels for the patient in the 1  range.  I will full PPE for the exam including an N95 face mask, goggles, white lab coat, and gloves when touching patient.  I performed thorough hand hygiene before and after the patient visit.  Patient did have a venous blood gas which showed pH 7.40, pCO2 28, pO2 of 31, and O2 sat of 62%.  She also had recent electrolytes which showed a sodium of 131, potassium 3.4, chloride 96, CO2 19.1, BUN 17, creatinine 1.15, estimated GFR now up to 54.3.  Lactate level has been coming steadily down and currently at 2.8.  Additional labs ordered for tomorrow a.m.  Patient has been able to urinate several times and daughters have helped her up to the bathroom for this.  I actually note that she did have some urinary frequency and incontinence at home which is unusual for her.  I did speak briefly to the patient's daytime nurse who was departing soon after I arrived on the floor.  I have  reviewed Dr. Arthur Tena's notes and his admission history and physical.  I do agree completely with his plan of care at present time.  Supportive care is continued to be offered for the sepsis with careful monitoring in the ICU.  Patient's anion gap metabolic acidosis probably is related to lactic acidosis and she was continuing to take metformin even when she was not able to eat.  IV fluids are continued aggressively.  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  Probably will plan sliding scale insulin for stabilization of diabetes while here in the hospital and will train patient to continue that in the home environment.  Pseudohyponatremia will be corrected with the IV fluids.  Hypothyroidism will be addressed with continued Synthroid.  Statins are held for now.  Obesity is an ongoing problem for the patient.  At the time my exam, patient is medically stable and slowly improving.  I did relay this to the daughter to agree with my assessment.  We will continue to follow the patient with you and we will be happy to assume care of the patient when she is stable and ready to be transferred out of the ICU.  I can be reached directly for any concerns or questions at [phone].    1/3/2024.  Patient is seen again today in her room in the CCU #335.  Patient was resting quietly in bed at the time of my visit and states that her daughters were downstairs getting a bite to eat.  I did discuss the case with the patient's nurse, Tiana, who tells me that she is being preop and should be going down soon for cystoscopy and stent placement.  I will full PPE for the exam including an N95 face mask, goggles, white lab coat, and gloves when touching patient.  I performed thorough hand hygiene before and after the patient visit.  Patient is more alert today but still somewhat confused about details of her medical case.  She is able to carry on  a conversation with me though and joke a bit with me while I am present in the room.  She does understand she has issues because of the obstruction bilaterally of her ureters that has resulted in hydronephrosis and hopefully can be relieved by the cystoscopy that is planned for later this morning.  Patient is still on an insulin drip but is drastically improved compared to where she was yesterday at this time.  Review of labs shows that her sodium is now up to 133, her CO2 is 19.1, glucose is now at 134, phosphorus is at 2.1, lactate is normalized at 1.9, white blood cell count today is 18.93, hemoglobin is 9.6 today, lakelets are normal today,Blood culture from the first day of hospitalization shows anaerobic bottle gram-positive bacilli growth with identification still ongoing.  Fungal and body fluid cultures are still negative.  Urine culture remains negative.  Dr. Oquendo was following the patient for critical care today.  He has continued her antibiotics and is awaiting urology input.  Fluid resuscitation is continued.  Blood pressure now seems well-controlled.  DVT prophylaxis in place.  He had a lengthy discussion with patient's family at the time of his rounds.  Urology has seen the patient in consultation and is planning to do cystoscopy later today.  Dietitian is following the patient's case and recommending input treatment as needed.Dr. Armando did perform a cystoscopy with ureteral catheterization and stent insertion bilaterally.  His pre-op diagnosis and postop diagnoses were urosepsis with bilateral UPJ obstruction.  He did feel that he had successfully decompressed the blockages and the hydronephrotic changes.  He did speak with patient's daughter postoperatively.  Patient does seem to be much improved today.  Vital signs still show no fever.  Blood pressure is relatively stable.  Will continue to follow with you.    1/4/2024.  Patient is seen again today in her room in the CCU #335.  Patient has  transitions of critical care and is in overflow for care on the floor.  Patient's daughter is present at bedside at the time of my visit.  Patient is much more awake and alert today.  She is more interactive and talkative.  I did discuss the case with the patient's nurse.  We are going to obtain an ID consult today for help with antibiotic management and we also are going to transition the patient to regular sliding scale insulin as we begin the teaching process of getting patient on her own regimen of sliding scale insulin at the time of discharge to home.  We have also started patient on Lantus therapy at 10 units for now and may need to titrate up.  I wore full PPE for the exam today including an N95 face mask, goggles, white lab coat, and gloves when touching patient.  I performed thorough hand hygiene before and after the patient visit.  Specialist have seen patient today and their care is summarized below.Dr. Najera, the critical care attending today, felt that her mentation was slow but answering questions appropriately.  No other major findings were discovered at present time.  Antibiotics were continued for sepsis was felt to be significantly improved with anion gap now closed and lactic acidosis much better.  IV antibiotics were continued awaiting final culture results and I did consult ID to see patient for their input on the situation.  Dr. Devon Armando who completed bilateral stent placement for bilateral OP J obstruction and urosepsis is pleased with progress on day 1 postop.  He notes that urine output has been excellent.  Dr. Us from infectious disease did see the patient in consultation.  He notes that Zosyn antibiotics have been discontinued by Dr. Armando after her successful surgery.  White blood cell count is down to 17.08 today with hematocrit of 30.7.  Glucose max was 250 today estimated GFR is up to 54 and CO2 is 19.0.  He feels the significance of the positive blood culture is unclear  at this point with only 1 of 2 bottles positive for gram-positive bacilli which normally would represent a contamination.  He is planning to repeat the blood cultures and will follow-up.  Urine culture was also negative except for yeast growth and due to the recent  instrumentation he has started the patient on fluconazole 400 mg daily while cultures are pending.  New blood culture has been sent.  We will check again the lactic acid, procalcitonin, sed rate, and CRP with a.m. labs tomorrow.  Labs relatively stable today though I do note that sodium is dropped a bit to 127.  CO2 remains slightly low at 18.0 and chloride slightly low at 97.  Patient's glucoses running in the upper 100s and low 200s at present.  Electrolytes otherwise seem fairly stable.  White count is still elevated somewhat at 17.08 despite stenting yesterday.  Patient currently off antibiotics and being followed carefully by ID.  Plan to check procalcitonin and repeat blood culture with a.m. labs.  Hemoglobin is at 10.0.  Otherwise patient appears very stable at present time.  I do agree that her mentation is a little bit slower than usual but I suspect this will continue to improve as we get further from her acute DKA event.  Treatment plan reviewed with patient and her daughter today.    1/05/2024.  Patient is seen again today in her room in the CCU #335.  Patient resting quietly in bed and daughter Enoch is at bedside.  I spoke with patient's nurse during the day to discuss occasional changes in her treatment plan.  I wore full PPE for the exam including an N95 facemask, goggles, white lab coat, and gloves when touching patient.  I performed thorough hand hygiene before and after the patient visit.  Patient is much more alert today and more like her usual self.  She does laugh and participate in conversation.  Mother and daughter were very impressed with Dr. Sánchez's excellent review with them of the patient's renal condition and with the  time he has been going over the various diagnostic images that have been done up to date.  He was pleased to see how significantly improved electrolytes are and felt the patient was remaining hemodynamically stable at present time.  Dr. Us from ID did see the patient.  Blood cultures were repeated but original cultures are now positive 2 out of 2 for gram-positive bacilli which is more consistent with a true infection.  He started patient on vancomycin and will be following up on ID and's susceptibilities of the organisms.  He also continued the fluconazole 400 mg daily while following up on cultures from perinephric fluid.The pulmonary intensivist Dr. Arthur Tena saw patient briefly and found no pulmonary or critical care needs at this time.  Dr. Armando from neurology saw patient on postop day #2 after stent placement for bilateral UPJ obstruction.  Indicates that Perales catheter can be removed at any time.  Occupational Therapy began to work with the patient and feels that she will benefit from further skilled occupational therapy.  She did some bedside exercises and did do some sort steps.  She was able to sit up on the edge of the bed for 8 to 10 minutes with to stand.  Physical therapy also saw patient and felt she would continue to benefit from skilled physical therapy.  Pharmacist did consult with the patient on vancomycin and started at 750 mg IV every 12 hours.  They will be checking a trough level on 1/7/2024 at 8:30 AM.  Labs today are generally stable.  Glucose levels ranging in the 140s to 170s.  Sodium still slightly low at 132.  CO2 now normal at 22.5, albumin slightly low at 2.1, AST slightly up at 34 and alk phos slightly up at 270.  White blood cell count today is down to 13.04 and hemoglobin is stable at 10.3 with platelets of 412,000.  2 of 2 initial blood cultures were positive with final ID pending.  Sed rate remains greater than 130, procalcitonin is 3.24, and C-reactive protein is at  23.85.  Patient is still on overflow status waiting for a floor telemetry bed.    1/8/2024.  Patient is seen again today in her room on the coronary care unit #335.  Patient was actually up in a chair at the time of my visit and her daughter was present at bedside.  I wore full PPE for the exam including an N95 facemask, goggles, white lab coat, and gloves when touching patient.  I performed thorough hand hygiene before and after the patient visit.  Patient is feeling much better today.  She says that she was anxious to get up and was happy that the therapist left her up in the chair.  She is able to get to the bathroom but does need some assistance still for that endeavor.  Several specialist saw the patient and their care is summarized below.  Patient remains on vancomycin therapy and pharmacy is following the dose and adjusting levels.  Dr. Us from ID did see patient again today.  He notes that repeat blood cultures remain negative and initial blood cultures are still being evaluated.  He has continued vancomycin for now and will follow-up on ID and susceptibilities.  Urine culture growing Candida glabrata and tropicalis and he has transitioned her from fluconazole therapy to micafungin 100 mg daily for now.  White blood cell count is slightly increased today at 16.61.  He is cautiously monitoring the patient for any new signs of infection or changes.Dr. Armando from urology did see the patient and was pleased with his surgical result from cystoscopy last week with bilateral stent placement for treating UPJ obstruction.  He has signed off on the case and plans to have her follow-up with him in clinic in 7 to 10 days for reevaluation following discharge.Occupational Therapy did work with patient who reported pain level of 2 out of 10 focused mainly on right abdomen where she has some redness swelling and discomfort.  There was no rebound to my exam in this area but I did elect to go ahead and do a CT scan of  abdomen and pelvis to evaluate the area since she did have the extensive surgery not so long ago.  Patient is noted to fatigue quickly and has some functional deficits that need to be maximized prior to DC to home.  Patient is planning discharge to home with spouse and daughters.  MI requested diabetic educator did meet with the patient.  Patient does need a meter for glucose testing.  Planning 4 times a day test for now.  Will use insulin pen lispro at meals.  Dietitian did meet with patient and her daughter for input on diet.  Provided printed materials and discussion.  Will be available for concerns or questions.  Labs today generally were stable.  Sodium back up to 131.  Glucose max of 245.  Patient does admit she is eating more at this time.  White blood cell count is up slightly at 16.61 of concern to ID.  They are monitoring cultures carefully.  Hemoglobin did drop from 11.5 down to 9.3 today will recheck tomorrow.  CT abdomen and pelvis was completed.  Right ureteral stent present but there is mild distention of the right extrarenal pelvis which appears to be improved compared to the exam 6 days ago.  There is right urethrocele thickening.  There is a right perinephric collection consistent with hemorrhage or urinoma's and there is a new deep collection in the right posterior lateral abdominal wall measuring 3 cm in thickness by 13 cm transverse along with muscular thickening along the right lateral abdominal wall and edema within the right lateral abdominal pelvic subcutaneous fat.  There is enlargement of this fluid collection seen.  We will have nursing notify urology in a.m. of this finding for their input.  May want to consider general surgery consultation also if pain persist in this area.    1/9/2024.  Patient is seen again today in the CCU room #335.  Patient's daughter is at bedside and patient is resting quietly in bedside chair.  Patient appears much more awake and alert today back to her usual  personality.  I wore full PPE for the exam including an N95 face mask, goggles, white lab coat, and gloves when touching patient.  I performed thorough hand hygiene before and after the patient visit.  Nursing reports the patient has been using bedside commode with some urgency and loose bowel movements at times.  This is why she continues on IV vancomycin.  Patient did require O2 2 L per nasal cannula at night while sleeping.  No other new issues noted.  Dr. Us from ID saw patient again today.  He continues to monitor her blood cultures and has continued her on the IV vancomycin along with micafungin for treatment of yeast infection.  Urology did see patient regarding findings on CT scan.  These were reviewed with Dr. Salomon.  There are fluid collections in the right lateral abdominal pelvic wall measuring 12.8 cm x 18 cm x 3 cm which may represent hematomas or urinoma months.  Left renal pelvis is still dilated slightly.  Urology did speak with the IR department directly and arrange for CT-guided drain placement which the patient is agreeable to having.Physical therapy did work with the patient today.  She required contact-guard assist for standing and ambulated 15 feet with no assist.  Patient was minimally unsteady and fatigued quickly though patient indicated these were the usual distances that she does ambulate.  Patient plans to DC home with home health and assistance from her daughter.  PT will continue to follow and advance as able.  Patient encouraged to get up in chair for all meals and ambulate at least 3 times a day to promote functional mobility during hospital stay.  General surgery did see patient at my request regarding the right lower quadrant pain.  They feel that it is primarily due to the retroperitoneal fluid collections which have increased in size.  They deferred further treatment decisions to urology and felt there was no intraperitoneal abnormalities noted.  Labs today do show glucose  is up slightly more as patient is eating more.  Lantus has been increased for today to 16 units once daily.  Sodium is now up to 133.  Glucose was up at 212 this a.m. white blood cell count is improved down from 16.61 to a level of 14.58 today.  Hemoglobin is up slightly at 10.1 and does remain stable.  Platelet count is also stable at 439,000.  Patient planning discharge to home over the weekend if drain is able to successfully remove fluid from the retroperitoneal fluid collection that is causing pain in the right lower quadrant.  Final antibiotic plan is still pending from ID.  We are continuing to follow closely and encourage further activity on the part of the patient.    1/10/2024.  Patient is seen again today in her room in the CCU room #335.  Patient is up in chair at bedside watching television.  She recognizes me immediately upon my entry into her room and smiles appropriately.  I wore full PPE for the exam including an N95 facemask, goggles, white lab coat, and gloves.  I performed thorough hand hygiene before and after the patient visit.  I did discuss the patient's care with her nurse who was present in the [place] outside her room.  Patient did have successful placement of the drain today in the pocket of fluid accumulation in the right lower abdominal wall.  Procedure was felt to be quite successful and 50 cc of fluid were sent to the lab for evaluation and analysis.  Patient has an ongoing drain that has very opaque brownish-white material draining from the fluid collection at present time.  Patient states that she feels 100% better with significant improvement in the pain that she was experiencing in the right lower quadrant prior to this drainage.  Vital signs today indicate no fevers with current temperature of 98.1 pulse 86 respiratory rate 18 blood pressure 120/65 and oxygen sat 96% on room air.Review of patient's labs shows that her sodium is very stable at 132 glucoses have been primarily in the  1 43-1 63 range today.  Patient's white blood cell count continues to come down slowly and is at 12.93 and her hemoglobin is stable at 10.3.  Body fluid culture from drainage is pending.  Patient does remain on Lantus 16 units along with sliding scale insulin at present time and seems to be very comfortable with management of her diabetes.  We will plan follow-up on outpatient basis with endocrinology following discharge.  Patient was seen by Dr. Us today and he has continued the vancomycin therapy awaiting culture results from the fluid drained from her right lower quadrant and awaiting final ID is on organism which grew from blood earlier in admission.  He is still continuing to treat for fungal infection that was collected at time of drainage from stents placed in ureters and is using micafungin for treatment of this infection.  Dr. Campos performed procedure in radiology without complications or problems.  He has suggested that patient have a repeat CT scan of the area prior to discharge to home.    1/11/2023.  Patient is seen again today in her room on CCU room #335.  Patient is up in her chair watching television and indicates to me that she is often sleeping in the chair because it is easier to get up to urinate from the chair then to get up from the bed.  I did speak with the patient's nurse earlier in the day.  I wore full PPE for the exam including an N95 facemask, goggles, white lab coat, and gloves when touching patient.  I performed thorough hand hygiene before and after the patient.  Patient's appetite continues to slowly improve.  She also is gradually getting stronger and gaining more independence.  PT did discuss with her using a walker to ambulate because she seems to be much safer with that at the present time the patient is somewhat stubborn and insist on walking alone despite poor posture at times.  Review of today's labs show that her blood sugars have ranged from 156 up to a max of 389.  I  will plan on increasing her insulin through her Lantus by another 2 units each day up to 18 units daily.  Other labs are very stable.  Sodium is 131, BUN slightly low at 4, albumin low at 2.1.  White blood cell count continues to come down and is at 11.91 today.  Hemoglobin is at 9.8 and platelet count is 502,000.  Culture still pending from drain placement but so far with no growth.  Other cultures remain positive as before.  Per ID patient to continue on vancomycin for now awaiting those culture results.  Anticipate possible discharge the patient over the weekend depending on decisions that come from infectious disease.  Otherwise clinical condition seems to be quite stable.Review of vital signs reveals that temperature is 98.9, pulse 81, respirations 20, blood pressure 110/82 and her room air sat is 96%.  We continue to follow while awaiting decision on antibiotics.    1/12/2024.  Patient is seen once again today in her room in the CCU room #335.  Patient was resting quietly in bed at the time of my visit.  I wore full PPE for the exam including an N95 face mask, goggles, white lab coat, and gloves when touching patient.  I performed thorough hand hygiene before and after the patient visit.  Patient has had a very quiet day.  She was seen by Dr. Miller from urology earlier this morning.  Per his plan, drain in right retroperitoneal area is to be maintained until output is clear and scant and patient is felt to be infection free.  We still are awaiting culture results from the drain.  Urology plans to coordinate outpatient study of both ureters to determine patency with a follow-up in office 1 week after discharge.Dr. Us from infectious disease saw patient again also today.  Drainage cultures from the retroperitoneal drain remain pending.  For now he continued vancomycin.  He also talked with micro in the lab today about the prior positive blood cultures and they are going to resend the new sample due to the  mixed sample that came out on prior study.  He is continuing micafungin 100 mg daily for now due to the isolated Candida in patient's stents.  Pharmacy continues to adjust vancomycin levels.  New labs ordered for tomorrow AM.  Patient hoping for discharge soon if final decisions can be made regarding antibiotic therapy.  Vital signs remained stable with no fever pulse 82 respiratory rate 18 and blood pressure 128/64 with 94% on sat with room air.    1/13/2024.  Patient is seen again today in her room in the CCU room #335.  Patient is resting quietly in bed at the time of my visit watching television.  I did discuss the case briefly with the patient's nurse.  I wore full PPE for the exam including an N95 face mask, goggles, white lab coat, and gloves when touching the patient.  I performed thorough hand hygiene before and after the patient visit.  Patient remains in good spirits today and actually was more talkative than in the past.  Patient's  is helping from afar with a move that her daughter is doing today.  Most of the family is involved with moving the daughter to returning home.  Patient's drain continues to drain cloudy whitish pus-like appearing material with the amount of drainage decreased slightly.  Culture has grown lactobacillus which hopefully will be sensitive to the same vancomycin that the patient is taking for her blood culture that was positive.  Rereview of the sensitivity blood culture is still pending in the lab at present time.  ID did see the patient and will continue to follow.  Hopefully will have final cultures back by Monday and be able to discharge patient early in the week to home with home IV antibiotics as necessary.  Patient's daughter is an RN and will be able to coordinate infusion of those medications at home level.  Urology did see the patient today and suggested continuing the IV antibiotics and the right retroperitoneal drain.  They also plan to continue following for  now.  No other major changes occurred with the patient today.  We are continuing to follow until she is ready for discharge to home.    1/14/2024.  Patient is seen again today in her room on CCU #335.  Patient was resting quietly in bed watching television and her nurse was at bedside while phlebotomy was drawing her vancomycin trough level.  Patient not feeling as well today due to low grade temperature elevations.Tmax 99.1 overnight last night.  I wore full PPE for the exam including an N-95 face mask, goggles, gloves and white lab coat.  I performed thorough hand hygiene before and after the patient visit. Patient's retroperitoneal drain is still draining opaque, thick pus which has culture positive for lactobacillus.  We are still waiting on final culture results and sensitivities for both bacteria that have grown, one from the drainage and one from the blood.  Patient's appetite is decreased today.  Daughter visited earlier and helped her with bath and clean up.  Labs show sodium down to 125.(Requesting input from nephrology on the hyponatremia) Glucoses remain more elevated in last 24 hours possibly due to brewing infection. Planning to continue gradually increasing the Lantus dose to 14 units bid. WBC is up to 10.99 and hemoglobin is stable at 9.1.  Platelets are up to 550,000.  Will check UA, Sed rate / CRP in am.  Also plan to check AAS x-ray in am.  Will continue monitoring patient carefully.    1/15/2024.  Patient is seen again today in the CCU room #335.  Patient is resting quietly in bed but still appears a bit flushed.  No family is in the room at the present time.  I wore full PPE for the exam including an N95 facemask, goggles, white lab coat, and gloves when touching patient.  I performed thorough hand hygiene before and after the patient visit.  Patient is in a good mood today and anxious to go home soon.  We did have patient seen by the nephrology team today.  They feel the patient is actually  somewhat hypervolemic and actually gave her 1 dose of Lasix 20 mg IV stat.  They want to avoid hypotonic fluids with the patient.  They are checking a cortisol level urine sodium and urine osmolality.  They have placed the patient on fluid restriction at 1500 cc for 24 hours and will be continuing to follow labs tomorrow.  Dr. Us saw patient from infectious disease.  Send out blood cultures still are pending.  Fluconazole susceptibilities also pending for Candida glabrata.  He is continuing the patient on micafungin 100 mg daily for the Candida.  He is planning to de-escalate the antibiotics from vancomycin today to Unasyn 3 g every 6 hours and plans to follow further.  Hopefully can transition to an oral antibiotic at the time of discharge to home.  Physical therapy saw patient but patient and family declined treatment because she has had diarrhea all day and has been up and down on the bedside commode.  Labs show sodium up to 132 today.  Chloride 54.  Albumin remains low at 2.2.  Cortisol level 23.90 appears normal.  TSH slightly up at 5.2.  C-reactive protein still high at 24.73.  White blood cell count up to 11.33.  Hemoglobin 8.9.  Sed rate still up at 84.  UA checked yesterday still relatively unremarkable with small leukocytes and small blood.  Urine culture so far negative.Abdominal x-ray series shows minimal atelectasis at right base.  Otherwise unremarkable.  UPJ drains appear to be still in correct place.Glucoses today have ranged from 321 glucoses today have ranged from 190 earlier this morning to a high of 321 later this evening.  Will continue to monitor tomorrow and consider an additional increase in Lantus if these elevations persist.    1/16/2024.  Patient is seen again today in her room in the CCU #335.  Patient is resting quietly in bed at the time of my visit but wakes as I enter the door immediately and starts talking with me.  She does appear more comfortable today, less flushed, no fevers.   Vital signs much improved with temperature of 98.9, pulse 97, respiratory rate 14, blood pressure 103/55, and room air sat of 94%.  Patient is now on Unasyn every 6 hours in place of the previous vancomycin.  I will full PPE for the exam including an N95 face mask, goggles, white lab coat, and gloves when touching patient.  I performed thorough hand hygiene before and after the patient visit.  I did review notes from specialist today and discussed the case with the patient's nurse.  Patient was seen by Dr. Harrington from renal.  She found that due to the elevated glucoses patient's sodium corrects to 132.  She did suggest increasing patient's TSH dose slightly which we will do.  Cortisol level was found to be normal.  Cultures of had no changes today.  Patient continues to show anemia of chronic disease.  Diabetes range has been from 1 91-3 21 today. Fluid restrictions were continued.  Oral diuretic given again today.  Dr. Us from ID continue the micafungin and the Unasyn.  Final ID plan for antibiotics still pending.  We will proceed to CT scan of abdomen and pelvis as requested previously by radiology prior to patient's discharge so that we can be sure that drain has effectively drained all the fluid in her abdominal wall.  This will also check on position of the UPJ stents.  I did complete order for rolling walker as requested by CCP.  I have sent a note to CCP  detailing additional discharge planning considerations which are somewhat contingent on whether or not ID feels home IV antibiotics and antifungals are necessary.  If this is the case, then I also feel patient needs home health also to coordinate infusions as well as to draw labs as we need them for monitoring of patient's infections and other issues such as her hyponatremia.  Blood glucose levels remain somewhat elevated despite the increased doses of Lantus that I have added.  In an effort to even better control this I would like to increase the  Lantus dose to 18 mg twice a day and hopefully we will find that this reduces the need for such high doses of sliding scale insulin which has ranged from 4 units to 7 units over the course of the day.  Next step would be consideration of adding mealtime insulin if this does not resolve the problem.  I will also ask diabetic educator to again look at patient's regimen to see if she has any recommendations with respect to adjustment of doses.  We certainly are going to need to have this patient follow-up with endocrinology on an outpatient basis to work on better regulation of blood sugars.  Nurses do report that patient is supplementing her hospital diet with food brought in from home at times.  Overall patient seems to be fairly stable medically at present time.    1/17/2024.  Patient seen again today in the CCU room #335.  Patient is sitting up in the chair at bedside.  Daughter is laying on bed.  I wore full PPE for the exam including an N95 facemask, goggles, white lab coat, and gloves when touching patient.  I performed thorough hand hygiene before and after the patient visit.  Patient in good mood today and feeling stronger.  Has been working with physical therapy and doing quite successfully as long as she use a rollator walker.  Daughter decided that they will have a rollator walker to take home.  We did discuss the results of the CT scan of the persistent fluid that was seen on the scan.  I am going to consult urology to take a look at the situation again and Dr. Martinez did see patient later in the day.  We also discussed patient's food intake and encouraged her to take with food regularly.  The diabetic educator also saw the patient for me in consultation. Labs today are relatively stable.  Her sodium is 133 and corrects to 135.  She continues on a 1500 cc fluid limitation per day.  Blood sugars have ranged from a low of 168 up to a high of 333.  White blood cell count is up slightly at 10.93 and hemoglobin  remains slightly low at 9.1 with platelet count of 541,000.  CT of abdomen and pelvis was completed at my request.  Findings did show a moderate right pleural effusion with partial collapse of the right lower lobe.  Effusion is slightly larger than it was on previous exam and left lung appears to be clear.  Right ureteral stent is present with mild distention of right renal pelvis similar to previous exam left ureteral stent is present and there is no change.  Drain has been placed since last film was done in 2 right posterior lateral abdominal wall fluid collection.  Collection shows a decrease in size though continues to contain bubbles of air and mild fluid.  Collection extends up to the iliac crest on the right.  There are also multi lobular collections that contain fluid and fat within the posterior medial upper abdominal wall this collection of fluid extends to the right adrenal gland and right kidney and is also similar to what was seen on 1/8/2024.  There is a collection along the anterior margin of the right psoas muscle which is increased in size from previous exam.  There is no free intraperitoneal gas other organs appeared normal.  There has been a previous cholecystectomy.  There is some edema in the subcutaneous fat along the right posterior lateral flank.  This CT scan was reviewed by Dr. Bill Martinez from Urology who consulted on the patient later in the day.  He did not feel was necessary to place a secondary drain as above is necessary to be patient and let the drain is already in do its work.  The drain needs to be irrigated periodically to keep it from becoming obstructed.  Patient has retroperitoneal abscess secondary to urinary extravasation from the calyceal rupture.  Etiology of the extravasation was confirmed by determining that the fluid coming from drain had creatinine level.  There is some associated cellulitis of her pannus on the right lower side also.  Infectious disease saw the patient  again today and Dr. Us has continued Unasyn therapy along with micafungin.  He relates no other comments today.  Dr. Harrington from renal did see the patient.  She has continued the patient on diuretic in the form of torsemide to help with fluid overload.  She has continued fluid restrictions and did discuss the case with the patient and her daughter.  Overall the patient seems very stable.  Hopefully will be able to move toward discharge in the next 48 to 72 hours if patient's stability persist.  Will train daughter to irrigate her drain on a regular basis.    1/18/2024.  Patient is seen again today in her room in the CCU #335.  Patient is resting quietly in bed with her daughter at bedside.  I wore full PPE for the exam including an N95 facemask, goggles, white lab coat, and gloves when touching patient.  I performed thorough hand hygiene before and after the patient visit.  I did discuss the case earlier this morning with Dr. Us from infectious disease.  He does feel patient can safely be discharged to home tomorrow if she remains stable.  He will transition her antibiotics tomorrow morning to probably Augmentin and fluconazole and determine length of treatment at that time.  I did review the notes from urology yesterday and we will schedule follow-up in 1 to 2 weeks in urology clinic for further evaluation of the fluid collection postoperatively after her placement of bilateral UPJ stents.  It is noted that patient had calyceal rupture and we are still waiting for that to completely heal and resolve.  Patient does have some leakage of urine still into the fluid that is collected in the adjacent abdominal wall and retroperitoneal areas.  Pulmonary did see patient today.  They feel the right pleural effusion is relatively benign but did order an echocardiogram to be sure there were no cardiac complications.  They feel that further diuresis on an outpatient basis with the torsemide will be appropriate  treatment and hopefully the symptoms will completely resolve over the next few days.Report from the echocardiogram looks good with left ventricular ejection fraction of 55 to 60%.  The patient does have some mild grade 1 impaired relaxation.  Estimated right ventricular systolic pressure from tricuspid regurgitation is normal.  Glucoses have been fairly stable today.  And we have increased Lantus yet again today in an effort to better control his blood sugars.  Highest glucose was 322 right before lunchtime with afternoon and evening glucoses of 260-280.  Renal did see the patient and continued to p.o. torsemide and fluid restrictions.  Labs will need to be monitored for diuretic toxicity.  Urology is agreeable to discharge to home and will follow-up with the patient in their clinic in 1 week.  Patient will also follow-up with ID at the end of her.antibiotic therapy.  Patient will follow-up with Dr. Fairchild in 1 to 2 weeks with a video visit to discuss progress at home.      Review of Systems:               Review of systems could not be obtained due to  patient confusion. patient nonverbal.       Past Medical History:   Diagnosis Date    Anemia     intermittently    Anxiety and depression     Arthritis     Depression     Diabetes mellitus     Gallstones     High cholesterol     History of frequent urinary tract infections     HX OF YEAST IN BLADDER HAS PRN DIFLUCAN    History of transfusion 05/24/2017    Had 2 iron infusions.  This was when i had knee replacement    Hyperlipidemia     Hypertension     Hypothyroidism     Knee pain, bilateral     Low back pain     Lumbar stenosis     PONV (postoperative nausea and vomiting)     Positive TB test     chest x-ray neg    Seasonal allergies      Past Surgical History:   Procedure Laterality Date    APPENDECTOMY N/A 1982    Dr. Wilson    CHOLECYSTECTOMY WITH INTRAOPERATIVE CHOLANGIOGRAM N/A 10/31/2018    Procedure: laparoscopic cholecystectomy;  Surgeon: Mary Kay Mac MD;   Location: [facility] MAIN OR;  Service: General    COLONOSCOPY      CYSTOSCOPY BLADDER BIOPSY N/A 10/3/2016    Procedure: CYSTOSCOPY BLADDER BIOPSY;  Surgeon: Rei Calvert MD;  Location: [facility] MAIN OR;  Service:     CYSTOSCOPY W/ URETERAL STENT PLACEMENT Bilateral 1/3/2024    Procedure: CYSTOSCOPY BILATERAL RETROGRADE PYELOGRAM  BILATERAL URETERAL STENT INSERTION AND CATHETHER PLACEMENT;  Surgeon: Eduard Armando MD;  Location: [facility] MAIN OR;  Service: Urology;  Laterality: Bilateral;    DILATATION AND CURETTAGE N/A     ENDOSCOPY      INTRAUTERINE DEVICE INSERTION      KNEE ARTHROSCOPY W/ MENISCAL REPAIR Left     OVARIAN CYST REMOVAL Left     Dr. Wilson    TOTAL KNEE ARTHROPLASTY Right 2017    Procedure: RIGHT TOTAL KNEE ARTHROPLASTY WITH ALETA NAVIGATION;  Surgeon: Ross Cruz MD;  Location: [facility] MAIN OR;  Service:      Allergies   Allergen Reactions    Sulfa Antibiotics Hives and Rash       Family History   Problem Relation Age of Onset    Hepatitis Mother     Breast cancer Mother     Heart disease Mother     Cancer Mother     Hyperlipidemia Mother              Heart failure Father     Stroke Father     Hypertension Father         Since he was 72, now 86s    Diabetes Father     Heart disease Father     Hyperlipidemia Father         Unsurs    Heart disease Maternal Grandmother     Cancer Maternal Grandfather     COPD Maternal Grandfather     Arthritis Paternal Grandmother     Diabetes Maternal Aunt     Diabetes Paternal Uncle     Malig Hyperthermia Neg Hx        Social History     Socioeconomic History    Marital status:    Tobacco Use    Smoking status: Never    Smokeless tobacco: Never    Tobacco comments:     Never smoked   Vaping Use    Vaping Use: Never used   Substance and Sexual Activity    Alcohol use: Never     Alcohol/week: 3.0 standard drinks of alcohol     Types: 1 Glasses of wine, 2 Standard drinks or equivalent per week    Drug use: Never    Sexual  "activity: Not Currently     Partners: Male     Birth control/protection: I.U.D.       PMH, FH, SH and ROS completed with Admission History and Physical and updated in EPIC system.        Objective     Scheduled Meds:ampicillin-sulbactam, 3 g, Intravenous, Q6H  enoxaparin, 40 mg, Subcutaneous, Q12H  escitalopram, 10 mg, Oral, Daily  insulin glargine, 20 Units, Subcutaneous, Q12H  insulin lispro, 2-9 Units, Subcutaneous, 4x Daily AC & at Bedtime  levothyroxine, 150 mcg, Oral, Q AM  micafungin (MYCAMINE) IV, 100 mg, Intravenous, Q24H  oxybutynin XL, 10 mg, Oral, Daily  pantoprazole, 40 mg, Oral, Q AM  potassium chloride ER, 40 mEq, Oral, Q4H  rOPINIRole, 0.5 mg, Oral, Nightly  senna-docusate sodium, 2 tablet, Oral, BID  torsemide, 40 mg, Oral, Daily      Continuous Infusions:       Vital signs in last 24 hours:  Temp:  [97.3 °F (36.3 °C)-99.7 °F (37.6 °C)] 98.2 °F (36.8 °C)  Heart Rate:  [87-98] 89  Resp:  [18-20] 18  BP: (107-123)/(56-71) 111/64    Intake/Output:    Intake/Output Summary (Last 24 hours) at 1/18/2024 1110  Last data filed at 1/18/2024 0900  Gross per 24 hour   Intake 2675 ml   Output 1225 ml   Net 1450 ml         Exam:  /64   Pulse 89   Temp 98.2 °F (36.8 °C) (Oral)   Resp 18   Ht 165.1 cm (65\")   Wt 103 kg (228 lb)   LMP 09/03/2016 Comment: IUD  SpO2 94%   BMI 37.94 kg/m²     Constitutional:  Patient resting in bed today with her daughter at bedside on bed.  Continues to be more alert. .  Normal personality.  Pain significantly improved,.  Drain continues to produce opaque whitish-tan drainage. Blood pressures stable  Patient now feeling  better  WBC is now normal..  There is resolving cellulitis of the right pannus area   Eyes:     PERRLA, conjunctiva/corneas clear, no icterus, no conjunctival                                     pallor, EOM's intact, both eyes      ENT and Mouth: Lips, tongue, gums normal; oral mucosa pink and moist   Neck:     Supple, symmetrical, trachea midline, no " JVD  Respiratory:     Clear to auscultation bilaterally, respirations unlabored  Cardiovascular:  Regular rate and rhythm, S1 and S2 normal, no murmur,      no  Rub or gallop.  Pulses normal.    Gastrointestinal:   BS present x 4 Soft, non-tender, bowel sounds active,      no masses, no hepatosplenomegaly    right lower quadrant pain almost totally resolved with no rebound guarding or other abnormalities.  Retroperitoneal drain remains in place.  Will plan CT scan prior to discharge.                                               :       No hernia.  Normal exam for sex.         Musculoskeletal: Extremities normal, atraumatic, no cyanosis or edema     No arthropathy.  No deformity.  Gait normal                                                 Skin:   Skin is warm and dry,  no rashes, swelling or palpable lesions.  Erythema resolved over right lower quadrant area pain   Neurologic:  CN -XII intact, motor strength grossly intact, sensation grossly intact to light touch, no focal reflex deficits noted    Psychiatric:     Alert,oriented X3, no delusions, psychoses, depression or anxiety    Heme/Lymph/Imun:   No bruises, petechiae.  Lymph nodes normal in size/configuration       Data Review:  Lab Results   Component Value Date    CALCIUM 9.0 01/18/2024    PHOS 3.8 01/17/2024     Results from last 7 days   Lab Units 01/18/24  0710 01/17/24  0747 01/16/24  1709 01/16/24  0715   AST (SGOT) U/L 15 19  --  15   ALT (SGPT) U/L 12 9  --  8   MAGNESIUM mg/dL  --  1.8  --   --    SODIUM mmol/L 133* 133*  --  130*   POTASSIUM mmol/L 3.4* 3.9 4.5 3.6   CHLORIDE mmol/L 89* 91*  --  92*   CO2 mmol/L 30.9* 28.4  --  25.8   BUN mg/dL 8 8  --  7*   CREATININE mg/dL 0.84 0.84  --  0.73   GLUCOSE mg/dL 178* 168*  --  191*   CALCIUM mg/dL 9.0 8.7  --  8.6   WBC 10*3/mm3 9.87 10.93*  --  9.56   HEMOGLOBIN g/dL 8.6* 9.1*  --  9.2*   PLATELETS 10*3/mm3 514* 541*  --  534*     Lab Results   Component Value Date    TROPONINT <6 01/02/2024      Estimated Creatinine Clearance: 83.7 mL/min (by C-G formula based on SCr of 0.84 mg/dL).  WEIGHTS:     Wt Readings from Last 1 Encounters:   01/18/24 1028 103 kg (228 lb)   01/18/24 0800 103 kg (228 lb)   01/18/24 0600 108 kg (238 lb 12.1 oz)   01/17/24 0700 111 kg (245 lb)   01/17/24 0407 110 kg (242 lb 8.1 oz)   01/16/24 0529 108 kg (238 lb 1.6 oz)   01/14/24 0600 115 kg (254 lb 10.1 oz)   01/13/24 0627 116 kg (255 lb 1.2 oz)   01/12/24 0605 115 kg (254 lb 3.1 oz)   01/11/24 0600 113 kg (249 lb)   01/09/24 0653 114 kg (251 lb 3.2 oz)   01/08/24 0700 115 kg (254 lb 3.1 oz)   01/07/24 0510 120 kg (263 lb 10.7 oz)   01/07/24 0349 120 kg (263 lb 10.7 oz)   01/06/24 0509 122 kg (269 lb 6.4 oz)   01/05/24 0556 111 kg (244 lb 11.4 oz)   01/04/24 0600 111 kg (245 lb 6 oz)   01/03/24 0600 111 kg (245 lb 6 oz)   01/02/24 1131 120 kg (265 lb)         Assessment:    DKA (diabetic ketoacidosis)    UTI (urinary tract infection)    Morbid obesity with BMI of 40.0-44.9, adult    JADEN (acute kidney injury)    Sepsis, unspecified organism    Lactic acidosis    UPJ (ureteropelvic junction) obstruction bilateral    Bilateral hydronephrosis moderate on admission    Renal calculus on right, nonobstructinbg    Retroperitoneal abscess due to right calyceal rupture    Cellulitis of abdominal wall in pannus    Extravasation of urine from calyceal rupture R kidney    Vitamin D deficiency    Essential hypertension    Hyperlipidemia    Hypothyroidism    Allergic rhinitis    Back pain    Chronic liver disease    Primary osteoarthritis of right knee    Contact dermatitis    Anxiety    Dense breast tissue on mammogram    Bilateral hydrocele      Attending Physician Assessment and Plan:    1.  Cystitis/UTI with sepsis associated with bilateral UPJ obstruction and moderate bilateral hydronephrosis and possible right calyceal rupture.  Patient resuscitated aggressively with IV fluids.  Broad-spectrum antibiotics, Zosyn, started.  Urology consult  is placed.  Patient being followed carefully in ICU for additional complications or signs of worsening sepsis.  Culture of urine remains negative on day #2.  New cultures collected at time of cystoscopy today by urology.  Patient currently off Zosyn which was stopped after surgery yesterday.  We will repeat blood cultures since we did have 1 of 2 initial blood cultures positive.  ID has been consulted and is following with us.  They have started the patient on fluconazole secondary to the instrumentation yesterday and yeast that is seen in hearing.  I do plan to check a procalcitonin level with a.m. labs tomorrow.  Patient also started on vancomycin due to 2 of 2 positive cultures from blood.  Additional blood cultures pending at present time.  ID continues to follow on 1/9/2024.  Vancomycin and micafungin are continued for treatment of ongoing infections.  Antibiotics unchanged at this point.  Hoping culture from fluid collection obtained in IR today will be useful in guiding antibiotic therapy.  ID continues to follow.  Vancomycin still ongoing.  Final decision on treatment to come soon with respect to cultures.  Awaiting final culture results.  Lab resubmitting blood culture for ID.  Urine cultures negative.  Drainage from abscess culture still pending.  On 1/13/2024.  Still on vancomycin.  Awaiting final culture sensitivities.1/14/2024 patient beginning to show some elevations in WBC and low grade temperature elevations.  Repeat UA on day #14 unremarkable and urine culture pending 1/15/2024.  Now transitioning from vancomycin to IV Unasyn per ID.  Continue to follow and await final culture results.  Still awaiting final plan for antibiotics by ID with anticipated discharge to home in the next 2 to 3 days.  Should be ready for discharge on 1/19/2024.    2.  Anion gap metabolic acidosis versus possible diabetic ketoacidosis.  Improving rapidly at present time on DKA protocol with Glucomander.  Aggressive fluid  resuscitation initiated in the ER is continued in the ICU.  Patient now with good urinary output and hopefully will be able to come off of IV insulin and switch to subcu dosing overnight..  Anion gap has narrowed and improved.  Patient should come off Glucomander DKA protocol sometime later today.  On postop day #1 anion gap has significantly improved.  We are asking renal to help us with follow-up on her acute kidney injury and volume status.  Acidosis has resolved.  Acidosis has resolved.  Diabetes now under control with Lantus 16 units daily and sliding scale insulin anion gap remains normal now.  Anion gap now stable.     3.  Lactic acidosis possibly due to home use of metformin while taking no food or possibly due to acute sepsis.  Continuing rapid fluid resuscitation with improvement already noted in lactic acid.  Monitoring carefully for signs of any new changes or problems.  Lactic acidosis has also resolved on day #2 and patient's sepsis seems to be under much more stable condition at present time.  Repeat lactic acid in a.m. with other labs.  Lactic acidosis has resolved.  Continuing sliding scale insulin with slow increases in doses.    4.  Type 2 diabetes mellitus poorly controlled at present time with elevated A1c greater than 16.  Will probably need to arrange for follow-up with patient in endocrinology clinic.  For now we will ask diabetic educator to see patient and work on sliding scale insulin protocols.  Patient and her daughter are interested in Dexcom monitoring and I will see if that is available from the diabetic educator.  Discussed situation with the daughters and she may benefit from continuous 24-hour glucose monitoring issues willing to learn the process.  Will probably need to discharge charge on sliding scale insulin and may also consider mealtime insulin or long acting insulin.  Diabetic educator to see patient and work with us on diet, glucose testing techniques, and generalized  diabetes management.  Patient doing well on sliding scale with single dose of Lantus at nighttime.  Now the patient is eating more plan to increase Lantus to 16 units subcu daily.  We will plan on rechecking hemoglobin A1c in 6 to 8 weeks.  Diabetes much improved.  Stable on Lantus and sliding scale insulin.  Continuing to increase Lantus slowly to 18 units daily.  Splitting Lantus into 2 doses daily and will give 10 units subcu twice daily since glucose is still running a bit high.  Tolerating insulin well.  Patient eating better and blood sugars slightly higher.  Continuing Lovenox on 1/13/2024 but increasing dose to 12 units twice daily since blood sugars are still running higher above 200 especially later in the day.1/14/2023 further increasing Lantus dose to 14 units bid.  Will increase Lantus to 15 units twice daily on 1/14/2023.  May need to add some mealtime insulin if blood sugars remain elevated especially in the evening when it seems to get highest.  Lantus now boosted to 18 units twice daily hoping good control elevated glucoses a bit better.  May need to consider mealtime insulin but will continue to monitor.  Placed consult to diabetic educator for her input on this issue.  Continue to gradually increase Lantus to a total of 20 units twice daily as recommended by diabetic educator who says we should increase long-term insulin.  Can continue this process of slow increases on an outpatient basis.  Lantus has been increased to 20 units twice daily.  Continuing sliding scale insulin.  Discussed follow-up on outpatient basis with endocrinology.    5.  Pseudohyponatremia.  Seems to already be improving with resuscitation and resolution of the uncontrolled glucoses.  Will continue to follow electrolytes regularly.  Hyponatremia now corrected and in normal range.  Hyponatremia has now resolved on day #2. Sodium now normalized.Low sodium now reoccurring on 1/14/2024.  I am asking for nephrology to evaluate.   Nephrology is added fluid restriction.  Sodium is improved.  Continuing to monitor.     6.  Hypothyroidism.  Patient's Synthroid is continued by the ICU attending.  Will continue to follow this in the hospital and on an outpatient basis.  Hypothyroidism is a stable condition.  Have elected to increase levothyroxine to 150 mcg daily which hopefully will help with the elevation in the TSH.  Will check again in 4 to 6 weeks as outpatient and consider additional increase if needed.     7.  Hyperlipidemia.  Agree with holding statin for now.  Will resume prior to discharge and continue on outpatient basis.     8.  Obesity.  Hope to continue using agents such as Ozempic for management of patient's blood sugar levels.  This will also help with diet control and ongoing need for gradual weight loss.     9.  Acute kidney injury secondary to volume depletion with uncontrolled glucoses.  Should improve as patient's volume status normalizes.  Will continue to monitor closely.  Acute kidney injury is gradually improving with resolution of hydronephrosis and bilateral stent placement.  Acute kidney injury totally resolved.     10.  Generalized muscle weakness and fatigue.  Suspect related to the poorly controlled diabetes and electrolyte disturbances.  Will do PT and OT evaluations after patient stabilizes.  Suspect patient will discharge to home with PT and OT and home environment.  Hopefully patient will be able to work with PT and OT in the next few days.  Muscle strengthening ongoing.  Patient improving with PT and OT and seems ambulatory independently at present time.    11.  Right lower quadrant pain with fluid collections noticed on CT scan completed today 6 days after admission felt to represent retroperitoneal abscess due to right calyceal rupture with extravasation of urine.  Patient complaining of significant pain with erythematous skin over the right lower quadrant.  I will ask general surgery to take a look at the  situation in the a.m.  We will also notify urology of the findings in the a.m. no pain at present in right lower quadrant.  Continuing to monitor drainage which does persist at present time.  Planning repeat CT scan of abdomen in a.m.  in anticipation of upcoming discharge to home.  Repeat CAT scan done and shows persistent but somewhat smaller retroperitoneal abscess at this point.  Will continue drain and urology has added regular irrigation of the drain.  Hopefully will be able to go home in the next 2 to 3 days if final antibiotic plan can be made.  Final antibiotic plan to be suggested by ID in AM.  Patient to be discharged on Augmentin and fluconazole with course of treatment to be determined.        Plan for disposition:Where: home and home health and When: Tomorrow     Copied text in this note has been reviewed by me and is accurate as of 01/18/2024.  Much of this dictation was completed using Dragon voice activated transcription software which can result in misspelled words and nonsensical phrases at times.     Dr. Fairchild available and can be reached at [phone]      Dean Fairchild MD  1/18/2024  1941 EST

## 2024-01-18 NOTE — PROGRESS NOTES
[facility]  INTERNAL MEDICINE  DEAN FAIRCHILD MD  [address] [place] [address]  Phone [phone] Fax [phone]  E-mail:  [email or web address]      INTERNAL MEDICINE DAILY PROGRESS NOTE  Dean Fairchild M.D.  2024            Patient Identification:  Name: Tiana Hudson  Age: 63 y.o.  Sex: female  :  1960  MRN: [id number]         Primary Care Physician: Dean Fairchild MD  LENGTH OF STAY 16 DAYS    Consults       Date and Time Order Name Status Description    2024  1:42 PM Inpatient Urology Consult Completed     1/15/2024  5:58 AM Inpatient Nephrology Consult Completed     2024  3:42 AM Inpatient General Surgery Consult Completed     2024  5:10 AM Inpatient Nephrology Consult Completed     2024  2:00 PM Inpatient Infectious Diseases Consult Completed     1/3/2024 10:27 AM Inpatient Urology Consult Completed     2024 11:55 PM Urology (on-call MD unless specified) Completed     2024  3:45 PM IP General Consult (Use specialty-specific consult if known) Completed             Chief Complaint: Abdominal/flank pain with DKA, acute cystitis and sepsis, and possible calyceal rupture in the kidney     History of Present Illness:     Subjective   Interval History: Patient is a 63 y.o.female who presented at my request to the emergency room at [facility] with complaint of acute abdominal/flank pain and history of recurrent kidney stones.  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  Patient has been followed for the last few months by Dr. Rei Calvert in urology for renal calculi and actually has had a stent placed in the right kidney due to obstruction from her renal  stones.  It is also noted that the patient had a recent lithotripsy.  Patient began to feel poorly after the recent Christmas holiday and became more severely ill over the last 3 to 4 days prior to this admission.  She has felt extremely fatigued, dizzy at times, tired, nauseated, and has spent most of her time in bed sleeping.  Family has had difficulty getting the patient to take any oral intake and became quite concerned as her condition continued to worsen.  Patient was attempting to go to work today but really was too dizzy to get in the car to drive and 2-week to actually work.  After urging from her daughter, patient called me with respect to the symptoms and at my request went to the ER for evaluation.  Patient has multiple medical comorbidities that complicate her medical care.These include most significantly recurrent urinary tract infections due to her nephrolithiasis, morbid obesity with BMI greater than 40, diabetes mellitus type 2 poorly controlled with recent addition of Ozempic to her metformin therapy, vitamin D deficiency, essential hypertension, hyperlipidemia, hypothyroidism, allergic rhinitis, back pain, contact dermatitis, anxiety, and history of dense breast tissue on mammograms.     Patient was evaluated in the emergency room by Angelito Winkler MD who was the ER attending on call time of her arrival.  Patient was seen on 1/2/2024 at 1544 by Dr. Winkler.  His HPI was consistent with the story which I given above.  Patient denied dysuria, fever, chills on his evaluation.  She did admit to having emesis and actually had some emesis while in the emergency room.  Review of systems in the emergency room was positive for diffuse abdominal pain, and vomiting.  Patient also was noted to have some significant flank pain.  All other systems reviewed were negative in the emergency room vital signs on arrival in the emergency room showed temperature of 96 °F, heart rate of 114, respiratory rate of 16,  blood pressure 125/72, and O2 sat of 98%.  Patient was described as ill-appearing but in no acute distress.  She did have severe tenderness to palpation more on the right side of the abdomen and out into the right flank area with voluntary guarding.  Labs collected in the ER showed that her lactate level was elevated at 6.0.  She had a lipase of 23, her white blood cell count was 20.12, her hemoglobin was 11.2, and her platelet count was 568,000.  Patient did have a phosphorus of 4.2, magnesium 2.0, osmolality of 317, hemoglobin A1c is 16.90, and UA showed specific gravity 1.027, 3+ glucose, moderate blood 2+, leukocyte esterase moderate 2+, nitrates negative, white blood cells 6-10, red blood cells 6-10, bacteria 2+.  Her CMP showed a glucose of 978, creatinine of 1.82, sodium of 115, potassium of 4.2, chloride of 76, CO2 of 15.7, albumin 2.4, AST 33, alk phos 220, anion gap of 23, EGFR of 31.3.  Patient did have a CT scan of her abdomen and pelvis completed which showed loculated fluid throughout the right perinephric space felt to possibly be secondary to right calyceal rupture, new mild to moderate wall thickening of the right renal pelvis and renal calyces, bilateral UPJ obstruction with moderate bilateral hydronephrosis on the left, mild new dilation of right ureter, nonobstructing right renal calculi, hepatic morphology suggestive of chronic liver disease, and reactive lymph nodes in the retrocaval area.  A chest x-ray was done which showed no acute disease other than minimal atelectasis at the base of the right lung.  Patient underwent aggressive fluid resuscitation in the emergency room and received over 3 L of IV fluid.  She was placed on an insulin drip and was also started on Zosyn therapy.  She was followed on sepsis protocol as well as DKA protocol.  Patient did receive morphine 4 mg for pain x 2.  Case was discussed with myself as her primary care attending.  Urology was also contacted about  situation.  I did suggest that patient be admitted to the ICU for treatment of the DKA and severe sepsis picture.  Dr. Arthur Tena did agree to the ICU admission and will be following her in the CCU for pulmonary/critical care.  Final diagnoses in the ER were sepsis due to unspecified organism and acute cystitis without significant hematuria.     1/2/2024.  I personally saw the patient for the first time during this hospitalization on this date in the coronary care unit room #335.  Patient was resting quietly in bed and did a peer acutely ill.  She did wake to her name and spoke a few words before falling back asleep.  Patient's 2 daughters were at the foot of her bed and did discuss the case at length with me.  Daughter Enoch, is a former nurse from here at [facility], and now works at [employer] with the hospitalist group there.  Patient has responded well to the [facility] DKA protocols and blood sugars have gradually come down to near normal levels for the patient in the 1  range.  I will full PPE for the exam including an N95 face mask, goggles, white lab coat, and gloves when touching patient.  I performed thorough hand hygiene before and after the patient visit.  Patient did have a venous blood gas which showed pH 7.40, pCO2 28, pO2 of 31, and O2 sat of 62%.  She also had recent electrolytes which showed a sodium of 131, potassium 3.4, chloride 96, CO2 19.1, BUN 17, creatinine 1.15, estimated GFR now up to 54.3.  Lactate level has been coming steadily down and currently at 2.8.  Additional labs ordered for tomorrow a.m.  Patient has been able to urinate several times and daughters have helped her up to the bathroom for this.  I actually note that she did have some urinary frequency and incontinence at home which is unusual for her.  I did speak briefly to the patient's daytime nurse who was departing soon after I arrived on the floor.  I have reviewed Dr. Arthur Tena's notes and his admission history  and physical.  I do agree completely with his plan of care at present time.  Supportive care is continued to be offered for the sepsis with careful monitoring in the ICU.  Patient's anion gap metabolic acidosis probably is related to lactic acidosis and she was continuing to take metformin even when she was not able to eat.  IV fluids are continued aggressively.  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  Probably will plan sliding scale insulin for stabilization of diabetes while here in the hospital and will train patient to continue that in the home environment.  Pseudohyponatremia will be corrected with the IV fluids.  Hypothyroidism will be addressed with continued Synthroid.  Statins are held for now.  Obesity is an ongoing problem for the patient.  At the time my exam, patient is medically stable and slowly improving.  I did relay this to the daughter to agree with my assessment.  We will continue to follow the patient with you and we will be happy to assume care of the patient when she is stable and ready to be transferred out of the ICU.  I can be reached directly for any concerns or questions at [phone].    1/3/2024.  Patient is seen again today in her room in the CCU #335.  Patient was resting quietly in bed at the time of my visit and states that her daughters were downstairs getting a bite to eat.  I did discuss the case with the patient's nurse, Tiana, who tells me that she is being preop and should be going down soon for cystoscopy and stent placement.  I will full PPE for the exam including an N95 face mask, goggles, white lab coat, and gloves when touching patient.  I performed thorough hand hygiene before and after the patient visit.  Patient is more alert today but still somewhat confused about details of her medical case.  She is able to carry on a conversation with me though and joke a bit with me while  I am present in the room.  She does understand she has issues because of the obstruction bilaterally of her ureters that has resulted in hydronephrosis and hopefully can be relieved by the cystoscopy that is planned for later this morning.  Patient is still on an insulin drip but is drastically improved compared to where she was yesterday at this time.  Review of labs shows that her sodium is now up to 133, her CO2 is 19.1, glucose is now at 134, phosphorus is at 2.1, lactate is normalized at 1.9, white blood cell count today is 18.93, hemoglobin is 9.6 today, lakelets are normal today,Blood culture from the first day of hospitalization shows anaerobic bottle gram-positive bacilli growth with identification still ongoing.  Fungal and body fluid cultures are still negative.  Urine culture remains negative.  Dr. Oquendo was following the patient for critical care today.  He has continued her antibiotics and is awaiting urology input.  Fluid resuscitation is continued.  Blood pressure now seems well-controlled.  DVT prophylaxis in place.  He had a lengthy discussion with patient's family at the time of his rounds.  Urology has seen the patient in consultation and is planning to do cystoscopy later today.  Dietitian is following the patient's case and recommending input treatment as needed.Dr. Armando did perform a cystoscopy with ureteral catheterization and stent insertion bilaterally.  His pre-op diagnosis and postop diagnoses were urosepsis with bilateral UPJ obstruction.  He did feel that he had successfully decompressed the blockages and the hydronephrotic changes.  He did speak with patient's daughter postoperatively.  Patient does seem to be much improved today.  Vital signs still show no fever.  Blood pressure is relatively stable.  Will continue to follow with you.    1/4/2024.  Patient is seen again today in her room in the CCU #335.  Patient has transitions of critical care and is in overflow for care on the  floor.  Patient's daughter is present at bedside at the time of my visit.  Patient is much more awake and alert today.  She is more interactive and talkative.  I did discuss the case with the patient's nurse.  We are going to obtain an ID consult today for help with antibiotic management and we also are going to transition the patient to regular sliding scale insulin as we begin the teaching process of getting patient on her own regimen of sliding scale insulin at the time of discharge to home.  We have also started patient on Lantus therapy at 10 units for now and may need to titrate up.  I wore full PPE for the exam today including an N95 face mask, goggles, white lab coat, and gloves when touching patient.  I performed thorough hand hygiene before and after the patient visit.  Specialist have seen patient today and their care is summarized below.Dr. Najera, the critical care attending today, felt that her mentation was slow but answering questions appropriately.  No other major findings were discovered at present time.  Antibiotics were continued for sepsis was felt to be significantly improved with anion gap now closed and lactic acidosis much better.  IV antibiotics were continued awaiting final culture results and I did consult ID to see patient for their input on the situation.  Dr. Devon Armando who completed bilateral stent placement for bilateral OP J obstruction and urosepsis is pleased with progress on day 1 postop.  He notes that urine output has been excellent.  Dr. Us from infectious disease did see the patient in consultation.  He notes that Zosyn antibiotics have been discontinued by Dr. Armando after her successful surgery.  White blood cell count is down to 17.08 today with hematocrit of 30.7.  Glucose max was 250 today estimated GFR is up to 54 and CO2 is 19.0.  He feels the significance of the positive blood culture is unclear at this point with only 1 of 2 bottles positive for  gram-positive bacilli which normally would represent a contamination.  He is planning to repeat the blood cultures and will follow-up.  Urine culture was also negative except for yeast growth and due to the recent  instrumentation he has started the patient on fluconazole 400 mg daily while cultures are pending.  New blood culture has been sent.  We will check again the lactic acid, procalcitonin, sed rate, and CRP with a.m. labs tomorrow.  Labs relatively stable today though I do note that sodium is dropped a bit to 127.  CO2 remains slightly low at 18.0 and chloride slightly low at 97.  Patient's glucoses running in the upper 100s and low 200s at present.  Electrolytes otherwise seem fairly stable.  White count is still elevated somewhat at 17.08 despite stenting yesterday.  Patient currently off antibiotics and being followed carefully by ID.  Plan to check procalcitonin and repeat blood culture with a.m. labs.  Hemoglobin is at 10.0.  Otherwise patient appears very stable at present time.  I do agree that her mentation is a little bit slower than usual but I suspect this will continue to improve as we get further from her acute DKA event.  Treatment plan reviewed with patient and her daughter today.    1/05/2024.  Patient is seen again today in her room in the CCU #335.  Patient resting quietly in bed and daughter Enoch is at bedside.  I spoke with patient's nurse during the day to discuss occasional changes in her treatment plan.  I wore full PPE for the exam including an N95 facemask, goggles, white lab coat, and gloves when touching patient.  I performed thorough hand hygiene before and after the patient visit.  Patient is much more alert today and more like her usual self.  She does laugh and participate in conversation.  Mother and daughter were very impressed with Dr. Sánchez's excellent review with them of the patient's renal condition and with the time he has been going over the various diagnostic  images that have been done up to date.  He was pleased to see how significantly improved electrolytes are and felt the patient was remaining hemodynamically stable at present time.  Dr. Us from ID did see the patient.  Blood cultures were repeated but original cultures are now positive 2 out of 2 for gram-positive bacilli which is more consistent with a true infection.  He started patient on vancomycin and will be following up on ID and's susceptibilities of the organisms.  He also continued the fluconazole 400 mg daily while following up on cultures from perinephric fluid.The pulmonary intensivist Dr. Arthur Tena saw patient briefly and found no pulmonary or critical care needs at this time.  Dr. Armando from neurology saw patient on postop day #2 after stent placement for bilateral UPJ obstruction.  Indicates that Perales catheter can be removed at any time.  Occupational Therapy began to work with the patient and feels that she will benefit from further skilled occupational therapy.  She did some bedside exercises and did do some sort steps.  She was able to sit up on the edge of the bed for 8 to 10 minutes with to stand.  Physical therapy also saw patient and felt she would continue to benefit from skilled physical therapy.  Pharmacist did consult with the patient on vancomycin and started at 750 mg IV every 12 hours.  They will be checking a trough level on 1/7/2024 at 8:30 AM.  Labs today are generally stable.  Glucose levels ranging in the 140s to 170s.  Sodium still slightly low at 132.  CO2 now normal at 22.5, albumin slightly low at 2.1, AST slightly up at 34 and alk phos slightly up at 270.  White blood cell count today is down to 13.04 and hemoglobin is stable at 10.3 with platelets of 412,000.  2 of 2 initial blood cultures were positive with final ID pending.  Sed rate remains greater than 130, procalcitonin is 3.24, and C-reactive protein is at 23.85.  Patient is still on overflow status waiting for  a floor telemetry bed.    1/8/2024.  Patient is seen again today in her room on the coronary care unit #335.  Patient was actually up in a chair at the time of my visit and her daughter was present at bedside.  I wore full PPE for the exam including an N95 facemask, goggles, white lab coat, and gloves when touching patient.  I performed thorough hand hygiene before and after the patient visit.  Patient is feeling much better today.  She says that she was anxious to get up and was happy that the therapist left her up in the chair.  She is able to get to the bathroom but does need some assistance still for that endeavor.  Several specialist saw the patient and their care is summarized below.  Patient remains on vancomycin therapy and pharmacy is following the dose and adjusting levels.  Dr. Us from ID did see patient again today.  He notes that repeat blood cultures remain negative and initial blood cultures are still being evaluated.  He has continued vancomycin for now and will follow-up on ID and susceptibilities.  Urine culture growing Candida glabrata and tropicalis and he has transitioned her from fluconazole therapy to micafungin 100 mg daily for now.  White blood cell count is slightly increased today at 16.61.  He is cautiously monitoring the patient for any new signs of infection or changes.Dr. Armando from urology did see the patient and was pleased with his surgical result from cystoscopy last week with bilateral stent placement for treating UPJ obstruction.  He has signed off on the case and plans to have her follow-up with him in clinic in 7 to 10 days for reevaluation following discharge.Occupational Therapy did work with patient who reported pain level of 2 out of 10 focused mainly on right abdomen where she has some redness swelling and discomfort.  There was no rebound to my exam in this area but I did elect to go ahead and do a CT scan of abdomen and pelvis to evaluate the area since she did  have the extensive surgery not so long ago.  Patient is noted to fatigue quickly and has some functional deficits that need to be maximized prior to DC to home.  Patient is planning discharge to home with spouse and daughters.  MI requested diabetic educator did meet with the patient.  Patient does need a meter for glucose testing.  Planning 4 times a day test for now.  Will use insulin pen lispro at meals.  Dietitian did meet with patient and her daughter for input on diet.  Provided printed materials and discussion.  Will be available for concerns or questions.  Labs today generally were stable.  Sodium back up to 131.  Glucose max of 245.  Patient does admit she is eating more at this time.  White blood cell count is up slightly at 16.61 of concern to ID.  They are monitoring cultures carefully.  Hemoglobin did drop from 11.5 down to 9.3 today will recheck tomorrow.  CT abdomen and pelvis was completed.  Right ureteral stent present but there is mild distention of the right extrarenal pelvis which appears to be improved compared to the exam 6 days ago.  There is right urethrocele thickening.  There is a right perinephric collection consistent with hemorrhage or urinoma's and there is a new deep collection in the right posterior lateral abdominal wall measuring 3 cm in thickness by 13 cm transverse along with muscular thickening along the right lateral abdominal wall and edema within the right lateral abdominal pelvic subcutaneous fat.  There is enlargement of this fluid collection seen.  We will have nursing notify urology in a.m. of this finding for their input.  May want to consider general surgery consultation also if pain persist in this area.    1/9/2024.  Patient is seen again today in the CCU room #335.  Patient's daughter is at bedside and patient is resting quietly in bedside chair.  Patient appears much more awake and alert today back to her usual personality.  I wore full PPE for the exam including an  N95 face mask, goggles, white lab coat, and gloves when touching patient.  I performed thorough hand hygiene before and after the patient visit.  Nursing reports the patient has been using bedside commode with some urgency and loose bowel movements at times.  This is why she continues on IV vancomycin.  Patient did require O2 2 L per nasal cannula at night while sleeping.  No other new issues noted.  Dr. Us from ID saw patient again today.  He continues to monitor her blood cultures and has continued her on the IV vancomycin along with micafungin for treatment of yeast infection.  Urology did see patient regarding findings on CT scan.  These were reviewed with Dr. Salomon.  There are fluid collections in the right lateral abdominal pelvic wall measuring 12.8 cm x 18 cm x 3 cm which may represent hematomas or urinoma months.  Left renal pelvis is still dilated slightly.  Urology did speak with the IR department directly and arrange for CT-guided drain placement which the patient is agreeable to having.Physical therapy did work with the patient today.  She required contact-guard assist for standing and ambulated 15 feet with no assist.  Patient was minimally unsteady and fatigued quickly though patient indicated these were the usual distances that she does ambulate.  Patient plans to DC home with home health and assistance from her daughter.  PT will continue to follow and advance as able.  Patient encouraged to get up in chair for all meals and ambulate at least 3 times a day to promote functional mobility during hospital stay.  General surgery did see patient at my request regarding the right lower quadrant pain.  They feel that it is primarily due to the retroperitoneal fluid collections which have increased in size.  They deferred further treatment decisions to urology and felt there was no intraperitoneal abnormalities noted.  Labs today do show glucose is up slightly more as patient is eating more.  Lantus  has been increased for today to 16 units once daily.  Sodium is now up to 133.  Glucose was up at 212 this a.m. white blood cell count is improved down from 16.61 to a level of 14.58 today.  Hemoglobin is up slightly at 10.1 and does remain stable.  Platelet count is also stable at 439,000.  Patient planning discharge to home over the weekend if drain is able to successfully remove fluid from the retroperitoneal fluid collection that is causing pain in the right lower quadrant.  Final antibiotic plan is still pending from ID.  We are continuing to follow closely and encourage further activity on the part of the patient.    1/10/2024.  Patient is seen again today in her room in the CCU room #335.  Patient is up in chair at bedside watching television.  She recognizes me immediately upon my entry into her room and smiles appropriately.  I wore full PPE for the exam including an N95 facemask, goggles, white lab coat, and gloves.  I performed thorough hand hygiene before and after the patient visit.  I did discuss the patient's care with her nurse who was present in the [facility]ney outside her room.  Patient did have successful placement of the drain today in the pocket of fluid accumulation in the right lower abdominal wall.  Procedure was felt to be quite successful and 50 cc of fluid were sent to the lab for evaluation and analysis.  Patient has an ongoing drain that has very opaque brownish-white material draining from the fluid collection at present time.  Patient states that she feels 100% better with significant improvement in the pain that she was experiencing in the right lower quadrant prior to this drainage.  Vital signs today indicate no fevers with current temperature of 98.1 pulse 86 respiratory rate 18 blood pressure 120/65 and oxygen sat 96% on room air.Review of patient's labs shows that her sodium is very stable at 132 glucoses have been primarily in the 1 43-1 63 range today.  Patient's white blood cell  count continues to come down slowly and is at 12.93 and her hemoglobin is stable at 10.3.  Body fluid culture from drainage is pending.  Patient does remain on Lantus 16 units along with sliding scale insulin at present time and seems to be very comfortable with management of her diabetes.  We will plan follow-up on outpatient basis with endocrinology following discharge.  Patient was seen by Dr. Us today and he has continued the vancomycin therapy awaiting culture results from the fluid drained from her right lower quadrant and awaiting final ID is on organism which grew from blood earlier in admission.  He is still continuing to treat for fungal infection that was collected at time of drainage from stents placed in ureters and is using micafungin for treatment of this infection.  Dr. Campos performed procedure in radiology without complications or problems.  He has suggested that patient have a repeat CT scan of the area prior to discharge to home.    1/11/2023.  Patient is seen again today in her room on CCU room #335.  Patient is up in her chair watching television and indicates to me that she is often sleeping in the chair because it is easier to get up to urinate from the chair then to get up from the bed.  I did speak with the patient's nurse earlier in the day.  I wore full PPE for the exam including an N95 facemask, goggles, white lab coat, and gloves when touching patient.  I performed thorough hand hygiene before and after the patient.  Patient's appetite continues to slowly improve.  She also is gradually getting stronger and gaining more independence.  PT did discuss with her using a walker to ambulate because she seems to be much safer with that at the present time the patient is somewhat stubborn and insist on walking alone despite poor posture at times.  Review of today's labs show that her blood sugars have ranged from 156 up to a max of 389.  I will plan on increasing her insulin through her  Lantus by another 2 units each day up to 18 units daily.  Other labs are very stable.  Sodium is 131, BUN slightly low at 4, albumin low at 2.1.  White blood cell count continues to come down and is at 11.91 today.  Hemoglobin is at 9.8 and platelet count is 502,000.  Culture still pending from drain placement but so far with no growth.  Other cultures remain positive as before.  Per ID patient to continue on vancomycin for now awaiting those culture results.  Anticipate possible discharge the patient over the weekend depending on decisions that come from infectious disease.  Otherwise clinical condition seems to be quite stable.Review of vital signs reveals that temperature is 98.9, pulse 81, respirations 20, blood pressure 110/82 and her room air sat is 96%.  We continue to follow while awaiting decision on antibiotics.    1/12/2024.  Patient is seen once again today in her room in the CCU room #335.  Patient was resting quietly in bed at the time of my visit.  I wore full PPE for the exam including an N95 face mask, goggles, white lab coat, and gloves when touching patient.  I performed thorough hand hygiene before and after the patient visit.  Patient has had a very quiet day.  She was seen by Dr. Miller from urology earlier this morning.  Per his plan, drain in right retroperitoneal area is to be maintained until output is clear and scant and patient is felt to be infection free.  We still are awaiting culture results from the drain.  Urology plans to coordinate outpatient study of both ureters to determine patency with a follow-up in office 1 week after discharge.Dr. Us from infectious disease saw patient again also today.  Drainage cultures from the retroperitoneal drain remain pending.  For now he continued vancomycin.  He also talked with micro in the lab today about the prior positive blood cultures and they are going to resend the new sample due to the mixed sample that came out on prior study.  He is  continuing micafungin 100 mg daily for now due to the isolated Candida in patient's stents.  Pharmacy continues to adjust vancomycin levels.  New labs ordered for tomorrow AM.  Patient hoping for discharge soon if final decisions can be made regarding antibiotic therapy.  Vital signs remained stable with no fever pulse 82 respiratory rate 18 and blood pressure 128/64 with 94% on sat with room air.    1/13/2024.  Patient is seen again today in her room in the CCU room #335.  Patient is resting quietly in bed at the time of my visit watching television.  I did discuss the case briefly with the patient's nurse.  I wore full PPE for the exam including an N95 face mask, goggles, white lab coat, and gloves when touching the patient.  I performed thorough hand hygiene before and after the patient visit.  Patient remains in good spirits today and actually was more talkative than in the past.  Patient's  is helping from afar with a move that her daughter is doing today.  Most of the family is involved with moving the daughter to returning home.  Patient's drain continues to drain cloudy whitish pus-like appearing material with the amount of drainage decreased slightly.  Culture has grown lactobacillus which hopefully will be sensitive to the same vancomycin that the patient is taking for her blood culture that was positive.  Rereview of the sensitivity blood culture is still pending in the lab at present time.  ID did see the patient and will continue to follow.  Hopefully will have final cultures back by Monday and be able to discharge patient early in the week to home with home IV antibiotics as necessary.  Patient's daughter is an RN and will be able to coordinate infusion of those medications at home level.  Urology did see the patient today and suggested continuing the IV antibiotics and the right retroperitoneal drain.  They also plan to continue following for now.  No other major changes occurred with the  patient today.  We are continuing to follow until she is ready for discharge to home.    1/14/2024.  Patient is seen again today in her room on CCU #335.  Patient was resting quietly in bed watching television and her nurse was at bedside while phlebotomy was drawing her vancomycin trough level.  Patient not feeling as well today due to low grade temperature elevations.Tmax 99.1 overnight last night.  I wore full PPE for the exam including an N-95 face mask, goggles, gloves and white lab coat.  I performed thorough hand hygiene before and after the patient visit. Patient's retroperitoneal drain is still draining opaque, thick pus which has culture positive for lactobacillus.  We are still waiting on final culture results and sensitivities for both bacteria that have grown, one from the drainage and one from the blood.  Patient's appetite is decreased today.  Daughter visited earlier and helped her with bath and clean up.  Labs show sodium down to 125.(Requesting input from nephrology on the hyponatremia) Glucoses remain more elevated in last 24 hours possibly due to brewing infection. Planning to continue gradually increasing the Lantus dose to 14 units bid. WBC is up to 10.99 and hemoglobin is stable at 9.1.  Platelets are up to 550,000.  Will check UA, Sed rate / CRP in am.  Also plan to check AAS x-ray in am.  Will continue monitoring patient carefully.    1/15/2024.  Patient is seen again today in the CCU room #335.  Patient is resting quietly in bed but still appears a bit flushed.  No family is in the room at the present time.  I wore full PPE for the exam including an N95 facemask, goggles, white lab coat, and gloves when touching patient.  I performed thorough hand hygiene before and after the patient visit.  Patient is in a good mood today and anxious to go home soon.  We did have patient seen by the nephrology team today.  They feel the patient is actually somewhat hypervolemic and actually gave her 1 dose  of Lasix 20 mg IV stat.  They want to avoid hypotonic fluids with the patient.  They are checking a cortisol level urine sodium and urine osmolality.  They have placed the patient on fluid restriction at 1500 cc for 24 hours and will be continuing to follow labs tomorrow.  Dr. Us saw patient from infectious disease.  Send out blood cultures still are pending.  Fluconazole susceptibilities also pending for Candida glabrata.  He is continuing the patient on micafungin 100 mg daily for the Candida.  He is planning to de-escalate the antibiotics from vancomycin today to Unasyn 3 g every 6 hours and plans to follow further.  Hopefully can transition to an oral antibiotic at the time of discharge to home.  Physical therapy saw patient but patient and family declined treatment because she has had diarrhea all day and has been up and down on the bedside commode.  Labs show sodium up to 132 today.  Chloride 54.  Albumin remains low at 2.2.  Cortisol level 23.90 appears normal.  TSH slightly up at 5.2.  C-reactive protein still high at 24.73.  White blood cell count up to 11.33.  Hemoglobin 8.9.  Sed rate still up at 84.  UA checked yesterday still relatively unremarkable with small leukocytes and small blood.  Urine culture so far negative.Abdominal x-ray series shows minimal atelectasis at right base.  Otherwise unremarkable.  UPJ drains appear to be still in correct place.Glucoses today have ranged from 321 glucoses today have ranged from 190 earlier this morning to a high of 321 later this evening.  Will continue to monitor tomorrow and consider an additional increase in Lantus if these elevations persist.    1/16/2024.  Patient is seen again today in her room in the CCU #335.  Patient is resting quietly in bed at the time of my visit but wakes as I enter the door immediately and starts talking with me.  She does appear more comfortable today, less flushed, no fevers.  Vital signs much improved with temperature of  98.9, pulse 97, respiratory rate 14, blood pressure 103/55, and room air sat of 94%.  Patient is now on Unasyn every 6 hours in place of the previous vancomycin.  I will full PPE for the exam including an N95 face mask, goggles, white lab coat, and gloves when touching patient.  I performed thorough hand hygiene before and after the patient visit.  I did review notes from specialist today and discussed the case with the patient's nurse.  Patient was seen by Dr. Harrington from renal.  She found that due to the elevated glucoses patient's sodium corrects to 132.  She did suggest increasing patient's TSH dose slightly which we will do.  Cortisol level was found to be normal.  Cultures of had no changes today.  Patient continues to show anemia of chronic disease.  Diabetes range has been from 1 91-3 21 today. Fluid restrictions were continued.  Oral diuretic given again today.  Dr. Us from ID continue the micafungin and the Unasyn.  Final ID plan for antibiotics still pending.  We will proceed to CT scan of abdomen and pelvis as requested previously by radiology prior to patient's discharge so that we can be sure that drain has effectively drained all the fluid in her abdominal wall.  This will also check on position of the UPJ stents.  I did complete order for rolling walker as requested by CCP.  I have sent a note to CCP  detailing additional discharge planning considerations which are somewhat contingent on whether or not ID feels home IV antibiotics and antifungals are necessary.  If this is the case, then I also feel patient needs home health also to coordinate infusions as well as to draw labs as we need them for monitoring of patient's infections and other issues such as her hyponatremia.  Blood glucose levels remain somewhat elevated despite the increased doses of Lantus that I have added.  In an effort to even better control this I would like to increase the Lantus dose to 18 mg twice a day and hopefully we  will find that this reduces the need for such high doses of sliding scale insulin which has ranged from 4 units to 7 units over the course of the day.  Next step would be consideration of adding mealtime insulin if this does not resolve the problem.  I will also ask diabetic educator to again look at patient's regimen to see if she has any recommendations with respect to adjustment of doses.  We certainly are going to need to have this patient follow-up with endocrinology on an outpatient basis to work on better regulation of blood sugars.  Nurses do report that patient is supplementing her hospital diet with food brought in from home at times.  Overall patient seems to be fairly stable medically at present time.    1/17/2024.  Patient seen again today in the CCU room #335.  Patient is sitting up in the chair at bedside.  Daughter is laying on bed.  I wore full PPE for the exam including an N95 facemask, goggles, white lab coat, and gloves when touching patient.  I performed thorough hand hygiene before and after the patient visit.  Patient in good mood today and feeling stronger.  Has been working with physical therapy and doing quite successfully as long as she use a rollator walker.  Daughter decided that they will have a rollator walker to take home.  We did discuss the results of the CT scan of the persistent fluid that was seen on the scan.  I am going to consult urology to take a look at the situation again and Dr. Martinez did see patient later in the day.  We also discussed patient's food intake and encouraged her to take with food regularly.  The diabetic educator also saw the patient for me in consultation. Labs today are relatively stable.  Her sodium is 133 and corrects to 135.  She continues on a 1500 cc fluid limitation per day.  Blood sugars have ranged from a low of 168 up to a high of 333.  White blood cell count is up slightly at 10.93 and hemoglobin remains slightly low at 9.1 with platelet count of  541,000.  CT of abdomen and pelvis was completed at my request.  Findings did show a moderate right pleural effusion with partial collapse of the right lower lobe.  Effusion is slightly larger than it was on previous exam and left lung appears to be clear.  Right ureteral stent is present with mild distention of right renal pelvis similar to previous exam left ureteral stent is present and there is no change.  Drain has been placed since last film was done in 2 right posterior lateral abdominal wall fluid collection.  Collection shows a decrease in size though continues to contain bubbles of air and mild fluid.  Collection extends up to the iliac crest on the right.  There are also multi lobular collections that contain fluid and fat within the posterior medial upper abdominal wall this collection of fluid extends to the right adrenal gland and right kidney and is also similar to what was seen on 1/8/2024.  There is a collection along the anterior margin of the right psoas muscle which is increased in size from previous exam.  There is no free intraperitoneal gas other organs appeared normal.  There has been a previous cholecystectomy.  There is some edema in the subcutaneous fat along the right posterior lateral flank.  This CT scan was reviewed by Dr. Bill Martinez from Urology who consulted on the patient later in the day.  He did not feel was necessary to place a secondary drain as above is necessary to be patient and let the drain is already in do its work.  The drain needs to be irrigated periodically to keep it from becoming obstructed.  Patient has retroperitoneal abscess secondary to urinary extravasation from the calyceal rupture.  Etiology of the extravasation was confirmed by determining that the fluid coming from drain had creatinine level.  There is some associated cellulitis of her pannus on the right lower side also.  Infectious disease saw the patient again today and Dr. Us has continued Unasyn  therapy along with micafungin.  He relates no other comments today.  Dr. Harrington from renal did see the patient.  She has continued the patient on diuretic in the form of torsemide to help with fluid overload.  She has continued fluid restrictions and did discuss the case with the patient and her daughter.  Overall the patient seems very stable.  Hopefully will be able to move toward discharge in the next 48 to 72 hours if patient's stability persist.  Will train daughter to irrigate her drain on a regular basis.      Review of Systems:               Review of systems could not be obtained due to  patient confusion. patient nonverbal.       Past Medical History:   Diagnosis Date    Anemia     intermittently    Anxiety and depression     Arthritis     Depression     Diabetes mellitus     Gallstones     High cholesterol     History of frequent urinary tract infections     HX OF YEAST IN BLADDER HAS PRN DIFLUCAN    History of transfusion 05/24/2017    Had 2 iron infusions.  This was when i had knee replacement    Hyperlipidemia     Hypertension     Hypothyroidism     Knee pain, bilateral     Low back pain     Lumbar stenosis     PONV (postoperative nausea and vomiting)     Positive TB test     chest x-ray neg    Seasonal allergies      Past Surgical History:   Procedure Laterality Date    APPENDECTOMY N/A 1982    Dr. Wilson    CHOLECYSTECTOMY WITH INTRAOPERATIVE CHOLANGIOGRAM N/A 10/31/2018    Procedure: laparoscopic cholecystectomy;  Surgeon: Mary Kay Mac MD;  Location: [facility];  Service: General    COLONOSCOPY      CYSTOSCOPY BLADDER BIOPSY N/A 10/3/2016    Procedure: CYSTOSCOPY BLADDER BIOPSY;  Surgeon: Rei Calvert MD;  Location: [facility] OR;  Service:     CYSTOSCOPY W/ URETERAL STENT PLACEMENT Bilateral 1/3/2024    Procedure: CYSTOSCOPY BILATERAL RETROGRADE PYELOGRAM  BILATERAL URETERAL STENT INSERTION AND CATHETHER PLACEMENT;  Surgeon: Eduard Armando MD;  Location: [facility] OR;   Service: Urology;  Laterality: Bilateral;    DILATATION AND CURETTAGE N/A     ENDOSCOPY      INTRAUTERINE DEVICE INSERTION      KNEE ARTHROSCOPY W/ MENISCAL REPAIR Left     OVARIAN CYST REMOVAL Left     Dr. Wilson    TOTAL KNEE ARTHROPLASTY Right 2017    Procedure: RIGHT TOTAL KNEE ARTHROPLASTY WITH ALETA NAVIGATION;  Surgeon: Ross Cruz MD;  Location: [facility] OR;  Service:      Allergies   Allergen Reactions    Sulfa Antibiotics Hives and Rash       Family History   Problem Relation Age of Onset    Hepatitis Mother     Breast cancer Mother     Heart disease Mother     Cancer Mother     Hyperlipidemia Mother              Heart failure Father     Stroke Father     Hypertension Father         Since he was 72, now 86s    Diabetes Father     Heart disease Father     Hyperlipidemia Father         Unsurs    Heart disease Maternal Grandmother     Cancer Maternal Grandfather     COPD Maternal Grandfather     Arthritis Paternal Grandmother     Diabetes Maternal Aunt     Diabetes Paternal Uncle     Malig Hyperthermia Neg Hx        Social History     Socioeconomic History    Marital status:    Tobacco Use    Smoking status: Never    Smokeless tobacco: Never    Tobacco comments:     Never smoked   Vaping Use    Vaping Use: Never used   Substance and Sexual Activity    Alcohol use: Never     Alcohol/week: 3.0 standard drinks of alcohol     Types: 1 Glasses of wine, 2 Standard drinks or equivalent per week    Drug use: Never    Sexual activity: Not Currently     Partners: Male     Birth control/protection: I.U.D.       PMH, FH, SH and ROS completed with Admission History and Physical and updated in EPIC system.        Objective     Scheduled Meds:ampicillin-sulbactam, 3 g, Intravenous, Q6H  enoxaparin, 40 mg, Subcutaneous, Q12H  escitalopram, 10 mg, Oral, Daily  insulin glargine, 18 Units, Subcutaneous, Q12H  insulin lispro, 2-9 Units, Subcutaneous, 4x Daily AC & at Bedtime  levothyroxine,  "150 mcg, Oral, Q AM  micafungin (MYCAMINE) IV, 100 mg, Intravenous, Q24H  oxybutynin XL, 10 mg, Oral, Daily  pantoprazole, 40 mg, Oral, Q AM  rOPINIRole, 0.5 mg, Oral, Nightly  senna-docusate sodium, 2 tablet, Oral, BID  torsemide, 40 mg, Oral, Daily      Continuous Infusions:       Vital signs in last 24 hours:  Temp:  [97.3 °F (36.3 °C)-99.7 °F (37.6 °C)] 99.7 °F (37.6 °C)  Heart Rate:  [90-94] 93  Resp:  [15-18] 18  BP: ()/(56-71) 120/71    Intake/Output:    Intake/Output Summary (Last 24 hours) at 1/17/2024 2105  Last data filed at 1/17/2024 1800  Gross per 24 hour   Intake 2252 ml   Output 435 ml   Net 1817 ml         Exam:  /71 (BP Location: Left arm, Patient Position: Lying)   Pulse 93   Temp 99.7 °F (37.6 °C)   Resp 18   Ht 165.1 cm (65\")   Wt 111 kg (245 lb)   LMP 09/03/2016 Comment: IUD  SpO2 92%   BMI 40.77 kg/m²     Constitutional:  Patient resting in chair today with her daughter at bedside on bed.  Continues to be more alert.  Patient reports some loose stool today but improving.  Normal personality.  Pain significantly improved,.  Drain continues to produce opaque whitish-tan drainage. Blood pressures stable  Patient now feeling  better  WBC is now normal..  There is minimal cellulitis of the right pannus area   Eyes:     PERRLA, conjunctiva/corneas clear, no icterus, no conjunctival                                     pallor, EOM's intact, both eyes      ENT and Mouth: Lips, tongue, gums normal; oral mucosa pink and moist   Neck:     Supple, symmetrical, trachea midline, no JVD  Respiratory:     Clear to auscultation bilaterally, respirations unlabored  Cardiovascular:  Regular rate and rhythm, S1 and S2 normal, no murmur,      no  Rub or gallop.  Pulses normal.    Gastrointestinal:   BS present x 4 Soft, non-tender, bowel sounds active,      no masses, no hepatosplenomegaly    right lower quadrant pain almost totally resolved with no rebound guarding or other abnormalities.  " Retroperitoneal drain remains in place.  Will plan CT scan prior to discharge.                                               :       No hernia.  Normal exam for sex.         Musculoskeletal: Extremities normal, atraumatic, no cyanosis or edema     No arthropathy.  No deformity.  Gait normal                                                 Skin:   Skin is warm and dry,  no rashes, swelling or palpable lesions.  Erythema resolved over right lower quadrant area pain   Neurologic:  CN -XII intact, motor strength grossly intact, sensation grossly intact to light touch, no focal reflex deficits noted    Psychiatric:     Alert,oriented X3, no delusions, psychoses, depression or anxiety    Heme/Lymph/Imun:   No bruises, petechiae.  Lymph nodes normal in size/configuration       Data Review:  Lab Results   Component Value Date    CALCIUM 8.7 01/17/2024    PHOS 3.8 01/17/2024     Results from last 7 days   Lab Units 01/17/24  0747 01/16/24  1709 01/16/24  0715 01/15/24  0714   AST (SGOT) U/L 19  --  15 14   ALT (SGPT) U/L 9  --  8 7   MAGNESIUM mg/dL 1.8  --   --   --    SODIUM mmol/L 133*  --  130* 132*   POTASSIUM mmol/L 3.9 4.5 3.6 3.9   CHLORIDE mmol/L 91*  --  92* 94*   CO2 mmol/L 28.4  --  25.8 26.5   BUN mg/dL 8  --  7* 6*   CREATININE mg/dL 0.84  --  0.73 0.81   GLUCOSE mg/dL 168*  --  191* 215*   CALCIUM mg/dL 8.7  --  8.6 8.6   WBC 10*3/mm3 10.93*  --  9.56 11.33*   HEMOGLOBIN g/dL 9.1*  --  9.2* 8.9*   PLATELETS 10*3/mm3 541*  --  534* 603*     Lab Results   Component Value Date    TROPONINT <6 01/02/2024     Estimated Creatinine Clearance: 87.3 mL/min (by C-G formula based on SCr of 0.84 mg/dL).  WEIGHTS:     Wt Readings from Last 1 Encounters:   01/17/24 0700 111 kg (245 lb)   01/17/24 0407 110 kg (242 lb 8.1 oz)   01/16/24 0529 108 kg (238 lb 1.6 oz)   01/14/24 0600 115 kg (254 lb 10.1 oz)   01/13/24 0627 116 kg (255 lb 1.2 oz)   01/12/24 0605 115 kg (254 lb 3.1 oz)   01/11/24 0600 113 kg (249 lb)   01/09/24  0653 114 kg (251 lb 3.2 oz)   01/08/24 0700 115 kg (254 lb 3.1 oz)   01/07/24 0510 120 kg (263 lb 10.7 oz)   01/07/24 0349 120 kg (263 lb 10.7 oz)   01/06/24 0509 122 kg (269 lb 6.4 oz)   01/05/24 0556 111 kg (244 lb 11.4 oz)   01/04/24 0600 111 kg (245 lb 6 oz)   01/03/24 0600 111 kg (245 lb 6 oz)   01/02/24 1131 120 kg (265 lb)         Assessment:    DKA (diabetic ketoacidosis)    UTI (urinary tract infection)    Morbid obesity with BMI of 40.0-44.9, adult    JADEN (acute kidney injury)    Sepsis, unspecified organism    Lactic acidosis    UPJ (ureteropelvic junction) obstruction bilateral    Bilateral hydronephrosis moderate on admission    Renal calculus on right, nonobstructinbg    Vitamin D deficiency    Essential hypertension    Hyperlipidemia    Hypothyroidism    Allergic rhinitis    Back pain    Chronic liver disease    Primary osteoarthritis of right knee    Contact dermatitis    Anxiety    Dense breast tissue on mammogram    Bilateral hydrocele      Attending Physician Assessment and Plan:    1.  Cystitis/UTI with sepsis associated with bilateral UPJ obstruction and moderate bilateral hydronephrosis and possible right calyceal rupture.  Patient resuscitated aggressively with IV fluids.  Broad-spectrum antibiotics, Zosyn, started.  Urology consult is placed.  Patient being followed carefully in ICU for additional complications or signs of worsening sepsis.  Culture of urine remains negative on day #2.  New cultures collected at time of cystoscopy today by urology.  Patient currently off Zosyn which was stopped after surgery yesterday.  We will repeat blood cultures since we did have 1 of 2 initial blood cultures positive.  ID has been consulted and is following with us.  They have started the patient on fluconazole secondary to the instrumentation yesterday and yeast that is seen in hearing.  I do plan to check a procalcitonin level with a.m. labs tomorrow.  Patient also started on vancomycin due to 2 of 2  positive cultures from blood.  Additional blood cultures pending at present time.  ID continues to follow on 1/9/2024.  Vancomycin and micafungin are continued for treatment of ongoing infections.  Antibiotics unchanged at this point.  Hoping culture from fluid collection obtained in IR today will be useful in guiding antibiotic therapy.  ID continues to follow.  Vancomycin still ongoing.  Final decision on treatment to come soon with respect to cultures.  Awaiting final culture results.  Lab resubmitting blood culture for ID.  Urine cultures negative.  Drainage from abscess culture still pending.  On 1/13/2024.  Still on vancomycin.  Awaiting final culture sensitivities.1/14/2024 patient beginning to show some elevations in WBC and low grade temperature elevations.  Repeat UA on day #14 unremarkable and urine culture pending 1/15/2024.  Now transitioning from vancomycin to IV Unasyn per ID.  Continue to follow and await final culture results.  Still awaiting final plan for antibiotics by ID with anticipated discharge to home in the next 2 to 3 days.    2.  Anion gap metabolic acidosis versus possible diabetic ketoacidosis.  Improving rapidly at present time on DKA protocol with Glucomander.  Aggressive fluid resuscitation initiated in the ER is continued in the ICU.  Patient now with good urinary output and hopefully will be able to come off of IV insulin and switch to subcu dosing overnight..  Anion gap has narrowed and improved.  Patient should come off Glucomander DKA protocol sometime later today.  On postop day #1 anion gap has significantly improved.  We are asking renal to help us with follow-up on her acute kidney injury and volume status.  Acidosis has resolved.  Acidosis has resolved.  Diabetes now under control with Lantus 16 units daily and sliding scale insulin anion gap remains normal now.     3.  Lactic acidosis possibly due to home use of metformin while taking no food or possibly due to acute  sepsis.  Continuing rapid fluid resuscitation with improvement already noted in lactic acid.  Monitoring carefully for signs of any new changes or problems.  Lactic acidosis has also resolved on day #2 and patient's sepsis seems to be under much more stable condition at present time.  Repeat lactic acid in a.m. with other labs.  Lactic acidosis has resolved.  Continuing sliding scale insulin with slow increases in doses.    4.  Type 2 diabetes mellitus poorly controlled at present time with elevated A1c greater than 16.  Will probably need to arrange for follow-up with patient in endocrinology clinic.  For now we will ask diabetic educator to see patient and work on sliding scale insulin protocols.  Patient and her daughter are interested in Dexcom monitoring and I will see if that is available from the diabetic educator.  Discussed situation with the daughters and she may benefit from continuous 24-hour glucose monitoring issues willing to learn the process.  Will probably need to discharge charge on sliding scale insulin and may also consider mealtime insulin or long acting insulin.  Diabetic educator to see patient and work with us on diet, glucose testing techniques, and generalized diabetes management.  Patient doing well on sliding scale with single dose of Lantus at nighttime.  Now the patient is eating more plan to increase Lantus to 16 units subcu daily.  We will plan on rechecking hemoglobin A1c in 6 to 8 weeks.  Diabetes much improved.  Stable on Lantus and sliding scale insulin.  Continuing to increase Lantus slowly to 18 units daily.  Splitting Lantus into 2 doses daily and will give 10 units subcu twice daily since glucose is still running a bit high.  Tolerating insulin well.  Patient eating better and blood sugars slightly higher.  Continuing Lovenox on 1/13/2024 but increasing dose to 12 units twice daily since blood sugars are still running higher above 200 especially later in the day.1/14/2023  further increasing Lantus dose to 14 units bid.  Will increase Lantus to 15 units twice daily on 1/14/2023.  May need to add some mealtime insulin if blood sugars remain elevated especially in the evening when it seems to get highest.  Lantus now boosted to 18 units twice daily hoping good control elevated glucoses a bit better.  May need to consider mealtime insulin but will continue to monitor.  Placed consult to diabetic educator for her input on this issue.  Continue to gradually increase Lantus to a total of 20 units twice daily as recommended by diabetic educator who says we should increase long-term insulin.  Can continue this process of slow increases on an outpatient basis.    5.  Pseudohyponatremia.  Seems to already be improving with resuscitation and resolution of the uncontrolled glucoses.  Will continue to follow electrolytes regularly.  Hyponatremia now corrected and in normal range.  Hyponatremia has now resolved on day #2. Sodium now normalized.Low sodium now reoccurring on 1/14/2024.  I am asking for nephrology to evaluate.  Nephrology is added fluid restriction.  Sodium is improved.  Continuing to monitor.     6.  Hypothyroidism.  Patient's Synthroid is continued by the ICU attending.  Will continue to follow this in the hospital and on an outpatient basis.  Hypothyroidism is a stable condition.  Have elected to increase levothyroxine to 150 mcg daily which hopefully will help with the elevation in the TSH.  Will check again in 4 to 6 weeks as outpatient and consider additional increase if needed.     7.  Hyperlipidemia.  Agree with holding statin for now.  Will resume prior to discharge and continue on outpatient basis.     8.  Obesity.  Hope to continue using agents such as Ozempic for management of patient's blood sugar levels.  This will also help with diet control and ongoing need for gradual weight loss.     9.  Acute kidney injury secondary to volume depletion with uncontrolled glucoses.   Should improve as patient's volume status normalizes.  Will continue to monitor closely.  Acute kidney injury is gradually improving with resolution of hydronephrosis and bilateral stent placement.  Acute kidney injury totally resolved.     10.  Generalized muscle weakness and fatigue.  Suspect related to the poorly controlled diabetes and electrolyte disturbances.  Will do PT and OT evaluations after patient stabilizes.  Suspect patient will discharge to home with PT and OT and home environment.  Hopefully patient will be able to work with PT and OT in the next few days.  Muscle strengthening ongoing.    11.  Right lower quadrant pain with fluid collections noticed on CT scan completed today 6 days after admission felt to represent retroperitoneal abscess due to right calyceal rupture with extravasation of urine.  Patient complaining of significant pain with erythematous skin over the right lower quadrant.  I will ask general surgery to take a look at the situation in the a.m.  We will also notify urology of the findings in the a.m. no pain at present in right lower quadrant.  Continuing to monitor drainage which does persist at present time.  Planning repeat CT scan of abdomen in a.m.  in anticipation of upcoming discharge to home.  Repeat CAT scan done and shows persistent but somewhat smaller retroperitoneal abscess at this point.  Will continue drain and urology has added regular irrigation of the drain.  Hopefully will be able to go home in the next 2 to 3 days if final antibiotic plan can be made.        Plan for disposition:Where: home and home health and When: 2-3 days when medically stable     Copied text in this note has been reviewed by me and is accurate as of 01/17/2024.  Much of this dictation was completed using Dragon voice activated transcription software which can result in misspelled words and nonsensical phrases at times.     Dr. Fairchild available and can be reached at [phone]      Dean ELMORE  MD [place]  1/17/2024  13:00 EST

## 2024-01-18 NOTE — PROGRESS NOTES
"  [facility] Progress Note    Chief Complaint: No new complaints    Is to go home tomorrow.  She is very ready for this and very happy.  She is comfortable with her drain care.    Review of Systems:    The following systems were reviewed and negative;  respiratory, cardiovascular, and gastrointestinal          Vital Signs  /64 (BP Location: Left arm, Patient Position: Lying)   Pulse 89   Temp 98.2 °F (36.8 °C) (Oral)   Resp 18   Ht 165.1 cm (65\")   Wt 103 kg (228 lb)   LMP 09/03/2016 Comment: IUD  SpO2 94%   BMI 37.94 kg/m²     Physical Exam:     General Appearance:    Alert, cooperative, NAD   HEENT:    No trauma, pupils reactive, hearing intact   Back:     No CVA tenderness   Lungs:     Respirations unlabored, no wheezing    Heart:    RRR, intact peripheral pulses   Abdomen:   Pannus with still some cellulitis but minimal tenderness drain output noted drain secured   :  Deferred   Extremities:   No edema, no deformity   Lymphatic:   No neck or groin LAD   Skin:   No bleeding, bruising or rashes   Neuro/Psych:   Orientation intact, mood/affect pleasant, no focal findings        Results Review:     I reviewed the patient's new clinical results.  Lab Results (last 24 hours)       Procedure Component Value Units Date/Time    Potassium [[id number]]  (Normal) Collected: 01/18/24 1750    Specimen: Blood Updated: 01/18/24 1822     Potassium 4.5 mmol/L      Comment: Slight hemolysis detected by analyzer. Result may be falsely elevated.       POC Glucose Once [[id number]]  (Abnormal) Collected: 01/18/24 1500    Specimen: Blood Updated: 01/18/24 1502     Glucose 264 mg/dL     POC Glucose Once [[id number]]  (Abnormal) Collected: 01/18/24 1109    Specimen: Blood Updated: 01/18/24 1112     Glucose 322 mg/dL     Comprehensive Metabolic Panel [[id number]]  (Abnormal) Collected: 01/18/24 0710    Specimen: Blood Updated: 01/18/24 0833     Glucose 178 mg/dL      BUN 8 mg/dL      Creatinine 0.84 mg/dL      Sodium 133 " mmol/L      Potassium 3.4 mmol/L      Chloride 89 mmol/L      CO2 30.9 mmol/L      Calcium 9.0 mg/dL      Total Protein 6.7 g/dL      Albumin 2.3 g/dL      ALT (SGPT) 12 U/L      AST (SGOT) 15 U/L      Alkaline Phosphatase 144 U/L      Total Bilirubin 0.5 mg/dL      Globulin 4.4 gm/dL      A/G Ratio 0.5 g/dL      BUN/Creatinine Ratio 9.5     Anion Gap 13.1 mmol/L      eGFR 79.2 mL/min/1.73     Narrative:      GFR Normal >60  Chronic Kidney Disease <60  Kidney Failure <15      CBC & Differential [[id number]]  (Abnormal) Collected: 01/18/24 0710    Specimen: Blood Updated: 01/18/24 0823    Narrative:      The following orders were created for panel order CBC & Differential.  Procedure                               Abnormality         Status                     ---------                               -----------         ------                     CBC Auto Differential[[id number]]        Abnormal            Final result                 Please view results for these tests on the individual orders.    CBC Auto Differential [[id number]]  (Abnormal) Collected: 01/18/24 0710    Specimen: Blood Updated: 01/18/24 0823     WBC 9.87 10*3/mm3      RBC 3.37 10*6/mm3      Hemoglobin 8.6 g/dL      Hematocrit 28.1 %      MCV 83.4 fL      MCH 25.5 pg      MCHC 30.6 g/dL      RDW 14.3 %      RDW-SD 43.1 fl      MPV 9.4 fL      Platelets 514 10*3/mm3      Neutrophil % 67.2 %      Lymphocyte % 14.1 %      Monocyte % 14.6 %      Eosinophil % 2.2 %      Basophil % 1.0 %      Immature Grans % 0.9 %      Neutrophils, Absolute 6.63 10*3/mm3      Lymphocytes, Absolute 1.39 10*3/mm3      Monocytes, Absolute 1.44 10*3/mm3      Eosinophils, Absolute 0.22 10*3/mm3      Basophils, Absolute 0.10 10*3/mm3      Immature Grans, Absolute 0.09 10*3/mm3      nRBC 0.0 /100 WBC     POC Glucose Once [[id number]]  (Abnormal) Collected: 01/18/24 0721    Specimen: Blood Updated: 01/18/24 0722     Glucose 190 mg/dL     POC Glucose Once [[id number]]  (Abnormal)  Collected: 01/18/24 0608    Specimen: Blood Updated: 01/18/24 0615     Glucose 180 mg/dL     POC Glucose Once [[id number]]  (Abnormal) Collected: 01/17/24 2225    Specimen: Blood Updated: 01/17/24 2227     Glucose 224 mg/dL           Imaging Results (Last 24 Hours)       ** No results found for the last 24 hours. **            Medication Review:   I have personally reviewed    Current Facility-Administered Medications:     acetaminophen (TYLENOL) tablet 650 mg, 650 mg, Oral, Q4H PRN, 650 mg at 01/16/24 0925 **OR** acetaminophen (TYLENOL) suppository 650 mg, 650 mg, Rectal, Q4H PRN, Eduard Armando MD    ampicillin-sulbactam (UNASYN) 3 g in sodium chloride 0.9 % 100 mL IVPB-VTB, 3 g, Intravenous, Q6H, Jorge L Us, DO, 3 g at 01/18/24 1544    sennosides-docusate (PERICOLACE) 8.6-50 MG per tablet 2 tablet, 2 tablet, Oral, BID, 2 tablet at 01/11/24 2036 **AND** polyethylene glycol (MIRALAX) packet 17 g, 17 g, Oral, Daily PRN **AND** bisacodyl (DULCOLAX) EC tablet 5 mg, 5 mg, Oral, Daily PRN **AND** bisacodyl (DULCOLAX) suppository 10 mg, 10 mg, Rectal, Daily PRN, Eduard Armando MD    Calcium Replacement - Follow Nurse / BPA Driven Protocol, , Does not apply, PRN, Eduard Armando MD    dextrose (D50W) (25 g/50 mL) IV injection 25 g, 25 g, Intravenous, Q15 Min PRN, Dean Fairchild MD    dextrose (GLUTOSE) oral gel 15 g, 15 g, Oral, Q15 Min PRN, Dean Fairchild MD    [START ON 1/19/2024] Enoxaparin Sodium (LOVENOX) syringe 40 mg, 40 mg, Subcutaneous, Q24H, Dena Fairchild MD    escitalopram (LEXAPRO) tablet 10 mg, 10 mg, Oral, Daily, Eduard Armando MD, 10 mg at 01/18/24 0850    glucagon (GLUCAGEN) injection 1 mg, 1 mg, Intramuscular, Q15 Min PRN, Danny Najera MD    HYDROcodone-acetaminophen (NORCO) 5-325 MG per tablet 1 tablet, 1 tablet, Oral, Q6H PRN, Dean Fairchild MD, 1 tablet at 01/18/24 1202    insulin glargine (LANTUS, SEMGLEE) injection 20 Units, 20 Units, Subcutaneous, Q12H,  Dean Fairchild MD, 20 Units at 01/18/24 0850    insulin lispro (HUMALOG/ADMELOG) injection 2-9 Units, 2-9 Units, Subcutaneous, 4x Daily AC & at Bedtime, Dean Fairchild MD, 6 Units at 01/18/24 1610    ipratropium-albuterol (DUO-NEB) nebulizer solution 3 mL, 3 mL, Nebulization, Q6H PRN, Eduard Armando MD    levothyroxine (SYNTHROID, LEVOTHROID) tablet 150 mcg, 150 mcg, Oral, Q AM, Dean Fairchild MD, 150 mcg at 01/18/24 0608    Magnesium Standard Dose Replacement - Follow Nurse / BPA Driven Protocol, , Does not apply, PRN, Eduard Armando MD    micafungin sodium (MYCAMINE) 100 mg in sodium chloride 0.9 % 100 mL IVPB-VTB, 100 mg, Intravenous, Q24H, Micky Car MD, 100 mg at 01/18/24 0856    nitroglycerin (NITROSTAT) SL tablet 0.4 mg, 0.4 mg, Sublingual, Q5 Min PRN, Eduard Armando MD    ondansetron ODT (ZOFRAN-ODT) disintegrating tablet 4 mg, 4 mg, Oral, Q6H PRN **OR** ondansetron (ZOFRAN) injection 4 mg, 4 mg, Intravenous, Q6H PRN, Eduard Armando MD, 4 mg at 01/06/24 1048    oxybutynin XL (DITROPAN-XL) 24 hr tablet 10 mg, 10 mg, Oral, Daily, Dean Fairchild MD, 10 mg at 01/18/24 0850    pantoprazole (PROTONIX) EC tablet 40 mg, 40 mg, Oral, Q AM, Dean Fairchild MD, 40 mg at 01/18/24 0608    Phosphorus Replacement - Follow Nurse / BPA Driven Protocol, , Does not apply, PRN, Eduard Armando MD    Potassium Replacement - Follow Nurse / BPA Driven Protocol, , Does not apply, PRN, Eduard Armando MD    rOPINIRole (REQUIP) tablet 0.5 mg, 0.5 mg, Oral, Nightly, Eduard Armando MD, 0.5 mg at 01/17/24 2233    torsemide (DEMADEX) tablet 40 mg, 40 mg, Oral, Daily, Jeri Harrington MD, 40 mg at 01/18/24 0850    Allergies:    Sulfa antibiotics    Assessment:    Active Problems:    DKA (diabetic ketoacidosis)    Primary osteoarthritis of right knee    UTI (urinary tract infection)    Contact dermatitis    Vitamin D deficiency    Essential hypertension    Hyperlipidemia     Hypothyroidism    Morbid obesity with BMI of 40.0-44.9, adult    Allergic rhinitis    Back pain    Anxiety    Dense breast tissue on mammogram    JADEN (acute kidney injury)    Sepsis, unspecified organism    Lactic acidosis    UPJ (ureteropelvic junction) obstruction bilateral    Bilateral hydrocele    Bilateral hydronephrosis moderate on admission    Renal calculus on right, nonobstructinbg    Chronic liver disease    Retroperitoneal abscess due to right calyceal rupture    Cellulitis of abdominal wall in pannus    Extravasation of urine from calyceal rupture R kidney       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  The family will take care of her drain and keep track of it and we will see her in a week.      Marvin Martinez MD    1/18/2024  18:23 EST

## 2024-01-19 ENCOUNTER — APPOINTMENT (OUTPATIENT)
Dept: GENERAL RADIOLOGY | Facility: HOSPITAL | Age: 64
DRG: 853 | End: 2024-01-19
Payer: COMMERCIAL

## 2024-01-19 ENCOUNTER — READMISSION MANAGEMENT (OUTPATIENT)
Dept: CALL CENTER | Facility: HOSPITAL | Age: 64
End: 2024-01-19
Payer: COMMERCIAL

## 2024-01-19 ENCOUNTER — DOCUMENTATION (OUTPATIENT)
Dept: HOME HEALTH SERVICES | Facility: HOME HEALTHCARE | Age: 64
End: 2024-01-19
Payer: COMMERCIAL

## 2024-01-19 ENCOUNTER — HOME HEALTH ADMISSION (OUTPATIENT)
Dept: HOME HEALTH SERVICES | Facility: HOME HEALTHCARE | Age: 64
End: 2024-01-19
Payer: COMMERCIAL

## 2024-01-19 VITALS
RESPIRATION RATE: 16 BRPM | DIASTOLIC BLOOD PRESSURE: 60 MMHG | HEART RATE: 82 BPM | OXYGEN SATURATION: 91 % | BODY MASS INDEX: 39.6 KG/M2 | TEMPERATURE: 98.3 F | WEIGHT: 237.66 LBS | SYSTOLIC BLOOD PRESSURE: 103 MMHG | HEIGHT: 65 IN

## 2024-01-19 PROBLEM — B37.7: Status: ACTIVE | Noted: 2024-01-19

## 2024-01-19 PROBLEM — R65.20: Status: ACTIVE | Noted: 2024-01-19

## 2024-01-19 PROBLEM — G93.41: Status: ACTIVE | Noted: 2024-01-19

## 2024-01-19 LAB
ALBUMIN SERPL-MCNC: 2.7 G/DL (ref 3.5–5.2)
ALBUMIN/GLOB SERPL: 0.6 G/DL
ALP SERPL-CCNC: 158 U/L (ref 39–117)
ALT SERPL W P-5'-P-CCNC: 13 U/L (ref 1–33)
ANION GAP SERPL CALCULATED.3IONS-SCNC: 11 MMOL/L (ref 5–15)
AST SERPL-CCNC: 17 U/L (ref 1–32)
BASOPHILS # BLD AUTO: 0.08 10*3/MM3 (ref 0–0.2)
BASOPHILS NFR BLD AUTO: 0.8 % (ref 0–1.5)
BILIRUB SERPL-MCNC: 0.5 MG/DL (ref 0–1.2)
BUN SERPL-MCNC: 8 MG/DL (ref 8–23)
BUN/CREAT SERPL: 8 (ref 7–25)
CALCIUM SPEC-SCNC: 9.4 MG/DL (ref 8.6–10.5)
CHLORIDE SERPL-SCNC: 90 MMOL/L (ref 98–107)
CO2 SERPL-SCNC: 32 MMOL/L (ref 22–29)
CREAT SERPL-MCNC: 1 MG/DL (ref 0.57–1)
DEPRECATED RDW RBC AUTO: 44.6 FL (ref 37–54)
EGFRCR SERPLBLD CKD-EPI 2021: 64.2 ML/MIN/1.73
EOSINOPHIL # BLD AUTO: 0.36 10*3/MM3 (ref 0–0.4)
EOSINOPHIL NFR BLD AUTO: 3.6 % (ref 0.3–6.2)
ERYTHROCYTE [DISTWIDTH] IN BLOOD BY AUTOMATED COUNT: 14.6 % (ref 12.3–15.4)
GLOBULIN UR ELPH-MCNC: 4.9 GM/DL
GLUCOSE BLDC GLUCOMTR-MCNC: 166 MG/DL (ref 70–130)
GLUCOSE BLDC GLUCOMTR-MCNC: 245 MG/DL (ref 70–130)
GLUCOSE BLDC GLUCOMTR-MCNC: 261 MG/DL (ref 70–130)
GLUCOSE SERPL-MCNC: 255 MG/DL (ref 65–99)
HCT VFR BLD AUTO: 29.8 % (ref 34–46.6)
HGB BLD-MCNC: 9.4 G/DL (ref 12–15.9)
IMM GRANULOCYTES # BLD AUTO: 0.08 10*3/MM3 (ref 0–0.05)
IMM GRANULOCYTES NFR BLD AUTO: 0.8 % (ref 0–0.5)
LYMPHOCYTES # BLD AUTO: 1.5 10*3/MM3 (ref 0.7–3.1)
LYMPHOCYTES NFR BLD AUTO: 15.1 % (ref 19.6–45.3)
MCH RBC QN AUTO: 26.9 PG (ref 26.6–33)
MCHC RBC AUTO-ENTMCNC: 31.5 G/DL (ref 31.5–35.7)
MCV RBC AUTO: 85.1 FL (ref 79–97)
MONOCYTES # BLD AUTO: 1.39 10*3/MM3 (ref 0.1–0.9)
MONOCYTES NFR BLD AUTO: 14 % (ref 5–12)
NEUTROPHILS NFR BLD AUTO: 6.51 10*3/MM3 (ref 1.7–7)
NEUTROPHILS NFR BLD AUTO: 65.7 % (ref 42.7–76)
NRBC BLD AUTO-RTO: 0 /100 WBC (ref 0–0.2)
PLATELET # BLD AUTO: 490 10*3/MM3 (ref 140–450)
PMV BLD AUTO: 9.7 FL (ref 6–12)
POTASSIUM SERPL-SCNC: 4.2 MMOL/L (ref 3.5–5.2)
PROCALCITONIN SERPL-MCNC: 0.22 NG/ML (ref 0–0.25)
PROT SERPL-MCNC: 7.6 G/DL (ref 6–8.5)
RBC # BLD AUTO: 3.5 10*6/MM3 (ref 3.77–5.28)
SODIUM SERPL-SCNC: 133 MMOL/L (ref 136–145)
WBC NRBC COR # BLD AUTO: 9.92 10*3/MM3 (ref 3.4–10.8)

## 2024-01-19 PROCEDURE — 82948 REAGENT STRIP/BLOOD GLUCOSE: CPT

## 2024-01-19 PROCEDURE — 25010000002 ENOXAPARIN PER 10 MG: Performed by: INTERNAL MEDICINE

## 2024-01-19 PROCEDURE — 63710000001 INSULIN LISPRO (HUMAN) PER 5 UNITS: Performed by: INTERNAL MEDICINE

## 2024-01-19 PROCEDURE — 84145 PROCALCITONIN (PCT): CPT | Performed by: INTERNAL MEDICINE

## 2024-01-19 PROCEDURE — 71045 X-RAY EXAM CHEST 1 VIEW: CPT

## 2024-01-19 PROCEDURE — 85025 COMPLETE CBC W/AUTO DIFF WBC: CPT | Performed by: INTERNAL MEDICINE

## 2024-01-19 PROCEDURE — 25010000002 MICAFUNGIN SODIUM 100 MG RECONSTITUTED SOLUTION 1 EACH VIAL: Performed by: INTERNAL MEDICINE

## 2024-01-19 PROCEDURE — 99232 SBSQ HOSP IP/OBS MODERATE 35: CPT | Performed by: STUDENT IN AN ORGANIZED HEALTH CARE EDUCATION/TRAINING PROGRAM

## 2024-01-19 PROCEDURE — 25010000002 AMPICILLIN-SULBACTAM PER 1.5 G: Performed by: STUDENT IN AN ORGANIZED HEALTH CARE EDUCATION/TRAINING PROGRAM

## 2024-01-19 PROCEDURE — 63710000001 INSULIN GLARGINE PER 5 UNITS: Performed by: INTERNAL MEDICINE

## 2024-01-19 PROCEDURE — 80053 COMPREHEN METABOLIC PANEL: CPT | Performed by: INTERNAL MEDICINE

## 2024-01-19 RX ORDER — ACETAMINOPHEN 325 MG/1
650 TABLET ORAL EVERY 4 HOURS PRN
Start: 2024-01-19

## 2024-01-19 RX ORDER — DIPHENHYDRAMINE HYDROCHLORIDE 25 MG/1
CAPSULE, LIQUID FILLED ORAL
Qty: 1 EACH | Refills: 0 | Status: SHIPPED | OUTPATIENT
Start: 2024-01-19

## 2024-01-19 RX ORDER — FLUCONAZOLE 200 MG/1
800 TABLET ORAL EVERY 24 HOURS
Qty: 56 TABLET | Refills: 0 | Status: SHIPPED | OUTPATIENT
Start: 2024-01-20 | End: 2024-02-03

## 2024-01-19 RX ORDER — BLOOD SUGAR DIAGNOSTIC
STRIP MISCELLANEOUS
Qty: 100 EACH | Refills: 12 | Status: SHIPPED | OUTPATIENT
Start: 2024-01-19

## 2024-01-19 RX ORDER — FLUCONAZOLE 200 MG/1
800 TABLET ORAL EVERY 24 HOURS
Status: DISCONTINUED | OUTPATIENT
Start: 2024-01-19 | End: 2024-01-19 | Stop reason: HOSPADM

## 2024-01-19 RX ORDER — INSULIN LISPRO 100 [IU]/ML
2-9 INJECTION, SOLUTION INTRAVENOUS; SUBCUTANEOUS
Start: 2024-01-19

## 2024-01-19 RX ORDER — PANTOPRAZOLE SODIUM 40 MG/1
40 TABLET, DELAYED RELEASE ORAL
Qty: 30 TABLET | Refills: 5 | Status: SHIPPED | OUTPATIENT
Start: 2024-01-19 | End: 2024-07-17

## 2024-01-19 RX ORDER — HYDROCODONE BITARTRATE AND ACETAMINOPHEN 5; 325 MG/1; MG/1
1-2 TABLET ORAL EVERY 6 HOURS PRN
Qty: 24 TABLET | Refills: 0 | Status: CANCELLED | OUTPATIENT
Start: 2024-01-19 | End: 2024-01-22

## 2024-01-19 RX ORDER — NICOTINE POLACRILEX 4 MG
15 LOZENGE BUCCAL
Start: 2024-01-19

## 2024-01-19 RX ORDER — ONDANSETRON 4 MG/1
4 TABLET, ORALLY DISINTEGRATING ORAL EVERY 6 HOURS PRN
Qty: 40 TABLET | Refills: 1 | Status: SHIPPED | OUTPATIENT
Start: 2024-01-19

## 2024-01-19 RX ORDER — PEN NEEDLE, DIABETIC 30 GX3/16"
1 NEEDLE, DISPOSABLE MISCELLANEOUS 4 TIMES DAILY PRN
Qty: 100 EACH | Refills: 0 | Status: SHIPPED | OUTPATIENT
Start: 2024-01-19

## 2024-01-19 RX ORDER — HYDROCODONE BITARTRATE AND ACETAMINOPHEN 5; 325 MG/1; MG/1
1-2 TABLET ORAL EVERY 6 HOURS PRN
Qty: 24 TABLET | Refills: 0 | Status: SHIPPED | OUTPATIENT
Start: 2024-01-19 | End: 2024-01-22

## 2024-01-19 RX ORDER — TORSEMIDE 20 MG/1
40 TABLET ORAL DAILY
Qty: 60 TABLET | Refills: 0 | Status: SHIPPED | OUTPATIENT
Start: 2024-01-19

## 2024-01-19 RX ORDER — INSULIN ASPART 100 [IU]/ML
INJECTION, SOLUTION INTRAVENOUS; SUBCUTANEOUS
Qty: 15 ML | Refills: 0 | Status: SHIPPED | OUTPATIENT
Start: 2024-01-19

## 2024-01-19 RX ORDER — NALOXONE HYDROCHLORIDE 4 MG/.1ML
SPRAY NASAL
Qty: 2 EACH | Refills: 0 | Status: SHIPPED | OUTPATIENT
Start: 2024-01-19

## 2024-01-19 RX ORDER — AMOXICILLIN AND CLAVULANATE POTASSIUM 875; 125 MG/1; MG/1
1 TABLET, FILM COATED ORAL EVERY 12 HOURS SCHEDULED
Qty: 28 TABLET | Refills: 0 | Status: SHIPPED | OUTPATIENT
Start: 2024-01-19 | End: 2024-02-02

## 2024-01-19 RX ORDER — LEVOTHYROXINE SODIUM 0.15 MG/1
150 TABLET ORAL
Qty: 30 TABLET | Refills: 3 | Status: SHIPPED | OUTPATIENT
Start: 2024-01-19

## 2024-01-19 RX ORDER — INSULIN GLARGINE 100 [IU]/ML
20 INJECTION, SOLUTION SUBCUTANEOUS 2 TIMES DAILY
Qty: 15 ML | Refills: 5 | Status: SHIPPED | OUTPATIENT
Start: 2024-01-19

## 2024-01-19 RX ORDER — AMOXICILLIN AND CLAVULANATE POTASSIUM 875; 125 MG/1; MG/1
1 TABLET, FILM COATED ORAL EVERY 12 HOURS SCHEDULED
Status: DISCONTINUED | OUTPATIENT
Start: 2024-01-19 | End: 2024-01-19 | Stop reason: HOSPADM

## 2024-01-19 RX ORDER — LANCETS 30 GAUGE
EACH MISCELLANEOUS
Qty: 100 EACH | Refills: 0 | Status: SHIPPED | OUTPATIENT
Start: 2024-01-19

## 2024-01-19 RX ADMIN — ESCITALOPRAM OXALATE 10 MG: 10 TABLET, FILM COATED ORAL at 08:01

## 2024-01-19 RX ADMIN — INSULIN LISPRO 6 UNITS: 100 INJECTION, SOLUTION INTRAVENOUS; SUBCUTANEOUS at 11:41

## 2024-01-19 RX ADMIN — ENOXAPARIN SODIUM 40 MG: 100 INJECTION SUBCUTANEOUS at 08:00

## 2024-01-19 RX ADMIN — TORSEMIDE 40 MG: 20 TABLET ORAL at 08:01

## 2024-01-19 RX ADMIN — INSULIN LISPRO 2 UNITS: 100 INJECTION, SOLUTION INTRAVENOUS; SUBCUTANEOUS at 08:01

## 2024-01-19 RX ADMIN — MICAFUNGIN SODIUM 100 MG: 100 INJECTION, POWDER, LYOPHILIZED, FOR SOLUTION INTRAVENOUS at 08:07

## 2024-01-19 RX ADMIN — INSULIN LISPRO 4 UNITS: 100 INJECTION, SOLUTION INTRAVENOUS; SUBCUTANEOUS at 17:04

## 2024-01-19 RX ADMIN — OXYBUTYNIN CHLORIDE 10 MG: 10 TABLET, EXTENDED RELEASE ORAL at 08:01

## 2024-01-19 RX ADMIN — LEVOTHYROXINE SODIUM 150 MCG: 150 TABLET ORAL at 06:07

## 2024-01-19 RX ADMIN — PANTOPRAZOLE SODIUM 40 MG: 40 TABLET, DELAYED RELEASE ORAL at 06:07

## 2024-01-19 RX ADMIN — HYDROCODONE BITARTRATE AND ACETAMINOPHEN 1 TABLET: 5; 325 TABLET ORAL at 14:02

## 2024-01-19 RX ADMIN — AMPICILLIN SODIUM AND SULBACTAM SODIUM 3 G: 2; 1 INJECTION, POWDER, FOR SOLUTION INTRAMUSCULAR; INTRAVENOUS at 03:44

## 2024-01-19 RX ADMIN — FLUCONAZOLE 800 MG: 200 TABLET ORAL at 09:47

## 2024-01-19 RX ADMIN — INSULIN GLARGINE 20 UNITS: 100 INJECTION, SOLUTION SUBCUTANEOUS at 08:00

## 2024-01-19 RX ADMIN — AMOXICILLIN AND CLAVULANATE POTASSIUM 1 TABLET: 875; 125 TABLET, FILM COATED ORAL at 09:47

## 2024-01-19 NOTE — NURSING NOTE
Discharge orders in- instructions went through with pt and daughter on RAJESH drain- Daughter flushed RAJESH with RN supervision. All questions answered.   Meds delivered to bedside and family member picked up walker from [facility].

## 2024-01-19 NOTE — OUTREACH NOTE
Prep Survey      Flowsheet Row Responses   [facility] facility patient discharged from? [facility]   Is LACE score < 7 ? No   Eligibility Readm Mgmt   Discharge diagnosis CYSTOSCOPY BILATERAL RETROGRADE PYELOGRAM  BILATERAL URETERAL STENT INSERTION AND CATHETHER PLACEMENT   Does the patient have one of the following disease processes/diagnoses(primary or secondary)? General Surgery   Does the patient have Home health ordered? Yes   What is the Home health agency?   [facility]   Is there a DME ordered? Yes   What DME was ordered? [facility]   Prep survey completed? Yes            WILLOW HECK - Registered Nurse

## 2024-01-19 NOTE — DISCHARGE SUMMARY
NATHALIA CAMPUZANO [facility]  INTERNAL MEDICINE  KYLE UPTON MD  [address] [place] [address]  Phone [phone] Fax [phone]  E-mail:  [email or web address]    [facility]   DISCHARGE SUMMARY  KYLE UPTON MD      Date of Discharge:  1/19/2024    Discharge Diagnosis:       DKA (diabetic ketoacidosis)    JADEN (acute kidney injury)    Retroperitoneal abscess due to right calyceal rupture    UPJ (ureteropelvic junction) obstruction bilateral    Bilateral hydronephrosis moderate on admission    Renal calculus on right, non-obstructing    UTI (urinary tract infection)    Sepsis, unspecified organism    Lactic acidosis    Cellulitis of abdominal wall in pannus    Extravasation of urine from calyceal rupture R kidney    Vitamin D deficiency    Essential hypertension    Hyperlipidemia    Hypothyroidism    Allergic rhinitis    Back pain    Chronic liver disease    Primary osteoarthritis of right knee    Contact dermatitis    Anxiety    Dense breast tissue on mammogram    Bilateral hydrocele    Morbid obesity with BMI of 40.0-44.9, adult      Procedures Performed    1.  Venous blood gas on admission showing pH 7.40, pCO2 28, pO2 31, O2 sat 62.6% with CO2 content of 18.9.  2.  Troponin level less than 6 3.  Sodium level ranging from 127 up to 133.  3.  Potassium level ranging from 3.4 on admission up to 4.2 at time of discharge  4.  Chloride level ranging from a low of 89 to a high of 103  5.  CO2 ranging from 15.1 soon after admission up to 32.0 prior to discharge  6.  Anion gap ranging from 15.5 on admission down to 11.05 on discharge  7.  BUN ranging from 17 on admission down to a low of 3 and back up to 8 prior to discharge  GFR ranging from 54.3 on admission up to 64.25 at discharge  8.  Magnesium normal at 1.7 up to 1.9  9.  Phosphorus low at 2.1 corrected back to 3.7 prior to discharge  10.  Albumin 1.8 on admission up to 2.7 at discharge  11.  AST ranging from  21 on admission up to a max of 34 and back down to the 17 prior to discharge  12.  Random cortisol level normal at 23.9  13.  Hemoglobin A1c 17.7 on admission  14.  TSH slightly elevated 9.25 with correction and increase in dose of levothyroxine 150 mcg daily  15.  Glucose ranging from 978 on admission running in the upper 100s and low 200s prior to discharge.  16.  C-reactive protein increased at 24.73  17.  Lactate level elevated 3.2 on admission with rapid collect correction on DKA Glucomander protocol  18.  Procalcitonin 3.24  19.  Lipase 23  20.  Osmolality elevated on admission at 317  21.  INR 1.17  22.  White blood cell count ranging from 18.93 soon after admission down to 9.92 prior to discharge  23.  Hemoglobin ranging from 10.5 on admission down to a low of 8.6 and back up to 9.4 prior to discharge  24.  Platelet count ranging from a low of 378 to a max of 603  25.  Sed rate greater than 130  26.  Vancomycin troughs all within normal limits  27.  UA initially showing moderate ketones and moderate leukocytes repeated later in hospital stay and had small leukocytes and small blood  28.  Urine osmolality normal at 225  29.  Urine sodium normal at 64  30.  Blood culture from early in admission showing gram-positive rods with atypical culture results still pending  31.  Drainage within abdomen growing Candida glabrata and Candida tropicalis treated with IV micafungin  32.  Twelve-lead EKG showing normal sinus tachycardia rate 101 borderline ST depression no change from previous tracings.  33.  Adult echocardiogram showing left ventricular ejection fraction 56 to 60%.  Left ventricular diastolic function consistent with grade 1 impaired relaxation.  Right ventricular systolic pressure from tricuspid regurgitation normal.  34.  IV antibiotic vancomycin transition to Unasyn and eventually transition to Augmentin prior to discharge.  Micafungin given by IV for treatment of unique yeast  infection.      Procedure(s):  CYSTOSCOPY BILATERAL RETROGRADE PYELOGRAM  BILATERAL URETERAL STENT INSERTION AND CATHETHER PLACEMENT     SCANNED - TELEMETRY      Date/Time: 1/19/2024 6:20 PM    Performed by: Dean Fairchild MD  Authorized by: [employer], [place]        Imaging Results (All)       Procedure Component Value Units Date/Time    CT Abdomen Pelvis With Contrast [[id number]] Collected: 01/17/24 1135     Updated: 01/17/24 1421    Narrative:      CT ABDOMEN AND PELVIS WITH IV CONTRAST     HISTORY: Abdominal pain. Follow-up abdominal wall abscess.     TECHNIQUE:  CT includes axial imaging from the lung bases to the  trochanters with intravenous contrast and with use of oral contrast.  Data reconstructed in coronal and sagittal planes. Radiation dose  reduction techniques were utilized, including automated exposure control  and exposure modulation based on body size.     COMPARISON: CT abdomen and pelvis 01/08/2024, CT-guided abscess imaging  01/10/2024     FINDINGS: Moderate right pleural effusion with partial collapse right  lower lobe. Effusion is increased in size compared to the exam  01/08/2024. Left lung base appears clear.     Right ureteral stent is present and there is mild distention of the  right renal pelvis with urothelial thickening and this appears similar  to the previous exam. Left ureteral stent is present and there is  distention of the left extrarenal pelvis without change.     Since the previous exam there has been placement of a pigtail drainage  catheter into a collection along the right posterolateral abdominal  wall. This collection exhibits a decrease in size compared to the  previous exam though continues to contain bubbles of air and mild fluid.  Collection extends inferiorly lateral to the right iliac crest. Caudal  to the drain this collection measures approximately 9.4 x 1.9 cm.     There are multilobular collections that contain fluid and fat within the  posterior medial upper  abdominal wall. Collection is extending adjacent  to the right adrenal gland and right kidney and appears similar to the  prior exam 01/08/2024. Collections contact the posterior lateral right  kidney. Collections extend caudally along the anterior margin of the  right psoas muscle where there is a collection that measures 4.8 x 2.8  cm and this component of the collection is increased in size from 4 x  2.4 cm on the previous exam.     There is no free intraperitoneal gas. Spleen, liver, adrenal glands  appear within normal limits. There has been previous cholecystectomy.  There is edema subcutaneous fat along the right posterolateral flank.       Impression:      1. Since the exam 9 days ago there has been placement of a pigtail  drainage catheter into a collection along the right posterolateral  abdominal wall and this collection is mildly decreased in size though  there remains bubbles of air and fluid within the collection particular  extending superolateral to the right iliac crest where a component of  the collection measures 9.4 x 1.9 cm in axial dimension.  2. Multiple retroperitoneal collections extending posterior to the right  kidney and right adrenal gland are overall similar in size and  configuration compared to the prior exam 01/08/2024 though there is a  component of the collection that extends into the right psoas muscle  that is increased in size.  3. Bilateral ureteral stents are present. There is distention of the  extrarenal pelvises bilaterally similar the previous exam. Right  urothelial thickening.  4. Moderate right pleural effusion with adjacent right basilar  consolidation. Effusion exhibits mild increase in size compared to the  exam 9 days ago.      Radiation dose reduction techniques were utilized, including automated  exposure control and exposure modulation based on body size.        This report was finalized on 1/17/2024 2:18 PM by Dr. Agustin Begum M.D on Workstation:  [id number]       XR Abdomen 2+ VW with Chest 1 VW [[id number]] Collected: 01/15/24 0828     Updated: 01/15/24 0836    Narrative:      XR ABDOMEN 2+ VIEWS W CHEST 1 VW-     INDICATION: Leukocytosis     COMPARISON: CT abdomen pelvis 1/8/2024       Impression:      Stable small right pleural effusion. Hazy right lower lobe  opacity, atelectasis versus infiltrate. No pneumothorax. Normal size  cardiac silhouette.     Bilateral ureteral stents overlying the collecting systems. Right  abdominal percutaneous drainage catheter. Nonobstructive bowel gas  pattern. No pneumatosis. IUD.     This report was finalized on 1/15/2024 8:32 AM by Dr. Angelito Aguilera M.D on Workstation: [id number]       CT Guided Percutaneous Drain Retroperitoneal [[id number]] Collected: 01/10/24 1616     Updated: 01/10/24 1647    Narrative:      CT GUIDED DRAINAGE     HISTORY:  Increasing retroperitoneal fluid collection noted on CT scan  with persistent abdominal pain    .     COMPARISON: CT 1/8/2024.     PROCEDURE DETAILS: After informed consent was obtained from the patient,  the patient was placed on the CT scanner in supine position. A nurse was  continuously present and moderate sedation was performed under physician  supervision from 1435 to 1457 hours with a total of 50 mcg fentanyl and  1 mg versed administered. A preliminary scan of the abdomen was obtained  and the retroperitoneal fluid collection identified.  A route was  planned for drainage of the largest most accessible part of the  collection (as discussed with Dr. Alfredo Miller preprocedure) and an  appropriate skin site identified. This site was then prepped and draped  in the usual sterile manner and the skin anesthetized with lidocaine.  The tract to the collection was anesthetized with lidocaine and a Yueh  needle catheter placed within the collection. A wire was then advanced  into the collection and the Yueh removed. After serial dilation, a 10  Turkmen skater drainage catheter was  placed into the collection, locked,  secured with Dermabond reinforced suture and stay-fix dressing, and  attached to bulb suction. Approximately 50 mL purulent appearing fluid  were manually removed with specimen sent to lab. Initial irrigation was  performed. The patient tolerated the procedure well and there were no  immediate complications.  All elements of maximal sterile technique were  followed. Radiation dose reduction techniques were utilized, including  automated exposure control and exposure modulation           Impression:      Successful drain placement into retroperitoneal fluid collection as  described above. CT follow-up prior to drain removal recommended.        This report was finalized on 1/10/2024 4:21 PM by Dr. Cedrick Campos M.D  on Workstation: [id number]       CT Abdomen Pelvis Without Contrast [[id number]] Collected: 01/08/24 1634     Updated: 01/09/24 0833    Narrative:      CT ABDOMEN AND PELVIS WITHOUT IV CONTRAST     HISTORY: Right lower quadrant abdominal pain     TECHNIQUE:  CT includes axial imaging from the lung bases to the  trochanters without intravenous contrast and without use of oral  contrast. Data reconstructed in coronal and sagittal planes. Radiation  dose reduction techniques were utilized, including automated exposure  control and exposure modulation based on body size.     COMPARISON: CT abdomen and pelvis 01/02/2024, 06/21/2023     FINDINGS: A right ureteral stent is present and there is mild distention  of the right extrarenal pelvis exhibits improvement compared to the exam  6 days ago. There is right urothelial thickening. There are right  perinephric collections extending into the perirenal fat and surrounding  the Gerota's fascia as well as extending along the anterior lateral  margin of the right psoas muscle. These collections are consistent with  areas of hemorrhage or urinomas. There has developed new collections  deep to the right posterolateral abdominal wall and  these collections  measure up to 3 cm in thickness by 13 cm transverse by 18 cm in length.  There is muscular thickening along the right lateral abdominal wall and  there is edema within the overlying right lateral abdominal pelvic  subcutaneous fat. A component of the collection along the superior  margin of the right psoas muscle medial to the right renal upper pole  measures 3.5 x 6.9 cm and previously measured 2 x 6.2 cm. Collection  posteromedial to the right lobe of the liver and posterior to the right  adrenal gland measures 7.1 x 4.9 cm and previously measured 7.1 x 3.9 cm  and is without change.     A left ureteral stent has also been placed and there remains dilatation  of the left extrarenal pelvis.     There is moderate stool distention of the cecum. There is no bowel  dilatation to suggest obstruction.        Impression:      1. Since the examination 6 days ago there has been placement of  bilateral ureteral stents. Distention of the right renal pelvis has  decreased though there remains right urothelial thickening. There are  multiple retroperitoneal collections within the right abdomen  surrounding the right kidney extending along the right psoas muscle and  deep to the right lateral abdominopelvic musculature. Some collections  of not changed in size though there is a collection along the superior  margin of the right psoas muscle medial to the right kidney that is  increased in size. There has also been a more significant increase in  size of the collection just deep to the right lateral abdominal pelvic  wall and this collection measures 12.8 cm transverse x 18 cm in length x  3 cm in thickness. These collections may represent hematomas or urinoma  formation. Infected collections should be considered though there is no  internal air lucency.  2. Distention of the left renal pelvis without change.  3. Small right pleural effusion with right lower lobe consolidation and  potential infiltrate,  increased when compared to the exam 6 days ago.  4. Mild stool and air distention of the cecum. No evidence for bowel  obstruction.      Radiation dose reduction techniques were utilized, including automated  exposure control and exposure modulation based on body size.        This report was finalized on 1/9/2024 8:30 AM by Dr. Agustin Begum M.D on Workstation: [id number]       FL Retrograde Pyelogram In OR [[id number]] Collected: 01/03/24 1715     Updated: 01/03/24 1721    Narrative:      RETROGRADE PYELOGRAM INTERPRETATION ONLY 01/03/2024     HISTORY: Retrograde pyelogram.     There is contrast material in both collecting systems which appear  slightly dilated. Contrast material in nondilated ureters is seen.  Double-J bilateral ureteral stents have been placed and appear to be in  good position. IUD overlies the pelvis.           FLUOROSCOPY TIME:  3 minutes, 48 seconds ,  5   images. 47 mGy     This report was finalized on 1/3/2024 5:18 PM by Dr. Reji Mallory M.D on Workstation: [id number]       CT Abdomen Pelvis With Contrast [[id number]] Collected: 01/02/24 1449     Updated: 01/02/24 1526    Narrative:      CT ABDOMEN AND PELVIS WITH IV CONTRAST     HISTORY: Right flank pain.     TECHNIQUE: Radiation dose reduction techniques were utilized, including  automated exposure control and exposure modulation based on body size.   3 mm images were obtained through the abdomen and pelvis after the  administration of IV contrast.     COMPARISON: 6/21/2023        FINDINGS: Moderate bilateral hydronephrosis with caliber changes at the  UPJ, mildly decreased on the right and increased on the left. New  circumferential mild-moderate wall thickening involving the right renal  calyces and renal pelvis. New moderate volume of low-attenuation mildly  loculated fluid throughout the right perinephric space (series 3/images  88 through 132). No enhancing wall or definite organization. No internal  locules of air. Fluid is  most concentrated adjacent to the interpolar  right kidney (series 3/image 94). New diffuse mild dilation of the right  ureter. No renal calculi within the right ureter or bladder. Few  nonobstructing right renal calculi measuring up to 4 mm. Nondilated left  ureter. Nondilated bladder without wall thickening. Left renal cyst.     Similar hepatic morphology with concavity of the posterior right hepatic  surface cortical contour and widening of the hepatic fissures. Question  mild nodularity of the hepatic surface cortical contour. Spleen is  normal in size. No definite upper abdominal portosystemic collaterals.  Cholecystectomy. Nondilated biliary tree. Unchanged 1.2 cm retrocaval  lymph node (series 2/image 65).     Sigmoid colon diverticulosis without findings suggest diverticulitis.  Appendectomy. Nondilated normal small bowel.     Well-positioned IUD. Remaining solid organs and bowel are normal.     Right lower lobe linear atelectasis.          Impression:      1. New moderate volume of low-attenuation mildly loculated fluid  throughout the right perinephric space, favored secondary to right  calyceal rupture.  2. New mild-moderate wall thickening involving the right renal pelvis  and renal calyces. Recommend correlation with urinalysis for findings to  suggest infection.  3. Findings suggesting bilateral UPJ obstruction. Moderate bilateral  hydronephrosis has increased on the left and mildly decreased on the  right compared to prior.  4. New mild dilation of the right ureter of indeterminate etiology.  5. Few nonobstructing right renal calculi measuring up to 4 mm.  6. Hepatic morphology suggesting chronic liver disease.  7. Unchanged mildly enlarged retrocaval lymph node, favored  reactive/inflammatory     This report was finalized on 1/2/2024 3:23 PM by Dr. Angelito Aguilera M.D on Workstation: [id number]       XR Chest 1 View [[id number]] Collected: 01/02/24 1416     Updated: 01/02/24 1420    Narrative:      XR  CHEST 1 VW-1/2/2024     HISTORY: Evaluate for fluid overload.     Heart size is within normal limits. Lungs are underinflated. There is  some minimal linear atelectasis of the right lung base. Lungs are  otherwise clear. Bones and soft tissues are unremarkable.       Impression:      1. No acute process except minimal atelectasis of the right lung base.     This report was finalized on 1/2/2024 2:17 PM by Dr. Reji Mallory M.D on Workstation: [id number]                 Treatment Team at [facility]  Treatment Team:   Consulting Physician: Dean Fairchild MD  Consulting Physician: Joe Blue Jr., MD  Surgeon: Marvin Martinez MD  Surgeon: Eduard Armando MD  Consulting Physician: Rei Calvert MD  Consulting Physician: Adria Bah MD  Consulting Physician: Evan Miller MD  Consulting Physician: Rashaun Hoffman MD  Consulting Physician: Rashel Duke MD      Presenting Problem/History of Present Illness  Chief Complaint   Patient presents with    Flank Pain     R side     Hospital Course        Chief Complaint: Abdominal/flank pain with DKA, acute cystitis and sepsis, and possible calyceal rupture in the kidney     History of Present Illness:     Subjective   Interval History: Patient is a 63 y.o.female who presented at my request to the emergency room at [facility] with complaint of acute abdominal/flank pain and history of recurrent kidney stones.  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  Patient has been followed for the last few months by Dr. Rei Calvert in urology for renal calculi and actually has had a stent placed in the right kidney due to obstruction from her renal stones.  It is also noted that  the patient had a recent lithotripsy.  Patient began to feel poorly after the recent Christmas holiday and became more severely ill over the last 3 to 4 days prior to this admission.  She has felt extremely fatigued, dizzy at times, tired, nauseated, and has spent most of her time in bed sleeping.  Family has had difficulty getting the patient to take any oral intake and became quite concerned as her condition continued to worsen.  Patient was attempting to go to work today but really was too dizzy to get in the car to drive and 2-week to actually work.  After urging from her daughter, patient called me with respect to the symptoms and at my request went to the ER for evaluation.  Patient has multiple medical comorbidities that complicate her medical care.These include most significantly recurrent urinary tract infections due to her nephrolithiasis, morbid obesity with BMI greater than 40, diabetes mellitus type 2 poorly controlled with recent addition of Ozempic to her metformin therapy, vitamin D deficiency, essential hypertension, hyperlipidemia, hypothyroidism, allergic rhinitis, back pain, contact dermatitis, anxiety, and history of dense breast tissue on mammograms.     Patient was evaluated in the emergency room by Angelito Winkler MD who was the ER attending on call time of her arrival.  Patient was seen on 1/2/2024 at 1544 by Dr. Winkler.  His HPI was consistent with the story which I given above.  Patient denied dysuria, fever, chills on his evaluation.  She did admit to having emesis and actually had some emesis while in the emergency room.  Review of systems in the emergency room was positive for diffuse abdominal pain, and vomiting.  Patient also was noted to have some significant flank pain.  All other systems reviewed were negative in the emergency room vital signs on arrival in the emergency room showed temperature of 96 °F, heart rate of 114, respiratory rate of 16, blood pressure 125/72, and O2 sat  of 98%.  Patient was described as ill-appearing but in no acute distress.  She did have severe tenderness to palpation more on the right side of the abdomen and out into the right flank area with voluntary guarding.  Labs collected in the ER showed that her lactate level was elevated at 6.0.  She had a lipase of 23, her white blood cell count was 20.12, her hemoglobin was 11.2, and her platelet count was 568,000.  Patient did have a phosphorus of 4.2, magnesium 2.0, osmolality of 317, hemoglobin A1c is 16.90, and UA showed specific gravity 1.027, 3+ glucose, moderate blood 2+, leukocyte esterase moderate 2+, nitrates negative, white blood cells 6-10, red blood cells 6-10, bacteria 2+.  Her CMP showed a glucose of 978, creatinine of 1.82, sodium of 115, potassium of 4.2, chloride of 76, CO2 of 15.7, albumin 2.4, AST 33, alk phos 220, anion gap of 23, EGFR of 31.3.  Patient did have a CT scan of her abdomen and pelvis completed which showed loculated fluid throughout the right perinephric space felt to possibly be secondary to right calyceal rupture, new mild to moderate wall thickening of the right renal pelvis and renal calyces, bilateral UPJ obstruction with moderate bilateral hydronephrosis on the left, mild new dilation of right ureter, nonobstructing right renal calculi, hepatic morphology suggestive of chronic liver disease, and reactive lymph nodes in the retrocaval area.  A chest x-ray was done which showed no acute disease other than minimal atelectasis at the base of the right lung.  Patient underwent aggressive fluid resuscitation in the emergency room and received over 3 L of IV fluid.  She was placed on an insulin drip and was also started on Zosyn therapy.  She was followed on sepsis protocol as well as DKA protocol.  Patient did receive morphine 4 mg for pain x 2.  Case was discussed with myself as her primary care attending.  Urology was also contacted about situation.  I did suggest that patient be  admitted to the ICU for treatment of the DKA and severe sepsis picture.  Dr. Arthur Tena did agree to the ICU admission and will be following her in the CCU for pulmonary/critical care.  Final diagnoses in the ER were sepsis due to unspecified organism and acute cystitis without significant hematuria.     1/2/2024.  I personally saw the patient for the first time during this hospitalization on this date in the coronary care unit room #335.  Patient was resting quietly in bed and did a peer acutely ill.  She did wake to her name and spoke a few words before falling back asleep.  Patient's 2 daughters were at the foot of her bed and did discuss the case at length with me.  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  Patient has responded well to the Glucommander DKA protocols and blood sugars have gradually come down to near normal levels for the patient in the 1  range.  I will full PPE for the exam including an N95 face mask, goggles, white lab coat, and gloves when touching patient.  I performed thorough hand hygiene before and after the patient visit.  Patient did have a venous blood gas which showed pH 7.40, pCO2 28, pO2 of 31, and O2 sat of 62%.  She also had recent electrolytes which showed a sodium of 131, potassium 3.4, chloride 96, CO2 19.1, BUN 17, creatinine 1.15, estimated GFR now up to 54.3.  Lactate level has been coming steadily down and currently at 2.8.  Additional labs ordered for tomorrow a.m.  Patient has been able to urinate several times and daughters have helped her up to the bathroom for this.  I actually note that she did have some urinary frequency and incontinence at home which is unusual for her.  I did speak briefly to the patient's daytime nurse who was departing soon after I arrived on the floor.  I have reviewed Dr. Arthur Tena's notes and his admission history and physical.  I do agree completely with  his plan of care at present time.  Supportive care is continued to be offered for the sepsis with careful monitoring in the ICU.  Patient's anion gap metabolic acidosis probably is related to lactic acidosis and she was continuing to take metformin even when she was not able to eat.  IV fluids are continued aggressively.  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  Probably will plan sliding scale insulin for stabilization of diabetes while here in the hospital and will train patient to continue that in the home environment.  Pseudohyponatremia will be corrected with the IV fluids.  Hypothyroidism will be addressed with continued Synthroid.  Statins are held for now.  Obesity is an ongoing problem for the patient.  At the time my exam, patient is medically stable and slowly improving.  I did relay this to the daughter to agree with my assessment.  We will continue to follow the patient with you and we will be happy to assume care of the patient when she is stable and ready to be transferred out of the ICU.  I can be reached directly for any concerns or questions at [phone].     1/3/2024.  Patient is seen again today in her room in the CCU #335.  Patient was resting quietly in bed at the time of my visit and states that her daughters were downstairs getting a bite to eat.  I did discuss the case with the patient's nurse, Tiana, who tells me that she is being preop and should be going down soon for cystoscopy and stent placement.  I will full PPE for the exam including an N95 face mask, goggles, white lab coat, and gloves when touching patient.  I performed thorough hand hygiene before and after the patient visit.  Patient is more alert today but still somewhat confused about details of her medical case.  She is able to carry on a conversation with me though and joke a bit with me while I am present in the room.  She does  understand she has issues because of the obstruction bilaterally of her ureters that has resulted in hydronephrosis and hopefully can be relieved by the cystoscopy that is planned for later this morning.  Patient is still on an insulin drip but is drastically improved compared to where she was yesterday at this time.  Review of labs shows that her sodium is now up to 133, her CO2 is 19.1, glucose is now at 134, phosphorus is at 2.1, lactate is normalized at 1.9, white blood cell count today is 18.93, hemoglobin is 9.6 today, lakelets are normal today,Blood culture from the first day of hospitalization shows anaerobic bottle gram-positive bacilli growth with identification still ongoing.  Fungal and body fluid cultures are still negative.  Urine culture remains negative.  Dr. Oquendo was following the patient for critical care today.  He has continued her antibiotics and is awaiting urology input.  Fluid resuscitation is continued.  Blood pressure now seems well-controlled.  DVT prophylaxis in place.  He had a lengthy discussion with patient's family at the time of his rounds.  Urology has seen the patient in consultation and is planning to do cystoscopy later today.  Dietitian is following the patient's case and recommending input treatment as needed.Dr. Armando did perform a cystoscopy with ureteral catheterization and stent insertion bilaterally.  His pre-op diagnosis and postop diagnoses were urosepsis with bilateral UPJ obstruction.  He did feel that he had successfully decompressed the blockages and the hydronephrotic changes.  He did speak with patient's daughter postoperatively.  Patient does seem to be much improved today.  Vital signs still show no fever.  Blood pressure is relatively stable.  Will continue to follow with you.     1/4/2024.  Patient is seen again today in her room in the CCU #335.  Patient has transitions of critical care and is in overflow for care on the floor.  Patient's daughter is  present at bedside at the time of my visit.  Patient is much more awake and alert today.  She is more interactive and talkative.  I did discuss the case with the patient's nurse.  We are going to obtain an ID consult today for help with antibiotic management and we also are going to transition the patient to regular sliding scale insulin as we begin the teaching process of getting patient on her own regimen of sliding scale insulin at the time of discharge to home.  We have also started patient on Lantus therapy at 10 units for now and may need to titrate up.  I wore full PPE for the exam today including an N95 face mask, goggles, white lab coat, and gloves when touching patient.  I performed thorough hand hygiene before and after the patient visit.  Specialist have seen patient today and their care is summarized below.Dr. Najera, the critical care attending today, felt that her mentation was slow but answering questions appropriately.  No other major findings were discovered at present time.  Antibiotics were continued for sepsis was felt to be significantly improved with anion gap now closed and lactic acidosis much better.  IV antibiotics were continued awaiting final culture results and I did consult ID to see patient for their input on the situation.  Dr. Devon Armando who completed bilateral stent placement for bilateral OP J obstruction and urosepsis is pleased with progress on day 1 postop.  He notes that urine output has been excellent.  Dr. Us from infectious disease did see the patient in consultation.  He notes that Zosyn antibiotics have been discontinued by Dr. Armando after her successful surgery.  White blood cell count is down to 17.08 today with hematocrit of 30.7.  Glucose max was 250 today estimated GFR is up to 54 and CO2 is 19.0.  He feels the significance of the positive blood culture is unclear at this point with only 1 of 2 bottles positive for gram-positive bacilli which normally  would represent a contamination.  He is planning to repeat the blood cultures and will follow-up.  Urine culture was also negative except for yeast growth and due to the recent  instrumentation he has started the patient on fluconazole 400 mg daily while cultures are pending.  New blood culture has been sent.  We will check again the lactic acid, procalcitonin, sed rate, and CRP with a.m. labs tomorrow.  Labs relatively stable today though I do note that sodium is dropped a bit to 127.  CO2 remains slightly low at 18.0 and chloride slightly low at 97.  Patient's glucoses running in the upper 100s and low 200s at present.  Electrolytes otherwise seem fairly stable.  White count is still elevated somewhat at 17.08 despite stenting yesterday.  Patient currently off antibiotics and being followed carefully by ID.  Plan to check procalcitonin and repeat blood culture with a.m. labs.  Hemoglobin is at 10.0.  Otherwise patient appears very stable at present time.  I do agree that her mentation is a little bit slower than usual but I suspect this will continue to improve as we get further from her acute DKA event.  Treatment plan reviewed with patient and her daughter today.     1/05/2024.  Patient is seen again today in her room in the CCU #335.  Patient resting quietly in bed and daughter Enoch is at bedside.  I spoke with patient's nurse during the day to discuss occasional changes in her treatment plan.  I wore full PPE for the exam including an N95 facemask, goggles, white lab coat, and gloves when touching patient.  I performed thorough hand hygiene before and after the patient visit.  Patient is much more alert today and more like her usual self.  She does laugh and participate in conversation.  Mother and daughter were very impressed with Dr. Sánchez's excellent review with them of the patient's renal condition and with the time he has been going over the various diagnostic images that have been done up to date.   He was pleased to see how significantly improved electrolytes are and felt the patient was remaining hemodynamically stable at present time.  Dr. Us from ID did see the patient.  Blood cultures were repeated but original cultures are now positive 2 out of 2 for gram-positive bacilli which is more consistent with a true infection.  He started patient on vancomycin and will be following up on ID and's susceptibilities of the organisms.  He also continued the fluconazole 400 mg daily while following up on cultures from perinephric fluid.The pulmonary intensivist Dr. Arthur Tena saw patient briefly and found no pulmonary or critical care needs at this time.  Dr. Armando from neurology saw patient on postop day #2 after stent placement for bilateral UPJ obstruction.  Indicates that Perales catheter can be removed at any time.  Occupational Therapy began to work with the patient and feels that she will benefit from further skilled occupational therapy.  She did some bedside exercises and did do some sort steps.  She was able to sit up on the edge of the bed for 8 to 10 minutes with to stand.  Physical therapy also saw patient and felt she would continue to benefit from skilled physical therapy.  Pharmacist did consult with the patient on vancomycin and started at 750 mg IV every 12 hours.  They will be checking a trough level on 1/7/2024 at 8:30 AM.  Labs today are generally stable.  Glucose levels ranging in the 140s to 170s.  Sodium still slightly low at 132.  CO2 now normal at 22.5, albumin slightly low at 2.1, AST slightly up at 34 and alk phos slightly up at 270.  White blood cell count today is down to 13.04 and hemoglobin is stable at 10.3 with platelets of 412,000.  2 of 2 initial blood cultures were positive with final ID pending.  Sed rate remains greater than 130, procalcitonin is 3.24, and C-reactive protein is at 23.85.  Patient is still on overflow status waiting for a floor telemetry bed.     1/8/2024.   Patient is seen again today in her room on the coronary care unit #335.  Patient was actually up in a chair at the time of my visit and her daughter was present at bedside.  I wore full PPE for the exam including an N95 facemask, goggles, white lab coat, and gloves when touching patient.  I performed thorough hand hygiene before and after the patient visit.  Patient is feeling much better today.  She says that she was anxious to get up and was happy that the therapist left her up in the chair.  She is able to get to the bathroom but does need some assistance still for that endeavor.  Several specialist saw the patient and their care is summarized below.  Patient remains on vancomycin therapy and pharmacy is following the dose and adjusting levels.  Dr. Us from ID did see patient again today.  He notes that repeat blood cultures remain negative and initial blood cultures are still being evaluated.  He has continued vancomycin for now and will follow-up on ID and susceptibilities.  Urine culture growing Candida glabrata and tropicalis and he has transitioned her from fluconazole therapy to micafungin 100 mg daily for now.  White blood cell count is slightly increased today at 16.61.  He is cautiously monitoring the patient for any new signs of infection or changes.Dr. Armando from urology did see the patient and was pleased with his surgical result from cystoscopy last week with bilateral stent placement for treating UPJ obstruction.  He has signed off on the case and plans to have her follow-up with him in clinic in 7 to 10 days for reevaluation following discharge.Occupational Therapy did work with patient who reported pain level of 2 out of 10 focused mainly on right abdomen where she has some redness swelling and discomfort.  There was no rebound to my exam in this area but I did elect to go ahead and do a CT scan of abdomen and pelvis to evaluate the area since she did have the extensive surgery not so long  ago.  Patient is noted to fatigue quickly and has some functional deficits that need to be maximized prior to DC to home.  Patient is planning discharge to home with spouse and daughters.  MI requested diabetic educator did meet with the patient.  Patient does need a meter for glucose testing.  Planning 4 times a day test for now.  Will use insulin pen lispro at meals.  Dietitian did meet with patient and her daughter for input on diet.  Provided printed materials and discussion.  Will be available for concerns or questions.  Labs today generally were stable.  Sodium back up to 131.  Glucose max of 245.  Patient does admit she is eating more at this time.  White blood cell count is up slightly at 16.61 of concern to ID.  They are monitoring cultures carefully.  Hemoglobin did drop from 11.5 down to 9.3 today will recheck tomorrow.  CT abdomen and pelvis was completed.  Right ureteral stent present but there is mild distention of the right extrarenal pelvis which appears to be improved compared to the exam 6 days ago.  There is right urethrocele thickening.  There is a right perinephric collection consistent with hemorrhage or urinoma's and there is a new deep collection in the right posterior lateral abdominal wall measuring 3 cm in thickness by 13 cm transverse along with muscular thickening along the right lateral abdominal wall and edema within the right lateral abdominal pelvic subcutaneous fat.  There is enlargement of this fluid collection seen.  We will have nursing notify urology in a.m. of this finding for their input.  May want to consider general surgery consultation also if pain persist in this area.     1/9/2024.  Patient is seen again today in the CCU room #335.  Patient's daughter is at bedside and patient is resting quietly in bedside chair.  Patient appears much more awake and alert today back to her usual personality.  I wore full PPE for the exam including an N95 face mask, goggles, white lab  coat, and gloves when touching patient.  I performed thorough hand hygiene before and after the patient visit.  Nursing reports the patient has been using bedside commode with some urgency and loose bowel movements at times.  This is why she continues on IV vancomycin.  Patient did require O2 2 L per nasal cannula at night while sleeping.  No other new issues noted.  Dr. Us from ID saw patient again today.  He continues to monitor her blood cultures and has continued her on the IV vancomycin along with micafungin for treatment of yeast infection.  Urology did see patient regarding findings on CT scan.  These were reviewed with Dr. Salomon.  There are fluid collections in the right lateral abdominal pelvic wall measuring 12.8 cm x 18 cm x 3 cm which may represent hematomas or urinoma months.  Left renal pelvis is still dilated slightly.  Urology did speak with the IR department directly and arrange for CT-guided drain placement which the patient is agreeable to having.Physical therapy did work with the patient today.  She required contact-guard assist for standing and ambulated 15 feet with no assist.  Patient was minimally unsteady and fatigued quickly though patient indicated these were the usual distances that she does ambulate.  Patient plans to DC home with home health and assistance from her daughter.  PT will continue to follow and advance as able.  Patient encouraged to get up in chair for all meals and ambulate at least 3 times a day to promote functional mobility during hospital stay.  General surgery did see patient at my request regarding the right lower quadrant pain.  They feel that it is primarily due to the retroperitoneal fluid collections which have increased in size.  They deferred further treatment decisions to urology and felt there was no intraperitoneal abnormalities noted.  Labs today do show glucose is up slightly more as patient is eating more.  Lantus has been increased for today to 16  units once daily.  Sodium is now up to 133.  Glucose was up at 212 this a.m. white blood cell count is improved down from 16.61 to a level of 14.58 today.  Hemoglobin is up slightly at 10.1 and does remain stable.  Platelet count is also stable at 439,000.  Patient planning discharge to home over the weekend if drain is able to successfully remove fluid from the retroperitoneal fluid collection that is causing pain in the right lower quadrant.  Final antibiotic plan is still pending from ID.  We are continuing to follow closely and encourage further activity on the part of the patient.     1/10/2024.  Patient is seen again today in her room in the CCU room #335.  Patient is up in chair at bedside watching television.  She recognizes me immediately upon my entry into her room and smiles appropriately.  I wore full PPE for the exam including an N95 facemask, goggles, white lab coat, and gloves.  I performed thorough hand hygiene before and after the patient visit.  I did discuss the patient's care with her nurse who was present in the [place]ney outside her room.  Patient did have successful placement of the drain today in the pocket of fluid accumulation in the right lower abdominal wall.  Procedure was felt to be quite successful and 50 cc of fluid were sent to the lab for evaluation and analysis.  Patient has an ongoing drain that has very opaque brownish-white material draining from the fluid collection at present time.  Patient states that she feels 100% better with significant improvement in the pain that she was experiencing in the right lower quadrant prior to this drainage.  Vital signs today indicate no fevers with current temperature of 98.1 pulse 86 respiratory rate 18 blood pressure 120/65 and oxygen sat 96% on room air.Review of patient's labs shows that her sodium is very stable at 132 glucoses have been primarily in the 1 43-1 63 range today.  Patient's white blood cell count continues to come down slowly  and is at 12.93 and her hemoglobin is stable at 10.3.  Body fluid culture from drainage is pending.  Patient does remain on Lantus 16 units along with sliding scale insulin at present time and seems to be very comfortable with management of her diabetes.  We will plan follow-up on outpatient basis with endocrinology following discharge.  Patient was seen by Dr. Us today and he has continued the vancomycin therapy awaiting culture results from the fluid drained from her right lower quadrant and awaiting final ID is on organism which grew from blood earlier in admission.  He is still continuing to treat for fungal infection that was collected at time of drainage from stents placed in ureters and is using micafungin for treatment of this infection.  Dr. Campos performed procedure in radiology without complications or problems.  He has suggested that patient have a repeat CT scan of the area prior to discharge to home.     1/11/2023.  Patient is seen again today in her room on CCU room #335.  Patient is up in her chair watching television and indicates to me that she is often sleeping in the chair because it is easier to get up to urinate from the chair then to get up from the bed.  I did speak with the patient's nurse earlier in the day.  I wore full PPE for the exam including an N95 facemask, goggles, white lab coat, and gloves when touching patient.  I performed thorough hand hygiene before and after the patient.  Patient's appetite continues to slowly improve.  She also is gradually getting stronger and gaining more independence.  PT did discuss with her using a walker to ambulate because she seems to be much safer with that at the present time the patient is somewhat stubborn and insist on walking alone despite poor posture at times.  Review of today's labs show that her blood sugars have ranged from 156 up to a max of 389.  I will plan on increasing her insulin through her Lantus by another 2 units each day up  to 18 units daily.  Other labs are very stable.  Sodium is 131, BUN slightly low at 4, albumin low at 2.1.  White blood cell count continues to come down and is at 11.91 today.  Hemoglobin is at 9.8 and platelet count is 502,000.  Culture still pending from drain placement but so far with no growth.  Other cultures remain positive as before.  Per ID patient to continue on vancomycin for now awaiting those culture results.  Anticipate possible discharge the patient over the weekend depending on decisions that come from infectious disease.  Otherwise clinical condition seems to be quite stable.Review of vital signs reveals that temperature is 98.9, pulse 81, respirations 20, blood pressure 110/82 and her room air sat is 96%.  We continue to follow while awaiting decision on antibiotics.     1/12/2024.  Patient is seen once again today in her room in the CCU room #335.  Patient was resting quietly in bed at the time of my visit.  I wore full PPE for the exam including an N95 face mask, goggles, white lab coat, and gloves when touching patient.  I performed thorough hand hygiene before and after the patient visit.  Patient has had a very quiet day.  She was seen by Dr. Miller from urology earlier this morning.  Per his plan, drain in right retroperitoneal area is to be maintained until output is clear and scant and patient is felt to be infection free.  We still are awaiting culture results from the drain.  Urology plans to coordinate outpatient study of both ureters to determine patency with a follow-up in office 1 week after discharge.Dr. Us from infectious disease saw patient again also today.  Drainage cultures from the retroperitoneal drain remain pending.  For now he continued vancomycin.  He also talked with micro in the lab today about the prior positive blood cultures and they are going to resend the new sample due to the mixed sample that came out on prior study.  He is continuing micafungin 100 mg daily  for now due to the isolated Candida in patient's stents.  Pharmacy continues to adjust vancomycin levels.  New labs ordered for tomorrow AM.  Patient hoping for discharge soon if final decisions can be made regarding antibiotic therapy.  Vital signs remained stable with no fever pulse 82 respiratory rate 18 and blood pressure 128/64 with 94% on sat with room air.     1/13/2024.  Patient is seen again today in her room in the CCU room #335.  Patient is resting quietly in bed at the time of my visit watching television.  I did discuss the case briefly with the patient's nurse.  I wore full PPE for the exam including an N95 face mask, goggles, white lab coat, and gloves when touching the patient.  I performed thorough hand hygiene before and after the patient visit.  Patient remains in good spirits today and actually was more talkative than in the past.  Patient's  is helping from afar with a move that her daughter is doing today.  Most of the family is involved with moving the daughter to returning home.  Patient's drain continues to drain cloudy whitish pus-like appearing material with the amount of drainage decreased slightly.  Culture has grown lactobacillus which hopefully will be sensitive to the same vancomycin that the patient is taking for her blood culture that was positive.  Rereview of the sensitivity blood culture is still pending in the lab at present time.  ID did see the patient and will continue to follow.  Hopefully will have final cultures back by Monday and be able to discharge patient early in the week to home with home IV antibiotics as necessary.  Patient's daughter is an RN and will be able to coordinate infusion of those medications at home level.  Urology did see the patient today and suggested continuing the IV antibiotics and the right retroperitoneal drain.  They also plan to continue following for now.  No other major changes occurred with the patient today.  We are continuing to  follow until she is ready for discharge to home.     1/14/2024.  Patient is seen again today in her room on CCU #335.  Patient was resting quietly in bed watching television and her nurse was at bedside while phlebotomy was drawing her vancomycin trough level.  Patient not feeling as well today due to low grade temperature elevations.Tmax 99.1 overnight last night.  I wore full PPE for the exam including an N-95 face mask, goggles, gloves and white lab coat.  I performed thorough hand hygiene before and after the patient visit. Patient's retroperitoneal drain is still draining opaque, thick pus which has culture positive for lactobacillus.  We are still waiting on final culture results and sensitivities for both bacteria that have grown, one from the drainage and one from the blood.  Patient's appetite is decreased today.  Daughter visited earlier and helped her with bath and clean up.  Labs show sodium down to 125.(Requesting input from nephrology on the hyponatremia) Glucoses remain more elevated in last 24 hours possibly due to brewing infection. Planning to continue gradually increasing the Lantus dose to 14 units bid. WBC is up to 10.99 and hemoglobin is stable at 9.1.  Platelets are up to 550,000.  Will check UA, Sed rate / CRP in am.  Also plan to check AAS x-ray in am.  Will continue monitoring patient carefully.     1/15/2024.  Patient is seen again today in the CCU room #335.  Patient is resting quietly in bed but still appears a bit flushed.  No family is in the room at the present time.  I wore full PPE for the exam including an N95 facemask, goggles, white lab coat, and gloves when touching patient.  I performed thorough hand hygiene before and after the patient visit.  Patient is in a good mood today and anxious to go home soon.  We did have patient seen by the nephrology team today.  They feel the patient is actually somewhat hypervolemic and actually gave her 1 dose of Lasix 20 mg IV stat.  They want  to avoid hypotonic fluids with the patient.  They are checking a cortisol level urine sodium and urine osmolality.  They have placed the patient on fluid restriction at 1500 cc for 24 hours and will be continuing to follow labs tomorrow.  Dr. Us saw patient from infectious disease.  Send out blood cultures still are pending.  Fluconazole susceptibilities also pending for Candida glabrata.  He is continuing the patient on micafungin 100 mg daily for the Candida.  He is planning to de-escalate the antibiotics from vancomycin today to Unasyn 3 g every 6 hours and plans to follow further.  Hopefully can transition to an oral antibiotic at the time of discharge to home.  Physical therapy saw patient but patient and family declined treatment because she has had diarrhea all day and has been up and down on the bedside commode.  Labs show sodium up to 132 today.  Chloride 54.  Albumin remains low at 2.2.  Cortisol level 23.90 appears normal.  TSH slightly up at 5.2.  C-reactive protein still high at 24.73.  White blood cell count up to 11.33.  Hemoglobin 8.9.  Sed rate still up at 84.  UA checked yesterday still relatively unremarkable with small leukocytes and small blood.  Urine culture so far negative.Abdominal x-ray series shows minimal atelectasis at right base.  Otherwise unremarkable.  UPJ drains appear to be still in correct place.Glucoses today have ranged from 321 glucoses today have ranged from 190 earlier this morning to a high of 321 later this evening.  Will continue to monitor tomorrow and consider an additional increase in Lantus if these elevations persist.     1/16/2024.  Patient is seen again today in her room in the CCU #335.  Patient is resting quietly in bed at the time of my visit but wakes as I enter the door immediately and starts talking with me.  She does appear more comfortable today, less flushed, no fevers.  Vital signs much improved with temperature of 98.9, pulse 97, respiratory rate 14,  blood pressure 103/55, and room air sat of 94%.  Patient is now on Unasyn every 6 hours in place of the previous vancomycin.  I will full PPE for the exam including an N95 face mask, goggles, white lab coat, and gloves when touching patient.  I performed thorough hand hygiene before and after the patient visit.  I did review notes from specialist today and discussed the case with the patient's nurse.  Patient was seen by Dr. Harrington from renal.  She found that due to the elevated glucoses patient's sodium corrects to 132.  She did suggest increasing patient's TSH dose slightly which we will do.  Cortisol level was found to be normal.  Cultures of had no changes today.  Patient continues to show anemia of chronic disease.  Diabetes range has been from 1 91-3 21 today. Fluid restrictions were continued.  Oral diuretic given again today.  Dr. Us from ID continue the micafungin and the Unasyn.  Final ID plan for antibiotics still pending.  We will proceed to CT scan of abdomen and pelvis as requested previously by radiology prior to patient's discharge so that we can be sure that drain has effectively drained all the fluid in her abdominal wall.  This will also check on position of the UPJ stents.  I did complete order for rolling walker as requested by CCP.  I have sent a note to CCP  detailing additional discharge planning considerations which are somewhat contingent on whether or not ID feels home IV antibiotics and antifungals are necessary.  If this is the case, then I also feel patient needs home health also to coordinate infusions as well as to draw labs as we need them for monitoring of patient's infections and other issues such as her hyponatremia.  Blood glucose levels remain somewhat elevated despite the increased doses of Lantus that I have added.  In an effort to even better control this I would like to increase the Lantus dose to 18 mg twice a day and hopefully we will find that this reduces the need  for such high doses of sliding scale insulin which has ranged from 4 units to 7 units over the course of the day.  Next step would be consideration of adding mealtime insulin if this does not resolve the problem.  I will also ask diabetic educator to again look at patient's regimen to see if she has any recommendations with respect to adjustment of doses.  We certainly are going to need to have this patient follow-up with endocrinology on an outpatient basis to work on better regulation of blood sugars.  Nurses do report that patient is supplementing her hospital diet with food brought in from home at times.  Overall patient seems to be fairly stable medically at present time.     1/17/2024.  Patient seen again today in the CCU room #335.  Patient is sitting up in the chair at bedside.  Daughter is laying on bed.  I wore full PPE for the exam including an N95 facemask, goggles, white lab coat, and gloves when touching patient.  I performed thorough hand hygiene before and after the patient visit.  Patient in good mood today and feeling stronger.  Has been working with physical therapy and doing quite successfully as long as she use a rollator walker.  Daughter decided that they will have a rollator walker to take home.  We did discuss the results of the CT scan of the persistent fluid that was seen on the scan.  I am going to consult urology to take a look at the situation again and Dr. Martinez did see patient later in the day.  We also discussed patient's food intake and encouraged her to take with food regularly.  The diabetic educator also saw the patient for me in consultation. Labs today are relatively stable.  Her sodium is 133 and corrects to 135.  She continues on a 1500 cc fluid limitation per day.  Blood sugars have ranged from a low of 168 up to a high of 333.  White blood cell count is up slightly at 10.93 and hemoglobin remains slightly low at 9.1 with platelet count of 541,000.  CT of abdomen and pelvis  was completed at my request.  Findings did show a moderate right pleural effusion with partial collapse of the right lower lobe.  Effusion is slightly larger than it was on previous exam and left lung appears to be clear.  Right ureteral stent is present with mild distention of right renal pelvis similar to previous exam left ureteral stent is present and there is no change.  Drain has been placed since last film was done in 2 right posterior lateral abdominal wall fluid collection.  Collection shows a decrease in size though continues to contain bubbles of air and mild fluid.  Collection extends up to the iliac crest on the right.  There are also multi lobular collections that contain fluid and fat within the posterior medial upper abdominal wall this collection of fluid extends to the right adrenal gland and right kidney and is also similar to what was seen on 1/8/2024.  There is a collection along the anterior margin of the right psoas muscle which is increased in size from previous exam.  There is no free intraperitoneal gas other organs appeared normal.  There has been a previous cholecystectomy.  There is some edema in the subcutaneous fat along the right posterior lateral flank.  This CT scan was reviewed by Dr. Bill Martinez from Urology who consulted on the patient later in the day.  He did not feel was necessary to place a secondary drain as above is necessary to be patient and let the drain is already in do its work.  The drain needs to be irrigated periodically to keep it from becoming obstructed.  Patient has retroperitoneal abscess secondary to urinary extravasation from the calyceal rupture.  Etiology of the extravasation was confirmed by determining that the fluid coming from drain had creatinine level.  There is some associated cellulitis of her pannus on the right lower side also.  Infectious disease saw the patient again today and Dr. Us has continued Unasyn therapy along with micafungin.  He  relates no other comments today.  Dr. Harrington from renal did see the patient.  She has continued the patient on diuretic in the form of torsemide to help with fluid overload.  She has continued fluid restrictions and did discuss the case with the patient and her daughter.  Overall the patient seems very stable.  Hopefully will be able to move toward discharge in the next 48 to 72 hours if patient's stability persist.  Will train daughter to irrigate her drain on a regular basis.     1/18/2024.  Patient is seen again today in her room in the CCU #335.  Patient is resting quietly in bed with her daughter at bedside.  I wore full PPE for the exam including an N95 facemask, goggles, white lab coat, and gloves when touching patient.  I performed thorough hand hygiene before and after the patient visit.  I did discuss the case earlier this morning with Dr. Us from infectious disease.  He does feel patient can safely be discharged to home tomorrow if she remains stable.  He will transition her antibiotics tomorrow morning to probably Augmentin and fluconazole and determine length of treatment at that time.  I did review the notes from urology yesterday and we will schedule follow-up in 1 to 2 weeks in urology clinic for further evaluation of the fluid collection postoperatively after her placement of bilateral UPJ stents.  It is noted that patient had calyceal rupture and we are still waiting for that to completely heal and resolve.  Patient does have some leakage of urine still into the fluid that is collected in the adjacent abdominal wall and retroperitoneal areas.  Pulmonary did see patient today.  They feel the right pleural effusion is relatively benign but did order an echocardiogram to be sure there were no cardiac complications.  They feel that further diuresis on an outpatient basis with the torsemide will be appropriate treatment and hopefully the symptoms will completely resolve over the next few days.Report  from the echocardiogram looks good with left ventricular ejection fraction of 55 to 60%.  The patient does have some mild grade 1 impaired relaxation.  Estimated right ventricular systolic pressure from tricuspid regurgitation is normal.  Glucoses have been fairly stable today.  And we have increased Lantus yet again today in an effort to better control his blood sugars.  Highest glucose was 322 right before lunchtime with afternoon and evening glucoses of 260-280.  Renal did see the patient and continued to p.o. torsemide and fluid restrictions.  Labs will need to be monitored for diuretic toxicity.  Urology is agreeable to discharge to home and will follow-up with the patient in their clinic in 1 week.  Patient will also follow-up with ID at the end of her.antibiotic therapy.  Patient will follow-up with Dr. Fairchild in 1 to 2 weeks with a video visit to discuss progress at home.    1/19/2024.  Patient is seen again today in her room in the CCU #335 in preparation for discharge.  Patient is resting quietly in bed and daughter Enoch comes in while in the midst of the discharge planning.  I wore full PPE for the exam including an N95 facemask, goggles, white lab coat, and gloves when touching patient.  I performed thorough hand hygiene before and after the patient visit.  Patient is very anxious to go home.  Daughter waiting for instructions and will then have her  come back to pick both she and her mother up for the discharge to home.  Patient will be staying with the daughters during the recovery.  Home health will be referred there.  Patient has been cleared by urology for discharge and follow-up in their office in 1 week.ID did see patient in consultation and switched her from IV antibiotic Unasyn and micafungin to Augmentin 875 twice daily plus fluconazole 800 mg daily with duration pending at this time depending on how issues with the drain go.  Recommended follow-up will be with urology and only with ID  "if urology needs further consultation.  Patient was taught how to irrigate her drain.  Urology will be following and will give home health orders as needed also.   has arranged for rolling walker from [employer].  Renal will follow-up on outpatient basis.  Sodium level stable at 133.  Continue diuretic dose with torsemide 40 once daily.  [facility] will follow patient in the outpatient environment.  Labs at discharge looked fairly good.  Sodium was 133.  CO2 was 32.0.  Blood sugar ran in the upper 100s and low 200s most of the day.  I did review home medications with the patient.  She is going to start back on her metformin and on her Trulicity at the time of arrival at home.  We called and all the different medications that have been adjusted or changed and did review those with the patient and her daughter.  Daughter is comfortable irrigating patient's drain.  At this point it was felt that the patient had maximized benefit from hospitalization and was ready for discharge to home.         Vital Signs       Physical Exam at Discharge      /64   Pulse 89   Temp 98.2 °F (36.8 °C) (Oral)   Resp 18   Ht 165.1 cm (65\")   Wt 103 kg (228 lb)   LMP 09/03/2016 Comment: IUD  SpO2 94%   BMI 37.94 kg/m²      Constitutional:             Patient resting in bed today with her daughter at bedside.  Continues to be more alert. .  Normal personality.  Pain significantly improved,.  Drain continues to produce opaque whitish-tan drainage. Blood pressures stable  Patient now feeling  better  WBC is now normal..  There is resolving cellulitis of the right pannus area   Eyes:                          PERRLA, conjunctiva/corneas clear, no icterus, no conjunctival                                     pallor, EOM's intact, both eyes      ENT and Mouth:         Lips, tongue, gums normal; oral mucosa pink and moist   Neck:                          Supple, symmetrical, trachea midline, no JVD  Respiratory:       "           Clear to auscultation bilaterally, respirations unlabored  Cardiovascular:           Regular rate and rhythm, S1 and S2 normal, no murmur,                                       no  Rub or gallop.  Pulses normal.    Gastrointestinal:          BS present x 4 Soft, non-tender, bowel sounds active,                                       no masses, no hepatosplenomegaly     right lower quadrant pain almost totally resolved with no rebound guarding or other abnormalities.  Retroperitoneal drain remains in place.  Will plan CT scan prior to discharge.                                               :                             No hernia.  Normal exam for sex.         Musculoskeletal:        Extremities normal, atraumatic, no cyanosis or edema                                      No arthropathy.  No deformity.  Gait normal                                                 Skin:                           Skin is warm and dry,  no rashes, swelling or palpable lesions.  Erythema resolved over right lower quadrant area pain   Neurologic:                 CN -XII intact, motor strength grossly intact, sensation grossly intact to light touch, no focal reflex deficits noted    Psychiatric:                 Alert,oriented X3, no delusions, psychoses, depression or anxiety    Heme/Lymph/Imun:   No bruises, petechiae.  Lymph nodes normal in size/configuration       Pertinent Test Results:    Results from last 7 days   Lab Units 01/19/24  0855 01/18/24  1750 01/18/24  0710 01/17/24  0747   SODIUM mmol/L 133*  --  133* 133*   POTASSIUM mmol/L 4.2 4.5 3.4* 3.9   CHLORIDE mmol/L 90*  --  89* 91*   CO2 mmol/L 32.0*  --  30.9* 28.4   BUN mg/dL 8  --  8 8   CREATININE mg/dL 1.00  --  0.84 0.84   GLUCOSE mg/dL 255*  --  178* 168*   CALCIUM mg/dL 9.4  --  9.0 8.7       Results from last 7 days   Lab Units 01/19/24  0414 01/18/24  0710 01/17/24  0747   WBC 10*3/mm3 9.92 9.87 10.93*   HEMOGLOBIN g/dL 9.4* 8.6* 9.1*   HEMATOCRIT % 29.8*  28.1* 29.2*   PLATELETS 10*3/mm3 490* 514* 541*       Results from last 7 days   Lab Units 01/15/24  0714   CRP mg/dL 24.73*             Attending Physician Final Assessment and Plan      1.  Cystitis/UTI with sepsis associated with bilateral UPJ obstruction and moderate bilateral hydronephrosis and possible right calyceal rupture.  Patient resuscitated aggressively with IV fluids.  Broad-spectrum antibiotics, Zosyn, started.  Urology consult is placed.  Patient being followed carefully in ICU for additional complications or signs of worsening sepsis.  Culture of urine remains negative on day #2.  New cultures collected at time of cystoscopy today by urology.  Patient currently off Zosyn which was stopped after surgery yesterday.  We will repeat blood cultures since we did have 1 of 2 initial blood cultures positive.  ID has been consulted and is following with us.  They have started the patient on fluconazole secondary to the instrumentation yesterday and yeast that is seen in hearing.  I do plan to check a procalcitonin level with a.m. labs tomorrow.  Patient also started on vancomycin due to 2 of 2 positive cultures from blood.  Additional blood cultures pending at present time.  ID continues to follow on 1/9/2024.  Vancomycin and micafungin are continued for treatment of ongoing infections.  Antibiotics unchanged at this point.  Hoping culture from fluid collection obtained in IR today will be useful in guiding antibiotic therapy.  ID continues to follow.  Vancomycin still ongoing.  Final decision on treatment to come soon with respect to cultures.  Awaiting final culture results.  Lab resubmitting blood culture for ID.  Urine cultures negative.  Drainage from abscess culture still pending.  On 1/13/2024.  Still on vancomycin.  Awaiting final culture sensitivities.1/14/2024 patient beginning to show some elevations in WBC and low grade temperature elevations.  Repeat UA on day #14 unremarkable and urine culture  pending 1/15/2024.  Now transitioning from vancomycin to IV Unasyn per ID.  Continue to follow and await final culture results.  Still awaiting final plan for antibiotics by ID with anticipated discharge to home in the next 2 to 3 days.  Should be ready for discharge on 1/19/2024.  Patient to be followed on outpatient basis by urology for determination of length of antibiotic treatment.     2.  Anion gap metabolic acidosis versus possible diabetic ketoacidosis.  Improving rapidly at present time on DKA protocol with Glucomander.  Aggressive fluid resuscitation initiated in the ER is continued in the ICU.  Patient now with good urinary output and hopefully will be able to come off of IV insulin and switch to subcu dosing overnight..  Anion gap has narrowed and improved.  Patient should come off Glucomander DKA protocol sometime later today.  On postop day #1 anion gap has significantly improved.  We are asking renal to help us with follow-up on her acute kidney injury and volume status.  Acidosis has resolved.  Acidosis has resolved.  Diabetes now under control with Lantus 16 units daily and sliding scale insulin anion gap remains normal now.  Anion gap now stable.     3.  Lactic acidosis possibly due to home use of metformin while taking no food or possibly due to acute sepsis.  Continuing rapid fluid resuscitation with improvement already noted in lactic acid.  Monitoring carefully for signs of any new changes or problems.  Lactic acidosis has also resolved on day #2 and patient's sepsis seems to be under much more stable condition at present time.  Repeat lactic acid in a.m. with other labs.  Lactic acidosis has resolved.  Continuing sliding scale insulin with slow increases in doses.     4.  Type 2 diabetes mellitus poorly controlled at present time with elevated A1c greater than 16.  Will probably need to arrange for follow-up with patient in endocrinology clinic.  For now we will ask diabetic educator to see  patient and work on sliding scale insulin protocols.  Patient and her daughter are interested in Dexcom monitoring and I will see if that is available from the diabetic educator.  Discussed situation with the daughters and she may benefit from continuous 24-hour glucose monitoring issues willing to learn the process.  Will probably need to discharge charge on sliding scale insulin and may also consider mealtime insulin or long acting insulin.  Diabetic educator to see patient and work with us on diet, glucose testing techniques, and generalized diabetes management.  Patient doing well on sliding scale with single dose of Lantus at nighttime.  Now the patient is eating more plan to increase Lantus to 16 units subcu daily.  We will plan on rechecking hemoglobin A1c in 6 to 8 weeks.  Diabetes much improved.  Stable on Lantus and sliding scale insulin.  Continuing to increase Lantus slowly to 18 units daily.  Splitting Lantus into 2 doses daily and will give 10 units subcu twice daily since glucose is still running a bit high.  Tolerating insulin well.  Patient eating better and blood sugars slightly higher.  Continuing Lovenox on 1/13/2024 but increasing dose to 12 units twice daily since blood sugars are still running higher above 200 especially later in the day.1/14/2023 further increasing Lantus dose to 14 units bid.  Will increase Lantus to 15 units twice daily on 1/14/2023.  May need to add some mealtime insulin if blood sugars remain elevated especially in the evening when it seems to get highest.  Lantus now boosted to 18 units twice daily hoping good control elevated glucoses a bit better.  May need to consider mealtime insulin but will continue to monitor.  Placed consult to diabetic educator for her input on this issue.  Continue to gradually increase Lantus to a total of 20 units twice daily as recommended by diabetic educator who says we should increase long-term insulin.  Can continue this process of slow  increases on an outpatient basis.  Lantus has been increased to 20 units twice daily.  Continuing sliding scale insulin.  Discussed follow-up on outpatient basis with endocrinology.     5.  Pseudohyponatremia.  Seems to already be improving with resuscitation and resolution of the uncontrolled glucoses.  Will continue to follow electrolytes regularly.  Hyponatremia now corrected and in normal range.  Hyponatremia has now resolved on day #2. Sodium now normalized.Low sodium now reoccurring on 1/14/2024.  I am asking for nephrology to evaluate.  Nephrology is added fluid restriction.  Sodium is improved.  Continuing to monitor.     6.  Hypothyroidism.  Patient's Synthroid is continued by the ICU attending.  Will continue to follow this in the hospital and on an outpatient basis.  Hypothyroidism is a stable condition.  Have elected to increase levothyroxine to 150 mcg daily which hopefully will help with the elevation in the TSH.  Will check again in 4 to 6 weeks as outpatient and consider additional increase if needed.     7.  Hyperlipidemia.  Agree with holding statin for now.  Will resume prior to discharge and continue on outpatient basis.     8.  Obesity.  Hope to continue using agents such as Ozempic for management of patient's blood sugar levels.  This will also help with diet control and ongoing need for gradual weight loss.     9.  Acute kidney injury secondary to volume depletion with uncontrolled glucoses.  Should improve as patient's volume status normalizes.  Will continue to monitor closely.  Acute kidney injury is gradually improving with resolution of hydronephrosis and bilateral stent placement.  Acute kidney injury totally resolved.     10.  Generalized muscle weakness and fatigue.  Suspect related to the poorly controlled diabetes and electrolyte disturbances.  Will do PT and OT evaluations after patient stabilizes.  Suspect patient will discharge to home with PT and OT and home environment.   Hopefully patient will be able to work with PT and OT in the next few days.  Muscle strengthening ongoing.  Patient improving with PT and OT and seems ambulatory independently at present time.     11.  Right lower quadrant pain with fluid collections noticed on CT scan completed today 6 days after admission felt to represent retroperitoneal abscess due to right calyceal rupture with extravasation of urine.  Patient complaining of significant pain with erythematous skin over the right lower quadrant.  I will ask general surgery to take a look at the situation in the a.m.  We will also notify urology of the findings in the a.m. no pain at present in right lower quadrant.  Continuing to monitor drainage which does persist at present time.  Planning repeat CT scan of abdomen in a.m.  in anticipation of upcoming discharge to home.  Repeat CAT scan done and shows persistent but somewhat smaller retroperitoneal abscess at this point.  Will continue drain and urology has added regular irrigation of the drain.  Hopefully will be able to go home in the next 2 to 3 days if final antibiotic plan can be made.  Final antibiotic plan to be suggested by ID in AM.  Patient to be discharged on Augmentin and fluconazole with course of treatment to be determined.  By urology.  Patient to follow-up in their clinic in 1 week to review antibiotics and determine appropriate treatment course.       Condition on Discharge: Stable    Discharge Disposition  [place] [facility] [place] with [facility] to follow for nursing, PT, OT,    Transport Plan  Family to transport home by private car    Hospital Treatments discontinued at time of Discharge  IV, Telemetry, Perales Catheter, Deep Lines and PICC LInes    Discharge Medications     Discharge Medications        New Medications        Instructions Start Date   Accu-Chek Guide Me w/Device kit   Use to test blood glucose up to four times daily as needed      Accu-Chek Guide test  strip  Generic drug: glucose blood   Use to test blood glucose up to four times daily as needed.      Accu-Chek Softclix Lancets lancets   Use to test blood glucose up to four times daily as needed.      acetaminophen 325 MG tablet  Commonly known as: TYLENOL   650 mg, Oral, Every 4 Hours PRN      amoxicillin-clavulanate 875-125 MG per tablet  Commonly known as: AUGMENTIN   Take 1 tablet by mouth Every 12 (Twelve) Hours for 14 days.      BD Pen Needle Kim 2nd Gen 32G X 4 MM misc  Generic drug: Insulin Pen Needle   1 each, Subcutaneous, 4 Times Daily PRN      dextrose 40 % gel  Commonly known as: GLUTOSE   15 g, Oral, Every 15 Minutes PRN      fluconazole 200 MG tablet  Commonly known as: DIFLUCAN   800 mg, Oral, Every 24 Hours      Glucose Management tablet   Use as needed for emergently low blood glucose levels. Formulary Compliance Approval      HYDROcodone-acetaminophen 5-325 MG per tablet  Commonly known as: NORCO   1-2 tablets, Oral, Every 6 Hours PRN      insulin glargine 100 UNIT/ML injection  Commonly known as: LANTUS, SEMGLEE   20 Units, Subcutaneous, Every 12 Hours Scheduled      insulin lispro 100 UNIT/ML injection  Commonly known as: HUMALOG/ADMELOG   2-9 Units, Subcutaneous, 4 Times Daily Before Meals & Nightly      Lantus SoloStar 100 UNIT/ML injection pen  Generic drug: Insulin Glargine   20 Units, Subcutaneous, 2 Times Daily      naloxone 4 MG/0.1ML nasal spray  Commonly known as: NARCAN   Call 911. Don't prime. [place] in 1 nostril for overdose. Repeat in 2-3 minutes in other nostril if no or minimal breathing/responsiveness.      NovoLOG FlexPen 100 UNIT/ML solution pen-injector sc pen  Generic drug: insulin aspart   Inject under the skin per sliding scale 4 times daily with meals and at bedtime. Max total daily dose of 36 units. -199 mg/dL - 2 units, -249 mg/dL - 4 units -299 mg/dL - 6 units -349 mg/dL - 7 units -400 mg/dL - 8 units BG > than 400 mg/dL - 9 units & Call  Provider      ondansetron ODT 4 MG disintegrating tablet  Commonly known as: ZOFRAN-ODT   4 mg, Translingual, Every 6 Hours PRN      pantoprazole 40 MG EC tablet  Commonly known as: PROTONIX   Take 1 tablet by mouth Every Morning      torsemide 20 MG tablet  Commonly known as: DEMADEX   40 mg, Oral, Daily      UltiCare Alcohol Swabs pads   Apply one alcohol swab to injection site of skin immediately prior to insulin injection.             Changes to Medications        Instructions Start Date   levothyroxine 150 MCG tablet  Commonly known as: SYNTHROID, LEVOTHROID  What changed:   medication strength  See the new instructions.   150 mcg, Oral, Every Early Morning      Trulicity 1.5 MG/0.5ML solution pen-injector  Generic drug: Dulaglutide  What changed:   how much to take  additional instructions   Inject 1.5 mg under the skin into the appropriate area as directed 1 (One) Time Per Week on Sunday             Continue These Medications        Instructions Start Date   diclofenac 50 MG EC tablet  Commonly known as: VOLTAREN   50 mg, Oral, 2 Times Daily PRN      escitalopram 10 MG tablet  Commonly known as: LEXAPRO   1 tablet, Oral, Daily      ferrous sulfate 325 (65 FE) MG tablet   1 tablet, Oral, 2 Times Daily With Meals      loratadine 10 MG tablet  Commonly known as: CLARITIN   1 tablet, Oral, Daily      metFORMIN 500 MG tablet  Commonly known as: GLUCOPHAGE   500 mg, Oral, 2 Times Daily With Meals      multivitamin tablet tablet   1 tablet, Oral, Daily      oxybutynin XL 10 MG 24 hr tablet  Commonly known as: DITROPAN-XL   1 tablet, Oral, Daily      potassium chloride 20 MEQ CR tablet  Commonly known as: K-DUR,KLOR-CON   1 tablet, Oral, As Needed      pravastatin 40 MG tablet  Commonly known as: PRAVACHOL   40 mg, Oral, Daily      rOPINIRole 0.5 MG tablet  Commonly known as: REQUIP   0.5 mg, Oral, Every Night at Bedtime      tiZANidine 4 MG tablet  Commonly known as: ZANAFLEX   1 tablet, Oral, Nightly PRN       traMADol 50 MG tablet  Commonly known as: ULTRAM   0.5 tablets, Oral, 2 Times Daily PRN, for pain      uribel 118 MG capsule capsule   1 capsule, Oral, 3 Times Daily      VITAMIN D PO   1 dose, Oral, Daily             Stop These Medications      amLODIPine-benazepril 5-20 MG per capsule  Commonly known as: LOTREL 5-20     famotidine 40 MG tablet  Commonly known as: PEPCID     furosemide 20 MG tablet  Commonly known as: LASIX              Home Medication List  Prior to Admission medications    Medication Sig Start Date End Date Taking? Authorizing Provider   amLODIPine-benazepril (LOTREL 5-20) 5-20 MG per capsule Take 1 capsule by mouth daily.  Patient taking differently: Take 1 capsule by mouth Every Morning. 7/18/16  Yes Dean Fairchild MD   Dulaglutide 1.5 MG/0.5ML solution pen-injector Inject  under the skin into the appropriate area as directed 1 (One) Time Per Week. SUNDAY   Yes Lucinda Willis MD   famotidine (PEPCID) 40 MG tablet Take 1 tablet by mouth Every Night.   Yes Lucinda Willis MD   ferrous sulfate 325 (65 FE) MG tablet Take 1 tablet by mouth 2 (Two) Times a Day With Meals.   Yes Lucinda Willis MD   furosemide (LASIX) 20 MG tablet Take 0.5 tablets by mouth Daily As Needed. 2/26/18  Yes Lucinda Willis MD   levothyroxine (SYNTHROID, LEVOTHROID) 137 MCG tablet TAKE 1 TABLET BY MOUTH EVERY DAY IN THE MORNING ON AN EMPTY STOMACH 3/25/22  Yes Lucinda Willis MD   loratadine (CLARITIN) 10 MG tablet Take 1 tablet by mouth Daily.   Yes Lucinda Willis MD   metFORMIN (GLUCOPHAGE) 500 MG tablet Take 1 tablet by mouth 2 (Two) Times a Day With Meals. 4/7/17  Yes Dean Fairchild MD   multivitamin (THERAGRAN) tablet tablet Take  by mouth Daily.   Yes Lucinda Willis MD   oxybutynin XL (DITROPAN-XL) 10 MG 24 hr tablet Take 1 tablet by mouth Daily.   Yes Lucinda Willis MD   potassium chloride (K-DUR,KLOR-CON) 20 MEQ CR tablet Take 1 tablet by mouth As Needed  (only when takin Lasix). 2/26/18  Yes Lucinda Willis MD   pravastatin (PRAVACHOL) 40 MG tablet Take 1 tablet by mouth daily. 7/18/16  Yes Dean Fairchild MD   rOPINIRole (REQUIP) 0.5 MG tablet Take 1 tablet by mouth every night at bedtime. 7/25/17  Yes Fabiana Kamara APRN   tiZANidine (ZANAFLEX) 4 MG tablet Take 1 tablet by mouth At Night As Needed for Muscle Spasms.   Yes Lucinda Willis MD   traMADol (ULTRAM) 50 MG tablet Take 0.5 tablets by mouth 2 (Two) Times a Day As Needed. for pain 10/26/20  Yes Lucinda Willis MD   uribel (URO-MP) 118 MG capsule capsule Take 1 capsule by mouth 3 (Three) Times a Day. 3/26/18  Yes Lucinda Willis MD   VITAMIN D PO Take  by mouth Daily.   Yes Lucinda Willis MD   diclofenac (VOLTAREN) 50 MG EC tablet Take 1 tablet by mouth 2 (Two) Times a Day As Needed (pain). 3/6/19   Joe Gil IV, MD   escitalopram (LEXAPRO) 10 MG tablet Take 1 tablet by mouth Daily.    Provider, MD Lucinda       Discharge Diet   Diet Orders (active) (From admission, onward)       Start     Ordered    01/18/24 1046  Diet: Regular/House Diet, Fluid Restriction (240 mL/tray) Diets, Cardiac Diets; Healthy Heart (2-3 Na+); Other (Specify mL/day) (1200); Texture: Regular Texture (IDDSI 7); Fluid Consistency: Thin (IDDSI 0)  Diet Effective Now         01/18/24 1045                    Activity at Discharge  Activity Instructions       Activity as Tolerated      Gradually Increase Activity Until at Pre-Hospitalization Level              Follow-up Appointments  No future appointments.  Additional Instructions for the Follow-ups that You Need to Schedule       Ambulatory Referral to Home Health   As directed      Face to Face Visit Date: 1/19/2024   Follow-up provider for Plan of Care?: I will be treating the patient on an ongoing basis.  Please send me the Plan of Care for signature.   Follow-up provider: DEAN FAIRCHILD [[id number]]   Reason/Clinical Findings: Newly  diagnosed insulin dependent diabetic.  Needs continued diabetic teaching.  Internal abcess of abdomen needs drainage followed.  Labs to be drawn weekly   Describe mobility limitations that make leaving home difficult: Patient needs rolling walker with asist of 1 to mobilize and get out of bed.   Nursing/Therapeutic Services Requested: Skilled Nursing Physical Therapy Occupational Therapy Dietician   Skilled nursing orders: Medication education (Labs once weekly x 3 weeks:  CBC,CMP, Phosphorus, Magnesium, Sed rate, CRP) Pain management Wound care dressing/changes   Instructions: Minitor drain, irrigate to keep flow going   PT orders: Therapeutic exercise Gait Training Home safety assessment Strengthening   Weight Bearing Status: As Tolerated   Occupational orders: Activities of daily living Energy conservation Strengthening Cognition Fine motor Home safety assessment   Dietician orders: Diet instruction (Diabetic diet teaching)   Frequency: 1 Week 1        Call MD With Problems / Concerns   As directed      Instructions: Dr. Fairchild available at [phone]    Order Comments: Instructions: Dr. Fairchild available at [phone]         Discharge Follow-up with PCP   As directed       Currently Documented PCP:    Dean Fairchild MD    PCP Phone Number:    [phone]     Follow Up Details: Call [phone] for 30 minute video visit in 1-2 weeks        Discharge Follow-up with Specified Provider: Dr. Harrington Renal 3-4 weeks for follow up on new Torsemide diuretic and Hyponatremia; 1 Month   As directed      To: Dr. Harrington Renal 3-4 weeks for follow up on new Torsemide diuretic and Hyponatremia   Follow Up: 1 Month        Discharge Follow-up with Specified Provider: Dr. Us in ID follow up on sepsis and antibiotic/antifungal agents; 3 Weeks   As directed      To: Dr. Us in ID follow up on sepsis and antibiotic/antifungal agents   Follow Up: 3 Weeks        Discharge Follow-up with Specified Provider: Dr. [facility]  in 1 week for follow up on bilateral UPJ stents and abdominal abcess due to calyceal rupture; 1 Week   As directed      To: Dr. [facility] in 1 week for follow up on bilateral UPJ stents and abdominal abcess due to calyceal rupture   Follow Up: 1 Week                Therapy Orders  Physical therapy, Occupational Therapy, and Speech Therapy to evaluate and treat.  Nutrition/Dieticien to follow weights and determine further nutritional needs.    Test Results Pending at Discharge  Pending Labs       Order Current Status    Blood Culture - Blood, Arm, Left Preliminary result    Blood Culture - Blood, Arm, Left Preliminary result    Fungus Culture - Body Fluid, Kidney, Left Preliminary result    Fungus Culture - Body Fluid, Kidney, Right Preliminary result            Dean Fairchild MD  1/19/2024  1230 EDT    Time: Discharge 70 min

## 2024-01-19 NOTE — PROGRESS NOTES
LOS: 17 days     Chief Complaint: Antibiotic management    Interval History: Patient reports she is feeling well.  Still with some purulent drain output.  Tolerating antibiotics.    Vital Signs  Temp:  [97.9 °F (36.6 °C)-98.2 °F (36.8 °C)] 97.9 °F (36.6 °C)  Heart Rate:  [] 95  Resp:  [16-18] 18  BP: (111-120)/(57-71) 118/63    Physical Exam:  General: In no acute distress  HEENT: Oropharynx clear, moist mucous membranes  Respiratory: Normal work of breathing  Skin: No rashes or lesions in exposed areas  Extremities: No edema, cyanosis  Access: Peripheral IV    Antibiotics:  Anti-Infectives (From admission, onward)      Ordered     Dose/Rate Route Frequency Start Stop    01/19/24 0826  fluconazole (DIFLUCAN) tablet 800 mg        Ordering Provider: Jorge L Us DO    800 mg Oral Every 24 Hours 01/19/24 0915 02/02/24 0914    01/19/24 0826  amoxicillin-clavulanate (AUGMENTIN) 875-125 MG per tablet 1 tablet        Ordering Provider: Jorge L Us DO    1 tablet Oral Every 12 Hours Scheduled 01/19/24 0915 02/02/24 0859    01/09/24 1144  vancomycin (VANCOCIN) 500 mg in sodium chloride 0.9 % 100 mL IVPB-VTB        Ordering Provider: Jorge L Us DO    500 mg  200 mL/hr over 30 Minutes Intravenous Every 8 Hours 01/09/24 1644 01/12/24 0902    01/05/24 0756  vancomycin 2250 mg/500 mL 0.9% NS IVPB ([facility])        Ordering Provider: Jorge L Us DO    20 mg/kg × 111 kg  over 135 Minutes Intravenous Once 01/05/24 0845 01/05/24 1118    01/05/24 0750  Pharmacy to dose vancomycin        Ordering Provider: Jorge L Us DO     Does not apply Continuous PRN 01/05/24 0750 01/12/24 0749    01/02/24 1636  piperacillin-tazobactam (ZOSYN) 4.5 g in iso-osmotic dextrose 100 mL IVPB (premix)        Ordering Provider: Arthur Najera MD    4.5 g  over 30 Minutes Intravenous Once 01/02/24 1652 01/02/24 1735    01/02/24 1243  cefTRIAXone (ROCEPHIN) 2,000 mg in sodium chloride 0.9 % 100 mL  IVPB-VTB        Ordering Provider: Angelito Winkler MD    2,000 mg  200 mL/hr over 30 Minutes Intravenous Once 01/02/24 1259 01/02/24 1414             Results Review:     I reviewed the patient's new clinical results.    Lab Results   Component Value Date    WBC 9.92 01/19/2024    HGB 9.4 (L) 01/19/2024    HCT 29.8 (L) 01/19/2024    MCV 85.1 01/19/2024     (H) 01/19/2024     Lab Results   Component Value Date    GLUCOSE 178 (H) 01/18/2024    BUN 8 01/18/2024    CREATININE 0.84 01/18/2024    EGFRIFNONA 54 (L) 10/24/2018    EGFRIFAFRI 83 02/23/2017    BCR 9.5 01/18/2024    CO2 30.9 (H) 01/18/2024    CALCIUM 9.0 01/18/2024    PROTENTOTREF 6.9 02/23/2017    ALBUMIN 2.3 (L) 01/18/2024    LABIL2 1.7 02/23/2017    AST 15 01/18/2024    ALT 12 01/18/2024     Microbiology:  1/2 urine culture no growth  1/2 blood cultures 2 out of 2 gram-positive bacilli  1/3 right renal fluid culture Candida glabrata and Candida tropicalis  1/3 left renal fluid culture Candida glabrata  1/5 blood cultures no growth to date  1/10 retroperitoneal fluid culture lactobacillus    Assessment    #Right calyceal rupture  #Bilateral hydronephrosis status post bilateral ureteral stenting  #Abdominal abscess  #JADEN, improved  #DKA, improved  #Hyponatremia, improved  #Morbid obesity, BMI 41  #Gram-positive bacteremia    Case discussed with Dr. Fairchild yesterday and will transition to oral therapy today to facilitate discharge.  Plan to stop micafungin and Unasyn.  Start Augmentin 875 twice daily plus fluconazole 800 mg daily.  Duration remains to be determined and recommend continuing antibiotics while drain is in place.  Recommend follow-up with urology.    Thank you for allowing me to be involved in the care of this patient. Infectious diseases will sign off at this time with antibiotics plan in place, but please call me at [phone] if any further ID questions or new ID concerns.

## 2024-01-19 NOTE — PROGRESS NOTES
[facility] Progress Note      Patient Name: Tiana Hudson  : 1960  MRN: [id number]  Primary Care Physician:  Dean Fairchild MD  Date of admission: 2024    Subjective     Interval History:   Follow-up hyponatremia.           Review of Systems:   As noted above    Objective     Vitals:   Temp:  [97.9 °F (36.6 °C)-98.2 °F (36.8 °C)] 97.9 °F (36.6 °C)  Heart Rate:  [] 95  Resp:  [16-18] 18  BP: (117-120)/(57-71) 118/63    Intake/Output Summary (Last 24 hours) at 2024 1414  Last data filed at 2024 0800  Gross per 24 hour   Intake 1365 ml   Output 2118 ml   Net -753 ml       Physical Exam:    General Appearance: alert, oriented x 3, no acute distress .  Sitting in chair.  Skin: warm and dry  HEENT: oral mucosa normal, nonicteric sclera. On 2 L NC 02  Neck: supple, no JVD  Lungs: Clear to auscultation anteriorly.  Decreased bs right base posteriorly. Unlabored.  Heart: RRR, normal S1 and S2  Abdomen: soft, nontender, +bs.  Pannus edematous. Body wall edema.   : no palpable bladder  Extremities: Trace  lower extremity edema  Neuro: normal speech and mental status     Scheduled Meds:     amoxicillin-clavulanate, 1 tablet, Oral, Q12H  enoxaparin, 40 mg, Subcutaneous, Q24H  escitalopram, 10 mg, Oral, Daily  fluconazole, 800 mg, Oral, Q24H  insulin glargine, 20 Units, Subcutaneous, Q12H  insulin lispro, 2-9 Units, Subcutaneous, 4x Daily AC & at Bedtime  levothyroxine, 150 mcg, Oral, Q AM  oxybutynin XL, 10 mg, Oral, Daily  pantoprazole, 40 mg, Oral, Q AM  rOPINIRole, 0.5 mg, Oral, Nightly  senna-docusate sodium, 2 tablet, Oral, BID  torsemide, 40 mg, Oral, Daily      IV Meds:        Results Reviewed:   I have personally reviewed the results from the time of this admission to 2024 14:14 EST     Results from last 7 days   Lab Units 24  0855 24  1750 24  0710 24  0747   SODIUM mmol/L 133*  --  133* 133*   POTASSIUM mmol/L 4.2 4.5 3.4*  3.9   CHLORIDE mmol/L 90*  --  89* 91*   CO2 mmol/L 32.0*  --  30.9* 28.4   BUN mg/dL 8  --  8 8   CREATININE mg/dL 1.00  --  0.84 0.84   CALCIUM mg/dL 9.4  --  9.0 8.7   BILIRUBIN mg/dL 0.5  --  0.5 0.5   ALK PHOS U/L 158*  --  144* 135*   ALT (SGPT) U/L 13  --  12 9   AST (SGOT) U/L 17  --  15 19   GLUCOSE mg/dL 255*  --  178* 168*       Estimated Creatinine Clearance: 72.2 mL/min (by C-G formula based on SCr of 1 mg/dL).    Results from last 7 days   Lab Units 01/17/24  0747 01/16/24  0715   MAGNESIUM mg/dL 1.8  --    PHOSPHORUS mg/dL 3.8 3.6       Results from last 7 days   Lab Units 01/16/24  0715   URIC ACID mg/dL 2.9       Results from last 7 days   Lab Units 01/19/24  0414 01/18/24  0710 01/17/24  0747 01/16/24  0715 01/15/24  0714   WBC 10*3/mm3 9.92 9.87 10.93* 9.56 11.33*   HEMOGLOBIN g/dL 9.4* 8.6* 9.1* 9.2* 8.9*   PLATELETS 10*3/mm3 490* 514* 541* 534* 603*               Assessment / Plan     ASSESSMENT:  Hyponatremia .  Likely due to volume excess.  Patient with pleural effusion and peripheral edema.  Sodium stable at 133.    Bilateral UPJ obstruction status post stent 1/3/2024.  Right calyceal rupture.Right retroperitoneal drain placed by interventional radiology 1/10/2024 for abscess drainage.  Candida glabrata and Candida tropicalis from right renal fluid culture and Candida glabrata from the left renal fluid culture 1/3/2024.  Lactobacillus from retroperitoneal fluid culture 1/10/2024.  Gram-positive bacilli bacteremia  1/2/24.  Currently on Augmentin and Diflucan  3.  Hypothyroid on replacement.  TSH elevated at 9 , levothyroxine increased.   4.  Anemia of chronic disease.  5. DM2 with recent DKA. BS range 190-333.  Dr. Fairchild addressing.   6. Elevated bicarb likely metabolic alkalosis secondary to diuresis  PLAN:  -Sodium level is stable at 133   -Will continue current diuretic dose with torsemide 40 mg  -Will check VBG  -Labs in a.m.        thank you for involving us in the care of Tiana VALE  Tristan.  Please feel free to call with any questions.    Rashaun Hoffman MD  01/19/24  14:14 [facility]    [facility]  [phone]    Please note that portions of this note were completed with a voice recognition program.

## 2024-01-19 NOTE — PLAN OF CARE
Goal Outcome Evaluation:  Plan of Care Reviewed With: patient        Progress: improving  Outcome Evaluation: note vitals remain stable over night. pt verbalized plans for d/c to home today.

## 2024-01-19 NOTE — CASE MANAGEMENT/SOCIAL WORK
Continued Stay Note  [facility]     Patient Name: Tiana Hudson  MRN: [id number]  Today's Date: 1/19/2024    Admit Date: 1/2/2024    Plan: Home w/  [facility] & rolling walker from [employer] (to be delivered at bedside)   Discharge Plan       Row Name 01/19/24 1343       Plan    Plan Home w/  [facility] & rolling walker from [employer] (to be delivered at bedside)    Patient/Family in Agreement with Plan yes    Plan Comments D/c order noted. [facility] left message for [employer] to notify of dc and that patient will need a rolling walker delivered at bedside prior to leaving today. [facility] notified of dc. FÁTIMA, CSW                   Discharge Codes    No documentation.                 Expected Discharge Date and Time       Expected Discharge Date Expected Discharge Time    Jan 19, 2024               PRIETO Donald

## 2024-01-19 NOTE — PROGRESS NOTES
[facility] given referral from Dr Fairchild for home SN, PT, OT.  Spoke with patient and Dtr, Enoch mendoza. D/C plan and demographics.  Patient is going to Dtr Vesta's home initially at [address] [place] [address].  It will be best to call the Dtr Enoch to schedule all visits as she will be close by and will be main contact at [phone].  Please do add Vesta Hudson also as emergency contact [phone].  Spoke with floor nurse to confirm specifics on drain irrigation and supplies are being sent home with patient.  Dtr and patient have been taught on how to irrigate the pigtail drain.  May accept orders from Dr Eduard Armando.

## 2024-01-20 ENCOUNTER — HOME CARE VISIT (OUTPATIENT)
Dept: HOME HEALTH SERVICES | Facility: HOME HEALTHCARE | Age: 64
End: 2024-01-20
Payer: COMMERCIAL

## 2024-01-20 PROCEDURE — G0299 HHS/HOSPICE OF RN EA 15 MIN: HCPCS

## 2024-01-20 NOTE — CASE MANAGEMENT/SOCIAL WORK
Case Management Discharge Note      Final Note: dc home with [facility] and walker/[facility] on 1/19/24    Provided Post Acute Provider List?: N/A  Provided Post Acute Provider Quality & Resource List?: N/A    Selected Continued Care - Discharged on 1/19/2024 Admission date: 1/2/2024 - Discharge disposition: Home-Health Care Svc      Destination    No services have been selected for the patient.                Durable Medical Equipment       Service Provider Selected Services Address Phone Fax Patient Preferred    [facility] [place] [facility] [address], [facility] [address] [phone] --              Dialysis/Infusion    No services have been selected for the patient.                Home Medical Care Coordination complete.      Service Provider Selected Services Address Phone Fax Patient Preferred     [place] [facility] ,  Home Nursing ,  Home Rehabilitation [address] [place] [address] [id number] [phone] --              Therapy    No services have been selected for the patient.                Community Resources    No services have been selected for the patient.                Community & DME    No services have been selected for the patient.                         Final Discharge Disposition Code: 06 - home with home health care

## 2024-01-21 LAB
BACTERIA SPEC AEROBE CULT: ABNORMAL
BACTERIA SPEC AEROBE CULT: ABNORMAL
GRAM STN SPEC: ABNORMAL
GRAM STN SPEC: ABNORMAL
ISOLATED FROM: ABNORMAL
ISOLATED FROM: ABNORMAL

## 2024-01-22 ENCOUNTER — READMISSION MANAGEMENT (OUTPATIENT)
Dept: CALL CENTER | Facility: HOSPITAL | Age: 64
End: 2024-01-22
Payer: COMMERCIAL

## 2024-01-22 ENCOUNTER — HOME CARE VISIT (OUTPATIENT)
Dept: HOME HEALTH SERVICES | Facility: HOME HEALTHCARE | Age: 64
End: 2024-01-22
Payer: COMMERCIAL

## 2024-01-22 VITALS
HEIGHT: 64 IN | WEIGHT: 278 LBS | DIASTOLIC BLOOD PRESSURE: 76 MMHG | TEMPERATURE: 97.5 F | OXYGEN SATURATION: 96 % | HEART RATE: 94 BPM | RESPIRATION RATE: 18 BRPM | SYSTOLIC BLOOD PRESSURE: 142 MMHG | BODY MASS INDEX: 47.46 KG/M2

## 2024-01-22 PROCEDURE — G0151 HHCP-SERV OF PT,EA 15 MIN: HCPCS

## 2024-01-22 NOTE — OUTREACH NOTE
General Surgery Week 1 Survey      Flowsheet Row Responses   [facility] patient discharged from? [place]   Does the patient have one of the following disease processes/diagnoses(primary or secondary)? General Surgery   Week 1 attempt successful? Yes   Call start time 1326   Call end time 1343   Discharge diagnosis CYSTOSCOPY BILATERAL RETROGRADE PYELOGRAM  BILATERAL URETERAL STENT INSERTION AND CATHETHER PLACEMENT   Meds reviewed with patient/caregiver? Yes   Does the patient have all medications related to this admission filled (includes all antibiotics, pain medications, etc.) Yes   Is the patient taking all medications as directed (includes completed medication regime)? Yes   Does the patient have a follow up appointment scheduled with their surgeon? No   What is preventing the patient from scheduling follow up appointments? Haven't had time   Nursing Interventions Educated patient on importance of making appointment, Advised patient to make appointment   What is the Home health agency?  [facility]   Has home health visited the patient within 72 hours of discharge? Yes   What DME was ordered? [facility] [place]   Has all DME been delivered? No   DME comments Family had to go to [facility] to  the walker as [facility] had not delivered to pt after 4 hours of wait time, dtr reports.   Comments Per dtr the pt's drain has 20-40cc output every 2 hrs, green to red in color, dressing changes daily, wound is improving per dtr.Pt rated pain 1/10 earlier today and dtr reports highest pain level since DC was 5/10,pt is not using Norco dtr report. Glucose at home 172 average, which is improved since DC, dtr reports. Pt has appetite and voiding WNL.   Did the patient receive a copy of their discharge instructions? Yes   Nursing interventions Reviewed instructions with patient   What is the patient's perception of their health status since discharge? Improving   Nursing interventions Nurse provided  patient education   Is the patient /caregiver able to teach back basic post-op care? Continue use of incentive spirometry at least 1 week post discharge, Practice 'cough and deep breath', Keep incision areas clean,dry and protected, No tub bath, swimming, or hot tub until instructed by MD   Is the patient/caregiver able to teach back signs and symptoms of incisional infection? Increased redness, swelling or pain at the incisonal site, Fever, Pus or odor from incision   Is the patient/caregiver able to teach back steps to recovery at home? Rest and rebuild strength, gradually increase activity   Is the patient/caregiver able to teach back the hierarchy of who to call/visit for symptoms/problems? PCP, Specialist, Home health nurse, Urgent Care, ED, 911 Yes   Week 1 call completed? Yes   Revoked No further contact(revokes)-requires comment   Call end time [id number]            Felicitas LUIS - Registered Nurse

## 2024-01-22 NOTE — HOME HEALTH
"SOC Note: Reviewed hospital DC instructions with patient. Instructed on med changes and compliance with times and doses of med reg with theraputic effect. Instructed to ambulate with walker and assist x 1. Instructed to continue to use incentive spirometer, cough and deep breathing exercises 4 times daily for 5-7 days. Patient able to voice back understanding on instructions provided.    Home Health ordered for: SN 1w1, 2w3, 1w3, 2PRN, PT/OT services    Reason for Hosp/Primary Dx/Co-morbidities: Adm to [facility]: 1/2/24 - 1/19/24 Hyperglycemia, lactic acidosis and right calycel rupture.   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/p stent placement 1/3/24, Severe sepsis, Hyponatremia, Moderate right pleural effusion: Probably secondary to hypoalbuminemia secondary to decreased nutrition while patient is being hospitalized in addition to administration of IV fluid, RLL atelectasis, Uncontrolled DM type II with hyperglycemia, A1c 17 on 12/2/2024    Focus of Care: SN Stricture of ureter, Medication education Labs once weekly x 3 weeks:  CBC,CMP, Phosphorus, Magnesium, Sed rate, CRP, Pain management, Wound care dressing/changes with drain tube    Patient's goal(s): \"To heal from complications\"    Current Functional status/mobility/DME: Amb with walker/ cane    HB status/Living Arrangements: Living temp with daughter    Skin Integrity/wound status: Intact    Code Status: Full    Fall Risk/Safety concerns: High    Educated on Emergency Plan, steps to take prior to going to the ER and when to Call Home Health First:  Yes     Medication issues/Concerns: Added: acetaminophen (TYLENOL) 325 MG tablet Take 2 tablets by mouth Every 4 (Four) Hours As Needed for Fever (fever greater than 100.4 °F or headache). Added: Alcohol Swabs (Alcohol Pads) 70 % pads Apply one alcohol swab to injection site of skin immediately prior to insulin injection. Added: amoxicillin-clavulanate (AUGMENTIN) 875-125 MG per tablet Take 1 tablet by " mouth Every 12 (Twelve) Hours for 14 days. Added: Blood Glucose Monitoring Suppl (Blood Glucose Monitor System) w/Device kit Use to test blood glucose up to four times daily as needed Added: dextrose (GLUTOSE) 40 % gel Take 15 g by mouth Every 15 (Fifteen) Minutes As Needed for Low Blood Sugar (Blood sugar less than 70). Added: fluconazole (DIFLUCAN) 200 MG tablet Take 4 tablets by mouth Daily for 14 doses. Indications: Infection Within the Abdomen Added: glucose blood test strip Use to test blood glucose up to four times daily as needed.   Added: HYDROcodone-acetaminophen (NORCO) 5-325 MG per tablet Take 1-2 tablets by mouth Every 6 (Six) Hours As Needed for Moderate Pain or Severe Pain for up to 3 days. Added: insulin aspart (NovoLOG FlexPen) 100 UNIT/ML solution pen-injector sc pen Inject under the skin per sliding scale 4 times daily with meals and at bedtime. Max total daily dose of 36 units. -199 mg/dL - 2 units, -249 mg/dL - 4 units -299 mg/dL - 6 units -349 mg/dL - 7 units -400 mg/dL - 8 units BG > than 400 mg/dL - 9 units & Call Provider   Added: Insulin Glargine (Lantus SoloStar) 100 UNIT/ML injection pen Inject 20 Units under the skin into the appropriate area as directed 2 (Two) Times a Day. Added: insulin glargine (LANTUS, SEMGLEE) 100 UNIT/ML injection Inject 20 Units under the skin into the appropriate area as directed Every 12 (Twelve) Hours. Indications: Type 2 Diabetes Added: insulin lispro (HUMALOG/ADMELOG) 100 UNIT/ML injection Inject 2-9 Units under the skin into the appropriate area as directed 4 (Four) Times a Day Before Meals & at Bedtime. Added: Insulin Pen Needle (Pen Needles) 32G X 4 MM misc Inject 1 each under the skin into the appropriate area as directed 4 (Four) Times a Day As Needed (for insulin injections). Added: naloxone (NARCAN) 4 MG/0.1ML nasal spray Call 911. Don't prime. Emma in 1 nostril for overdose. Repeat in 2-3 minutes in other nostril if no  or minimal breathing/responsiveness. Added: Nutritional Supplements (Glucose Management) tablet Use as needed for emergently low blood glucose levels. Added: ondansetron ODT (ZOFRAN-ODT) 4 MG disintegrating tablet Place 1 tablet on the tongue Every 6 (Six) Hours As Needed for Nausea or Vomiting. Added: pantoprazole (PROTONIX) 40 MG EC tablet Take 1 tablet by mouth Every Morning, Added: torsemide (DEMADEX) 20 MG tablet Take 2 tablets by mouth Daily. Changed: Dulaglutide 1.5 MG/0.5ML solution pen-injector Inject 1.5 mg under the skin into the appropriate area as directed 1 (One) Time Per Week on Sunday Changed: levothyroxine (SYNTHROID, LEVOTHROID) 150 MCG tablet Take 1 tablet by mouth Every Morning.     Additional Problems/Concerns: None    SDOH Barriers (i.e. caregiver concerns, social isolation, transportation, food insecurity, environment, income etc.)/Need for MSW: None    Plan for next visit: CP assess, med comploiance with extensive med reg change, falls prevetnion, RAJESH drain maint, Skin integ, weekly labs CBC,CMP, Phosphorus, Magnesium, Sed rate, CRP x 3weeks.

## 2024-01-23 ENCOUNTER — HOME CARE VISIT (OUTPATIENT)
Dept: HOME HEALTH SERVICES | Facility: HOME HEALTHCARE | Age: 64
End: 2024-01-23
Payer: COMMERCIAL

## 2024-01-23 ENCOUNTER — LAB REQUISITION (OUTPATIENT)
Dept: LAB | Facility: HOSPITAL | Age: 64
End: 2024-01-23
Payer: COMMERCIAL

## 2024-01-23 VITALS
TEMPERATURE: 97.5 F | OXYGEN SATURATION: 95 % | SYSTOLIC BLOOD PRESSURE: 112 MMHG | RESPIRATION RATE: 18 BRPM | DIASTOLIC BLOOD PRESSURE: 68 MMHG | HEART RATE: 83 BPM

## 2024-01-23 DIAGNOSIS — N13.5 CROSSING VESSEL AND STRICTURE OF URETER WITHOUT HYDRONEPHROSIS: ICD-10-CM

## 2024-01-23 LAB
ALBUMIN SERPL-MCNC: 3.2 G/DL (ref 3.5–5.2)
ALBUMIN/GLOB SERPL: 0.7 G/DL
ALP SERPL-CCNC: 179 U/L (ref 39–117)
ALT SERPL W P-5'-P-CCNC: 8 U/L (ref 1–33)
ANION GAP SERPL CALCULATED.3IONS-SCNC: 20 MMOL/L (ref 5–15)
AST SERPL-CCNC: 22 U/L (ref 1–32)
BACTERIA SPEC AEROBE CULT: ABNORMAL
BACTERIA SPEC AEROBE CULT: ABNORMAL
BILIRUB SERPL-MCNC: 0.4 MG/DL (ref 0–1.2)
BUN SERPL-MCNC: 27 MG/DL (ref 8–23)
BUN/CREAT SERPL: 7.8 (ref 7–25)
CALCIUM SPEC-SCNC: 10 MG/DL (ref 8.6–10.5)
CHLORIDE SERPL-SCNC: 84 MMOL/L (ref 98–107)
CO2 SERPL-SCNC: 29 MMOL/L (ref 22–29)
CREAT SERPL-MCNC: 3.47 MG/DL (ref 0.57–1)
CRP SERPL-MCNC: 32.08 MG/DL (ref 0–0.5)
DEPRECATED RDW RBC AUTO: 44.8 FL (ref 37–54)
EGFRCR SERPLBLD CKD-EPI 2021: 14.4 ML/MIN/1.73
ERYTHROCYTE [DISTWIDTH] IN BLOOD BY AUTOMATED COUNT: 15 % (ref 12.3–15.4)
ERYTHROCYTE [SEDIMENTATION RATE] IN BLOOD: 93 MM/HR (ref 0–30)
GLOBULIN UR ELPH-MCNC: 4.7 GM/DL
GLUCOSE SERPL-MCNC: 198 MG/DL (ref 65–99)
GRAM STN SPEC: ABNORMAL
GRAM STN SPEC: ABNORMAL
HCT VFR BLD AUTO: 30.8 % (ref 34–46.6)
HGB BLD-MCNC: 9.7 G/DL (ref 12–15.9)
ISOLATED FROM: ABNORMAL
ISOLATED FROM: ABNORMAL
MAGNESIUM SERPL-MCNC: 1.9 MG/DL (ref 1.6–2.4)
MCH RBC QN AUTO: 26.3 PG (ref 26.6–33)
MCHC RBC AUTO-ENTMCNC: 31.5 G/DL (ref 31.5–35.7)
MCV RBC AUTO: 83.5 FL (ref 79–97)
PHOSPHATE SERPL-MCNC: 5 MG/DL (ref 2.5–4.5)
PLATELET # BLD AUTO: 547 10*3/MM3 (ref 140–450)
PMV BLD AUTO: 10.3 FL (ref 6–12)
POTASSIUM SERPL-SCNC: 3.8 MMOL/L (ref 3.5–5.2)
PROT SERPL-MCNC: 7.9 G/DL (ref 6–8.5)
RBC # BLD AUTO: 3.69 10*6/MM3 (ref 3.77–5.28)
SODIUM SERPL-SCNC: 133 MMOL/L (ref 136–145)
WBC NRBC COR # BLD AUTO: 14.63 10*3/MM3 (ref 3.4–10.8)

## 2024-01-23 PROCEDURE — 84100 ASSAY OF PHOSPHORUS: CPT | Performed by: INTERNAL MEDICINE

## 2024-01-23 PROCEDURE — 85027 COMPLETE CBC AUTOMATED: CPT | Performed by: INTERNAL MEDICINE

## 2024-01-23 PROCEDURE — 83735 ASSAY OF MAGNESIUM: CPT | Performed by: INTERNAL MEDICINE

## 2024-01-23 PROCEDURE — 80053 COMPREHEN METABOLIC PANEL: CPT | Performed by: INTERNAL MEDICINE

## 2024-01-23 PROCEDURE — G0299 HHS/HOSPICE OF RN EA 15 MIN: HCPCS

## 2024-01-23 PROCEDURE — 86140 C-REACTIVE PROTEIN: CPT | Performed by: INTERNAL MEDICINE

## 2024-01-23 PROCEDURE — 85652 RBC SED RATE AUTOMATED: CPT | Performed by: INTERNAL MEDICINE

## 2024-01-23 NOTE — HOME HEALTH
Patient is a 62yo female discharged from [facility] 1/19/24. Patient ended up having surgery after her renal pelvis ruptured. Patient had stints placed in her kidneys. Per patient, she had been getting UTI's repeatedly and didn't know why. Patient still employed and staying with daughter. Physical therapy to provide skilled care of therapeutic exercises, gait training, patient education.    PLAN FOR NEXT VISIT:  --Continue therapeutic exercises  --Continue gait training

## 2024-01-24 VITALS
WEIGHT: 223.13 LBS | BODY MASS INDEX: 38.3 KG/M2 | OXYGEN SATURATION: 97 % | SYSTOLIC BLOOD PRESSURE: 124 MMHG | TEMPERATURE: 97 F | HEART RATE: 92 BPM | DIASTOLIC BLOOD PRESSURE: 72 MMHG | RESPIRATION RATE: 18 BRPM

## 2024-01-24 LAB
BACTERIA SPEC AEROBE CULT: ABNORMAL
GRAM STN SPEC: ABNORMAL
ISOLATED FROM: ABNORMAL

## 2024-01-25 ENCOUNTER — HOME CARE VISIT (OUTPATIENT)
Dept: HOME HEALTH SERVICES | Facility: HOME HEALTHCARE | Age: 64
End: 2024-01-25
Payer: COMMERCIAL

## 2024-01-25 VITALS
SYSTOLIC BLOOD PRESSURE: 118 MMHG | TEMPERATURE: 97.2 F | DIASTOLIC BLOOD PRESSURE: 62 MMHG | HEART RATE: 112 BPM | OXYGEN SATURATION: 100 %

## 2024-01-25 PROCEDURE — G0157 HHC PT ASSISTANT EA 15: HCPCS

## 2024-01-25 NOTE — HOME HEALTH
Subjective:I am doing ok    Falls- none  Medication Changes- none  Wound- abdominal drain on the right    Assessment:Patient seen after renal stents placement. Patient manuevering with RWX in the home short distances ~ 50 feet with slow steady sinai and rest breaks from fatigue. She was able to review her seated and standing HEP with cues and seated rest breaks. She was able to come to stand from low surface of armed chair with proper hand placement. Patient re-education on medication regimen, hydration and proper nutrition including protien options and used teach back for carryover and accuracy. Patient will benefit with continued PT for progression and reduce risk of decline. Update to PT Lizy as needed    Plan for next visit:  Gait training  Review and progress HEP  Balance/transfers/safety  steps to exit the home

## 2024-01-25 NOTE — HOME HEALTH
Routine Visit Note: Alberto sitting in living room with stating that she has more energy and feels she is getting better. Labs drawn per Rt AC without complications and taken to [facility]    Skill/education provided: Instructed on fall prevention-clear pathways, remove any home hazards, wear appropriate footwear, adequate lightening, change position slowly, etc. Patient voices back understanding. No safety issues noted.    Patient/caregiver response: Patinet and caregiver need reinforcement of instructions given    Plan for next visit: CP assess, med comploiance with extensive med reg change, falls prevetnion, RAJESH drain maint, Skin integ, weekly labs CBC,CMP, Phosphorus, Magnesium, Sed rate, CRP x 3weeks.

## 2024-01-26 ENCOUNTER — HOME CARE VISIT (OUTPATIENT)
Dept: HOME HEALTH SERVICES | Facility: HOME HEALTHCARE | Age: 64
End: 2024-01-26
Payer: COMMERCIAL

## 2024-01-26 PROCEDURE — G0299 HHS/HOSPICE OF RN EA 15 MIN: HCPCS

## 2024-01-28 VITALS
HEART RATE: 78 BPM | TEMPERATURE: 97.1 F | OXYGEN SATURATION: 97 % | WEIGHT: 224.56 LBS | BODY MASS INDEX: 38.55 KG/M2 | RESPIRATION RATE: 18 BRPM | SYSTOLIC BLOOD PRESSURE: 126 MMHG | DIASTOLIC BLOOD PRESSURE: 76 MMHG

## 2024-01-28 LAB
FUNGUS WND CULT: ABNORMAL
FUNGUS WND CULT: ABNORMAL

## 2024-01-29 ENCOUNTER — TRANSCRIBE ORDERS (OUTPATIENT)
Dept: LAB | Facility: HOSPITAL | Age: 64
End: 2024-01-29
Payer: COMMERCIAL

## 2024-01-29 ENCOUNTER — LAB (OUTPATIENT)
Dept: LAB | Facility: HOSPITAL | Age: 64
End: 2024-01-29
Payer: COMMERCIAL

## 2024-01-29 DIAGNOSIS — R70.0 ELEVATED SEDIMENTATION RATE: ICD-10-CM

## 2024-01-29 DIAGNOSIS — R79.9 ABNORMAL BLOOD CHEMISTRY: ICD-10-CM

## 2024-01-29 DIAGNOSIS — R79.89 HYPOURICEMIA: Primary | ICD-10-CM

## 2024-01-29 DIAGNOSIS — R79.82 ELEVATED C-REACTIVE PROTEIN (CRP): ICD-10-CM

## 2024-01-29 DIAGNOSIS — N17.9 ACUTE RENAL FAILURE, UNSPECIFIED ACUTE RENAL FAILURE TYPE: ICD-10-CM

## 2024-01-29 DIAGNOSIS — R79.89 HYPOURICEMIA: ICD-10-CM

## 2024-01-29 LAB
ALBUMIN SERPL-MCNC: 3 G/DL (ref 3.5–5.2)
ALBUMIN/GLOB SERPL: 0.6 G/DL
ALP SERPL-CCNC: 176 U/L (ref 39–117)
ALT SERPL W P-5'-P-CCNC: 11 U/L (ref 1–33)
AMYLASE SERPL-CCNC: 27 U/L (ref 28–100)
ANION GAP SERPL CALCULATED.3IONS-SCNC: 17.1 MMOL/L (ref 5–15)
AST SERPL-CCNC: 19 U/L (ref 1–32)
BASOPHILS # BLD AUTO: 0.05 10*3/MM3 (ref 0–0.2)
BASOPHILS NFR BLD AUTO: 0.4 % (ref 0–1.5)
BILIRUB SERPL-MCNC: 0.4 MG/DL (ref 0–1.2)
BUN SERPL-MCNC: 30 MG/DL (ref 8–23)
BUN/CREAT SERPL: 12 (ref 7–25)
CALCIUM SPEC-SCNC: 10.4 MG/DL (ref 8.6–10.5)
CHLORIDE SERPL-SCNC: 89 MMOL/L (ref 98–107)
CO2 SERPL-SCNC: 26.9 MMOL/L (ref 22–29)
CREAT SERPL-MCNC: 2.5 MG/DL (ref 0.57–1)
CRP SERPL-MCNC: 41.64 MG/DL (ref 0–0.5)
DEPRECATED RDW RBC AUTO: 45.1 FL (ref 37–54)
EGFRCR SERPLBLD CKD-EPI 2021: 21.4 ML/MIN/1.73
EOSINOPHIL # BLD AUTO: 0.07 10*3/MM3 (ref 0–0.4)
EOSINOPHIL NFR BLD AUTO: 0.6 % (ref 0.3–6.2)
ERYTHROCYTE [DISTWIDTH] IN BLOOD BY AUTOMATED COUNT: 15.2 % (ref 12.3–15.4)
GLOBULIN UR ELPH-MCNC: 4.9 GM/DL
GLUCOSE SERPL-MCNC: 196 MG/DL (ref 65–99)
HCT VFR BLD AUTO: 30.5 % (ref 34–46.6)
HGB BLD-MCNC: 9.4 G/DL (ref 12–15.9)
IMM GRANULOCYTES # BLD AUTO: 0.08 10*3/MM3 (ref 0–0.05)
IMM GRANULOCYTES NFR BLD AUTO: 0.7 % (ref 0–0.5)
LIPASE SERPL-CCNC: 24 U/L (ref 13–60)
LYMPHOCYTES # BLD AUTO: 1.19 10*3/MM3 (ref 0.7–3.1)
LYMPHOCYTES NFR BLD AUTO: 10.3 % (ref 19.6–45.3)
MCH RBC QN AUTO: 25.3 PG (ref 26.6–33)
MCHC RBC AUTO-ENTMCNC: 30.8 G/DL (ref 31.5–35.7)
MCV RBC AUTO: 82 FL (ref 79–97)
MONOCYTES # BLD AUTO: 0.75 10*3/MM3 (ref 0.1–0.9)
MONOCYTES NFR BLD AUTO: 6.5 % (ref 5–12)
NEUTROPHILS NFR BLD AUTO: 81.5 % (ref 42.7–76)
NEUTROPHILS NFR BLD AUTO: 9.4 10*3/MM3 (ref 1.7–7)
NRBC BLD AUTO-RTO: 0 /100 WBC (ref 0–0.2)
PLATELET # BLD AUTO: 572 10*3/MM3 (ref 140–450)
PMV BLD AUTO: 9.8 FL (ref 6–12)
POTASSIUM SERPL-SCNC: 4 MMOL/L (ref 3.5–5.2)
PROT SERPL-MCNC: 7.9 G/DL (ref 6–8.5)
RBC # BLD AUTO: 3.72 10*6/MM3 (ref 3.77–5.28)
SODIUM SERPL-SCNC: 133 MMOL/L (ref 136–145)
WBC NRBC COR # BLD AUTO: 11.54 10*3/MM3 (ref 3.4–10.8)

## 2024-01-29 PROCEDURE — 82150 ASSAY OF AMYLASE: CPT

## 2024-01-29 PROCEDURE — 86140 C-REACTIVE PROTEIN: CPT

## 2024-01-29 PROCEDURE — 85025 COMPLETE CBC W/AUTO DIFF WBC: CPT

## 2024-01-29 PROCEDURE — 80053 COMPREHEN METABOLIC PANEL: CPT

## 2024-01-29 PROCEDURE — 36415 COLL VENOUS BLD VENIPUNCTURE: CPT

## 2024-01-29 PROCEDURE — 83690 ASSAY OF LIPASE: CPT

## 2024-01-29 NOTE — HOME HEALTH
Routine Visit Note: Alberto sitting in living room with stating that she feels fine today. MD called and msg left with questions on labwork. RAJESH drain with less drainage of <50cc daily. Drainage brown/tan amd thick.    Skill/education provided: Instructed on fall prevention-clear pathways, remove any home hazards, wear appropriate footwear, adequate lightening, change position slowly, etc. Patient voices back understanding. No safety issues noted.    Patient/caregiver response: Patinet and caregiver need reinforcement of instructions given    Plan for next visit: CP assess, med comploiance with extensive med reg change, falls prevetnion, RAJESH drain maint, Skin integ, weekly labs CBC,CMP, Phosphorus, Magnesium, Sed rate, CRP x 3weeks.

## 2024-01-30 ENCOUNTER — HOME CARE VISIT (OUTPATIENT)
Dept: HOME HEALTH SERVICES | Facility: HOME HEALTHCARE | Age: 64
End: 2024-01-30
Payer: COMMERCIAL

## 2024-01-30 ENCOUNTER — TRANSCRIBE ORDERS (OUTPATIENT)
Dept: ADMINISTRATIVE | Facility: HOSPITAL | Age: 64
End: 2024-01-30
Payer: COMMERCIAL

## 2024-01-30 VITALS — HEART RATE: 94 BPM | OXYGEN SATURATION: 97 %

## 2024-01-30 DIAGNOSIS — N19 RENAL FAILURE, UNSPECIFIED CHRONICITY: ICD-10-CM

## 2024-01-30 DIAGNOSIS — Z98.890 PERSONAL HISTORY OF SPINE SURGERY: Primary | ICD-10-CM

## 2024-01-30 PROCEDURE — G0157 HHC PT ASSISTANT EA 15: HCPCS

## 2024-01-30 PROCEDURE — G0299 HHS/HOSPICE OF RN EA 15 MIN: HCPCS

## 2024-01-30 NOTE — HOME HEALTH
Subjective: I went out to get labs and my family helped me with the steps    Falls- none   Medication Changes- none   Wound- abdominal drain on the right     Assessment:Patient seen after renal stents placement. Patient manuevering with RWX in the home short distances ~ 70 feet with slow steady sinai and rest breaks from fatigue, I encouraged 2 laps each time for progression. She was able to review her seated and standing HEP with cues and seated rest breaks. She was able to come to stand from low surface of couchr with proper hand placement. Patient re-education on medication regimen, hydration and proper nutrition including protein options and used teach back for carryover and accuracy. Patient will benefit with continued PT for progression and reduce risk of decline. Update to PT Lizy as needed     Plan for next visit:   Gait training   Review and progress HEP   Balance/transfers/safety   steps to exit the home

## 2024-02-01 ENCOUNTER — HOME CARE VISIT (OUTPATIENT)
Dept: HOME HEALTH SERVICES | Facility: HOME HEALTHCARE | Age: 64
End: 2024-02-01
Payer: COMMERCIAL

## 2024-02-01 ENCOUNTER — LAB REQUISITION (OUTPATIENT)
Dept: LAB | Facility: HOSPITAL | Age: 64
End: 2024-02-01
Payer: COMMERCIAL

## 2024-02-01 VITALS
BODY MASS INDEX: 38.01 KG/M2 | TEMPERATURE: 97.2 F | DIASTOLIC BLOOD PRESSURE: 72 MMHG | WEIGHT: 221.44 LBS | OXYGEN SATURATION: 96 % | HEART RATE: 78 BPM | RESPIRATION RATE: 18 BRPM | SYSTOLIC BLOOD PRESSURE: 128 MMHG

## 2024-02-01 VITALS
DIASTOLIC BLOOD PRESSURE: 78 MMHG | OXYGEN SATURATION: 98 % | TEMPERATURE: 97.3 F | HEART RATE: 97 BPM | SYSTOLIC BLOOD PRESSURE: 140 MMHG

## 2024-02-01 DIAGNOSIS — N13.5 CROSSING VESSEL AND STRICTURE OF URETER WITHOUT HYDRONEPHROSIS: ICD-10-CM

## 2024-02-01 LAB
ANION GAP SERPL CALCULATED.3IONS-SCNC: 18.6 MMOL/L (ref 5–15)
BASOPHILS # BLD AUTO: 0.06 10*3/MM3 (ref 0–0.2)
BASOPHILS NFR BLD AUTO: 0.5 % (ref 0–1.5)
BUN SERPL-MCNC: 42 MG/DL (ref 8–23)
BUN/CREAT SERPL: 11.1 (ref 7–25)
CALCIUM SPEC-SCNC: 10 MG/DL (ref 8.6–10.5)
CHLORIDE SERPL-SCNC: 88 MMOL/L (ref 98–107)
CO2 SERPL-SCNC: 25.4 MMOL/L (ref 22–29)
CREAT SERPL-MCNC: 3.8 MG/DL (ref 0.57–1)
CRP SERPL-MCNC: 31.9 MG/DL (ref 0–0.5)
DEPRECATED RDW RBC AUTO: 42.8 FL (ref 37–54)
EGFRCR SERPLBLD CKD-EPI 2021: 12.9 ML/MIN/1.73
EOSINOPHIL # BLD AUTO: 0.15 10*3/MM3 (ref 0–0.4)
EOSINOPHIL NFR BLD AUTO: 1.3 % (ref 0.3–6.2)
ERYTHROCYTE [DISTWIDTH] IN BLOOD BY AUTOMATED COUNT: 14.9 % (ref 12.3–15.4)
ERYTHROCYTE [SEDIMENTATION RATE] IN BLOOD: 92 MM/HR (ref 0–30)
GLUCOSE SERPL-MCNC: 119 MG/DL (ref 65–99)
HCT VFR BLD AUTO: 28.6 % (ref 34–46.6)
HGB BLD-MCNC: 9.1 G/DL (ref 12–15.9)
IMM GRANULOCYTES # BLD AUTO: 0.1 10*3/MM3 (ref 0–0.05)
IMM GRANULOCYTES NFR BLD AUTO: 0.8 % (ref 0–0.5)
LYMPHOCYTES # BLD AUTO: 1.26 10*3/MM3 (ref 0.7–3.1)
LYMPHOCYTES NFR BLD AUTO: 10.5 % (ref 19.6–45.3)
MAGNESIUM SERPL-MCNC: 1.9 MG/DL (ref 1.6–2.4)
MCH RBC QN AUTO: 25.3 PG (ref 26.6–33)
MCHC RBC AUTO-ENTMCNC: 31.8 G/DL (ref 31.5–35.7)
MCV RBC AUTO: 79.7 FL (ref 79–97)
MONOCYTES # BLD AUTO: 0.9 10*3/MM3 (ref 0.1–0.9)
MONOCYTES NFR BLD AUTO: 7.5 % (ref 5–12)
NEUTROPHILS NFR BLD AUTO: 79.4 % (ref 42.7–76)
NEUTROPHILS NFR BLD AUTO: 9.48 10*3/MM3 (ref 1.7–7)
NRBC BLD AUTO-RTO: 0 /100 WBC (ref 0–0.2)
PHOSPHATE SERPL-MCNC: 4.6 MG/DL (ref 2.5–4.5)
PLATELET # BLD AUTO: 524 10*3/MM3 (ref 140–450)
PMV BLD AUTO: 9.6 FL (ref 6–12)
POTASSIUM SERPL-SCNC: 4.1 MMOL/L (ref 3.5–5.2)
RBC # BLD AUTO: 3.59 10*6/MM3 (ref 3.77–5.28)
SODIUM SERPL-SCNC: 132 MMOL/L (ref 136–145)
WBC NRBC COR # BLD AUTO: 11.95 10*3/MM3 (ref 3.4–10.8)

## 2024-02-01 PROCEDURE — 84100 ASSAY OF PHOSPHORUS: CPT | Performed by: INTERNAL MEDICINE

## 2024-02-01 PROCEDURE — 85652 RBC SED RATE AUTOMATED: CPT | Performed by: INTERNAL MEDICINE

## 2024-02-01 PROCEDURE — G0157 HHC PT ASSISTANT EA 15: HCPCS

## 2024-02-01 PROCEDURE — 80048 BASIC METABOLIC PNL TOTAL CA: CPT | Performed by: INTERNAL MEDICINE

## 2024-02-01 PROCEDURE — 83735 ASSAY OF MAGNESIUM: CPT | Performed by: INTERNAL MEDICINE

## 2024-02-01 PROCEDURE — 85025 COMPLETE CBC W/AUTO DIFF WBC: CPT | Performed by: INTERNAL MEDICINE

## 2024-02-01 PROCEDURE — 86140 C-REACTIVE PROTEIN: CPT | Performed by: INTERNAL MEDICINE

## 2024-02-01 PROCEDURE — G0299 HHS/HOSPICE OF RN EA 15 MIN: HCPCS

## 2024-02-01 NOTE — HOME HEALTH
Routine Visit Note: Alberto sitting in living room with stating that she ok and seen PCP on Monday with labs drawn    Skill/education provided: Instructed on fall prevention-clear pathways, remove any home hazards, wear appropriate footwear, adequate lightening, change position slowly, etc. Patient voices back understanding. No safety issues noted.    Patient/caregiver response: Alberto and caregiver need reinforcement of instructions given    Plan for next visit: CP assess, med comploiance with extensive med reg change, falls prevetnion, RAJESH drain maint, Skin integ, weekly labs CBC,CMP, Phosphorus, Magnesium, Sed rate, CRP x 3weeks.

## 2024-02-01 NOTE — HOME HEALTH
Subjective: I went to the doctor the yesterday and walked a long way. Patient in bathroom upon arrival    Falls- none   Medication Changes- none   Wound- abdominal drain on the right     Assessment:Patient seen after renal stents placement. Patient manuevering with RWX in the home short distances ~ 70 feet with slow steady sinai and rest breaks from fatigue, I encouraged 2 laps each time for progression however she does not appear to be doing them.She was able to increase distance going to the doctor per daughter. Patient and daughter education on steps to exit the home with walker for support and no railing. . She was able to review her seated and standing HEP with cues and increased seated rest breaks. She was able to come to stand from low surface of couch with proper hand placement. Patient re-education on medication regimen, hydration and proper nutrition including protein options and used teach back for carryover and accuracy. Patient will be discharged to continue with HEP and walking program next visit if appropriate. Update to PT    Plan for next visit:   Gait training   Review HEP   Balance/transfers/safety   steps to exit the home

## 2024-02-02 ENCOUNTER — HOME CARE VISIT (OUTPATIENT)
Dept: HOME HEALTH SERVICES | Facility: HOME HEALTHCARE | Age: 64
End: 2024-02-02
Payer: COMMERCIAL

## 2024-02-02 PROCEDURE — G0152 HHCP-SERV OF OT,EA 15 MIN: HCPCS

## 2024-02-02 NOTE — Clinical Note
OT evaluation completed, plan for 2x/week to address adl training, functional transfers, mobility, safety, DME/adaptive equipment education.

## 2024-02-04 VITALS
RESPIRATION RATE: 18 BRPM | DIASTOLIC BLOOD PRESSURE: 84 MMHG | SYSTOLIC BLOOD PRESSURE: 144 MMHG | OXYGEN SATURATION: 95 % | HEART RATE: 88 BPM | TEMPERATURE: 96.8 F

## 2024-02-05 ENCOUNTER — HOME CARE VISIT (OUTPATIENT)
Dept: HOME HEALTH SERVICES | Facility: HOME HEALTHCARE | Age: 64
End: 2024-02-05
Payer: COMMERCIAL

## 2024-02-05 ENCOUNTER — LAB REQUISITION (OUTPATIENT)
Dept: LAB | Facility: HOSPITAL | Age: 64
End: 2024-02-05
Payer: COMMERCIAL

## 2024-02-05 VITALS — TEMPERATURE: 98 F | OXYGEN SATURATION: 99 % | HEART RATE: 80 BPM

## 2024-02-05 DIAGNOSIS — N13.5 CROSSING VESSEL AND STRICTURE OF URETER WITHOUT HYDRONEPHROSIS: ICD-10-CM

## 2024-02-05 LAB
ANION GAP SERPL CALCULATED.3IONS-SCNC: 16.3 MMOL/L (ref 5–15)
BUN SERPL-MCNC: 22 MG/DL (ref 8–23)
BUN/CREAT SERPL: 10.8 (ref 7–25)
CALCIUM SPEC-SCNC: 9.8 MG/DL (ref 8.6–10.5)
CHLORIDE SERPL-SCNC: 96 MMOL/L (ref 98–107)
CO2 SERPL-SCNC: 23.7 MMOL/L (ref 22–29)
CREAT SERPL-MCNC: 2.04 MG/DL (ref 0.57–1)
CRP SERPL-MCNC: 26.51 MG/DL (ref 0–0.5)
DEPRECATED RDW RBC AUTO: 45.3 FL (ref 37–54)
EGFRCR SERPLBLD CKD-EPI 2021: 27.3 ML/MIN/1.73
ERYTHROCYTE [DISTWIDTH] IN BLOOD BY AUTOMATED COUNT: 15.5 % (ref 12.3–15.4)
ERYTHROCYTE [SEDIMENTATION RATE] IN BLOOD: 85 MM/HR (ref 0–30)
GLUCOSE SERPL-MCNC: 93 MG/DL (ref 65–99)
HCT VFR BLD AUTO: 27.7 % (ref 34–46.6)
HGB BLD-MCNC: 9 G/DL (ref 12–15.9)
MAGNESIUM SERPL-MCNC: 1.8 MG/DL (ref 1.6–2.4)
MCH RBC QN AUTO: 26.4 PG (ref 26.6–33)
MCHC RBC AUTO-ENTMCNC: 32.5 G/DL (ref 31.5–35.7)
MCV RBC AUTO: 81.2 FL (ref 79–97)
PHOSPHATE SERPL-MCNC: 4.8 MG/DL (ref 2.5–4.5)
PLATELET # BLD AUTO: 447 10*3/MM3 (ref 140–450)
PMV BLD AUTO: 9.3 FL (ref 6–12)
POTASSIUM SERPL-SCNC: 3.4 MMOL/L (ref 3.5–5.2)
RBC # BLD AUTO: 3.41 10*6/MM3 (ref 3.77–5.28)
SODIUM SERPL-SCNC: 136 MMOL/L (ref 136–145)
WBC NRBC COR # BLD AUTO: 13.15 10*3/MM3 (ref 3.4–10.8)

## 2024-02-05 PROCEDURE — 84100 ASSAY OF PHOSPHORUS: CPT | Performed by: INTERNAL MEDICINE

## 2024-02-05 PROCEDURE — 85027 COMPLETE CBC AUTOMATED: CPT | Performed by: INTERNAL MEDICINE

## 2024-02-05 PROCEDURE — 80048 BASIC METABOLIC PNL TOTAL CA: CPT | Performed by: INTERNAL MEDICINE

## 2024-02-05 PROCEDURE — 86140 C-REACTIVE PROTEIN: CPT | Performed by: INTERNAL MEDICINE

## 2024-02-05 PROCEDURE — 85652 RBC SED RATE AUTOMATED: CPT | Performed by: INTERNAL MEDICINE

## 2024-02-05 PROCEDURE — 83735 ASSAY OF MAGNESIUM: CPT | Performed by: INTERNAL MEDICINE

## 2024-02-05 PROCEDURE — G0299 HHS/HOSPICE OF RN EA 15 MIN: HCPCS

## 2024-02-05 NOTE — HOME HEALTH
Reason for Referral: Recent hospitalization due to renal pelvis ruptured, stent placement, sepsis, hyponatremia, pleural effusion    Medical History: Type 2 DM    Subjective: Patient reports she had drain removed, doing better    Home Environment: Patient staying with supportive daughter, using walker    PLOF: Independent    Medical Necessity: Patient requires skilled occupational therapy for remediation of deficits to improve safety in home and improve self care, functional transfers, mobility, strength, endurance, DME/adaptive equipment    Plan for Next Visit: DME education, tub transfers, bathing

## 2024-02-05 NOTE — HOME HEALTH
Routine Visit Note: Alberto sitting in living room with stating that she has been having HA and RAJESH drain site has been leaking a little. Labs drawn per Rt AC without complications and taken to [facility]    Skill/education provided: Instructed on fall prevention-clear pathways, remove any home hazards, wear appropriate footwear, adequate lightening, change position slowly, etc. Patient voices back understanding. No safety issues noted.    Patient/caregiver response: Patinet and caregiver need reinforcement of instructions given    Plan for next visit: CP assess, med comploiance with extensive med reg change, falls prevetnion, RAJESH drain maint, Skin integ, weekly labs CBC,CMP, Phosphorus, Magnesium, Sed rate, CRP x 3weeks.

## 2024-02-06 ENCOUNTER — HOME CARE VISIT (OUTPATIENT)
Dept: HOME HEALTH SERVICES | Facility: HOME HEALTHCARE | Age: 64
End: 2024-02-06
Payer: COMMERCIAL

## 2024-02-07 ENCOUNTER — HOME CARE VISIT (OUTPATIENT)
Dept: HOME HEALTH SERVICES | Facility: HOME HEALTHCARE | Age: 64
End: 2024-02-07
Payer: COMMERCIAL

## 2024-02-07 VITALS
RESPIRATION RATE: 18 BRPM | HEART RATE: 80 BPM | TEMPERATURE: 97.2 F | DIASTOLIC BLOOD PRESSURE: 70 MMHG | OXYGEN SATURATION: 95 % | SYSTOLIC BLOOD PRESSURE: 126 MMHG

## 2024-02-07 VITALS
DIASTOLIC BLOOD PRESSURE: 82 MMHG | HEART RATE: 82 BPM | OXYGEN SATURATION: 97 % | RESPIRATION RATE: 18 BRPM | TEMPERATURE: 97.8 F | SYSTOLIC BLOOD PRESSURE: 124 MMHG

## 2024-02-07 PROCEDURE — G0151 HHCP-SERV OF PT,EA 15 MIN: HCPCS

## 2024-02-07 NOTE — HOME HEALTH
Routine Visit Note: Labs drawn per Rt AC without complications and taken to [facility]    Skill/education provided: Instructed on fall prevention-clear pathways, remove any home hazards, wear appropriate footwear, adequate lightening, change position slowly, etc. Patient voices back understanding. No safety issues noted.    Patient/caregiver response: Patinet and caregiver need reinforcement of instructions given    Plan for next visit: CP assess, med comploiance with extensive med reg change, falls prevetnion, RAJESH drain maint, Skin integ, weekly labs CBC,CMP, Phosphorus, Magnesium, Sed rate, CRP x 3weeks.

## 2024-02-08 ENCOUNTER — HOME CARE VISIT (OUTPATIENT)
Dept: HOME HEALTH SERVICES | Facility: HOME HEALTHCARE | Age: 64
End: 2024-02-08
Payer: COMMERCIAL

## 2024-02-08 PROCEDURE — G0152 HHCP-SERV OF OT,EA 15 MIN: HCPCS

## 2024-02-08 NOTE — HOME HEALTH
Patient sitting in chair upon arrival. Discussed discharged with patient. Patient and family agreeable.

## 2024-02-09 ENCOUNTER — HOME CARE VISIT (OUTPATIENT)
Dept: HOME HEALTH SERVICES | Facility: HOME HEALTHCARE | Age: 64
End: 2024-02-09
Payer: COMMERCIAL

## 2024-02-09 PROCEDURE — G0299 HHS/HOSPICE OF RN EA 15 MIN: HCPCS

## 2024-02-10 VITALS
SYSTOLIC BLOOD PRESSURE: 136 MMHG | DIASTOLIC BLOOD PRESSURE: 68 MMHG | WEIGHT: 229.19 LBS | RESPIRATION RATE: 18 BRPM | TEMPERATURE: 97.7 F | OXYGEN SATURATION: 97 % | BODY MASS INDEX: 39.34 KG/M2 | HEART RATE: 88 BPM

## 2024-02-10 NOTE — HOME HEALTH
Routine Visit Note: Alberto sitting in living room with stating that she has been having leaking a little from jeronimo site.    Skill/education provided: Instructed on fall prevention-clear pathways, remove any home hazards, wear appropriate footwear, adequate lightening, change position slowly, etc. Patient voices back understanding. No safety issues noted.    Patient/caregiver response: Patinet and caregiver need reinforcement of instructions given    Plan for next visit: CP assess, med comploiance with extensive med reg change, falls prevetnion, RAJESH drain maint, Skin integ, weekly labs CBC,CMP, Phosphorus, Magnesium, Sed rate, CRP x 3weeks.

## 2024-02-11 VITALS
OXYGEN SATURATION: 96 % | HEART RATE: 81 BPM | DIASTOLIC BLOOD PRESSURE: 68 MMHG | TEMPERATURE: 97.8 F | SYSTOLIC BLOOD PRESSURE: 130 MMHG

## 2024-02-11 NOTE — HOME HEALTH
Patient reports feeling better today, daughter present.  Plan to address safety with kitchen tasks from walker level next visit

## 2024-02-13 ENCOUNTER — LAB REQUISITION (OUTPATIENT)
Dept: LAB | Facility: HOSPITAL | Age: 64
End: 2024-02-13
Payer: COMMERCIAL

## 2024-02-13 ENCOUNTER — HOME CARE VISIT (OUTPATIENT)
Dept: HOME HEALTH SERVICES | Facility: HOME HEALTHCARE | Age: 64
End: 2024-02-13
Payer: COMMERCIAL

## 2024-02-13 DIAGNOSIS — N13.5 CROSSING VESSEL AND STRICTURE OF URETER WITHOUT HYDRONEPHROSIS: ICD-10-CM

## 2024-02-13 LAB
ANION GAP SERPL CALCULATED.3IONS-SCNC: 12.2 MMOL/L (ref 5–15)
BUN SERPL-MCNC: 12 MG/DL (ref 8–23)
BUN/CREAT SERPL: 10.3 (ref 7–25)
CALCIUM SPEC-SCNC: 9.3 MG/DL (ref 8.6–10.5)
CHLORIDE SERPL-SCNC: 102 MMOL/L (ref 98–107)
CO2 SERPL-SCNC: 24.8 MMOL/L (ref 22–29)
CREAT SERPL-MCNC: 1.16 MG/DL (ref 0.57–1)
CRP SERPL-MCNC: 26.91 MG/DL (ref 0–0.5)
DEPRECATED RDW RBC AUTO: 45.8 FL (ref 37–54)
EGFRCR SERPLBLD CKD-EPI 2021: 53.7 ML/MIN/1.73
ERYTHROCYTE [DISTWIDTH] IN BLOOD BY AUTOMATED COUNT: 15.7 % (ref 12.3–15.4)
ERYTHROCYTE [SEDIMENTATION RATE] IN BLOOD: 90 MM/HR (ref 0–30)
GLUCOSE SERPL-MCNC: 158 MG/DL (ref 65–99)
HCT VFR BLD AUTO: 26.4 % (ref 34–46.6)
HGB BLD-MCNC: 8.6 G/DL (ref 12–15.9)
MAGNESIUM SERPL-MCNC: 1.6 MG/DL (ref 1.6–2.4)
MCH RBC QN AUTO: 26.2 PG (ref 26.6–33)
MCHC RBC AUTO-ENTMCNC: 32.6 G/DL (ref 31.5–35.7)
MCV RBC AUTO: 80.5 FL (ref 79–97)
PHOSPHATE SERPL-MCNC: 3 MG/DL (ref 2.5–4.5)
PLATELET # BLD AUTO: 465 10*3/MM3 (ref 140–450)
PMV BLD AUTO: 9.6 FL (ref 6–12)
POTASSIUM SERPL-SCNC: 3.6 MMOL/L (ref 3.5–5.2)
RBC # BLD AUTO: 3.28 10*6/MM3 (ref 3.77–5.28)
SODIUM SERPL-SCNC: 139 MMOL/L (ref 136–145)
WBC NRBC COR # BLD AUTO: 16.14 10*3/MM3 (ref 3.4–10.8)

## 2024-02-13 PROCEDURE — 85027 COMPLETE CBC AUTOMATED: CPT | Performed by: INTERNAL MEDICINE

## 2024-02-13 PROCEDURE — 83735 ASSAY OF MAGNESIUM: CPT | Performed by: INTERNAL MEDICINE

## 2024-02-13 PROCEDURE — 86140 C-REACTIVE PROTEIN: CPT | Performed by: INTERNAL MEDICINE

## 2024-02-13 PROCEDURE — 85652 RBC SED RATE AUTOMATED: CPT | Performed by: INTERNAL MEDICINE

## 2024-02-13 PROCEDURE — 80048 BASIC METABOLIC PNL TOTAL CA: CPT | Performed by: INTERNAL MEDICINE

## 2024-02-13 PROCEDURE — 84100 ASSAY OF PHOSPHORUS: CPT | Performed by: INTERNAL MEDICINE

## 2024-02-13 PROCEDURE — G0299 HHS/HOSPICE OF RN EA 15 MIN: HCPCS

## 2024-02-14 ENCOUNTER — HOME CARE VISIT (OUTPATIENT)
Dept: HOME HEALTH SERVICES | Facility: HOME HEALTHCARE | Age: 64
End: 2024-02-14
Payer: COMMERCIAL

## 2024-02-14 VITALS
RESPIRATION RATE: 18 BRPM | OXYGEN SATURATION: 94 % | TEMPERATURE: 99 F | SYSTOLIC BLOOD PRESSURE: 174 MMHG | HEART RATE: 110 BPM | DIASTOLIC BLOOD PRESSURE: 80 MMHG

## 2024-02-16 ENCOUNTER — HOME CARE VISIT (OUTPATIENT)
Dept: HOME HEALTH SERVICES | Facility: HOME HEALTHCARE | Age: 64
End: 2024-02-16
Payer: COMMERCIAL

## 2024-02-16 ENCOUNTER — TRANSCRIBE ORDERS (OUTPATIENT)
Dept: GENERAL RADIOLOGY | Facility: HOSPITAL | Age: 64
End: 2024-02-16
Payer: COMMERCIAL

## 2024-02-16 DIAGNOSIS — K68.19 RETROPERITONEAL ABSCESS: Primary | ICD-10-CM

## 2024-02-19 NOTE — HOME HEALTH
Patient having fever today, requested no visit.  Spoke with daughter, in agreement with OT discharge without visit, has good knowledge with adl tasks.

## 2024-02-20 ENCOUNTER — HOME CARE VISIT (OUTPATIENT)
Dept: HOME HEALTH SERVICES | Facility: HOME HEALTHCARE | Age: 64
End: 2024-02-20
Payer: COMMERCIAL

## 2024-02-20 ENCOUNTER — LAB REQUISITION (OUTPATIENT)
Dept: LAB | Facility: HOSPITAL | Age: 64
End: 2024-02-20
Payer: COMMERCIAL

## 2024-02-20 DIAGNOSIS — N13.5 CROSSING VESSEL AND STRICTURE OF URETER WITHOUT HYDRONEPHROSIS: ICD-10-CM

## 2024-02-20 LAB
ALBUMIN SERPL-MCNC: 2.5 G/DL (ref 3.5–5.2)
ALBUMIN/GLOB SERPL: 0.6 G/DL
ALP SERPL-CCNC: 131 U/L (ref 39–117)
ALT SERPL W P-5'-P-CCNC: 14 U/L (ref 1–33)
ANION GAP SERPL CALCULATED.3IONS-SCNC: 10.5 MMOL/L (ref 5–15)
AST SERPL-CCNC: 18 U/L (ref 1–32)
BILIRUB SERPL-MCNC: 0.3 MG/DL (ref 0–1.2)
BUN SERPL-MCNC: 8 MG/DL (ref 8–23)
BUN/CREAT SERPL: 8.8 (ref 7–25)
CALCIUM SPEC-SCNC: 8.7 MG/DL (ref 8.6–10.5)
CHLORIDE SERPL-SCNC: 101 MMOL/L (ref 98–107)
CO2 SERPL-SCNC: 26.5 MMOL/L (ref 22–29)
CREAT SERPL-MCNC: 0.91 MG/DL (ref 0.57–1)
CRP SERPL-MCNC: 17.04 MG/DL (ref 0–0.5)
DEPRECATED RDW RBC AUTO: 46 FL (ref 37–54)
EGFRCR SERPLBLD CKD-EPI 2021: 71.9 ML/MIN/1.73
ERYTHROCYTE [DISTWIDTH] IN BLOOD BY AUTOMATED COUNT: 16.2 % (ref 12.3–15.4)
ERYTHROCYTE [SEDIMENTATION RATE] IN BLOOD: 103 MM/HR (ref 0–30)
GLOBULIN UR ELPH-MCNC: 3.9 GM/DL
GLUCOSE SERPL-MCNC: 236 MG/DL (ref 65–99)
HCT VFR BLD AUTO: 24.7 % (ref 34–46.6)
HGB BLD-MCNC: 7.5 G/DL (ref 12–15.9)
MCH RBC QN AUTO: 24.4 PG (ref 26.6–33)
MCHC RBC AUTO-ENTMCNC: 30.4 G/DL (ref 31.5–35.7)
MCV RBC AUTO: 80.5 FL (ref 79–97)
PLATELET # BLD AUTO: 679 10*3/MM3 (ref 140–450)
PMV BLD AUTO: 9.7 FL (ref 6–12)
POTASSIUM SERPL-SCNC: 3.4 MMOL/L (ref 3.5–5.2)
PROT SERPL-MCNC: 6.4 G/DL (ref 6–8.5)
RBC # BLD AUTO: 3.07 10*6/MM3 (ref 3.77–5.28)
SODIUM SERPL-SCNC: 138 MMOL/L (ref 136–145)
TSH SERPL DL<=0.05 MIU/L-ACNC: 7.21 UIU/ML (ref 0.27–4.2)
WBC NRBC COR # BLD AUTO: 9.29 10*3/MM3 (ref 3.4–10.8)

## 2024-02-20 PROCEDURE — G0299 HHS/HOSPICE OF RN EA 15 MIN: HCPCS

## 2024-02-20 PROCEDURE — 85027 COMPLETE CBC AUTOMATED: CPT | Performed by: INTERNAL MEDICINE

## 2024-02-20 PROCEDURE — 86140 C-REACTIVE PROTEIN: CPT | Performed by: INTERNAL MEDICINE

## 2024-02-20 PROCEDURE — 80053 COMPREHEN METABOLIC PANEL: CPT | Performed by: INTERNAL MEDICINE

## 2024-02-20 PROCEDURE — 84443 ASSAY THYROID STIM HORMONE: CPT | Performed by: INTERNAL MEDICINE

## 2024-02-20 PROCEDURE — 85652 RBC SED RATE AUTOMATED: CPT | Performed by: INTERNAL MEDICINE

## 2024-02-20 NOTE — CASE COMMUNICATION
RECEIVED A CALL FROM RED AT THE LAB.  UNABLE TO RUN VITAMIN D LAB DUE TO NOT RECEIVING A GOLD TOP TUBE.  NOTIFIED GLORIA.

## 2024-02-21 NOTE — PROGRESS NOTES
02/22/24 0001   Pre-Procedure Phone Call   Procedure Time Verified Yes   Arrival Time 1100   Procedure Location Verified Yes   Medical History Reviewed No   NPO Status Reinforced Yes   Ride and Caregiver Arranged Yes   Patient Knows to Bring Current Medications   (No changes in medications since last reviewed.)   Bring Outside Films Requested   (CT A&P 2/9/24 in PACS)

## 2024-02-22 ENCOUNTER — HOSPITAL ENCOUNTER (OUTPATIENT)
Dept: CT IMAGING | Facility: HOSPITAL | Age: 64
Discharge: HOME | End: 2024-02-22
Admitting: UROLOGY
Payer: COMMERCIAL

## 2024-02-22 VITALS
TEMPERATURE: 97.6 F | DIASTOLIC BLOOD PRESSURE: 78 MMHG | HEART RATE: 90 BPM | RESPIRATION RATE: 18 BRPM | SYSTOLIC BLOOD PRESSURE: 150 MMHG | OXYGEN SATURATION: 98 % | WEIGHT: 228.13 LBS | BODY MASS INDEX: 39.16 KG/M2

## 2024-02-22 VITALS
TEMPERATURE: 98 F | RESPIRATION RATE: 21 BRPM | HEIGHT: 64 IN | WEIGHT: 229 LBS | OXYGEN SATURATION: 95 % | SYSTOLIC BLOOD PRESSURE: 135 MMHG | DIASTOLIC BLOOD PRESSURE: 69 MMHG | BODY MASS INDEX: 39.09 KG/M2 | HEART RATE: 89 BPM

## 2024-02-22 DIAGNOSIS — K68.19 RETROPERITONEAL ABSCESS: ICD-10-CM

## 2024-02-22 LAB
INR PPP: 1.3 (ref 0.8–1.2)
PLATELET # BLD AUTO: 719 10*3/MM3 (ref 140–450)
PROTHROMBIN TIME: 15.2 SECONDS (ref 12.8–15.2)

## 2024-02-22 PROCEDURE — 49406 IMAGE CATH FLUID PERI/RETRO: CPT

## 2024-02-22 PROCEDURE — 85049 AUTOMATED PLATELET COUNT: CPT | Performed by: RADIOLOGY

## 2024-02-22 PROCEDURE — C1729 CATH, DRAINAGE: HCPCS

## 2024-02-22 PROCEDURE — 87077 CULTURE AEROBIC IDENTIFY: CPT | Performed by: UROLOGY

## 2024-02-22 PROCEDURE — 25010000002 ONDANSETRON PER 1 MG: Performed by: RADIOLOGY

## 2024-02-22 PROCEDURE — 87205 SMEAR GRAM STAIN: CPT | Performed by: UROLOGY

## 2024-02-22 PROCEDURE — 25810000003 SODIUM CHLORIDE 0.9 % SOLUTION: Performed by: RADIOLOGY

## 2024-02-22 PROCEDURE — 25010000002 MIDAZOLAM PER 1 MG: Performed by: RADIOLOGY

## 2024-02-22 PROCEDURE — 87015 SPECIMEN INFECT AGNT CONCNTJ: CPT | Performed by: UROLOGY

## 2024-02-22 PROCEDURE — 25010000002 FENTANYL CITRATE (PF) 50 MCG/ML SOLUTION: Performed by: RADIOLOGY

## 2024-02-22 PROCEDURE — 99153 MOD SED SAME PHYS/QHP EA: CPT

## 2024-02-22 PROCEDURE — 25010000002 LIDOCAINE 1 % SOLUTION: Performed by: RADIOLOGY

## 2024-02-22 PROCEDURE — 99152 MOD SED SAME PHYS/QHP 5/>YRS: CPT

## 2024-02-22 PROCEDURE — 85610 PROTHROMBIN TIME: CPT | Performed by: RADIOLOGY

## 2024-02-22 PROCEDURE — 87070 CULTURE OTHR SPECIMN AEROBIC: CPT | Performed by: UROLOGY

## 2024-02-22 PROCEDURE — 87186 SC STD MICRODIL/AGAR DIL: CPT | Performed by: UROLOGY

## 2024-02-22 PROCEDURE — 25010000002 MORPHINE PER 10 MG: Performed by: RADIOLOGY

## 2024-02-22 RX ORDER — FENTANYL CITRATE 50 UG/ML
INJECTION, SOLUTION INTRAMUSCULAR; INTRAVENOUS AS NEEDED
Status: COMPLETED | OUTPATIENT
Start: 2024-02-22 | End: 2024-02-22

## 2024-02-22 RX ORDER — SODIUM CHLORIDE 9 MG/ML
25 INJECTION, SOLUTION INTRAVENOUS ONCE
Status: COMPLETED | OUTPATIENT
Start: 2024-02-22 | End: 2024-02-22

## 2024-02-22 RX ORDER — MORPHINE SULFATE 2 MG/ML
2 INJECTION, SOLUTION INTRAMUSCULAR; INTRAVENOUS ONCE
Status: COMPLETED | OUTPATIENT
Start: 2024-02-22 | End: 2024-02-22

## 2024-02-22 RX ORDER — ONDANSETRON 2 MG/ML
4 INJECTION INTRAMUSCULAR; INTRAVENOUS ONCE
Status: COMPLETED | OUTPATIENT
Start: 2024-02-22 | End: 2024-02-22

## 2024-02-22 RX ORDER — MIDAZOLAM HYDROCHLORIDE 1 MG/ML
INJECTION INTRAMUSCULAR; INTRAVENOUS AS NEEDED
Status: COMPLETED | OUTPATIENT
Start: 2024-02-22 | End: 2024-02-22

## 2024-02-22 RX ORDER — LIDOCAINE HYDROCHLORIDE 10 MG/ML
20 INJECTION, SOLUTION INFILTRATION; PERINEURAL ONCE
Status: COMPLETED | OUTPATIENT
Start: 2024-02-22 | End: 2024-02-22

## 2024-02-22 RX ORDER — SODIUM CHLORIDE 9 MG/ML
40 INJECTION, SOLUTION INTRAVENOUS AS NEEDED
Status: DISCONTINUED | OUTPATIENT
Start: 2024-02-22 | End: 2024-02-23 | Stop reason: HOSPADM

## 2024-02-22 RX ORDER — SODIUM CHLORIDE 0.9 % (FLUSH) 0.9 %
3 SYRINGE (ML) INJECTION EVERY 12 HOURS SCHEDULED
Status: DISCONTINUED | OUTPATIENT
Start: 2024-02-22 | End: 2024-02-23 | Stop reason: HOSPADM

## 2024-02-22 RX ORDER — SODIUM CHLORIDE 0.9 % (FLUSH) 0.9 %
10 SYRINGE (ML) INJECTION AS NEEDED
Status: DISCONTINUED | OUTPATIENT
Start: 2024-02-22 | End: 2024-02-23 | Stop reason: HOSPADM

## 2024-02-22 RX ADMIN — SODIUM CHLORIDE 25 ML/HR: 9 INJECTION, SOLUTION INTRAVENOUS at 12:16

## 2024-02-22 RX ADMIN — MORPHINE SULFATE 2 MG: 2 INJECTION, SOLUTION INTRAMUSCULAR; INTRAVENOUS at 13:10

## 2024-02-22 RX ADMIN — ONDANSETRON 4 MG: 2 INJECTION INTRAMUSCULAR; INTRAVENOUS at 13:10

## 2024-02-22 RX ADMIN — LIDOCAINE HYDROCHLORIDE 20 ML: 10 INJECTION, SOLUTION INFILTRATION; PERINEURAL at 12:50

## 2024-02-22 RX ADMIN — MIDAZOLAM 1 MG: 1 INJECTION INTRAMUSCULAR; INTRAVENOUS at 12:30

## 2024-02-22 RX ADMIN — SODIUM CHLORIDE 500 ML: 9 INJECTION, SOLUTION INTRAVENOUS at 13:10

## 2024-02-22 RX ADMIN — Medication 3 ML: at 12:16

## 2024-02-22 RX ADMIN — FENTANYL CITRATE 50 MCG: 50 INJECTION, SOLUTION INTRAMUSCULAR; INTRAVENOUS at 12:30

## 2024-02-22 NOTE — DISCHARGE INSTRUCTIONS
EDUCATION /DISCHARGE INSTRUCTIONS  CT/US guided biopsy:  A biopsy is a procedure done to remove tissue for further analysis.  Before images are taken to locate the target area.  Images can be obtained using ultrasound, CT or MRI.  A physician will clean your skin with antiseptic soap, place a sterile towel around the site and administer a local anesthetic to numb the area.  The physician will then insert a special needle.  Sometimes images are taken of the needle after it is inserted to ensure the needle is in the correct area to be biopsied.   A sample is obtained and sent to the laboratory for study.  Occasionally the laboratory is unable to make a diagnosis from the sample and the procedure may need to be repeated.  Within a week the radiologist will send a report to your physician.  A pathologist will also examine the tissue and send a report.    Risks of the procedure include but are not limited to:   *  Bleeding    *  Infection   *  Puncture of surrounding organs *  Death     *  Lung collapse if the biopsy is near the chest which may require insertion of a      chest tube to re-inflate the lung if severe.    Benefits of the procedure:  Using x-ray helps to locate the area that requires a biopsy. The procedure is less invasive than a surgical procedure, there are no large incisions and it does not require anesthesia.    Alternatives to the procedure:  A biopsy can be performed surgically.  Risks of a surgical biopsy include exposure to anesthesia, infection, excessive bleeding and injury to abdominal organs.  A benefit of surgical biopsy is the ability to see the area to be biopsied and remove of a larger piece of tissue.    THIS EDUCATION INFORMATION WAS REVIEWED PRIOR TO PROCEDURE AND CONSENT. Patient initials__________________Time___________________    Post Procedure:    *  Expect the biopsy site may be tender up to one week.    *  Rest today (no pushing pulling or straining).   *  Slowly increase activity  tomorrow.    *  If you received sedation do not drive for 24 hours.   *  Keep dressing clean and dry.   *  Leave dressing on puncture site for 24 hours.    *  You may shower when dressing removed.  Call your doctor if experiencing:   *  Signs of infection such as redness, swelling, excessive pain and / or foul        smelling drainage from the puncture site.   *  Chills or fever over 101 degrees (by mouth).   *  Unrelieved pain.   *  Any new or severe symptoms.   *  If experiencing sudden / severe shortness of breath or chest pain go to the       nearest emergency room.   Following the procedure:     Follow-up with the ordering physician as directed.    Continue to take other medications as directed by your physician unless    otherwise instructed.   If applicable, resume taking your blood thinners or Aspirin on ___________.    If you have any concerns please call the Radiology Nurses Desk at [phone]. 7AM-10PM   You are the most important factor in your recovery.  Follow the above instructions carefully.

## 2024-02-23 ENCOUNTER — TELEPHONE (OUTPATIENT)
Dept: INTERVENTIONAL RADIOLOGY/VASCULAR | Facility: HOSPITAL | Age: 64
End: 2024-02-23
Payer: COMMERCIAL

## 2024-02-23 NOTE — HOME HEALTH
Routine Visit Note:  Labs drawn per Rt AC without complications and taken to [facility]    Skill/education provided: Instructed on fall prevention-clear pathways, remove any home hazards, wear appropriate footwear, adequate lightening, change position slowly, etc. Patient voices back understanding. No safety issues noted.    Patient/caregiver response: Patinet and caregiver need reinforcement of instructions given    Plan for next visit: CP assess, med compliance with extensive med reg change, falls prevetnion, RAJESH drain maint, Skin integ.

## 2024-02-25 LAB
BACTERIA FLD CULT: ABNORMAL
BACTERIA FLD CULT: ABNORMAL
GRAM STN SPEC: ABNORMAL
GRAM STN SPEC: ABNORMAL

## 2024-02-28 ENCOUNTER — HOME CARE VISIT (OUTPATIENT)
Dept: HOME HEALTH SERVICES | Facility: HOME HEALTHCARE | Age: 64
End: 2024-02-28
Payer: COMMERCIAL

## 2024-02-28 ENCOUNTER — LAB REQUISITION (OUTPATIENT)
Dept: LAB | Facility: HOSPITAL | Age: 64
End: 2024-02-28
Payer: COMMERCIAL

## 2024-02-28 DIAGNOSIS — J90 PLEURAL EFFUSION: Primary | ICD-10-CM

## 2024-02-28 DIAGNOSIS — N13.5 CROSSING VESSEL AND STRICTURE OF URETER WITHOUT HYDRONEPHROSIS: ICD-10-CM

## 2024-02-28 LAB
25(OH)D3 SERPL-MCNC: 61.6 NG/ML (ref 30–100)
ALBUMIN SERPL-MCNC: 2.5 G/DL (ref 3.5–5.2)
ALBUMIN/GLOB SERPL: 0.6 G/DL
ALP SERPL-CCNC: 121 U/L (ref 39–117)
ALT SERPL W P-5'-P-CCNC: 10 U/L (ref 1–33)
ANION GAP SERPL CALCULATED.3IONS-SCNC: 10 MMOL/L (ref 5–15)
AST SERPL-CCNC: 14 U/L (ref 1–32)
BASOPHILS # BLD AUTO: 0.07 10*3/MM3 (ref 0–0.2)
BASOPHILS NFR BLD AUTO: 0.8 % (ref 0–1.5)
BILIRUB SERPL-MCNC: 0.2 MG/DL (ref 0–1.2)
BUN SERPL-MCNC: 11 MG/DL (ref 8–23)
BUN/CREAT SERPL: 10.5 (ref 7–25)
CALCIUM SPEC-SCNC: 9.1 MG/DL (ref 8.6–10.5)
CHLORIDE SERPL-SCNC: 102 MMOL/L (ref 98–107)
CO2 SERPL-SCNC: 29 MMOL/L (ref 22–29)
CREAT SERPL-MCNC: 1.05 MG/DL (ref 0.57–1)
CRP SERPL-MCNC: 12.61 MG/DL (ref 0–0.5)
DEPRECATED RDW RBC AUTO: 47.8 FL (ref 37–54)
EGFRCR SERPLBLD CKD-EPI 2021: 60.6 ML/MIN/1.73
EOSINOPHIL # BLD AUTO: 0.34 10*3/MM3 (ref 0–0.4)
EOSINOPHIL NFR BLD AUTO: 4 % (ref 0.3–6.2)
ERYTHROCYTE [DISTWIDTH] IN BLOOD BY AUTOMATED COUNT: 16.3 % (ref 12.3–15.4)
ERYTHROCYTE [SEDIMENTATION RATE] IN BLOOD: 80 MM/HR (ref 0–30)
GLOBULIN UR ELPH-MCNC: 4 GM/DL
GLUCOSE SERPL-MCNC: 147 MG/DL (ref 65–99)
HCT VFR BLD AUTO: 26.4 % (ref 34–46.6)
HGB BLD-MCNC: 8 G/DL (ref 12–15.9)
IMM GRANULOCYTES # BLD AUTO: 0.03 10*3/MM3 (ref 0–0.05)
IMM GRANULOCYTES NFR BLD AUTO: 0.4 % (ref 0–0.5)
LYMPHOCYTES # BLD AUTO: 1.52 10*3/MM3 (ref 0.7–3.1)
LYMPHOCYTES NFR BLD AUTO: 18 % (ref 19.6–45.3)
MCH RBC QN AUTO: 24.8 PG (ref 26.6–33)
MCHC RBC AUTO-ENTMCNC: 30.3 G/DL (ref 31.5–35.7)
MCV RBC AUTO: 81.7 FL (ref 79–97)
MONOCYTES # BLD AUTO: 0.64 10*3/MM3 (ref 0.1–0.9)
MONOCYTES NFR BLD AUTO: 7.6 % (ref 5–12)
NEUTROPHILS NFR BLD AUTO: 5.83 10*3/MM3 (ref 1.7–7)
NEUTROPHILS NFR BLD AUTO: 69.2 % (ref 42.7–76)
NRBC BLD AUTO-RTO: 0 /100 WBC (ref 0–0.2)
PLATELET # BLD AUTO: 642 10*3/MM3 (ref 140–450)
PMV BLD AUTO: 9.8 FL (ref 6–12)
POTASSIUM SERPL-SCNC: 4.5 MMOL/L (ref 3.5–5.2)
PROT SERPL-MCNC: 6.5 G/DL (ref 6–8.5)
RBC # BLD AUTO: 3.23 10*6/MM3 (ref 3.77–5.28)
SODIUM SERPL-SCNC: 141 MMOL/L (ref 136–145)
WBC NRBC COR # BLD AUTO: 8.43 10*3/MM3 (ref 3.4–10.8)

## 2024-02-28 PROCEDURE — 85025 COMPLETE CBC W/AUTO DIFF WBC: CPT | Performed by: INTERNAL MEDICINE

## 2024-02-28 PROCEDURE — 82306 VITAMIN D 25 HYDROXY: CPT | Performed by: INTERNAL MEDICINE

## 2024-02-28 PROCEDURE — 80053 COMPREHEN METABOLIC PANEL: CPT | Performed by: INTERNAL MEDICINE

## 2024-02-28 PROCEDURE — 86140 C-REACTIVE PROTEIN: CPT | Performed by: INTERNAL MEDICINE

## 2024-02-28 PROCEDURE — G0299 HHS/HOSPICE OF RN EA 15 MIN: HCPCS

## 2024-02-28 PROCEDURE — 85652 RBC SED RATE AUTOMATED: CPT | Performed by: INTERNAL MEDICINE

## 2024-03-01 ENCOUNTER — TELEPHONE (OUTPATIENT)
Dept: SURGERY | Facility: CLINIC | Age: 64
End: 2024-03-01
Payer: COMMERCIAL

## 2024-03-01 NOTE — TELEPHONE ENCOUNTER
I left a detailed message regarding appointment on 3/4/202. I left my name, medical group and call back number.    DB 7:11  3/1/2024

## 2024-03-01 NOTE — HOME HEALTH
Routine Visit Note: Patient with new RAJESH drain to Rt LQ with serosang drainage. Labs drawn per Rt AC without complications and taken to [facility]    Skill/education provided: Instructed on Drain care and deep breathing exercises with IS    Patient/caregiver response: Patient and caregiver need reinforcement of instructions given    Plan for next visit: CP assess, med compliance with extensive med reg change, falls prevetnion, RAJESH drain maint, Skin integ.

## 2024-03-03 VITALS
HEART RATE: 88 BPM | BODY MASS INDEX: 38.79 KG/M2 | SYSTOLIC BLOOD PRESSURE: 142 MMHG | TEMPERATURE: 97.3 F | WEIGHT: 226 LBS | RESPIRATION RATE: 18 BRPM | DIASTOLIC BLOOD PRESSURE: 82 MMHG | OXYGEN SATURATION: 98 %

## 2024-03-04 ENCOUNTER — OFFICE VISIT (OUTPATIENT)
Dept: SURGERY | Facility: CLINIC | Age: 64
End: 2024-03-04
Payer: COMMERCIAL

## 2024-03-04 ENCOUNTER — HOSPITAL ENCOUNTER (OUTPATIENT)
Dept: GENERAL RADIOLOGY | Facility: HOSPITAL | Age: 64
Discharge: HOME | End: 2024-03-04
Payer: COMMERCIAL

## 2024-03-04 VITALS
HEART RATE: 99 BPM | SYSTOLIC BLOOD PRESSURE: 158 MMHG | OXYGEN SATURATION: 97 % | WEIGHT: 226 LBS | DIASTOLIC BLOOD PRESSURE: 96 MMHG | HEIGHT: 64 IN | BODY MASS INDEX: 38.58 KG/M2

## 2024-03-04 DIAGNOSIS — J90 PLEURAL EFFUSION: ICD-10-CM

## 2024-03-04 DIAGNOSIS — J90 PLEURAL EFFUSION, RIGHT: Primary | ICD-10-CM

## 2024-03-04 PROCEDURE — 99214 OFFICE O/P EST MOD 30 MIN: CPT | Performed by: NURSE PRACTITIONER

## 2024-03-04 PROCEDURE — 71046 X-RAY EXAM CHEST 2 VIEWS: CPT

## 2024-03-04 NOTE — PROGRESS NOTES
"Chief Complaint  New patient, right pleural effusion    Subjective        Tiana Hudson presents to [facility] THORACIC SURGERY  History of Present Illness    Ms. Hudson is a very pleasant 63-year-old lady who presents today for evaluation of her right pleural effusion incidentally seen on imaging in January 2024 during a hospital admission for right retroperitoneal abscess following ureteral stent placement.  She received IV antibiotics and had a percutaneous drain placed to the right lower quadrant on 1/10/2024.  This remains in place with approximately 50 cc of purulent output as of this week.  Prior to that it was pink serosanguineous drainage.  She is scheduled to have CT abdomen on Friday and follow-up with her urologist next week.    She is a never smoker.  She denies previous chest surgeries.  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  She was healthy and active at baseline prior to her hospitalization.  Her weakness and fatigue have improved since she has been home.  She continues to use a walker and remains somewhat winded with exertion.  No fevers, night sweats or chills.  She presents today feeling well with her daughter and chest x-ray.      Objective   Vital Signs:  /96 (BP Location: Left arm, Patient Position: Sitting, Cuff Size: Adult)   Pulse 99   Ht 162.6 cm (64\")   Wt 103 kg (226 lb)   SpO2 97%   BMI 38.79 kg/m²   Estimated body mass index is 38.79 kg/m² as calculated from the following:    Height as of this encounter: 162.6 cm (64\").    Weight as of this encounter: 103 kg (226 lb).             Physical Exam  Constitutional:       General: She is not in acute distress.     Appearance: Normal appearance. She is not ill-appearing.   HENT:      Head: Normocephalic and atraumatic.   Cardiovascular:      Rate and Rhythm: Normal rate.   Pulmonary:      Effort: Pulmonary effort is normal. No tachypnea or respiratory distress.      Breath sounds: No decreased breath " sounds or wheezing.   Musculoskeletal:      Cervical back: Normal range of motion and neck supple.   Skin:     General: Skin is warm.      Comments: RAJESH drain to right lower quadrant with small amount of brown purulent drainage   Neurological:      General: No focal deficit present.      Mental Status: She is alert. Mental status is at baseline.      Motor: Weakness present.   Psychiatric:         Mood and Affect: Mood normal.         Thought Content: Thought content normal.        Result Review :            I have independently reviewed the CT abdomen pelvis performed in January 2024 which demonstrates a moderate right-sided pleural effusion with collapse of the right lower lobe.  Chest x-ray performed prior to her appointment today demonstrates some improvement to the right effusion.  No acute findings on the left.             Assessment and Plan     Diagnoses and all orders for this visit:    1. Pleural effusion, right (Primary)  -     CT Chest Without Contrast; Future  -     US Thoracentesis Right; Future    Other orders  -     Obtain Informed Consent; Standing  -     pH, Body Fluid - Pleural Fluid, Pleural Cavity; Standing  -     Glucose, Body Fluid - Pleural Fluid, Pleural Cavity; Standing  -     Lactate Dehydrogenase, Body Fluid - Pleural Fluid, Pleural Cavity; Standing  -     Protein, Body Fluid - Pleural Fluid, Pleural Cavity; Standing  -     Non-gynecologic Cytology; Standing  -     Body Fluid Culture - Body Fluid, Pleural Cavity; Standing  -     Albumin, Fluid - Pleural Fluid, Pleural Cavity; Standing  -     Amylase, Body Fluid - Pleural Fluid, Pleural Cavity; Standing    Ms. Hudson presents today with a persistent right pleural effusion after she was found to have a right retroperitoneal abscess that was treated with antibiotics and percutaneous drain placement.  Imaging performed prior to her appointment today demonstrates small to moderate right residual pleural effusion.  I would like for her to  undergo thoracentesis followed by CT chest for further evaluation.  This will be performed in interventional radiology.  We discussed that she might not even have enough pleural fluid to be drained but it would be worthwhile for them to evaluate.  Risks and benefits of the procedure were discussed with her and she is agreeable to proceed.  She will follow-up with Dr. Blas to discuss further plan of care once studies have been completed.       I spent 51 minutes caring for Tiana on this date of service. This time includes time spent by me in the following activities:preparing for the visit, reviewing tests, obtaining and/or reviewing a separately obtained history, performing a medically appropriate examination and/or evaluation , counseling and educating the patient/family/caregiver, ordering medications, tests, or procedures, documenting information in the medical record, independently interpreting results and communicating that information with the patient/family/caregiver, and care coordination  Follow Up     Return in about 2 weeks (around 3/18/2024) for Next scheduled follow up.  Patient was given instructions and counseling regarding her condition or for health maintenance advice. Please see specific information pulled into the AVS if appropriate.

## 2024-03-05 ENCOUNTER — LAB REQUISITION (OUTPATIENT)
Dept: LAB | Facility: HOSPITAL | Age: 64
End: 2024-03-05

## 2024-03-05 ENCOUNTER — HOME CARE VISIT (OUTPATIENT)
Dept: HOME HEALTH SERVICES | Facility: HOME HEALTHCARE | Age: 64
End: 2024-03-05
Payer: COMMERCIAL

## 2024-03-05 DIAGNOSIS — N13.5 CROSSING VESSEL AND STRICTURE OF URETER WITHOUT HYDRONEPHROSIS: ICD-10-CM

## 2024-03-05 LAB
ALBUMIN SERPL-MCNC: 2.9 G/DL (ref 3.5–5.2)
ALBUMIN/GLOB SERPL: 0.7 G/DL
ALP SERPL-CCNC: 102 U/L (ref 39–117)
ALT SERPL W P-5'-P-CCNC: 6 U/L (ref 1–33)
ANION GAP SERPL CALCULATED.3IONS-SCNC: 14 MMOL/L (ref 5–15)
AST SERPL-CCNC: 20 U/L (ref 1–32)
BASOPHILS # BLD AUTO: 0.08 10*3/MM3 (ref 0–0.2)
BASOPHILS NFR BLD AUTO: 0.9 % (ref 0–1.5)
BILIRUB SERPL-MCNC: 0.2 MG/DL (ref 0–1.2)
BUN SERPL-MCNC: 11 MG/DL (ref 8–23)
BUN/CREAT SERPL: 12.6 (ref 7–25)
CALCIUM SPEC-SCNC: 9.1 MG/DL (ref 8.6–10.5)
CHLORIDE SERPL-SCNC: 100 MMOL/L (ref 98–107)
CO2 SERPL-SCNC: 28 MMOL/L (ref 22–29)
CREAT SERPL-MCNC: 0.87 MG/DL (ref 0.57–1)
CRP SERPL-MCNC: 4.22 MG/DL (ref 0–0.5)
DEPRECATED RDW RBC AUTO: 52.8 FL (ref 37–54)
EGFRCR SERPLBLD CKD-EPI 2021: 75.9 ML/MIN/1.73
EOSINOPHIL # BLD AUTO: 0.53 10*3/MM3 (ref 0–0.4)
EOSINOPHIL NFR BLD AUTO: 5.7 % (ref 0.3–6.2)
ERYTHROCYTE [DISTWIDTH] IN BLOOD BY AUTOMATED COUNT: 17.5 % (ref 12.3–15.4)
ERYTHROCYTE [SEDIMENTATION RATE] IN BLOOD: 74 MM/HR (ref 0–30)
GLOBULIN UR ELPH-MCNC: 4.3 GM/DL
GLUCOSE SERPL-MCNC: 94 MG/DL (ref 65–99)
HCT VFR BLD AUTO: 28 % (ref 34–46.6)
HGB BLD-MCNC: 8.1 G/DL (ref 12–15.9)
IMM GRANULOCYTES # BLD AUTO: 0.03 10*3/MM3 (ref 0–0.05)
IMM GRANULOCYTES NFR BLD AUTO: 0.3 % (ref 0–0.5)
LYMPHOCYTES # BLD AUTO: 1.57 10*3/MM3 (ref 0.7–3.1)
LYMPHOCYTES NFR BLD AUTO: 16.8 % (ref 19.6–45.3)
MCH RBC QN AUTO: 24.1 PG (ref 26.6–33)
MCHC RBC AUTO-ENTMCNC: 28.9 G/DL (ref 31.5–35.7)
MCV RBC AUTO: 83.3 FL (ref 79–97)
MONOCYTES # BLD AUTO: 0.75 10*3/MM3 (ref 0.1–0.9)
MONOCYTES NFR BLD AUTO: 8 % (ref 5–12)
NEUTROPHILS NFR BLD AUTO: 6.37 10*3/MM3 (ref 1.7–7)
NEUTROPHILS NFR BLD AUTO: 68.3 % (ref 42.7–76)
NRBC BLD AUTO-RTO: 0 /100 WBC (ref 0–0.2)
PLATELET # BLD AUTO: 476 10*3/MM3 (ref 140–450)
PMV BLD AUTO: 9.4 FL (ref 6–12)
POTASSIUM SERPL-SCNC: 3.3 MMOL/L (ref 3.5–5.2)
PROT SERPL-MCNC: 7.2 G/DL (ref 6–8.5)
RBC # BLD AUTO: 3.36 10*6/MM3 (ref 3.77–5.28)
SODIUM SERPL-SCNC: 142 MMOL/L (ref 136–145)
WBC NRBC COR # BLD AUTO: 9.33 10*3/MM3 (ref 3.4–10.8)

## 2024-03-05 PROCEDURE — 80053 COMPREHEN METABOLIC PANEL: CPT | Performed by: INTERNAL MEDICINE

## 2024-03-05 PROCEDURE — 86140 C-REACTIVE PROTEIN: CPT | Performed by: INTERNAL MEDICINE

## 2024-03-05 PROCEDURE — 85025 COMPLETE CBC W/AUTO DIFF WBC: CPT | Performed by: INTERNAL MEDICINE

## 2024-03-05 PROCEDURE — 85652 RBC SED RATE AUTOMATED: CPT | Performed by: INTERNAL MEDICINE

## 2024-03-05 PROCEDURE — G0299 HHS/HOSPICE OF RN EA 15 MIN: HCPCS

## 2024-03-05 NOTE — PROGRESS NOTES
.     REASON FOR CONSULTATION:   Iron deficiency anemia  Provide an opinion on any further workup or treatment                             REQUESTING PHYSICIAN: Dean Fairchild MD  RECORDS OBTAINED:  Records of the patient's history including those obtained from the referring provider were reviewed and summarized in detail.    HISTORY OF PRESENT ILLNESS:  The patient is a 63 y.o. year old female  who is here for follow-up with the above-mentioned history.    Reviewed Dr. Fairchild discharge summary from 1/19/2024.  Admitted for DKA, acute cystitis and sepsis    2/28/2024: Hb 8  6/2/2017: 7% iron saturation.  5/24/ 17: Ferritin 15.7, 4% iron saturation.    States she has been on oral iron for several years.  Despite this, Hb remains low.  Denies bleeding.  States she had an EGD and colonoscopy in 2020 with Dr. Cruz      Past Medical History:   Diagnosis Date    Anemia     intermittently    Anxiety and depression     Arthritis     Depression     Diabetes mellitus     Gallstones     GERD (gastroesophageal reflux disease)     High cholesterol     History of frequent urinary tract infections     HX OF YEAST IN BLADDER HAS PRN DIFLUCAN    History of kidney stones     History of transfusion 05/24/2017    Had 2 iron infusions.  This was when i had knee replacement    Hyperlipidemia     Hypertension     Hypothyroidism     Knee pain, bilateral     Low back pain     Lumbar stenosis     PONV (postoperative nausea and vomiting)     Positive TB test     chest x-ray neg    Seasonal allergies     Varicose vein of leg      Past Surgical History:   Procedure Laterality Date    APPENDECTOMY N/A 1982    Dr. Wilson    CHOLECYSTECTOMY WITH INTRAOPERATIVE CHOLANGIOGRAM N/A 10/31/2018    Procedure: laparoscopic cholecystectomy;  Surgeon: Mary Kay Mac MD;  Location: [facility];  Service: General    COLONOSCOPY      CYSTOSCOPY BLADDER BIOPSY N/A 10/03/2016    Procedure: CYSTOSCOPY BLADDER BIOPSY;  Surgeon: Rei Calvert MD;   Location: [facility] MAIN OR;  Service:     CYSTOSCOPY W/ URETERAL STENT PLACEMENT Bilateral 01/03/2024    Procedure: CYSTOSCOPY BILATERAL RETROGRADE PYELOGRAM  BILATERAL URETERAL STENT INSERTION AND CATHETHER PLACEMENT;  Surgeon: Eduard Armando MD;  Location: [facility] OR;  Service: Urology;  Laterality: Bilateral;    DILATATION AND CURETTAGE N/A     ENDOSCOPY      EXPLORATORY LAPAROTOMY Left 1981    S&O    INTRAUTERINE DEVICE INSERTION      KNEE ARTHROSCOPY W/ MENISCAL REPAIR Left     OVARIAN CYST REMOVAL Left 1982    Dr. Wilson    TOTAL KNEE ARTHROPLASTY Right 05/22/2017    Procedure: RIGHT TOTAL KNEE ARTHROPLASTY WITH ALETA NAVIGATION;  Surgeon: Ross Cruz MD;  Location: [facility] OR;  Service:     WISDOM TOOTH EXTRACTION         MEDICATIONS    Current Outpatient Medications:     acetaminophen (TYLENOL) 325 MG tablet, Take 2 tablets by mouth Every 4 (Four) Hours As Needed for Fever (fever greater than 100.4 °F or headache)., Disp: , Rfl:     Alcohol Swabs (Alcohol Pads) 70 % pads, Apply one alcohol swab to injection site of skin immediately prior to insulin injection., Disp: 100 each, Rfl: 12    Blood Glucose Monitoring Suppl (Blood Glucose Monitor System) w/Device kit, Use to test blood glucose up to four times daily as needed, Disp: 1 each, Rfl: 0    dextrose (GLUTOSE) 40 % gel, Take 15 g by mouth Every 15 (Fifteen) Minutes As Needed for Low Blood Sugar (Blood sugar less than 70)., Disp: , Rfl:     diclofenac (VOLTAREN) 50 MG EC tablet, Take 1 tablet by mouth 2 (Two) Times a Day As Needed (pain)., Disp: 60 tablet, Rfl: 2    Dulaglutide 1.5 MG/0.5ML solution pen-injector, Inject 1.5 mg under the skin into the appropriate area as directed 1 (One) Time Per Week on Sunday, Disp: 2 mL, Rfl: 10    escitalopram (LEXAPRO) 10 MG tablet, Take 1 tablet by mouth Daily. Indications: Major Depressive Disorder, Disp: , Rfl:     ferrous sulfate 325 (65 FE) MG tablet, Take 1 tablet by mouth 2 (Two) Times a Day  With Meals. Indications: Anemia From Inadequate Iron in the Body, Disp: , Rfl:     glucose blood test strip, Use to test blood glucose up to four times daily as needed., Disp: 100 each, Rfl: 0    insulin aspart (NovoLOG FlexPen) 100 UNIT/ML solution pen-injector sc pen, Inject under the skin per sliding scale 4 times daily with meals and at bedtime. Max total daily dose of 36 units. -199 mg/dL - 2 units, -249 mg/dL - 4 units -299 mg/dL - 6 units -349 mg/dL - 7 units -400 mg/dL - 8 units BG > than 400 mg/dL - 9 units & Call Provider, Disp: 15 mL, Rfl: 0    Insulin Glargine (Lantus SoloStar) 100 UNIT/ML injection pen, Inject 20 Units under the skin into the appropriate area as directed 2 (Two) Times a Day., Disp: 15 mL, Rfl: 5    insulin glargine (LANTUS, SEMGLEE) 100 UNIT/ML injection, Inject 20 Units under the skin into the appropriate area as directed Every 12 (Twelve) Hours. Indications: Type 2 Diabetes, Disp: , Rfl:     insulin lispro (HUMALOG/ADMELOG) 100 UNIT/ML injection, Inject 2-9 Units under the skin into the appropriate area as directed 4 (Four) Times a Day Before Meals & at Bedtime., Disp: , Rfl:     Insulin Pen Needle (Pen Needles) 32G X 4 MM misc, Inject 1 each under the skin into the appropriate area as directed 4 (Four) Times a Day As Needed (for insulin injections)., Disp: 100 each, Rfl: 0    Lancets misc, Use to test blood glucose up to four times daily as needed., Disp: 100 each, Rfl: 0    levothyroxine (SYNTHROID, LEVOTHROID) 150 MCG tablet, Take 1 tablet by mouth Every Morning., Disp: 30 tablet, Rfl: 3    loratadine (CLARITIN) 10 MG tablet, Take 1 tablet by mouth Daily. Indications: Hayfever, Disp: , Rfl:     metFORMIN (GLUCOPHAGE) 500 MG tablet, Take 1 tablet by mouth 2 (Two) Times a Day With Meals., Disp: 360 tablet, Rfl: 0    multivitamin (THERAGRAN) tablet tablet, Take 1 tablet by mouth Daily. Indications: Vitamin Deficiency, Disp: , Rfl:     naloxone (NARCAN) 4  MG/0.1ML nasal spray, Call 911. Don't prime. [place] in 1 nostril for overdose. Repeat in 2-3 minutes in other nostril if no or minimal breathing/responsiveness., Disp: 2 each, Rfl: 0    Nutritional Supplements (Glucose Management) tablet, Use as needed for emergently low blood glucose levels. Formulary Compliance Approval, Disp: 30 tablet, Rfl: 0    ondansetron ODT (ZOFRAN-ODT) 4 MG disintegrating tablet, Place 1 tablet on the tongue Every 6 (Six) Hours As Needed for Nausea or Vomiting., Disp: 40 tablet, Rfl: 1    oxybutynin XL (DITROPAN-XL) 10 MG 24 hr tablet, Take 1 tablet by mouth Daily. Indications: Urinary Incontinence, Disp: , Rfl:     pantoprazole (PROTONIX) 40 MG EC tablet, Take 1 tablet by mouth Every Morning, Disp: 30 tablet, Rfl: 5    potassium chloride (K-DUR,KLOR-CON) 20 MEQ CR tablet, Take 1 tablet by mouth As Needed (only when takin Lasix). Indications: Low Amount of Potassium in the Blood, Disp: , Rfl:     pravastatin (PRAVACHOL) 40 MG tablet, Take 1 tablet by mouth daily., Disp: 90 tablet, Rfl: 3    rOPINIRole (REQUIP) 0.5 MG tablet, Take 1 tablet by mouth every night at bedtime., Disp: 30 tablet, Rfl: 0    tiZANidine (ZANAFLEX) 4 MG tablet, Take 1 tablet by mouth At Night As Needed for Muscle Spasms. Indications: Musculoskeletal Pain, Disp: , Rfl:     torsemide (DEMADEX) 20 MG tablet, Take 2 tablets by mouth Daily., Disp: 60 tablet, Rfl: 0    VITAMIN D PO, Take 1 dose by mouth Daily. Indications: Vitamin and/or Mineral Deficiency, Disp: , Rfl:     ALLERGIES:     Allergies   Allergen Reactions    Sulfa Antibiotics Hives and Rash       SOCIAL HISTORY:       Social History     Socioeconomic History    Marital status:      Spouse name: Brandon   Tobacco Use    Smoking status: Never    Smokeless tobacco: Never    Tobacco comments:     Never smoked   Vaping Use    Vaping status: Never Used   Substance and Sexual Activity    Alcohol use: Never     Alcohol/week: 3.0 standard drinks of alcohol      "Types: 1 Glasses of wine, 2 Standard drinks or equivalent per week    Drug use: Never    Sexual activity: Not Currently     Partners: Male     Birth control/protection: I.U.D.         FAMILY HISTORY:  Family History   Problem Relation Age of Onset    Hypertension Mother     Hepatitis Mother     Breast cancer Mother     Heart disease Mother     Cancer Mother     Hyperlipidemia Mother              Heart failure Father     Stroke Father     Hypertension Father         Since he was 72, now 86s    Diabetes Father     Heart disease Father     Hyperlipidemia Father         Unsurs    Diabetes Maternal Aunt     Diabetes Paternal Uncle     Heart disease Maternal Grandmother     Cancer Maternal Grandfather     COPD Maternal Grandfather     Arthritis Paternal Grandmother     Malig Hyperthermia Neg Hx        REVIEW OF SYSTEMS:  Review of Systems   Constitutional:  Negative for activity change.   HENT:  Negative for nosebleeds and trouble swallowing.    Respiratory:  Negative for shortness of breath and wheezing.    Cardiovascular:  Negative for chest pain and palpitations.   Gastrointestinal:  Negative for constipation, diarrhea and nausea.   Genitourinary:  Negative for dysuria and hematuria.   Musculoskeletal:  Negative for arthralgias and myalgias.   Skin:  Negative for rash and wound.   Neurological:  Negative for seizures and syncope.   Hematological:  Negative for adenopathy. Does not bruise/bleed easily.   Psychiatric/Behavioral:  Negative for confusion.               Vitals:    24 0921   BP: 131/78   Pulse: 88   Resp: 16   Temp: 97.3 °F (36.3 °C)   TempSrc: Temporal   SpO2: 99%   Weight: 101 kg (222 lb 4.8 oz)   Height: 162.6 cm (64.02\")   PainSc:   2   PainLoc: Abdomen         3/6/2024     9:22 AM   Current Status   ECOG score 1      PHYSICAL EXAM:        CONSTITUTIONAL:  Vital signs reviewed.  No distress, looks comfortable.  EYES:  Conjunctiva and lids unremarkable.  PERRLA  EARS,NOSE,MOUTH,THROAT:  " Ears and nose appear unremarkable.  Lips, teeth, gums appear unremarkable.  RESPIRATORY:  Normal respiratory effort.  Lungs clear to auscultation bilaterally.  CARDIOVASCULAR:  Normal S1, S2.  No murmurs rubs or gallops.  No significant lower extremity edema.  GASTROINTESTINAL: Abdomen appears unremarkable.  Nontender.  No hepatomegaly.  No splenomegaly.  LYMPHATIC:  No cervical, supraclavicular, axillary lymphadenopathy.  SKIN:  Warm.  No rashes.  PSYCHIATRIC:  Normal judgment and insight.  Normal mood and affect.          RECENT LABS:        WBC   Date Value Ref Range Status   03/06/2024 11.43 (H) 3.40 - 10.80 10*3/mm3 Final   03/05/2024 9.33 3.40 - 10.80 10*3/mm3 Final   02/28/2024 8.43 3.40 - 10.80 10*3/mm3 Final   02/20/2024 9.29 3.40 - 10.80 10*3/mm3 Final   02/13/2024 16.14 (H) 3.40 - 10.80 10*3/mm3 Final   02/05/2024 13.15 (H) 3.40 - 10.80 10*3/mm3 Final   02/01/2024 11.95 (H) 3.40 - 10.80 10*3/mm3 Final   01/29/2024 11.54 (H) 3.40 - 10.80 10*3/mm3 Final   01/23/2024 14.63 (H) 3.40 - 10.80 10*3/mm3 Final   01/19/2024 9.92 3.40 - 10.80 10*3/mm3 Final   01/18/2024 9.87 3.40 - 10.80 10*3/mm3 Final   01/17/2024 10.93 (H) 3.40 - 10.80 10*3/mm3 Final   01/16/2024 9.56 3.40 - 10.80 10*3/mm3 Final   01/15/2024 11.33 (H) 3.40 - 10.80 10*3/mm3 Final   01/14/2024 10.99 (H) 3.40 - 10.80 10*3/mm3 Final   01/13/2024 9.51 3.40 - 10.80 10*3/mm3 Final   01/11/2024 11.91 (H) 3.40 - 10.80 10*3/mm3 Final   01/10/2024 12.93 (H) 3.40 - 10.80 10*3/mm3 Final   01/09/2024 14.58 (H) 3.40 - 10.80 10*3/mm3 Final   01/08/2024 16.61 (H) 3.40 - 10.80 10*3/mm3 Final   01/07/2024 13.75 (H) 3.40 - 10.80 10*3/mm3 Final   01/06/2024 10.77 3.40 - 10.80 10*3/mm3 Final   01/05/2024 13.04 (H) 3.40 - 10.80 10*3/mm3 Final   01/04/2024 17.08 (H) 3.40 - 10.80 10*3/mm3 Final   01/03/2024 18.93 (H) 3.40 - 10.80 10*3/mm3 Final   01/02/2024 17.30 (H) 3.40 - 10.80 10*3/mm3 Final   01/02/2024 20.12 (H) 3.40 - 10.80 10*3/mm3 Final   06/21/2023 9.83 3.40 -  10.80 10*3/mm3 Final   10/24/2018 7.22 4.50 - 10.70 10*3/mm3 Final   06/02/2017 7.24 4.50 - 10.70 10*3/mm3 Final   05/25/2017 7.08 4.50 - 10.70 10*3/mm3 Final   05/11/2017 6.98 4.50 - 10.70 10*3/mm3 Final   02/23/2017 6.23 4.50 - 10.70 10*3/mm3 Final   10/24/2016 8.23 4.50 - 10.70 10*3/mm3 Final   09/29/2016 6.09 4.50 - 10.70 10*3/mm3 Final   07/18/2016 7.8 3.4 - 10.8 x10E3/uL Final     Hemoglobin   Date Value Ref Range Status   03/06/2024 9.2 (L) 12.0 - 15.9 g/dL Final   03/05/2024 8.1 (L) 12.0 - 15.9 g/dL Final   02/28/2024 8.0 (L) 12.0 - 15.9 g/dL Final   02/20/2024 7.5 (L) 12.0 - 15.9 g/dL Final   02/13/2024 8.6 (L) 12.0 - 15.9 g/dL Final   02/05/2024 9.0 (L) 12.0 - 15.9 g/dL Final   02/01/2024 9.1 (L) 12.0 - 15.9 g/dL Final   01/29/2024 9.4 (L) 12.0 - 15.9 g/dL Final   01/23/2024 9.7 (L) 12.0 - 15.9 g/dL Final   01/19/2024 9.4 (L) 12.0 - 15.9 g/dL Final   01/18/2024 8.6 (L) 12.0 - 15.9 g/dL Final   01/17/2024 9.1 (L) 12.0 - 15.9 g/dL Final   01/16/2024 9.2 (L) 12.0 - 15.9 g/dL Final   01/15/2024 8.9 (L) 12.0 - 15.9 g/dL Final   01/14/2024 9.1 (L) 12.0 - 15.9 g/dL Final   01/13/2024 9.1 (L) 12.0 - 15.9 g/dL Final   01/11/2024 9.8 (L) 12.0 - 15.9 g/dL Final   01/10/2024 10.3 (L) 12.0 - 15.9 g/dL Final   01/09/2024 10.1 (L) 12.0 - 15.9 g/dL Final   01/08/2024 9.3 (L) 12.0 - 15.9 g/dL Final   01/07/2024 11.5 (L) 12.0 - 15.9 g/dL Final   01/06/2024 10.1 (L) 12.0 - 15.9 g/dL Final   01/05/2024 10.3 (L) 12.0 - 15.9 g/dL Final   01/04/2024 10.0 (L) 12.0 - 15.9 g/dL Final   01/03/2024 9.6 (L) 12.0 - 15.9 g/dL Final   01/02/2024 10.5 (L) 12.0 - 15.9 g/dL Final   01/02/2024 11.2 (L) 12.0 - 15.9 g/dL Final   06/21/2023 14.4 12.0 - 15.9 g/dL Final   10/24/2018 11.5 (L) 11.9 - 15.5 g/dL Final   06/02/2017 10.5 (L) 11.9 - 15.5 g/dL Final   05/25/2017 8.1 (L) 11.9 - 15.5 g/dL Final   05/24/2017 7.9 (L) 11.9 - 15.5 g/dL Final   05/23/2017 9.1 (L) 11.9 - 15.5 g/dL Final   05/11/2017 9.4 (L) 11.9 - 15.5 g/dL Final   02/23/2017  9.5 (L) 11.9 - 15.5 g/dL Final   10/24/2016 9.6 (L) 11.9 - 15.5 g/dL Final   09/29/2016 9.5 (L) 11.9 - 15.5 g/dL Final     Comment:     Slide reviewed by technologist   07/18/2016 10.4 (L) 11.1 - 15.9 g/dL Final     Platelets   Date Value Ref Range Status   03/06/2024 468 (H) 140 - 450 10*3/mm3 Final   03/05/2024 476 (H) 140 - 450 10*3/mm3 Final   02/28/2024 642 (H) 140 - 450 10*3/mm3 Final   02/22/2024 719 (H) 140 - 450 10*3/mm3 Final   02/20/2024 679 (H) 140 - 450 10*3/mm3 Final   02/13/2024 465 (H) 140 - 450 10*3/mm3 Final   02/05/2024 447 140 - 450 10*3/mm3 Final   02/01/2024 524 (H) 140 - 450 10*3/mm3 Final   01/29/2024 572 (H) 140 - 450 10*3/mm3 Final   01/23/2024 547 (H) 140 - 450 10*3/mm3 Final   01/19/2024 490 (H) 140 - 450 10*3/mm3 Final   01/18/2024 514 (H) 140 - 450 10*3/mm3 Final   01/17/2024 541 (H) 140 - 450 10*3/mm3 Final   01/16/2024 534 (H) 140 - 450 10*3/mm3 Final   01/15/2024 603 (H) 140 - 450 10*3/mm3 Final   01/14/2024 550 (H) 140 - 450 10*3/mm3 Final   01/13/2024 528 (H) 140 - 450 10*3/mm3 Final   01/11/2024 502 (H) 140 - 450 10*3/mm3 Final   01/10/2024 392 140 - 450 10*3/mm3 Final   01/09/2024 439 140 - 450 10*3/mm3 Final   01/08/2024 378 140 - 450 10*3/mm3 Final   01/07/2024 371 140 - 450 10*3/mm3 Final   01/06/2024 384 140 - 450 10*3/mm3 Final   01/05/2024 412 140 - 450 10*3/mm3 Final   01/04/2024 446 140 - 450 10*3/mm3 Final   01/03/2024 441 140 - 450 10*3/mm3 Final   01/02/2024 480 (H) 140 - 450 10*3/mm3 Final   01/02/2024 568 (H) 140 - 450 10*3/mm3 Final   06/21/2023 256 140 - 450 10*3/mm3 Final   10/24/2018 300 140 - 500 10*3/mm3 Final   06/02/2017 389 140 - 500 10*3/mm3 Final   05/25/2017 238 140 - 500 10*3/mm3 Final   05/11/2017 343 140 - 500 10*3/mm3 Final   02/23/2017 381 140 - 500 10*3/mm3 Final   10/24/2016 409 140 - 500 10*3/mm3 Final   09/29/2016 352 140 - 500 10*3/mm3 Final   07/18/2016 350 150 - 379 x10E3/uL Final       Assessment & Plan   Anemia, unspecified type  -  Ferritin  - Iron Profile  - Retic With IRF & RET-He  - Ferritin  - Iron Profile  - Retic With IRF & RET-He  - CBC & Differential  - Vitamin B12  - Folate RBC  - Lactate Dehydrogenase  - Haptoglobin  - Direct Antiglobulin Test (Direct Shonda)        Tiana Hudson   *Iron deficiency anemia  6/2/2017: 7% iron saturation.  5/24/17: Ferritin 15.7, 4% iron saturation.  Not responding well to oral iron due to poor absorption (despite being on oral iron for years, remains anemic)  I explained IV iron as a possibility of life-threatening allergic reactions.  Patient understands this and wants to proceed if she remains iron deficient on today's labs  3/6/2024: Ferritin 211, 12% saturation.  Suspect the elevated ferritin may be due to inflammation as she still has a drain from the retroperitoneal abscess.  Venofer 200 mg x 3 doses.    *Source of iron deficiency  Patient states EGD and colonoscopy by Dr. Farooq Cruz, 2020 (after she was found to be iron deficient), and was unremarkable with a plan to repeat colonoscopy in 10 years    *Microcytosis, suspect due to iron deficiency    *Thrombocytosis, suspect due to iron deficiency    *Anemia of inflammation  Early 2024: Right retroperitoneal abscess requiring percutaneous drain  3/6/2024: Still has a drain in place  This can contribute to anemia    *Class II obesity.  This can lead to cytopenias through hepatic steatosis.  Body mass index is 38.14 kg/m².  BMI 25 to <30 is overweight  BMI 30 to <35 is class 1 obesity  BMI 35 to <40 is class 2 obesity  BMI 40 or higher is class 3 obesity   Ideally, lose weight    Plan  Venofer 200 mg x 3 doses.  MD 2 months.  At least 1 day prior: Ferritin, iron panel, CBC, reticulate hemoglobin

## 2024-03-06 ENCOUNTER — CONSULT (OUTPATIENT)
Dept: ONCOLOGY | Facility: CLINIC | Age: 64
End: 2024-03-06
Payer: COMMERCIAL

## 2024-03-06 ENCOUNTER — LAB (OUTPATIENT)
Dept: LAB | Facility: HOSPITAL | Age: 64
End: 2024-03-06
Payer: COMMERCIAL

## 2024-03-06 VITALS
SYSTOLIC BLOOD PRESSURE: 131 MMHG | DIASTOLIC BLOOD PRESSURE: 78 MMHG | OXYGEN SATURATION: 99 % | HEIGHT: 64 IN | RESPIRATION RATE: 16 BRPM | TEMPERATURE: 97.3 F | BODY MASS INDEX: 37.95 KG/M2 | WEIGHT: 222.3 LBS | HEART RATE: 88 BPM

## 2024-03-06 DIAGNOSIS — D50.9 IRON DEFICIENCY ANEMIA, UNSPECIFIED IRON DEFICIENCY ANEMIA TYPE: Primary | ICD-10-CM

## 2024-03-06 DIAGNOSIS — D64.9 ANEMIA, UNSPECIFIED TYPE: Primary | ICD-10-CM

## 2024-03-06 LAB
BASOPHILS # BLD AUTO: 0.11 10*3/MM3 (ref 0–0.2)
BASOPHILS NFR BLD AUTO: 1 % (ref 0–1.5)
DAT POLY-SP REAG RBC QL: NEGATIVE
DEPRECATED RDW RBC AUTO: 60.6 FL (ref 37–54)
EOSINOPHIL # BLD AUTO: 0.54 10*3/MM3 (ref 0–0.4)
EOSINOPHIL NFR BLD AUTO: 4.7 % (ref 0.3–6.2)
ERYTHROCYTE [DISTWIDTH] IN BLOOD BY AUTOMATED COUNT: 19.7 % (ref 12.3–15.4)
FERRITIN SERPL-MCNC: 211 NG/ML (ref 13–150)
HAPTOGLOB SERPL-MCNC: 439 MG/DL (ref 30–200)
HCT VFR BLD AUTO: 30.7 % (ref 34–46.6)
HGB BLD-MCNC: 9.2 G/DL (ref 12–15.9)
HGB RETIC QN AUTO: 32.7 PG (ref 29.8–36.1)
IMM GRANULOCYTES # BLD AUTO: 0.06 10*3/MM3 (ref 0–0.05)
IMM GRANULOCYTES NFR BLD AUTO: 0.5 % (ref 0–0.5)
IMM RETICS NFR: 25.9 % (ref 3–15.8)
IRON 24H UR-MRATE: 28 MCG/DL (ref 37–145)
IRON SATN MFR SERPL: 12 % (ref 20–50)
LDH SERPL-CCNC: 143 U/L (ref 135–214)
LYMPHOCYTES # BLD AUTO: 1.57 10*3/MM3 (ref 0.7–3.1)
LYMPHOCYTES NFR BLD AUTO: 13.7 % (ref 19.6–45.3)
MCH RBC QN AUTO: 26 PG (ref 26.6–33)
MCHC RBC AUTO-ENTMCNC: 30 G/DL (ref 31.5–35.7)
MCV RBC AUTO: 86.7 FL (ref 79–97)
MONOCYTES # BLD AUTO: 0.78 10*3/MM3 (ref 0.1–0.9)
MONOCYTES NFR BLD AUTO: 6.8 % (ref 5–12)
NEUTROPHILS NFR BLD AUTO: 73.3 % (ref 42.7–76)
NEUTROPHILS NFR BLD AUTO: 8.37 10*3/MM3 (ref 1.7–7)
NRBC BLD AUTO-RTO: 0 /100 WBC (ref 0–0.2)
PLATELET # BLD AUTO: 468 10*3/MM3 (ref 140–450)
PMV BLD AUTO: 9.4 FL (ref 6–12)
RBC # BLD AUTO: 3.54 10*6/MM3 (ref 3.77–5.28)
RETICS # AUTO: 0.13 10*6/MM3 (ref 0.02–0.13)
RETICS/RBC NFR AUTO: 3.62 % (ref 0.7–1.9)
TIBC SERPL-MCNC: 238 MCG/DL (ref 298–536)
TRANSFERRIN SERPL-MCNC: 170 MG/DL (ref 200–360)
VIT B12 BLD-MCNC: 734 PG/ML (ref 211–946)
WBC NRBC COR # BLD AUTO: 11.43 10*3/MM3 (ref 3.4–10.8)

## 2024-03-06 PROCEDURE — 86880 COOMBS TEST DIRECT: CPT | Performed by: INTERNAL MEDICINE

## 2024-03-06 PROCEDURE — 83010 ASSAY OF HAPTOGLOBIN QUANT: CPT | Performed by: INTERNAL MEDICINE

## 2024-03-06 PROCEDURE — 83540 ASSAY OF IRON: CPT | Performed by: INTERNAL MEDICINE

## 2024-03-06 PROCEDURE — 82607 VITAMIN B-12: CPT | Performed by: INTERNAL MEDICINE

## 2024-03-06 PROCEDURE — 85025 COMPLETE CBC W/AUTO DIFF WBC: CPT

## 2024-03-06 PROCEDURE — 83615 LACTATE (LD) (LDH) ENZYME: CPT | Performed by: INTERNAL MEDICINE

## 2024-03-06 PROCEDURE — 36415 COLL VENOUS BLD VENIPUNCTURE: CPT | Performed by: INTERNAL MEDICINE

## 2024-03-06 PROCEDURE — 85046 RETICYTE/HGB CONCENTRATE: CPT | Performed by: INTERNAL MEDICINE

## 2024-03-06 PROCEDURE — 82728 ASSAY OF FERRITIN: CPT | Performed by: INTERNAL MEDICINE

## 2024-03-06 PROCEDURE — 99204 OFFICE O/P NEW MOD 45 MIN: CPT | Performed by: INTERNAL MEDICINE

## 2024-03-06 PROCEDURE — 84466 ASSAY OF TRANSFERRIN: CPT | Performed by: INTERNAL MEDICINE

## 2024-03-07 LAB
FOLATE BLD-MCNC: 446 NG/ML
FOLATE RBC-MCNC: 1407 NG/ML
HCT VFR BLD AUTO: 31.7 % (ref 34–46.6)

## 2024-03-08 ENCOUNTER — PATIENT ROUNDING (BHMG ONLY) (OUTPATIENT)
Dept: SURGERY | Facility: CLINIC | Age: 64
End: 2024-03-08
Payer: COMMERCIAL

## 2024-03-08 ENCOUNTER — HOME CARE VISIT (OUTPATIENT)
Dept: HOME HEALTH SERVICES | Facility: HOME HEALTHCARE | Age: 64
End: 2024-03-08
Payer: COMMERCIAL

## 2024-03-08 VITALS
OXYGEN SATURATION: 97 % | RESPIRATION RATE: 18 BRPM | TEMPERATURE: 97.5 F | SYSTOLIC BLOOD PRESSURE: 144 MMHG | WEIGHT: 218.56 LBS | BODY MASS INDEX: 37.52 KG/M2 | HEART RATE: 88 BPM | DIASTOLIC BLOOD PRESSURE: 76 MMHG

## 2024-03-08 NOTE — HOME HEALTH
Routine Visit Note: Labs drawn per Rt AC without complications and taken to [facility]    Skill/education provided: Instructed on Drain care and deep breathing exercises with IS    Patient/caregiver response: Patient and caregiver need reinforcement of instructions given    Plan for next visit: CP assess, med compliance with extensive med reg change, falls prevetnion, RAJESH drain maint, Skin integ.

## 2024-03-12 ENCOUNTER — HOME CARE VISIT (OUTPATIENT)
Dept: HOME HEALTH SERVICES | Facility: HOME HEALTHCARE | Age: 64
End: 2024-03-12
Payer: COMMERCIAL

## 2024-03-12 ENCOUNTER — LAB REQUISITION (OUTPATIENT)
Dept: LAB | Facility: HOSPITAL | Age: 64
End: 2024-03-12
Payer: COMMERCIAL

## 2024-03-12 DIAGNOSIS — N13.5 CROSSING VESSEL AND STRICTURE OF URETER WITHOUT HYDRONEPHROSIS: ICD-10-CM

## 2024-03-12 LAB
ALBUMIN SERPL-MCNC: 3.1 G/DL (ref 3.5–5.2)
ALBUMIN/GLOB SERPL: 0.8 G/DL
ALP SERPL-CCNC: 108 U/L (ref 39–117)
ALT SERPL W P-5'-P-CCNC: 10 U/L (ref 1–33)
ANION GAP SERPL CALCULATED.3IONS-SCNC: 11.8 MMOL/L (ref 5–15)
AST SERPL-CCNC: 21 U/L (ref 1–32)
BILIRUB SERPL-MCNC: 0.5 MG/DL (ref 0–1.2)
BUN SERPL-MCNC: 13 MG/DL (ref 8–23)
BUN/CREAT SERPL: 13 (ref 7–25)
CALCIUM SPEC-SCNC: 9.7 MG/DL (ref 8.6–10.5)
CHLORIDE SERPL-SCNC: 103 MMOL/L (ref 98–107)
CO2 SERPL-SCNC: 26.2 MMOL/L (ref 22–29)
CREAT SERPL-MCNC: 1 MG/DL (ref 0.57–1)
CRP SERPL-MCNC: 6.33 MG/DL (ref 0–0.5)
DEPRECATED RDW RBC AUTO: 56.1 FL (ref 37–54)
EGFRCR SERPLBLD CKD-EPI 2021: 64.2 ML/MIN/1.73
ERYTHROCYTE [DISTWIDTH] IN BLOOD BY AUTOMATED COUNT: 18.2 % (ref 12.3–15.4)
ERYTHROCYTE [SEDIMENTATION RATE] IN BLOOD: 95 MM/HR (ref 0–30)
GLOBULIN UR ELPH-MCNC: 3.9 GM/DL
GLUCOSE SERPL-MCNC: 122 MG/DL (ref 65–99)
HCT VFR BLD AUTO: 29.6 % (ref 34–46.6)
HGB BLD-MCNC: 9.1 G/DL (ref 12–15.9)
MCH RBC QN AUTO: 26 PG (ref 26.6–33)
MCHC RBC AUTO-ENTMCNC: 30.7 G/DL (ref 31.5–35.7)
MCV RBC AUTO: 84.6 FL (ref 79–97)
PLATELET # BLD AUTO: 358 10*3/MM3 (ref 140–450)
PMV BLD AUTO: 9.8 FL (ref 6–12)
POTASSIUM SERPL-SCNC: 3.6 MMOL/L (ref 3.5–5.2)
PROT SERPL-MCNC: 7 G/DL (ref 6–8.5)
RBC # BLD AUTO: 3.5 10*6/MM3 (ref 3.77–5.28)
SODIUM SERPL-SCNC: 141 MMOL/L (ref 136–145)
WBC NRBC COR # BLD AUTO: 9.1 10*3/MM3 (ref 3.4–10.8)

## 2024-03-12 PROCEDURE — 85652 RBC SED RATE AUTOMATED: CPT | Performed by: INTERNAL MEDICINE

## 2024-03-12 PROCEDURE — 86140 C-REACTIVE PROTEIN: CPT | Performed by: INTERNAL MEDICINE

## 2024-03-12 PROCEDURE — G0299 HHS/HOSPICE OF RN EA 15 MIN: HCPCS

## 2024-03-12 PROCEDURE — 80053 COMPREHEN METABOLIC PANEL: CPT | Performed by: INTERNAL MEDICINE

## 2024-03-12 PROCEDURE — 85027 COMPLETE CBC AUTOMATED: CPT | Performed by: INTERNAL MEDICINE

## 2024-03-12 NOTE — PRE-PROCEDURE INSTRUCTIONS
PAT call complete. Education provided to the patient on the following:    - Nothing to eat after midnight the night before your procedure, water and black coffee okay up to 2 hours before arrival time--Instructed pt nothing after 11:00 a.m day of surgery.   - If diabetic and procedure is after noon: No food 8 hours prior to arrival time, and only then only clear liquids 2 hours before arrival time--Instructed pt nothing after 0500 the morning of surgery.  - You will need to have someone drive you home after your procedure and remain with you for 24 hours after. The  will need to remain on site during your visit.  - Please remove all jewelry, including body piercing's, and leave any valuables at home. Only bring your drivers license and insurance card on day of procedure.  - Do not wear contact lenses; wear glasses and bring your case.  - Wash with antibacterial soap (such as Dial) the night before and morning of procedure.  - Be prepared to provide your last dose of all home medications.  - Coffee and vending available on the 1st and 5th floors; no cafeteria on site.  - You will need to arrive at 1300 on 3/15/2024 at [facility] located at [address]. We are located on the 5th floor.  -Please be aware that arrival times may be subject to change up until the day of surgery.   - Feel free to contact us at: [phone] with any additional questions/concerns.    Pt. stated she is currently out of her Trulicity and has not had in 4 weeks. Instructed pt to hold diclofenac and vitamins as of today and to hold or cut her Lantus dose in half the night prior to surgery.

## 2024-03-12 NOTE — DISCHARGE INSTRUCTIONS
[facility]     Home Care after: Cystoscopy, Ureteral stent removal    Follow the guidelines below. Call your doctor if you notice any unusual symptoms. Remember: You are under the influence of medication. Do not drive, drink alcoholic beverages, sign legal documents or make major decisions during the next 24 hours.    Wound Care  You will not have a dressing. There are no incisions.  You may have some red-tinged urine.  This will fluctuate and go away.  It is normal to have some bladder spasms, frequency of urination, and urgency to urinate.  It is okay to use tylenol, ibuprofen, pyridium and/or oxybutynin for her bladder spasms.    Activity  Plan at least a few days off work, more if necessary, especially if your job requires heavy lifting or a lot of physical activities.  If you wish to return to work sooner, that is okay, but light-duty is recommended.  Sexual activity may resume in a few days  No jogging, tennis, heavy weight-lifting or prolonged physical activity for 2 weeks.  No driving while taking narcotic pain medication  You can shower 1 days after your surgery, but DO NOT SOAK in tub baths.  Avoid straining with bowel movements. Narcotics can cause constipation so you can take over-the-counter stool softeners (Senokot-S, Miralax, Colace) per the instructions on the box; hold these pills if you are experiencing diarrhea.       Return to Work/School  You may return to work/school in a few days.  If you need a note, please obtain from office during your follow-up visit    Bathing    You can shower immediately after your procedure.  Tub baths are okay    Diet  Gradually resume regular diet, as tolerated.   Drinking plenty of water is very important.    Driving (if applicable)  No driving for 24 hours after surgery due to the anesthetic.  No driving anytime you are on pain medication.    Educational  The medication used to put you to sleep will be acting in your body for the next 24 hours, so you might feel  a little sleepy. This feeling will slowly wear off. For the first 24 hours, you should NOT:   Drive a car, operate machinery, or power tools.  Drink any alcoholic drinks (even beer).   Make any important decisions, sign any important or legal papers.  For your safety, we strongly suggest that a responsible adult stay with you for the rest of the day and during the night after you leave the hospital.  Medication  You may take your usual medications unless told otherwise by your doctor.  Do not take any anticoagulation (Ibuprofen, Advil, Aspirin, Eliquis, Xarelto, Coumadin, etc) until cleared by your Doctor.  Narcotic pain medications can cause constipation. You may take an over-the-counter stool softener or laxative if needed.   A bowel movement every day is preferred, every other day is reasonable.      Call your Doctor if:  Call to notify your surgeon if you develop:  Fever greater than 101F  Unmanageable pain despite pain medication  Pain medication not effective or makes you ill  Blood staining continues to worsen for more than 24 hours  Difficulty breathing or unusual shortness of breath, excessive bleeding, drainage at the operative site, fevers, chills, increased pain that is not relieved by pain medications, persistent nausea or vomiting    HOW TO REACH YOUR DOCTOR  Monday - Friday from 8:00 - 4:30, call the Urology Office, [phone].  After hours, weekend and holidays call the [phone] or go to Emergency Room    Follow up care:   The Urology clinic will call to schedule your post-op appointment, to occur 1-2 weeks after your operation.  If you do not receive a call within 1 week for scheduling, please call [phone] to make an appointment.

## 2024-03-13 VITALS
DIASTOLIC BLOOD PRESSURE: 82 MMHG | TEMPERATURE: 97.6 F | HEART RATE: 94 BPM | OXYGEN SATURATION: 98 % | RESPIRATION RATE: 18 BRPM | SYSTOLIC BLOOD PRESSURE: 152 MMHG | WEIGHT: 222 LBS | BODY MASS INDEX: 38.09 KG/M2

## 2024-03-14 NOTE — HOME HEALTH
Routine Visit Note: Labs drawn per Rt AC without complications and taken to [facility]    Skill/education provided: Instructed on importance of daily weight checks, monitor weight every morning with the same amount of cloth, before eating/drinking, maintain a log, report any weight gain to MD as indicated above and signs of fluid retention such as edema, sudden cough and SOA. Patient able to teach back.    Patient/caregiver response: Patient and caregiver need reinforcement of instructions given    Plan for next visit: CP assess, med compliance with extensive med reg change, falls prevetnion, RAJESH drain maint, Skin integ.

## 2024-03-15 ENCOUNTER — HOSPITAL ENCOUNTER (OUTPATIENT)
Facility: HOSPITAL | Age: 64
Setting detail: HOSPITAL OUTPATIENT SURGERY
Discharge: HOME | End: 2024-03-15
Attending: UROLOGY | Admitting: UROLOGY
Payer: COMMERCIAL

## 2024-03-15 ENCOUNTER — ANESTHESIA (OUTPATIENT)
Dept: PERIOP | Facility: HOSPITAL | Age: 64
End: 2024-03-15
Payer: COMMERCIAL

## 2024-03-15 ENCOUNTER — HOME CARE VISIT (OUTPATIENT)
Dept: HOME HEALTH SERVICES | Facility: HOME HEALTHCARE | Age: 64
End: 2024-03-15
Payer: COMMERCIAL

## 2024-03-15 ENCOUNTER — APPOINTMENT (OUTPATIENT)
Dept: GENERAL RADIOLOGY | Facility: HOSPITAL | Age: 64
End: 2024-03-15
Payer: COMMERCIAL

## 2024-03-15 ENCOUNTER — ANESTHESIA EVENT (OUTPATIENT)
Dept: PERIOP | Facility: HOSPITAL | Age: 64
End: 2024-03-15
Payer: COMMERCIAL

## 2024-03-15 VITALS
DIASTOLIC BLOOD PRESSURE: 76 MMHG | HEART RATE: 90 BPM | TEMPERATURE: 99.3 F | SYSTOLIC BLOOD PRESSURE: 144 MMHG | HEIGHT: 64 IN | WEIGHT: 222 LBS | BODY MASS INDEX: 37.9 KG/M2 | OXYGEN SATURATION: 96 % | RESPIRATION RATE: 16 BRPM

## 2024-03-15 DIAGNOSIS — R39.0 EXTRAVASATION OF URINE: Primary | ICD-10-CM

## 2024-03-15 LAB
GLUCOSE BLDC GLUCOMTR-MCNC: 112 MG/DL (ref 70–130)
GLUCOSE BLDC GLUCOMTR-MCNC: 123 MG/DL (ref 70–130)

## 2024-03-15 PROCEDURE — 0 IOTHALAMATE 60 % SOLUTION: Performed by: UROLOGY

## 2024-03-15 PROCEDURE — 25010000002 FENTANYL CITRATE (PF) 50 MCG/ML SOLUTION: Performed by: ANESTHESIOLOGY

## 2024-03-15 PROCEDURE — 25010000002 PROPOFOL 10 MG/ML EMULSION: Performed by: NURSE ANESTHETIST, CERTIFIED REGISTERED

## 2024-03-15 PROCEDURE — C1758 CATHETER, URETERAL: HCPCS | Performed by: UROLOGY

## 2024-03-15 PROCEDURE — 82948 REAGENT STRIP/BLOOD GLUCOSE: CPT

## 2024-03-15 PROCEDURE — 25810000003 LACTATED RINGERS PER 1000 ML: Performed by: ANESTHESIOLOGY

## 2024-03-15 PROCEDURE — 74420 UROGRAPHY RTRGR +-KUB: CPT

## 2024-03-15 PROCEDURE — 25010000002 GENTAMICIN PER 80 MG: Performed by: UROLOGY

## 2024-03-15 PROCEDURE — 25010000002 ONDANSETRON PER 1 MG: Performed by: NURSE ANESTHETIST, CERTIFIED REGISTERED

## 2024-03-15 RX ORDER — NALOXONE HCL 0.4 MG/ML
0.2 VIAL (ML) INJECTION AS NEEDED
Status: DISCONTINUED | OUTPATIENT
Start: 2024-03-15 | End: 2024-03-15 | Stop reason: HOSPADM

## 2024-03-15 RX ORDER — HYDROCODONE BITARTRATE AND ACETAMINOPHEN 5; 325 MG/1; MG/1
1 TABLET ORAL EVERY 6 HOURS PRN
Qty: 30 TABLET | Refills: 0 | Status: SHIPPED | OUTPATIENT
Start: 2024-03-15

## 2024-03-15 RX ORDER — SODIUM CHLORIDE 0.9 % (FLUSH) 0.9 %
3-10 SYRINGE (ML) INJECTION AS NEEDED
Status: DISCONTINUED | OUTPATIENT
Start: 2024-03-15 | End: 2024-03-15 | Stop reason: HOSPADM

## 2024-03-15 RX ORDER — SODIUM CHLORIDE 0.9 % (FLUSH) 0.9 %
3 SYRINGE (ML) INJECTION EVERY 12 HOURS SCHEDULED
Status: DISCONTINUED | OUTPATIENT
Start: 2024-03-15 | End: 2024-03-15 | Stop reason: HOSPADM

## 2024-03-15 RX ORDER — CEPHALEXIN 500 MG/1
500 CAPSULE ORAL 3 TIMES DAILY
Qty: 21 CAPSULE | Refills: 0 | Status: SHIPPED | OUTPATIENT
Start: 2024-03-15 | End: 2024-03-22

## 2024-03-15 RX ORDER — ONDANSETRON 2 MG/ML
4 INJECTION INTRAMUSCULAR; INTRAVENOUS ONCE AS NEEDED
Status: DISCONTINUED | OUTPATIENT
Start: 2024-03-15 | End: 2024-03-15 | Stop reason: HOSPADM

## 2024-03-15 RX ORDER — LIDOCAINE HYDROCHLORIDE 10 MG/ML
0.5 INJECTION, SOLUTION INFILTRATION; PERINEURAL ONCE AS NEEDED
Status: DISCONTINUED | OUTPATIENT
Start: 2024-03-15 | End: 2024-03-15 | Stop reason: SDUPTHER

## 2024-03-15 RX ORDER — SODIUM CHLORIDE, SODIUM LACTATE, POTASSIUM CHLORIDE, CALCIUM CHLORIDE 600; 310; 30; 20 MG/100ML; MG/100ML; MG/100ML; MG/100ML
9 INJECTION, SOLUTION INTRAVENOUS CONTINUOUS
Status: DISCONTINUED | OUTPATIENT
Start: 2024-03-15 | End: 2024-03-15 | Stop reason: HOSPADM

## 2024-03-15 RX ORDER — DIPHENHYDRAMINE HYDROCHLORIDE 50 MG/ML
12.5 INJECTION INTRAMUSCULAR; INTRAVENOUS
Status: DISCONTINUED | OUTPATIENT
Start: 2024-03-15 | End: 2024-03-15 | Stop reason: HOSPADM

## 2024-03-15 RX ORDER — FENTANYL CITRATE 50 UG/ML
50 INJECTION, SOLUTION INTRAMUSCULAR; INTRAVENOUS ONCE AS NEEDED
Status: COMPLETED | OUTPATIENT
Start: 2024-03-15 | End: 2024-03-15

## 2024-03-15 RX ORDER — HYDRALAZINE HYDROCHLORIDE 20 MG/ML
5 INJECTION INTRAMUSCULAR; INTRAVENOUS
Status: DISCONTINUED | OUTPATIENT
Start: 2024-03-15 | End: 2024-03-15 | Stop reason: HOSPADM

## 2024-03-15 RX ORDER — ONDANSETRON 2 MG/ML
INJECTION INTRAMUSCULAR; INTRAVENOUS AS NEEDED
Status: DISCONTINUED | OUTPATIENT
Start: 2024-03-15 | End: 2024-03-15 | Stop reason: SURG

## 2024-03-15 RX ORDER — FENTANYL CITRATE 50 UG/ML
50 INJECTION, SOLUTION INTRAMUSCULAR; INTRAVENOUS
Status: DISCONTINUED | OUTPATIENT
Start: 2024-03-15 | End: 2024-03-15 | Stop reason: HOSPADM

## 2024-03-15 RX ORDER — MAGNESIUM HYDROXIDE 1200 MG/15ML
LIQUID ORAL AS NEEDED
Status: DISCONTINUED | OUTPATIENT
Start: 2024-03-15 | End: 2024-03-15 | Stop reason: HOSPADM

## 2024-03-15 RX ORDER — DROPERIDOL 2.5 MG/ML
0.62 INJECTION, SOLUTION INTRAMUSCULAR; INTRAVENOUS
Status: DISCONTINUED | OUTPATIENT
Start: 2024-03-15 | End: 2024-03-15 | Stop reason: HOSPADM

## 2024-03-15 RX ORDER — FAMOTIDINE 10 MG/ML
20 INJECTION, SOLUTION INTRAVENOUS ONCE
Status: COMPLETED | OUTPATIENT
Start: 2024-03-15 | End: 2024-03-15

## 2024-03-15 RX ORDER — PROPOFOL 10 MG/ML
VIAL (ML) INTRAVENOUS AS NEEDED
Status: DISCONTINUED | OUTPATIENT
Start: 2024-03-15 | End: 2024-03-15 | Stop reason: SURG

## 2024-03-15 RX ORDER — PROMETHAZINE HYDROCHLORIDE 25 MG/1
25 SUPPOSITORY RECTAL ONCE AS NEEDED
Status: DISCONTINUED | OUTPATIENT
Start: 2024-03-15 | End: 2024-03-15 | Stop reason: HOSPADM

## 2024-03-15 RX ORDER — FLUMAZENIL 0.1 MG/ML
0.2 INJECTION INTRAVENOUS AS NEEDED
Status: DISCONTINUED | OUTPATIENT
Start: 2024-03-15 | End: 2024-03-15 | Stop reason: HOSPADM

## 2024-03-15 RX ORDER — OXYCODONE AND ACETAMINOPHEN 7.5; 325 MG/1; MG/1
1 TABLET ORAL EVERY 4 HOURS PRN
Status: DISCONTINUED | OUTPATIENT
Start: 2024-03-15 | End: 2024-03-15 | Stop reason: HOSPADM

## 2024-03-15 RX ORDER — PROMETHAZINE HYDROCHLORIDE 25 MG/1
25 TABLET ORAL ONCE AS NEEDED
Status: DISCONTINUED | OUTPATIENT
Start: 2024-03-15 | End: 2024-03-15 | Stop reason: HOSPADM

## 2024-03-15 RX ORDER — IPRATROPIUM BROMIDE AND ALBUTEROL SULFATE 2.5; .5 MG/3ML; MG/3ML
3 SOLUTION RESPIRATORY (INHALATION) ONCE AS NEEDED
Status: DISCONTINUED | OUTPATIENT
Start: 2024-03-15 | End: 2024-03-15 | Stop reason: HOSPADM

## 2024-03-15 RX ORDER — ACETAMINOPHEN 500 MG
1000 TABLET ORAL ONCE
Status: COMPLETED | OUTPATIENT
Start: 2024-03-15 | End: 2024-03-15

## 2024-03-15 RX ORDER — LIDOCAINE HYDROCHLORIDE 20 MG/ML
INJECTION, SOLUTION INFILTRATION; PERINEURAL AS NEEDED
Status: DISCONTINUED | OUTPATIENT
Start: 2024-03-15 | End: 2024-03-15 | Stop reason: SURG

## 2024-03-15 RX ORDER — HYDROCODONE BITARTRATE AND ACETAMINOPHEN 5; 325 MG/1; MG/1
1 TABLET ORAL ONCE AS NEEDED
Status: COMPLETED | OUTPATIENT
Start: 2024-03-15 | End: 2024-03-15

## 2024-03-15 RX ORDER — OXYCODONE HYDROCHLORIDE AND ACETAMINOPHEN 5; 325 MG/1; MG/1
1 TABLET ORAL ONCE AS NEEDED
Status: DISCONTINUED | OUTPATIENT
Start: 2024-03-15 | End: 2024-03-15 | Stop reason: HOSPADM

## 2024-03-15 RX ORDER — LIDOCAINE HYDROCHLORIDE 10 MG/ML
0.5 INJECTION, SOLUTION INFILTRATION; PERINEURAL ONCE AS NEEDED
Status: DISCONTINUED | OUTPATIENT
Start: 2024-03-15 | End: 2024-03-15 | Stop reason: HOSPADM

## 2024-03-15 RX ORDER — LABETALOL HYDROCHLORIDE 5 MG/ML
5 INJECTION, SOLUTION INTRAVENOUS
Status: DISCONTINUED | OUTPATIENT
Start: 2024-03-15 | End: 2024-03-15 | Stop reason: HOSPADM

## 2024-03-15 RX ORDER — EPHEDRINE SULFATE 50 MG/ML
5 INJECTION, SOLUTION INTRAVENOUS ONCE AS NEEDED
Status: DISCONTINUED | OUTPATIENT
Start: 2024-03-15 | End: 2024-03-15 | Stop reason: HOSPADM

## 2024-03-15 RX ORDER — HYDROMORPHONE HYDROCHLORIDE 1 MG/ML
0.5 INJECTION, SOLUTION INTRAMUSCULAR; INTRAVENOUS; SUBCUTANEOUS
Status: DISCONTINUED | OUTPATIENT
Start: 2024-03-15 | End: 2024-03-15 | Stop reason: HOSPADM

## 2024-03-15 RX ADMIN — FAMOTIDINE 20 MG: 10 INJECTION INTRAVENOUS at 15:21

## 2024-03-15 RX ADMIN — LIDOCAINE HYDROCHLORIDE 100 MG: 20 INJECTION, SOLUTION INFILTRATION; PERINEURAL at 15:36

## 2024-03-15 RX ADMIN — FENTANYL CITRATE 50 MCG: 50 INJECTION, SOLUTION INTRAMUSCULAR; INTRAVENOUS at 15:40

## 2024-03-15 RX ADMIN — ACETAMINOPHEN 1000 MG: 500 TABLET ORAL at 15:21

## 2024-03-15 RX ADMIN — GENTAMICIN SULFATE 370 MG: 40 INJECTION, SOLUTION INTRAMUSCULAR; INTRAVENOUS at 15:22

## 2024-03-15 RX ADMIN — SODIUM CHLORIDE, POTASSIUM CHLORIDE, SODIUM LACTATE AND CALCIUM CHLORIDE 9 ML/HR: 600; 310; 30; 20 INJECTION, SOLUTION INTRAVENOUS at 15:21

## 2024-03-15 RX ADMIN — HYDROCODONE BITARTRATE AND ACETAMINOPHEN 1 TABLET: 5; 325 TABLET ORAL at 16:54

## 2024-03-15 RX ADMIN — FENTANYL CITRATE 50 MCG: 50 INJECTION, SOLUTION INTRAMUSCULAR; INTRAVENOUS at 16:02

## 2024-03-15 RX ADMIN — PROPOFOL 200 MG: 10 INJECTION, EMULSION INTRAVENOUS at 15:36

## 2024-03-15 RX ADMIN — ONDANSETRON 4 MG: 2 INJECTION INTRAMUSCULAR; INTRAVENOUS at 15:37

## 2024-03-15 NOTE — OP NOTE
URETEROSCOPY LASER LITHOTRIPSY WITH STENT INSERTION  Procedure Note    Tiana Hudson  3/15/2024    Pre-op Diagnosis:   Bilateral hydronephrosis    Post-op Diagnosis:     Post-Op Diagnosis Codes:     * Hydronephrosis due to obstruction of ureter [N13.1]    Procedure(s):  CYSTOSCOPY BILATERAL RETROGRADES POSSBLE URETEROSCOPY AND STENT REMOVAL    Surgeon(s):  Marvin Martinez MD    Anesthesia: General    Staff:   Circulator: Miriam Rooney RN  Scrub Person: Steven Peck    Estimated Blood Loss: none    Specimens:                * No orders in the log *      Drains:   [REMOVED] Closed/Suction Drain Lateral RLQ Pigtail 10 Fr. (Removed)       [REMOVED] Closed/Suction Drain 1 Right RLQ Pigtail 10 Fr. (Removed)   Dressing Status Clean;Dry;Intact 03/15/24 1428   Drainage Appearance Cloudy;Straw;Yellow 03/15/24 1428   Status To bulb suction 03/15/24 1428       Findings: Resolved hydronephrosis.  No evidence of any urinary leak.  Small stone collection found in the interpolar region of the right kidney broken up with laser.  No stent replaced on either side.    Complications: None apparent    Indications: 63-year-old female recently admitted with obstructing pyelonephritis and urinary sepsis had bilateral stents placed she is had urinary extravasation with development of a urinoma.  Her drain has been replaced and is now draining very little output with just a serous fluid volume less than 100 cc/week.  She now presents for cystoscopy bilateral retrogrades possible ureteroscopy possible right stent replacement.  I suspect her left stent should come out easily.    Procedure: Patient was taken the operative suite after informed sent was obtained.  Placed in couple supine position and given general anesthesia.  Placed in lithotomy.  Prepped and draped in a sterile fashion.  Surgical timeout was performed.  Cystoscope was inserted.  The bladder was thoroughly visualized.  Bilateral stents were seen and removed.   A left retrograde pyelogram was formed with full-strength contrast.  The ureter was normal the pelvis was normal slightly dilated and what look like a semblance of an extrarenal pelvis.  The calyces were sharp.  The right retrograde pyelogram showed similar findings with a extrarenal pelvis and suggesting that she might have a mild UPJ obstruction but no filling defects seen and there is certainly no evidence of any extravasation and I did a pretty aggressive fill of her right collecting system.  Guidewire was passed up the left side.  Flexible ureteroscope was passed up in the left collecting system I visualized the ureter all 3 segments were normal.  The pelvis was normal the calyces were normal.  No stones were seen.  No urothelial tumors were seen.  The ureteroscope was removed.  No stent was placed in the left side.  The right side was similarly innervated and I was actually able to get the flexible ureteroscope on its own without a wire and I visualized the entire right ureter all 3 segments were normal with no perforation no mucosal disruption and no suggestive of a fistula.  The pelvis had a little bit of a higher insertion and I was able to easily enter this and thoroughly visualized the pelvis and all the calyces and infundibulum.  She had a small collection of stones in the interpolar region which I broke up with the 240 µm holmium laser fiber.  These were broken up well she had a few small clots up there but I felt pretty comfortable that we did not have to replace her stent so I irrigated the pelvis 1 more time sure there is no bleeding withdrew the ureteroscope.  I washed out the bladder and she was placed back into the supine position.  Her extraperitoneal drain was now removed intact.  She was awoken and taken recovery in stable condition.      Marvin Martinez MD     Date: 3/15/2024  Time: 16:17 EDT

## 2024-03-15 NOTE — ANESTHESIA PROCEDURE NOTES
Airway  Urgency: elective    Date/Time: 3/15/2024 3:38 PM    General Information and Staff    Patient location during procedure: OR  CRNA/CAA: Zay Hamilton CRNA    Indications and Patient Condition  Indications for airway management: airway protection    Preoxygenated: yes  MILS not maintained throughout  Mask difficulty assessment: 0 - not attempted    Final Airway Details  Final airway type: supraglottic airway      Successful airway: LMA  Size 4     Number of attempts at approach: 1  Assessment: lips, teeth, and gum same as pre-op

## 2024-03-15 NOTE — ANESTHESIA PREPROCEDURE EVALUATION
Anesthesia Evaluation     history of anesthetic complications:  PONV               Airway   Mallampati: II  TM distance: >3 FB  Neck ROM: full  No difficulty expected  Dental    (+) poor dentition    Pulmonary    (+) ,shortness of breath  (-) rhonchi, decreased breath sounds, wheezes  Cardiovascular     Rhythm: regular  Rate: normal    (+) hypertension, hyperlipidemia  (-) murmur    ROS comment: 1/2024  ·  Left ventricular ejection fraction appears to be 56 - 60%.  ·  Left ventricular diastolic function is consistent with (grade I) impaired relaxation.  ·  Estimated right ventricular systolic pressure from tricuspid regurgitation is normal (<35 mmHg)      Neuro/Psych  (+) psychiatric history Anxiety  GI/Hepatic/Renal/Endo    (+) morbid obesity, GERD, liver disease, renal disease- stones, diabetes mellitus (last A1C 17.7) type 2 poorly controlled using insulin, thyroid problem hypothyroidism    Musculoskeletal     (+) back pain  Abdominal     Abdomen: soft.   Substance History      OB/GYN          Other   arthritis,                 Anesthesia Plan    ASA 3     general     intravenous induction     Anesthetic plan, risks, benefits, and alternatives have been provided, discussed and informed consent has been obtained with: patient.    CODE STATUS:

## 2024-03-15 NOTE — H&P
[facility] Surgical History and Physical    Patient Care Team:  Dean Fairchild MD as PCP - General (Internal Medicine)  Ross Cruz MD as Consulting Physician (Orthopedic Surgery)  Dean Fairchild MD as Referring Physician (Internal Medicine)  Felix Limon II, MD as Consulting Physician (Hematology and Oncology)    Chief complaint flank pain    Subjective     Patient is a 63 y.o. female presents with flank pain and recent admission for obstructing pyelonephritis.  She developed a ureteral extravasation possible development with a.  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  She had every drain removed and then replaced after it reaccumulated and now has been doing well.  A follow-up imaging shows complete resolvent of her urinoma and her drain output has been about 100 cc the last week.  She still has her bilateral ureteral stents.  She will undergo stent removal retrogrades possible ureteroscopy.  She is aware that she may need to have her right stent replaced.    Review of Systems   Pertinent items are noted in HPI    Past Medical History:   Diagnosis Date    JADEN (acute kidney injury) 01/2024    Anemia     intermittently    Anxiety and depression     Arthritis     Cellulitis of abdominal wall 01/18/2024    in [place]    Depression     Diabetes mellitus     DKA (diabetic ketoacidosis) 01/2024    Gallstones     GERD (gastroesophageal reflux disease)     High cholesterol     History of frequent urinary tract infections     HX OF YEAST IN BLADDER HAS PRN DIFLUCAN    History of kidney stones     History of transfusion 05/24/2017    Had 2 iron infusions.  This was when i had knee replacement    Hyperlipidemia     Hypertension     Hypothyroidism     Knee pain, bilateral     Low back pain     Lumbar stenosis     Pleural effusion on right 01/2024    Incidentally seen on imaging in January 2024 during a hospital admission for right retroperitoneal abscess following ureteral stent placement. Plan is  to have a CT on 3/16/24 and may possibily get an US Thoracentesis per pt.    PONV (postoperative nausea and vomiting)     Positive TB test     chest x-ray neg    Retroperitoneal abscess 2024    due to right calyceal rupture    Seasonal allergies     Sepsis 2024    associated with bilateral UPJ obstruction and moderate bilateral hydronephrosis and possible right calyceal rupture--hospitalized for 2 weeks.    SOB (shortness of breath) on exertion     Tattoos     UPJ (ureteropelvic junction) obstruction 2024    Varicose vein of leg      Past Surgical History:   Procedure Laterality Date    APPENDECTOMY N/A     Dr. Wilson    CHOLECYSTECTOMY WITH INTRAOPERATIVE CHOLANGIOGRAM N/A 10/31/2018    Procedure: laparoscopic cholecystectomy;  Surgeon: Mary Kay Mac MD;  Location: [facility] OR;  Service: General    COLONOSCOPY      CYSTOSCOPY BLADDER BIOPSY N/A 10/03/2016    Procedure: CYSTOSCOPY BLADDER BIOPSY;  Surgeon: Rei Calvert MD;  Location: [facility] OR;  Service:     CYSTOSCOPY W/ URETERAL STENT PLACEMENT Bilateral 2024    Procedure: CYSTOSCOPY BILATERAL RETROGRADE PYELOGRAM  BILATERAL URETERAL STENT INSERTION AND CATHETHER PLACEMENT;  Surgeon: Eduard Armando MD;  Location: [facility] OR;  Service: Urology;  Laterality: Bilateral;    DILATATION AND CURETTAGE N/A     ENDOSCOPY      EXPLORATORY LAPAROTOMY Left     S&O    INTRAUTERINE DEVICE INSERTION      KNEE ARTHROSCOPY W/ MENISCAL REPAIR Left     OVARIAN CYST REMOVAL Left     Dr. Wilson    TOTAL KNEE ARTHROPLASTY Right 2017    Procedure: RIGHT TOTAL KNEE ARTHROPLASTY WITH ALETA NAVIGATION;  Surgeon: Ross Cruz MD;  Location: [facility] OR;  Service:     WISDOM TOOTH EXTRACTION       Family History   Problem Relation Age of Onset    Hypertension Mother     Hepatitis Mother     Breast cancer Mother     Heart disease Mother     Cancer Mother     Hyperlipidemia Mother              Anemia Mother      Heart failure Father     Stroke Father     Hypertension Father         Since he was 72, now 86s    Diabetes Father     Heart disease Father     Hyperlipidemia Father         Unsurs    Hypertension Brother     Diabetes Maternal Aunt     Diabetes Paternal Uncle     Heart disease Maternal Grandmother     Cancer Maternal Grandfather     COPD Maternal Grandfather     Arthritis Paternal Grandmother     Malig Hyperthermia Neg Hx      Social History     Tobacco Use    Smoking status: Never    Smokeless tobacco: Never    Tobacco comments:     Never smoked   Vaping Use    Vaping status: Never Used   Substance Use Topics    Alcohol use: Never     Alcohol/week: 3.0 standard drinks of alcohol     Types: 1 Glasses of wine, 2 Standard drinks or equivalent per week    Drug use: Never       Meds:  Medications Prior to Admission   Medication Sig Dispense Refill Last Dose    acetaminophen (TYLENOL) 325 MG tablet Take 2 tablets by mouth Every 4 (Four) Hours As Needed for Fever (fever greater than 100.4 °F or headache).   3/15/2024 at 0500    Alcohol Swabs (Alcohol Pads) 70 % pads Apply one alcohol swab to injection site of skin immediately prior to insulin injection. 100 each 12 3/15/2024    Blood Glucose Monitoring Suppl (Blood Glucose Monitor System) w/Device kit Use to test blood glucose up to four times daily as needed 1 each 0 3/15/2024    Docusate Calcium (STOOL SOFTENER PO) Take 1 tablet by mouth Every Night.   3/14/2024 at 1930    escitalopram (LEXAPRO) 10 MG tablet Take 1 tablet by mouth Daily. Indications: Major Depressive Disorder   3/14/2024 at 1930    glucose blood test strip Use to test blood glucose up to four times daily as needed. 100 each 0 3/15/2024    insulin aspart (NovoLOG FlexPen) 100 UNIT/ML solution pen-injector sc pen Inject under the skin per sliding scale 4 times daily with meals and at bedtime. Max total daily dose of 36 units. -199 mg/dL - 2 units, -249 mg/dL - 4 units -299 mg/dL - 6  units -349 mg/dL - 7 units -400 mg/dL - 8 units BG > than 400 mg/dL - 9 units & Call Provider (Patient taking differently: Inject  under the skin into the appropriate area as directed Take As Directed. Inject under the skin per sliding scale 4 times daily with meals and at bedtime. Max total daily dose of 36 units. -199 mg/dL - 2 units, -249 mg/dL - 4 units -299 mg/dL - 6 units -349 mg/dL - 7 units -400 mg/dL - 8 units BG > than 400 mg/dL - 9 units & Call Provider  Indications: Type 2 Diabetes) 15 mL 0 3/14/2024 at 0900    Insulin Glargine (Lantus SoloStar) 100 UNIT/ML injection pen Inject 20 Units under the skin into the appropriate area as directed 2 (Two) Times a Day. 15 mL 5 3/14/2024 at 2100    Insulin Pen Needle (Pen Needles) 32G X 4 MM misc Inject 1 each under the skin into the appropriate area as directed 4 (Four) Times a Day As Needed (for insulin injections). 100 each 0 3/15/2024    Lancets misc Use to test blood glucose up to four times daily as needed. 100 each 0 3/15/2024    levothyroxine (SYNTHROID, LEVOTHROID) 150 MCG tablet Take 1 tablet by mouth Every Morning. 30 tablet 3 3/15/2024 at 0800    loratadine (CLARITIN) 10 MG tablet Take 1 tablet by mouth As Needed for Allergies. Indications: Hayfever   Past Week    metFORMIN (GLUCOPHAGE) 500 MG tablet Take 1 tablet by mouth 2 (Two) Times a Day With Meals. 360 tablet 0 3/14/2024 at 1930    naloxone (NARCAN) 4 MG/0.1ML nasal spray Call 911. Don't prime. [place]win in 1 nostril for overdose. Repeat in 2-3 minutes in other nostril if no or minimal breathing/responsiveness. 2 each 0 never needed    Nutritional Supplements (Glucose Management) tablet Use as needed for emergently low blood glucose levels. Formulary Compliance Approval 30 tablet 0 never used    ondansetron ODT (ZOFRAN-ODT) 4 MG disintegrating tablet Place 1 tablet on the tongue Every 6 (Six) Hours As Needed for Nausea or Vomiting. 40 tablet 1 3/14/2024 at  1200    oxybutynin XL (DITROPAN-XL) 10 MG 24 hr tablet Take 1 tablet by mouth Daily. Indications: Urinary Incontinence   3/14/2024 at 0900    pantoprazole (PROTONIX) 40 MG EC tablet Take 1 tablet by mouth Every Morning 30 tablet 5 3/15/2024 at 0800    potassium chloride (K-DUR,KLOR-CON) 20 MEQ CR tablet Take 1 tablet by mouth As Needed (only when takin torsemide). Indications: Low Amount of Potassium in the Blood   Past Month    rOPINIRole (REQUIP) 0.5 MG tablet Take 1 tablet by mouth every night at bedtime. 30 tablet 0 3/14/2024 at 1930    tiZANidine (ZANAFLEX) 4 MG tablet Take 1 tablet by mouth At Night As Needed for Muscle Spasms. Indications: Musculoskeletal Pain   3/14/2024 at 1930    torsemide (DEMADEX) 20 MG tablet Take 2 tablets by mouth Daily. (Patient taking differently: Take 2 tablets by mouth As Needed. Indications: Cardiac Failure) 60 tablet 0 Past Month    VITAMIN D PO Take 1 dose by mouth 1 (One) Time Per Week. Indications: Vitamin and/or Mineral Deficiency   Past Month    diclofenac (VOLTAREN) 50 MG EC tablet Take 1 tablet by mouth 2 (Two) Times a Day As Needed (pain). 60 tablet 2 3/11/2024    Dulaglutide 1.5 MG/0.5ML solution pen-injector Inject 1.5 mg under the skin into the appropriate area as directed 1 (One) Time Per Week on Sunday (Patient taking differently: Inject 1.5 mg under the skin into the appropriate area as directed 1 (One) Time Per Week. Pt stated she ran out and waiting for it to be refilled.   Indications: Type 2 Diabetes) 2 mL 10 More than a month    ferrous sulfate 325 (65 FE) MG tablet Take 1 tablet by mouth Daily With Breakfast. Indications: Anemia From Inadequate Iron in the Body   3/11/2024    multivitamin (THERAGRAN) tablet tablet Take 1 tablet by mouth Every Night. Indications: Vitamin Deficiency   3/11/2024       Allergies:  Sulfa antibiotics    Debilities:  None    Objective     Vital Signs  Temp:  [99.3 °F (37.4 °C)] 99.3 °F (37.4 °C)  Heart Rate:  [102-116] 102  Resp:   "[18] 18  BP: (134)/(90) 134/90  No intake or output data in the 24 hours ending 03/15/24 1525  Flowsheet Rows      Flowsheet Row First Filed Value   Admission Height 162.6 cm (64\") Documented at 03/12/2024 1120   Admission Weight 101 kg (222 lb) Documented at 03/12/2024 1120             Physical Exam:     General Appearance:     Alert, cooperative, NAD   HEENT:     No trauma, pupils reactive, hearing intact   Back:      No CVA tenderness   Lungs:      Respirations unlabored, no wheezing    Heart:     RRR, intact peripheral pulses   Abdomen:      Soft, NDNT, no masses, no guarding   :     Normal external genitalia   Extremities:    No edema, no deformity   Lymphatic:    No neck or groin LAD   Skin:    No bleeding, bruising or rashes   Neuro/Psych:    Orientation intact, mood/affect pleasant, no focal findings     Results Review:     Results for orders placed or performed during the hospital encounter of 03/15/24   POC Glucose Once    Specimen: Blood   Result Value Ref Range    Glucose 123 70 - 130 mg/dL       I reviewed the patient's prior history and old records to the best of my ability.   I have personally reviewed all pertinent imaging myself and their accompanying reports.   I have reviewed all labs.     Assessment:  Bilateral ureteral obstruction with indwelling stents and right extraperitoneal urinoma drained    Plan:    Cystoscopy with bilateral stent removal.  Retrograde studies possible right ureteroscopy possible stent replacement.    I discussed the patient's findings and my recommendations with patient.   Risks, complications, outcomes and alternatives discussed with the patient at the bedside and office.    Marvin Martinez MD  03/15/24  15:25 EDT          "

## 2024-03-15 NOTE — ANESTHESIA POSTPROCEDURE EVALUATION
"Patient: Tiana Hudson    Procedure Summary       Date: 03/15/24 Room / Location: [facility] OR 01 /  [facility]    Anesthesia Start: 1529 Anesthesia Stop: 1622    Procedure: CYSTOSCOPY BILATERAL RETROGRADES POSSBLE URETEROSCOPY AND STENT REMOVAL (Bilateral) Diagnosis:       Hydronephrosis due to obstruction of ureter      (Bilateral hydronephrosis)    Surgeons: Marvin Martinez MD Provider: Marvin Nicole MD    Anesthesia Type: general ASA Status: 3            Anesthesia Type: general    Vitals  Vitals Value Taken Time   /84 03/15/24 1630   Temp 37.4 °C (99.3 °F) 03/15/24 1617   Pulse 93 03/15/24 1638   Resp 16 03/15/24 1635   SpO2 96 % 03/15/24 1635   Vitals shown include unfiled device data.        Post Anesthesia Care and Evaluation    Patient location during evaluation: PACU  Patient participation: complete - patient participated  Level of consciousness: awake and alert  Pain management: adequate    Airway patency: patent  Anesthetic complications: No anesthetic complications  PONV Status: controlled  Cardiovascular status: acceptable and hemodynamically stable  Respiratory status: acceptable  Hydration status: acceptable    Comments: /75 (BP Location: Left arm)   Pulse 91   Temp 37.4 °C (99.3 °F) (Oral)   Resp 16   Ht 162.6 cm (64\")   Wt 101 kg (222 lb)   LMP 09/03/2016 Comment: IUD  SpO2 94%   BMI 38.11 kg/m²     "

## 2024-03-16 ENCOUNTER — HOSPITAL ENCOUNTER (OUTPATIENT)
Facility: HOSPITAL | Age: 64
Discharge: HOME | End: 2024-03-16
Payer: COMMERCIAL

## 2024-03-16 DIAGNOSIS — J90 PLEURAL EFFUSION, RIGHT: ICD-10-CM

## 2024-03-16 PROCEDURE — 71250 CT THORAX DX C-: CPT

## 2024-03-19 ENCOUNTER — HOME CARE VISIT (OUTPATIENT)
Dept: HOME HEALTH SERVICES | Facility: HOME HEALTHCARE | Age: 64
End: 2024-03-19
Payer: COMMERCIAL

## 2024-03-19 ENCOUNTER — LAB REQUISITION (OUTPATIENT)
Dept: LAB | Facility: HOSPITAL | Age: 64
End: 2024-03-19
Payer: COMMERCIAL

## 2024-03-19 DIAGNOSIS — N13.5 CROSSING VESSEL AND STRICTURE OF URETER WITHOUT HYDRONEPHROSIS: ICD-10-CM

## 2024-03-19 LAB
ALBUMIN SERPL-MCNC: 3.1 G/DL (ref 3.5–5.2)
ALBUMIN/GLOB SERPL: 0.8 G/DL
ALP SERPL-CCNC: 111 U/L (ref 39–117)
ALT SERPL W P-5'-P-CCNC: 7 U/L (ref 1–33)
ANION GAP SERPL CALCULATED.3IONS-SCNC: 12.2 MMOL/L (ref 5–15)
AST SERPL-CCNC: 16 U/L (ref 1–32)
BILIRUB SERPL-MCNC: <0.2 MG/DL (ref 0–1.2)
BUN SERPL-MCNC: 15 MG/DL (ref 8–23)
BUN/CREAT SERPL: 11.7 (ref 7–25)
CALCIUM SPEC-SCNC: 9.2 MG/DL (ref 8.6–10.5)
CHLORIDE SERPL-SCNC: 101 MMOL/L (ref 98–107)
CO2 SERPL-SCNC: 27.8 MMOL/L (ref 22–29)
CREAT SERPL-MCNC: 1.28 MG/DL (ref 0.57–1)
CRP SERPL-MCNC: 6.94 MG/DL (ref 0–0.5)
DEPRECATED RDW RBC AUTO: 53.3 FL (ref 37–54)
EGFRCR SERPLBLD CKD-EPI 2021: 47.8 ML/MIN/1.73
ERYTHROCYTE [DISTWIDTH] IN BLOOD BY AUTOMATED COUNT: 17.7 % (ref 12.3–15.4)
ERYTHROCYTE [SEDIMENTATION RATE] IN BLOOD: 54 MM/HR (ref 0–30)
GLOBULIN UR ELPH-MCNC: 4.1 GM/DL
GLUCOSE SERPL-MCNC: 140 MG/DL (ref 65–99)
HCT VFR BLD AUTO: 28 % (ref 34–46.6)
HGB BLD-MCNC: 8.4 G/DL (ref 12–15.9)
MCH RBC QN AUTO: 24.9 PG (ref 26.6–33)
MCHC RBC AUTO-ENTMCNC: 30 G/DL (ref 31.5–35.7)
MCV RBC AUTO: 82.8 FL (ref 79–97)
PLATELET # BLD AUTO: 385 10*3/MM3 (ref 140–450)
PMV BLD AUTO: 9.8 FL (ref 6–12)
POTASSIUM SERPL-SCNC: 3.9 MMOL/L (ref 3.5–5.2)
PROT SERPL-MCNC: 7.2 G/DL (ref 6–8.5)
RBC # BLD AUTO: 3.38 10*6/MM3 (ref 3.77–5.28)
SODIUM SERPL-SCNC: 141 MMOL/L (ref 136–145)
WBC NRBC COR # BLD AUTO: 6.16 10*3/MM3 (ref 3.4–10.8)

## 2024-03-19 PROCEDURE — G0299 HHS/HOSPICE OF RN EA 15 MIN: HCPCS

## 2024-03-19 PROCEDURE — 80053 COMPREHEN METABOLIC PANEL: CPT | Performed by: INTERNAL MEDICINE

## 2024-03-19 PROCEDURE — 85652 RBC SED RATE AUTOMATED: CPT | Performed by: INTERNAL MEDICINE

## 2024-03-19 PROCEDURE — 86140 C-REACTIVE PROTEIN: CPT | Performed by: INTERNAL MEDICINE

## 2024-03-19 PROCEDURE — 85027 COMPLETE CBC AUTOMATED: CPT | Performed by: INTERNAL MEDICINE

## 2024-03-20 ENCOUNTER — INFUSION (OUTPATIENT)
Dept: ONCOLOGY | Facility: HOSPITAL | Age: 64
End: 2024-03-20
Payer: COMMERCIAL

## 2024-03-20 VITALS
SYSTOLIC BLOOD PRESSURE: 144 MMHG | RESPIRATION RATE: 18 BRPM | DIASTOLIC BLOOD PRESSURE: 68 MMHG | WEIGHT: 219 LBS | HEART RATE: 84 BPM | OXYGEN SATURATION: 98 % | TEMPERATURE: 97.2 F | BODY MASS INDEX: 37.59 KG/M2

## 2024-03-20 VITALS
BODY MASS INDEX: 38.42 KG/M2 | SYSTOLIC BLOOD PRESSURE: 129 MMHG | OXYGEN SATURATION: 95 % | RESPIRATION RATE: 16 BRPM | DIASTOLIC BLOOD PRESSURE: 77 MMHG | TEMPERATURE: 98 F | WEIGHT: 223.8 LBS | HEART RATE: 85 BPM

## 2024-03-20 DIAGNOSIS — D50.9 IRON DEFICIENCY ANEMIA, UNSPECIFIED IRON DEFICIENCY ANEMIA TYPE: Primary | ICD-10-CM

## 2024-03-20 PROCEDURE — 25810000003 SODIUM CHLORIDE 0.9 % SOLUTION: Performed by: NURSE PRACTITIONER

## 2024-03-20 PROCEDURE — 63710000001 DIPHENHYDRAMINE PER 50 MG: Performed by: NURSE PRACTITIONER

## 2024-03-20 PROCEDURE — 25010000002 IRON SUCROSE PER 1 MG: Performed by: NURSE PRACTITIONER

## 2024-03-20 PROCEDURE — 96374 THER/PROPH/DIAG INJ IV PUSH: CPT

## 2024-03-20 RX ORDER — DIPHENHYDRAMINE HCL 25 MG
25 CAPSULE ORAL ONCE
Status: COMPLETED | OUTPATIENT
Start: 2024-03-20 | End: 2024-03-20

## 2024-03-20 RX ORDER — ACETAMINOPHEN 325 MG/1
650 TABLET ORAL ONCE
Status: COMPLETED | OUTPATIENT
Start: 2024-03-20 | End: 2024-03-20

## 2024-03-20 RX ORDER — SODIUM CHLORIDE 9 MG/ML
20 INJECTION, SOLUTION INTRAVENOUS ONCE
Status: COMPLETED | OUTPATIENT
Start: 2024-03-20 | End: 2024-03-20

## 2024-03-20 RX ADMIN — IRON SUCROSE 200 MG: 20 INJECTION, SOLUTION INTRAVENOUS at 14:44

## 2024-03-20 RX ADMIN — ACETAMINOPHEN 650 MG: 325 TABLET ORAL at 14:13

## 2024-03-20 RX ADMIN — SODIUM CHLORIDE 20 ML/HR: 9 INJECTION, SOLUTION INTRAVENOUS at 14:16

## 2024-03-20 RX ADMIN — DIPHENHYDRAMINE HYDROCHLORIDE 25 MG: 25 CAPSULE ORAL at 14:13

## 2024-03-21 NOTE — HOME HEALTH
Routine Visit Note: Reviewed post stent removal and drain removal with patient and caregiver, Patient stating that she feels better    Skill/education provided: Instructed on importance of daily weight checks, monitor weight every morning with the same amount of cloth, before eating/drinking, maintain a log, report any weight gain to MD as indicated above and signs of fluid retention such as edema, sudden cough and SOA. Patient able to teach back.    Patient/caregiver response: Patient and caregiver need reinforcement of instructions given

## 2024-03-27 ENCOUNTER — HOSPITAL ENCOUNTER (OUTPATIENT)
Dept: ULTRASOUND IMAGING | Facility: HOSPITAL | Age: 64
Discharge: HOME | End: 2024-03-27
Payer: COMMERCIAL

## 2024-03-27 ENCOUNTER — INFUSION (OUTPATIENT)
Dept: ONCOLOGY | Facility: HOSPITAL | Age: 64
End: 2024-03-27
Payer: COMMERCIAL

## 2024-03-27 VITALS
HEIGHT: 64 IN | WEIGHT: 219 LBS | SYSTOLIC BLOOD PRESSURE: 138 MMHG | DIASTOLIC BLOOD PRESSURE: 90 MMHG | HEART RATE: 92 BPM | RESPIRATION RATE: 16 BRPM | OXYGEN SATURATION: 97 % | BODY MASS INDEX: 37.39 KG/M2 | TEMPERATURE: 98.6 F

## 2024-03-27 VITALS
WEIGHT: 220 LBS | HEART RATE: 91 BPM | RESPIRATION RATE: 16 BRPM | BODY MASS INDEX: 37.76 KG/M2 | TEMPERATURE: 97.8 F | OXYGEN SATURATION: 95 % | SYSTOLIC BLOOD PRESSURE: 147 MMHG | DIASTOLIC BLOOD PRESSURE: 92 MMHG

## 2024-03-27 DIAGNOSIS — J90 PLEURAL EFFUSION, RIGHT: ICD-10-CM

## 2024-03-27 DIAGNOSIS — D50.9 IRON DEFICIENCY ANEMIA, UNSPECIFIED IRON DEFICIENCY ANEMIA TYPE: Primary | ICD-10-CM

## 2024-03-27 LAB
ALBUMIN FLD-MCNC: 0.2 G/DL
AMYLASE FLD-CCNC: 5 U/L
GLUCOSE FLD-MCNC: 18 MG/DL
LDH FLD-CCNC: 100 U/L
PH FLD: 7.3 [PH]
PROT FLD-MCNC: <1 G/DL

## 2024-03-27 PROCEDURE — 83986 ASSAY PH BODY FLUID NOS: CPT | Performed by: NURSE PRACTITIONER

## 2024-03-27 PROCEDURE — 88305 TISSUE EXAM BY PATHOLOGIST: CPT | Performed by: NURSE PRACTITIONER

## 2024-03-27 PROCEDURE — 82150 ASSAY OF AMYLASE: CPT | Performed by: NURSE PRACTITIONER

## 2024-03-27 PROCEDURE — 87205 SMEAR GRAM STAIN: CPT | Performed by: NURSE PRACTITIONER

## 2024-03-27 PROCEDURE — 96374 THER/PROPH/DIAG INJ IV PUSH: CPT

## 2024-03-27 PROCEDURE — 83615 LACTATE (LD) (LDH) ENZYME: CPT | Performed by: NURSE PRACTITIONER

## 2024-03-27 PROCEDURE — 25810000003 SODIUM CHLORIDE 0.9 % SOLUTION: Performed by: INTERNAL MEDICINE

## 2024-03-27 PROCEDURE — 87070 CULTURE OTHR SPECIMN AEROBIC: CPT | Performed by: NURSE PRACTITIONER

## 2024-03-27 PROCEDURE — 82945 GLUCOSE OTHER FLUID: CPT | Performed by: NURSE PRACTITIONER

## 2024-03-27 PROCEDURE — 84157 ASSAY OF PROTEIN OTHER: CPT | Performed by: NURSE PRACTITIONER

## 2024-03-27 PROCEDURE — 63710000001 DIPHENHYDRAMINE PER 50 MG: Performed by: INTERNAL MEDICINE

## 2024-03-27 PROCEDURE — 87015 SPECIMEN INFECT AGNT CONCNTJ: CPT | Performed by: NURSE PRACTITIONER

## 2024-03-27 PROCEDURE — 82042 OTHER SOURCE ALBUMIN QUAN EA: CPT | Performed by: NURSE PRACTITIONER

## 2024-03-27 PROCEDURE — 25010000002 LIDOCAINE 1 % SOLUTION: Performed by: RADIOLOGY

## 2024-03-27 PROCEDURE — 76942 ECHO GUIDE FOR BIOPSY: CPT

## 2024-03-27 PROCEDURE — 88112 CYTOPATH CELL ENHANCE TECH: CPT | Performed by: NURSE PRACTITIONER

## 2024-03-27 PROCEDURE — 25010000002 IRON SUCROSE PER 1 MG: Performed by: INTERNAL MEDICINE

## 2024-03-27 RX ORDER — SODIUM CHLORIDE 0.9 % (FLUSH) 0.9 %
10 SYRINGE (ML) INJECTION AS NEEDED
Status: DISCONTINUED | OUTPATIENT
Start: 2024-03-27 | End: 2024-03-28 | Stop reason: HOSPADM

## 2024-03-27 RX ORDER — PROMETHAZINE HYDROCHLORIDE 25 MG/1
25 TABLET ORAL EVERY 6 HOURS PRN
COMMUNITY
Start: 2024-03-18

## 2024-03-27 RX ORDER — LIDOCAINE HYDROCHLORIDE 10 MG/ML
10 INJECTION, SOLUTION INFILTRATION; PERINEURAL ONCE
Status: COMPLETED | OUTPATIENT
Start: 2024-03-27 | End: 2024-03-27

## 2024-03-27 RX ORDER — DIPHENHYDRAMINE HCL 25 MG
25 CAPSULE ORAL ONCE
Status: COMPLETED | OUTPATIENT
Start: 2024-03-27 | End: 2024-03-27

## 2024-03-27 RX ORDER — SODIUM CHLORIDE 9 MG/ML
20 INJECTION, SOLUTION INTRAVENOUS ONCE
Status: COMPLETED | OUTPATIENT
Start: 2024-03-27 | End: 2024-03-27

## 2024-03-27 RX ORDER — ACETAMINOPHEN 325 MG/1
650 TABLET ORAL ONCE
Status: COMPLETED | OUTPATIENT
Start: 2024-03-27 | End: 2024-03-27

## 2024-03-27 RX ORDER — ACETAMINOPHEN 325 MG/1
650 TABLET ORAL ONCE
Status: CANCELLED | OUTPATIENT
Start: 2024-04-03

## 2024-03-27 RX ORDER — DIPHENHYDRAMINE HCL 25 MG
25 CAPSULE ORAL ONCE
Status: CANCELLED | OUTPATIENT
Start: 2024-04-03

## 2024-03-27 RX ORDER — SODIUM CHLORIDE 9 MG/ML
20 INJECTION, SOLUTION INTRAVENOUS ONCE
Status: CANCELLED | OUTPATIENT
Start: 2024-04-03

## 2024-03-27 RX ADMIN — IRON SUCROSE 200 MG: 20 INJECTION, SOLUTION INTRAVENOUS at 15:26

## 2024-03-27 RX ADMIN — LIDOCAINE HYDROCHLORIDE 5 ML: 10 INJECTION, SOLUTION INFILTRATION; PERINEURAL at 13:24

## 2024-03-27 RX ADMIN — SODIUM CHLORIDE 20 ML/HR: 9 INJECTION, SOLUTION INTRAVENOUS at 15:26

## 2024-03-27 RX ADMIN — ACETAMINOPHEN 650 MG: 325 TABLET ORAL at 14:56

## 2024-03-27 RX ADMIN — LIDOCAINE HYDROCHLORIDE 10 ML: 10 INJECTION, SOLUTION INFILTRATION; PERINEURAL at 13:19

## 2024-03-27 RX ADMIN — DIPHENHYDRAMINE HYDROCHLORIDE 25 MG: 25 CAPSULE ORAL at 14:56

## 2024-03-27 NOTE — DISCHARGE INSTRUCTIONS
EDUCATION /DISCHARGE INSTRUCTIONS Thoracentesis:  A thoracentesis is a procedure to remove fluid or air from the space between the lung and it's lining.  It is done to relieve lung compression and respiratory distress.  A sample can also be obtained to aid diagnosis.   Medications can be administered into the space.      During the procedure:  You will be seated comfortable leaning forward onto a pillow supported by a table.  The area will be cleaned with antiseptic soap and draped with a sterile towel.  The physician will administer a local antiseptic to numb the area.  Next, the physician will insert a needle with tubing attached into the space between the lung and lining.  Fluid is removed and a sample sent to the laboratory.   When completed a pressure dressing is applied to the site.    Risks of the procedure include but are not limited to:   *  Bleeding    *  Low blood pressure *  Infection     *  Lung collapse possibly requiring insertion of a tube to re-inflate the lung.    Benefits of the procedure:  Relief of respiratory distress and facilitation of a diagnosis.  Alternatives to the procedure:  Drug therapy with diuretics to remove fluid.  Risks of diuretic drug therapy include possible dehydration and renal failure.  The benefit of drug therapy is that it can be done at home under physician supervision.    Post Procedure:    *  Rest today (no pushing pulling, straining or heavy lifting).   *  Slowly increase activity tomorow.    *  If you received sedation do not drive for 24 hours.   *  Keep dressing clean and dry.   *  Leave dressing on puncture site for 24 hours.    *  You may shower when dressing removed.   *  Expect some coughing as the lung re-expands.    Call your doctor if experiencing:   *  If experiencing sudden / severe shortness of breath, chest pain or if coughing       up blood go to the nearest emergency room.   *  Signs of infection such as redness, swelling, excessive pain and / or foul        smelling drainage from the puncture site.   *  Chills or fever over 101 degrees (by mouth).   *  Change in sputum color (yellow, green, red).   *  Unrelieved pain.   *  Any new or severe symptoms.  Following the procedure:     Follow-up with the ordering physician as directed.    Continue to take other medications as directed by your physician unless    otherwise instructed.     If you have any concerns please call the Radiology Nurses Desk at [phone].  You are the most important factor in your recovery.  Follow the above instructions carefully.

## 2024-03-29 LAB
CYTO UR: NORMAL
LAB AP CASE REPORT: NORMAL
PATH REPORT.FINAL DX SPEC: NORMAL
PATH REPORT.GROSS SPEC: NORMAL

## 2024-03-30 LAB
BACTERIA FLD CULT: NORMAL
GRAM STN SPEC: NORMAL
GRAM STN SPEC: NORMAL

## 2024-04-01 ENCOUNTER — TELEPHONE (OUTPATIENT)
Dept: SURGERY | Facility: CLINIC | Age: 64
End: 2024-04-01
Payer: COMMERCIAL

## 2024-04-03 ENCOUNTER — INFUSION (OUTPATIENT)
Dept: ONCOLOGY | Facility: HOSPITAL | Age: 64
End: 2024-04-03
Payer: COMMERCIAL

## 2024-04-03 VITALS
BODY MASS INDEX: 37.93 KG/M2 | WEIGHT: 221 LBS | SYSTOLIC BLOOD PRESSURE: 101 MMHG | OXYGEN SATURATION: 96 % | DIASTOLIC BLOOD PRESSURE: 79 MMHG | HEART RATE: 105 BPM | TEMPERATURE: 97.7 F | RESPIRATION RATE: 16 BRPM

## 2024-04-03 DIAGNOSIS — D50.9 IRON DEFICIENCY ANEMIA, UNSPECIFIED IRON DEFICIENCY ANEMIA TYPE: Primary | ICD-10-CM

## 2024-04-03 PROCEDURE — 63710000001 DIPHENHYDRAMINE PER 50 MG: Performed by: INTERNAL MEDICINE

## 2024-04-03 PROCEDURE — 96374 THER/PROPH/DIAG INJ IV PUSH: CPT

## 2024-04-03 PROCEDURE — 25810000003 SODIUM CHLORIDE 0.9 % SOLUTION: Performed by: INTERNAL MEDICINE

## 2024-04-03 PROCEDURE — 25010000002 IRON SUCROSE PER 1 MG: Performed by: INTERNAL MEDICINE

## 2024-04-03 RX ORDER — ACETAMINOPHEN 325 MG/1
650 TABLET ORAL ONCE
Status: COMPLETED | OUTPATIENT
Start: 2024-04-03 | End: 2024-04-03

## 2024-04-03 RX ORDER — DIPHENHYDRAMINE HCL 25 MG
25 CAPSULE ORAL ONCE
Status: COMPLETED | OUTPATIENT
Start: 2024-04-03 | End: 2024-04-03

## 2024-04-03 RX ORDER — SODIUM CHLORIDE 9 MG/ML
20 INJECTION, SOLUTION INTRAVENOUS ONCE
Status: COMPLETED | OUTPATIENT
Start: 2024-04-03 | End: 2024-04-03

## 2024-04-03 RX ADMIN — ACETAMINOPHEN 650 MG: 325 TABLET ORAL at 13:51

## 2024-04-03 RX ADMIN — IRON SUCROSE 200 MG: 20 INJECTION, SOLUTION INTRAVENOUS at 14:16

## 2024-04-03 RX ADMIN — SODIUM CHLORIDE 20 ML/HR: 9 INJECTION, SOLUTION INTRAVENOUS at 14:16

## 2024-04-03 RX ADMIN — DIPHENHYDRAMINE HYDROCHLORIDE 25 MG: 25 CAPSULE ORAL at 13:51

## 2024-04-08 ENCOUNTER — OFFICE VISIT (OUTPATIENT)
Dept: SURGERY | Facility: CLINIC | Age: 64
End: 2024-04-08
Payer: COMMERCIAL

## 2024-04-08 ENCOUNTER — HOSPITAL ENCOUNTER (OUTPATIENT)
Dept: GENERAL RADIOLOGY | Facility: HOSPITAL | Age: 64
Discharge: HOME | End: 2024-04-08
Admitting: THORACIC SURGERY (CARDIOTHORACIC VASCULAR SURGERY)
Payer: COMMERCIAL

## 2024-04-08 VITALS
HEIGHT: 64 IN | HEART RATE: 111 BPM | OXYGEN SATURATION: 99 % | SYSTOLIC BLOOD PRESSURE: 128 MMHG | DIASTOLIC BLOOD PRESSURE: 76 MMHG | BODY MASS INDEX: 37.9 KG/M2 | WEIGHT: 222 LBS

## 2024-04-08 DIAGNOSIS — J90 PLEURAL EFFUSION, RIGHT: ICD-10-CM

## 2024-04-08 DIAGNOSIS — J90 PLEURAL EFFUSION, RIGHT: Primary | ICD-10-CM

## 2024-04-08 PROCEDURE — 71046 X-RAY EXAM CHEST 2 VIEWS: CPT

## 2024-04-08 PROCEDURE — 99212 OFFICE O/P EST SF 10 MIN: CPT | Performed by: THORACIC SURGERY (CARDIOTHORACIC VASCULAR SURGERY)

## 2024-04-08 NOTE — PROGRESS NOTES
"Chief Complaint  Pleural effusion      Subjective        Tiana Hudson presents to [facility] THORACIC SURGERY  History of Present Illness    The patient is a pleasant 63-year-old female who recently was evaluated in 01/2024 for a right pleural effusion. She was seen at the beginning of 03/2024 and was found to have continued effusion. She underwent a thoracentesis with minimal improvement.    She expresses satisfactory respiratory function. She has been utilizing an incentive spirometer as recommended by the radiologist who performed her thoracentesis, which has resulted in nocturnal expectoration.    Objective   Vital Signs:  /76 (BP Location: Right arm, Patient Position: Sitting, Cuff Size: Adult)   Pulse 111   Ht 162.6 cm (64\")   Wt 101 kg (222 lb)   SpO2 99%   BMI 38.11 kg/m²   Estimated body mass index is 38.11 kg/m² as calculated from the following:    Height as of this encounter: 162.6 cm (64\").    Weight as of this encounter: 101 kg (222 lb).             Physical Exam  Vitals and nursing note reviewed.   Constitutional:       Appearance: She is well-developed.   HENT:      Head: Normocephalic and atraumatic.      Nose: Nose normal.   Eyes:      Conjunctiva/sclera: Conjunctivae normal.   Cardiovascular:      Rate and Rhythm: Normal rate.   Pulmonary:      Effort: Pulmonary effort is normal.   Abdominal:      Palpations: Abdomen is soft.   Musculoskeletal:      Cervical back: Neck supple.   Skin:     General: Skin is warm and dry.   Neurological:      Mental Status: She is alert and oriented to person, place, and time.   Psychiatric:         Behavior: Behavior normal.         Thought Content: Thought content normal.         Judgment: Judgment normal.        Result Review :    Imaging  CT of the chest performed on 3/16/2024 shows a small right pleural effusion that has decreased in size. There are some areas of atelectasis in the right middle and lower lobe. No nodules, no " mediastinal or hilar lymphadenopathy, no pericardial effusion. Decrease in the retroperitoneal fluid collection on the right.      Chest x-ray shows good expansion of bilateral lungs, no significant effusion. CT scan of chest shows residual fluid, but improved condition since hospitalization in 01/2024.                  Assessment and Plan     Diagnoses and all orders for this visit:    1. Pleural effusion, right (Primary)  -     XR Chest 2 View; Future      The patient is a pleasant 63-year-old female with a retroperitoneal abscess and reactive right pleural effusion that appears to have resolved.    Resolved retroperitoneal and reactive right pleural effusion.  - A follow-up CT scan of the chest is scheduled for 3 months from now.    Follow-up  She is scheduled for a follow-up visit in 3 months.     I spent 15 minutes caring for Tiana on this date of service. This time includes time spent by me in the following activities:preparing for the visit, reviewing tests, obtaining and/or reviewing a separately obtained history, performing a medically appropriate examination and/or evaluation , counseling and educating the patient/family/caregiver, ordering medications, tests, or procedures, documenting information in the medical record, and independently interpreting results and communicating that information with the patient/family/caregiver  Follow Up     No follow-ups on file.  Patient was given instructions and counseling regarding her condition or for health maintenance advice. Please see specific information pulled into the AVS if appropriate.         Transcribed from ambient dictation for Leeann Blas MD by Kyleigh Villegas.  04/08/24   10:01 EDT    Patient or patient representative verbalized consent to the visit recording.  I have personally performed the services described in this document as transcribed by the above individual, and it is both accurate and complete.

## 2024-04-08 NOTE — LETTER
"April 17, 2024     Dean Fairchild MD  [address] [facility] [address]    Patient: Tiana Hudson   YOB: 1960   Date of Visit: 4/8/2024     Dear Dean Fairchild MD:       Thank you for referring Tiana Hudson to me for evaluation. Below are the relevant portions of my assessment and plan of care.    If you have questions, please do not hesitate to call me. I look forward to following Tiana along with you.         Sincerely,        Leeann Blas MD        CC: No Recipients    Leeann Blas MD  04/17/24 0902  Sign when Signing Visit  Chief Complaint  Pleural effusion      Subjective       Tiana Hudson presents to [facility] THORACIC SURGERY  History of Present Illness    The patient is a pleasant 63-year-old female who recently was evaluated in 01/2024 for a right pleural effusion. She was seen at the beginning of 03/2024 and was found to have continued effusion. She underwent a thoracentesis with minimal improvement.    She expresses satisfactory respiratory function. She has been utilizing an incentive spirometer as recommended by the radiologist who performed her thoracentesis, which has resulted in nocturnal expectoration.    Objective  Vital Signs:  /76 (BP Location: Right arm, Patient Position: Sitting, Cuff Size: Adult)   Pulse 111   Ht 162.6 cm (64\")   Wt 101 kg (222 lb)   SpO2 99%   BMI 38.11 kg/m²   Estimated body mass index is 38.11 kg/m² as calculated from the following:    Height as of this encounter: 162.6 cm (64\").    Weight as of this encounter: 101 kg (222 lb).             Physical Exam  Vitals and nursing note reviewed.   Constitutional:       Appearance: She is well-developed.   HENT:      Head: Normocephalic and atraumatic.      Nose: Nose normal.   Eyes:      Conjunctiva/sclera: Conjunctivae normal.   Cardiovascular:      Rate and Rhythm: Normal rate.   Pulmonary:      Effort: Pulmonary effort is normal.   Abdominal:      " Palpations: Abdomen is soft.   Musculoskeletal:      Cervical back: Neck supple.   Skin:     General: Skin is warm and dry.   Neurological:      Mental Status: She is alert and oriented to person, place, and time.   Psychiatric:         Behavior: Behavior normal.         Thought Content: Thought content normal.         Judgment: Judgment normal.        Result Review:    Imaging  CT of the chest performed on 3/16/2024 shows a small right pleural effusion that has decreased in size. There are some areas of atelectasis in the right middle and lower lobe. No nodules, no mediastinal or hilar lymphadenopathy, no pericardial effusion. Decrease in the retroperitoneal fluid collection on the right.      Chest x-ray shows good expansion of bilateral lungs, no significant effusion. CT scan of chest shows residual fluid, but improved condition since hospitalization in 01/2024.                  Assessment and Plan     Diagnoses and all orders for this visit:    1. Pleural effusion, right (Primary)  -     XR Chest 2 View; Future      The patient is a pleasant 63-year-old female with a retroperitoneal abscess and reactive right pleural effusion that appears to have resolved.    Resolved retroperitoneal and reactive right pleural effusion.  - A follow-up CT scan of the chest is scheduled for 3 months from now.    Follow-up  She is scheduled for a follow-up visit in 3 months.     I spent 15 minutes caring for Tiana on this date of service. This time includes time spent by me in the following activities:preparing for the visit, reviewing tests, obtaining and/or reviewing a separately obtained history, performing a medically appropriate examination and/or evaluation , counseling and educating the patient/family/caregiver, ordering medications, tests, or procedures, documenting information in the medical record, and independently interpreting results and communicating that information with the patient/family/caregiver  Follow Up     No  follow-ups on file.  Patient was given instructions and counseling regarding her condition or for health maintenance advice. Please see specific information pulled into the AVS if appropriate.         Transcribed from ambient dictation for Leeann Blas MD by Kyleigh Villegas.  04/08/24   10:01 EDT    Patient or patient representative verbalized consent to the visit recording.  I have personally performed the services described in this document as transcribed by the above individual, and it is both accurate and complete.    [facility]

## 2024-05-07 ENCOUNTER — LAB (OUTPATIENT)
Dept: LAB | Facility: HOSPITAL | Age: 64
End: 2024-05-07
Payer: COMMERCIAL

## 2024-05-07 DIAGNOSIS — D64.9 ANEMIA, UNSPECIFIED TYPE: ICD-10-CM

## 2024-05-07 LAB
BASOPHILS # BLD AUTO: 0.06 10*3/MM3 (ref 0–0.2)
BASOPHILS NFR BLD AUTO: 0.9 % (ref 0–1.5)
DEPRECATED RDW RBC AUTO: 52.3 FL (ref 37–54)
EOSINOPHIL # BLD AUTO: 0.4 10*3/MM3 (ref 0–0.4)
EOSINOPHIL NFR BLD AUTO: 6.3 % (ref 0.3–6.2)
ERYTHROCYTE [DISTWIDTH] IN BLOOD BY AUTOMATED COUNT: 16.7 % (ref 12.3–15.4)
FERRITIN SERPL-MCNC: 137 NG/ML (ref 13–150)
HCT VFR BLD AUTO: 39.9 % (ref 34–46.6)
HGB BLD-MCNC: 12.8 G/DL (ref 12–15.9)
HGB RETIC QN AUTO: 30.7 PG (ref 29.8–36.1)
IMM GRANULOCYTES # BLD AUTO: 0.02 10*3/MM3 (ref 0–0.05)
IMM GRANULOCYTES NFR BLD AUTO: 0.3 % (ref 0–0.5)
IMM RETICS NFR: 10.3 % (ref 3–15.8)
IRON 24H UR-MRATE: 53 MCG/DL (ref 37–145)
IRON SATN MFR SERPL: 17 % (ref 20–50)
LYMPHOCYTES # BLD AUTO: 1.51 10*3/MM3 (ref 0.7–3.1)
LYMPHOCYTES NFR BLD AUTO: 23.8 % (ref 19.6–45.3)
MCH RBC QN AUTO: 27.6 PG (ref 26.6–33)
MCHC RBC AUTO-ENTMCNC: 32.1 G/DL (ref 31.5–35.7)
MCV RBC AUTO: 86 FL (ref 79–97)
MONOCYTES # BLD AUTO: 0.5 10*3/MM3 (ref 0.1–0.9)
MONOCYTES NFR BLD AUTO: 7.9 % (ref 5–12)
NEUTROPHILS NFR BLD AUTO: 3.86 10*3/MM3 (ref 1.7–7)
NEUTROPHILS NFR BLD AUTO: 60.8 % (ref 42.7–76)
NRBC BLD AUTO-RTO: 0 /100 WBC (ref 0–0.2)
PLATELET # BLD AUTO: 237 10*3/MM3 (ref 140–450)
PMV BLD AUTO: 10 FL (ref 6–12)
RBC # BLD AUTO: 4.64 10*6/MM3 (ref 3.77–5.28)
RETICS # AUTO: 0.05 10*6/MM3 (ref 0.02–0.13)
RETICS/RBC NFR AUTO: 1.18 % (ref 0.7–1.9)
TIBC SERPL-MCNC: 319 MCG/DL (ref 298–536)
TRANSFERRIN SERPL-MCNC: 214 MG/DL (ref 200–360)
WBC NRBC COR # BLD AUTO: 6.35 10*3/MM3 (ref 3.4–10.8)

## 2024-05-07 PROCEDURE — 83540 ASSAY OF IRON: CPT

## 2024-05-07 PROCEDURE — 84466 ASSAY OF TRANSFERRIN: CPT

## 2024-05-07 PROCEDURE — 85025 COMPLETE CBC W/AUTO DIFF WBC: CPT

## 2024-05-07 PROCEDURE — 85046 RETICYTE/HGB CONCENTRATE: CPT

## 2024-05-07 PROCEDURE — 82728 ASSAY OF FERRITIN: CPT

## 2024-05-08 ENCOUNTER — OFFICE VISIT (OUTPATIENT)
Dept: ONCOLOGY | Facility: CLINIC | Age: 64
End: 2024-05-08
Payer: COMMERCIAL

## 2024-05-08 VITALS
OXYGEN SATURATION: 97 % | HEART RATE: 85 BPM | BODY MASS INDEX: 39.65 KG/M2 | WEIGHT: 231 LBS | DIASTOLIC BLOOD PRESSURE: 79 MMHG | TEMPERATURE: 97.5 F | SYSTOLIC BLOOD PRESSURE: 132 MMHG

## 2024-05-08 DIAGNOSIS — D50.9 IRON DEFICIENCY ANEMIA, UNSPECIFIED IRON DEFICIENCY ANEMIA TYPE: Primary | ICD-10-CM

## 2024-05-08 PROCEDURE — 99214 OFFICE O/P EST MOD 30 MIN: CPT | Performed by: INTERNAL MEDICINE

## 2024-07-05 ENCOUNTER — TELEPHONE (OUTPATIENT)
Dept: SURGERY | Facility: CLINIC | Age: 64
End: 2024-07-05
Payer: COMMERCIAL

## 2024-07-29 ENCOUNTER — HOSPITAL ENCOUNTER (OUTPATIENT)
Facility: HOSPITAL | Age: 64
Discharge: HOME | End: 2024-07-29
Admitting: THORACIC SURGERY (CARDIOTHORACIC VASCULAR SURGERY)
Payer: COMMERCIAL

## 2024-07-29 DIAGNOSIS — J90 PLEURAL EFFUSION, RIGHT: ICD-10-CM

## 2024-07-29 PROCEDURE — 71250 CT THORAX DX C-: CPT

## 2024-08-26 ENCOUNTER — OFFICE VISIT (OUTPATIENT)
Dept: SURGERY | Facility: CLINIC | Age: 64
End: 2024-08-26
Payer: COMMERCIAL

## 2024-08-26 VITALS
DIASTOLIC BLOOD PRESSURE: 60 MMHG | SYSTOLIC BLOOD PRESSURE: 110 MMHG | HEART RATE: 77 BPM | OXYGEN SATURATION: 98 % | BODY MASS INDEX: 44.35 KG/M2 | WEIGHT: 258.4 LBS

## 2024-08-26 DIAGNOSIS — J90 PLEURAL EFFUSION, RIGHT: Primary | ICD-10-CM

## 2024-08-26 PROCEDURE — 99213 OFFICE O/P EST LOW 20 MIN: CPT | Performed by: THORACIC SURGERY (CARDIOTHORACIC VASCULAR SURGERY)

## 2024-08-26 RX ORDER — FAMOTIDINE 40 MG/1
40 TABLET, FILM COATED ORAL
COMMUNITY
Start: 2024-06-21

## 2024-08-26 RX ORDER — AMLODIPINE AND BENAZEPRIL HYDROCHLORIDE 5; 20 MG/1; MG/1
1 CAPSULE ORAL DAILY
COMMUNITY
Start: 2024-06-21

## 2024-08-26 NOTE — LETTER
"September 1, 2024     Dean Fairchild MD  [address] [facility] [address]    Patient: Tiana Hudson   YOB: 1960   Date of Visit: 8/26/2024     Dear Dean Fairchild MD:       Thank you for referring Tiana Hudson to me for evaluation. Below are the relevant portions of my assessment and plan of care.    If you have questions, please do not hesitate to call me. I look forward to following Tiana along with you.         Sincerely,        Leeann Blas MD        CC: No Recipients    Leeann Blas MD  09/01/24 1338  Sign when Signing Visit  Chief Complaint  Follow-up (3 month ct follow up ), pleural effusion    Subjective       Tiana Hudson presents to [facility] THORACIC SURGERY  History of Present Illness  Ms. Hudson presents in follow-up for her effusion that was seen in January 2024.  She is feeling well.  She has no complaints today.  Objective  Vital Signs:  /60 (BP Location: Left arm, Patient Position: Sitting, Cuff Size: Adult)   Pulse 77   Wt 117 kg (258 lb 6.4 oz)   SpO2 98%   BMI 44.35 kg/m²   Estimated body mass index is 44.35 kg/m² as calculated from the following:    Height as of 4/8/24: 162.6 cm (64\").    Weight as of this encounter: 117 kg (258 lb 6.4 oz).          Physical Exam  Vitals and nursing note reviewed.   Constitutional:       Appearance: She is well-developed.   HENT:      Head: Normocephalic and atraumatic.      Nose: Nose normal.   Eyes:      Conjunctiva/sclera: Conjunctivae normal.   Cardiovascular:      Rate and Rhythm: Normal rate.   Pulmonary:      Effort: Pulmonary effort is normal.   Abdominal:      Palpations: Abdomen is soft.   Musculoskeletal:      Cervical back: Neck supple.   Skin:     General: Skin is warm and dry.   Neurological:      Mental Status: She is alert and oriented to person, place, and time.   Psychiatric:         Behavior: Behavior normal.         Thought Content: Thought content normal.         " Judgment: Judgment normal.        Result Review:          I have independently reviewed the CT of the chest performed on 7/29/2024 which demonstrates no pleural effusion.  Mild atelectasis in the right lung base.  No lung nodules.  No mediastinal or hilar lymphadenopathy.  Small hiatal hernia.     Assessment and Plan   Diagnoses and all orders for this visit:    1. Pleural effusion, right (Primary)      Ms. Hudson is a pleasant 63-year-old lady presents in follow-up for a pleural effusion that was likely reactive secondary to her intra-abdominal process.  Her effusion has resolved.  There is no evidence of fluid in the chest.  She will need no further imaging for this.  She will plan to follow-up with me on an as-needed basis.  If she develops symptoms of shortness of breath, she will plan to call me.     I spent 20 minutes caring for Tiana on this date of service. This time includes time spent by me in the following activities:preparing for the visit, reviewing tests, obtaining and/or reviewing a separately obtained history, performing a medically appropriate examination and/or evaluation , counseling and educating the patient/family/caregiver, ordering medications, tests, or procedures, documenting information in the medical record, and independently interpreting results and communicating that information with the patient/family/caregiver  Follow Up   No follow-ups on file.  Patient was given instructions and counseling regarding her condition or for health maintenance advice. Please see specific information pulled into the AVS if appropriate.

## 2024-09-01 NOTE — PROGRESS NOTES
"Chief Complaint  Follow-up (3 month ct follow up ), pleural effusion    Subjective        Tiana Hudson presents to [facility] THORACIC SURGERY  History of Present Illness  Ms. Hudson presents in follow-up for her effusion that was seen in January 2024.  She is feeling well.  She has no complaints today.  Objective   Vital Signs:  /60 (BP Location: Left arm, Patient Position: Sitting, Cuff Size: Adult)   Pulse 77   Wt 117 kg (258 lb 6.4 oz)   SpO2 98%   BMI 44.35 kg/m²   Estimated body mass index is 44.35 kg/m² as calculated from the following:    Height as of 4/8/24: 162.6 cm (64\").    Weight as of this encounter: 117 kg (258 lb 6.4 oz).          Physical Exam  Vitals and nursing note reviewed.   Constitutional:       Appearance: She is well-developed.   HENT:      Head: Normocephalic and atraumatic.      Nose: Nose normal.   Eyes:      Conjunctiva/sclera: Conjunctivae normal.   Cardiovascular:      Rate and Rhythm: Normal rate.   Pulmonary:      Effort: Pulmonary effort is normal.   Abdominal:      Palpations: Abdomen is soft.   Musculoskeletal:      Cervical back: Neck supple.   Skin:     General: Skin is warm and dry.   Neurological:      Mental Status: She is alert and oriented to person, place, and time.   Psychiatric:         Behavior: Behavior normal.         Thought Content: Thought content normal.         Judgment: Judgment normal.        Result Review :          I have independently reviewed the CT of the chest performed on 7/29/2024 which demonstrates no pleural effusion.  Mild atelectasis in the right lung base.  No lung nodules.  No mediastinal or hilar lymphadenopathy.  Small hiatal hernia.     Assessment and Plan   Diagnoses and all orders for this visit:    1. Pleural effusion, right (Primary)      Ms. Hudson is a pleasant 63-year-old lady presents in follow-up for a pleural effusion that was likely reactive secondary to her intra-abdominal process.  Her " effusion has resolved.  There is no evidence of fluid in the chest.  She will need no further imaging for this.  She will plan to follow-up with me on an as-needed basis.  If she develops symptoms of shortness of breath, she will plan to call me.     I spent 20 minutes caring for Tiana on this date of service. This time includes time spent by me in the following activities:preparing for the visit, reviewing tests, obtaining and/or reviewing a separately obtained history, performing a medically appropriate examination and/or evaluation , counseling and educating the patient/family/caregiver, ordering medications, tests, or procedures, documenting information in the medical record, and independently interpreting results and communicating that information with the patient/family/caregiver  Follow Up   No follow-ups on file.  Patient was given instructions and counseling regarding her condition or for health maintenance advice. Please see specific information pulled into the AVS if appropriate.

## 2024-09-19 NOTE — PROGRESS NOTES
.     REASON FOR FOLLOWUP :   Iron deficiency anemia    HISTORY OF PRESENT ILLNESS:  The patient is a 63 y.o. year old female  who is here for follow-up with the above-mentioned history.    No new problems.  No complaints of bleeding.    Past Medical History:   Diagnosis Date    JADEN (acute kidney injury) 01/2024    Anemia     intermittently    Anxiety and depression     Arthritis     Cellulitis of abdominal wall 01/18/2024    in [place]    Depression     Diabetes mellitus     DKA (diabetic ketoacidosis) 01/2024    Gallstones     GERD (gastroesophageal reflux disease)     High cholesterol     History of frequent urinary tract infections     HX OF YEAST IN BLADDER HAS PRN DIFLUCAN    History of kidney stones     History of transfusion 05/24/2017    Had 2 iron infusions.  This was when i had knee replacement    Hyperlipidemia     Hypertension     Hypothyroidism     Knee pain, bilateral     Low back pain     Lumbar stenosis     Pleural effusion on right 01/2024    Incidentally seen on imaging in January 2024 during a hospital admission for right retroperitoneal abscess following ureteral stent placement. Plan is to have a CT on 3/16/24 and may possibily get an US Thoracentesis per pt.    PONV (postoperative nausea and vomiting)     Positive TB test     chest x-ray neg    Retroperitoneal abscess 01/2024    due to right calyceal rupture    Seasonal allergies     Sepsis 01/2024    associated with bilateral UPJ obstruction and moderate bilateral hydronephrosis and possible right calyceal rupture--hospitalized for 2 weeks.    SOB (shortness of breath) on exertion     Tattoos     UPJ (ureteropelvic junction) obstruction 01/2024    Varicose vein of leg      Past Surgical History:   Procedure Laterality Date    APPENDECTOMY N/A 1982    Dr. Wilson    CHOLECYSTECTOMY WITH INTRAOPERATIVE CHOLANGIOGRAM N/A 10/31/2018    Procedure: laparoscopic cholecystectomy;  Surgeon: Mary Kay Mac MD;  Location: [facility] OR;  Service:  General    COLONOSCOPY      CYSTOSCOPY BLADDER BIOPSY N/A 10/03/2016    Procedure: CYSTOSCOPY BLADDER BIOPSY;  Surgeon: Rei Calvert MD;  Location: [facility] MAIN OR;  Service:     CYSTOSCOPY W/ URETERAL STENT PLACEMENT Bilateral 01/03/2024    Procedure: CYSTOSCOPY BILATERAL RETROGRADE PYELOGRAM  BILATERAL URETERAL STENT INSERTION AND CATHETHER PLACEMENT;  Surgeon: Eduard Armando MD;  Location: [facility] MAIN OR;  Service: Urology;  Laterality: Bilateral;    DILATATION AND CURETTAGE N/A     ENDOSCOPY      EXPLORATORY LAPAROTOMY Left 1981    S&O    INTRAUTERINE DEVICE INSERTION      KNEE ARTHROSCOPY W/ MENISCAL REPAIR Left     OVARIAN CYST REMOVAL Left 1982    Dr. Wilson    TOTAL KNEE ARTHROPLASTY Right 05/22/2017    Procedure: RIGHT TOTAL KNEE ARTHROPLASTY WITH ALETA NAVIGATION;  Surgeon: Ross Cruz MD;  Location: [facility] MAIN OR;  Service:     URETEROSCOPY LASER LITHOTRIPSY WITH STENT INSERTION Bilateral 3/15/2024    Procedure: CYSTOSCOPY BILATERAL RETROGRADES POSSBLE URETEROSCOPY AND STENT REMOVAL;  Surgeon: Marvin Martinez MD;  Location: [facility] MAIN OR;  Service: Urology;  Laterality: Bilateral;    WISDOM TOOTH EXTRACTION         MEDICATIONS    Current Outpatient Medications:     acetaminophen (TYLENOL) 325 MG tablet, Take 2 tablets by mouth Every 4 (Four) Hours As Needed for Fever (fever greater than 100.4 °F or headache)., Disp: , Rfl:     Alcohol Swabs (Alcohol Pads) 70 % pads, Apply one alcohol swab to injection site of skin immediately prior to insulin injection., Disp: 100 each, Rfl: 12    amLODIPine-benazepril (LOTREL 5-20) 5-20 MG per capsule, Take 1 capsule by mouth Daily., Disp: , Rfl:     Blood Glucose Monitoring Suppl (Blood Glucose Monitor System) w/Device kit, Use to test blood glucose up to four times daily as needed, Disp: 1 each, Rfl: 0    diclofenac (VOLTAREN) 50 MG EC tablet, Take 1 tablet by mouth 2 (Two) Times a Day As Needed (pain)., Disp: 60 tablet, Rfl: 2     escitalopram (LEXAPRO) 10 MG tablet, Take 1 tablet by mouth Daily. Indications: Major Depressive Disorder, Disp: , Rfl:     famotidine (PEPCID) 40 MG tablet, Take 1 tablet by mouth every night at bedtime., Disp: , Rfl:     glucose blood test strip, Use to test blood glucose up to four times daily as needed., Disp: 100 each, Rfl: 0    insulin aspart (NovoLOG FlexPen) 100 UNIT/ML solution pen-injector sc pen, Inject under the skin per sliding scale 4 times daily with meals and at bedtime. Max total daily dose of 36 units. -199 mg/dL - 2 units, -249 mg/dL - 4 units -299 mg/dL - 6 units -349 mg/dL - 7 units -400 mg/dL - 8 units BG > than 400 mg/dL - 9 units & Call Provider (Patient taking differently: Inject  under the skin into the appropriate area as directed Take As Directed. Inject under the skin per sliding scale 4 times daily with meals and at bedtime. Max total daily dose of 36 units. -199 mg/dL - 2 units, -249 mg/dL - 4 units -299 mg/dL - 6 units -349 mg/dL - 7 units -400 mg/dL - 8 units BG > than 400 mg/dL - 9 units & Call Provider  Indications: Type 2 Diabetes), Disp: 15 mL, Rfl: 0    Insulin Glargine (Lantus SoloStar) 100 UNIT/ML injection pen, Inject 20 Units under the skin into the appropriate area as directed 2 (Two) Times a Day., Disp: 15 mL, Rfl: 5    Insulin Pen Needle (Pen Needles) 32G X 4 MM misc, Inject 1 each under the skin into the appropriate area as directed 4 (Four) Times a Day As Needed (for insulin injections)., Disp: 100 each, Rfl: 0    Lancets misc, Use to test blood glucose up to four times daily as needed., Disp: 100 each, Rfl: 0    levothyroxine (SYNTHROID, LEVOTHROID) 150 MCG tablet, Take 1 tablet by mouth Every Morning., Disp: 30 tablet, Rfl: 3    loratadine (CLARITIN) 10 MG tablet, Take 1 tablet by mouth As Needed for Allergies., Disp: , Rfl:     metFORMIN (GLUCOPHAGE) 500 MG tablet, Take 1 tablet by mouth 2 (Two) Times a Day  With Meals., Disp: 360 tablet, Rfl: 0    multivitamin (THERAGRAN) tablet tablet, Take 1 tablet by mouth Every Night. Indications: Vitamin Deficiency, Disp: , Rfl:     ondansetron ODT (ZOFRAN-ODT) 4 MG disintegrating tablet, Place 1 tablet on the tongue Every 6 (Six) Hours As Needed for Nausea or Vomiting., Disp: 40 tablet, Rfl: 1    oxybutynin XL (DITROPAN-XL) 10 MG 24 hr tablet, Take 1 tablet by mouth Daily. Indications: Urinary Incontinence, Disp: , Rfl:     potassium chloride (K-DUR,KLOR-CON) 20 MEQ CR tablet, Take 1 tablet by mouth As Needed (only when takin torsemide)., Disp: , Rfl:     promethazine (PHENERGAN) 25 MG tablet, Take 1 tablet by mouth Every 6 (Six) Hours As Needed., Disp: , Rfl:     rOPINIRole (REQUIP) 0.5 MG tablet, Take 1 tablet by mouth every night at bedtime., Disp: 30 tablet, Rfl: 0    tiZANidine (ZANAFLEX) 4 MG tablet, Take 1 tablet by mouth At Night As Needed for Muscle Spasms. Indications: Musculoskeletal Pain, Disp: , Rfl:     ALLERGIES:     Allergies   Allergen Reactions    Sulfa Antibiotics Hives and Rash       SOCIAL HISTORY:       Social History     Socioeconomic History    Marital status:      Spouse name: Brandon    Number of children: 2   Tobacco Use    Smoking status: Never    Smokeless tobacco: Never    Tobacco comments:     Never smoked   Vaping Use    Vaping status: Never Used   Substance and Sexual Activity    Alcohol use: Never     Alcohol/week: 3.0 standard drinks of alcohol     Types: 1 Glasses of wine, 2 Standard drinks or equivalent per week    Drug use: Never    Sexual activity: Not Currently     Partners: Male     Birth control/protection: I.U.D.         FAMILY HISTORY:  Family History   Problem Relation Age of Onset    Hypertension Mother     Hepatitis Mother     Breast cancer Mother     Heart disease Mother     Cancer Mother     Hyperlipidemia Mother              Anemia Mother     Heart failure Father     Stroke Father     Hypertension Father          "Since he was 72, now 86s    Diabetes Father     Heart disease Father     Hyperlipidemia Father         Unsurs    Hypertension Brother     Diabetes Maternal Aunt     Diabetes Paternal Uncle     Heart disease Maternal Grandmother     Cancer Maternal Grandfather     COPD Maternal Grandfather     Arthritis Paternal Grandmother     Malig Hyperthermia Neg Hx        REVIEW OF SYSTEMS:  Review of Systems   Constitutional:  Negative for activity change.   HENT:  Negative for nosebleeds and trouble swallowing.    Respiratory:  Negative for shortness of breath and wheezing.    Cardiovascular:  Negative for chest pain and palpitations.   Gastrointestinal:  Negative for constipation, diarrhea and nausea.   Genitourinary:  Negative for dysuria and hematuria.   Musculoskeletal:  Negative for arthralgias and myalgias.   Skin:  Negative for rash and wound.   Neurological:  Negative for seizures and syncope.   Hematological:  Negative for adenopathy. Does not bruise/bleed easily.   Psychiatric/Behavioral:  Negative for confusion.               Vitals:    09/30/24 0835   BP: 132/77   Pulse: 90   Temp: 98.1 °F (36.7 °C)   TempSrc: Oral   SpO2: 93%   Weight: 120 kg (264 lb 14.4 oz)   Height: 162.6 cm (64.02\")   PainSc: 0-No pain             9/30/2024     8:37 AM   Current Status   ECOG score 1      PHYSICAL EXAM:        CONSTITUTIONAL:  Vital signs reviewed.  No distress, looks comfortable.  EYES:  Conjunctiva and lids unremarkable.  PERRLA  EARS,NOSE,MOUTH,THROAT:  Ears and nose appear unremarkable.  Lips, teeth, gums appear unremarkable.  RESPIRATORY:  Normal respiratory effort.  Lungs clear to auscultation bilaterally.  CARDIOVASCULAR:  Normal S1, S2.  No murmurs rubs or gallops.  No significant lower extremity edema.  GASTROINTESTINAL: Abdomen appears unremarkable.  Nontender.  No hepatomegaly.  No splenomegaly.  LYMPHATIC:  No cervical, supraclavicular, axillary lymphadenopathy.  SKIN:  Warm.  No rashes.  PSYCHIATRIC:  Normal " judgment and insight.  Normal mood and affect.        RECENT LABS:        WBC   Date Value Ref Range Status   09/27/2024 5.54 3.40 - 10.80 10*3/mm3 Final   05/07/2024 6.35 3.40 - 10.80 10*3/mm3 Final   03/19/2024 6.16 3.40 - 10.80 10*3/mm3 Final   03/12/2024 9.10 3.40 - 10.80 10*3/mm3 Final   03/06/2024 11.43 (H) 3.40 - 10.80 10*3/mm3 Final   03/05/2024 9.33 3.40 - 10.80 10*3/mm3 Final   02/28/2024 8.43 3.40 - 10.80 10*3/mm3 Final   02/20/2024 9.29 3.40 - 10.80 10*3/mm3 Final   02/13/2024 16.14 (H) 3.40 - 10.80 10*3/mm3 Final   02/05/2024 13.15 (H) 3.40 - 10.80 10*3/mm3 Final   02/01/2024 11.95 (H) 3.40 - 10.80 10*3/mm3 Final   01/29/2024 11.54 (H) 3.40 - 10.80 10*3/mm3 Final   01/23/2024 14.63 (H) 3.40 - 10.80 10*3/mm3 Final   01/19/2024 9.92 3.40 - 10.80 10*3/mm3 Final   01/18/2024 9.87 3.40 - 10.80 10*3/mm3 Final   01/17/2024 10.93 (H) 3.40 - 10.80 10*3/mm3 Final   01/16/2024 9.56 3.40 - 10.80 10*3/mm3 Final   01/15/2024 11.33 (H) 3.40 - 10.80 10*3/mm3 Final   01/14/2024 10.99 (H) 3.40 - 10.80 10*3/mm3 Final   01/13/2024 9.51 3.40 - 10.80 10*3/mm3 Final   01/11/2024 11.91 (H) 3.40 - 10.80 10*3/mm3 Final   01/10/2024 12.93 (H) 3.40 - 10.80 10*3/mm3 Final   01/09/2024 14.58 (H) 3.40 - 10.80 10*3/mm3 Final   01/08/2024 16.61 (H) 3.40 - 10.80 10*3/mm3 Final   01/07/2024 13.75 (H) 3.40 - 10.80 10*3/mm3 Final   01/06/2024 10.77 3.40 - 10.80 10*3/mm3 Final   01/05/2024 13.04 (H) 3.40 - 10.80 10*3/mm3 Final   01/04/2024 17.08 (H) 3.40 - 10.80 10*3/mm3 Final   01/03/2024 18.93 (H) 3.40 - 10.80 10*3/mm3 Final   01/02/2024 17.30 (H) 3.40 - 10.80 10*3/mm3 Final   01/02/2024 20.12 (H) 3.40 - 10.80 10*3/mm3 Final   06/21/2023 9.83 3.40 - 10.80 10*3/mm3 Final   10/24/2018 7.22 4.50 - 10.70 10*3/mm3 Final   06/02/2017 7.24 4.50 - 10.70 10*3/mm3 Final   05/25/2017 7.08 4.50 - 10.70 10*3/mm3 Final   05/11/2017 6.98 4.50 - 10.70 10*3/mm3 Final   02/23/2017 6.23 4.50 - 10.70 10*3/mm3 Final   10/24/2016 8.23 4.50 - 10.70 10*3/mm3  Final   09/29/2016 6.09 4.50 - 10.70 10*3/mm3 Final   07/18/2016 7.8 3.4 - 10.8 x10E3/uL Final     Hemoglobin   Date Value Ref Range Status   09/27/2024 13.0 12.0 - 15.9 g/dL Final   05/07/2024 12.8 12.0 - 15.9 g/dL Final   03/19/2024 8.4 (L) 12.0 - 15.9 g/dL Final   03/12/2024 9.1 (L) 12.0 - 15.9 g/dL Final   03/06/2024 9.2 (L) 12.0 - 15.9 g/dL Final   03/05/2024 8.1 (L) 12.0 - 15.9 g/dL Final   02/28/2024 8.0 (L) 12.0 - 15.9 g/dL Final   02/20/2024 7.5 (L) 12.0 - 15.9 g/dL Final   02/13/2024 8.6 (L) 12.0 - 15.9 g/dL Final   02/05/2024 9.0 (L) 12.0 - 15.9 g/dL Final   02/01/2024 9.1 (L) 12.0 - 15.9 g/dL Final   01/29/2024 9.4 (L) 12.0 - 15.9 g/dL Final   01/23/2024 9.7 (L) 12.0 - 15.9 g/dL Final   01/19/2024 9.4 (L) 12.0 - 15.9 g/dL Final   01/18/2024 8.6 (L) 12.0 - 15.9 g/dL Final   01/17/2024 9.1 (L) 12.0 - 15.9 g/dL Final   01/16/2024 9.2 (L) 12.0 - 15.9 g/dL Final   01/15/2024 8.9 (L) 12.0 - 15.9 g/dL Final   01/14/2024 9.1 (L) 12.0 - 15.9 g/dL Final   01/13/2024 9.1 (L) 12.0 - 15.9 g/dL Final   01/11/2024 9.8 (L) 12.0 - 15.9 g/dL Final   01/10/2024 10.3 (L) 12.0 - 15.9 g/dL Final   01/09/2024 10.1 (L) 12.0 - 15.9 g/dL Final   01/08/2024 9.3 (L) 12.0 - 15.9 g/dL Final   01/07/2024 11.5 (L) 12.0 - 15.9 g/dL Final   01/06/2024 10.1 (L) 12.0 - 15.9 g/dL Final   01/05/2024 10.3 (L) 12.0 - 15.9 g/dL Final   01/04/2024 10.0 (L) 12.0 - 15.9 g/dL Final   01/03/2024 9.6 (L) 12.0 - 15.9 g/dL Final   01/02/2024 10.5 (L) 12.0 - 15.9 g/dL Final   01/02/2024 11.2 (L) 12.0 - 15.9 g/dL Final   06/21/2023 14.4 12.0 - 15.9 g/dL Final   10/24/2018 11.5 (L) 11.9 - 15.5 g/dL Final   06/02/2017 10.5 (L) 11.9 - 15.5 g/dL Final   05/25/2017 8.1 (L) 11.9 - 15.5 g/dL Final   05/24/2017 7.9 (L) 11.9 - 15.5 g/dL Final   05/23/2017 9.1 (L) 11.9 - 15.5 g/dL Final   05/11/2017 9.4 (L) 11.9 - 15.5 g/dL Final   02/23/2017 9.5 (L) 11.9 - 15.5 g/dL Final   10/24/2016 9.6 (L) 11.9 - 15.5 g/dL Final   09/29/2016 9.5 (L) 11.9 - 15.5 g/dL Final      Comment:     Slide reviewed by technologist   07/18/2016 10.4 (L) 11.1 - 15.9 g/dL Final     Platelets   Date Value Ref Range Status   09/27/2024 204 140 - 450 10*3/mm3 Final   05/07/2024 237 140 - 450 10*3/mm3 Final   03/19/2024 385 140 - 450 10*3/mm3 Final   03/12/2024 358 140 - 450 10*3/mm3 Final   03/06/2024 468 (H) 140 - 450 10*3/mm3 Final   03/05/2024 476 (H) 140 - 450 10*3/mm3 Final   02/28/2024 642 (H) 140 - 450 10*3/mm3 Final   02/22/2024 719 (H) 140 - 450 10*3/mm3 Final   02/20/2024 679 (H) 140 - 450 10*3/mm3 Final   02/13/2024 465 (H) 140 - 450 10*3/mm3 Final   02/05/2024 447 140 - 450 10*3/mm3 Final   02/01/2024 524 (H) 140 - 450 10*3/mm3 Final   01/29/2024 572 (H) 140 - 450 10*3/mm3 Final   01/23/2024 547 (H) 140 - 450 10*3/mm3 Final   01/19/2024 490 (H) 140 - 450 10*3/mm3 Final   01/18/2024 514 (H) 140 - 450 10*3/mm3 Final   01/17/2024 541 (H) 140 - 450 10*3/mm3 Final   01/16/2024 534 (H) 140 - 450 10*3/mm3 Final   01/15/2024 603 (H) 140 - 450 10*3/mm3 Final   01/14/2024 550 (H) 140 - 450 10*3/mm3 Final   01/13/2024 528 (H) 140 - 450 10*3/mm3 Final   01/11/2024 502 (H) 140 - 450 10*3/mm3 Final   01/10/2024 392 140 - 450 10*3/mm3 Final   01/09/2024 439 140 - 450 10*3/mm3 Final   01/08/2024 378 140 - 450 10*3/mm3 Final   01/07/2024 371 140 - 450 10*3/mm3 Final   01/06/2024 384 140 - 450 10*3/mm3 Final   01/05/2024 412 140 - 450 10*3/mm3 Final   01/04/2024 446 140 - 450 10*3/mm3 Final   01/03/2024 441 140 - 450 10*3/mm3 Final   01/02/2024 480 (H) 140 - 450 10*3/mm3 Final   01/02/2024 568 (H) 140 - 450 10*3/mm3 Final   06/21/2023 256 140 - 450 10*3/mm3 Final   10/24/2018 300 140 - 500 10*3/mm3 Final   06/02/2017 389 140 - 500 10*3/mm3 Final   05/25/2017 238 140 - 500 10*3/mm3 Final   05/11/2017 343 140 - 500 10*3/mm3 Final   02/23/2017 381 140 - 500 10*3/mm3 Final   10/24/2016 409 140 - 500 10*3/mm3 Final   09/29/2016 352 140 - 500 10*3/mm3 Final   07/18/2016 350 150 - 379 x10E3/uL Final       Assessment &  Plan   There are no diagnoses linked to this encounter.        Tiana Hudson   *Iron deficiency anemia  6/2/2017: 7% iron saturation.  5/24/17: Ferritin 15.7, 4% iron saturation.  Not responding well to oral iron due to poor absorption (despite being on oral iron for years, remains anemic)  I explained IV iron as a possibility of life-threatening allergic reactions.  Patient understands this and wants to proceed if she remains iron deficient on today's labs  3/6/2024: Ferritin 211, 12% saturation.  Suspect the elevated ferritin may be due to inflammation as she still has a drain from the retroperitoneal abscess.  Venofer 200 mg x 3 doses.  5/7/2024: Ferritin 137, 17% saturation, Hb 12.8  9/27/2024: Ferritin 72, 16% saturation, Hb 13.  No need for IV iron currently.    *Source of iron deficiency  Patient states EGD and colonoscopy by Dr. Farooq Cruz, 2020 (after she was found to be iron deficient), and was unremarkable with a plan to repeat colonoscopy in 10 years    *Microcytosis, suspect due to iron deficiency  MCV 92    *Thrombocytosis, suspect due to iron deficiency      *Anemia of inflammation  Early 2024: Right retroperitoneal abscess requiring percutaneous drain  3/6/2024: Still has a drain in place  This can contribute to anemia  Hb 13    *Class II obesity.  This can lead to cytopenias through hepatic steatosis.  Body mass index is 45.45 kg/m².  BMI 25 to <30 is overweight  BMI 30 to <35 is class 1 obesity  BMI 35 to <40 is class 2 obesity  BMI 40 or higher is class 3 obesity   Remaining overweight.  Ideally, lose weight    Plan  MD 6 months.  At least 1 day prior: Ferritin, iron panel, CBC, reticulate hemoglobin  No need for Venofer currently

## 2024-09-20 ENCOUNTER — E-VISIT (OUTPATIENT)
Dept: FAMILY MEDICINE CLINIC | Facility: TELEHEALTH | Age: 64
End: 2024-09-20

## 2024-09-20 PROCEDURE — FABRICHEALTHVISIT: Performed by: NURSE PRACTITIONER

## 2024-09-27 ENCOUNTER — LAB (OUTPATIENT)
Dept: LAB | Facility: HOSPITAL | Age: 64
End: 2024-09-27
Payer: COMMERCIAL

## 2024-09-27 DIAGNOSIS — D50.9 IRON DEFICIENCY ANEMIA, UNSPECIFIED IRON DEFICIENCY ANEMIA TYPE: ICD-10-CM

## 2024-09-27 LAB
BASOPHILS # BLD AUTO: 0.05 10*3/MM3 (ref 0–0.2)
BASOPHILS NFR BLD AUTO: 0.9 % (ref 0–1.5)
DEPRECATED RDW RBC AUTO: 45 FL (ref 37–54)
EOSINOPHIL # BLD AUTO: 0.44 10*3/MM3 (ref 0–0.4)
EOSINOPHIL NFR BLD AUTO: 7.9 % (ref 0.3–6.2)
ERYTHROCYTE [DISTWIDTH] IN BLOOD BY AUTOMATED COUNT: 13.2 % (ref 12.3–15.4)
FERRITIN SERPL-MCNC: 71.9 NG/ML (ref 13–150)
HCT VFR BLD AUTO: 40.1 % (ref 34–46.6)
HGB BLD-MCNC: 13 G/DL (ref 12–15.9)
HGB RETIC QN AUTO: 33.2 PG (ref 29.8–36.1)
IMM GRANULOCYTES # BLD AUTO: 0.01 10*3/MM3 (ref 0–0.05)
IMM GRANULOCYTES NFR BLD AUTO: 0.2 % (ref 0–0.5)
IMM RETICS NFR: 8.4 % (ref 3–15.8)
IRON 24H UR-MRATE: 59 MCG/DL (ref 37–145)
IRON SATN MFR SERPL: 16 % (ref 20–50)
LYMPHOCYTES # BLD AUTO: 1.51 10*3/MM3 (ref 0.7–3.1)
LYMPHOCYTES NFR BLD AUTO: 27.3 % (ref 19.6–45.3)
MCH RBC QN AUTO: 29.8 PG (ref 26.6–33)
MCHC RBC AUTO-ENTMCNC: 32.4 G/DL (ref 31.5–35.7)
MCV RBC AUTO: 92 FL (ref 79–97)
MONOCYTES # BLD AUTO: 0.4 10*3/MM3 (ref 0.1–0.9)
MONOCYTES NFR BLD AUTO: 7.2 % (ref 5–12)
NEUTROPHILS NFR BLD AUTO: 3.13 10*3/MM3 (ref 1.7–7)
NEUTROPHILS NFR BLD AUTO: 56.5 % (ref 42.7–76)
NRBC BLD AUTO-RTO: 0 /100 WBC (ref 0–0.2)
PLATELET # BLD AUTO: 204 10*3/MM3 (ref 140–450)
PMV BLD AUTO: 10.3 FL (ref 6–12)
RBC # BLD AUTO: 4.36 10*6/MM3 (ref 3.77–5.28)
RETICS # AUTO: 0.06 10*6/MM3 (ref 0.02–0.13)
RETICS/RBC NFR AUTO: 1.45 % (ref 0.7–1.9)
TIBC SERPL-MCNC: 373 MCG/DL (ref 298–536)
TRANSFERRIN SERPL-MCNC: 250 MG/DL (ref 200–360)
WBC NRBC COR # BLD AUTO: 5.54 10*3/MM3 (ref 3.4–10.8)

## 2024-09-27 PROCEDURE — 36415 COLL VENOUS BLD VENIPUNCTURE: CPT

## 2024-09-27 PROCEDURE — 84466 ASSAY OF TRANSFERRIN: CPT

## 2024-09-27 PROCEDURE — 85025 COMPLETE CBC W/AUTO DIFF WBC: CPT

## 2024-09-27 PROCEDURE — 82728 ASSAY OF FERRITIN: CPT

## 2024-09-27 PROCEDURE — 83540 ASSAY OF IRON: CPT

## 2024-09-27 PROCEDURE — 85046 RETICYTE/HGB CONCENTRATE: CPT

## 2024-09-30 ENCOUNTER — OFFICE VISIT (OUTPATIENT)
Dept: ONCOLOGY | Facility: CLINIC | Age: 64
End: 2024-09-30
Payer: COMMERCIAL

## 2024-09-30 VITALS
HEART RATE: 90 BPM | DIASTOLIC BLOOD PRESSURE: 77 MMHG | HEIGHT: 64 IN | SYSTOLIC BLOOD PRESSURE: 132 MMHG | OXYGEN SATURATION: 93 % | BODY MASS INDEX: 45.23 KG/M2 | TEMPERATURE: 98.1 F | WEIGHT: 264.9 LBS

## 2024-09-30 DIAGNOSIS — D50.9 IRON DEFICIENCY ANEMIA, UNSPECIFIED IRON DEFICIENCY ANEMIA TYPE: Primary | ICD-10-CM

## 2024-09-30 PROCEDURE — 99214 OFFICE O/P EST MOD 30 MIN: CPT | Performed by: INTERNAL MEDICINE

## 2024-12-17 ENCOUNTER — E-VISIT (OUTPATIENT)
Dept: FAMILY MEDICINE CLINIC | Facility: TELEHEALTH | Age: 64
End: 2024-12-17

## 2024-12-17 PROCEDURE — FABRICHEALTHVISIT: Performed by: NURSE PRACTITIONER

## 2024-12-18 NOTE — E-VISIT TREATED
Date: 2024 20:42:34  Clinician: Mirela Carrera  Clinician NPI: [id number]  Patient: Tiana Hudson  Patient : 1960  Patient Address: [address] [place] [address]  Patient Phone: [phone]  Visit Protocol: URI  Patient Summary:  Tiana is a 64 year old ( : 1960 ) female who initiated a visit for cold, sinus infection, or influenza.     Tiana states the symptoms started 1-2 days ago.   Symptom start date: 12/15/2024   The symptoms consist of anosmia or   ageusia, facial pain or pressure, chills, a sore throat, myalgia, nasal congestion, malaise, ear pain, rhinitis, a cough, and a headache. Tiana is experiencing difficulty breathing due to nasal congestion but is not short of breath. Tiana also feels   feverish but was unable to measure temperature.   Symptom details     Nasal secretions: The color of the mucus is yellow and clear.    Cough: Tiana coughs a few times an hour and the cough is more bothersome at night. Phlegm comes into the throat when coughing. Tiana does not believe the cough is caused by post-nasal drip. The color of the phlegm is yellow and clear.     Sore throat: Tiana reports having severe throat pain (7-9 on a 10 point pain scale), does not have exudate on the tonsils, and can swallow liquids with mild discomfort. Tiana is not sure if the lymph nodes in the neck are enlarged. A rash has not   appeared on the skin since the sore throat started.     Facial pain or pressure: The facial pain or pressure feels worse when bending over or leaning forward.     Headache: The headache is moderate (4-6 on a 10 point pain scale).      Tiana denies having diarrhea, teeth pain, wheezing, nausea, and vomiting. Tiana also denies taking antibiotic medication in the past month, having recent facial or sinus surgery in the past 60 days, and having a sinus infection within the past year.     Precipitating events  Tiana is not sure if they have been exposed to someone with strep  throat. Tiana has not recently been exposed to someone with influenza. Tiana has been in close contact with the following high risk individuals: immunocompromised   people.   Tiana has not received the RSV vaccine.   Tiana has received the influenza vaccine more than 2 weeks ago.   Pertinent COVID-19 (Coronavirus) information  Since the symptoms started, Tiana has not tested for COVID-19.   Tiana has not had   COVID-19 in the last 3 months.   Tiana has received a COVID-19 vaccine in the past year.     Pertinent medical history   Tiana has diabetes. A provider has told Tiana that their diabetes is in control.   Tiana reports having the following conditions:     Mental health conditions    Tiana typically gets a yeast infection when an antibiotic is taken. Tiana has successfully used Diflucan to treat previous yeast infections. 2 doses of fluconazole (Diflucan) has typically been needed for symptoms to resolve   in the past.  A provider has not told Tiana to avoid NSAIDs.   Tiana denies having immunosuppressive conditions (e.g., chemotherapy, HIV, organ transplant, long-term use of steroids or other immunosuppressive medications, splenectomy). Tiana denies   having severe COPD, heart disease, and congestive heart failure. Tiana does not have asthma.   Tiana does not smoke or use smokeless tobacco. Tiana does not vape or use other e-cigarette products.   Additional information as reported by the patient (free   text): Mucinex multisyptom helps some. Hot tea helps.   Weight: 250 lbs (113.4 kg)    MEDICATIONS: acetaminophen oral, pantoprazole oral, diclofenac oral, ropinirole oral, famotidine oral, loratadine oral, tizanidine oral, oxybutynin chloride oral, Lantus Solostar U-100 Insulin subcutaneous, ondansetron oral, metformin oral, alcohol   swabs topical, Novolog Flexpen U-100 Insulin aspart subcutaneous, ALLERGIES: sulfasalazine  Clinician Response:  Dear Tiana,  Based on the information provided, you have viral  sinusitis, also known as a sinus infection. Sinus infections are caused by bacteria or a virus and symptoms are almost always identical. The difference between the 2   types of infections is timing.  Sinus infections start as viral infections and symptoms improve on their own in about 7 days. A bacterial infection may have developed if any of the following apply to you:     You have had 7 days of symptoms and are experiencing at least 2 of the following:       Fever    Facial pressure or headache    Green or yellow nasal mucus    Symptoms that improved and then got worse again       You have had symptoms for 10 or more days     Your health is our priority. Based on the information you have provided, it is possible that you may have some type of viral infection.  Please read the full treatment plan and see my recommendations below.  Medication information  Because you have a   viral infection, antibiotics will not help you get better. Treating a viral infection with antibiotics could actually make you feel worse.  I am prescribing:       Fluticasone 50 mcg/actuation nasal spray. Inhale 2 sprays in each nostril 1 time per day; after 1 week, may adjust to 1 - 2 sprays in each nostril 1 time per day. This medication takes several days to start working, so keep taking it even if it doesn't   help right away. There are no refills with this prescription.      Brompheniramine-pseudoeph-DM (Bromfed DM) 2-30-10 mg/5mL oral syrup. Take 10 milliliters by mouth every 4 hours as needed for cough. Do not take more than 60mL in 24 hours. There are no refills with this prescription.     Tips for using a nasal spray:     When the medication is being sprayed in your nose, point the tip of the nasal spray towards your ear.    Do not blow your nose after using the spray.    Do not lean your head back after using the spray as it will go down your throat.    Wipe the tip of the nose piece after use with a dry, clean tissue.     Self care   Steps you can take to be as comfortable as possible:     Rest.    Drink plenty of fluids.    Take a warm shower to loosen congestion.    Use a cool-mist humidifier.    Use throat lozenges.    Suck on frozen items such as popsicles.    Drink hot tea with lemon and honey.    Gargle with warm salt water (1/4 teaspoon of salt per 8 ounce glass of water).    Take a spoonful of honey to reduce your cough.     When to seek care  Please be seen in a clinic or urgent care if any of the following occur:   New symptoms develop, or symptoms become worse   Call 911 or go to the emergency room if any of the following occur:     Difficulty breathing    If you feel that your throat is closing off    Suddenly develop a rash    Unable to swallow fluids or are drooling     For the latest updates on COVID-19 (Coronavirus), please visit the Centers for Disease Control and Prevention (CDC). Also, your state and local health department websites may provide additional guidance regarding testing and isolation recommendations   for your location.   Diagnosis: COVID-19 (Coronavirus) concern  Diagnosis ICD: Z20.828    Follow up instructions: ATTENTION: If you have been prescribed medications, your prescriptions will not be sent until you choose your pharmacy.  To do so open the link within your notification, or go to [employer] and click eVisit in the menu to open your   treatment plan. From there, you can select your pharmacy at the bottom of your after visit summary. You can also go to [email or web address]  Prescriptions  Prescription: brompheniramine-pseudoeph-DM (Bromfed DM) 2-30-10 mg/5 mL oral syrup, take 10 milliliters by mouth every 4 hours as needed for cough  Sent To: [facility]/pharmacy #[id number] - [phone] - [address] [place] [address]  Prescription: fluticasone 50 mcg/actuation nasal spray,suspension, inhale 2 sprays in each nostril 1 time per day; after 1 week, may adjust to 1 - 2 sprays in each  nostril 1 time per day.  Sent To: [facility]/pharmacy #[id number] - [phone] - [address] [id number]

## 2025-03-12 ENCOUNTER — TELEPHONE (OUTPATIENT)
Dept: ONCOLOGY | Facility: CLINIC | Age: 65
End: 2025-03-12

## 2025-03-12 NOTE — TELEPHONE ENCOUNTER
Caller: Tiana Hudson    Relationship to patient: Self    Best call back number:   Telephone Information:   Mobile [phone]     Type of visit: LAB    Requested date: 3/18 IN THE MORNING     If rescheduling, when is the original appointment: 3/17

## 2025-03-21 ENCOUNTER — LAB (OUTPATIENT)
Dept: LAB | Facility: HOSPITAL | Age: 65
End: 2025-03-21
Payer: COMMERCIAL

## 2025-03-21 DIAGNOSIS — D50.9 IRON DEFICIENCY ANEMIA, UNSPECIFIED IRON DEFICIENCY ANEMIA TYPE: ICD-10-CM

## 2025-03-21 LAB
BASOPHILS # BLD AUTO: 0.08 10*3/MM3 (ref 0–0.2)
BASOPHILS NFR BLD AUTO: 1 % (ref 0–1.5)
DEPRECATED RDW RBC AUTO: 43.4 FL (ref 37–54)
EOSINOPHIL # BLD AUTO: 0.44 10*3/MM3 (ref 0–0.4)
EOSINOPHIL NFR BLD AUTO: 5.8 % (ref 0.3–6.2)
ERYTHROCYTE [DISTWIDTH] IN BLOOD BY AUTOMATED COUNT: 13.4 % (ref 12.3–15.4)
FERRITIN SERPL-MCNC: 38.3 NG/ML (ref 13–150)
HCT VFR BLD AUTO: 41.6 % (ref 34–46.6)
HGB BLD-MCNC: 13.3 G/DL (ref 12–15.9)
HGB RETIC QN AUTO: 32 PG (ref 29.8–36.1)
IMM GRANULOCYTES # BLD AUTO: 0.02 10*3/MM3 (ref 0–0.05)
IMM GRANULOCYTES NFR BLD AUTO: 0.3 % (ref 0–0.5)
IMM RETICS NFR: 15.7 % (ref 3–15.8)
IRON 24H UR-MRATE: 58 MCG/DL (ref 37–145)
IRON SATN MFR SERPL: 15 % (ref 20–50)
LYMPHOCYTES # BLD AUTO: 2.05 10*3/MM3 (ref 0.7–3.1)
LYMPHOCYTES NFR BLD AUTO: 26.9 % (ref 19.6–45.3)
MCH RBC QN AUTO: 28.6 PG (ref 26.6–33)
MCHC RBC AUTO-ENTMCNC: 32 G/DL (ref 31.5–35.7)
MCV RBC AUTO: 89.5 FL (ref 79–97)
MONOCYTES # BLD AUTO: 0.57 10*3/MM3 (ref 0.1–0.9)
MONOCYTES NFR BLD AUTO: 7.5 % (ref 5–12)
NEUTROPHILS NFR BLD AUTO: 4.46 10*3/MM3 (ref 1.7–7)
NEUTROPHILS NFR BLD AUTO: 58.5 % (ref 42.7–76)
NRBC BLD AUTO-RTO: 0 /100 WBC (ref 0–0.2)
PLATELET # BLD AUTO: 222 10*3/MM3 (ref 140–450)
PMV BLD AUTO: 10.2 FL (ref 6–12)
RBC # BLD AUTO: 4.65 10*6/MM3 (ref 3.77–5.28)
RETICS # AUTO: 0.09 10*6/MM3 (ref 0.02–0.13)
RETICS/RBC NFR AUTO: 1.99 % (ref 0.7–1.9)
TIBC SERPL-MCNC: 399 MCG/DL (ref 298–536)
TRANSFERRIN SERPL-MCNC: 268 MG/DL (ref 200–360)
WBC NRBC COR # BLD AUTO: 7.62 10*3/MM3 (ref 3.4–10.8)

## 2025-03-21 PROCEDURE — 85025 COMPLETE CBC W/AUTO DIFF WBC: CPT

## 2025-03-21 PROCEDURE — 83540 ASSAY OF IRON: CPT

## 2025-03-21 PROCEDURE — 85046 RETICYTE/HGB CONCENTRATE: CPT

## 2025-03-21 PROCEDURE — 84466 ASSAY OF TRANSFERRIN: CPT

## 2025-03-21 PROCEDURE — 36415 COLL VENOUS BLD VENIPUNCTURE: CPT

## 2025-03-21 PROCEDURE — 82728 ASSAY OF FERRITIN: CPT

## 2025-03-24 ENCOUNTER — OFFICE VISIT (OUTPATIENT)
Dept: ONCOLOGY | Facility: CLINIC | Age: 65
End: 2025-03-24
Payer: COMMERCIAL

## 2025-03-24 VITALS
WEIGHT: 291.2 LBS | DIASTOLIC BLOOD PRESSURE: 79 MMHG | BODY MASS INDEX: 49.72 KG/M2 | HEIGHT: 64 IN | OXYGEN SATURATION: 97 % | SYSTOLIC BLOOD PRESSURE: 124 MMHG | HEART RATE: 95 BPM | TEMPERATURE: 98.2 F

## 2025-03-24 DIAGNOSIS — D50.9 IRON DEFICIENCY ANEMIA, UNSPECIFIED IRON DEFICIENCY ANEMIA TYPE: Primary | ICD-10-CM

## 2025-03-24 PROBLEM — T45.4X5A ADVERSE EFFECT OF IRON AND ITS COMPOUNDS, INITIAL ENCOUNTER: Status: ACTIVE | Noted: 2025-03-24

## 2025-03-24 PROCEDURE — 99214 OFFICE O/P EST MOD 30 MIN: CPT | Performed by: INTERNAL MEDICINE

## 2025-03-24 RX ORDER — SODIUM CHLORIDE 9 MG/ML
20 INJECTION, SOLUTION INTRAVENOUS ONCE
OUTPATIENT
Start: 2025-04-28

## 2025-03-24 RX ORDER — SODIUM CHLORIDE 9 MG/ML
20 INJECTION, SOLUTION INTRAVENOUS ONCE
OUTPATIENT
Start: 2025-04-14

## 2025-03-24 RX ORDER — HYDROCORTISONE SODIUM SUCCINATE 100 MG/2ML
100 INJECTION INTRAMUSCULAR; INTRAVENOUS AS NEEDED
OUTPATIENT
Start: 2025-04-28

## 2025-03-24 RX ORDER — DIPHENHYDRAMINE HYDROCHLORIDE 50 MG/ML
50 INJECTION, SOLUTION INTRAMUSCULAR; INTRAVENOUS AS NEEDED
OUTPATIENT
Start: 2025-04-28

## 2025-03-24 RX ORDER — FAMOTIDINE 10 MG/ML
20 INJECTION, SOLUTION INTRAVENOUS AS NEEDED
OUTPATIENT
Start: 2025-04-28

## 2025-03-24 RX ORDER — CETIRIZINE HYDROCHLORIDE 10 MG/1
10 TABLET ORAL ONCE
OUTPATIENT
Start: 2025-04-28

## 2025-03-24 RX ORDER — FAMOTIDINE 10 MG/ML
20 INJECTION, SOLUTION INTRAVENOUS AS NEEDED
OUTPATIENT
Start: 2025-04-07

## 2025-03-24 RX ORDER — FAMOTIDINE 20 MG/1
20 TABLET, FILM COATED ORAL ONCE
OUTPATIENT
Start: 2025-04-28

## 2025-03-24 RX ORDER — FAMOTIDINE 20 MG/1
20 TABLET, FILM COATED ORAL ONCE
OUTPATIENT
Start: 2025-04-21

## 2025-03-24 RX ORDER — FAMOTIDINE 10 MG/ML
20 INJECTION, SOLUTION INTRAVENOUS AS NEEDED
OUTPATIENT
Start: 2025-04-14

## 2025-03-24 RX ORDER — FAMOTIDINE 10 MG/ML
20 INJECTION, SOLUTION INTRAVENOUS AS NEEDED
Status: CANCELLED | OUTPATIENT
Start: 2025-03-31

## 2025-03-24 RX ORDER — CETIRIZINE HYDROCHLORIDE 10 MG/1
10 TABLET ORAL ONCE
OUTPATIENT
Start: 2025-04-21

## 2025-03-24 RX ORDER — DIPHENHYDRAMINE HYDROCHLORIDE 50 MG/ML
50 INJECTION, SOLUTION INTRAMUSCULAR; INTRAVENOUS AS NEEDED
OUTPATIENT
Start: 2025-04-14

## 2025-03-24 RX ORDER — FAMOTIDINE 20 MG/1
20 TABLET, FILM COATED ORAL ONCE
OUTPATIENT
Start: 2025-04-14

## 2025-03-24 RX ORDER — CETIRIZINE HYDROCHLORIDE 10 MG/1
10 TABLET ORAL ONCE
Status: CANCELLED | OUTPATIENT
Start: 2025-03-31

## 2025-03-24 RX ORDER — SODIUM CHLORIDE 9 MG/ML
20 INJECTION, SOLUTION INTRAVENOUS ONCE
OUTPATIENT
Start: 2025-04-07

## 2025-03-24 RX ORDER — SODIUM CHLORIDE 9 MG/ML
20 INJECTION, SOLUTION INTRAVENOUS ONCE
OUTPATIENT
Start: 2025-04-21

## 2025-03-24 RX ORDER — FAMOTIDINE 20 MG/1
20 TABLET, FILM COATED ORAL ONCE
OUTPATIENT
Start: 2025-04-07

## 2025-03-24 RX ORDER — DIPHENHYDRAMINE HYDROCHLORIDE 50 MG/ML
50 INJECTION, SOLUTION INTRAMUSCULAR; INTRAVENOUS AS NEEDED
OUTPATIENT
Start: 2025-04-07

## 2025-03-24 RX ORDER — HYDROCORTISONE SODIUM SUCCINATE 100 MG/2ML
100 INJECTION INTRAMUSCULAR; INTRAVENOUS AS NEEDED
OUTPATIENT
Start: 2025-04-14

## 2025-03-24 RX ORDER — HYDROCORTISONE SODIUM SUCCINATE 100 MG/2ML
100 INJECTION INTRAMUSCULAR; INTRAVENOUS AS NEEDED
Status: CANCELLED | OUTPATIENT
Start: 2025-03-31

## 2025-03-24 RX ORDER — FAMOTIDINE 20 MG/1
20 TABLET, FILM COATED ORAL ONCE
Status: CANCELLED | OUTPATIENT
Start: 2025-03-31

## 2025-03-24 RX ORDER — DIPHENHYDRAMINE HYDROCHLORIDE 50 MG/ML
50 INJECTION, SOLUTION INTRAMUSCULAR; INTRAVENOUS AS NEEDED
OUTPATIENT
Start: 2025-04-21

## 2025-03-24 RX ORDER — FAMOTIDINE 10 MG/ML
20 INJECTION, SOLUTION INTRAVENOUS AS NEEDED
OUTPATIENT
Start: 2025-04-21

## 2025-03-24 RX ORDER — CETIRIZINE HYDROCHLORIDE 10 MG/1
10 TABLET ORAL ONCE
OUTPATIENT
Start: 2025-04-14

## 2025-03-24 RX ORDER — HYDROCORTISONE SODIUM SUCCINATE 100 MG/2ML
100 INJECTION INTRAMUSCULAR; INTRAVENOUS AS NEEDED
OUTPATIENT
Start: 2025-04-07

## 2025-03-24 RX ORDER — DIPHENHYDRAMINE HYDROCHLORIDE 50 MG/ML
50 INJECTION, SOLUTION INTRAMUSCULAR; INTRAVENOUS AS NEEDED
Status: CANCELLED | OUTPATIENT
Start: 2025-03-31

## 2025-03-24 RX ORDER — HYDROCORTISONE SODIUM SUCCINATE 100 MG/2ML
100 INJECTION INTRAMUSCULAR; INTRAVENOUS AS NEEDED
OUTPATIENT
Start: 2025-04-21

## 2025-03-24 RX ORDER — CETIRIZINE HYDROCHLORIDE 10 MG/1
10 TABLET ORAL ONCE
OUTPATIENT
Start: 2025-04-07

## 2025-03-24 RX ORDER — SODIUM CHLORIDE 9 MG/ML
20 INJECTION, SOLUTION INTRAVENOUS ONCE
Status: CANCELLED | OUTPATIENT
Start: 2025-03-31

## 2025-03-24 NOTE — PROGRESS NOTES
.     REASON FOR FOLLOWUP :   Iron deficiency anemia    HISTORY OF PRESENT ILLNESS:  The patient is a 64 y.o. year old female  who is here for follow-up with the above-mentioned history.    No new problems.  No bleeding.    Past Medical History:   Diagnosis Date    JADEN (acute kidney injury) 01/2024    Anemia     intermittently    Anxiety and depression     Arthritis     Cellulitis of abdominal wall 01/18/2024    in [place]    Depression     Diabetes mellitus     DKA (diabetic ketoacidosis) 01/2024    Gallstones     GERD (gastroesophageal reflux disease)     High cholesterol     History of frequent urinary tract infections     HX OF YEAST IN BLADDER HAS PRN DIFLUCAN    History of kidney stones     History of transfusion 05/24/2017    Had 2 iron infusions.  This was when i had knee replacement    Hyperlipidemia     Hypertension     Hypothyroidism     Knee pain, bilateral     Low back pain     Lumbar stenosis     Pleural effusion on right 01/2024    Incidentally seen on imaging in January 2024 during a hospital admission for right retroperitoneal abscess following ureteral stent placement. Plan is to have a CT on 3/16/24 and may possibily get an US Thoracentesis per pt.    PONV (postoperative nausea and vomiting)     Positive TB test     chest x-ray neg    Retroperitoneal abscess 01/2024    due to right calyceal rupture    Seasonal allergies     Sepsis 01/2024    associated with bilateral UPJ obstruction and moderate bilateral hydronephrosis and possible right calyceal rupture--hospitalized for 2 weeks.    SOB (shortness of breath) on exertion     Tattoos     UPJ (ureteropelvic junction) obstruction 01/2024    Varicose vein of leg      Past Surgical History:   Procedure Laterality Date    APPENDECTOMY N/A 1982    Dr. Wilson    CHOLECYSTECTOMY WITH INTRAOPERATIVE CHOLANGIOGRAM N/A 10/31/2018    Procedure: laparoscopic cholecystectomy;  Surgeon: Mary Kay Mac MD;  Location: [facility] OR;  Service: General     COLONOSCOPY      CYSTOSCOPY BLADDER BIOPSY N/A 10/03/2016    Procedure: CYSTOSCOPY BLADDER BIOPSY;  Surgeon: Rei Calvert MD;  Location:  [facility] MAIN OR;  Service:     CYSTOSCOPY W/ URETERAL STENT PLACEMENT Bilateral 01/03/2024    Procedure: CYSTOSCOPY BILATERAL RETROGRADE PYELOGRAM  BILATERAL URETERAL STENT INSERTION AND CATHETHER PLACEMENT;  Surgeon: Eduard Armando MD;  Location: [facility] MAIN OR;  Service: Urology;  Laterality: Bilateral;    DILATATION AND CURETTAGE N/A     ENDOSCOPY      EXPLORATORY LAPAROTOMY Left 1981    S&O    INTRAUTERINE DEVICE INSERTION      KNEE ARTHROSCOPY W/ MENISCAL REPAIR Left     OVARIAN CYST REMOVAL Left 1982    Dr. Wilson    TOTAL KNEE ARTHROPLASTY Right 05/22/2017    Procedure: RIGHT TOTAL KNEE ARTHROPLASTY WITH ALETA NAVIGATION;  Surgeon: Ross Cruz MD;  Location: [facility] MAIN OR;  Service:     URETEROSCOPY LASER LITHOTRIPSY WITH STENT INSERTION Bilateral 3/15/2024    Procedure: CYSTOSCOPY BILATERAL RETROGRADES POSSBLE URETEROSCOPY AND STENT REMOVAL;  Surgeon: Marvin Martinez MD;  Location: [facility] MAIN OR;  Service: Urology;  Laterality: Bilateral;    WISDOM TOOTH EXTRACTION         MEDICATIONS    Current Outpatient Medications:     acetaminophen (TYLENOL) 325 MG tablet, Take 2 tablets by mouth Every 4 (Four) Hours As Needed for Fever (fever greater than 100.4 °F or headache)., Disp: , Rfl:     Alcohol Swabs (Alcohol Pads) 70 % pads, Apply one alcohol swab to injection site of skin immediately prior to insulin injection., Disp: 100 each, Rfl: 12    amLODIPine-benazepril (LOTREL 5-20) 5-20 MG per capsule, Take 1 capsule by mouth Daily., Disp: , Rfl:     Blood Glucose Monitoring Suppl (Blood Glucose Monitor System) w/Device kit, Use to test blood glucose up to four times daily as needed, Disp: 1 each, Rfl: 0    diclofenac (VOLTAREN) 50 MG EC tablet, Take 1 tablet by mouth 2 (Two) Times a Day As Needed (pain)., Disp: 60 tablet, Rfl: 2    escitalopram  (LEXAPRO) 10 MG tablet, Take 1 tablet by mouth Daily. Indications: Major Depressive Disorder, Disp: , Rfl:     famotidine (PEPCID) 40 MG tablet, Take 1 tablet by mouth every night at bedtime., Disp: , Rfl:     glucose blood test strip, Use to test blood glucose up to four times daily as needed., Disp: 100 each, Rfl: 0    insulin aspart (NovoLOG FlexPen) 100 UNIT/ML solution pen-injector sc pen, Inject under the skin per sliding scale 4 times daily with meals and at bedtime. Max total daily dose of 36 units. -199 mg/dL - 2 units, -249 mg/dL - 4 units -299 mg/dL - 6 units -349 mg/dL - 7 units -400 mg/dL - 8 units BG > than 400 mg/dL - 9 units & Call Provider (Patient taking differently: Inject  under the skin into the appropriate area as directed Take As Directed. Inject under the skin per sliding scale 4 times daily with meals and at bedtime. Max total daily dose of 36 units. -199 mg/dL - 2 units, -249 mg/dL - 4 units -299 mg/dL - 6 units -349 mg/dL - 7 units -400 mg/dL - 8 units BG > than 400 mg/dL - 9 units & Call Provider  Indications: Type 2 Diabetes), Disp: 15 mL, Rfl: 0    Insulin Glargine (Lantus SoloStar) 100 UNIT/ML injection pen, Inject 20 Units under the skin into the appropriate area as directed 2 (Two) Times a Day., Disp: 15 mL, Rfl: 5    Insulin Pen Needle (Pen Needles) 32G X 4 MM misc, Inject 1 each under the skin into the appropriate area as directed 4 (Four) Times a Day As Needed (for insulin injections)., Disp: 100 each, Rfl: 0    Lancets misc, Use to test blood glucose up to four times daily as needed., Disp: 100 each, Rfl: 0    levothyroxine (SYNTHROID, LEVOTHROID) 150 MCG tablet, Take 1 tablet by mouth Every Morning., Disp: 30 tablet, Rfl: 3    loratadine (CLARITIN) 10 MG tablet, Take 1 tablet by mouth As Needed for Allergies. Indications: Hayfever, Disp: , Rfl:     metFORMIN (GLUCOPHAGE) 500 MG tablet, Take 1 tablet by mouth 2 (Two)  Times a Day With Meals., Disp: 360 tablet, Rfl: 0    multivitamin (THERAGRAN) tablet tablet, Take 1 tablet by mouth Every Night. Indications: Vitamin Deficiency, Disp: , Rfl:     ondansetron ODT (ZOFRAN-ODT) 4 MG disintegrating tablet, Place 1 tablet on the tongue Every 6 (Six) Hours As Needed for Nausea or Vomiting., Disp: 40 tablet, Rfl: 1    oxybutynin XL (DITROPAN-XL) 10 MG 24 hr tablet, Take 1 tablet by mouth Daily. Indications: Urinary Incontinence, Disp: , Rfl:     potassium chloride (K-DUR,KLOR-CON) 20 MEQ CR tablet, Take 1 tablet by mouth As Needed (only when takin torsemide). Indications: Low Amount of Potassium in the Blood, Disp: , Rfl:     promethazine (PHENERGAN) 25 MG tablet, Take 1 tablet by mouth Every 6 (Six) Hours As Needed., Disp: , Rfl:     rOPINIRole (REQUIP) 0.5 MG tablet, Take 1 tablet by mouth every night at bedtime., Disp: 30 tablet, Rfl: 0    tiZANidine (ZANAFLEX) 4 MG tablet, Take 1 tablet by mouth At Night As Needed for Muscle Spasms. Indications: Musculoskeletal Pain, Disp: , Rfl:     ALLERGIES:     Allergies   Allergen Reactions    Sulfa Antibiotics Hives and Rash       SOCIAL HISTORY:       Social History     Socioeconomic History    Marital status:      Spouse name: Brandon    Number of children: 2   Tobacco Use    Smoking status: Never    Smokeless tobacco: Never    Tobacco comments:     Never smoked   Vaping Use    Vaping status: Never Used   Substance and Sexual Activity    Alcohol use: Never     Alcohol/week: 3.0 standard drinks of alcohol     Types: 1 Glasses of wine, 2 Standard drinks or equivalent per week    Drug use: Never    Sexual activity: Not Currently     Partners: Male     Birth control/protection: I.U.D.         FAMILY HISTORY:  Family History   Problem Relation Age of Onset    Hypertension Mother     Hepatitis Mother     Breast cancer Mother     Heart disease Mother     Cancer Mother     Hyperlipidemia Mother              Anemia Mother     Heart  "failure Father     Stroke Father     Hypertension Father         Since he was 72, now 86s    Diabetes Father     Heart disease Father     Hyperlipidemia Father         Unsurs    Hypertension Brother     Diabetes Maternal Aunt     Diabetes Paternal Uncle     Heart disease Maternal Grandmother     Cancer Maternal Grandfather     COPD Maternal Grandfather     Arthritis Paternal Grandmother     Malig Hyperthermia Neg Hx        REVIEW OF SYSTEMS:  Review of Systems   Constitutional:  Negative for activity change.   HENT:  Negative for nosebleeds and trouble swallowing.    Respiratory:  Negative for shortness of breath and wheezing.    Cardiovascular:  Negative for chest pain and palpitations.   Gastrointestinal:  Negative for constipation, diarrhea and nausea.   Genitourinary:  Negative for dysuria and hematuria.   Musculoskeletal:  Negative for arthralgias and myalgias.   Skin:  Negative for rash and wound.   Neurological:  Negative for seizures and syncope.   Hematological:  Negative for adenopathy. Does not bruise/bleed easily.   Psychiatric/Behavioral:  Negative for confusion.               Vitals:    03/24/25 0917   BP: 124/79   Pulse: 95   Temp: 98.2 °F (36.8 °C)   TempSrc: Oral   SpO2: 97%   Weight: 132 kg (291 lb 3.2 oz)   Height: 162.6 cm (64.02\")   PainSc: 5   Comment: knees   PainLoc: Back             3/24/2025     9:18 AM   Current Status   ECOG score 1      PHYSICAL EXAM:        CONSTITUTIONAL:  Vital signs reviewed.  No distress, looks comfortable.  EYES:  Conjunctiva and lids unremarkable.  PERRLA  EARS,NOSE,MOUTH,THROAT:  Ears and nose appear unremarkable.  Lips, teeth, gums appear unremarkable.  RESPIRATORY:  Normal respiratory effort.  Lungs clear to auscultation bilaterally.  CARDIOVASCULAR:  Normal S1, S2.  No murmurs rubs or gallops.  No significant lower extremity edema.  GASTROINTESTINAL: Abdomen appears unremarkable.  Nontender.  No hepatomegaly.  No splenomegaly.  LYMPHATIC:  No cervical, " supraclavicular, axillary lymphadenopathy.  SKIN:  Warm.  No rashes.  PSYCHIATRIC:  Normal judgment and insight.  Normal mood and affect.      RECENT LABS:        WBC   Date Value Ref Range Status   03/21/2025 7.62 3.40 - 10.80 10*3/mm3 Final   09/27/2024 5.54 3.40 - 10.80 10*3/mm3 Final   05/07/2024 6.35 3.40 - 10.80 10*3/mm3 Final   03/19/2024 6.16 3.40 - 10.80 10*3/mm3 Final   03/12/2024 9.10 3.40 - 10.80 10*3/mm3 Final   03/06/2024 11.43 (H) 3.40 - 10.80 10*3/mm3 Final   03/05/2024 9.33 3.40 - 10.80 10*3/mm3 Final   02/28/2024 8.43 3.40 - 10.80 10*3/mm3 Final   02/20/2024 9.29 3.40 - 10.80 10*3/mm3 Final   02/13/2024 16.14 (H) 3.40 - 10.80 10*3/mm3 Final   02/05/2024 13.15 (H) 3.40 - 10.80 10*3/mm3 Final   02/01/2024 11.95 (H) 3.40 - 10.80 10*3/mm3 Final   01/29/2024 11.54 (H) 3.40 - 10.80 10*3/mm3 Final   01/23/2024 14.63 (H) 3.40 - 10.80 10*3/mm3 Final   01/19/2024 9.92 3.40 - 10.80 10*3/mm3 Final   01/18/2024 9.87 3.40 - 10.80 10*3/mm3 Final   01/17/2024 10.93 (H) 3.40 - 10.80 10*3/mm3 Final   01/16/2024 9.56 3.40 - 10.80 10*3/mm3 Final   01/15/2024 11.33 (H) 3.40 - 10.80 10*3/mm3 Final   01/14/2024 10.99 (H) 3.40 - 10.80 10*3/mm3 Final   01/13/2024 9.51 3.40 - 10.80 10*3/mm3 Final   01/11/2024 11.91 (H) 3.40 - 10.80 10*3/mm3 Final   01/10/2024 12.93 (H) 3.40 - 10.80 10*3/mm3 Final   01/09/2024 14.58 (H) 3.40 - 10.80 10*3/mm3 Final   01/08/2024 16.61 (H) 3.40 - 10.80 10*3/mm3 Final   01/07/2024 13.75 (H) 3.40 - 10.80 10*3/mm3 Final   01/06/2024 10.77 3.40 - 10.80 10*3/mm3 Final   01/05/2024 13.04 (H) 3.40 - 10.80 10*3/mm3 Final   01/04/2024 17.08 (H) 3.40 - 10.80 10*3/mm3 Final   01/03/2024 18.93 (H) 3.40 - 10.80 10*3/mm3 Final   01/02/2024 17.30 (H) 3.40 - 10.80 10*3/mm3 Final   01/02/2024 20.12 (H) 3.40 - 10.80 10*3/mm3 Final   06/21/2023 9.83 3.40 - 10.80 10*3/mm3 Final   10/24/2018 7.22 4.50 - 10.70 10*3/mm3 Final   06/02/2017 7.24 4.50 - 10.70 10*3/mm3 Final   05/25/2017 7.08 4.50 - 10.70 10*3/mm3 Final    05/11/2017 6.98 4.50 - 10.70 10*3/mm3 Final   02/23/2017 6.23 4.50 - 10.70 10*3/mm3 Final   10/24/2016 8.23 4.50 - 10.70 10*3/mm3 Final   09/29/2016 6.09 4.50 - 10.70 10*3/mm3 Final   07/18/2016 7.8 3.4 - 10.8 x10E3/uL Final     Hemoglobin   Date Value Ref Range Status   03/21/2025 13.3 12.0 - 15.9 g/dL Final   09/27/2024 13.0 12.0 - 15.9 g/dL Final   05/07/2024 12.8 12.0 - 15.9 g/dL Final   03/19/2024 8.4 (L) 12.0 - 15.9 g/dL Final   03/12/2024 9.1 (L) 12.0 - 15.9 g/dL Final   03/06/2024 9.2 (L) 12.0 - 15.9 g/dL Final   03/05/2024 8.1 (L) 12.0 - 15.9 g/dL Final   02/28/2024 8.0 (L) 12.0 - 15.9 g/dL Final   02/20/2024 7.5 (L) 12.0 - 15.9 g/dL Final   02/13/2024 8.6 (L) 12.0 - 15.9 g/dL Final   02/05/2024 9.0 (L) 12.0 - 15.9 g/dL Final   02/01/2024 9.1 (L) 12.0 - 15.9 g/dL Final   01/29/2024 9.4 (L) 12.0 - 15.9 g/dL Final   01/23/2024 9.7 (L) 12.0 - 15.9 g/dL Final   01/19/2024 9.4 (L) 12.0 - 15.9 g/dL Final   01/18/2024 8.6 (L) 12.0 - 15.9 g/dL Final   01/17/2024 9.1 (L) 12.0 - 15.9 g/dL Final   01/16/2024 9.2 (L) 12.0 - 15.9 g/dL Final   01/15/2024 8.9 (L) 12.0 - 15.9 g/dL Final   01/14/2024 9.1 (L) 12.0 - 15.9 g/dL Final   01/13/2024 9.1 (L) 12.0 - 15.9 g/dL Final   01/11/2024 9.8 (L) 12.0 - 15.9 g/dL Final   01/10/2024 10.3 (L) 12.0 - 15.9 g/dL Final   01/09/2024 10.1 (L) 12.0 - 15.9 g/dL Final   01/08/2024 9.3 (L) 12.0 - 15.9 g/dL Final   01/07/2024 11.5 (L) 12.0 - 15.9 g/dL Final   01/06/2024 10.1 (L) 12.0 - 15.9 g/dL Final   01/05/2024 10.3 (L) 12.0 - 15.9 g/dL Final   01/04/2024 10.0 (L) 12.0 - 15.9 g/dL Final   01/03/2024 9.6 (L) 12.0 - 15.9 g/dL Final   01/02/2024 10.5 (L) 12.0 - 15.9 g/dL Final   01/02/2024 11.2 (L) 12.0 - 15.9 g/dL Final   06/21/2023 14.4 12.0 - 15.9 g/dL Final   10/24/2018 11.5 (L) 11.9 - 15.5 g/dL Final   06/02/2017 10.5 (L) 11.9 - 15.5 g/dL Final   05/25/2017 8.1 (L) 11.9 - 15.5 g/dL Final   05/24/2017 7.9 (L) 11.9 - 15.5 g/dL Final   05/23/2017 9.1 (L) 11.9 - 15.5 g/dL Final    05/11/2017 9.4 (L) 11.9 - 15.5 g/dL Final   02/23/2017 9.5 (L) 11.9 - 15.5 g/dL Final   10/24/2016 9.6 (L) 11.9 - 15.5 g/dL Final   09/29/2016 9.5 (L) 11.9 - 15.5 g/dL Final     Comment:     Slide reviewed by technologist   07/18/2016 10.4 (L) 11.1 - 15.9 g/dL Final     Platelets   Date Value Ref Range Status   03/21/2025 222 140 - 450 10*3/mm3 Final   09/27/2024 204 140 - 450 10*3/mm3 Final   05/07/2024 237 140 - 450 10*3/mm3 Final   03/19/2024 385 140 - 450 10*3/mm3 Final   03/12/2024 358 140 - 450 10*3/mm3 Final   03/06/2024 468 (H) 140 - 450 10*3/mm3 Final   03/05/2024 476 (H) 140 - 450 10*3/mm3 Final   02/28/2024 642 (H) 140 - 450 10*3/mm3 Final   02/22/2024 719 (H) 140 - 450 10*3/mm3 Final   02/20/2024 679 (H) 140 - 450 10*3/mm3 Final   02/13/2024 465 (H) 140 - 450 10*3/mm3 Final   02/05/2024 447 140 - 450 10*3/mm3 Final   02/01/2024 524 (H) 140 - 450 10*3/mm3 Final   01/29/2024 572 (H) 140 - 450 10*3/mm3 Final   01/23/2024 547 (H) 140 - 450 10*3/mm3 Final   01/19/2024 490 (H) 140 - 450 10*3/mm3 Final   01/18/2024 514 (H) 140 - 450 10*3/mm3 Final   01/17/2024 541 (H) 140 - 450 10*3/mm3 Final   01/16/2024 534 (H) 140 - 450 10*3/mm3 Final   01/15/2024 603 (H) 140 - 450 10*3/mm3 Final   01/14/2024 550 (H) 140 - 450 10*3/mm3 Final   01/13/2024 528 (H) 140 - 450 10*3/mm3 Final   01/11/2024 502 (H) 140 - 450 10*3/mm3 Final   01/10/2024 392 140 - 450 10*3/mm3 Final   01/09/2024 439 140 - 450 10*3/mm3 Final   01/08/2024 378 140 - 450 10*3/mm3 Final   01/07/2024 371 140 - 450 10*3/mm3 Final   01/06/2024 384 140 - 450 10*3/mm3 Final   01/05/2024 412 140 - 450 10*3/mm3 Final   01/04/2024 446 140 - 450 10*3/mm3 Final   01/03/2024 441 140 - 450 10*3/mm3 Final   01/02/2024 480 (H) 140 - 450 10*3/mm3 Final   01/02/2024 568 (H) 140 - 450 10*3/mm3 Final   06/21/2023 256 140 - 450 10*3/mm3 Final   10/24/2018 300 140 - 500 10*3/mm3 Final   06/02/2017 389 140 - 500 10*3/mm3 Final   05/25/2017 238 140 - 500 10*3/mm3 Final    05/11/2017 343 140 - 500 10*3/mm3 Final   02/23/2017 381 140 - 500 10*3/mm3 Final   10/24/2016 409 140 - 500 10*3/mm3 Final   09/29/2016 352 140 - 500 10*3/mm3 Final   07/18/2016 350 150 - 379 x10E3/uL Final       Assessment & Plan   There are no diagnoses linked to this encounter.        Tiana Hudson   *Iron deficiency anemia  6/2/2017: 7% iron saturation.  5/24/17: Ferritin 15.7, 4% iron saturation.  Not responding well to oral iron due to poor absorption (despite being on oral iron for years, remains anemic)  I explained IV iron as a possibility of life-threatening allergic reactions.  Patient understands this and wants to proceed if she remains iron deficient on today's labs  3/6/2024: Ferritin 211, 12% saturation.  Suspect the elevated ferritin may be due to inflammation as she still has a drain from the retroperitoneal abscess.  Venofer 200 mg x 3 doses.  5/7/2024: Ferritin 137, 17% saturation, Hb 12.8  9/27/2024: Ferritin 72, 16% saturation, Hb 13.  No need for IV iron currently.  3/21/2025: Ferritin 38.3, 15% saturation, Hb 13.3.  Venofer 200 mg x 5 doses.    *Source of iron deficiency  Patient states EGD and colonoscopy by Dr. Farooq Cruz, 2020 (after she was found to be iron deficient), and was unremarkable with a plan to repeat colonoscopy in 10 years    *Microcytosis, suspect due to iron deficiency  MCV 85.5    *Thrombocytosis, suspect due to iron deficiency      *Anemia of inflammation  Early 2024: Right retroperitoneal abscess requiring percutaneous drain  3/6/2024: Still has a drain in place  This can contribute to anemia  Hb  13.3    *Class II obesity.  This can lead to cytopenias through hepatic steatosis.  Body mass index is 49.96 kg/m².  BMI 25 to <30 is overweight  BMI 30 to <35 is class 1 obesity  BMI 35 to <40 is class 2 obesity  BMI 40 or higher is class 3 obesity   Remains overweight.  Ideally, lose weight    Plan  MD 6 months.  At least 1 day prior: Ferritin, iron panel, CBC,  reticulate hemoglobin  Venofer 200 mg x 5 doses

## 2025-03-31 ENCOUNTER — INFUSION (OUTPATIENT)
Dept: ONCOLOGY | Facility: HOSPITAL | Age: 65
End: 2025-03-31
Payer: COMMERCIAL

## 2025-03-31 VITALS
RESPIRATION RATE: 16 BRPM | DIASTOLIC BLOOD PRESSURE: 67 MMHG | BODY MASS INDEX: 49.86 KG/M2 | WEIGHT: 290.6 LBS | OXYGEN SATURATION: 95 % | TEMPERATURE: 97.8 F | HEART RATE: 73 BPM | SYSTOLIC BLOOD PRESSURE: 104 MMHG

## 2025-03-31 DIAGNOSIS — D50.9 IRON DEFICIENCY ANEMIA, UNSPECIFIED IRON DEFICIENCY ANEMIA TYPE: ICD-10-CM

## 2025-03-31 DIAGNOSIS — T45.4X5A ADVERSE EFFECT OF IRON AND ITS COMPOUNDS, INITIAL ENCOUNTER: Primary | ICD-10-CM

## 2025-03-31 PROCEDURE — 96374 THER/PROPH/DIAG INJ IV PUSH: CPT

## 2025-03-31 PROCEDURE — 25010000002 IRON SUCROSE PER 1 MG: Performed by: INTERNAL MEDICINE

## 2025-03-31 RX ORDER — FAMOTIDINE 20 MG/1
20 TABLET, FILM COATED ORAL ONCE
Status: COMPLETED | OUTPATIENT
Start: 2025-03-31 | End: 2025-03-31

## 2025-03-31 RX ORDER — CETIRIZINE HYDROCHLORIDE 10 MG/1
10 TABLET ORAL ONCE
Status: COMPLETED | OUTPATIENT
Start: 2025-03-31 | End: 2025-03-31

## 2025-03-31 RX ADMIN — FAMOTIDINE 20 MG: 20 TABLET, FILM COATED ORAL at 08:01

## 2025-03-31 RX ADMIN — IRON SUCROSE 200 MG: 20 INJECTION, SOLUTION INTRAVENOUS at 08:26

## 2025-03-31 RX ADMIN — CETIRIZINE HYDROCHLORIDE 10 MG: 10 TABLET, FILM COATED ORAL at 08:00

## 2025-04-07 ENCOUNTER — INFUSION (OUTPATIENT)
Dept: ONCOLOGY | Facility: HOSPITAL | Age: 65
End: 2025-04-07
Payer: COMMERCIAL

## 2025-04-07 VITALS
OXYGEN SATURATION: 95 % | RESPIRATION RATE: 16 BRPM | HEART RATE: 113 BPM | TEMPERATURE: 98 F | DIASTOLIC BLOOD PRESSURE: 70 MMHG | BODY MASS INDEX: 50.27 KG/M2 | WEIGHT: 293 LBS | SYSTOLIC BLOOD PRESSURE: 154 MMHG

## 2025-04-07 DIAGNOSIS — T45.4X5A ADVERSE EFFECT OF IRON AND ITS COMPOUNDS, INITIAL ENCOUNTER: Primary | ICD-10-CM

## 2025-04-07 DIAGNOSIS — D50.9 IRON DEFICIENCY ANEMIA, UNSPECIFIED IRON DEFICIENCY ANEMIA TYPE: ICD-10-CM

## 2025-04-07 PROCEDURE — 96374 THER/PROPH/DIAG INJ IV PUSH: CPT

## 2025-04-07 PROCEDURE — 25010000002 IRON SUCROSE PER 1 MG: Performed by: INTERNAL MEDICINE

## 2025-04-07 RX ORDER — CETIRIZINE HYDROCHLORIDE 10 MG/1
10 TABLET ORAL ONCE
Status: COMPLETED | OUTPATIENT
Start: 2025-04-07 | End: 2025-04-07

## 2025-04-07 RX ORDER — FAMOTIDINE 20 MG/1
20 TABLET, FILM COATED ORAL ONCE
Status: COMPLETED | OUTPATIENT
Start: 2025-04-07 | End: 2025-04-07

## 2025-04-07 RX ADMIN — IRON SUCROSE 200 MG: 20 INJECTION, SOLUTION INTRAVENOUS at 08:18

## 2025-04-07 RX ADMIN — CETIRIZINE HYDROCHLORIDE 10 MG: 10 TABLET, FILM COATED ORAL at 08:00

## 2025-04-07 RX ADMIN — FAMOTIDINE 20 MG: 20 TABLET, FILM COATED ORAL at 08:00

## 2025-04-14 ENCOUNTER — INFUSION (OUTPATIENT)
Dept: ONCOLOGY | Facility: HOSPITAL | Age: 65
End: 2025-04-14
Payer: COMMERCIAL

## 2025-04-14 VITALS
OXYGEN SATURATION: 94 % | DIASTOLIC BLOOD PRESSURE: 67 MMHG | WEIGHT: 292.4 LBS | RESPIRATION RATE: 18 BRPM | SYSTOLIC BLOOD PRESSURE: 110 MMHG | TEMPERATURE: 98 F | BODY MASS INDEX: 50.17 KG/M2 | HEART RATE: 106 BPM

## 2025-04-14 DIAGNOSIS — D50.9 IRON DEFICIENCY ANEMIA, UNSPECIFIED IRON DEFICIENCY ANEMIA TYPE: ICD-10-CM

## 2025-04-14 DIAGNOSIS — T45.4X5A ADVERSE EFFECT OF IRON AND ITS COMPOUNDS, INITIAL ENCOUNTER: Primary | ICD-10-CM

## 2025-04-14 PROCEDURE — 96374 THER/PROPH/DIAG INJ IV PUSH: CPT

## 2025-04-14 PROCEDURE — 25010000002 IRON SUCROSE PER 1 MG: Performed by: INTERNAL MEDICINE

## 2025-04-14 RX ORDER — FAMOTIDINE 20 MG/1
20 TABLET, FILM COATED ORAL ONCE
Status: COMPLETED | OUTPATIENT
Start: 2025-04-14 | End: 2025-04-14

## 2025-04-14 RX ORDER — CETIRIZINE HYDROCHLORIDE 10 MG/1
10 TABLET ORAL ONCE
Status: COMPLETED | OUTPATIENT
Start: 2025-04-14 | End: 2025-04-14

## 2025-04-14 RX ADMIN — FAMOTIDINE 20 MG: 20 TABLET, FILM COATED ORAL at 08:13

## 2025-04-14 RX ADMIN — IRON SUCROSE 200 MG: 20 INJECTION, SOLUTION INTRAVENOUS at 08:28

## 2025-04-14 RX ADMIN — CETIRIZINE HYDROCHLORIDE 10 MG: 10 TABLET, FILM COATED ORAL at 08:13

## 2025-04-21 ENCOUNTER — INFUSION (OUTPATIENT)
Dept: ONCOLOGY | Facility: HOSPITAL | Age: 65
End: 2025-04-21
Payer: COMMERCIAL

## 2025-04-21 VITALS
HEART RATE: 77 BPM | SYSTOLIC BLOOD PRESSURE: 117 MMHG | BODY MASS INDEX: 49.86 KG/M2 | OXYGEN SATURATION: 95 % | RESPIRATION RATE: 16 BRPM | WEIGHT: 290.6 LBS | DIASTOLIC BLOOD PRESSURE: 73 MMHG | TEMPERATURE: 98 F

## 2025-04-21 DIAGNOSIS — D50.9 IRON DEFICIENCY ANEMIA, UNSPECIFIED IRON DEFICIENCY ANEMIA TYPE: ICD-10-CM

## 2025-04-21 DIAGNOSIS — T45.4X5A ADVERSE EFFECT OF IRON AND ITS COMPOUNDS, INITIAL ENCOUNTER: Primary | ICD-10-CM

## 2025-04-21 PROCEDURE — 25010000002 IRON SUCROSE PER 1 MG: Performed by: INTERNAL MEDICINE

## 2025-04-21 PROCEDURE — 96374 THER/PROPH/DIAG INJ IV PUSH: CPT

## 2025-04-21 RX ORDER — CETIRIZINE HYDROCHLORIDE 10 MG/1
10 TABLET ORAL ONCE
Status: COMPLETED | OUTPATIENT
Start: 2025-04-21 | End: 2025-04-21

## 2025-04-21 RX ORDER — FAMOTIDINE 20 MG/1
20 TABLET, FILM COATED ORAL ONCE
Status: COMPLETED | OUTPATIENT
Start: 2025-04-21 | End: 2025-04-21

## 2025-04-21 RX ADMIN — CETIRIZINE HYDROCHLORIDE 10 MG: 10 TABLET, FILM COATED ORAL at 08:05

## 2025-04-21 RX ADMIN — FAMOTIDINE 20 MG: 20 TABLET, FILM COATED ORAL at 08:05

## 2025-04-21 RX ADMIN — IRON SUCROSE 200 MG: 20 INJECTION, SOLUTION INTRAVENOUS at 08:22

## 2025-04-28 ENCOUNTER — INFUSION (OUTPATIENT)
Dept: ONCOLOGY | Facility: HOSPITAL | Age: 65
End: 2025-04-28
Payer: COMMERCIAL

## 2025-04-28 VITALS
SYSTOLIC BLOOD PRESSURE: 120 MMHG | HEART RATE: 75 BPM | WEIGHT: 292.8 LBS | DIASTOLIC BLOOD PRESSURE: 68 MMHG | OXYGEN SATURATION: 95 % | BODY MASS INDEX: 50.23 KG/M2 | TEMPERATURE: 98 F | RESPIRATION RATE: 16 BRPM

## 2025-04-28 DIAGNOSIS — T45.4X5A ADVERSE EFFECT OF IRON AND ITS COMPOUNDS, INITIAL ENCOUNTER: Primary | ICD-10-CM

## 2025-04-28 DIAGNOSIS — D50.9 IRON DEFICIENCY ANEMIA, UNSPECIFIED IRON DEFICIENCY ANEMIA TYPE: ICD-10-CM

## 2025-04-28 PROCEDURE — 96374 THER/PROPH/DIAG INJ IV PUSH: CPT

## 2025-04-28 PROCEDURE — 96365 THER/PROPH/DIAG IV INF INIT: CPT

## 2025-04-28 PROCEDURE — 25010000002 IRON SUCROSE PER 1 MG: Performed by: INTERNAL MEDICINE

## 2025-04-28 RX ORDER — CETIRIZINE HYDROCHLORIDE 10 MG/1
10 TABLET ORAL ONCE
Status: COMPLETED | OUTPATIENT
Start: 2025-04-28 | End: 2025-04-28

## 2025-04-28 RX ORDER — FAMOTIDINE 20 MG/1
20 TABLET, FILM COATED ORAL ONCE
Status: COMPLETED | OUTPATIENT
Start: 2025-04-28 | End: 2025-04-28

## 2025-04-28 RX ADMIN — CETIRIZINE HYDROCHLORIDE 10 MG: 10 TABLET, FILM COATED ORAL at 08:08

## 2025-04-28 RX ADMIN — IRON SUCROSE 200 MG: 20 INJECTION, SOLUTION INTRAVENOUS at 08:38

## 2025-04-28 RX ADMIN — FAMOTIDINE 20 MG: 20 TABLET, FILM COATED ORAL at 08:08

## 2025-05-07 ENCOUNTER — PATIENT ROUNDING (BHMG ONLY) (OUTPATIENT)
Dept: SPORTS MEDICINE | Facility: CLINIC | Age: 65
End: 2025-05-07
Payer: COMMERCIAL

## 2025-05-07 NOTE — ED NOTES
Thank you for letting us care for you in your recent visit to our [facility] urgent care center. We would love to hear about your experience with us. Was this the first time you have visited our location?         We’re always looking for ways to make our patients’ experiences even better. Do you have any recommendations on ways we may improve?         I appreciate you taking the time to respond. Please be on the lookout for a survey about your recent visit from [employer] via text or email. We would greatly appreciate if you could fill that out and turn it back in. We want your voice to be heard and we value your feedback.    Thank you for choosing [facility] for your healthcare needs.         If you have concerns or would like to speak to me in person regarding your visit please feel free to give me a call. [phone]         Hope you get well soon and thank you.         Lisa Shannon  Practice Manager

## 2025-08-04 ENCOUNTER — TRANSCRIBE ORDERS (OUTPATIENT)
Dept: ADMINISTRATIVE | Facility: HOSPITAL | Age: 65
End: 2025-08-04
Payer: COMMERCIAL

## 2025-08-04 DIAGNOSIS — Z13.820 ENCOUNTER FOR SCREENING FOR OSTEOPOROSIS: ICD-10-CM

## 2025-08-04 DIAGNOSIS — Z12.31 VISIT FOR SCREENING MAMMOGRAM: Primary | ICD-10-CM

## 2025-08-29 ENCOUNTER — DOCUMENTATION (OUTPATIENT)
Dept: OBSTETRICS AND GYNECOLOGY | Facility: CLINIC | Age: 65
End: 2025-08-29
Payer: COMMERCIAL

## 2025-08-29 DIAGNOSIS — N39.0 URINARY TRACT INFECTION WITHOUT HEMATURIA, SITE UNSPECIFIED: Primary | ICD-10-CM

## 2025-08-29 RX ORDER — CIPROFLOXACIN 500 MG/1
500 TABLET, FILM COATED ORAL EVERY 12 HOURS SCHEDULED
Status: SHIPPED | OUTPATIENT
Start: 2025-08-29 | End: 2025-09-05

## 2025-08-29 RX ORDER — PHENAZOPYRIDINE HYDROCHLORIDE 200 MG/1
200 TABLET, FILM COATED ORAL 3 TIMES DAILY PRN
Qty: 20 TABLET | Refills: 0 | Status: SHIPPED | OUTPATIENT
Start: 2025-08-29

## 2025-08-29 RX ORDER — CIPROFLOXACIN 250 MG/1
500 TABLET, FILM COATED ORAL 2 TIMES DAILY
Qty: 28 TABLET | Refills: 0 | Status: SHIPPED | OUTPATIENT
Start: 2025-08-29 | End: 2025-09-05

## (undated) DEVICE — SYR LUERLOK 30CC

## (undated) DEVICE — STPCK 3WY D201 DISCOFIX

## (undated) DEVICE — LOU LAP CHOLE: Brand: MEDLINE INDUSTRIES, INC.

## (undated) DEVICE — PK URETSCP 40

## (undated) DEVICE — TIDISHIELD UROLOGY DRAIN BAGS FROSTY VINYL STERILE FITS SIEMENS UROSKOP ACCESS 20 PER CASE: Brand: TIDISHIELD

## (undated) DEVICE — CATH CHOLANG 4.5F18IN BRGNDY

## (undated) DEVICE — LOU CYSTO: Brand: MEDLINE INDUSTRIES, INC.

## (undated) DEVICE — BNDG ELAS ELITE V/CLOSE 6IN 5YD LF STRL

## (undated) DEVICE — ENCORE® LATEX ORTHO SIZE 8, STERILE LATEX POWDER-FREE SURGICAL GLOVE: Brand: ENCORE

## (undated) DEVICE — SUT VICRYL 1 CT1 27IN  JJ40G

## (undated) DEVICE — DUAL CUT SAGITTAL BLADE

## (undated) DEVICE — SOL NACL 0.9PCT 1000ML

## (undated) DEVICE — ENDOPATH XCEL BLADELESS TROCARS WITH STABILITY SLEEVES: Brand: ENDOPATH XCEL

## (undated) DEVICE — SKIN AFFIX SURG ADHESIVE 72/CS 0.55ML: Brand: MEDLINE

## (undated) DEVICE — GLV SURG SIGNATURE ESSENTIAL PF LTX SZ8

## (undated) DEVICE — COVER,MAYO STAND,STERILE: Brand: MEDLINE

## (undated) DEVICE — ENDOPATH PNEUMONEEDLE INSUFFLATION NEEDLES WITH LUER LOCK CONNECTORS 120MM: Brand: ENDOPATH

## (undated) DEVICE — EXTENSION SET, MALE LUER LOCK ADAPTER WITH RETRACTABLE COLLAR

## (undated) DEVICE — GLV SURG SENSICARE MICRO PF LF 8 STRL

## (undated) DEVICE — GLV SURG BIOGEL LTX PF 7

## (undated) DEVICE — DRAPE,REIN 53X77,STERILE: Brand: MEDLINE

## (undated) DEVICE — CATH URETRL FLXITP POLLACK STD 5F 70CM

## (undated) DEVICE — STPLR SKIN VISISTAT WD 35CT

## (undated) DEVICE — CONTAINER,SPECIMEN,OR STERILE,4OZ: Brand: MEDLINE

## (undated) DEVICE — SKIN PREP TRAY W/CHG: Brand: MEDLINE INDUSTRIES, INC.

## (undated) DEVICE — SUT VIC 0 TN 27IN DYED JTN0G

## (undated) DEVICE — CLIP LIGAT VASC HORIZON TI MD/LG GRN 6CT: Type: IMPLANTABLE DEVICE | Status: NON-FUNCTIONAL

## (undated) DEVICE — GLV SURG TRIUMPH CLASSIC PF LTX 9 STRL

## (undated) DEVICE — UNDERCAST PADDING: Brand: DEROYAL

## (undated) DEVICE — PAD,ABDOMINAL,8"X10",ST,LF: Brand: MEDLINE

## (undated) DEVICE — FIBR LASR FLEXIVAPULSE242 HOLMIUM FLT/TP 1P/U

## (undated) DEVICE — SUT VIC 5/0 PS2 18IN J495H

## (undated) DEVICE — OCCLUSIVE GAUZE STRIP,3% BISMUTH TRIBROMOPHENATE IN PETROLATUM BLEND: Brand: XEROFORM

## (undated) DEVICE — P.F.C. DRILL BIT AND STEINMAN PIN PACKET (1 UNIT) .125IN DIA 5IN LGTH: Brand: P.F.C.

## (undated) DEVICE — ENDOCUT SCISSOR TIP, DISPOSABLE: Brand: RENEW

## (undated) DEVICE — CATH IV INSYTE AUTOGARD 14G 1 1/2IN ORNG

## (undated) DEVICE — NITINOL WIRE WITH HYDROPHILIC TIP: Brand: SENSOR

## (undated) DEVICE — ENCORE® LATEX ORTHO SIZE 9, STERILE LATEX POWDER-FREE SURGICAL GLOVE: Brand: ENCORE

## (undated) DEVICE — GLV SURG BIOGEL LTX PF 6 1/2

## (undated) DEVICE — GAUZE,SPONGE,FLUFF,6"X6.75",STRL,10/TRAY: Brand: MEDLINE

## (undated) DEVICE — INSTRUMENT BATTERY

## (undated) DEVICE — GLV SURG TRIUMPH CLASSIC PF LTX 8 STRL

## (undated) DEVICE — ENDOPOUCH RETRIEVER SPECIMEN RETRIEVAL BAGS: Brand: ENDOPOUCH RETRIEVER

## (undated) DEVICE — NDL HYPO PRECISIONGLIDE REG 25G 1 1/2

## (undated) DEVICE — PREMIUM WET SKIN PREP TRAY: Brand: MEDLINE INDUSTRIES, INC.

## (undated) DEVICE — MAT FLR ABSORBENT LG 4FT 10 2.5FT

## (undated) DEVICE — APPL CHLORAPREP W/TINT 26ML ORNG

## (undated) DEVICE — PK KN TOTL 40

## (undated) DEVICE — NDL SPINE 18G 31/2IN PNK